# Patient Record
Sex: FEMALE | Race: WHITE | NOT HISPANIC OR LATINO | Employment: OTHER | URBAN - METROPOLITAN AREA
[De-identification: names, ages, dates, MRNs, and addresses within clinical notes are randomized per-mention and may not be internally consistent; named-entity substitution may affect disease eponyms.]

---

## 2017-02-06 RX ORDER — CYCLOBENZAPRINE HCL 10 MG
TABLET ORAL
Qty: 60 TABLET | Refills: 0 | Status: SHIPPED | OUTPATIENT
Start: 2017-02-06 | End: 2017-03-06 | Stop reason: SDUPTHER

## 2017-02-08 RX ORDER — TRAMADOL HYDROCHLORIDE 50 MG/1
TABLET ORAL
Qty: 20 TABLET | Refills: 0 | Status: SHIPPED | OUTPATIENT
Start: 2017-02-08 | End: 2017-02-28 | Stop reason: SDUPTHER

## 2017-02-27 RX ORDER — GABAPENTIN 800 MG/1
TABLET ORAL
Qty: 270 TABLET | Refills: 1 | Status: SHIPPED | OUTPATIENT
Start: 2017-02-27 | End: 2017-08-27 | Stop reason: SDUPTHER

## 2017-02-28 ENCOUNTER — OFFICE VISIT (OUTPATIENT)
Dept: FAMILY MEDICINE CLINIC | Facility: CLINIC | Age: 62
End: 2017-02-28

## 2017-02-28 VITALS
WEIGHT: 154 LBS | TEMPERATURE: 98 F | SYSTOLIC BLOOD PRESSURE: 140 MMHG | RESPIRATION RATE: 16 BRPM | BODY MASS INDEX: 25.66 KG/M2 | HEIGHT: 65 IN | DIASTOLIC BLOOD PRESSURE: 90 MMHG

## 2017-02-28 DIAGNOSIS — I10 ESSENTIAL HYPERTENSION: Primary | ICD-10-CM

## 2017-02-28 DIAGNOSIS — M54.50 CHRONIC BILATERAL LOW BACK PAIN WITHOUT SCIATICA: ICD-10-CM

## 2017-02-28 DIAGNOSIS — G89.29 CHRONIC BILATERAL LOW BACK PAIN WITHOUT SCIATICA: ICD-10-CM

## 2017-02-28 DIAGNOSIS — G47.10 HYPERSOMNOLENCE: ICD-10-CM

## 2017-02-28 PROCEDURE — 99213 OFFICE O/P EST LOW 20 MIN: CPT | Performed by: FAMILY MEDICINE

## 2017-02-28 RX ORDER — AMOXICILLIN 875 MG/1
875 TABLET, COATED ORAL 2 TIMES DAILY
Qty: 14 TABLET | Refills: 0 | Status: SHIPPED | OUTPATIENT
Start: 2017-02-28 | End: 2017-07-31

## 2017-02-28 RX ORDER — MELOXICAM 15 MG/1
15 TABLET ORAL DAILY PRN
Qty: 30 TABLET | Refills: 5 | Status: SHIPPED | OUTPATIENT
Start: 2017-02-28 | End: 2017-10-09 | Stop reason: SDUPTHER

## 2017-02-28 RX ORDER — TRAMADOL HYDROCHLORIDE 50 MG/1
50 TABLET ORAL EVERY 8 HOURS PRN
Qty: 50 TABLET | Refills: 0 | Status: SHIPPED | OUTPATIENT
Start: 2017-02-28 | End: 2017-03-24 | Stop reason: SDUPTHER

## 2017-02-28 NOTE — PATIENT INSTRUCTIONS
This is a very nice 61-year-old who is here for follow-up.  She appears to be doing very well so I will renew her prescription.  I would like her to call if there is a problem.

## 2017-02-28 NOTE — PROGRESS NOTES
Subjective   Jayne Beltran is a 61 y.o. female presenting with   Chief Complaint   Patient presents with   • Diabetes   • Hypertension   • Medication Check up   • Med Refill     TRAMADOL  and  MELOXICAM    • Sore Throat        HPI Comments: 61-year-old  white female former smoker comes in today for follow-up for high blood pressure and chronic back pain and for refill on her tramadol and meloxicam.  She tells me that she is doing well physically but she is somewhat tired of dealing with her  Golden.  She said that he does not want to do anything but sit around the house and watch TV.    Otherwise she is fine without any current complaints, She lately has developed a sore throat and sinus pressure and she tells me that Golden had to be put on antibiotics for cellulitis of his leg.    Diabetes     Hypertension     Sore Throat           The following portions of the patient's history were reviewed and updated as appropriate: current medications, past family history, past medical history, past social history, past surgical history and problem list.    Review of Systems   HENT: Positive for sore throat.    Musculoskeletal: Positive for back pain.   All other systems reviewed and are negative.      Objective   Physical Exam   Constitutional: She is oriented to person, place, and time. She appears well-developed and well-nourished. No distress.   HENT:   Head: Normocephalic and atraumatic.   Nose: Mucosal edema and rhinorrhea present.   Mouth/Throat: Posterior oropharyngeal erythema present. No oropharyngeal exudate or posterior oropharyngeal edema.   Eyes: EOM are normal. Pupils are equal, round, and reactive to light.   Neck: Normal range of motion. Neck supple. No tracheal deviation present. No thyromegaly present.   Cardiovascular: Normal rate, regular rhythm, normal heart sounds and intact distal pulses.  Exam reveals no friction rub.    No murmur heard.  Pulmonary/Chest: Effort normal and breath  sounds normal. No respiratory distress. She has no wheezes.   Musculoskeletal: Normal range of motion. She exhibits no edema or tenderness.   Neurological: She is alert and oriented to person, place, and time. No cranial nerve deficit. Coordination normal.   Skin: Skin is warm and dry.   Psychiatric: She has a normal mood and affect. Her behavior is normal.   Nursing note and vitals reviewed.      Assessment/Plan   Jayne was seen today for diabetes, hypertension, medication check up, med refill and sore throat.    Diagnoses and all orders for this visit:    Essential hypertension  -     Comprehensive Metabolic Panel  -     TSH    Hypersomnolence    Chronic bilateral low back pain without sciatica    Other orders  -     meloxicam (MOBIC) 15 MG tablet; Take 1 tablet by mouth Daily As Needed for mild pain (1-3).  -     traMADol (ULTRAM) 50 MG tablet; Take 1 tablet by mouth Every 8 (Eight) Hours As Needed for moderate pain (4-6).                   I would like him to return for another visit in 6 month(s)

## 2017-03-01 LAB
ALBUMIN SERPL-MCNC: 4.5 G/DL (ref 3.5–5.2)
ALBUMIN/GLOB SERPL: 1.7 G/DL
ALP SERPL-CCNC: 125 U/L (ref 39–117)
ALT SERPL-CCNC: 55 U/L (ref 1–33)
AST SERPL-CCNC: 32 U/L (ref 1–32)
BILIRUB SERPL-MCNC: 0.6 MG/DL (ref 0.1–1.2)
BUN SERPL-MCNC: 16 MG/DL (ref 8–23)
BUN/CREAT SERPL: 18.8 (ref 7–25)
CALCIUM SERPL-MCNC: 10.1 MG/DL (ref 8.6–10.5)
CHLORIDE SERPL-SCNC: 98 MMOL/L (ref 98–107)
CO2 SERPL-SCNC: 29.5 MMOL/L (ref 22–29)
CREAT SERPL-MCNC: 0.85 MG/DL (ref 0.57–1)
GLOBULIN SER CALC-MCNC: 2.6 GM/DL
GLUCOSE SERPL-MCNC: 120 MG/DL (ref 65–99)
POTASSIUM SERPL-SCNC: 3.7 MMOL/L (ref 3.5–5.2)
PROT SERPL-MCNC: 7.1 G/DL (ref 6–8.5)
SODIUM SERPL-SCNC: 144 MMOL/L (ref 136–145)
TSH SERPL DL<=0.005 MIU/L-ACNC: 1.22 MIU/ML (ref 0.27–4.2)

## 2017-03-07 RX ORDER — CYCLOBENZAPRINE HCL 10 MG
TABLET ORAL
Qty: 60 TABLET | Refills: 0 | Status: SHIPPED | OUTPATIENT
Start: 2017-03-07 | End: 2017-04-19 | Stop reason: SDUPTHER

## 2017-03-07 RX ORDER — CLONIDINE HYDROCHLORIDE 0.2 MG/1
TABLET ORAL
Qty: 180 TABLET | Refills: 1 | Status: SHIPPED | OUTPATIENT
Start: 2017-03-07 | End: 2017-09-20 | Stop reason: SDUPTHER

## 2017-03-24 RX ORDER — ROPINIROLE 1 MG/1
TABLET, FILM COATED ORAL
Qty: 90 TABLET | Refills: 0 | Status: SHIPPED | OUTPATIENT
Start: 2017-03-24 | End: 2017-06-01 | Stop reason: SDUPTHER

## 2017-03-24 RX ORDER — TRAMADOL HYDROCHLORIDE 50 MG/1
TABLET ORAL
Qty: 50 TABLET | Refills: 0 | Status: SHIPPED | OUTPATIENT
Start: 2017-03-24 | End: 2017-04-19 | Stop reason: SDUPTHER

## 2017-04-20 RX ORDER — TRAMADOL HYDROCHLORIDE 50 MG/1
TABLET ORAL
Qty: 50 TABLET | Refills: 0 | OUTPATIENT
Start: 2017-04-20 | End: 2017-05-14 | Stop reason: SDUPTHER

## 2017-04-20 RX ORDER — CYCLOBENZAPRINE HCL 10 MG
TABLET ORAL
Qty: 60 TABLET | Refills: 0 | Status: SHIPPED | OUTPATIENT
Start: 2017-04-20 | End: 2017-06-01 | Stop reason: SDUPTHER

## 2017-04-24 RX ORDER — METOPROLOL SUCCINATE 200 MG/1
TABLET, EXTENDED RELEASE ORAL
Qty: 90 TABLET | Refills: 1 | Status: SHIPPED | OUTPATIENT
Start: 2017-04-24 | End: 2017-10-29 | Stop reason: SDUPTHER

## 2017-05-15 RX ORDER — TRAMADOL HYDROCHLORIDE 50 MG/1
TABLET ORAL
Qty: 50 TABLET | Refills: 0 | Status: SHIPPED | OUTPATIENT
Start: 2017-05-15 | End: 2017-06-12 | Stop reason: SDUPTHER

## 2017-05-18 RX ORDER — MELOXICAM 15 MG/1
TABLET ORAL
Qty: 30 TABLET | Refills: 4 | Status: SHIPPED | OUTPATIENT
Start: 2017-05-18 | End: 2017-07-31

## 2017-06-02 RX ORDER — CYCLOBENZAPRINE HCL 10 MG
TABLET ORAL
Qty: 60 TABLET | Refills: 2 | Status: SHIPPED | OUTPATIENT
Start: 2017-06-02 | End: 2017-10-24 | Stop reason: SDUPTHER

## 2017-06-02 RX ORDER — ROPINIROLE 1 MG/1
TABLET, FILM COATED ORAL
Qty: 90 TABLET | Refills: 0 | Status: SHIPPED | OUTPATIENT
Start: 2017-06-02 | End: 2017-08-09 | Stop reason: SDUPTHER

## 2017-06-05 RX ORDER — OMEPRAZOLE 20 MG/1
CAPSULE, DELAYED RELEASE ORAL
Qty: 90 CAPSULE | Refills: 1 | Status: SHIPPED | OUTPATIENT
Start: 2017-06-05 | End: 2017-09-22 | Stop reason: SDUPTHER

## 2017-06-06 ENCOUNTER — TELEPHONE (OUTPATIENT)
Dept: GASTROENTEROLOGY | Facility: CLINIC | Age: 62
End: 2017-06-06

## 2017-06-13 RX ORDER — TRAMADOL HYDROCHLORIDE 50 MG/1
TABLET ORAL
Qty: 50 TABLET | Refills: 0 | Status: SHIPPED | OUTPATIENT
Start: 2017-06-13 | End: 2017-07-05 | Stop reason: SDUPTHER

## 2017-06-15 PROBLEM — K21.9 GASTROESOPHAGEAL REFLUX DISEASE: Status: ACTIVE | Noted: 2017-06-15

## 2017-06-15 PROBLEM — E11.9 TYPE 2 DIABETES MELLITUS (HCC): Status: ACTIVE | Noted: 2017-06-15

## 2017-06-15 PROBLEM — L82.0 INFLAMED SEBORRHEIC KERATOSIS: Status: ACTIVE | Noted: 2017-06-15

## 2017-06-15 PROBLEM — R73.9 HYPERGLYCEMIA: Status: ACTIVE | Noted: 2017-06-15

## 2017-06-15 PROBLEM — G25.81 RESTLESS LEGS SYNDROME: Status: ACTIVE | Noted: 2017-06-15

## 2017-06-15 PROBLEM — G62.9 PERIPHERAL NEUROPATHY: Status: ACTIVE | Noted: 2017-06-15

## 2017-06-21 RX ORDER — LISINOPRIL 40 MG/1
TABLET ORAL
Qty: 180 TABLET | Refills: 1 | Status: SHIPPED | OUTPATIENT
Start: 2017-06-21 | End: 2017-12-16 | Stop reason: SDUPTHER

## 2017-07-07 RX ORDER — TRAMADOL HYDROCHLORIDE 50 MG/1
TABLET ORAL
Qty: 50 TABLET | Refills: 0 | OUTPATIENT
Start: 2017-07-07 | End: 2017-08-02 | Stop reason: SDUPTHER

## 2017-07-17 NOTE — TELEPHONE ENCOUNTER
We have been unable to reach you to schedule your colonoscopy with Dr. Gorman.  Please contact our office to schedule.

## 2017-07-31 ENCOUNTER — OFFICE VISIT (OUTPATIENT)
Dept: FAMILY MEDICINE CLINIC | Facility: CLINIC | Age: 62
End: 2017-07-31

## 2017-07-31 VITALS
SYSTOLIC BLOOD PRESSURE: 190 MMHG | OXYGEN SATURATION: 96 % | WEIGHT: 156 LBS | HEIGHT: 65 IN | DIASTOLIC BLOOD PRESSURE: 106 MMHG | BODY MASS INDEX: 25.99 KG/M2 | HEART RATE: 126 BPM | TEMPERATURE: 98.4 F

## 2017-07-31 DIAGNOSIS — I10 ESSENTIAL HYPERTENSION: Primary | ICD-10-CM

## 2017-07-31 DIAGNOSIS — L03.031 PARONYCHIA OF GREAT TOE, RIGHT: ICD-10-CM

## 2017-07-31 DIAGNOSIS — E11.9 TYPE 2 DIABETES MELLITUS WITHOUT COMPLICATION, WITHOUT LONG-TERM CURRENT USE OF INSULIN (HCC): ICD-10-CM

## 2017-07-31 DIAGNOSIS — G25.81 RESTLESS LEGS SYNDROME: ICD-10-CM

## 2017-07-31 DIAGNOSIS — K21.9 GASTROESOPHAGEAL REFLUX DISEASE WITHOUT ESOPHAGITIS: ICD-10-CM

## 2017-07-31 PROBLEM — R73.9 HYPERGLYCEMIA: Status: RESOLVED | Noted: 2017-06-15 | Resolved: 2017-07-31

## 2017-07-31 PROCEDURE — 96372 THER/PROPH/DIAG INJ SC/IM: CPT | Performed by: FAMILY MEDICINE

## 2017-07-31 PROCEDURE — 99213 OFFICE O/P EST LOW 20 MIN: CPT | Performed by: FAMILY MEDICINE

## 2017-07-31 RX ORDER — CEFTRIAXONE 500 MG/1
500 INJECTION, POWDER, FOR SOLUTION INTRAMUSCULAR; INTRAVENOUS EVERY 24 HOURS
Status: DISCONTINUED | OUTPATIENT
Start: 2017-07-31 | End: 2018-02-06

## 2017-07-31 RX ORDER — AMOXICILLIN AND CLAVULANATE POTASSIUM 875; 125 MG/1; MG/1
1 TABLET, FILM COATED ORAL 2 TIMES DAILY
Qty: 14 TABLET | Refills: 0 | Status: SHIPPED | OUTPATIENT
Start: 2017-07-31 | End: 2017-10-13

## 2017-07-31 RX ADMIN — CEFTRIAXONE 500 MG: 500 INJECTION, POWDER, FOR SOLUTION INTRAMUSCULAR; INTRAVENOUS at 15:52

## 2017-07-31 NOTE — PATIENT INSTRUCTIONS
This is a very nice 62-year-old who is here for infected right great toe.  I will request a Rocephin and send out a prescription for Augmentin to take twice daily with food.  I also would like her to soak her foot in warm water with Epsom salts at least twice daily.  I would like her to call Wednesday if it is not starting to look better.    I also would like her to be sure and take all of her blood pressure medicine and monitor her blood pressure.  I would like her to call if it remains over 140/90.

## 2017-07-31 NOTE — PROGRESS NOTES
"Subjective   Jayne Beltran is a 62 y.o. female presenting with   Chief Complaint   Patient presents with   • Infected Toe     right foot 1st digit x 1 wk         HPI Comments: This is a 62-year-old  former smoker with diabetes and high blood pressure and peripheral neuropathy and restless leg syndrome who just got back from a family vacation to a place called \"\[facility]\""\".  She was there with her family for 4 days (except her , Golden, who refused to go) but forgot her medication.  Therefore she has not been on blood pressure medicine or metformin for the past 4 days.  I rechecked her blood pressure when she had been sitting in the room and got 168/94, but her pulse remained at 114 bpm.  I pointed out to her that her metoprolol was a rate limiting medication and that this could explain the tachycardia.  She said part of the problem was that she was in pain from her right great toe.  She was also when pain from her peripheral neuropathy since she has not been on her gabapentin.    Fortunately she does not have any lymphangeitis running up her right leg and she does not have any fever or chills.       The following portions of the patient's history were reviewed and updated as appropriate: current medications, past family history, past medical history, past social history, past surgical history and problem list.    Review of Systems   Musculoskeletal: Positive for arthralgias, back pain and myalgias.   All other systems reviewed and are negative.      Objective   Physical Exam   Constitutional: She is oriented to person, place, and time. She appears well-developed and well-nourished. No distress.   HENT:   Head: Normocephalic and atraumatic.   Eyes: EOM are normal. Pupils are equal, round, and reactive to light.   Neck: Normal range of motion. Neck supple.   Cardiovascular: Regular rhythm, normal heart sounds and intact distal pulses.  Tachycardia present.    No murmur " heard.  Pulmonary/Chest: Effort normal and breath sounds normal. No respiratory distress. She has no wheezes. She has no rales.   Musculoskeletal: She exhibits tenderness (tenderness to palpate her right great toe and also discomfort in her lumbar spine to range of motion.). She exhibits no edema or deformity.        Neurological: She is alert and oriented to person, place, and time. No cranial nerve deficit. Coordination normal.   Skin: Skin is warm and dry. She is not diaphoretic.   Psychiatric: She has a normal mood and affect. Her behavior is normal.   Nursing note and vitals reviewed.      Assessment/Plan   Jayne was seen today for infected toe.    Diagnoses and all orders for this visit:    Essential hypertension    Type 2 diabetes mellitus without complication, without long-term current use of insulin    Restless legs syndrome    Gastroesophageal reflux disease without esophagitis    Paronychia of great toe, right  -     cefTRIAXone (ROCEPHIN) injection 500 mg; Inject 500 mg into the shoulder, thigh, or buttocks Daily.    Other orders  -     amoxicillin-clavulanate (AUGMENTIN) 875-125 MG per tablet; Take 1 tablet by mouth 2 (Two) Times a Day.                   I would like him to return for another visit in 3 month(s)

## 2017-08-03 RX ORDER — TRAMADOL HYDROCHLORIDE 50 MG/1
TABLET ORAL
Qty: 50 TABLET | Refills: 0 | OUTPATIENT
Start: 2017-08-03 | End: 2017-09-03 | Stop reason: SDUPTHER

## 2017-08-10 RX ORDER — ROPINIROLE 1 MG/1
TABLET, FILM COATED ORAL
Qty: 90 TABLET | Refills: 0 | Status: SHIPPED | OUTPATIENT
Start: 2017-08-10 | End: 2017-10-08 | Stop reason: SDUPTHER

## 2017-08-28 RX ORDER — GABAPENTIN 800 MG/1
TABLET ORAL
Qty: 270 TABLET | Refills: 0 | Status: SHIPPED | OUTPATIENT
Start: 2017-08-28 | End: 2017-10-13 | Stop reason: SDUPTHER

## 2017-09-05 RX ORDER — TRAMADOL HYDROCHLORIDE 50 MG/1
TABLET ORAL
Qty: 50 TABLET | Refills: 0 | OUTPATIENT
Start: 2017-09-05 | End: 2017-10-08 | Stop reason: SDUPTHER

## 2017-09-20 RX ORDER — CLONIDINE HYDROCHLORIDE 0.2 MG/1
TABLET ORAL
Qty: 180 TABLET | Refills: 1 | Status: SHIPPED | OUTPATIENT
Start: 2017-09-20 | End: 2017-10-13

## 2017-09-22 ENCOUNTER — TELEPHONE (OUTPATIENT)
Dept: FAMILY MEDICINE CLINIC | Facility: CLINIC | Age: 62
End: 2017-09-22

## 2017-09-22 RX ORDER — OMEPRAZOLE 20 MG/1
20 CAPSULE, DELAYED RELEASE ORAL DAILY
Qty: 90 CAPSULE | Refills: 1 | Status: SHIPPED | OUTPATIENT
Start: 2017-09-22 | End: 2018-03-29 | Stop reason: SDUPTHER

## 2017-09-22 NOTE — TELEPHONE ENCOUNTER
Golden called stating Jayne needs a refill on her Omeprazole   Send to [facility] in [place]

## 2017-10-02 RX ORDER — AMLODIPINE BESYLATE 5 MG/1
TABLET ORAL
Qty: 90 TABLET | Refills: 0 | Status: SHIPPED | OUTPATIENT
Start: 2017-10-02 | End: 2017-12-25 | Stop reason: SDUPTHER

## 2017-10-09 RX ORDER — MELOXICAM 15 MG/1
TABLET ORAL
Qty: 30 TABLET | Refills: 3 | Status: SHIPPED | OUTPATIENT
Start: 2017-10-09 | End: 2018-01-29 | Stop reason: SDUPTHER

## 2017-10-09 RX ORDER — TRAMADOL HYDROCHLORIDE 50 MG/1
TABLET ORAL
Qty: 50 TABLET | Refills: 0 | OUTPATIENT
Start: 2017-10-09 | End: 2017-11-09 | Stop reason: SDUPTHER

## 2017-10-09 RX ORDER — ROPINIROLE 1 MG/1
TABLET, FILM COATED ORAL
Qty: 90 TABLET | Refills: 0 | Status: SHIPPED | OUTPATIENT
Start: 2017-10-09 | End: 2017-12-31 | Stop reason: SDUPTHER

## 2017-10-09 RX ORDER — GABAPENTIN 800 MG/1
TABLET ORAL
Qty: 270 TABLET | Refills: 0 | OUTPATIENT
Start: 2017-10-09 | End: 2017-11-01 | Stop reason: SDUPTHER

## 2017-10-13 ENCOUNTER — OFFICE VISIT (OUTPATIENT)
Dept: FAMILY MEDICINE CLINIC | Facility: CLINIC | Age: 62
End: 2017-10-13

## 2017-10-13 VITALS
HEART RATE: 72 BPM | WEIGHT: 156 LBS | SYSTOLIC BLOOD PRESSURE: 118 MMHG | DIASTOLIC BLOOD PRESSURE: 70 MMHG | OXYGEN SATURATION: 95 % | BODY MASS INDEX: 25.99 KG/M2 | HEIGHT: 65 IN

## 2017-10-13 DIAGNOSIS — I10 ESSENTIAL HYPERTENSION: Primary | ICD-10-CM

## 2017-10-13 DIAGNOSIS — K21.9 GASTROESOPHAGEAL REFLUX DISEASE WITHOUT ESOPHAGITIS: ICD-10-CM

## 2017-10-13 DIAGNOSIS — E11.9 TYPE 2 DIABETES MELLITUS WITHOUT COMPLICATION, WITHOUT LONG-TERM CURRENT USE OF INSULIN (HCC): ICD-10-CM

## 2017-10-13 DIAGNOSIS — G60.9 IDIOPATHIC PERIPHERAL NEUROPATHY: ICD-10-CM

## 2017-10-13 DIAGNOSIS — M89.9 BONE LESION: ICD-10-CM

## 2017-10-13 PROCEDURE — 99213 OFFICE O/P EST LOW 20 MIN: CPT | Performed by: FAMILY MEDICINE

## 2017-10-13 NOTE — PROGRESS NOTES
Subjective   Jayne Beltran is a 62 y.o. female presenting with   Chief Complaint   Patient presents with   • Follow-up     elevated blood sugar on 9-2-17        HPI Comments: 62-year-old  white female nonsmoker was recently admitted to the hospital for evaluation of headache and abdominal pain and was found to have quite elevated blood pressure and elevated blood sugar.  She was treated and released and said she is doing a lot better today but she was concerned because there was some mention of an irregularity in the calvarium of the skull.  I went and reviewed that record and found that the radiologist said this was possibly a normal variant and that he did see it on imaging that was done in March and that was not appreciably different, but he did raise the issue of possible multiple myeloma.  Therefore I will request a serum protein electrophoresis and sedimentation rate to be thorough.    She says she is doing well on her increased dose of metformin and that her blood pressure is better controlled than it has been in quite some time.       The following portions of the patient's history were reviewed and updated as appropriate: current medications, past family history, past medical history, past social history, past surgical history and problem list.    Review of Systems   All other systems reviewed and are negative.      Objective   Physical Exam   Constitutional: She is oriented to person, place, and time. She appears well-developed and well-nourished. No distress.   HENT:   Head: Normocephalic and atraumatic.   Eyes: EOM are normal. Pupils are equal, round, and reactive to light.   Neck: Normal range of motion. Neck supple.   Cardiovascular: Normal rate and regular rhythm.    Pulmonary/Chest: Effort normal and breath sounds normal.   Musculoskeletal: Normal range of motion. She exhibits no edema.   Neurological: She is alert and oriented to person, place, and time.   Skin: Skin is warm and dry. She  is not diaphoretic.   Psychiatric: She has a normal mood and affect. Her behavior is normal.   Nursing note and vitals reviewed.      Assessment/Plan   Jayne was seen today for follow-up.    Diagnoses and all orders for this visit:    Essential hypertension  -     Comprehensive Metabolic Panel    Idiopathic peripheral neuropathy  -     Comprehensive Metabolic Panel    Type 2 diabetes mellitus without complication, without long-term current use of insulin  -     Comprehensive Metabolic Panel  -     Hemoglobin A1c    Gastroesophageal reflux disease without esophagitis  -     Comprehensive Metabolic Panel    Bone lesion  -     Protein Elec + Interp, Serum  -     Comprehensive Metabolic Panel  -     Sedimentation Rate                   I would like him to return for another visit in 6 month(s)

## 2017-10-13 NOTE — PATIENT INSTRUCTIONS
This is a very nice 62-year-old who recently had to be admitted to the hospital.  One of the things that was found was an irregularity in the skull.  Although this was thought by the radiologist to probably be benign, he did suggest clinical correlation so I will request blood work to further evaluate that.  I will call her when the results are available.

## 2017-10-16 DIAGNOSIS — IMO0001 UNCONTROLLED DIABETES MELLITUS TYPE 2 WITHOUT COMPLICATIONS, UNSPECIFIED LONG TERM INSULIN USE STATUS: Primary | ICD-10-CM

## 2017-10-16 LAB
ALBUMIN SERPL ELPH-MCNC: 3.6 G/DL (ref 2.9–4.4)
ALBUMIN SERPL-MCNC: 4.1 G/DL (ref 3.5–5.2)
ALBUMIN/GLOB SERPL: 1.1 {RATIO} (ref 0.7–1.7)
ALBUMIN/GLOB SERPL: 1.5 G/DL
ALP SERPL-CCNC: 122 U/L (ref 39–117)
ALPHA1 GLOB SERPL ELPH-MCNC: 0.3 G/DL (ref 0–0.4)
ALPHA2 GLOB SERPL ELPH-MCNC: 0.9 G/DL (ref 0.4–1)
ALT SERPL-CCNC: 44 U/L (ref 1–33)
AST SERPL-CCNC: 42 U/L (ref 1–32)
B-GLOBULIN SERPL ELPH-MCNC: 1.1 G/DL (ref 0.7–1.3)
BILIRUB SERPL-MCNC: 0.6 MG/DL (ref 0.1–1.2)
BUN SERPL-MCNC: 12 MG/DL (ref 8–23)
BUN/CREAT SERPL: 15 (ref 7–25)
CALCIUM SERPL-MCNC: 9 MG/DL (ref 8.6–10.5)
CHLORIDE SERPL-SCNC: 96 MMOL/L (ref 98–107)
CO2 SERPL-SCNC: 29.3 MMOL/L (ref 22–29)
CREAT SERPL-MCNC: 0.8 MG/DL (ref 0.57–1)
ERYTHROCYTE [SEDIMENTATION RATE] IN BLOOD BY WESTERGREN METHOD: 11 MM/HR (ref 0–30)
GAMMA GLOB SERPL ELPH-MCNC: 1 G/DL (ref 0.4–1.8)
GLOBULIN SER CALC-MCNC: 2.8 GM/DL
GLOBULIN SER CALC-MCNC: 3.3 G/DL (ref 2.2–3.9)
GLUCOSE SERPL-MCNC: 258 MG/DL (ref 65–99)
HBA1C MFR BLD: 10.92 % (ref 4.8–5.6)
Lab: NORMAL
M PROTEIN SERPL ELPH-MCNC: NORMAL G/DL
POTASSIUM SERPL-SCNC: 3.8 MMOL/L (ref 3.5–5.2)
PROT PATTERN SERPL ELPH-IMP: NORMAL
PROT SERPL-MCNC: 6.9 G/DL (ref 6–8.5)
SODIUM SERPL-SCNC: 139 MMOL/L (ref 136–145)

## 2017-10-16 RX ORDER — GABAPENTIN 800 MG/1
TABLET ORAL
Qty: 270 TABLET | Refills: 2 | OUTPATIENT
Start: 2017-10-16 | End: 2018-07-09 | Stop reason: SDUPTHER

## 2017-10-17 ENCOUNTER — TELEPHONE (OUTPATIENT)
Dept: FAMILY MEDICINE CLINIC | Facility: CLINIC | Age: 62
End: 2017-10-17

## 2017-10-17 NOTE — TELEPHONE ENCOUNTER
Pt's  asked if there is a location near [place] or out in [place] that Jayne can go to see a endocrinologist?  Dr Ceballos did a referral for her yesterday

## 2017-10-18 NOTE — TELEPHONE ENCOUNTER
Pt wants to go near [facility] or in [place] to a Endocrinologist   Dr Ceballos did a referral for this patient

## 2017-10-25 RX ORDER — CYCLOBENZAPRINE HCL 10 MG
TABLET ORAL
Qty: 60 TABLET | Refills: 1 | Status: SHIPPED | OUTPATIENT
Start: 2017-10-25 | End: 2018-03-05 | Stop reason: SDUPTHER

## 2017-10-30 RX ORDER — METOPROLOL SUCCINATE 200 MG/1
TABLET, EXTENDED RELEASE ORAL
Qty: 90 TABLET | Refills: 1 | Status: SHIPPED | OUTPATIENT
Start: 2017-10-30 | End: 2018-04-29 | Stop reason: SDUPTHER

## 2017-11-01 ENCOUNTER — OFFICE VISIT (OUTPATIENT)
Dept: ENDOCRINOLOGY | Age: 62
End: 2017-11-01

## 2017-11-01 VITALS
BODY MASS INDEX: 25.22 KG/M2 | WEIGHT: 151.4 LBS | DIASTOLIC BLOOD PRESSURE: 76 MMHG | SYSTOLIC BLOOD PRESSURE: 132 MMHG | OXYGEN SATURATION: 92 % | HEART RATE: 82 BPM | HEIGHT: 65 IN

## 2017-11-01 DIAGNOSIS — G25.81 RESTLESS LEGS SYNDROME: ICD-10-CM

## 2017-11-01 DIAGNOSIS — E78.5 DYSLIPIDEMIA: ICD-10-CM

## 2017-11-01 DIAGNOSIS — E55.9 VITAMIN D DEFICIENCY: ICD-10-CM

## 2017-11-01 DIAGNOSIS — G45.8 OTHER SPECIFIED TRANSIENT CEREBRAL ISCHEMIAS: ICD-10-CM

## 2017-11-01 DIAGNOSIS — I10 ESSENTIAL HYPERTENSION: ICD-10-CM

## 2017-11-01 DIAGNOSIS — E11.9 TYPE 2 DIABETES MELLITUS WITHOUT COMPLICATION, WITHOUT LONG-TERM CURRENT USE OF INSULIN (HCC): Primary | ICD-10-CM

## 2017-11-01 PROCEDURE — 99205 OFFICE O/P NEW HI 60 MIN: CPT | Performed by: INTERNAL MEDICINE

## 2017-11-01 RX ORDER — TITANIUM DIOXIDE, OCTINOXATE, ZINC OXIDE 4.61; 1.6; .78 G/40ML; G/40ML; G/40ML
CREAM TOPICAL
COMMUNITY
End: 2019-08-29

## 2017-11-01 RX ORDER — CLONIDINE HYDROCHLORIDE 0.2 MG/1
0.2 TABLET ORAL 2 TIMES DAILY
COMMUNITY
End: 2019-08-29

## 2017-11-01 RX ORDER — ASPIRIN 81 MG/1
81 TABLET ORAL DAILY
COMMUNITY
End: 2020-09-08 | Stop reason: HOSPADM

## 2017-11-01 RX ORDER — DAPAGLIFLOZIN AND METFORMIN HYDROCHLORIDE 5; 1000 MG/1; MG/1
2 TABLET, FILM COATED, EXTENDED RELEASE ORAL DAILY
Qty: 60 TABLET | Refills: 5 | Status: SHIPPED | OUTPATIENT
Start: 2017-11-01 | End: 2018-04-28 | Stop reason: SDUPTHER

## 2017-11-01 RX ORDER — FAMOTIDINE 20 MG
TABLET ORAL
COMMUNITY
End: 2020-03-02

## 2017-11-01 RX ORDER — INSULIN GLARGINE AND LIXISENATIDE 100; 33 U/ML; UG/ML
60 INJECTION, SOLUTION SUBCUTANEOUS DAILY
Qty: 5 PEN | Refills: 5 | Status: SHIPPED | OUTPATIENT
Start: 2017-11-01 | End: 2019-05-14

## 2017-11-01 NOTE — PROGRESS NOTES
"Meagan Beltran is a 62 y.o. female     NEW PATIENT CONSULT, REFERRED BY DR. FELTON FOR TYPE 2 DIABETES, UNCONTROLLED.    /76  Pulse 82  Ht 65\" (165.1 cm)  Wt 151 lb 6.4 oz (68.7 kg)  SpO2 92%  BMI 25.19 kg/m2     Allergies   Allergen Reactions   • Codeine          Current Outpatient Prescriptions:   •  amLODIPine (NORVASC) 5 MG tablet, TAKE 1 TABLET DAILY., Disp: 90 tablet, Rfl: 0  •  aspirin 81 MG EC tablet, Take 81 mg by mouth Daily., Disp: , Rfl:   •  CloNIDine (CATAPRES) 0.2 MG tablet, Take 0.2 mg by mouth 2 (Two) Times a Day., Disp: , Rfl:   •  Cranberry 400 MG capsule, Take  by mouth., Disp: , Rfl:   •  cyclobenzaprine (FLEXERIL) 10 MG tablet, TAKE ONE TABLET BY MOUTH TWICE A DAY AS NEEDED FOR MUSCLE SPASMS, Disp: 60 tablet, Rfl: 1  •  gabapentin (NEURONTIN) 800 MG tablet, TAKE 1 TABLET BY MOUTH 3 TIMES A DAY, Disp: 270 tablet, Rfl: 2  •  IRON, FERROUS GLUCONATE, PO, Take  by mouth., Disp: , Rfl:   •  lisinopril (PRINIVIL,ZESTRIL) 40 MG tablet, TAKE 2 TABLETS BY MOUTH EVERY DAY, Disp: 180 tablet, Rfl: 1  •  LYRICA 75 MG capsule, TAKE ONE CAPSULE BY MOUTH TWICE A DAY, Disp: 60 capsule, Rfl: 1  •  meloxicam (MOBIC) 15 MG tablet, TAKE ONE TABLET BY MOUTH DAILY AS NEEDED FOR PAIN, Disp: 30 tablet, Rfl: 3  •  metFORMIN (GLUCOPHAGE) 500 MG tablet, Take 500 mg by mouth 2 (Two) Times a Day., Disp: , Rfl:   •  metoprolol succinate XL (TOPROL-XL) 200 MG 24 hr tablet, TAKE 1 TABLET BY MOUTH EVERY DAY, Disp: 90 tablet, Rfl: 1  •  omeprazole (priLOSEC) 20 MG capsule, Take 1 capsule by mouth Daily., Disp: 90 capsule, Rfl: 1  •  potassium citrate (UROCIT-K) 10 MEQ (1080 MG) CR tablet, Take  by mouth 2 (two) times a day., Disp: , Rfl:   •  rOPINIRole (REQUIP) 1 MG tablet, TAKE ONE TABLET BY MOUTH EVERY NIGHT AT BEDTIME, Disp: 90 tablet, Rfl: 0  •  traMADol (ULTRAM) 50 MG tablet, TAKE 1 TABLET EVERY 8 HOURS AS NEEDED FOR PAIN, Disp: 50 tablet, Rfl: 0  •  Vitamin D, Cholecalciferol, 1000 units capsule, " Take  by mouth., Disp: , Rfl:     Current Facility-Administered Medications:   •  cefTRIAXone (ROCEPHIN) injection 500 mg, 500 mg, Intramuscular, Q24H, Erwin Ceballos MD, 500 mg at 07/31/17 5252        History of Present Illness this is a 61-year-old female who has been referred by Dr. Ceballos her primary care provider for further evaluation and treatment of uncontrolled type II diabetes.  She said she has been known to have type II diabetes for about a little over 2 years and has been on metformin and the 1000 mg daily.  She is also an known patient with hypertension as well as vitamin D deficiency and restless leg syndrome.  Her family history is equally positive for type II diabetes and hypertension and heart disease and stroke.    The following portions of the patient's history were reviewed and updated as appropriate: allergies, current medications, past family history, past medical history, past social history, past surgical history and problem list.    Review of Systems   Constitutional: Negative for chills, fatigue and fever.   HENT: Negative for sore throat, trouble swallowing and voice change.    Eyes: Negative for pain and redness.   Respiratory: Negative for shortness of breath and wheezing.    Gastrointestinal: Negative for abdominal pain, constipation, diarrhea, nausea and vomiting.   Endocrine: Positive for cold intolerance. Negative for heat intolerance.   Genitourinary: Positive for frequency.   Musculoskeletal: Negative for joint swelling.   Skin: Negative for rash and wound.   Neurological: Negative for tremors, light-headedness and headaches.   Hematological: Does not bruise/bleed easily.   Psychiatric/Behavioral: Positive for sleep disturbance. Negative for confusion. The patient is not nervous/anxious.        Objective   Physical Exam   Constitutional: She is oriented to person, place, and time. She appears well-developed and well-nourished. No distress.   HENT:   Head: Normocephalic and  atraumatic.   Right Ear: External ear normal.   Left Ear: External ear normal.   Nose: Nose normal.   Mouth/Throat: Oropharynx is clear and moist. No oropharyngeal exudate.   Eyes: Conjunctivae and EOM are normal. Pupils are equal, round, and reactive to light. Right eye exhibits no discharge. Left eye exhibits no discharge. No scleral icterus.   Neck: Normal range of motion. Neck supple. No JVD present. No tracheal deviation present. No thyromegaly present.   Cardiovascular: Normal rate, regular rhythm, normal heart sounds and intact distal pulses.  Exam reveals no gallop and no friction rub.    No murmur heard.  Pulmonary/Chest: Effort normal and breath sounds normal. No stridor. No respiratory distress. She has no wheezes. She has no rales. She exhibits no tenderness.   Abdominal: Soft. Bowel sounds are normal. She exhibits no distension and no mass. There is no tenderness. There is no rebound and no guarding. No hernia.   Musculoskeletal: Normal range of motion. She exhibits no edema, tenderness or deformity.   Lymphadenopathy:     She has no cervical adenopathy.   Neurological: She is alert and oriented to person, place, and time. She has normal reflexes. She displays normal reflexes. No cranial nerve deficit. She exhibits normal muscle tone. Coordination normal.   Skin: Skin is warm and dry. No rash noted. She is not diaphoretic. No erythema. No pallor.   Psychiatric: She has a normal mood and affect. Her behavior is normal. Judgment and thought content normal.   Nursing note and vitals reviewed.        Assessment/Plan   Jayne was seen today for diabetes.    Diagnoses and all orders for this visit:    Type 2 diabetes mellitus without complication, without long-term current use of insulin  -     T3, Free  -     T4 & TSH ([facility])  -     T4, Free  -     Uric Acid  -     Vitamin D 25 Hydroxy  -     Comprehensive Metabolic Panel  -     C-Peptide  -     Follicle Stimulating Hormone  -     Hemoglobin A1c  -      Lipid Panel  -     Luteinizing Hormone  -     MicroAlbumin, Urine, Random - Urine, Clean Catch  -     ACTH  -     Cortisol  -     T4 & TSH ([facility]); Future  -     Uric Acid; Future  -     Vitamin D 25 Hydroxy; Future  -     Comprehensive Metabolic Panel; Future  -     C-Peptide; Future  -     Hemoglobin A1c; Future  -     Lipid Panel; Future  -     MicroAlbumin, Urine, Random - Urine, Clean Catch; Future    Essential hypertension  -     T3, Free  -     T4 & TSH ([facility])  -     T4, Free  -     Uric Acid  -     Vitamin D 25 Hydroxy  -     Comprehensive Metabolic Panel  -     C-Peptide  -     Follicle Stimulating Hormone  -     Hemoglobin A1c  -     Lipid Panel  -     Luteinizing Hormone  -     MicroAlbumin, Urine, Random - Urine, Clean Catch  -     ACTH  -     Cortisol  -     T4 & TSH ([facility]); Future  -     Uric Acid; Future  -     Vitamin D 25 Hydroxy; Future  -     Comprehensive Metabolic Panel; Future  -     C-Peptide; Future  -     Hemoglobin A1c; Future  -     Lipid Panel; Future  -     MicroAlbumin, Urine, Random - Urine, Clean Catch; Future    Other specified transient cerebral ischemias  -     T3, Free  -     T4 & TSH ([facility])  -     T4, Free  -     Uric Acid  -     Vitamin D 25 Hydroxy  -     Comprehensive Metabolic Panel  -     C-Peptide  -     Follicle Stimulating Hormone  -     Hemoglobin A1c  -     Lipid Panel  -     Luteinizing Hormone  -     MicroAlbumin, Urine, Random - Urine, Clean Catch  -     ACTH  -     Cortisol  -     T4 & TSH ([facility]); Future  -     Uric Acid; Future  -     Vitamin D 25 Hydroxy; Future  -     Comprehensive Metabolic Panel; Future  -     C-Peptide; Future  -     Hemoglobin A1c; Future  -     Lipid Panel; Future  -     MicroAlbumin, Urine, Random - Urine, Clean Catch; Future    Restless legs syndrome  -     T3, Free  -     T4 & TSH ([facility])  -     T4, Free  -     Uric Acid  -     Vitamin D 25 Hydroxy  -     Comprehensive Metabolic Panel  -     C-Peptide  -     Follicle  Stimulating Hormone  -     Hemoglobin A1c  -     Lipid Panel  -     Luteinizing Hormone  -     MicroAlbumin, Urine, Random - Urine, Clean Catch  -     ACTH  -     Cortisol  -     T4 & TSH ([facility]); Future  -     Uric Acid; Future  -     Vitamin D 25 Hydroxy; Future  -     Comprehensive Metabolic Panel; Future  -     C-Peptide; Future  -     Hemoglobin A1c; Future  -     Lipid Panel; Future  -     MicroAlbumin, Urine, Random - Urine, Clean Catch; Future    Dyslipidemia  -     T3, Free  -     T4 & TSH ([facility])  -     T4, Free  -     Uric Acid  -     Vitamin D 25 Hydroxy  -     Comprehensive Metabolic Panel  -     C-Peptide  -     Follicle Stimulating Hormone  -     Hemoglobin A1c  -     Lipid Panel  -     Luteinizing Hormone  -     MicroAlbumin, Urine, Random - Urine, Clean Catch  -     ACTH  -     Cortisol  -     T4 & TSH ([facility]); Future  -     Uric Acid; Future  -     Vitamin D 25 Hydroxy; Future  -     Comprehensive Metabolic Panel; Future  -     C-Peptide; Future  -     Hemoglobin A1c; Future  -     Lipid Panel; Future  -     MicroAlbumin, Urine, Random - Urine, Clean Catch; Future    Vitamin D deficiency  -     T3, Free  -     T4 & TSH ([facility])  -     T4, Free  -     Uric Acid  -     Vitamin D 25 Hydroxy  -     Comprehensive Metabolic Panel  -     C-Peptide  -     Follicle Stimulating Hormone  -     Hemoglobin A1c  -     Lipid Panel  -     Luteinizing Hormone  -     MicroAlbumin, Urine, Random - Urine, Clean Catch  -     ACTH  -     Cortisol  -     T4 & TSH ([facility]); Future  -     Uric Acid; Future  -     Vitamin D 25 Hydroxy; Future  -     Comprehensive Metabolic Panel; Future  -     C-Peptide; Future  -     Hemoglobin A1c; Future  -     Lipid Panel; Future  -     MicroAlbumin, Urine, Random - Urine, Clean Catch; Future    Other orders  -     SOLIQUA 100-33 UNT-MCG/ML solution pen-injector; Inject 60 Units under the skin Daily.  -     XIGDUO XR 5-1000 MG tablet sustained-release 24 hour; Take 2 tablets by  mouth Daily.               His summary I saw and examined this the 62-year-old female for above-mentioned problems.  I reviewed her laboratory evaluations of the 08/21/2015 as well as 03/20/2015 and during which her hemoglobin A1c from 6.8 went to 7.2 and then there is a gap and then on 10/13/2017 she has a hemoglobin A1c of 10.92.  He is clearly out of control and as a result we will go ahead and the obtain an extensive laboratory evaluation and once the results come back we will go ahead and make proper recommendations however for her diabetes am going to go ahead and is start her on Xigduo 5/1000 mg once daily every morning for 4 weeks and then if well tolerated increase it to 2 tablets every morning.  I also stressed the point that because she will be urinating more she needs to drink plenty of liquids and also keep her genital area very clean after the use of toilet or sexual intercourse.  Additionally I am is starting her on Soliqua 16 units every morning and then every 4 days if her fasting blood glucose is greater than 110 increase the dose by 4 units.  I ask her to check her blood glucose 3 times daily.  This office visit including patient counseling which exceeded 50% of the time lasted more than 60 minutes.  She will see Ms. Iris Mazariegos in 4 months or sooner if needed with laboratory evaluation prior to each office visit.

## 2017-11-02 LAB
25(OH)D3+25(OH)D2 SERPL-MCNC: 54.7 NG/ML (ref 30–100)
ACTH PLAS-MCNC: 4.2 PG/ML (ref 7.2–63.3)
ALBUMIN SERPL-MCNC: 4.7 G/DL (ref 3.5–5.2)
ALBUMIN/GLOB SERPL: 1.6 G/DL
ALP SERPL-CCNC: 126 U/L (ref 39–117)
ALT SERPL-CCNC: 39 U/L (ref 1–33)
AST SERPL-CCNC: 33 U/L (ref 1–32)
BILIRUB SERPL-MCNC: 0.8 MG/DL (ref 0.1–1.2)
BUN SERPL-MCNC: 11 MG/DL (ref 8–23)
BUN/CREAT SERPL: 11.7 (ref 7–25)
C PEPTIDE SERPL-MCNC: 4.4 NG/ML (ref 1.1–4.4)
CALCIUM SERPL-MCNC: 9.8 MG/DL (ref 8.6–10.5)
CHLORIDE SERPL-SCNC: 97 MMOL/L (ref 98–107)
CHOLEST SERPL-MCNC: 200 MG/DL (ref 0–200)
CO2 SERPL-SCNC: 28.1 MMOL/L (ref 22–29)
CORTIS SERPL-MCNC: 4.2 UG/DL
CREAT SERPL-MCNC: 0.94 MG/DL (ref 0.57–1)
FSH SERPL-ACNC: 56.8 MIU/ML
GFR SERPLBLD CREATININE-BSD FMLA CKD-EPI: 60 ML/MIN/1.73
GFR SERPLBLD CREATININE-BSD FMLA CKD-EPI: 73 ML/MIN/1.73
GLOBULIN SER CALC-MCNC: 3 GM/DL
GLUCOSE SERPL-MCNC: 128 MG/DL (ref 65–99)
HBA1C MFR BLD: 10.6 % (ref 4.8–5.6)
HDLC SERPL-MCNC: 35 MG/DL (ref 40–60)
LDLC SERPL CALC-MCNC: ABNORMAL MG/DL
LH SERPL-ACNC: 32.4 MIU/ML
MICROALBUMIN UR-MCNC: 460.7 UG/ML
POTASSIUM SERPL-SCNC: 4.2 MMOL/L (ref 3.5–5.2)
PROT SERPL-MCNC: 7.7 G/DL (ref 6–8.5)
SODIUM SERPL-SCNC: 142 MMOL/L (ref 136–145)
T3FREE SERPL-MCNC: 3.9 PG/ML (ref 2–4.4)
T4 FREE SERPL-MCNC: 1.37 NG/DL (ref 0.93–1.7)
T4 SERPL-MCNC: 10.25 MCG/DL (ref 4.5–11.7)
TRIGL SERPL-MCNC: 537 MG/DL (ref 0–150)
TSH SERPL DL<=0.005 MIU/L-ACNC: 1.19 MIU/ML (ref 0.27–4.2)
URATE SERPL-MCNC: 5.1 MG/DL (ref 2.4–5.7)
VLDLC SERPL CALC-MCNC: ABNORMAL MG/DL

## 2017-11-02 RX ORDER — ICOSAPENT ETHYL 1000 MG/1
4 CAPSULE ORAL DAILY
Qty: 120 CAPSULE | Refills: 5 | Status: SHIPPED | OUTPATIENT
Start: 2017-11-02 | End: 2018-07-16

## 2017-11-03 RX ORDER — METOPROLOL SUCCINATE 200 MG/1
TABLET, EXTENDED RELEASE ORAL
Qty: 90 TABLET | Refills: 1 | Status: SHIPPED | OUTPATIENT
Start: 2017-11-03 | End: 2018-06-27 | Stop reason: SDUPTHER

## 2017-11-09 RX ORDER — TRAMADOL HYDROCHLORIDE 50 MG/1
TABLET ORAL
Qty: 50 TABLET | Refills: 0 | OUTPATIENT
Start: 2017-11-09 | End: 2017-12-04 | Stop reason: SDUPTHER

## 2017-12-06 RX ORDER — TRAMADOL HYDROCHLORIDE 50 MG/1
TABLET ORAL
Qty: 50 TABLET | Refills: 0 | OUTPATIENT
Start: 2017-12-06 | End: 2017-12-31 | Stop reason: SDUPTHER

## 2017-12-18 RX ORDER — LISINOPRIL 40 MG/1
TABLET ORAL
Qty: 180 TABLET | Refills: 1 | Status: SHIPPED | OUTPATIENT
Start: 2017-12-18 | End: 2018-09-09 | Stop reason: SDUPTHER

## 2017-12-26 RX ORDER — AMLODIPINE BESYLATE 5 MG/1
TABLET ORAL
Qty: 90 TABLET | Refills: 0 | Status: SHIPPED | OUTPATIENT
Start: 2017-12-26 | End: 2018-03-30 | Stop reason: SDUPTHER

## 2018-01-02 RX ORDER — TRAMADOL HYDROCHLORIDE 50 MG/1
TABLET ORAL
Qty: 50 TABLET | Refills: 0 | OUTPATIENT
Start: 2018-01-02 | End: 2018-01-06 | Stop reason: SDUPTHER

## 2018-01-02 RX ORDER — ROPINIROLE 1 MG/1
TABLET, FILM COATED ORAL
Qty: 90 TABLET | Refills: 0 | Status: SHIPPED | OUTPATIENT
Start: 2018-01-02 | End: 2018-03-30 | Stop reason: SDUPTHER

## 2018-01-08 RX ORDER — TRAMADOL HYDROCHLORIDE 50 MG/1
TABLET ORAL
Qty: 50 TABLET | Refills: 0 | OUTPATIENT
Start: 2018-01-08 | End: 2018-01-29 | Stop reason: SDUPTHER

## 2018-01-29 RX ORDER — TRAMADOL HYDROCHLORIDE 50 MG/1
TABLET ORAL
Qty: 50 TABLET | Refills: 0 | OUTPATIENT
Start: 2018-01-29 | End: 2018-02-26 | Stop reason: SDUPTHER

## 2018-01-29 RX ORDER — MELOXICAM 15 MG/1
TABLET ORAL
Qty: 30 TABLET | Refills: 2 | Status: SHIPPED | OUTPATIENT
Start: 2018-01-29 | End: 2018-02-26 | Stop reason: SDUPTHER

## 2018-02-06 ENCOUNTER — HOSPITAL ENCOUNTER (EMERGENCY)
Facility: HOSPITAL | Age: 63
Discharge: HOME | End: 2018-02-06
Attending: EMERGENCY MEDICINE | Admitting: EMERGENCY MEDICINE

## 2018-02-06 VITALS
HEIGHT: 65 IN | WEIGHT: 152 LBS | TEMPERATURE: 98.4 F | DIASTOLIC BLOOD PRESSURE: 110 MMHG | BODY MASS INDEX: 25.33 KG/M2 | SYSTOLIC BLOOD PRESSURE: 190 MMHG | OXYGEN SATURATION: 96 % | RESPIRATION RATE: 18 BRPM | HEART RATE: 93 BPM

## 2018-02-06 DIAGNOSIS — S01.01XA LACERATION OF SCALP, INITIAL ENCOUNTER: Primary | ICD-10-CM

## 2018-02-06 PROCEDURE — 90715 TDAP VACCINE 7 YRS/> IM: CPT | Performed by: EMERGENCY MEDICINE

## 2018-02-06 PROCEDURE — 90471 IMMUNIZATION ADMIN: CPT | Performed by: EMERGENCY MEDICINE

## 2018-02-06 PROCEDURE — 99283 EMERGENCY DEPT VISIT LOW MDM: CPT

## 2018-02-06 PROCEDURE — 12002 RPR S/N/AX/GEN/TRNK2.6-7.5CM: CPT | Performed by: EMERGENCY MEDICINE

## 2018-02-06 PROCEDURE — 25010000002 TDAP 5-2.5-18.5 LF-MCG/0.5 SUSPENSION: Performed by: EMERGENCY MEDICINE

## 2018-02-06 RX ORDER — BACITRACIN ZINC 500 [USP'U]/G
OINTMENT TOPICAL ONCE
Status: COMPLETED | OUTPATIENT
Start: 2018-02-06 | End: 2018-02-06

## 2018-02-06 RX ORDER — LIDOCAINE HYDROCHLORIDE AND EPINEPHRINE 20; 5 MG/ML; UG/ML
20 INJECTION, SOLUTION EPIDURAL; INFILTRATION; INTRACAUDAL; PERINEURAL ONCE
Status: COMPLETED | OUTPATIENT
Start: 2018-02-06 | End: 2018-02-06

## 2018-02-06 RX ORDER — ACETAMINOPHEN 500 MG
1000 TABLET ORAL ONCE
Status: COMPLETED | OUTPATIENT
Start: 2018-02-06 | End: 2018-02-06

## 2018-02-06 RX ADMIN — TETANUS TOXOID, REDUCED DIPHTHERIA TOXOID AND ACELLULAR PERTUSSIS VACCINE, ADSORBED 0.5 ML: 5; 2.5; 8; 8; 2.5 SUSPENSION INTRAMUSCULAR at 19:16

## 2018-02-06 RX ADMIN — ACETAMINOPHEN 1000 MG: 500 TABLET, FILM COATED ORAL at 19:43

## 2018-02-06 RX ADMIN — BACITRACIN ZINC 1 EACH: 500 OINTMENT TOPICAL at 20:14

## 2018-02-06 RX ADMIN — LIDOCAINE HYDROCHLORIDE,EPINEPHRINE BITARTRATE 20 ML: 20; .005 INJECTION, SOLUTION EPIDURAL; INFILTRATION; INTRACAUDAL; PERINEURAL at 19:44

## 2018-02-06 NOTE — ED PROVIDER NOTES
Subjective   History of Present Illness  History of Present Illness    Chief complaint: Forehead laceration    Location: forehead above hairline    Quality/Severity:  moderate    Timing/Duration: just PTA    Modifying Factors: none    Associated Symptoms: mild HA, no LOC, mild dizziness, no focal deficit    Narrative: 63 yo female hit head on sharp edge of trash can sustaining laceration. Td outdated.    Review of Systems  All other systems reviewed are otherwise negative as related chief complaint  Past Medical History:   Diagnosis Date   • Hypertension    • Skin cancer     nose   • Type 2 diabetes mellitus        Allergies   Allergen Reactions   • Codeine        Past Surgical History:   Procedure Laterality Date   • CHOLECYSTECTOMY     • COLONOSCOPY         Family History   Problem Relation Age of Onset   • Heart disease Mother    • Diabetes Mother    • Diabetes Father        Social History     Social History   • Marital status:      Spouse name: N/A   • Number of children: N/A   • Years of education: N/A     Social History Main Topics   • Smoking status: Current Every Day Smoker     Packs/day: 0.50     Types: Cigarettes   • Smokeless tobacco: Never Used   • Alcohol use No   • Drug use: No   • Sexual activity: Not Asked     Other Topics Concern   • None     Social History Narrative   • None     ED Triage Vitals   Temp Heart Rate Resp BP SpO2   02/06/18 1851 02/06/18 1851 02/06/18 1851 02/06/18 1851 02/06/18 1851   98.4 °F (36.9 °C) 93 18 197/110 96 %      Temp src Heart Rate Source Patient Position BP Location FiO2 (%)   02/06/18 1851 -- -- -- --   Oral           Objective   Physical Exam   Constitutional: She is oriented to person, place, and time. She appears well-developed. No distress.   HENT:   Head: Head is without raccoon's eyes and without Watson's sign.       Mouth/Throat: Oropharynx is clear and moist.   Eyes: EOM are normal. Pupils are equal, round, and reactive to light.   Neck:   Painless range  of motion.  No midline tenderness to palpation or step-off   Cardiovascular: Normal rate and intact distal pulses.    Pulmonary/Chest: Effort normal. No respiratory distress.   Musculoskeletal:   Moves all extremities equally with normal strength   Neurological: She is alert and oriented to person, place, and time.   Skin: Skin is warm and dry.   Psychiatric: She has a normal mood and affect. Thought content normal.   Vitals reviewed.      Procedures  Laceration repair:  Frontal scalp laceration-4 cm  Discussed risks benefits and alternatives.  Patient agreeable with planned laceration repair with sutures.  Anesthetized with 2% lidocaine with epinephrine.  Wound irrigated copiously with normal saline.  Wound is hemostatic.  Wound explored to its no foreign bodies are identified.  Wound edges are closely approximated and closed with 5-0 prolene suture.  One central interrupted suture and 2 laterally placed running sutures for a total of 3 sutures.  Good closure.  Hemostatic.  Procedure well tolerated.  No immediate competitions identified.  Bacitracin applied and wound.         ED Course  ED Course                  MDM    Final diagnoses:   Laceration of scalp, initial encounter              Medication List      Notice     No changes were made to your prescriptions during this visit.        Follow-up Information     Follow up with Erwin Ceballos MD. Schedule an appointment as soon as possible for a visit in 1 week.    Specialty:  Family Medicine    Why:  For wound re-check, For suture removal    Contact information:    [address]  [place] [address] [place] [id number] [phone]                 Vinay Maynard MD  02/06/18 2004

## 2018-02-07 NOTE — ED NOTES
Wound on head was cleaned with hippacleanse and flushed out with saline.     Nazanin Maynard  02/06/18 1944

## 2018-02-14 ENCOUNTER — OFFICE VISIT (OUTPATIENT)
Dept: FAMILY MEDICINE CLINIC | Facility: CLINIC | Age: 63
End: 2018-02-14

## 2018-02-14 VITALS
DIASTOLIC BLOOD PRESSURE: 80 MMHG | WEIGHT: 149 LBS | BODY MASS INDEX: 24.83 KG/M2 | HEIGHT: 65 IN | SYSTOLIC BLOOD PRESSURE: 128 MMHG

## 2018-02-14 DIAGNOSIS — Z48.02 ENCOUNTER FOR REMOVAL OF SUTURES: Primary | ICD-10-CM

## 2018-02-14 PROCEDURE — 99212 OFFICE O/P EST SF 10 MIN: CPT | Performed by: NURSE PRACTITIONER

## 2018-02-14 NOTE — PROGRESS NOTES
Pleasant patient here today for follow-up emergency room for stitches removal  Patient had fell hit her head on the garbage can, with laceration right frontal parietal  Received sutures in the hospital  Here for removal    No LOC  Slipped and fell while cleaning trash  No brain studies done in the emergency room  Feels good presently no headache dizziness no increased pain no vision change no neck pain

## 2018-02-18 PROBLEM — Z48.02 ENCOUNTER FOR REMOVAL OF SUTURES: Status: ACTIVE | Noted: 2018-02-18

## 2018-02-18 NOTE — PROGRESS NOTES
Subjective   Jayne Beltran is a 62 y.o. female.     HPI Comments: Pleasant patient here today for follow-up emergency room for stitches removal  Patient had fell hit her head on the garbage can, with laceration right frontal parietal  Received sutures in the hospital  Here for removal    No LOC  Slipped and fell while cleaning trash  No brain studies done in the emergency room  Feels good presently no headache dizziness no increased pain no vision change no neck pain             The following portions of the patient's history were reviewed and updated as appropriate: allergies, past family history, past medical history, past social history, past surgical history and problem list.    Review of Systems   Skin:        Sutures need removal scalp   All other systems reviewed and are negative.      Objective   Physical Exam   Constitutional: She is oriented to person, place, and time.   HENT:   Frontal sutures within hairline  Appears clean small scab without redness or appearance  Scant swelling     Eyes: EOM are normal. Pupils are equal, round, and reactive to light.   Pulmonary/Chest: Effort normal.   Musculoskeletal: Normal range of motion. She exhibits no edema or tenderness.   Full range of motion C-spine though cervical point tenderness over extremely  strength 5+     Neurological: She is alert and oriented to person, place, and time. She displays normal reflexes. No cranial nerve deficit. She exhibits normal muscle tone. Coordination normal.   Skin:   See HEENT exam   Psychiatric: She has a normal mood and affect. Her behavior is normal. Thought content normal.       Assessment/Plan   Jayne was seen today for suture remoaval.    Diagnoses and all orders for this visit:    Encounter for removal of sutures                  Gently removed approximate seven sutures blue nonobservant  Carefully inspected womb bison clean  Apply family are Vaseline twice daily  Until completely healed  If severe headache  weakness confusion emergency room horny persistent headache  Routine follow-up Dr. Ceballos

## 2018-02-21 ENCOUNTER — RESULTS ENCOUNTER (OUTPATIENT)
Dept: ENDOCRINOLOGY | Age: 63
End: 2018-02-21

## 2018-02-21 DIAGNOSIS — E78.5 DYSLIPIDEMIA: ICD-10-CM

## 2018-02-21 DIAGNOSIS — I10 ESSENTIAL HYPERTENSION: ICD-10-CM

## 2018-02-21 DIAGNOSIS — E11.9 TYPE 2 DIABETES MELLITUS WITHOUT COMPLICATION, WITHOUT LONG-TERM CURRENT USE OF INSULIN (HCC): ICD-10-CM

## 2018-02-21 DIAGNOSIS — G25.81 RESTLESS LEGS SYNDROME: ICD-10-CM

## 2018-02-21 DIAGNOSIS — E55.9 VITAMIN D DEFICIENCY: ICD-10-CM

## 2018-02-21 DIAGNOSIS — G45.8 OTHER SPECIFIED TRANSIENT CEREBRAL ISCHEMIAS: ICD-10-CM

## 2018-02-26 ENCOUNTER — TELEPHONE (OUTPATIENT)
Dept: FAMILY MEDICINE CLINIC | Facility: CLINIC | Age: 63
End: 2018-02-26

## 2018-02-26 RX ORDER — TRAMADOL HYDROCHLORIDE 50 MG/1
50 TABLET ORAL EVERY 8 HOURS PRN
Qty: 50 TABLET | Refills: 0 | OUTPATIENT
Start: 2018-02-26 | End: 2018-03-05 | Stop reason: SDUPTHER

## 2018-02-26 RX ORDER — MELOXICAM 15 MG/1
15 TABLET ORAL DAILY PRN
Qty: 30 TABLET | Refills: 2 | Status: SHIPPED | OUTPATIENT
Start: 2018-02-26 | End: 2018-12-10 | Stop reason: SDUPTHER

## 2018-02-26 RX ORDER — PREGABALIN 75 MG/1
75 CAPSULE ORAL 2 TIMES DAILY
Qty: 60 CAPSULE | Refills: 1 | OUTPATIENT
Start: 2018-02-26 | End: 2018-03-05 | Stop reason: SDUPTHER

## 2018-02-26 NOTE — TELEPHONE ENCOUNTER
Pt  called needs    Tramadol 50 mg  meloxicam 15 mg  lyrica 75 mg      They would like [facility] supply to local [facility]

## 2018-03-05 RX ORDER — CYCLOBENZAPRINE HCL 10 MG
TABLET ORAL
Qty: 60 TABLET | Refills: 2 | Status: SHIPPED | OUTPATIENT
Start: 2018-03-05 | End: 2018-08-27 | Stop reason: SDUPTHER

## 2018-03-05 RX ORDER — TRAMADOL HYDROCHLORIDE 50 MG/1
TABLET ORAL
Qty: 50 TABLET | Refills: 0 | OUTPATIENT
Start: 2018-03-05 | End: 2018-03-28 | Stop reason: SDUPTHER

## 2018-03-19 DIAGNOSIS — E55.9 VITAMIN D DEFICIENCY: ICD-10-CM

## 2018-03-19 DIAGNOSIS — E11.9 TYPE 2 DIABETES MELLITUS WITHOUT COMPLICATION, WITHOUT LONG-TERM CURRENT USE OF INSULIN (HCC): Primary | ICD-10-CM

## 2018-03-28 RX ORDER — TRAMADOL HYDROCHLORIDE 50 MG/1
TABLET ORAL
Qty: 50 TABLET | Refills: 0 | OUTPATIENT
Start: 2018-03-28 | End: 2018-04-24 | Stop reason: SDUPTHER

## 2018-03-29 RX ORDER — OMEPRAZOLE 20 MG/1
20 CAPSULE, DELAYED RELEASE ORAL DAILY
Qty: 90 CAPSULE | Refills: 1 | Status: SHIPPED | OUTPATIENT
Start: 2018-03-29 | End: 2018-06-27 | Stop reason: SDUPTHER

## 2018-03-29 RX ORDER — CLONIDINE HYDROCHLORIDE 0.2 MG/1
TABLET ORAL
Qty: 180 TABLET | Refills: 1 | Status: SHIPPED | OUTPATIENT
Start: 2018-03-29 | End: 2018-06-27 | Stop reason: SDUPTHER

## 2018-03-30 RX ORDER — ROPINIROLE 1 MG/1
TABLET, FILM COATED ORAL
Qty: 90 TABLET | Refills: 0 | Status: SHIPPED | OUTPATIENT
Start: 2018-03-30 | End: 2018-06-28 | Stop reason: SDUPTHER

## 2018-03-30 RX ORDER — AMLODIPINE BESYLATE 5 MG/1
TABLET ORAL
Qty: 90 TABLET | Refills: 0 | Status: SHIPPED | OUTPATIENT
Start: 2018-03-30 | End: 2018-06-28 | Stop reason: SDUPTHER

## 2018-04-19 ENCOUNTER — RESULTS ENCOUNTER (OUTPATIENT)
Dept: ENDOCRINOLOGY | Age: 63
End: 2018-04-19

## 2018-04-19 DIAGNOSIS — E11.9 TYPE 2 DIABETES MELLITUS WITHOUT COMPLICATION, WITHOUT LONG-TERM CURRENT USE OF INSULIN (HCC): ICD-10-CM

## 2018-04-19 DIAGNOSIS — E55.9 VITAMIN D DEFICIENCY: ICD-10-CM

## 2018-04-25 RX ORDER — TRAMADOL HYDROCHLORIDE 50 MG/1
TABLET ORAL
Qty: 50 TABLET | Refills: 0 | OUTPATIENT
Start: 2018-04-25 | End: 2018-05-24 | Stop reason: SDUPTHER

## 2018-04-25 RX ORDER — MELOXICAM 15 MG/1
TABLET ORAL
Qty: 30 TABLET | Refills: 1 | Status: SHIPPED | OUTPATIENT
Start: 2018-04-25 | End: 2018-06-27 | Stop reason: SDUPTHER

## 2018-04-30 RX ORDER — DAPAGLIFLOZIN AND METFORMIN HYDROCHLORIDE 5; 1000 MG/1; MG/1
TABLET, FILM COATED, EXTENDED RELEASE ORAL
Qty: 60 TABLET | Refills: 5 | Status: SHIPPED | OUTPATIENT
Start: 2018-04-30 | End: 2018-06-27 | Stop reason: SDUPTHER

## 2018-04-30 RX ORDER — METOPROLOL SUCCINATE 200 MG/1
TABLET, EXTENDED RELEASE ORAL
Qty: 90 TABLET | Refills: 1 | Status: SHIPPED | OUTPATIENT
Start: 2018-04-30 | End: 2018-10-26 | Stop reason: SDUPTHER

## 2018-05-01 RX ORDER — DAPAGLIFLOZIN AND METFORMIN HYDROCHLORIDE 5; 1000 MG/1; MG/1
TABLET, FILM COATED, EXTENDED RELEASE ORAL
Qty: 60 TABLET | Refills: 5 | Status: SHIPPED | OUTPATIENT
Start: 2018-05-01 | End: 2019-05-06 | Stop reason: SDUPTHER

## 2018-05-25 RX ORDER — TRAMADOL HYDROCHLORIDE 50 MG/1
TABLET ORAL
Qty: 20 TABLET | Refills: 0 | OUTPATIENT
Start: 2018-05-25 | End: 2018-06-04 | Stop reason: SDUPTHER

## 2018-06-05 RX ORDER — TRAMADOL HYDROCHLORIDE 50 MG/1
TABLET ORAL
Qty: 30 TABLET | Refills: 0 | OUTPATIENT
Start: 2018-06-05 | End: 2018-06-25 | Stop reason: SDUPTHER

## 2018-06-26 RX ORDER — TRAMADOL HYDROCHLORIDE 50 MG/1
TABLET ORAL
Qty: 30 TABLET | Refills: 0 | OUTPATIENT
Start: 2018-06-26 | End: 2018-07-23 | Stop reason: SDUPTHER

## 2018-06-27 ENCOUNTER — TELEPHONE (OUTPATIENT)
Dept: FAMILY MEDICINE CLINIC | Facility: CLINIC | Age: 63
End: 2018-06-27

## 2018-06-27 RX ORDER — OMEPRAZOLE 20 MG/1
20 CAPSULE, DELAYED RELEASE ORAL DAILY
Qty: 90 CAPSULE | Refills: 0 | Status: SHIPPED | OUTPATIENT
Start: 2018-06-27 | End: 2018-06-29 | Stop reason: SDUPTHER

## 2018-06-28 ENCOUNTER — TELEPHONE (OUTPATIENT)
Dept: FAMILY MEDICINE CLINIC | Facility: CLINIC | Age: 63
End: 2018-06-28

## 2018-06-28 RX ORDER — ROPINIROLE 1 MG/1
TABLET, FILM COATED ORAL
Qty: 90 TABLET | Refills: 0 | Status: SHIPPED | OUTPATIENT
Start: 2018-06-28 | End: 2018-07-16

## 2018-06-28 RX ORDER — AMLODIPINE BESYLATE 5 MG/1
TABLET ORAL
Qty: 90 TABLET | Refills: 0 | Status: SHIPPED | OUTPATIENT
Start: 2018-06-28 | End: 2018-10-04 | Stop reason: SDUPTHER

## 2018-06-29 ENCOUNTER — TELEPHONE (OUTPATIENT)
Dept: FAMILY MEDICINE CLINIC | Facility: CLINIC | Age: 63
End: 2018-06-29

## 2018-06-29 RX ORDER — RANITIDINE 300 MG/1
300 CAPSULE ORAL EVERY EVENING
Qty: 90 CAPSULE | Refills: 1 | Status: SHIPPED | OUTPATIENT
Start: 2018-06-29 | End: 2018-12-15 | Stop reason: SDUPTHER

## 2018-06-29 RX ORDER — OMEPRAZOLE 20 MG/1
20 CAPSULE, DELAYED RELEASE ORAL DAILY
Qty: 90 CAPSULE | Refills: 1 | Status: SHIPPED | OUTPATIENT
Start: 2018-06-29 | End: 2018-06-29

## 2018-06-29 NOTE — TELEPHONE ENCOUNTER
[facility] requesting to change omeprazole 20 mg cap  Pt take one cap  Daily # 90  to something else because her insurance will not cover that

## 2018-07-09 RX ORDER — GABAPENTIN 800 MG/1
TABLET ORAL
Qty: 270 TABLET | Refills: 0 | OUTPATIENT
Start: 2018-07-09 | End: 2018-09-10 | Stop reason: SDUPTHER

## 2018-07-16 ENCOUNTER — OFFICE VISIT (OUTPATIENT)
Dept: FAMILY MEDICINE CLINIC | Facility: CLINIC | Age: 63
End: 2018-07-16

## 2018-07-16 VITALS
TEMPERATURE: 98 F | OXYGEN SATURATION: 98 % | DIASTOLIC BLOOD PRESSURE: 80 MMHG | SYSTOLIC BLOOD PRESSURE: 120 MMHG | WEIGHT: 143.9 LBS | BODY MASS INDEX: 23.97 KG/M2 | HEIGHT: 65 IN | HEART RATE: 72 BPM

## 2018-07-16 DIAGNOSIS — G89.29 CHRONIC BILATERAL LOW BACK PAIN WITHOUT SCIATICA: ICD-10-CM

## 2018-07-16 DIAGNOSIS — E11.9 TYPE 2 DIABETES MELLITUS WITHOUT COMPLICATION, WITHOUT LONG-TERM CURRENT USE OF INSULIN (HCC): ICD-10-CM

## 2018-07-16 DIAGNOSIS — M54.50 CHRONIC BILATERAL LOW BACK PAIN WITHOUT SCIATICA: ICD-10-CM

## 2018-07-16 DIAGNOSIS — E78.5 DYSLIPIDEMIA: ICD-10-CM

## 2018-07-16 DIAGNOSIS — I10 ESSENTIAL HYPERTENSION: Primary | ICD-10-CM

## 2018-07-16 DIAGNOSIS — G25.81 RESTLESS LEGS SYNDROME: ICD-10-CM

## 2018-07-16 DIAGNOSIS — G60.9 IDIOPATHIC PERIPHERAL NEUROPATHY: ICD-10-CM

## 2018-07-16 PROCEDURE — 99213 OFFICE O/P EST LOW 20 MIN: CPT | Performed by: FAMILY MEDICINE

## 2018-07-16 RX ORDER — ROPINIROLE 3 MG/1
3 TABLET, FILM COATED ORAL NIGHTLY
Qty: 30 TABLET | Refills: 5 | Status: SHIPPED | OUTPATIENT
Start: 2018-07-16 | End: 2018-12-10 | Stop reason: SDUPTHER

## 2018-07-16 NOTE — PATIENT INSTRUCTIONS
This is a very nice 62-year-old who is up all night with restless leg symptoms.  I will increase the strength of her Requip to 3 mg at night.  I would like her to call if this does not help.

## 2018-07-16 NOTE — PROGRESS NOTES
Subjective   Jayne Beltran is a 62 y.o. female presenting with   Chief Complaint   Patient presents with   • Restless Legs Syndrome     still having bad problems        62-year-old  white female nonsmoker comes in today with the complaint that her restless leg syndrome is keeping her up all night.  She says that she only got about 3 hours sleep last night before going into work.  She says that her legs start feeling like they are burning, and then she has to get up and walk around.    She also mentions that she hit her arm on the railing of her stairs and bruised it.  She only mention that when I noticed that she had to use her left arm to raise her right arm to shake my hand.  She declines x-ray today and physical therapy and says she is sure it is only bruised.  However she does display limited range of motion.  I advised her that if she does not start getting range of motion exercises she could end up with a frozen shoulder.         The following portions of the patient's history were reviewed and updated as appropriate: current medications, past family history, past medical history, past social history, past surgical history and problem list.    Review of Systems   Musculoskeletal: Positive for myalgias.   All other systems reviewed and are negative.      Objective   Physical Exam   Constitutional: She is oriented to person, place, and time. She appears well-developed and well-nourished.   HENT:   Head: Normocephalic and atraumatic.   Eyes: EOM are normal. Pupils are equal, round, and reactive to light.   Neck: Normal range of motion. Neck supple.   Cardiovascular: Normal rate and regular rhythm.    Pulmonary/Chest: Effort normal and breath sounds normal.   Musculoskeletal: She exhibits tenderness (tenderness to palpate in the proximal humerus on the right without any deformity or discoloration). She exhibits no edema or deformity.   Neurological: She is alert and oriented to person, place, and time.    Skin: Skin is warm and dry.   Psychiatric: She has a normal mood and affect. Her behavior is normal.   Nursing note and vitals reviewed.      Assessment/Plan   Jayne was seen today for restless legs syndrome.    Diagnoses and all orders for this visit:    Essential hypertension    Type 2 diabetes mellitus without complication, without long-term current use of insulin (CMS/[facility])    Chronic bilateral low back pain without sciatica    Idiopathic peripheral neuropathy    Restless legs syndrome    Dyslipidemia    Other orders  -     rOPINIRole (REQUIP) 3 MG tablet; Take 1 tablet by mouth Every Night.                   I would like him to return for another visit in 6 month(s)

## 2018-07-24 RX ORDER — MELOXICAM 15 MG/1
TABLET ORAL
Qty: 90 TABLET | Refills: 1 | Status: SHIPPED | OUTPATIENT
Start: 2018-07-24 | End: 2018-11-16 | Stop reason: SDUPTHER

## 2018-07-24 RX ORDER — TRAMADOL HYDROCHLORIDE 50 MG/1
TABLET ORAL
Qty: 30 TABLET | Refills: 1 | OUTPATIENT
Start: 2018-07-24 | End: 2018-09-11 | Stop reason: SDUPTHER

## 2018-08-27 RX ORDER — CYCLOBENZAPRINE HCL 10 MG
TABLET ORAL
Qty: 60 TABLET | Refills: 3 | Status: SHIPPED | OUTPATIENT
Start: 2018-08-27 | End: 2019-02-13 | Stop reason: SDUPTHER

## 2018-09-10 RX ORDER — LISINOPRIL 40 MG/1
TABLET ORAL
Qty: 180 TABLET | Refills: 1 | Status: SHIPPED | OUTPATIENT
Start: 2018-09-10 | End: 2019-03-16 | Stop reason: SDUPTHER

## 2018-09-10 RX ORDER — GABAPENTIN 800 MG/1
800 TABLET ORAL 3 TIMES DAILY
Qty: 270 TABLET | Refills: 0 | OUTPATIENT
Start: 2018-09-10 | End: 2018-12-30 | Stop reason: SDUPTHER

## 2018-09-12 RX ORDER — TRAMADOL HYDROCHLORIDE 50 MG/1
50 TABLET ORAL EVERY 8 HOURS PRN
Qty: 30 TABLET | Refills: 1 | OUTPATIENT
Start: 2018-09-24 | End: 2018-11-06 | Stop reason: SDUPTHER

## 2018-09-17 RX ORDER — TRAMADOL HYDROCHLORIDE 50 MG/1
TABLET ORAL
Qty: 30 TABLET | Refills: 0 | OUTPATIENT
Start: 2018-09-17

## 2018-09-24 RX ORDER — GABAPENTIN 800 MG/1
TABLET ORAL
Qty: 270 TABLET | Refills: 0 | OUTPATIENT
Start: 2018-09-24

## 2018-09-26 RX ORDER — CLONIDINE HYDROCHLORIDE 0.2 MG/1
TABLET ORAL
Qty: 180 TABLET | Refills: 1 | Status: SHIPPED | OUTPATIENT
Start: 2018-09-26 | End: 2018-11-16 | Stop reason: SDUPTHER

## 2018-10-04 RX ORDER — ROPINIROLE 1 MG/1
TABLET, FILM COATED ORAL
Qty: 90 TABLET | Refills: 0 | Status: SHIPPED | OUTPATIENT
Start: 2018-10-04 | End: 2018-12-31 | Stop reason: SDUPTHER

## 2018-10-04 RX ORDER — AMLODIPINE BESYLATE 5 MG/1
TABLET ORAL
Qty: 90 TABLET | Refills: 0 | Status: SHIPPED | OUTPATIENT
Start: 2018-10-04 | End: 2018-11-16

## 2018-10-26 RX ORDER — METOPROLOL SUCCINATE 200 MG/1
TABLET, EXTENDED RELEASE ORAL
Qty: 90 TABLET | Refills: 0 | Status: SHIPPED | OUTPATIENT
Start: 2018-10-26 | End: 2019-04-08 | Stop reason: SDUPTHER

## 2018-11-06 RX ORDER — TRAMADOL HYDROCHLORIDE 50 MG/1
50 TABLET ORAL EVERY 8 HOURS PRN
Qty: 30 TABLET | Refills: 0 | OUTPATIENT
Start: 2018-11-06 | End: 2018-11-16 | Stop reason: SDUPTHER

## 2018-11-07 ENCOUNTER — TELEPHONE (OUTPATIENT)
Dept: FAMILY MEDICINE CLINIC | Facility: CLINIC | Age: 63
End: 2018-11-07

## 2018-11-16 ENCOUNTER — OFFICE VISIT (OUTPATIENT)
Dept: FAMILY MEDICINE CLINIC | Facility: CLINIC | Age: 63
End: 2018-11-16

## 2018-11-16 VITALS
WEIGHT: 139 LBS | HEART RATE: 97 BPM | SYSTOLIC BLOOD PRESSURE: 162 MMHG | HEIGHT: 65 IN | BODY MASS INDEX: 23.16 KG/M2 | DIASTOLIC BLOOD PRESSURE: 93 MMHG | OXYGEN SATURATION: 96 %

## 2018-11-16 DIAGNOSIS — G62.9 PERIPHERAL POLYNEUROPATHY: ICD-10-CM

## 2018-11-16 DIAGNOSIS — I73.9 INTERMITTENT CLAUDICATION (HCC): ICD-10-CM

## 2018-11-16 DIAGNOSIS — G45.9 TIA (TRANSIENT ISCHEMIC ATTACK): ICD-10-CM

## 2018-11-16 DIAGNOSIS — I10 ESSENTIAL HYPERTENSION: ICD-10-CM

## 2018-11-16 DIAGNOSIS — E11.9 TYPE 2 DIABETES MELLITUS WITHOUT COMPLICATION, WITHOUT LONG-TERM CURRENT USE OF INSULIN (HCC): ICD-10-CM

## 2018-11-16 DIAGNOSIS — E11.9 TYPE 2 DIABETES MELLITUS WITHOUT COMPLICATION, WITHOUT LONG-TERM CURRENT USE OF INSULIN (HCC): Primary | ICD-10-CM

## 2018-11-16 DIAGNOSIS — E78.5 DYSLIPIDEMIA: ICD-10-CM

## 2018-11-16 DIAGNOSIS — I65.29 STENOSIS OF CAROTID ARTERY, UNSPECIFIED LATERALITY: ICD-10-CM

## 2018-11-16 PROBLEM — G25.81 RESTLESS LEGS SYNDROME: Chronic | Status: ACTIVE | Noted: 2017-06-15

## 2018-11-16 PROCEDURE — 99213 OFFICE O/P EST LOW 20 MIN: CPT | Performed by: NURSE PRACTITIONER

## 2018-11-16 RX ORDER — TRAMADOL HYDROCHLORIDE 50 MG/1
100 TABLET ORAL 2 TIMES DAILY PRN
Qty: 60 TABLET | Refills: 0 | Status: SHIPPED | OUTPATIENT
Start: 2018-11-16 | End: 2018-12-03 | Stop reason: SDUPTHER

## 2018-11-16 RX ORDER — AMLODIPINE BESYLATE 10 MG/1
10 TABLET ORAL DAILY
Qty: 90 TABLET | Refills: 1 | Status: SHIPPED | OUTPATIENT
Start: 2018-11-16 | End: 2019-03-25 | Stop reason: SDUPTHER

## 2018-11-17 LAB
ALBUMIN SERPL-MCNC: 4.3 G/DL (ref 3.5–5.2)
ALBUMIN/GLOB SERPL: 1.5 G/DL
ALP SERPL-CCNC: 88 U/L (ref 39–117)
ALT SERPL-CCNC: 21 U/L (ref 1–33)
AST SERPL-CCNC: 20 U/L (ref 1–32)
BASOPHILS # BLD AUTO: 0.08 10*3/MM3 (ref 0–0.2)
BASOPHILS NFR BLD AUTO: 0.6 % (ref 0–1.5)
BILIRUB SERPL-MCNC: 0.3 MG/DL (ref 0.1–1.2)
BUN SERPL-MCNC: 17 MG/DL (ref 8–23)
BUN/CREAT SERPL: 22.1 (ref 7–25)
CALCIUM SERPL-MCNC: 9 MG/DL (ref 8.6–10.5)
CHLORIDE SERPL-SCNC: 102 MMOL/L (ref 98–107)
CHOLEST SERPL-MCNC: 159 MG/DL (ref 0–200)
CO2 SERPL-SCNC: 25.7 MMOL/L (ref 22–29)
CREAT SERPL-MCNC: 0.77 MG/DL (ref 0.57–1)
EOSINOPHIL # BLD AUTO: 0.21 10*3/MM3 (ref 0–0.7)
EOSINOPHIL NFR BLD AUTO: 1.6 % (ref 0.3–6.2)
ERYTHROCYTE [DISTWIDTH] IN BLOOD BY AUTOMATED COUNT: 14.2 % (ref 11.7–13)
GLOBULIN SER CALC-MCNC: 2.8 GM/DL
GLUCOSE SERPL-MCNC: 116 MG/DL (ref 65–99)
HBA1C MFR BLD: 6.07 % (ref 4.8–5.6)
HCT VFR BLD AUTO: 46.7 % (ref 35.6–45.5)
HDLC SERPL-MCNC: 41 MG/DL (ref 40–60)
HGB BLD-MCNC: 14.6 G/DL (ref 11.9–15.5)
IMM GRANULOCYTES # BLD: 0.03 10*3/MM3 (ref 0–0.03)
IMM GRANULOCYTES NFR BLD: 0.2 % (ref 0–0.5)
LDLC SERPL CALC-MCNC: 64 MG/DL (ref 0–100)
LDLC/HDLC SERPL: 1.56 {RATIO}
LYMPHOCYTES # BLD AUTO: 5.01 10*3/MM3 (ref 0.9–4.8)
LYMPHOCYTES NFR BLD AUTO: 37.2 % (ref 19.6–45.3)
MCH RBC QN AUTO: 29.1 PG (ref 26.9–32)
MCHC RBC AUTO-ENTMCNC: 31.3 G/DL (ref 32.4–36.3)
MCV RBC AUTO: 93.2 FL (ref 80.5–98.2)
MONOCYTES # BLD AUTO: 0.86 10*3/MM3 (ref 0.2–1.2)
MONOCYTES NFR BLD AUTO: 6.4 % (ref 5–12)
NEUTROPHILS # BLD AUTO: 7.29 10*3/MM3 (ref 1.9–8.1)
NEUTROPHILS NFR BLD AUTO: 54 % (ref 42.7–76)
PLATELET # BLD AUTO: 395 10*3/MM3 (ref 140–500)
POTASSIUM SERPL-SCNC: 4 MMOL/L (ref 3.5–5.2)
PROT SERPL-MCNC: 7.1 G/DL (ref 6–8.5)
RBC # BLD AUTO: 5.01 10*6/MM3 (ref 3.9–5.2)
SODIUM SERPL-SCNC: 142 MMOL/L (ref 136–145)
TRIGL SERPL-MCNC: 271 MG/DL (ref 0–150)
TSH SERPL DL<=0.005 MIU/L-ACNC: 1.13 MIU/ML (ref 0.27–4.2)
VLDLC SERPL CALC-MCNC: 54.2 MG/DL (ref 5–40)
WBC # BLD AUTO: 13.48 10*3/MM3 (ref 4.5–10.7)

## 2018-11-18 NOTE — PROGRESS NOTES
Subjective   Jayne Beltran is a 63 y.o. female.     Pleasant patient here follow up essential hypertension  Blood pressureElevated today  I recheck with similar results  No chest pain no shortness of breath no fatigue    New skin lesion left chin  Several months  History basal cell nose    Long history chronic leg pain  Peripheral neuropathy diabetes  As well as restless leg syndrome  Increased pain at night    She describes some leg pain standing  Sitting feels better when moving    She also has Bilateralcalf Pain walking several blocks  And does get relief with rest somewhat    Several years ago she had a carotid Doppler study mild stenosis bilateral  No severe occlusion  Unfortunately she continues to smoke and is not ready to quit    Hypertriglyceridemia  Takes vesepa Sees Dr. Clemons  Has not followed up with lab in some time  Works frequently I think she's been overwhelmed with work        Hypertension   Pertinent negatives include no chest pain, palpitations or shortness of breath.        The following portions of the patient's history were reviewed and updated as appropriate: allergies, current medications, past family history, past medical history, past social history, past surgical history and problem list.    Review of Systems   Respiratory: Negative for cough and shortness of breath.    Cardiovascular: Negative for chest pain, palpitations and leg swelling.   Musculoskeletal:        Leg pain with walking chronic extremely pain   All other systems reviewed and are negative.      Objective   Physical Exam   Musculoskeletal:   Dorsal pedi-'s 2+ left  One plus right foot  Difficult to assess posterior tibialis  Cavalry for less than one 2nd feet warm  Decrease hair lower extremities           Assessment/Plan   Jayne was seen today for hypertension.    Diagnoses and all orders for this visit:    Type 2 diabetes mellitus without complication, without long-term current use of insulin (CMS/[facility])  -      Comprehensive Metabolic Panel; Future  -     CBC & Differential; Future  -     Lipid Panel With LDL / HDL Ratio; Future  -     TSH Rfx On Abnormal To Free T4; Future  -     Hemoglobin A1c; Future  -     Urinalysis With Microscopic If Indicated (No Culture) - Urine, Clean Catch; Future    Essential hypertension  -     Comprehensive Metabolic Panel; Future  -     CBC & Differential; Future  -     Lipid Panel With LDL / HDL Ratio; Future  -     TSH Rfx On Abnormal To Free T4; Future  -     Hemoglobin A1c; Future  -     Urinalysis With Microscopic If Indicated (No Culture) - Urine, Clean Catch; Future    Peripheral polyneuropathy  -     Comprehensive Metabolic Panel; Future  -     CBC & Differential; Future  -     Lipid Panel With LDL / HDL Ratio; Future  -     TSH Rfx On Abnormal To Free T4; Future  -     Hemoglobin A1c; Future  -     Urinalysis With Microscopic If Indicated (No Culture) - Urine, Clean Catch; Future  -     Doppler Arterial Lower Extremity Stress CAR    Dyslipidemia  -     Comprehensive Metabolic Panel; Future  -     CBC & Differential; Future  -     Lipid Panel With LDL / HDL Ratio; Future  -     TSH Rfx On Abnormal To Free T4; Future  -     Hemoglobin A1c; Future  -     Urinalysis With Microscopic If Indicated (No Culture) - Urine, Clean Catch; Future    TIA (transient ischemic attack)  -     Comprehensive Metabolic Panel; Future  -     CBC & Differential; Future  -     Lipid Panel With LDL / HDL Ratio; Future  -     TSH Rfx On Abnormal To Free T4; Future  -     Hemoglobin A1c; Future  -     Urinalysis With Microscopic If Indicated (No Culture) - Urine, Clean Catch; Future    Intermittent claudication (CMS/HCC)  -     Doppler Arterial Lower Extremity Stress CAR    Stenosis of carotid artery, unspecified laterality  -     Duplex Carotid Ultrasound CAR    Other orders  -     amLODIPine (NORVASC) 10 MG tablet; Take 1 tablet by mouth Daily.  -     traMADol (ULTRAM) 50 MG tablet; Take 2 tablets by mouth 2  (Two) Times a Day As Needed for Moderate Pain . Take with Tylenol                  Continue aspirin daily 1 mg  She must stop smoking  Discussed smoking cessation  Options  Smoking is a vascular disease  Diabetes is a vascular nervesAnd nerve disease  Hypertension is a vascular disease    Tobacco smoke or smoking is a accelerant    May benefit from a statin discuss with Dr. Ceballos  And/or Dr. Clemons  Good blood pressure control less than 130/80    I would like for her to have Doppler studies lower extremity to rule out any PAD component to her chronic pain  Her diabetes is severely uncontrolled this simply may be diabetic neuropathy    Risk-benefit tramadol  Potential risk of addiction dependence proper storage disposal  She may take up to two twice daily with Tylenol    She's had difficult time sleeping due to the severe pain    Chest pain shortness of breath confusion slurred speech weakness emergency room    Short-term follow-up    As well Follow-up Dr. Ceballos

## 2018-11-19 RX ORDER — PREGABALIN 75 MG/1
75 CAPSULE ORAL 2 TIMES DAILY
Qty: 60 CAPSULE | Refills: 1 | OUTPATIENT
Start: 2018-11-19

## 2018-11-21 ENCOUNTER — TELEPHONE (OUTPATIENT)
Dept: FAMILY MEDICINE CLINIC | Facility: CLINIC | Age: 63
End: 2018-11-21

## 2018-11-21 RX ORDER — PREGABALIN 75 MG/1
75 CAPSULE ORAL 2 TIMES DAILY
Qty: 60 CAPSULE | Refills: 1 | Status: SHIPPED | OUTPATIENT
Start: 2018-11-21 | End: 2019-01-21 | Stop reason: SDUPTHER

## 2018-11-28 ENCOUNTER — RESULTS ENCOUNTER (OUTPATIENT)
Dept: FAMILY MEDICINE CLINIC | Facility: CLINIC | Age: 63
End: 2018-11-28

## 2018-11-28 DIAGNOSIS — E11.9 TYPE 2 DIABETES MELLITUS WITHOUT COMPLICATION, WITHOUT LONG-TERM CURRENT USE OF INSULIN (HCC): ICD-10-CM

## 2018-11-28 DIAGNOSIS — G62.9 PERIPHERAL POLYNEUROPATHY: ICD-10-CM

## 2018-11-28 DIAGNOSIS — G45.9 TIA (TRANSIENT ISCHEMIC ATTACK): ICD-10-CM

## 2018-11-28 DIAGNOSIS — E78.5 DYSLIPIDEMIA: ICD-10-CM

## 2018-11-28 DIAGNOSIS — I10 ESSENTIAL HYPERTENSION: ICD-10-CM

## 2018-11-29 ENCOUNTER — OFFICE VISIT (OUTPATIENT)
Dept: FAMILY MEDICINE CLINIC | Facility: CLINIC | Age: 63
End: 2018-11-29

## 2018-11-29 VITALS
WEIGHT: 138 LBS | SYSTOLIC BLOOD PRESSURE: 171 MMHG | HEART RATE: 95 BPM | HEIGHT: 65 IN | OXYGEN SATURATION: 94 % | DIASTOLIC BLOOD PRESSURE: 102 MMHG | BODY MASS INDEX: 22.99 KG/M2

## 2018-11-29 DIAGNOSIS — I10 ESSENTIAL HYPERTENSION: Primary | ICD-10-CM

## 2018-11-29 PROCEDURE — 99213 OFFICE O/P EST LOW 20 MIN: CPT | Performed by: NURSE PRACTITIONER

## 2018-11-29 RX ORDER — TRIAMTERENE AND HYDROCHLOROTHIAZIDE 37.5; 25 MG/1; MG/1
1 TABLET ORAL DAILY
Qty: 30 TABLET | Refills: 2 | Status: SHIPPED | OUTPATIENT
Start: 2018-11-29 | End: 2018-12-24 | Stop reason: SDUPTHER

## 2018-11-30 ENCOUNTER — HOSPITAL ENCOUNTER (OUTPATIENT)
Dept: CARDIOLOGY | Facility: HOSPITAL | Age: 63
Discharge: HOME | End: 2018-11-30

## 2018-11-30 ENCOUNTER — HOSPITAL ENCOUNTER (OUTPATIENT)
Dept: CARDIOLOGY | Facility: HOSPITAL | Age: 63
Discharge: HOME | End: 2018-11-30
Admitting: NURSE PRACTITIONER

## 2018-11-30 LAB
BH CV LOWER ARTERIAL LEFT ABI RATIO: 1.2
BH CV LOWER ARTERIAL LEFT DORSALIS PEDIS SYS MAX: 152 MMHG
BH CV LOWER ARTERIAL LEFT GREAT TOE SYS MAX: 150 MMHG
BH CV LOWER ARTERIAL LEFT POST EX ABI RATIO: 1.2
BH CV LOWER ARTERIAL LEFT POST TIBIAL SYS MAX: 156 MMHG
BH CV LOWER ARTERIAL LEFT TBI RATIO: 1.1
BH CV LOWER ARTERIAL RIGHT ABI RATIO: 1.1
BH CV LOWER ARTERIAL RIGHT DORSALIS PEDIS SYS MAX: 147 MMHG
BH CV LOWER ARTERIAL RIGHT GREAT TOE SYS MAX: 139 MMHG
BH CV LOWER ARTERIAL RIGHT POST EX ABI RATIO: 1.3
BH CV LOWER ARTERIAL RIGHT POST TIBIAL SYS MAX: 154 MMHG
BH CV LOWER ARTERIAL RIGHT TBI RATIO: 1
BH CV XLRA MEAS LEFT CCA RATIO VEL: -57.8 CM/SEC
BH CV XLRA MEAS LEFT DIST CCA EDV: -16.6 CM/SEC
BH CV XLRA MEAS LEFT DIST CCA PSV: -57.8 CM/SEC
BH CV XLRA MEAS LEFT DIST ICA EDV: -15.3 CM/SEC
BH CV XLRA MEAS LEFT DIST ICA PSV: -47.9 CM/SEC
BH CV XLRA MEAS LEFT ICA RATIO VEL: -50.5 CM/SEC
BH CV XLRA MEAS LEFT ICA/CCA RATIO: 0.87
BH CV XLRA MEAS LEFT MID ICA EDV: -18.1 CM/SEC
BH CV XLRA MEAS LEFT MID ICA PSV: -50.5 CM/SEC
BH CV XLRA MEAS LEFT PROX CCA EDV: 16.6 CM/SEC
BH CV XLRA MEAS LEFT PROX CCA PSV: 94.8 CM/SEC
BH CV XLRA MEAS LEFT PROX ECA EDV: -14.2 CM/SEC
BH CV XLRA MEAS LEFT PROX ECA PSV: -65 CM/SEC
BH CV XLRA MEAS LEFT PROX ICA EDV: -13.7 CM/SEC
BH CV XLRA MEAS LEFT PROX ICA PSV: -46.9 CM/SEC
BH CV XLRA MEAS LEFT PROX SCLA PSV: 93.9 CM/SEC
BH CV XLRA MEAS LEFT VERTEBRAL A EDV: 14.7 CM/SEC
BH CV XLRA MEAS LEFT VERTEBRAL A PSV: 45 CM/SEC
BH CV XLRA MEAS RIGHT CCA RATIO VEL: -48.1 CM/SEC
BH CV XLRA MEAS RIGHT DIST CCA EDV: -13.4 CM/SEC
BH CV XLRA MEAS RIGHT DIST CCA PSV: -48.1 CM/SEC
BH CV XLRA MEAS RIGHT DIST ICA EDV: -19.4 CM/SEC
BH CV XLRA MEAS RIGHT DIST ICA PSV: -53.6 CM/SEC
BH CV XLRA MEAS RIGHT ICA RATIO VEL: -59.3 CM/SEC
BH CV XLRA MEAS RIGHT ICA/CCA RATIO: 1.2
BH CV XLRA MEAS RIGHT MID ICA EDV: -23.7 CM/SEC
BH CV XLRA MEAS RIGHT MID ICA PSV: -59.3 CM/SEC
BH CV XLRA MEAS RIGHT PROX CCA EDV: 22.5 CM/SEC
BH CV XLRA MEAS RIGHT PROX CCA PSV: 109 CM/SEC
BH CV XLRA MEAS RIGHT PROX ECA EDV: -9.9 CM/SEC
BH CV XLRA MEAS RIGHT PROX ECA PSV: -44.6 CM/SEC
BH CV XLRA MEAS RIGHT PROX ICA EDV: -14.7 CM/SEC
BH CV XLRA MEAS RIGHT PROX ICA PSV: -45 CM/SEC
BH CV XLRA MEAS RIGHT PROX SCLA PSV: 119 CM/SEC
BH CV XLRA MEAS RIGHT VERTEBRAL A EDV: 7.8 CM/SEC
BH CV XLRA MEAS RIGHT VERTEBRAL A PSV: 29.7 CM/SEC
LEFT ARM BP: 131 MMHG
RIGHT ARM BP: 136 MMHG
UPPER ARTERIAL LEFT ARM BRACHIAL SYS MAX: 131 MMHG
UPPER ARTERIAL RIGHT ARM BRACHIAL SYS MAX: 136 MMHG

## 2018-11-30 PROCEDURE — 93880 EXTRACRANIAL BILAT STUDY: CPT

## 2018-11-30 PROCEDURE — 93924 LWR XTR VASC STDY BILAT: CPT

## 2018-11-30 RX ORDER — TRAMADOL HYDROCHLORIDE 50 MG/1
100 TABLET ORAL 2 TIMES DAILY PRN
Qty: 60 TABLET | Refills: 0 | Status: CANCELLED | OUTPATIENT
Start: 2018-11-30

## 2018-12-03 RX ORDER — TRAMADOL HYDROCHLORIDE 50 MG/1
100 TABLET ORAL 2 TIMES DAILY PRN
Qty: 60 TABLET | Refills: 0 | Status: SHIPPED | OUTPATIENT
Start: 2018-12-03 | End: 2018-12-13 | Stop reason: SDUPTHER
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
## 2018-12-06 RX ORDER — ICOSAPENT ETHYL 1000 MG/1
4 CAPSULE ORAL DAILY
Qty: 120 CAPSULE | Refills: 5 | OUTPATIENT
Start: 2018-12-06

## 2018-12-10 RX ORDER — MELOXICAM 15 MG/1
15 TABLET ORAL DAILY PRN
Qty: 90 TABLET | Refills: 0 | Status: SHIPPED | OUTPATIENT
Start: 2018-12-10 | End: 2019-04-08 | Stop reason: SDUPTHER

## 2018-12-10 RX ORDER — ROPINIROLE 3 MG/1
3 TABLET, FILM COATED ORAL NIGHTLY
Qty: 90 TABLET | Refills: 0 | Status: SHIPPED | OUTPATIENT
Start: 2018-12-10 | End: 2019-03-08 | Stop reason: SDUPTHER

## 2018-12-13 ENCOUNTER — OFFICE VISIT (OUTPATIENT)
Dept: FAMILY MEDICINE CLINIC | Facility: CLINIC | Age: 63
End: 2018-12-13

## 2018-12-13 VITALS
HEART RATE: 71 BPM | SYSTOLIC BLOOD PRESSURE: 160 MMHG | HEIGHT: 65 IN | OXYGEN SATURATION: 93 % | BODY MASS INDEX: 22.99 KG/M2 | DIASTOLIC BLOOD PRESSURE: 82 MMHG | WEIGHT: 138 LBS

## 2018-12-13 DIAGNOSIS — I10 ESSENTIAL HYPERTENSION: ICD-10-CM

## 2018-12-13 DIAGNOSIS — G62.9 PERIPHERAL POLYNEUROPATHY: Primary | ICD-10-CM

## 2018-12-13 PROCEDURE — 99213 OFFICE O/P EST LOW 20 MIN: CPT | Performed by: NURSE PRACTITIONER

## 2018-12-13 RX ORDER — TRAMADOL HYDROCHLORIDE 50 MG/1
100 TABLET ORAL 2 TIMES DAILY PRN
Qty: 60 TABLET | Refills: 1 | Status: SHIPPED | OUTPATIENT
Start: 2018-12-13 | End: 2019-01-14 | Stop reason: SDUPTHER

## 2018-12-13 NOTE — PROGRESS NOTES
Very pleasant patient here follow-up essential hypertension is controlled    Chronic leg pain Pain  When walking recent Doppler studies are normal however

## 2018-12-16 NOTE — PROGRESS NOTES
Subjective   Jayne Beltran is a 63 y.o. female.     Very pleasant patient here follow-up essential hypertension is controlled    Chronic leg pain Pain  When walking recent Doppler studies are normal however  History diabetes mellitus controlled  She describes pain entire foot ankle  Much of her lower leg  Does not radiate from her back or groin  No abdominal pain  Increased with walking,Bedtime  Long history neuropathy  Recently had taken Tramadol help quite a bit  We discussed risk and benefit including potential risk  Addiction dependence  Although lower than narcotics    Continues to smoke unfortunately      Hypertension   Pertinent negatives include no chest pain, palpitations or shortness of breath.        The following portions of the patient's history were reviewed and updated as appropriate: allergies, current medications, past family history, past medical history, past social history, past surgical history and problem list.    Review of Systems   Constitutional: Negative for chills, fatigue, fever and unexpected weight loss.   HENT: Negative.  Negative for trouble swallowing.    Eyes: Negative.    Respiratory: Negative for cough and shortness of breath.    Cardiovascular: Negative for chest pain, palpitations and leg swelling.   Gastrointestinal: Negative for abdominal pain, blood in stool, constipation and diarrhea.   Genitourinary: Negative.    Musculoskeletal: Negative.         Chronic lower extremity pain neuropathy   Skin: Negative.    Neurological: Negative.  Negative for dizziness, speech difficulty, weakness and confusion.   Psychiatric/Behavioral: Negative.        Objective   Physical Exam   Constitutional: She is oriented to person, place, and time. She appears well-developed and well-nourished. No distress.   HENT:   Head: Normocephalic.   Eyes: Conjunctivae are normal. Pupils are equal, round, and reactive to light.   Neck: Neck supple.   Cardiovascular: Exam reveals no friction rub.   No  murmur heard.  Pulmonary/Chest: Effort normal. No respiratory distress. She has no wheezes.   Musculoskeletal: She exhibits no edema.   Trace tibia and ankle edemano calf tenderness negative homes   Neurological: She is alert and oriented to person, place, and time.   Skin: Skin is warm and dry.   Psychiatric: She has a normal mood and affect. Her behavior is normal. Judgment and thought content normal.   Vitals reviewed.        Assessment/Plan   Jayne was seen today for hypertension.    Diagnoses and all orders for this visit:    Peripheral polyneuropathy    Essential hypertension    Other orders  -     lidocaine (XYLOCAINE) 2 % jelly; Apply  topically to the appropriate area as directed 3 (Three) Times a Day. Sparingly as needed for pain  -     traMADol (ULTRAM) 50 MG tablet; Take 2 tablets by mouth 2 (Two) Times a Day As Needed for Moderate Pain . Take with Tylenol                Lidocaine with caution sparingly  Tramadol risk-benefit  Appropriate use storage disposal  Follow-up Dr. Ceballos  Consider maximizing Lyrica and possibly decreasing gabapentin  Potential drug interactions sedation discussed   strategy to mitigate risk  Stop smoking  Discuss importance  Smoking is a major cause of atherosclerotic disease lung disease lung cancer stroke heart attack

## 2018-12-17 RX ORDER — RANITIDINE 300 MG/1
CAPSULE ORAL
Qty: 90 CAPSULE | Refills: 1 | Status: SHIPPED | OUTPATIENT
Start: 2018-12-17 | End: 2019-03-25 | Stop reason: SDUPTHER

## 2018-12-17 RX ORDER — TRAMADOL HYDROCHLORIDE 50 MG/1
100 TABLET ORAL 2 TIMES DAILY PRN
Qty: 60 TABLET | Refills: 0 | OUTPATIENT
Start: 2018-12-17

## 2018-12-21 ENCOUNTER — TELEPHONE (OUTPATIENT)
Dept: FAMILY MEDICINE CLINIC | Facility: CLINIC | Age: 63
End: 2018-12-21

## 2018-12-21 NOTE — TELEPHONE ENCOUNTER
Called prescription to [facility] pharmacy for Maxzide   Take 2 tablet 1 morning and 1 afternoon ,    QTY 60     0 refill  Notified pt to have office visit in 2-3 weeks and  to check  Her potassium  Levels per Epley

## 2018-12-24 ENCOUNTER — TELEPHONE (OUTPATIENT)
Dept: FAMILY MEDICINE CLINIC | Facility: CLINIC | Age: 63
End: 2018-12-24

## 2018-12-24 RX ORDER — TRIAMTERENE AND HYDROCHLOROTHIAZIDE 37.5; 25 MG/1; MG/1
1 TABLET ORAL DAILY
Qty: 30 TABLET | Refills: 2 | Status: SHIPPED | OUTPATIENT
Start: 2018-12-24 | End: 2019-01-15 | Stop reason: SDUPTHER

## 2018-12-31 RX ORDER — GABAPENTIN 800 MG/1
TABLET ORAL
Qty: 270 TABLET | Refills: 0 | OUTPATIENT
Start: 2018-12-31 | End: 2019-05-14 | Stop reason: SDUPTHER

## 2018-12-31 RX ORDER — ROPINIROLE 1 MG/1
TABLET, FILM COATED ORAL
Qty: 90 TABLET | Refills: 0 | Status: SHIPPED | OUTPATIENT
Start: 2018-12-31 | End: 2019-01-14 | Stop reason: SDUPTHER

## 2018-12-31 RX ORDER — AMLODIPINE BESYLATE 5 MG/1
TABLET ORAL
Qty: 90 TABLET | Refills: 0 | Status: SHIPPED | OUTPATIENT
Start: 2018-12-31 | End: 2019-01-14 | Stop reason: SDUPTHER

## 2019-01-03 RX ORDER — GABAPENTIN 800 MG/1
TABLET ORAL
Qty: 270 TABLET | Refills: 0 | OUTPATIENT
Start: 2019-01-03

## 2019-01-14 ENCOUNTER — OFFICE VISIT (OUTPATIENT)
Dept: FAMILY MEDICINE CLINIC | Facility: CLINIC | Age: 64
End: 2019-01-14

## 2019-01-14 VITALS
HEART RATE: 78 BPM | OXYGEN SATURATION: 91 % | DIASTOLIC BLOOD PRESSURE: 80 MMHG | WEIGHT: 144 LBS | BODY MASS INDEX: 23.99 KG/M2 | SYSTOLIC BLOOD PRESSURE: 140 MMHG | HEIGHT: 65 IN

## 2019-01-14 DIAGNOSIS — G62.9 PERIPHERAL POLYNEUROPATHY: ICD-10-CM

## 2019-01-14 DIAGNOSIS — M54.12 CERVICAL RADICULOPATHY, ACUTE: Primary | ICD-10-CM

## 2019-01-14 DIAGNOSIS — I10 ESSENTIAL HYPERTENSION: ICD-10-CM

## 2019-01-14 PROCEDURE — 99214 OFFICE O/P EST MOD 30 MIN: CPT | Performed by: NURSE PRACTITIONER

## 2019-01-14 RX ORDER — TRAMADOL HYDROCHLORIDE 50 MG/1
100 TABLET ORAL 2 TIMES DAILY PRN
Qty: 120 TABLET | Refills: 2 | Status: SHIPPED | OUTPATIENT
Start: 2019-01-14 | End: 2019-05-14 | Stop reason: SDUPTHER

## 2019-01-14 NOTE — PATIENT INSTRUCTIONS
Discharge instructions    Neck exercises daily  Gentle stretching  Work around the pain not through  Call in 6 weeks for follow-up    If not greatly improved in 4-6 weeks  X-rays and physical therapy and/or referral    If weakness chest pain nausea vomiting  Bowel or bladder incontinence or retention weakness in the leg  Emergency room      Cervical Radiculopathy  Cervical radiculopathy happens when a nerve in the neck (cervical nerve) is pinched or bruised. This condition can develop because of an injury or as part of the normal aging process. Pressure on the cervical nerves can cause pain or numbness that runs from the neck all the way down into the arm and fingers. Usually, this condition gets better with rest. Treatment may be needed if the condition does not improve.  What are the causes?  This condition may be caused by:  · Injury.  · Slipped (herniated) disk.  · Muscle tightness in the neck because of overuse.  · Arthritis.  · Breakdown or degeneration in the bones and joints of the spine (spondylosis) due to aging.  · Bone spurs that may develop near the cervical nerves.    What are the signs or symptoms?  Symptoms of this condition include:  · Pain that runs from the neck to the arm and hand. The pain can be severe or irritating. It may be worse when the neck is moved.  · Numbness or weakness in the affected arm and hand.    How is this diagnosed?  This condition may be diagnosed based on symptoms, medical history, and a physical exam. You may also have tests, including:  · X-rays.  · CT scan.  · MRI.  · Electromyogram (EMG).  · Nerve conduction tests.    How is this treated?  In many cases, treatment is not needed for this condition. With rest, the condition usually gets better over time. If treatment is needed, options may include:  · Wearing a soft neck collar for short periods of time.  · Physical therapy to strengthen your neck muscles.  · Medicines, such as NSAIDs, oral corticosteroids, or spinal  injections.  · Surgery. This may be needed if other treatments do not help. Various types of surgery may be done depending on the cause of your problems.    Follow these instructions at home:  Managing pain  · Take over-the-counter and prescription medicines only as told by your health care provider.  · If directed, apply ice to the affected area.  ? Put ice in a plastic bag.  ? Place a towel between your skin and the bag.  ? Leave the ice on for 20 minutes, 2-3 times per day.  · If ice does not help, you can try using heat. Take a warm shower or warm bath, or use a heat pack as told by your health care provider.  · Try a gentle neck and shoulder massage to help relieve symptoms.  Activity  · Rest as needed. Follow instructions from your health care provider about any restrictions on activities.  · Do stretching and strengthening exercises as told by your health care provider or physical therapist.  General instructions  · If you were given a soft collar, wear it as told by your health care provider.  · Use a flat pillow when you sleep.  · Keep all follow-up visits as told by your health care provider. This is important.  Contact a health care provider if:  · Your condition does not improve with treatment.  Get help right away if:  · Your pain gets much worse and cannot be controlled with medicines.  · You have weakness or numbness in your hand, arm, face, or leg.  · You have a high fever.  · You have a stiff, rigid neck.  · You lose control of your bowels or your bladder (have incontinence).  · You have trouble with walking, balance, or speaking.  This information is not intended to replace advice given to you by your health care provider. Make sure you discuss any questions you have with your health care provider.  Document Released: 09/12/2002 Document Revised: 05/25/2017 Document Reviewed: 02/11/2016  [employer] Interactive Patient Education © 2018 [employer]    Cervical Strain and Sprain Rehab  Ask your health  care provider which exercises are safe for you. Do exercises exactly as told by your health care provider and adjust them as directed. It is normal to feel mild stretching, pulling, tightness, or discomfort as you do these exercises, but you should stop right away if you feel sudden pain or your pain gets worse. Do not begin these exercises until told by your health care provider.  Stretching and range of motion exercises  These exercises warm up your muscles and joints and improve the movement and flexibility of your neck. These exercises also help to relieve pain, numbness, and tingling.  Exercise A: Cervical side bend    1. Using good posture, sit on a stable chair or stand up.  2. Without moving your shoulders, slowly tilt your left / right ear to your shoulder until you feel a stretch in your neck muscles. You should be looking straight ahead.  3. Hold for __________ seconds.  4. Repeat with the other side of your neck.  Repeat __________ times. Complete this exercise __________ times a day.  Exercise B: Cervical rotation    1. Using good posture, sit on a stable chair or stand up.  2. Slowly turn your head to the side as if you are looking over your left / right shoulder.  ? Keep your eyes level with the ground.  ? Stop when you feel a stretch along the side and the back of your neck.  3. Hold for __________ seconds.  4. Repeat this by turning to your other side.  Repeat __________ times. Complete this exercise __________ times a day.  Exercise C: Thoracic extension and pectoral stretch  1. Roll a towel or a small blanket so it is about 4 inches (10 cm) in diameter.  2. Lie down on your back on a firm surface.  3. Put the towel lengthwise, under your spine in the middle of your back. It should not be not under your shoulder blades. The towel should line up with your spine from your middle back to your lower back.  4. Put your hands behind your head and let your elbows fall out to your sides.  5. Hold for  __________ seconds.  Repeat __________ times. Complete this exercise __________ times a day.  Strengthening exercises  These exercises build strength and endurance in your neck. Endurance is the ability to use your muscles for a long time, even after your muscles get tired.  Exercise D: Upper cervical flexion, isometric  1. Lie on your back with a thin pillow behind your head and a small rolled-up towel under your neck.  2. Gently tuck your chin toward your chest and nod your head down to look toward your feet. Do not lift your head off the pillow.  3. Hold for __________ seconds.  4. Release the tension slowly. Relax your neck muscles completely before you repeat this exercise.  Repeat __________ times. Complete this exercise __________ times a day.  Exercise E: Cervical extension, isometric    1. Stand about 6 inches (15 cm) away from a wall, with your back facing the wall.  2. Place a soft object, about 6-8 inches (15-20 cm) in diameter, between the back of your head and the wall. A soft object could be a small pillow, a ball, or a folded towel.  3. Gently tilt your head back and press into the soft object. Keep your jaw and forehead relaxed.  4. Hold for __________ seconds.  5. Release the tension slowly. Relax your neck muscles completely before you repeat this exercise.  Repeat __________ times. Complete this exercise __________ times a day.  Posture and body mechanics    Body mechanics refers to the movements and positions of your body while you do your daily activities. Posture is part of body mechanics. Good posture and healthy body mechanics can help to relieve stress in your body's tissues and joints. Good posture means that your spine is in its natural S-curve position (your spine is neutral), your shoulders are pulled back slightly, and your head is not tipped forward. The following are general guidelines for applying improved posture and body mechanics to your everyday activities.  Standing  · When  standing, keep your spine neutral and keep your feet about hip-width apart. Keep a slight bend in your knees. Your ears, shoulders, and hips should line up.  · When you do a task in which you  one place for a long time, place one foot up on a stable object that is 2-4 inches (5-10 cm) high, such as a footstool. This helps keep your spine neutral.  Sitting    · When sitting, keep your spine neutral and your keep feet flat on the floor. Use a footrest, if necessary, and keep your thighs parallel to the floor. Avoid rounding your shoulders, and avoid tilting your head forward.  · When working at a desk or a computer, keep your desk at a height where your hands are slightly lower than your elbows. Slide your chair under your desk so you are close enough to maintain good posture.  · When working at a computer, place your monitor at a height where you are looking straight ahead and you do not have to tilt your head forward or downward to look at the screen.  Resting  When lying down and resting, avoid positions that are most painful for you. Try to support your neck in a neutral position. You can use a contour pillow or a small rolled-up towel. Your pillow should support your neck but not push on it.  This information is not intended to replace advice given to you by your health care provider. Make sure you discuss any questions you have with your health care provider.  Document Released: 12/18/2006 Document Revised: 08/24/2017 Document Reviewed: 11/23/2016  [employer] Interactive Patient Education © 2018 [employer]

## 2019-01-15 RX ORDER — TRIAMTERENE AND HYDROCHLOROTHIAZIDE 37.5; 25 MG/1; MG/1
1 TABLET ORAL DAILY
Qty: 90 TABLET | Refills: 0 | Status: SHIPPED | OUTPATIENT
Start: 2019-01-15 | End: 2019-07-15 | Stop reason: SDUPTHER

## 2019-01-21 ENCOUNTER — TELEPHONE (OUTPATIENT)
Dept: FAMILY MEDICINE CLINIC | Facility: CLINIC | Age: 64
End: 2019-01-21

## 2019-01-21 RX ORDER — PREGABALIN 75 MG/1
75 CAPSULE ORAL 2 TIMES DAILY
Qty: 60 CAPSULE | Refills: 4 | OUTPATIENT
Start: 2019-01-21 | End: 2019-05-14 | Stop reason: SDUPTHER

## 2019-01-29 RX ORDER — ICOSAPENT ETHYL 1000 MG/1
4 CAPSULE ORAL DAILY
Qty: 120 CAPSULE | Refills: 5 | OUTPATIENT
Start: 2019-01-29

## 2019-02-13 RX ORDER — CYCLOBENZAPRINE HCL 10 MG
TABLET ORAL
Qty: 60 TABLET | Refills: 1 | Status: SHIPPED | OUTPATIENT
Start: 2019-02-13 | End: 2019-07-04 | Stop reason: SDUPTHER

## 2019-03-08 RX ORDER — ROPINIROLE 3 MG/1
3 TABLET, FILM COATED ORAL NIGHTLY
Qty: 90 TABLET | Refills: 0 | Status: SHIPPED | OUTPATIENT
Start: 2019-03-08 | End: 2019-06-02 | Stop reason: SDUPTHER

## 2019-03-18 RX ORDER — LISINOPRIL 40 MG/1
TABLET ORAL
Qty: 60 TABLET | Refills: 0 | Status: SHIPPED | OUTPATIENT
Start: 2019-03-18 | End: 2019-04-14 | Stop reason: SDUPTHER

## 2019-03-19 RX ORDER — CLONIDINE HYDROCHLORIDE 0.2 MG/1
TABLET ORAL
Qty: 180 TABLET | Refills: 1 | Status: SHIPPED | OUTPATIENT
Start: 2019-03-19 | End: 2019-03-27 | Stop reason: SDUPTHER

## 2019-03-25 RX ORDER — RANITIDINE 300 MG/1
CAPSULE ORAL
Qty: 30 CAPSULE | Refills: 0 | Status: SHIPPED | OUTPATIENT
Start: 2019-03-25 | End: 2019-07-15 | Stop reason: SDUPTHER

## 2019-03-25 RX ORDER — AMLODIPINE BESYLATE 10 MG/1
10 TABLET ORAL DAILY
Qty: 90 TABLET | Refills: 0 | Status: SHIPPED | OUTPATIENT
Start: 2019-03-25 | End: 2019-03-27 | Stop reason: SDUPTHER

## 2019-03-26 ENCOUNTER — TELEPHONE (OUTPATIENT)
Dept: FAMILY MEDICINE CLINIC | Facility: CLINIC | Age: 64
End: 2019-03-26

## 2019-03-27 RX ORDER — AMLODIPINE BESYLATE 10 MG/1
10 TABLET ORAL DAILY
Qty: 90 TABLET | Refills: 1 | Status: SHIPPED | OUTPATIENT
Start: 2019-03-27 | End: 2019-06-18

## 2019-03-28 RX ORDER — ROPINIROLE 1 MG/1
TABLET, FILM COATED ORAL
Qty: 90 TABLET | Refills: 0 | Status: SHIPPED | OUTPATIENT
Start: 2019-03-28 | End: 2019-05-14 | Stop reason: SDUPTHER

## 2019-03-28 RX ORDER — AMLODIPINE BESYLATE 5 MG/1
TABLET ORAL
Qty: 90 TABLET | Refills: 0 | Status: SHIPPED | OUTPATIENT
Start: 2019-03-28 | End: 2019-05-14 | Stop reason: SDUPTHER

## 2019-04-08 RX ORDER — METOPROLOL SUCCINATE 200 MG/1
200 TABLET, EXTENDED RELEASE ORAL DAILY
Qty: 90 TABLET | Refills: 0 | Status: SHIPPED | OUTPATIENT
Start: 2019-04-08 | End: 2019-07-04 | Stop reason: SDUPTHER

## 2019-04-08 RX ORDER — MELOXICAM 15 MG/1
15 TABLET ORAL DAILY PRN
Qty: 90 TABLET | Refills: 0 | Status: SHIPPED | OUTPATIENT
Start: 2019-04-08 | End: 2019-07-03 | Stop reason: SDUPTHER

## 2019-04-15 RX ORDER — LISINOPRIL 40 MG/1
TABLET ORAL
Qty: 60 TABLET | Refills: 0 | Status: SHIPPED | OUTPATIENT
Start: 2019-04-15 | End: 2019-05-07 | Stop reason: SDUPTHER

## 2019-04-15 RX ORDER — DAPAGLIFLOZIN AND METFORMIN HYDROCHLORIDE 5; 1000 MG/1; MG/1
TABLET, FILM COATED, EXTENDED RELEASE ORAL
Qty: 60 TABLET | Refills: 5 | OUTPATIENT
Start: 2019-04-15

## 2019-05-06 RX ORDER — DAPAGLIFLOZIN AND METFORMIN HYDROCHLORIDE 5; 1000 MG/1; MG/1
2 TABLET, FILM COATED, EXTENDED RELEASE ORAL DAILY
Qty: 60 TABLET | Refills: 0 | Status: SHIPPED | OUTPATIENT
Start: 2019-05-06 | End: 2019-05-13 | Stop reason: SDUPTHER

## 2019-05-06 RX ORDER — DAPAGLIFLOZIN AND METFORMIN HYDROCHLORIDE 5; 1000 MG/1; MG/1
TABLET, FILM COATED, EXTENDED RELEASE ORAL
Qty: 60 TABLET | Refills: 5 | OUTPATIENT
Start: 2019-05-06

## 2019-05-07 RX ORDER — LISINOPRIL 40 MG/1
80 TABLET ORAL DAILY
Qty: 180 TABLET | Refills: 0 | Status: SHIPPED | OUTPATIENT
Start: 2019-05-07 | End: 2019-08-05 | Stop reason: SDUPTHER

## 2019-05-13 RX ORDER — DAPAGLIFLOZIN AND METFORMIN HYDROCHLORIDE 5; 1000 MG/1; MG/1
2 TABLET, FILM COATED, EXTENDED RELEASE ORAL DAILY
Qty: 60 TABLET | Refills: 0 | Status: SHIPPED | OUTPATIENT
Start: 2019-05-13 | End: 2019-06-18 | Stop reason: SDUPTHER

## 2019-05-14 ENCOUNTER — OFFICE VISIT (OUTPATIENT)
Dept: FAMILY MEDICINE CLINIC | Facility: CLINIC | Age: 64
End: 2019-05-14

## 2019-05-14 VITALS
OXYGEN SATURATION: 94 % | BODY MASS INDEX: 23.82 KG/M2 | SYSTOLIC BLOOD PRESSURE: 130 MMHG | HEIGHT: 65 IN | DIASTOLIC BLOOD PRESSURE: 70 MMHG | HEART RATE: 84 BPM | WEIGHT: 143 LBS

## 2019-05-14 DIAGNOSIS — G62.9 PERIPHERAL POLYNEUROPATHY: Primary | ICD-10-CM

## 2019-05-14 DIAGNOSIS — Z79.899 HIGH RISK MEDICATION USE: ICD-10-CM

## 2019-05-14 PROCEDURE — 99213 OFFICE O/P EST LOW 20 MIN: CPT | Performed by: NURSE PRACTITIONER

## 2019-05-14 RX ORDER — PREGABALIN 75 MG/1
75 CAPSULE ORAL 2 TIMES DAILY
Qty: 60 CAPSULE | Refills: 3 | Status: SHIPPED | OUTPATIENT
Start: 2019-05-14 | End: 2019-06-18

## 2019-05-14 RX ORDER — TRAMADOL HYDROCHLORIDE 50 MG/1
100 TABLET ORAL 2 TIMES DAILY PRN
Qty: 120 TABLET | Refills: 2 | Status: SHIPPED | OUTPATIENT
Start: 2019-05-14 | End: 2019-08-26 | Stop reason: SDUPTHER

## 2019-05-14 RX ORDER — GABAPENTIN 800 MG/1
800 TABLET ORAL 3 TIMES DAILY
Qty: 270 TABLET | Refills: 0 | Status: SHIPPED | OUTPATIENT
Start: 2019-05-14 | End: 2019-06-18

## 2019-05-14 RX ORDER — DAPAGLIFLOZIN AND METFORMIN HYDROCHLORIDE 5; 1000 MG/1; MG/1
2 TABLET, FILM COATED, EXTENDED RELEASE ORAL DAILY
Qty: 60 TABLET | Refills: 3 | Status: CANCELLED | OUTPATIENT
Start: 2019-05-14

## 2019-05-14 NOTE — PROGRESS NOTES
Subjective   Jayne Beltran is a 63 y.o. female.     pleasant patient here for follow-up polyneuropathy long history of neuropathy and complains of chronic mild/moderate and severe pain  Combination of Lyrica and gabapentin has helped best so far and seen Dr. Ceballos as well as he started her on tramadol opiate agonist she is had much better pain control  Takes no more than 4 daily  Monitoring [place]  She has had no adverse events    Cervical radiculopathy resolved  Out of her diabetic medication needs to follow-up with Dr. Clemons endocrinology she is been off for 3 days no acute illness  She is careful not to combine Lyrica and gabapentin at the same time to decrease risk of sedation  No drug-seeking behavior she is had substantial benefit from the combination of medication    Previously Dr. Ceballos had patient taking both Lyrica and gabapentin and required this combination to get her improved pain relief  I would prefer monotherapy here  And she is agreed to discuss to try to decrease gabapentin gradually  She can discuss this with me by email or call and I can increase Lyrica as tolerated or required    Tramadol always lowest effective dose shortest time possible reviewed safety potential for addiction dependence  She is doing well here and getting considerable relief  Will need a tox assure today requested [place] ventura reviewed updated controlled substance agreement as well storage disposal  Patient has tried a conservative multitude of conservative therapy and her quality of life has been quite poor and is improved with tramadol which is less addictive with much less side effect profile  As hydrocodone  She is taking a moderate dose presently do not plan to titrate up and if this was required she would need to see pain management as discussed         The following portions of the patient's history were reviewed and updated as appropriate: allergies, current medications, past family history, past medical history,  past social history, past surgical history and problem list.    Review of Systems   Neurological: Positive for numbness.        Neuropathic leg pain   All other systems reviewed and are negative.      Objective   Physical Exam   Constitutional: She is oriented to person, place, and time. She appears well-developed and well-nourished.   HENT:   Head: Normocephalic.   Mouth/Throat: Oropharynx is clear and moist. No oropharyngeal exudate.   Eyes: Conjunctivae are normal. Pupils are equal, round, and reactive to light.   Neck: Neck supple. No JVD present.   Cardiovascular: Normal rate, regular rhythm and normal heart sounds. Exam reveals no friction rub.   No murmur heard.  Pulmonary/Chest: Effort normal and breath sounds normal. No respiratory distress. She has no wheezes.   Abdominal: Soft. Bowel sounds are normal. She exhibits no distension and no mass. There is no tenderness. There is no guarding. No hernia.   Musculoskeletal: She exhibits no edema or tenderness.   Lymphadenopathy:     She has no cervical adenopathy.   Neurological: She is alert and oriented to person, place, and time.   Skin: Skin is warm and dry.   Psychiatric: She has a normal mood and affect. Her behavior is normal. Judgment and thought content normal.   Vitals reviewed.        Assessment/Plan   Jayne was seen today for check-up on htn.    Diagnoses and all orders for this visit:    Peripheral polyneuropathy  Comments:  Chronic severe peripheral neuropathy  Orders:  -     ToxASSURE Select 13 (MW) - Urine, Clean Catch    High risk medication use  -     ToxASSURE Select 13 (MW) - Urine, Clean Catch    Other orders  -     Cancel: XIGDUO XR 5-1000 MG tablet; Take 2 tablets by mouth Daily.  -     gabapentin (NEURONTIN) 800 MG tablet; Take 1 tablet by mouth 3 (Three) Times a Day.  -     pregabalin (LYRICA) 75 MG capsule; Take 1 capsule by mouth 2 (Two) Times a Day.  -     traMADol (ULTRAM) 50 MG tablet; Take 2 tablets by mouth 2 (Two) Times a Day As  Needed for Moderate Pain . Take with Tylenol                  There are no Patient Instructions on file for this visit.

## 2019-05-20 LAB — DRUGS UR: NORMAL

## 2019-06-03 RX ORDER — ROPINIROLE 3 MG/1
TABLET, FILM COATED ORAL
Qty: 90 TABLET | Refills: 0 | Status: SHIPPED | OUTPATIENT
Start: 2019-06-03 | End: 2019-08-29 | Stop reason: SDUPTHER

## 2019-06-14 RX ORDER — DAPAGLIFLOZIN AND METFORMIN HYDROCHLORIDE 5; 1000 MG/1; MG/1
TABLET, FILM COATED, EXTENDED RELEASE ORAL
Qty: 60 TABLET | Refills: 0 | Status: CANCELLED | OUTPATIENT
Start: 2019-06-14

## 2019-06-18 ENCOUNTER — OFFICE VISIT (OUTPATIENT)
Dept: ENDOCRINOLOGY | Age: 64
End: 2019-06-18

## 2019-06-18 VITALS
BODY MASS INDEX: 23.99 KG/M2 | RESPIRATION RATE: 16 BRPM | WEIGHT: 144 LBS | HEIGHT: 65 IN | SYSTOLIC BLOOD PRESSURE: 122 MMHG | DIASTOLIC BLOOD PRESSURE: 76 MMHG

## 2019-06-18 DIAGNOSIS — E11.9 TYPE 2 DIABETES MELLITUS WITHOUT COMPLICATION, WITHOUT LONG-TERM CURRENT USE OF INSULIN (HCC): Primary | ICD-10-CM

## 2019-06-18 DIAGNOSIS — E11.43 DIABETIC AUTONOMIC NEUROPATHY ASSOCIATED WITH TYPE 2 DIABETES MELLITUS (HCC): ICD-10-CM

## 2019-06-18 DIAGNOSIS — I10 ESSENTIAL HYPERTENSION: ICD-10-CM

## 2019-06-18 DIAGNOSIS — E78.5 DYSLIPIDEMIA: ICD-10-CM

## 2019-06-18 DIAGNOSIS — E55.9 VITAMIN D DEFICIENCY: ICD-10-CM

## 2019-06-18 PROCEDURE — 99215 OFFICE O/P EST HI 40 MIN: CPT | Performed by: INTERNAL MEDICINE

## 2019-06-18 RX ORDER — DAPAGLIFLOZIN AND METFORMIN HYDROCHLORIDE 5; 1000 MG/1; MG/1
2 TABLET, FILM COATED, EXTENDED RELEASE ORAL DAILY
Qty: 60 TABLET | Refills: 11 | Status: SHIPPED | OUTPATIENT
Start: 2019-06-18 | End: 2019-09-05 | Stop reason: SDUPTHER

## 2019-06-18 RX ORDER — DULOXETIN HYDROCHLORIDE 60 MG/1
60 CAPSULE, DELAYED RELEASE ORAL DAILY
Qty: 90 CAPSULE | Refills: 3 | Status: SHIPPED | OUTPATIENT
Start: 2019-06-18 | End: 2019-08-29

## 2019-06-18 RX ORDER — AMLODIPINE BESYLATE 10 MG/1
10 TABLET ORAL DAILY
Qty: 90 TABLET | Refills: 1 | Status: SHIPPED | OUTPATIENT
Start: 2019-06-18 | End: 2019-08-29

## 2019-06-18 NOTE — PROGRESS NOTES
"Subjective   Jayne Beltran is a 63 y.o. female seen for follow up for DM2, HTN. No lab review. Patient is checking BG 2 times a day. No BG readings. She states that her feet are swollen and she has restless leg syndrome. She states that her PCP told her it may be diabetic neuropathy.     History of Present Illness this is a 63-year-old female known patient with type 2 diabetes hypertension and dyslipidemia as well as vitamin D deficiency.  Over the course of last 6 months she has had no significant health problem for which to go to the ER or hospital.  The first time I saw her was November 1, 2017 and since then I have not seen her.  She said the reason for this was a great deal of family issues and 8 deaths due to cardiovascular or cancer in close family members.    /76   Resp 16   Ht 165.1 cm (65\")   Wt 65.3 kg (144 lb)   BMI 23.96 kg/m²      Allergies   Allergen Reactions   • Codeine        Current Outpatient Medications:   •  amLODIPine (NORVASC) 10 MG tablet, Take 1 tablet by mouth Daily., Disp: 90 tablet, Rfl: 1  •  aspirin 81 MG EC tablet, Take 81 mg by mouth Daily., Disp: , Rfl:   •  CloNIDine (CATAPRES) 0.2 MG tablet, Take 0.2 mg by mouth 2 (Two) Times a Day., Disp: , Rfl:   •  Cranberry 400 MG capsule, Take  by mouth., Disp: , Rfl:   •  cyclobenzaprine (FLEXERIL) 10 MG tablet, TAKE 1 TABLET BY MOUTH TWICE A DAY AS NEEDED FOR MUSCLE SPASM, Disp: 60 tablet, Rfl: 1  •  gabapentin (NEURONTIN) 800 MG tablet, Take 1 tablet by mouth 3 (Three) Times a Day., Disp: 270 tablet, Rfl: 0  •  IRON, FERROUS GLUCONATE, PO, Take  by mouth., Disp: , Rfl:   •  lidocaine (XYLOCAINE) 2 % jelly, Apply  topically to the appropriate area as directed 3 (Three) Times a Day. Sparingly as needed for pain, Disp: 120 mL, Rfl: 3  •  lisinopril (PRINIVIL,ZESTRIL) 40 MG tablet, Take 2 tablets by mouth Daily., Disp: 180 tablet, Rfl: 0  •  meloxicam (MOBIC) 15 MG tablet, Take 1 tablet by mouth Daily As Needed for Moderate Pain " ., Disp: 90 tablet, Rfl: 0  •  metoprolol succinate XL (TOPROL-XL) 200 MG 24 hr tablet, Take 1 tablet by mouth Daily., Disp: 90 tablet, Rfl: 0  •  potassium citrate (UROCIT-K) 10 MEQ (1080 MG) CR tablet, Take  by mouth 2 (two) times a day., Disp: , Rfl:   •  pregabalin (LYRICA) 75 MG capsule, Take 1 capsule by mouth 2 (Two) Times a Day., Disp: 60 capsule, Rfl: 3  •  ranitidine (ZANTAC) 300 MG capsule, TAKE ONE CAPSULE BY MOUTH EVERY EVENING, Disp: 30 capsule, Rfl: 0  •  rOPINIRole (REQUIP) 3 MG tablet, TAKE 1 TABLET BY MOUTH EVERY DAY AT NIGHT, Disp: 90 tablet, Rfl: 0  •  traMADol (ULTRAM) 50 MG tablet, Take 2 tablets by mouth 2 (Two) Times a Day As Needed for Moderate Pain . Take with Tylenol, Disp: 120 tablet, Rfl: 2  •  triamterene-hydrochlorothiazide (MAXZIDE-25) 37.5-25 MG per tablet, Take 1 tablet by mouth Daily. For htn, Disp: 90 tablet, Rfl: 0  •  Vitamin D, Cholecalciferol, 1000 units capsule, Take  by mouth., Disp: , Rfl:   •  XIGDUO XR 5-1000 MG tablet, Take 2 tablets by mouth Daily., Disp: 60 tablet, Rfl: 0      The following portions of the patient's history were reviewed and updated as appropriate: allergies, current medications, past family history, past medical history, past social history, past surgical history and problem list.    Review of Systems   Constitutional: Negative.    HENT: Negative.    Eyes: Negative.    Respiratory: Negative.    Cardiovascular: Negative.    Gastrointestinal: Negative.    Endocrine: Negative.    Genitourinary: Negative.    Musculoskeletal: Negative.    Skin: Negative.    Allergic/Immunologic: Negative.    Neurological: Negative.    Hematological: Negative.    Psychiatric/Behavioral: Negative.        Objective   Physical Exam   Constitutional: She is oriented to person, place, and time. She appears well-developed and well-nourished. No distress.   HENT:   Head: Normocephalic and atraumatic.   Right Ear: External ear normal.   Left Ear: External ear normal.   Nose: Nose  normal.   Mouth/Throat: Oropharynx is clear and moist. No oropharyngeal exudate.   Eyes: Conjunctivae and EOM are normal. Pupils are equal, round, and reactive to light. Right eye exhibits no discharge. Left eye exhibits no discharge. No scleral icterus.   Neck: Normal range of motion. Neck supple. No JVD present. No tracheal deviation present. No thyromegaly present.   Cardiovascular: Normal rate, regular rhythm, normal heart sounds and intact distal pulses. Exam reveals no gallop and no friction rub.   No murmur heard.  Pulmonary/Chest: Effort normal and breath sounds normal. No stridor. No respiratory distress. She has no wheezes. She has no rales. She exhibits no tenderness.   Abdominal: Soft. Bowel sounds are normal. She exhibits no distension and no mass. There is no tenderness. There is no rebound and no guarding. No hernia.   Musculoskeletal: Normal range of motion. She exhibits edema. She exhibits no tenderness or deformity.   4+ bilateral edema on both ankles   Lymphadenopathy:     She has no cervical adenopathy.   Neurological: She is alert and oriented to person, place, and time. She has normal reflexes. She displays normal reflexes. No cranial nerve deficit or sensory deficit. She exhibits normal muscle tone. Coordination normal.   Skin: Skin is warm and dry. No rash noted. She is not diaphoretic. No erythema. No pallor.   Psychiatric: She has a normal mood and affect. Her behavior is normal. Judgment and thought content normal.   Nursing note and vitals reviewed.        Assessment/Plan   Diagnoses and all orders for this visit:    Type 2 diabetes mellitus without complication, without long-term current use of insulin (CMS/[facility])  -     T4 & TSH ([facility])  -     Vitamin D 25 Hydroxy  -     Uric Acid  -     Comprehensive Metabolic Panel  -     C-Peptide  -     Hemoglobin A1c  -     Lipid Panel  -     MicroAlbumin, Urine, Random - Urine, Clean Catch  -     ACTH  -     Cortisol  -     T4 & TSH ([facility]);  Future  -     Uric Acid; Future  -     Vitamin D 25 Hydroxy; Future  -     Comprehensive Metabolic Panel; Future  -     C-Peptide; Future  -     Hemoglobin A1c; Future  -     Lipid Panel; Future  -     MicroAlbumin, Urine, Random - Urine, Clean Catch; Future    Essential hypertension  -     T4 & TSH ([facility])  -     Vitamin D 25 Hydroxy  -     Uric Acid  -     Comprehensive Metabolic Panel  -     C-Peptide  -     Hemoglobin A1c  -     Lipid Panel  -     MicroAlbumin, Urine, Random - Urine, Clean Catch  -     ACTH  -     Cortisol  -     T4 & TSH ([facility]); Future  -     Uric Acid; Future  -     Vitamin D 25 Hydroxy; Future  -     Comprehensive Metabolic Panel; Future  -     C-Peptide; Future  -     Hemoglobin A1c; Future  -     Lipid Panel; Future  -     MicroAlbumin, Urine, Random - Urine, Clean Catch; Future    Vitamin D deficiency  -     T4 & TSH ([facility])  -     Vitamin D 25 Hydroxy  -     Uric Acid  -     Comprehensive Metabolic Panel  -     C-Peptide  -     Hemoglobin A1c  -     Lipid Panel  -     MicroAlbumin, Urine, Random - Urine, Clean Catch  -     ACTH  -     Cortisol  -     T4 & TSH ([facility]); Future  -     Uric Acid; Future  -     Vitamin D 25 Hydroxy; Future  -     Comprehensive Metabolic Panel; Future  -     C-Peptide; Future  -     Hemoglobin A1c; Future  -     Lipid Panel; Future  -     MicroAlbumin, Urine, Random - Urine, Clean Catch; Future    Dyslipidemia  -     T4 & TSH ([facility])  -     Vitamin D 25 Hydroxy  -     Uric Acid  -     Comprehensive Metabolic Panel  -     C-Peptide  -     Hemoglobin A1c  -     Lipid Panel  -     MicroAlbumin, Urine, Random - Urine, Clean Catch  -     ACTH  -     Cortisol  -     T4 & TSH ([facility]); Future  -     Uric Acid; Future  -     Vitamin D 25 Hydroxy; Future  -     Comprehensive Metabolic Panel; Future  -     C-Peptide; Future  -     Hemoglobin A1c; Future  -     Lipid Panel; Future  -     MicroAlbumin, Urine, Random - Urine, Clean Catch; Future    Diabetic  autonomic neuropathy associated with type 2 diabetes mellitus (CMS/[facility])  -     T4 & TSH ([facility]); Future  -     Uric Acid; Future  -     Vitamin D 25 Hydroxy; Future  -     Comprehensive Metabolic Panel; Future  -     C-Peptide; Future  -     Hemoglobin A1c; Future  -     Lipid Panel; Future  -     MicroAlbumin, Urine, Random - Urine, Clean Catch; Future    Other orders  -     XIGDUO XR 5-1000 MG tablet; Take 2 tablets by mouth Daily.  -     LYRICA 150 MG capsule; Take 1 capsule by mouth 2 (Two) Times a Day.  -     amLODIPine (NORVASC) 10 MG tablet; Take 1 tablet by mouth Daily.  -     DULoxetine (CYMBALTA) 60 MG capsule; Take 1 capsule by mouth Daily.      In summary I saw and examined this note for above-mentioned problems.  I reviewed her laboratory evaluation of November 16, 2018 and at this point we will go ahead and order additional laboratory evaluation and once the results come back we will go ahead and call for any possible modification or new medications.  I am also going to discontinue gabapentin and increase the dose of Lyrica to 150 mg twice a day and add Cymbalta 60 mg to help with her diabetic neuropathy.  This office visit lasted 40 minutes 25 minutes of it being a spent on face-to-face counseling and education of the patient and arranging his plan of care moving forward.  She will see Ms. Iris Mazariegos in 4 months or sooner if needed with laboratory evaluation prior to each office visit.

## 2019-06-19 LAB
25(OH)D3+25(OH)D2 SERPL-MCNC: 27.6 NG/ML (ref 30–100)
ACTH PLAS-MCNC: 7.7 PG/ML (ref 7.2–63.3)
ALBUMIN SERPL-MCNC: 3.9 G/DL (ref 3.5–5.2)
ALBUMIN/GLOB SERPL: 1.6 G/DL
ALP SERPL-CCNC: 83 U/L (ref 39–117)
ALT SERPL-CCNC: 16 U/L (ref 1–33)
AST SERPL-CCNC: 16 U/L (ref 1–32)
BILIRUB SERPL-MCNC: 0.4 MG/DL (ref 0.2–1.2)
BUN SERPL-MCNC: 27 MG/DL (ref 8–23)
BUN/CREAT SERPL: 18.2 (ref 7–25)
C PEPTIDE SERPL-MCNC: 9.1 NG/ML (ref 1.1–4.4)
CALCIUM SERPL-MCNC: 9.1 MG/DL (ref 8.6–10.5)
CHLORIDE SERPL-SCNC: 102 MMOL/L (ref 98–107)
CHOLEST SERPL-MCNC: 185 MG/DL (ref 0–200)
CO2 SERPL-SCNC: 25.3 MMOL/L (ref 22–29)
CORTIS SERPL-MCNC: 3.6 UG/DL
CREAT SERPL-MCNC: 1.48 MG/DL (ref 0.57–1)
GLOBULIN SER CALC-MCNC: 2.5 GM/DL
GLUCOSE SERPL-MCNC: 137 MG/DL (ref 65–99)
HBA1C MFR BLD: 6.18 % (ref 4.8–5.6)
HDLC SERPL-MCNC: 32 MG/DL (ref 40–60)
INTERPRETATION: NORMAL
LDLC SERPL CALC-MCNC: ABNORMAL MG/DL
Lab: NORMAL
MICROALBUMIN UR-MCNC: 36.4 UG/ML
POTASSIUM SERPL-SCNC: 3.4 MMOL/L (ref 3.5–5.2)
PROT SERPL-MCNC: 6.4 G/DL (ref 6–8.5)
SODIUM SERPL-SCNC: 140 MMOL/L (ref 136–145)
T4 SERPL-MCNC: 7.73 MCG/DL (ref 4.5–11.7)
TRIGL SERPL-MCNC: 436 MG/DL (ref 0–150)
TSH SERPL DL<=0.005 MIU/L-ACNC: 2.02 MIU/ML (ref 0.27–4.2)
URATE SERPL-MCNC: 8.1 MG/DL (ref 2.4–5.7)
VLDLC SERPL CALC-MCNC: ABNORMAL MG/DL

## 2019-06-19 RX ORDER — OMEPRAZOLE 20 MG/1
20 CAPSULE, DELAYED RELEASE ORAL DAILY
Qty: 90 CAPSULE | Refills: 1 | Status: SHIPPED | OUTPATIENT
Start: 2019-06-19 | End: 2019-08-29

## 2019-06-19 RX ORDER — ERGOCALCIFEROL 1.25 MG/1
50000 CAPSULE ORAL WEEKLY
Qty: 13 CAPSULE | Refills: 3 | Status: SHIPPED | OUTPATIENT
Start: 2019-06-19 | End: 2019-08-29

## 2019-06-19 RX ORDER — ALLOPURINOL 100 MG/1
100 TABLET ORAL DAILY
Qty: 90 TABLET | Refills: 3 | Status: SHIPPED | OUTPATIENT
Start: 2019-06-19 | End: 2019-08-29

## 2019-06-19 RX ORDER — ICOSAPENT ETHYL 1000 MG/1
2 CAPSULE ORAL 2 TIMES DAILY WITH MEALS
Qty: 360 CAPSULE | Refills: 3 | Status: SHIPPED | OUTPATIENT
Start: 2019-06-19 | End: 2020-03-10 | Stop reason: SDUPTHER

## 2019-06-25 RX ORDER — AMLODIPINE BESYLATE 5 MG/1
TABLET ORAL
Qty: 90 TABLET | Refills: 0 | Status: SHIPPED | OUTPATIENT
Start: 2019-06-25 | End: 2019-09-05

## 2019-06-25 RX ORDER — ROPINIROLE 1 MG/1
TABLET, FILM COATED ORAL
Qty: 90 TABLET | Refills: 0 | Status: ON HOLD | OUTPATIENT
Start: 2019-06-25 | End: 2020-03-03

## 2019-07-03 RX ORDER — MELOXICAM 15 MG/1
15 TABLET ORAL DAILY PRN
Qty: 90 TABLET | Refills: 0 | Status: SHIPPED | OUTPATIENT
Start: 2019-07-03 | End: 2019-08-29

## 2019-07-05 RX ORDER — CYCLOBENZAPRINE HCL 10 MG
TABLET ORAL
Qty: 60 TABLET | Refills: 1 | Status: SHIPPED | OUTPATIENT
Start: 2019-07-05 | End: 2019-09-26 | Stop reason: SDUPTHER

## 2019-07-05 RX ORDER — METOPROLOL SUCCINATE 200 MG/1
TABLET, EXTENDED RELEASE ORAL
Qty: 90 TABLET | Refills: 0 | Status: SHIPPED | OUTPATIENT
Start: 2019-07-05 | End: 2019-09-30 | Stop reason: SDUPTHER

## 2019-07-12 NOTE — PROGRESS NOTES
Patient kasey to dialysis.    Subjective   Jayne Beltran is a 63 y.o. female.     Follow-up essential hypertension controlled  Right arm pain sharp achy pain originates the base of right neck radiates to right trapezoid shoulder mid upper arm anteriorly  No weakness no chest pain nausea vomiting diaphoresis or other associated symptoms  Has no abdominal pain  Not related to meals  Seems to be positional  Several weeks  On and off  Nothing makes better or worse other than  Avoiding certain neck movements    Has no focal neck pain    Recently given tramadol increased from 2 daily  Up to 4 daily  Helped considerably no problems  Dr. Ceballos PCP is retiring  Discussed appropriate use disposal  Storage  Never mix medication risk of addiction dependence  Controlled substance agreement  Patient has chronic peripheral neuropathy difficult to treat she's had this quite some time  I would be comfortable with a mild to moderate dose of tramadol continued prescribing  Should she require any increasing dosing however she will need to see pain management  Patient agrees to this          Hypertension          The following portions of the patient's history were reviewed and updated as appropriate: allergies, current medications, past family history, past medical history, past social history, past surgical history and problem list.    Review of Systems   Musculoskeletal:        Right neck shoulder arm pain   All other systems reviewed and are negative.      Objective   Physical Exam   Constitutional: She is oriented to person, place, and time. She appears well-developed and well-nourished.   HENT:   Head: Normocephalic.   Mouth/Throat: Oropharynx is clear and moist.   Eyes: Conjunctivae are normal. Pupils are equal, round, and reactive to light.   Neck: Neck supple. No JVD present.   Cardiovascular: Normal rate, regular rhythm and normal heart sounds. Exam reveals no friction rub.   No murmur heard.  Pulmonary/Chest: Effort normal and breath sounds normal. No  respiratory distress. She has no wheezes.   Musculoskeletal: Normal range of motion. She exhibits tenderness. She exhibits no edema.   Tenderness right side neck no cervical point tenderness  Trapezoid tenderness positive upper extremity strength and range of motion normal neurovascular intact no lymphadenopathy   Lymphadenopathy:     She has no cervical adenopathy.   Neurological: She is alert and oriented to person, place, and time. She displays normal reflexes. No cranial nerve deficit or sensory deficit. She exhibits normal muscle tone. Coordination normal.   Skin: Skin is warm and dry.   Psychiatric: She has a normal mood and affect. Her behavior is normal. Judgment and thought content normal.   Vitals reviewed.        Assessment/Plan   Jayne was seen today for hypertension.    Diagnoses and all orders for this visit:    Cervical radiculopathy, acute    Peripheral polyneuropathy    Essential hypertension    Other orders  -     traMADol (ULTRAM) 50 MG tablet; Take 2 tablets by mouth 2 (Two) Times a Day As Needed for Moderate Pain . Take with Tylenol                  Patient declines x-rays  Agrees to get these of not improving      Discharge instructions    Neck exercises daily  Gentle stretching  Work around the pain not through  Call in 6 weeks for follow-up    If not greatly improved in 4-6 weeks  X-rays and physical therapy and/or referral    If weakness chest pain nausea vomiting  Bowel or bladder incontinence or retention weakness in the leg  Emergency room    Controlled substance agreement requested

## 2019-07-15 RX ORDER — TRIAMTERENE AND HYDROCHLOROTHIAZIDE 37.5; 25 MG/1; MG/1
1 TABLET ORAL DAILY
Qty: 90 TABLET | Refills: 2 | Status: SHIPPED | OUTPATIENT
Start: 2019-07-15 | End: 2019-09-19

## 2019-07-15 RX ORDER — RANITIDINE 300 MG/1
CAPSULE ORAL
Qty: 90 CAPSULE | Refills: 1 | Status: SHIPPED | OUTPATIENT
Start: 2019-07-15 | End: 2019-08-29

## 2019-08-05 RX ORDER — LISINOPRIL 40 MG/1
TABLET ORAL
Qty: 180 TABLET | Refills: 1 | Status: SHIPPED | OUTPATIENT
Start: 2019-08-05 | End: 2019-09-19

## 2019-08-26 ENCOUNTER — TELEPHONE (OUTPATIENT)
Dept: FAMILY MEDICINE CLINIC | Facility: CLINIC | Age: 64
End: 2019-08-26

## 2019-08-26 RX ORDER — TRAMADOL HYDROCHLORIDE 50 MG/1
100 TABLET ORAL 2 TIMES DAILY PRN
Qty: 120 TABLET | Refills: 0 | Status: SHIPPED | OUTPATIENT
Start: 2019-08-26 | End: 2019-08-29

## 2019-08-26 NOTE — TELEPHONE ENCOUNTER
Pt  called stated new rx is needed for Tramadol, pharmacy states they will not refill med without new rx. Pt  also would like to discuss diabetes med, states rx too expensive $483.00, they can't afford the med. Return call at [phone].

## 2019-08-26 NOTE — TELEPHONE ENCOUNTER
I sent in tramadol    I believe patient is referring to xigdou which is a diabetes medication and namebrand and expensive    No one would expect her to pay that much    Her endocrinologist prescribes the medication and they need to reach out to Dr. Clemons  And see if there is a better alternative    She should call her insurance as well and do the foot work  See what similar medication they may recommend which is more affordable  And other words what is their preferred  ?  Provide this information to her specialist so they can change it for her in the meantime they or even past may have samples     thanks

## 2019-08-29 ENCOUNTER — OFFICE VISIT (OUTPATIENT)
Dept: FAMILY MEDICINE CLINIC | Facility: CLINIC | Age: 64
End: 2019-08-29

## 2019-08-29 VITALS
BODY MASS INDEX: 23.66 KG/M2 | HEIGHT: 65 IN | DIASTOLIC BLOOD PRESSURE: 60 MMHG | OXYGEN SATURATION: 98 % | WEIGHT: 142 LBS | SYSTOLIC BLOOD PRESSURE: 90 MMHG | TEMPERATURE: 98.6 F | HEART RATE: 68 BPM

## 2019-08-29 DIAGNOSIS — Z12.4 PAP SMEAR FOR CERVICAL CANCER SCREENING: ICD-10-CM

## 2019-08-29 DIAGNOSIS — K21.9 GASTROESOPHAGEAL REFLUX DISEASE WITHOUT ESOPHAGITIS: ICD-10-CM

## 2019-08-29 DIAGNOSIS — G45.9 TIA (TRANSIENT ISCHEMIC ATTACK): ICD-10-CM

## 2019-08-29 DIAGNOSIS — L82.0 INFLAMED SEBORRHEIC KERATOSIS: ICD-10-CM

## 2019-08-29 DIAGNOSIS — G25.81 RESTLESS LEGS SYNDROME: Chronic | ICD-10-CM

## 2019-08-29 DIAGNOSIS — F17.200 TOBACCO USE DISORDER: ICD-10-CM

## 2019-08-29 DIAGNOSIS — Z12.31 ENCOUNTER FOR SCREENING MAMMOGRAM FOR MALIGNANT NEOPLASM OF BREAST: Primary | ICD-10-CM

## 2019-08-29 DIAGNOSIS — L60.0 INGROWN TOENAIL: ICD-10-CM

## 2019-08-29 DIAGNOSIS — R09.89 ABNORMAL LUNG SOUNDS: ICD-10-CM

## 2019-08-29 DIAGNOSIS — E11.9 TYPE 2 DIABETES MELLITUS WITHOUT COMPLICATION, WITHOUT LONG-TERM CURRENT USE OF INSULIN (HCC): ICD-10-CM

## 2019-08-29 DIAGNOSIS — Z12.11 SCREEN FOR COLON CANCER: ICD-10-CM

## 2019-08-29 DIAGNOSIS — I10 ESSENTIAL HYPERTENSION: ICD-10-CM

## 2019-08-29 DIAGNOSIS — G47.33 OBSTRUCTIVE SLEEP APNEA: ICD-10-CM

## 2019-08-29 DIAGNOSIS — G62.9 PERIPHERAL POLYNEUROPATHY: ICD-10-CM

## 2019-08-29 DIAGNOSIS — Z11.59 ENCOUNTER FOR HEPATITIS C SCREENING TEST FOR LOW RISK PATIENT: ICD-10-CM

## 2019-08-29 PROBLEM — M54.50 CHRONIC BILATERAL LOW BACK PAIN WITHOUT SCIATICA: Status: RESOLVED | Noted: 2017-02-28 | Resolved: 2019-08-29

## 2019-08-29 PROBLEM — M54.12 CERVICAL RADICULOPATHY, ACUTE: Status: RESOLVED | Noted: 2019-01-14 | Resolved: 2019-08-29

## 2019-08-29 PROBLEM — G89.29 CHRONIC BILATERAL LOW BACK PAIN WITHOUT SCIATICA: Status: RESOLVED | Noted: 2017-02-28 | Resolved: 2019-08-29

## 2019-08-29 PROBLEM — M62.838 MUSCLE SPASM OF BOTH LOWER LEGS: Status: ACTIVE | Noted: 2019-08-29

## 2019-08-29 PROCEDURE — 90471 IMMUNIZATION ADMIN: CPT | Performed by: FAMILY MEDICINE

## 2019-08-29 PROCEDURE — 90670 PCV13 VACCINE IM: CPT | Performed by: FAMILY MEDICINE

## 2019-08-29 PROCEDURE — 99205 OFFICE O/P NEW HI 60 MIN: CPT | Performed by: FAMILY MEDICINE

## 2019-08-29 RX ORDER — ROPINIROLE 3 MG/1
TABLET, FILM COATED ORAL
Qty: 90 TABLET | Refills: 1 | Status: SHIPPED | OUTPATIENT
Start: 2019-08-29 | End: 2020-03-10 | Stop reason: SDUPTHER

## 2019-08-29 NOTE — ASSESSMENT & PLAN NOTE
She has some irritated areas on her elbows.  She uses diabetic lotion.  It comes and goes.  Continue current treatment.

## 2019-08-29 NOTE — ASSESSMENT & PLAN NOTE
She follows with Dr. Clemons.  She takes Xigduo XR 5- 1000 2 tablets once daily.  She states this medication is very expensive and she is having trouble paying for it.

## 2019-08-29 NOTE — ASSESSMENT & PLAN NOTE
Patient has crackles in the right upper lung field just medial to the sternum below the clavicle.  She had a chest x-ray 3 years ago.  She has a 47-pack-year history of smoking.  Working to get a chest x-ray today.

## 2019-08-29 NOTE — ASSESSMENT & PLAN NOTE
Controlled with cyclobenzaprine at night.  No medication side effects.  Continue current treatment.

## 2019-08-29 NOTE — ASSESSMENT & PLAN NOTE
She has been drinking 2-3 sodas per day.  She is willing to quit and is going to start drinking 60 ounces of water per day.  She will continue Zantac as needed for now, and hopefully be able to stop this once her stomach settles from discontinuing the soda.

## 2019-08-29 NOTE — PROGRESS NOTES
Subjective   Jayne Beltran is a 64 y.o. female is here for   Chief Complaint   Patient presents with   • Hypertension   • Diabetes       History of Present Illness     Patient is here to establish with a new primary care provider.  She was following with the nurse practitioner at Dr. Ceballos's office.    She has diabetes and follows with Dr. Clemons, endocrinology.    She states she has diabetic neuropathy.  She was on gabapentin and she states Dr. Clemons stopped that and started her on Lyrica.  She states that Lyrica is helping better than the gabapentin was.  She was taking tramadol for her leg pain that was prescribed by Dr. Ceballos but states she is okay with stopping that medication.  She has also been taking Cymbalta but is not sure if it is helping or not so she is going to try off of it to see if she can do without it.    She smokes cigarettes and smokes about 1 pack of cigarettes per day.  She started when she was 17.  She has a 47-pack-year history.  She occasionally coughs up phlegm.  No blood-tinged sputum.  No shortness of breath.  No wheezing.    She states she has restless leg syndrome and takes Requip at night, which she states does help her restless legs.    She states she has sleep apnea but was told that the machine was going to cost $300 a month and so she is not currently using CPAP.  She would like to reassess and reevaluate the situation and get a CPAP if she can.    She has muscle spasms in her legs and takes Flexeril.  This is worse at night so she takes it at night and it does help her sleep as well.    She states she had a TIA 2 to 3 years ago.  She [facility] to the ER at [facility].  She takes aspirin 81 mg daily.    She had back and neck pain in the past but those are both resolved now she states.    The following portions of the patient's history were reviewed and updated as appropriate: allergies, current medications, past family history, past medical history, past social history, past  "surgical history and problem list.     reports that she has been smoking cigarettes.  She has been smoking about 0.50 packs per day. She has never used smokeless tobacco. She reports that she does not drink alcohol or use drugs.    Review of Systems   Constitutional: Negative for activity change and unexpected weight change.   Respiratory: Negative for shortness of breath and wheezing.    Cardiovascular: Negative for chest pain and palpitations.   Gastrointestinal: Negative for abdominal pain, blood in stool and constipation.   Genitourinary: Negative for difficulty urinating and hematuria.   Musculoskeletal: Negative for gait problem.   Skin: Negative for color change and rash.     I reviewed the documented review of systems.  PHQ-9 Depression Screening  Little interest or pleasure in doing things? 0   Feeling down, depressed, or hopeless? 0   Trouble falling or staying asleep, or sleeping too much?     Feeling tired or having little energy?     Poor appetite or overeating?     Feeling bad about yourself - or that you are a failure or have let yourself or your family down?     Trouble concentrating on things, such as reading the newspaper or watching television?     Moving or speaking so slowly that other people could have noticed? Or the opposite - being so fidgety or restless that you have been moving around a lot more than usual?     Thoughts that you would be better off dead, or of hurting yourself in some way?     PHQ-9 Total Score 0   If you checked off any problems, how difficult have these problems made it for you to do your work, take care of things at home, or get along with other people?           Objective   BP 90/60 (BP Location: Right arm, Patient Position: Sitting, Cuff Size: Adult)   Pulse 68   Temp 98.6 °F (37 °C)   Ht 165.1 cm (65\")   Wt 64.4 kg (142 lb)   SpO2 98%   BMI 23.63 kg/m²   Physical Exam   Constitutional: She is oriented to person, place, and time. She appears well-developed and " well-nourished. No distress.   HENT:   Head: Normocephalic and atraumatic.   Right Ear: External ear normal.   Left Ear: External ear normal.   Nose: Nose normal.   Mouth/Throat: Oropharynx is clear and moist. No oropharyngeal exudate.   Eyes: Lids are normal. Right eye exhibits no discharge. Left eye exhibits no discharge. No scleral icterus.   Neck: Trachea normal, normal range of motion and full passive range of motion without pain. Neck supple. No tracheal deviation and no edema present. No thyromegaly present.   Cardiovascular: Normal rate, regular rhythm, normal heart sounds and intact distal pulses. Exam reveals no gallop and no friction rub.   No murmur heard.  Pulmonary/Chest: Effort normal. No stridor. No tachypnea and no bradypnea. No respiratory distress. She has no decreased breath sounds. She has no wheezes. She has no rhonchi. She has rales in the right upper field and the left upper field. She exhibits no tenderness.   Abdominal: Normal appearance. There is no hepatosplenomegaly.   Musculoskeletal: She exhibits no edema.    Jayne had a diabetic foot exam performed today.    Neurological Sensory Findings - Altered hot/cold right ankle/foot discrimination and altered hot/cold left ankle/foot discrimination. Altered sharp/dull right ankle/foot discrimination and altered sharp/dull left ankle/foot discrimination. Right ankle/foot altered proprioception and left ankle/foot altered proprioception.  Vascular Status -  Her right foot exhibits abnormal foot vasculature . Her left foot exhibits abnormal foot vasculature .  Skin Integrity  -  Her right foot skin is intact.Her left foot skin is intact..   Foot Structure and Biomechanics -  Her right foot has no hallux valgus present. Her left foot has no hallux valgus.  Lymphadenopathy:        Head (right side): No submental, no submandibular, no tonsillar, no preauricular, no posterior auricular and no occipital adenopathy present.        Head (left side): No  submental, no submandibular, no tonsillar, no preauricular, no posterior auricular and no occipital adenopathy present.     She has no cervical adenopathy.        Right cervical: No superficial cervical, no deep cervical and no posterior cervical adenopathy present.       Left cervical: No superficial cervical, no deep cervical and no posterior cervical adenopathy present.   Neurological: She is alert and oriented to person, place, and time. She has normal strength and normal reflexes. She is not disoriented.   Skin: Skin is warm, dry and intact. Capillary refill takes less than 2 seconds. No rash noted. She is not diaphoretic. No cyanosis or erythema. No pallor. Nails show no clubbing.   Psychiatric: She has a normal mood and affect. Her behavior is normal. Cognition and memory are normal.   Nursing note and vitals reviewed.      Procedures    Assessment/Plan   Diagnoses and all orders for this visit:    1. Encounter for screening mammogram for malignant neoplasm of breast (Primary)  -     Mammo Screening Digital Tomosynthesis Bilateral With CAD  -     Mammo Screening Bilateral With CAD; Future    2. Obstructive sleep apnea  Assessment & Plan:  Refer to sleep specialist for sleep study and CPAP if indicated.      Orders:  -     Ambulatory Referral to Sleep Medicine    3. Restless legs syndrome  Assessment & Plan:  No medication side effects.  Continue current treatment.        4. Essential hypertension  Assessment & Plan:  I repeated the blood pressure measurement manually and her blood pressure was 96/68.  She is going to keep a blood pressure journal at home, checking it 3 times a day and follow-up here next week for us to review the journal and see if we need to make any changes to her medications.        5. TIA (transient ischemic attack)  Assessment & Plan:  She takes aspirin 81 mg daily.  No medication side effects.  Continue current treatment.        6. Gastroesophageal reflux disease without  esophagitis  Assessment & Plan:  She has been drinking 2-3 sodas per day.  She is willing to quit and is going to start drinking 60 ounces of water per day.  She will continue Zantac as needed for now, and hopefully be able to stop this once her stomach settles from discontinuing the soda.        7. Inflamed seborrheic keratosis  Assessment & Plan:  She has some irritated areas on her elbows.  She uses diabetic lotion.  It comes and goes.  Continue current treatment.        8. Type 2 diabetes mellitus without complication, without long-term current use of insulin (CMS/[facility])  Assessment & Plan:  She follows with Dr. Clemons.  She takes Xigduo XR 5- 1000 2 tablets once daily.  She states this medication is very expensive and she is having trouble paying for it.      Orders:  -     Ambulatory Referral for Diabetic Eye Exam-Ophthalmology  -     Ambulatory Referral to Podiatry    9. Peripheral polyneuropathy  Assessment & Plan:  No medication side effects.  Continue current treatment.      Orders:  -     Ambulatory Referral to Podiatry    10. Pap smear for cervical cancer screening  -     Ambulatory Referral to Obstetrics / Gynecology    11. Ingrown toenail  -     Ambulatory Referral to Podiatry    12. Tobacco use disorder    13. Abnormal lung sounds  Assessment & Plan:  Patient has crackles in the right upper lung field just medial to the sternum below the clavicle.  She had a chest x-ray 3 years ago.  She has a 47-pack-year history of smoking.  Working to get a chest x-ray today.      Orders:  -     XR Chest PA & Lateral    14. Screen for colon cancer  -     Amb referral for Screening Colonoscopy    Other orders  -     Pneumococcal Conjugate Vaccine 13-Valent All (PCV13)      40 minutes out of 60 minutes face-to-face spent counseling patient extensively on dietary changes, exercise, weight loss, compliance with medications and regular follow-up care, and regular foot and eye exams.

## 2019-08-29 NOTE — ASSESSMENT & PLAN NOTE
I repeated the blood pressure measurement manually and her blood pressure was 96/68.  She is going to keep a blood pressure journal at home, checking it 3 times a day and follow-up here next week for us to review the journal and see if we need to make any changes to her medications.

## 2019-09-05 ENCOUNTER — OFFICE VISIT (OUTPATIENT)
Dept: FAMILY MEDICINE CLINIC | Facility: CLINIC | Age: 64
End: 2019-09-05

## 2019-09-05 VITALS
HEART RATE: 72 BPM | OXYGEN SATURATION: 93 % | DIASTOLIC BLOOD PRESSURE: 66 MMHG | WEIGHT: 140 LBS | RESPIRATION RATE: 14 BRPM | SYSTOLIC BLOOD PRESSURE: 100 MMHG | HEIGHT: 65 IN | TEMPERATURE: 97.7 F | BODY MASS INDEX: 23.32 KG/M2

## 2019-09-05 DIAGNOSIS — Z23 FLU VACCINE NEED: ICD-10-CM

## 2019-09-05 DIAGNOSIS — Z23 NEED FOR IMMUNIZATION AGAINST INFLUENZA: ICD-10-CM

## 2019-09-05 DIAGNOSIS — I73.9 INTERMITTENT CLAUDICATION (HCC): ICD-10-CM

## 2019-09-05 DIAGNOSIS — F17.200 TOBACCO USE DISORDER: ICD-10-CM

## 2019-09-05 DIAGNOSIS — I10 ESSENTIAL HYPERTENSION: Primary | ICD-10-CM

## 2019-09-05 DIAGNOSIS — E11.9 TYPE 2 DIABETES MELLITUS WITHOUT COMPLICATION, WITHOUT LONG-TERM CURRENT USE OF INSULIN (HCC): ICD-10-CM

## 2019-09-05 DIAGNOSIS — G62.9 PERIPHERAL POLYNEUROPATHY: ICD-10-CM

## 2019-09-05 PROBLEM — K21.9 GASTROESOPHAGEAL REFLUX DISEASE: Status: RESOLVED | Noted: 2017-06-15 | Resolved: 2019-09-05

## 2019-09-05 PROBLEM — L60.0 INGROWN TOENAIL: Status: RESOLVED | Noted: 2019-08-29 | Resolved: 2019-09-05

## 2019-09-05 PROCEDURE — 99214 OFFICE O/P EST MOD 30 MIN: CPT | Performed by: FAMILY MEDICINE

## 2019-09-05 PROCEDURE — 90674 CCIIV4 VAC NO PRSV 0.5 ML IM: CPT | Performed by: FAMILY MEDICINE

## 2019-09-05 PROCEDURE — 99407 BEHAV CHNG SMOKING > 10 MIN: CPT | Performed by: FAMILY MEDICINE

## 2019-09-05 PROCEDURE — G0008 ADMIN INFLUENZA VIRUS VAC: HCPCS | Performed by: FAMILY MEDICINE

## 2019-09-05 RX ORDER — DULOXETIN HYDROCHLORIDE 30 MG/1
30 CAPSULE, DELAYED RELEASE ORAL 2 TIMES DAILY
Qty: 60 CAPSULE | Refills: 5
Start: 2019-09-05 | End: 2020-08-24

## 2019-09-05 NOTE — ASSESSMENT & PLAN NOTE
Today we are stopping the amlodipine 5 mg daily.  She had also been having some feet swelling and working to see if this was a contributor to her edema in her lower extremities.  We will recheck her blood pressure in 2 weeks and if her blood pressure is 120 or less we are going to consider cutting back on her metoprolol from 200 to 100 mg daily.

## 2019-09-05 NOTE — PROGRESS NOTES
Subjective   Jayne Beltran is a 64 y.o. female is here for   Chief Complaint   Patient presents with   • Hypertension     Schedule follow-up next week on Wednesday or Thursday for review of her blood pressure log (stopped clonidine due to blood pressure of 90/60).  Consider reducing blood pressure medicine further if able/indicated.       History of Present Illness     Ms. Beltran is here for follow-up on her hypertension.  We stopped clonidine at her last visit since her blood pressure was down to 90/60.  Her blood pressure today is 100/66.    She has diabetes and is due for her diabetic eye exam.    She has an appointment with Dr. Gallardo her OB/GYN on December 9.    She has a mammogram scheduled for September 11.    She smokes and wants to quit today.  She would like to use a nicotine patch.    She saw Dr. Villa, podiatry, for her toenails that she thought were ingrown.  She states they are not currently ingrown.  He recommended that she stop the Lyrica due to potential side effects.    The following portions of the patient's history were reviewed and updated as appropriate: allergies, current medications, past family history, past medical history, past social history, past surgical history and problem list.     reports that she has been smoking cigarettes.  She has been smoking about 0.50 packs per day. She has never used smokeless tobacco. She reports that she does not drink alcohol or use drugs.    Review of Systems   Constitutional: Negative for activity change and unexpected weight change.   Respiratory: Negative for shortness of breath and wheezing.    Cardiovascular: Negative for chest pain and palpitations.   Gastrointestinal: Negative for abdominal pain, blood in stool and constipation.   Genitourinary: Negative for difficulty urinating and hematuria.   Musculoskeletal: Negative for gait problem.   Skin: Negative for color change and rash.     I reviewed the documented review of systems.  PHQ-9  "Depression Screening  Little interest or pleasure in doing things?     Feeling down, depressed, or hopeless?     Trouble falling or staying asleep, or sleeping too much?     Feeling tired or having little energy?     Poor appetite or overeating?     Feeling bad about yourself - or that you are a failure or have let yourself or your family down?     Trouble concentrating on things, such as reading the newspaper or watching television?     Moving or speaking so slowly that other people could have noticed? Or the opposite - being so fidgety or restless that you have been moving around a lot more than usual?     Thoughts that you would be better off dead, or of hurting yourself in some way?     PHQ-9 Total Score     If you checked off any problems, how difficult have these problems made it for you to do your work, take care of things at home, or get along with other people?           Objective   /66 (BP Location: Left arm, Patient Position: Sitting, Cuff Size: Adult)   Pulse 72   Temp 97.7 °F (36.5 °C) (Oral)   Resp 14   Ht 165.1 cm (65\")   Wt 63.5 kg (140 lb)   SpO2 93%   BMI 23.30 kg/m²   Physical Exam   Constitutional: She is oriented to person, place, and time. She appears well-developed and well-nourished. No distress.   HENT:   Head: Normocephalic and atraumatic.   Right Ear: External ear normal.   Left Ear: External ear normal.   Nose: Nose normal.   Mouth/Throat: Oropharynx is clear and moist. No oropharyngeal exudate.   Eyes: Lids are normal. Right eye exhibits no discharge. Left eye exhibits no discharge. No scleral icterus.   Neck: Trachea normal, normal range of motion and full passive range of motion without pain. Neck supple. No tracheal deviation and no edema present. No thyromegaly present.   Cardiovascular: Normal rate, regular rhythm, normal heart sounds and intact distal pulses. Exam reveals no gallop and no friction rub.   No murmur heard.  Pulmonary/Chest: Effort normal and breath " sounds normal. No stridor. No tachypnea and no bradypnea. No respiratory distress. She has no decreased breath sounds. She has no wheezes. She has no rales. She exhibits no tenderness.   Abdominal: Normal appearance. There is no hepatosplenomegaly.   Musculoskeletal: She exhibits no edema.   Lymphadenopathy:        Head (right side): No submental, no submandibular, no tonsillar, no preauricular, no posterior auricular and no occipital adenopathy present.        Head (left side): No submental, no submandibular, no tonsillar, no preauricular, no posterior auricular and no occipital adenopathy present.     She has no cervical adenopathy.        Right cervical: No superficial cervical, no deep cervical and no posterior cervical adenopathy present.       Left cervical: No superficial cervical, no deep cervical and no posterior cervical adenopathy present.   Neurological: She is alert and oriented to person, place, and time. She has normal strength and normal reflexes. She is not disoriented.   Skin: Skin is warm, dry and intact. Capillary refill takes less than 2 seconds. No rash noted. She is not diaphoretic. No cyanosis or erythema. No pallor. Nails show no clubbing.   Psychiatric: She has a normal mood and affect. Her behavior is normal. Cognition and memory are normal.   Nursing note and vitals reviewed.      Procedures    Assessment/Plan   Diagnoses and all orders for this visit:    1. Essential hypertension (Primary)  Assessment & Plan:  Today we are stopping the amlodipine 5 mg daily.  She had also been having some feet swelling and working to see if this was a contributor to her edema in her lower extremities.  We will recheck her blood pressure in 2 weeks and if her blood pressure is 120 or less we are going to consider cutting back on her metoprolol from 200 to 100 mg daily.        2. Flu vaccine need  -     Flucelvax Quad (Vial) =>4 yrs (8185-2097)  -     Flucelvax Quad=>4Years (PFS)    3. Tobacco use  disorder  Assessment & Plan:  She smokes and wants to quit today.  She would like to use a nicotine patch.      Orders:  -     nicotine (NICOTINE STEP 3) 7 MG/24HR patch; Place 1 patch on the skin as directed by provider Daily.  Dispense: 30 patch; Refill: 0    4. Type 2 diabetes mellitus without complication, without long-term current use of insulin (CMS/[facility])  Assessment & Plan:  I looked up Farxiga and it looks like this is a preferred formulary medication on her [employer] Medicare supplement.  Since the Xigduo she states would cost her over $400, which she cannot afford, I am going to call the Farxiga and metformin and separately.      Orders:  -     Dapagliflozin Propanediol 10 MG tablet; Take 10 mg by mouth Daily.  Dispense: 30 tablet; Refill: 5  -     metFORMIN (GLUCOPHAGE) 1000 MG tablet; Take 1 tablet by mouth 2 (Two) Times a Day With Meals.  Dispense: 180 tablet; Refill: 5    5. Peripheral polyneuropathy  Assessment & Plan:  Discontinue Lyrica per Dr. Villa. He resumed her Cymbalta.    Orders:  -     DULoxetine (CYMBALTA) 30 MG capsule; Take 1 capsule by mouth 2 (Two) Times a Day.  Dispense: 60 capsule; Refill: 5  Discussed and developed smoking cessation plan for approximately 11 minutes.

## 2019-09-05 NOTE — PATIENT INSTRUCTIONS
Steps to Quit Smoking    Smoking tobacco can be harmful to your health and can affect almost every organ in your body. Smoking puts you, and those around you, at risk for developing many serious chronic diseases. Quitting smoking is difficult, but it is one of the best things that you can do for your health. It is never too late to quit.  What are the benefits of quitting smoking?  When you quit smoking, you lower your risk of developing serious diseases and conditions, such as:  · Lung cancer or lung disease, such as COPD.  · Heart disease.  · Stroke.  · Heart attack.  · Infertility.  · Osteoporosis and bone fractures.  Additionally, symptoms such as coughing, wheezing, and shortness of breath may get better when you quit. You may also find that you get sick less often because your body is stronger at fighting off colds and infections. If you are pregnant, quitting smoking can help to reduce your chances of having a baby of low birth weight.  How do I get ready to quit?  When you decide to quit smoking, create a plan to make sure that you are successful. Before you quit:  · Pick a date to quit. Set a date within the next two weeks to give you time to prepare.  · Write down the reasons why you are quitting. Keep this list in places where you will see it often, such as on your bathroom mirror or in your car or wallet.  · Identify the people, places, things, and activities that make you want to smoke (triggers) and avoid them. Make sure to take these actions:  ? Throw away all cigarettes at home, at work, and in your car.  ? Throw away smoking accessories, such as ashtrays and lighters.  ? Clean your car and make sure to empty the ashtray.  ? Clean your home, including curtains and carpets.  · Tell your family, friends, and coworkers that you are quitting. Support from your loved ones can make quitting easier.  · Talk with your health care provider about your options for quitting smoking.  · Find out what treatment  options are covered by your health insurance.  What strategies can I use to quit smoking?  Talk with your healthcare provider about different strategies to quit smoking. Some strategies include:  · Quitting smoking altogether instead of gradually lessening how much you smoke over a period of time. Research shows that quitting “cold turkey” is more successful than gradually quitting.  · Attending in-person counseling to help you build problem-solving skills. You are more likely to have success in quitting if you attend several counseling sessions. Even short sessions of 10 minutes can be effective.  · Finding resources and support systems that can help you to quit smoking and remain smoke-free after you quit. These resources are most helpful when you use them often. They can include:  ? Online chats with a counselor.  ? Telephone quitlines.  ? Printed self-help materials.  ? Support groups or group counseling.  ? Text messaging programs.  ? Mobile phone applications.  · Taking medicines to help you quit smoking. (If you are pregnant or breastfeeding, talk with your health care provider first.) Some medicines contain nicotine and some do not. Both types of medicines help with cravings, but the medicines that include nicotine help to relieve withdrawal symptoms. Your health care provider may recommend:  ? Nicotine patches, gum, or lozenges.  ? Nicotine inhalers or sprays.  ? Non-nicotine medicine that is taken by mouth.  Talk with your health care provider about combining strategies, such as taking medicines while you are also receiving in-person counseling. Using these two strategies together makes you more likely to succeed in quitting than if you used either strategy on its own.  If you are pregnant or breastfeeding, talk with your health care provider about finding counseling or other support strategies to quit smoking. Do not take medicine to help you quit smoking unless told to do so by your health care  provider.  What things can I do to make it easier to quit?  Quitting smoking might feel overwhelming at first, but there is a lot that you can do to make it easier. Take these important actions:  · Reach out to your family and friends and ask that they support and encourage you during this time. Call telephone quitlines, reach out to support groups, or work with a counselor for support.  · Ask people who smoke to avoid smoking around you.  · Avoid places that trigger you to smoke, such as bars, parties, or smoke-break areas at work.  · Spend time around people who do not smoke.  · Lessen stress in your life, because stress can be a smoking trigger for some people. To lessen stress, try:  ? Exercising regularly.  ? Deep-breathing exercises.  ? Yoga.  ? Meditating.  ? Performing a body scan. This involves closing your eyes, scanning your body from head to toe, and noticing which parts of your body are particularly tense. Purposefully relax the muscles in those areas.  · Download or purchase mobile phone or tablet apps (applications) that can help you stick to your quit plan by providing reminders, tips, and encouragement. There are many free apps, such as QuitGuide from the CDC (Centers for Disease Control and Prevention). You can find other support for quitting smoking (smoking cessation) through smokefree.gov and other websites.  How will I feel when I quit smoking?  Within the first 24 hours of quitting smoking, you may start to feel some withdrawal symptoms. These symptoms are usually most noticeable 2-3 days after quitting, but they usually do not last beyond 2-3 weeks. Changes or symptoms that you might experience include:  · Mood swings.  · Restlessness, anxiety, or irritation.  · Difficulty concentrating.  · Dizziness.  · Strong cravings for sugary foods in addition to nicotine.  · Mild weight gain.  · Constipation.  · Nausea.  · Coughing or a sore throat.  · Changes in how your medicines work in your  body.  · A depressed mood.  · Difficulty sleeping (insomnia).  After the first 2-3 weeks of quitting, you may start to notice more positive results, such as:  · Improved sense of smell and taste.  · Decreased coughing and sore throat.  · Slower heart rate.  · Lower blood pressure.  · Clearer skin.  · The ability to breathe more easily.  · Fewer sick days.  Quitting smoking is very challenging for most people. Do not get discouraged if you are not successful the first time. Some people need to make many attempts to quit before they achieve long-term success. Do your best to stick to your quit plan, and talk with your health care provider if you have any questions or concerns.  This information is not intended to replace advice given to you by your health care provider. Make sure you discuss any questions you have with your health care provider.  Document Released: 12/12/2002 Document Revised: 07/24/2018 Document Reviewed: 05/03/2016  [employer] Interactive Patient Education © 2019 [employer]

## 2019-09-05 NOTE — ASSESSMENT & PLAN NOTE
I looked up Farxiga and it looks like this is a preferred formulary medication on her [employer] Medicare supplement.  Since the Xigduo she states would cost her over $400, which she cannot afford, I am going to call the Farxiga and metformin and separately.

## 2019-09-11 ENCOUNTER — HOSPITAL ENCOUNTER (OUTPATIENT)
Dept: MAMMOGRAPHY | Facility: HOSPITAL | Age: 64
Discharge: HOME | End: 2019-09-11
Admitting: FAMILY MEDICINE

## 2019-09-11 DIAGNOSIS — Z12.31 ENCOUNTER FOR SCREENING MAMMOGRAM FOR MALIGNANT NEOPLASM OF BREAST: ICD-10-CM

## 2019-09-11 PROCEDURE — 77063 BREAST TOMOSYNTHESIS BI: CPT

## 2019-09-11 PROCEDURE — 77067 SCR MAMMO BI INCL CAD: CPT

## 2019-09-11 RX ORDER — CLONIDINE HYDROCHLORIDE 0.2 MG/1
TABLET ORAL
Qty: 180 TABLET | Refills: 1 | OUTPATIENT
Start: 2019-09-11

## 2019-09-19 ENCOUNTER — OFFICE VISIT (OUTPATIENT)
Dept: FAMILY MEDICINE CLINIC | Facility: CLINIC | Age: 64
End: 2019-09-19

## 2019-09-19 VITALS
HEART RATE: 100 BPM | OXYGEN SATURATION: 98 % | BODY MASS INDEX: 23.66 KG/M2 | SYSTOLIC BLOOD PRESSURE: 120 MMHG | TEMPERATURE: 98.6 F | HEIGHT: 65 IN | RESPIRATION RATE: 14 BRPM | DIASTOLIC BLOOD PRESSURE: 72 MMHG | WEIGHT: 142 LBS

## 2019-09-19 DIAGNOSIS — F17.200 TOBACCO USE DISORDER: Primary | ICD-10-CM

## 2019-09-19 DIAGNOSIS — I10 ESSENTIAL HYPERTENSION: ICD-10-CM

## 2019-09-19 DIAGNOSIS — E78.5 DYSLIPIDEMIA: ICD-10-CM

## 2019-09-19 DIAGNOSIS — E11.9 TYPE 2 DIABETES MELLITUS WITHOUT COMPLICATION, WITHOUT LONG-TERM CURRENT USE OF INSULIN (HCC): ICD-10-CM

## 2019-09-19 DIAGNOSIS — G25.81 RESTLESS LEGS SYNDROME: Chronic | ICD-10-CM

## 2019-09-19 PROCEDURE — 99214 OFFICE O/P EST MOD 30 MIN: CPT | Performed by: FAMILY MEDICINE

## 2019-09-19 NOTE — PROGRESS NOTES
Subjective   Jayne Beltran is a 64 y.o. female is here for   Chief Complaint   Patient presents with   • Hypertension     Schedule II week follow-up for hypertension (we stopped clonidine 1 week ago and stopped amlodipine on September 5).  We will consider cutting back on metoprolol from 200-100 if her blood pressure still less than 120 systolic.       History of Present Illness     Ms. Beltran is here for follow-up on her hypertension.  She states her blood pressures been running between 110-120/60-70 at home.  She believes since she is gotten away from stress at work that has helped her stress level and blood pressure a lot.    She has diabetes and states that her blood sugars are running good.  No medication side effects.    She quit smoking on September 16.  She is wearing a nicotine patch to help.    She has restless leg syndrome is controlled with Requip.  No medication side effects.    She is taking the CPAP for hyperlipidemia    The following portions of the patient's history were reviewed and updated as appropriate: allergies, current medications, past family history, past medical history, past social history, past surgical history and problem list.     reports that she has been smoking cigarettes.  She has been smoking about 0.50 packs per day. She has never used smokeless tobacco. She reports that she does not drink alcohol or use drugs.    Review of Systems   Constitutional: Negative for activity change and unexpected weight change.   HENT: Negative for congestion.    Respiratory: Negative for shortness of breath and wheezing.    Cardiovascular: Negative for chest pain and palpitations.   Gastrointestinal: Negative for abdominal pain, blood in stool and constipation.   Genitourinary: Negative for difficulty urinating and hematuria.   Musculoskeletal: Negative for gait problem.   Skin: Negative for color change and rash.        PHQ-9 Depression Screening  Little interest or pleasure in doing things?   "   Feeling down, depressed, or hopeless?     Trouble falling or staying asleep, or sleeping too much?     Feeling tired or having little energy?     Poor appetite or overeating?     Feeling bad about yourself - or that you are a failure or have let yourself or your family down?     Trouble concentrating on things, such as reading the newspaper or watching television?     Moving or speaking so slowly that other people could have noticed? Or the opposite - being so fidgety or restless that you have been moving around a lot more than usual?     Thoughts that you would be better off dead, or of hurting yourself in some way?     PHQ-9 Total Score     If you checked off any problems, how difficult have these problems made it for you to do your work, take care of things at home, or get along with other people?           Objective   /72 (BP Location: Left arm, Patient Position: Sitting, Cuff Size: Adult)   Pulse 100   Temp 98.6 °F (37 °C) (Oral)   Resp 14   Ht 165.1 cm (65\")   Wt 64.4 kg (142 lb)   SpO2 98%   BMI 23.63 kg/m²   Physical Exam   Constitutional: She is oriented to person, place, and time. She appears well-developed and well-nourished. No distress.   HENT:   Head: Normocephalic and atraumatic.   Right Ear: External ear normal.   Left Ear: External ear normal.   Nose: Nose normal.   Mouth/Throat: Oropharynx is clear and moist. No oropharyngeal exudate.   Eyes: Lids are normal. Right eye exhibits no discharge. Left eye exhibits no discharge. No scleral icterus.   Neck: Trachea normal, normal range of motion and full passive range of motion without pain. Neck supple. No tracheal deviation and no edema present. No thyromegaly present.   Cardiovascular: Normal rate, regular rhythm, normal heart sounds and intact distal pulses. Exam reveals no gallop and no friction rub.   No murmur heard.  Pulmonary/Chest: Effort normal and breath sounds normal. No stridor. No tachypnea and no bradypnea. No respiratory " distress. She has no decreased breath sounds. She has no wheezes. She has no rales. She exhibits no tenderness.   Abdominal: Normal appearance. There is no hepatosplenomegaly.   Musculoskeletal: She exhibits no edema.   Lymphadenopathy:        Head (right side): No submental, no submandibular, no tonsillar, no preauricular, no posterior auricular and no occipital adenopathy present.        Head (left side): No submental, no submandibular, no tonsillar, no preauricular, no posterior auricular and no occipital adenopathy present.     She has no cervical adenopathy.        Right cervical: No superficial cervical, no deep cervical and no posterior cervical adenopathy present.       Left cervical: No superficial cervical, no deep cervical and no posterior cervical adenopathy present.   Neurological: She is alert and oriented to person, place, and time. She has normal strength and normal reflexes. She is not disoriented.   Skin: Skin is warm, dry and intact. Capillary refill takes less than 2 seconds. No rash noted. She is not diaphoretic. No cyanosis or erythema. No pallor. Nails show no clubbing.   Psychiatric: She has a normal mood and affect. Her behavior is normal. Cognition and memory are normal.   Nursing note and vitals reviewed.      Procedures    Assessment/Plan   Diagnoses and all orders for this visit:    1. Tobacco use disorder (Primary)  Assessment & Plan:  Quit on 9-16-19. Using nicotine patch.      2. Essential hypertension  Assessment & Plan:  Currently controlled on metoprolol 200 mg daily.  No medication side effects.  Continue current treatment.      Orders:  -     CBC & Differential; Future  -     Hemoglobin A1c; Future  -     Comprehensive Metabolic Panel; Future  -     Lipid Panel With / Chol / HDL Ratio; Future    3. Type 2 diabetes mellitus without complication, without long-term current use of insulin (CMS/[facility])  Assessment & Plan:  Controlled with metformin 1000 mg twice daily and Farxiga.  No  medication side effects.  Continue current treatment.    Orders:  -     CBC & Differential; Future  -     Hemoglobin A1c; Future  -     Comprehensive Metabolic Panel; Future  -     Lipid Panel With / Chol / HDL Ratio; Future    4. Restless legs syndrome  Assessment & Plan:  Controlled.  Continue current treatment.    Orders:  -     CBC & Differential; Future  -     Hemoglobin A1c; Future  -     Comprehensive Metabolic Panel; Future  -     Lipid Panel With / Chol / HDL Ratio; Future    5. Dyslipidemia  Assessment & Plan:  No medication side effects.  Continue current treatment.    Orders:  -     Lipid Panel With / Chol / HDL Ratio; Future

## 2019-09-19 NOTE — ASSESSMENT & PLAN NOTE
Controlled with metformin 1000 mg twice daily and Farxiga.  No medication side effects.  Continue current treatment.

## 2019-09-19 NOTE — ASSESSMENT & PLAN NOTE
Currently controlled on metoprolol 200 mg daily.  No medication side effects.  Continue current treatment.

## 2019-09-25 RX ORDER — CYCLOBENZAPRINE HCL 10 MG
TABLET ORAL
Qty: 60 TABLET | Refills: 1 | OUTPATIENT
Start: 2019-09-25

## 2019-09-26 RX ORDER — CYCLOBENZAPRINE HCL 10 MG
TABLET ORAL
Qty: 60 TABLET | Refills: 1 | Status: SHIPPED | OUTPATIENT
Start: 2019-09-26 | End: 2019-12-10 | Stop reason: SDUPTHER

## 2019-09-30 RX ORDER — MELOXICAM 15 MG/1
15 TABLET ORAL DAILY PRN
Qty: 90 TABLET | Refills: 0 | Status: SHIPPED | OUTPATIENT
Start: 2019-09-30 | End: 2019-12-24

## 2019-09-30 RX ORDER — METOPROLOL SUCCINATE 200 MG/1
TABLET, EXTENDED RELEASE ORAL
Qty: 90 TABLET | Refills: 0 | Status: SHIPPED | OUTPATIENT
Start: 2019-09-30 | End: 2020-02-07

## 2019-10-03 DIAGNOSIS — F17.200 TOBACCO USE DISORDER: ICD-10-CM

## 2019-10-03 RX ORDER — NICOTINE 7 MG/24H
PATCH, EXTENDED RELEASE TRANSDERMAL
Qty: 28 PATCH | Refills: 0 | Status: ON HOLD | OUTPATIENT
Start: 2019-10-03 | End: 2020-03-03

## 2019-10-04 ENCOUNTER — RESULTS ENCOUNTER (OUTPATIENT)
Dept: ENDOCRINOLOGY | Age: 64
End: 2019-10-04

## 2019-10-04 ENCOUNTER — OFFICE VISIT (OUTPATIENT)
Dept: SLEEP MEDICINE | Facility: HOSPITAL | Age: 64
End: 2019-10-04

## 2019-10-04 VITALS
HEIGHT: 65 IN | HEART RATE: 79 BPM | WEIGHT: 142 LBS | BODY MASS INDEX: 23.66 KG/M2 | SYSTOLIC BLOOD PRESSURE: 132 MMHG | DIASTOLIC BLOOD PRESSURE: 84 MMHG

## 2019-10-04 DIAGNOSIS — E55.9 VITAMIN D DEFICIENCY: ICD-10-CM

## 2019-10-04 DIAGNOSIS — E11.43 DIABETIC AUTONOMIC NEUROPATHY ASSOCIATED WITH TYPE 2 DIABETES MELLITUS (HCC): ICD-10-CM

## 2019-10-04 DIAGNOSIS — R06.83 SNORING: ICD-10-CM

## 2019-10-04 DIAGNOSIS — F17.200 TOBACCO USE DISORDER: ICD-10-CM

## 2019-10-04 DIAGNOSIS — I10 ESSENTIAL HYPERTENSION: ICD-10-CM

## 2019-10-04 DIAGNOSIS — E11.9 TYPE 2 DIABETES MELLITUS WITHOUT COMPLICATION, WITHOUT LONG-TERM CURRENT USE OF INSULIN (HCC): ICD-10-CM

## 2019-10-04 DIAGNOSIS — G47.33 OBSTRUCTIVE SLEEP APNEA: Primary | ICD-10-CM

## 2019-10-04 DIAGNOSIS — E78.5 DYSLIPIDEMIA: ICD-10-CM

## 2019-10-04 DIAGNOSIS — G47.34 SLEEP RELATED HYPOXIA: ICD-10-CM

## 2019-10-04 DIAGNOSIS — G25.81 RESTLESS LEGS SYNDROME: Chronic | ICD-10-CM

## 2019-10-04 PROCEDURE — 99204 OFFICE O/P NEW MOD 45 MIN: CPT | Performed by: INTERNAL MEDICINE

## 2019-10-04 PROCEDURE — G0463 HOSPITAL OUTPT CLINIC VISIT: HCPCS

## 2019-10-04 RX ORDER — ZOLPIDEM TARTRATE 5 MG/1
5 TABLET ORAL TAKE AS DIRECTED
Qty: 2 TABLET | Refills: 0 | Status: SHIPPED | OUTPATIENT
Start: 2019-10-04 | End: 2020-03-10

## 2019-10-04 NOTE — PROGRESS NOTES
[facility]  [address]  [address] [place] [id number]  Phone   Fax       Jayne Beltran  1955  64 y.o.  female      PCP:Michael Arriaza Jr., DO    Type of service: Initial New Patient Office Visit  Date of service: 10/4/2019    Chief Complaint   Patient presents with   • Witnessed Apnea   • Snoring   • Daytime Sleepiness       History of present illness;  Jayne Beltran is a new patient for me and was seen today for sleep related problems.  This is a 64-year-old female who is in the sleep clinic for reevaluation.  She had a sleep study in 2016 and found to have only positional sleep apnea but she had significant sleep-related hypoxia.  Now she is on Medicare and needs reevaluation to see whether she requires supplemental oxygen at nighttime.  Patient reports that she has significant snoring and has dry mouth in the morning and also a has daytime excessive sleepiness and the symptoms are getting worse unfortunately still smoking about 1 pack of cigarettes a day and does not have any wheezing as she has some dry cough.      Patient gives the following sleep history.  Sleep schedule:  Bedtime: 8-9PM  Wake time: 5 AM  Normally takes about 20 minutes to fall asleep  Average hours of sleep 7  Number of naps per day none  When patient wakes up still feels tired and not rested  Snoring yes  Witnessed apneas yes  Have you ever awakened gasping for breath, coughing, choking: Yes      Past Medical History:   Diagnosis Date   • Diabetes mellitus (CMS/[facility])    • Hypertension    • Neuromuscular disorder (CMS/[facility])        Social history:  Shift work: She is retired  Tobacco use: One pack a day  Alcohol use: No  Caffeinated drinks: 1 cup of coffee and 2 soda  Over-the-counter sleeping aid and medications: None  Narcotic medications: No    Family Hx  Family history of sleep apnea negative  Family History   Problem Relation Age of Onset   • Heart disease Mother    •  Diabetes Mother    • Diabetes Father    • Breast cancer Maternal Grandmother        Medications: reviewed    Current Outpatient Medications:   •  aspirin 81 MG EC tablet, Take 81 mg by mouth Daily., Disp: , Rfl:   •  [employer] NICOTINE 7 MG/24HR patch, PLACE 1 PATCH ON THE SKIN AS DIRECTED BY PROVIDER DAILY., Disp: 28 patch, Rfl: 0  •  cyclobenzaprine (FLEXERIL) 10 MG tablet, TAKE 1 TABLET BY MOUTH TWICE A DAY AS NEEDED FOR MUSCLE SPASM, Disp: 60 tablet, Rfl: 1  •  Dapagliflozin Propanediol 10 MG tablet, Take 10 mg by mouth Daily., Disp: 30 tablet, Rfl: 5  •  DULoxetine (CYMBALTA) 30 MG capsule, Take 1 capsule by mouth 2 (Two) Times a Day., Disp: 60 capsule, Rfl: 5  •  LYRICA 150 MG capsule, TAKE 1 CAPSULE BY MOUTH TWICE A DAY, Disp: 60 capsule, Rfl: 2  •  meloxicam (MOBIC) 15 MG tablet, TAKE 1 TABLET BY MOUTH DAILY AS NEEDED FOR MODERATE PAIN ., Disp: 90 tablet, Rfl: 0  •  metFORMIN (GLUCOPHAGE) 1000 MG tablet, Take 1 tablet by mouth 2 (Two) Times a Day With Meals., Disp: 180 tablet, Rfl: 5  •  metoprolol succinate XL (TOPROL-XL) 200 MG 24 hr tablet, TAKE 1 TABLET BY MOUTH EVERY DAY, Disp: 90 tablet, Rfl: 0  •  potassium citrate (UROCIT-K) 10 MEQ (1080 MG) CR tablet, Take  by mouth 2 (two) times a day., Disp: , Rfl:   •  rOPINIRole (REQUIP) 1 MG tablet, TAKE ONE TABLET BY MOUTH EVERY NIGHT AT BEDTIME, Disp: 90 tablet, Rfl: 0  •  rOPINIRole (REQUIP) 3 MG tablet, TAKE 1 TABLET BY MOUTH EVERY DAY AT NIGHT, Disp: 90 tablet, Rfl: 1  •  VASCEPA 1 g capsule capsule, Take 2 g by mouth 2 (Two) Times a Day With Meals., Disp: 360 capsule, Rfl: 3  •  Vitamin D, Cholecalciferol, 1000 units capsule, Take  by mouth., Disp: , Rfl:   •  zolpidem (AMBIEN) 5 MG tablet, Take 1 tablet by mouth Take As Directed for 2 doses. Bring the medication to the sleep lab. DO NOT USE AT HOME, Disp: 2 tablet, Rfl: 0    Review of systems:  [place] Sleepiness Scale: Total score: 18   Positive for snoring, witnessed apneas, fatigue and daytime excessive  "sleepiness,   Negative for shortness of breath, cough, wheezing, chest pain, nausea and vomiting, palpitation, swelling of feet:    Morning headaches: No  Awaken with sore throat or dry mouth : Yes  Leg jerking at night: No  Crawly feeling/urge sensation to move in the legs: No  Teeth grinding: No  Sleepwalking, nightmares, muscle weakness with laughing or anger,sleep paralysis: No  Nasal Congestion: No  Nocturia (how many times/night): None  Memory Problem: No  Weight change, no change    Physical exam:  Vitals:    10/04/19 0900   BP: 132/84   Pulse: 79   Weight: 64.4 kg (142 lb)   Height: 165.1 cm (65\")    Body mass index is 23.63 kg/m².    HEENT: Head is atraumatic, normocephalic  Eyes: pupils are round equal and reacting to light and accommodation, conjunctiva normal  Nose: no nasal septal defects or deviation and the nasal passages are clear, no nasal polyps,  Throat: tonsils are not enlarged, tongue normal size, oral airway Mallampati class 3  NECK: No lymphadenopathy, trachea is in the midline, thyroid not enlarged  RESPIRATORY SYSTEM: Breath sounds are equal on both sides, there are no wheezes or crackles  CARDIOVASULAR SYSTEM: Heart sounds are regular rhythm and prosper rate, there are no murmurs or thrills  ABDOMEN: Soft, no hepatosplenomegaly, no evidence of ascites  EXTREMITES: No cyanosis, clubbing or edema   NEUROLOGICAL SYSTEM: Oriented x 3, no gross motor defects, gait normal    Medical records and labs reviewed in [facility] reviewed    Assessment and plan:  · Sleep apnea unspecified, (G47.30).   Patient's symptoms and examination is consistent with sleep apnea.  I have talked to the patient about the signs and symptoms of sleep apnea and consequences of untreated sleep apnea. Discussed the sleep testing. I have placed a order in epic for a in lab Split night sleep test.  Patient will have a follow-up after this sleep test is done.  I have also given a prescription for zolpidem to be used for the sleep " study  · Sleep related to hypoxia, patient needs a full evaluation with a split-night study to see if she needs any supplemental oxygen.  · Snoring secondary to sleep apnea  · Hypersomnia with [place] Sleepiness Scale of Total score: 18 due to sleep apnea.  · COPD, unfortunately she is still smoking 1 pack of cigarettes a day I have talked to the patient about smoking cessation        Aime Coronado MD, [facility]  Sleep Medicine.(Board-certified)  [facility]  10/4/2019 ,

## 2019-10-11 DIAGNOSIS — E11.9 TYPE 2 DIABETES MELLITUS WITHOUT COMPLICATION, WITHOUT LONG-TERM CURRENT USE OF INSULIN (HCC): Primary | ICD-10-CM

## 2019-10-11 DIAGNOSIS — E78.5 DYSLIPIDEMIA: ICD-10-CM

## 2019-10-11 DIAGNOSIS — E55.9 VITAMIN D DEFICIENCY: ICD-10-CM

## 2019-10-15 ENCOUNTER — HOSPITAL ENCOUNTER (OUTPATIENT)
Dept: SLEEP MEDICINE | Facility: HOSPITAL | Age: 64
Discharge: HOME | End: 2019-10-15
Admitting: INTERNAL MEDICINE

## 2019-10-15 DIAGNOSIS — G25.81 RESTLESS LEGS SYNDROME: ICD-10-CM

## 2019-10-15 DIAGNOSIS — R06.83 SNORING: ICD-10-CM

## 2019-10-15 DIAGNOSIS — F17.200 TOBACCO USE DISORDER: ICD-10-CM

## 2019-10-15 DIAGNOSIS — G47.33 OBSTRUCTIVE SLEEP APNEA: ICD-10-CM

## 2019-10-15 DIAGNOSIS — G47.34 SLEEP RELATED HYPOXIA: ICD-10-CM

## 2019-10-15 DIAGNOSIS — E11.9 TYPE 2 DIABETES MELLITUS WITHOUT COMPLICATION, WITHOUT LONG-TERM CURRENT USE OF INSULIN (HCC): ICD-10-CM

## 2019-10-15 PROCEDURE — 95810 POLYSOM 6/> YRS 4/> PARAM: CPT

## 2019-10-15 PROCEDURE — 95810 POLYSOM 6/> YRS 4/> PARAM: CPT | Performed by: INTERNAL MEDICINE

## 2019-10-21 RX ORDER — TRAMADOL HYDROCHLORIDE 50 MG/1
100 TABLET ORAL 2 TIMES DAILY PRN
Qty: 120 TABLET | Refills: 0 | OUTPATIENT
Start: 2019-10-21

## 2019-10-22 ENCOUNTER — TELEPHONE (OUTPATIENT)
Dept: FAMILY MEDICINE CLINIC | Facility: CLINIC | Age: 64
End: 2019-10-22

## 2019-10-22 NOTE — TELEPHONE ENCOUNTER
Mrs. Beltran told me that the prescription for Xigduo XR was not on formulary and that it would cost hundreds of dollars and she could not afford it.  Instead of having the patient call Dr. Clemons's office to have them change the medicine, and since Xigduo XR is a combination medication, I called in the two medicines as separate prescriptions, since her insurance company does cover them on formulary separately, in order to try to help the patient.  I do not want her to leave Dr. Clemons, unless he chooses to release her to my care for her diabetes.  Her last A1C on 6-18-19 was 6.18. LDL on 11/16/18 was 64.  Liver and kidney function have been normal.  If Dr. Clemons would like, I would be more than happy and comfortable taking over her diabetes care, but first I will contact Dr. Clemons to get his professional opinion and recommendation for this patient.  If he believes, for any reason that she should continue with him for diabetes care, then that is what I will recommend she do.

## 2019-10-22 NOTE — TELEPHONE ENCOUNTER
called inquiring on appointment with Endocrinologist at 4pm today. They feel that the appointment is not necessary since all meds were changed by you and numbers are all ok. They really dont want the drive or the extra co-pay for a specialist.

## 2019-10-23 RX ORDER — TRAMADOL HYDROCHLORIDE 50 MG/1
100 TABLET ORAL 2 TIMES DAILY PRN
Qty: 120 TABLET | Refills: 0 | OUTPATIENT
Start: 2019-10-23

## 2019-10-23 NOTE — TELEPHONE ENCOUNTER
I feel perfectly comfortable to leave her care in your able hands.  Feel free to reach out to me.  Thank you

## 2019-10-28 ENCOUNTER — TELEPHONE (OUTPATIENT)
Dept: FAMILY MEDICINE CLINIC | Facility: CLINIC | Age: 64
End: 2019-10-28

## 2019-10-28 NOTE — TELEPHONE ENCOUNTER
Patient is visiting her daughter in [facility]. The dog got her mrdication bottle for Metformin. She has what was in her weekly med case but what was in the bottle are gone. She will be there for 2 weeks. What to do?

## 2019-11-08 ENCOUNTER — TELEPHONE (OUTPATIENT)
Dept: SLEEP MEDICINE | Facility: HOSPITAL | Age: 64
End: 2019-11-08

## 2019-11-08 NOTE — TELEPHONE ENCOUNTER
Spoke w/patients , he said she was out of town and may not be back for a month, faxed order for overnight oximetry to [address]

## 2019-11-08 NOTE — TELEPHONE ENCOUNTER
Left message for patient to call back with sleep study results, Faxed orders to [place] [phone] for overnight oximeter, no apnea, hypoxia-AK

## 2019-11-11 ENCOUNTER — RESULTS ENCOUNTER (OUTPATIENT)
Dept: ENDOCRINOLOGY | Age: 64
End: 2019-11-11

## 2019-11-11 DIAGNOSIS — E55.9 VITAMIN D DEFICIENCY: ICD-10-CM

## 2019-11-11 DIAGNOSIS — E78.5 DYSLIPIDEMIA: ICD-10-CM

## 2019-11-11 DIAGNOSIS — E11.9 TYPE 2 DIABETES MELLITUS WITHOUT COMPLICATION, WITHOUT LONG-TERM CURRENT USE OF INSULIN (HCC): ICD-10-CM

## 2019-11-15 ENCOUNTER — TELEPHONE (OUTPATIENT)
Dept: FAMILY MEDICINE CLINIC | Facility: CLINIC | Age: 64
End: 2019-11-15

## 2019-11-15 NOTE — TELEPHONE ENCOUNTER
The patient's , Golden Beltran, called and said that she has been acting unusual since she started taking Lyrica.  He said she has seen the podiatrist, Dr. Villa, who is prescribing the Lyrica.  He said he is concerned that she may be taking other opioids.  He said that her behavior has been unusual and that she left the house 5 weeks ago and has not been back.  He said he thinks that her behaviors may be due to side effects of Lyrica.  I called Dr. Villa's office to let them know the patients  called this office and reported this concern.  I spoke with Dr. Villa and he said he would check with the patient to evaluate her for any possible medication side effects.

## 2019-11-27 ENCOUNTER — RESULTS ENCOUNTER (OUTPATIENT)
Dept: FAMILY MEDICINE CLINIC | Facility: CLINIC | Age: 64
End: 2019-11-27

## 2019-11-27 DIAGNOSIS — Z11.59 ENCOUNTER FOR HEPATITIS C SCREENING TEST FOR LOW RISK PATIENT: ICD-10-CM

## 2019-12-10 RX ORDER — CYCLOBENZAPRINE HCL 10 MG
TABLET ORAL
Qty: 60 TABLET | Refills: 0 | Status: SHIPPED | OUTPATIENT
Start: 2019-12-10 | End: 2020-03-10

## 2019-12-13 ENCOUNTER — OFFICE VISIT (OUTPATIENT)
Dept: SLEEP MEDICINE | Facility: HOSPITAL | Age: 64
End: 2019-12-13

## 2019-12-13 VITALS
BODY MASS INDEX: 25.07 KG/M2 | HEIGHT: 66 IN | HEART RATE: 89 BPM | DIASTOLIC BLOOD PRESSURE: 85 MMHG | SYSTOLIC BLOOD PRESSURE: 128 MMHG | WEIGHT: 156 LBS

## 2019-12-13 DIAGNOSIS — R06.83 SNORING: ICD-10-CM

## 2019-12-13 DIAGNOSIS — J96.91 RESPIRATORY FAILURE WITH HYPOXIA, UNSPECIFIED CHRONICITY (HCC): Primary | ICD-10-CM

## 2019-12-13 PROBLEM — J96.11 CHRONIC RESPIRATORY FAILURE WITH HYPOXIA: Status: ACTIVE | Noted: 2019-12-13

## 2019-12-13 PROCEDURE — G0463 HOSPITAL OUTPT CLINIC VISIT: HCPCS

## 2019-12-13 PROCEDURE — 99214 OFFICE O/P EST MOD 30 MIN: CPT | Performed by: INTERNAL MEDICINE

## 2019-12-13 NOTE — PROGRESS NOTES
[place] [id number] [facility]  [address] [facility]  [place] [address]  Phone   Fax       SLEEP CLINIC FOLLOW UP PROGRESS NOTE.    Jayne Beltran  1955  64 y.o.  female      PCP: Michael Arriaza Jr.,       Date of visit: 12/13/2019    Chief Complaint   Patient presents with   • Shortness of Breath       INTERM HISTORY:  This is a 64 y.o. years old patient who has a history of snoring and shortness of breath was evaluated in the sleep center she underwent polysomnography on 16 October year 2019 which basically showed that she did not had any sleep apnea but had significant hypoxia and periodic leg movement disorder.  Patient also has mild snoring.  Because of the severe hypoxia found during the sleep study she underwent a comprehensive oximetry on room air.    Daytime 6-minute exercise test was done on room air.  I have reviewed these test myself.  She walked for 4 minutes and her saturations dropped to 88% on room air.  She was placed on 2 L of oxygen and she completed 6-minute walk  She also had a nighttime oximetry which showed her saturations were low less than 88% for 45 minutes        Past Medical History:   Diagnosis Date   • Diabetes mellitus (CMS/HCC)    • Hypertension    • Neuromuscular disorder (CMS/HCC)        MEDICATIONS: reviewed by me    Current Outpatient Medications:   •  aspirin 81 MG EC tablet, Take 81 mg by mouth Daily., Disp: , Rfl:   •  [employer] NICOTINE 7 MG/24HR patch, PLACE 1 PATCH ON THE SKIN AS DIRECTED BY PROVIDER DAILY., Disp: 28 patch, Rfl: 0  •  cyclobenzaprine (FLEXERIL) 10 MG tablet, TAKE 1 TABLET BY MOUTH TWICE A DAY AS NEEDED FOR MUSCLE SPASMS, Disp: 60 tablet, Rfl: 0  •  Dapagliflozin Propanediol 10 MG tablet, Take 10 mg by mouth Daily., Disp: 30 tablet, Rfl: 5  •  DULoxetine (CYMBALTA) 30 MG capsule, Take 1 capsule by mouth 2 (Two) Times a Day., Disp: 60 capsule, Rfl: 5  •  LYRICA 150 MG capsule, TAKE 1 CAPSULE BY MOUTH TWICE A DAY,  "Disp: 60 capsule, Rfl: 2  •  meloxicam (MOBIC) 15 MG tablet, TAKE 1 TABLET BY MOUTH DAILY AS NEEDED FOR MODERATE PAIN ., Disp: 90 tablet, Rfl: 0  •  metFORMIN (GLUCOPHAGE) 1000 MG tablet, Take 1 tablet by mouth 2 (Two) Times a Day With Meals., Disp: 180 tablet, Rfl: 5  •  metoprolol succinate XL (TOPROL-XL) 200 MG 24 hr tablet, TAKE 1 TABLET BY MOUTH EVERY DAY, Disp: 90 tablet, Rfl: 0  •  potassium citrate (UROCIT-K) 10 MEQ (1080 MG) CR tablet, Take  by mouth 2 (two) times a day., Disp: , Rfl:   •  rOPINIRole (REQUIP) 1 MG tablet, TAKE ONE TABLET BY MOUTH EVERY NIGHT AT BEDTIME, Disp: 90 tablet, Rfl: 0  •  rOPINIRole (REQUIP) 3 MG tablet, TAKE 1 TABLET BY MOUTH EVERY DAY AT NIGHT, Disp: 90 tablet, Rfl: 1  •  VASCEPA 1 g capsule capsule, Take 2 g by mouth 2 (Two) Times a Day With Meals., Disp: 360 capsule, Rfl: 3  •  Vitamin D, Cholecalciferol, 1000 units capsule, Take  by mouth., Disp: , Rfl:   •  zolpidem (AMBIEN) 5 MG tablet, Take 1 tablet by mouth Take As Directed for 2 doses. Bring the medication to the sleep lab. DO NOT USE AT HOME, Disp: 2 tablet, Rfl: 0    Allergies   Allergen Reactions   • Codeine     reviewed by me    SOCIAL, FAMILY HISTORY: Medical records are reviewed and noted by me.    REVIEW OF SYSTEMS:   [place] Sleepiness Scale :Total score: 19   Snoring: Yes      PHYSICAL EXAMINATION:  Vitals:    12/13/19 0900   BP: 128/85   Pulse: 89   Weight: 70.8 kg (156 lb)   Height: 167.6 cm (66\")    Body mass index is 25.18 kg/m².    HEENT: pupils are round equal and reacting to light and accommodation, nasal passage is clear, no nasal polyps, no lymphadenopathy, throat is clear, oral airway Mallampati class 3  RESPRATORY SYSTEM: Breath sounds are equal on both sides and are normal, no wheezes or crackles  CARDIOVASULAR SYSTEM: Heart rate is regular without murmur  ABDOMEN: Soft, no ascites, no hepatosplenomegaly.  EXTREMITIES: No cyanosis, clubbing or edema       ASSESSMENT AND PLAN:  · Chronic hypoxic " respiratory failure, patient has significant hypoxia on room air.  There is a new problem.  she requires supplemental oxygen.  I have made arrangements for her to have 2 L of oxygen at nighttime and with activity.  This will be arranged through the [facility] and I will see her in 1 year for follow-up.  · Snoring I have talked to the patient about the snoring and she is also instructed to sleep on the side        Aime Coronado MD, [facility]  Sleep Medicine.(Board-certified)  [facility]   12/13/2019

## 2019-12-20 ENCOUNTER — DOCUMENTATION (OUTPATIENT)
Dept: SLEEP MEDICINE | Facility: HOSPITAL | Age: 64
End: 2019-12-20

## 2019-12-20 ENCOUNTER — TELEPHONE (OUTPATIENT)
Dept: SLEEP MEDICINE | Facility: HOSPITAL | Age: 64
End: 2019-12-20

## 2019-12-20 RX ORDER — OMEPRAZOLE 20 MG/1
CAPSULE, DELAYED RELEASE ORAL
Qty: 90 CAPSULE | Refills: 1 | Status: SHIPPED | OUTPATIENT
Start: 2019-12-20 | End: 2020-03-10 | Stop reason: SDUPTHER

## 2019-12-20 NOTE — PROGRESS NOTES
This is a brief note.  Patient was evaluated in the sleep center for evaluation of sleep apnea.  This sleep test did not show any evidence of sleep apnea but she had profound hypoxia at nighttime.  She underwent a comprehensive oximetry which confirmed that she has hypoxia with walking and at nighttime.  She was prescribed oxygen at 2 L but the Medicare is denying her oxygen as she does not have any other accompanying diagnosis of COPD or heart failure.  In addition she had a home sleep study in 2016 at which time she was found to have hypoxia but unfortunately there was no follow-up.    I have called called [facility] and requested an appointment for her to be seen for evaluation of hypoxia and possibly COPD.  Patient's  is also been informed about the upcoming appointment    Aime Coronado MD  12/20/2019  Sleep Medicine

## 2019-12-20 NOTE — TELEPHONE ENCOUNTER
Spoke with patient to let her know about insurance not wanting to pay for her O2, and that I had spoken to TJ at [facility], Dr. Coronado called [facility] to let them know that she needs to be seen there, She needs a formal diagnosis and to try an inhaler of some type before medicare will pay for her O2. Told her to please call me if she does not hear from [facility]

## 2019-12-24 RX ORDER — MELOXICAM 15 MG/1
15 TABLET ORAL DAILY PRN
Qty: 90 TABLET | Refills: 0 | Status: SHIPPED | OUTPATIENT
Start: 2019-12-24 | End: 2020-03-20 | Stop reason: SDUPTHER

## 2019-12-30 ENCOUNTER — OFFICE VISIT (OUTPATIENT)
Dept: FAMILY MEDICINE CLINIC | Facility: CLINIC | Age: 64
End: 2019-12-30

## 2019-12-30 ENCOUNTER — HOSPITAL ENCOUNTER (OUTPATIENT)
Dept: GENERAL RADIOLOGY | Facility: HOSPITAL | Age: 64
Discharge: HOME | End: 2019-12-30
Admitting: FAMILY MEDICINE

## 2019-12-30 VITALS
RESPIRATION RATE: 14 BRPM | OXYGEN SATURATION: 98 % | DIASTOLIC BLOOD PRESSURE: 80 MMHG | TEMPERATURE: 98.4 F | HEIGHT: 65 IN | BODY MASS INDEX: 26.33 KG/M2 | HEART RATE: 100 BPM | SYSTOLIC BLOOD PRESSURE: 140 MMHG | WEIGHT: 158 LBS

## 2019-12-30 DIAGNOSIS — R07.81 RIB PAIN ON LEFT SIDE: ICD-10-CM

## 2019-12-30 DIAGNOSIS — R22.41 LEG MASS, RIGHT: Primary | ICD-10-CM

## 2019-12-30 DIAGNOSIS — J18.9 PNEUMONIA OF BOTH LOWER LOBES DUE TO INFECTIOUS ORGANISM: ICD-10-CM

## 2019-12-30 PROCEDURE — G0402 INITIAL PREVENTIVE EXAM: HCPCS | Performed by: FAMILY MEDICINE

## 2019-12-30 PROCEDURE — 71101 X-RAY EXAM UNILAT RIBS/CHEST: CPT

## 2019-12-30 PROCEDURE — 99214 OFFICE O/P EST MOD 30 MIN: CPT | Performed by: FAMILY MEDICINE

## 2019-12-30 RX ORDER — AZITHROMYCIN 250 MG/1
TABLET, FILM COATED ORAL
Qty: 6 TABLET | Refills: 0 | Status: ON HOLD | OUTPATIENT
Start: 2019-12-30 | End: 2020-03-03

## 2019-12-30 RX ORDER — CEFDINIR 300 MG/1
300 CAPSULE ORAL 2 TIMES DAILY
Qty: 20 CAPSULE | Refills: 0 | Status: SHIPPED | OUTPATIENT
Start: 2019-12-30 | End: 2020-01-09

## 2019-12-30 NOTE — PATIENT INSTRUCTIONS

## 2019-12-30 NOTE — PROGRESS NOTES
Subsequent Medicare Wellness Visit   The ABC's of the Annual Wellness Visit    Chief Complaint   Patient presents with   • Cyst     right upper thigh   • Chest Pain     left side under breast- pulled muscle        HPI:  Jayne Beltran YOB: 1955, is a 64 y.o. female who presents for a Subsequent Medicare Wellness Visit.    Recent Hospitalizations:  No hospitalization(s) within the last year..    Current Medical Providers:  Patient Care Team:  Michael Arriaza Jr., DO as PCP - General (Family Medicine)  Gilles Villa DPM as Consulting Physician (Podiatry)  Jen Hays MD as Consulting Physician (Obstetrics and Gynecology)    Health Habits and Functional and Cognitive Screening and Depression Screening:  Functional & Cognitive Status 2019   Do you have difficulty preparing food and eating? No   Do you have difficulty bathing yourself, getting dressed or grooming yourself? No   Do you have difficulty using the toilet? No   Do you have difficulty moving around from place to place? No   Do you have trouble with steps or getting out of a bed or a chair? No   Current Diet Well Balanced Diet   Dental Exam Not up to date   Eye Exam Up to date   Exercise (times per week) 7 times per week   Current Exercise Activities Include Housecleaning   Do you need help using the phone?  No   Are you deaf or do you have serious difficulty hearing?  No   Do you need help with transportation? No   Do you need help shopping? No   Do you need help preparing meals?  No   Do you need help with housework?  No   Do you need help with laundry? No   Do you need help taking your medications? No   Do you need help managing money? No   Do you ever drive or ride in a car without wearing a seat belt? No   Have you felt unusual stress, anger or loneliness in the last month? No   Who do you live with? Spouse   If you need help, do you have trouble finding someone available to you? No   Have you been bothered in the last  four weeks by sexual problems? No   Do you have difficulty concentrating, remembering or making decisions? No       Compared to one year ago, the patient feels her physical health is the same and her mental health is the same.    Depression Screen:  PHQ-2/PHQ-9 Depression Screening 12/30/2019   Little interest or pleasure in doing things 0   Feeling down, depressed, or hopeless 0   Trouble falling or staying asleep, or sleeping too much 0   Feeling tired or having little energy 0   Poor appetite or overeating 0   Feeling bad about yourself - or that you are a failure or have let yourself or your family down 0   Trouble concentrating on things, such as reading the newspaper or watching television 0   Moving or speaking so slowly that other people could have noticed. Or the opposite - being so fidgety or restless that you have been moving around a lot more than usual 0   Thoughts that you would be better off dead, or of hurting yourself in some way 0   Total Score 0   If you checked off any problems, how difficult have these problems made it for you to do your work, take care of things at home, or get along with other people? Not difficult at all         Past Medical/Family/Social History:  The following portions of the patient's history were reviewed and updated as appropriate: allergies, current medications, past family history, past medical history, past social history, past surgical history and problem list.    Allergies   Allergen Reactions   • Codeine          Current Outpatient Medications:   •  aspirin 81 MG EC tablet, Take 81 mg by mouth Daily., Disp: , Rfl:   •  [employer] NICOTINE 7 MG/24HR patch, PLACE 1 PATCH ON THE SKIN AS DIRECTED BY PROVIDER DAILY., Disp: 28 patch, Rfl: 0  •  cyclobenzaprine (FLEXERIL) 10 MG tablet, TAKE 1 TABLET BY MOUTH TWICE A DAY AS NEEDED FOR MUSCLE SPASMS, Disp: 60 tablet, Rfl: 0  •  Dapagliflozin Propanediol 10 MG tablet, Take 10 mg by mouth Daily., Disp: 30 tablet, Rfl: 5  •   DULoxetine (CYMBALTA) 30 MG capsule, Take 1 capsule by mouth 2 (Two) Times a Day., Disp: 60 capsule, Rfl: 5  •  LYRICA 150 MG capsule, TAKE 1 CAPSULE BY MOUTH TWICE A DAY, Disp: 60 capsule, Rfl: 2  •  meloxicam (MOBIC) 15 MG tablet, TAKE 1 TABLET BY MOUTH DAILY AS NEEDED FOR MODERATE PAIN ., Disp: 90 tablet, Rfl: 0  •  metFORMIN (GLUCOPHAGE) 1000 MG tablet, Take 1 tablet by mouth 2 (Two) Times a Day With Meals., Disp: 180 tablet, Rfl: 5  •  metoprolol succinate XL (TOPROL-XL) 200 MG 24 hr tablet, TAKE 1 TABLET BY MOUTH EVERY DAY, Disp: 90 tablet, Rfl: 0  •  omeprazole (priLOSEC) 20 MG capsule, TAKE 1 CAPSULE BY MOUTH EVERY DAY, Disp: 90 capsule, Rfl: 1  •  potassium citrate (UROCIT-K) 10 MEQ (1080 MG) CR tablet, Take  by mouth 2 (two) times a day., Disp: , Rfl:   •  rOPINIRole (REQUIP) 1 MG tablet, TAKE ONE TABLET BY MOUTH EVERY NIGHT AT BEDTIME, Disp: 90 tablet, Rfl: 0  •  rOPINIRole (REQUIP) 3 MG tablet, TAKE 1 TABLET BY MOUTH EVERY DAY AT NIGHT, Disp: 90 tablet, Rfl: 1  •  VASCEPA 1 g capsule capsule, Take 2 g by mouth 2 (Two) Times a Day With Meals., Disp: 360 capsule, Rfl: 3  •  Vitamin D, Cholecalciferol, 1000 units capsule, Take  by mouth., Disp: , Rfl:   •  zolpidem (AMBIEN) 5 MG tablet, Take 1 tablet by mouth Take As Directed for 2 doses. Bring the medication to the sleep lab. DO NOT USE AT HOME, Disp: 2 tablet, Rfl: 0    Aspirin use counseling: Taking ASA appropriately as indicated    Current medication list contains no high risk medications.  No harmful drug interactions have been identified.     Family History   Problem Relation Age of Onset   • Heart disease Mother    • Diabetes Mother    • Diabetes Father    • Breast cancer Maternal Grandmother        Social History     Tobacco Use   • Smoking status: Current Every Day Smoker     Packs/day: 0.50     Types: Cigarettes   • Smokeless tobacco: Never Used   Substance Use Topics   • Alcohol use: No       Past Surgical History:   Procedure Laterality Date   •  "CHOLECYSTECTOMY     • COLONOSCOPY         Patient Active Problem List   Diagnosis   • Essential hypertension   • Snoring   • TIA (transient ischemic attack)   • Inflamed seborrheic keratosis   • Type 2 diabetes mellitus without complication, without long-term current use of insulin (CMS/HCC)   • Peripheral neuropathy   • Restless legs syndrome   • Benign paroxysmal positional vertigo   • Bone lesion   • Vitamin D deficiency   • Dyslipidemia   • Muscle spasm of both lower legs   • Tobacco use disorder   • Abnormal lung sounds   • Intermittent claudication (CMS/HCC)   • Chronic respiratory failure with hypoxia (CMS/HCC)       Review of Systems    Objective     Vitals:    12/30/19 1318   BP: 140/80   BP Location: Left arm   Patient Position: Sitting   Cuff Size: Adult   Pulse: 100   Resp: 14   Temp: 98.4 °F (36.9 °C)   TempSrc: Oral   SpO2: 98%   Weight: 71.7 kg (158 lb)   Height: 165.1 cm (65\")       Patient's Body mass index is 26.29 kg/m². BMI is above normal parameters. Recommendations include: educational material, exercise counseling and nutrition counseling.      No exam data present    The patient has no evidence of cognitve impairment.     Physical Exam    Recent Lab Results:  Lab Results   Component Value Date     (H) 06/18/2019     Lab Results   Component Value Date    TRIG 436 (H) 06/18/2019    HDL 32 (L) 06/18/2019    VLDL CANCELED 06/18/2019    LDLHDL 1.56 11/16/2018       Assessment/Plan   Age-appropriate Screening Schedule:  Refer to the list below for future screening recommendations based on patient's age, sex and/or medical conditions.      Health Maintenance   Topic Date Due   • PAP SMEAR  02/22/2016   • HEMOGLOBIN A1C  12/18/2019   • DIABETIC EYE EXAM  12/31/2019 (Originally 2/22/2016)   • ZOSTER VACCINE (1 of 2) 09/06/2020 (Originally 7/27/2004)   • URINE MICROALBUMIN  06/18/2020   • DIABETIC FOOT EXAM  09/04/2020   • MAMMOGRAM  09/11/2021   • COLONOSCOPY  10/12/2025   • TDAP/TD VACCINES (2 " - Td) 02/06/2028       Medicare Risks and Personalized Health Plan:  Obesity/Overweight       CMS-Preventive Services Quick Reference  Medicare Preventive Services Addressed:  Annual Wellness Visit (AWV)    Advance Care Planning:  Patient does not have an advance directive - information provided to the patient today    There are no diagnoses linked to this encounter.    An After Visit Summary and PPPS with all of these plans were given to the patient.      Follow Up:  No follow-ups on file.

## 2019-12-30 NOTE — PROGRESS NOTES
Subjective   Jayne Beltran is a 64 y.o. female is here for   Chief Complaint   Patient presents with   • Cyst     right upper thigh   • Chest Pain     left side under breast- pulled muscle        History of Present Illness     Patient states that she has a mass on her right upper thigh that started about 2 years ago and is been slowly getting bigger.  Not painful.    In November she was sick with an upper respiratory infection had some coughing and had some pain in her left side.  It was going away but came back again a few days ago.  She has pain when she coughs or bends over.  She was helping to move her  and was pulling on him and thinks that she may have pulled a muscle.    She has been coughing up sputum.  She has been SOA.  No fever. + Chills.    The following portions of the patient's history were reviewed and updated as appropriate: allergies, current medications, past family history, past medical history, past social history, past surgical history and problem list.     reports that she has been smoking cigarettes. She has been smoking about 0.50 packs per day. She has never used smokeless tobacco. She reports that she does not drink alcohol or use drugs.    Review of Systems   Constitutional: Negative for activity change and unexpected weight change.   HENT: Negative for congestion.    Respiratory: Negative for shortness of breath and wheezing.    Cardiovascular: Negative for chest pain and palpitations.   Gastrointestinal: Negative for abdominal pain, blood in stool and constipation.   Genitourinary: Negative for difficulty urinating and hematuria.   Musculoskeletal: Negative for gait problem.   Skin: Negative for color change and rash.        PHQ-9 Depression Screening  Little interest or pleasure in doing things? 0   Feeling down, depressed, or hopeless? 0   Trouble falling or staying asleep, or sleeping too much? 0   Feeling tired or having little energy? 0   Poor appetite or overeating? 0  "  Feeling bad about yourself - or that you are a failure or have let yourself or your family down? 0   Trouble concentrating on things, such as reading the newspaper or watching television? 0   Moving or speaking so slowly that other people could have noticed? Or the opposite - being so fidgety or restless that you have been moving around a lot more than usual? 0   Thoughts that you would be better off dead, or of hurting yourself in some way? 0   PHQ-9 Total Score 0   If you checked off any problems, how difficult have these problems made it for you to do your work, take care of things at home, or get along with other people? Not difficult at all         Objective   /80 (BP Location: Left arm, Patient Position: Sitting, Cuff Size: Adult)   Pulse 100   Temp 98.4 °F (36.9 °C) (Oral)   Resp 14   Ht 165.1 cm (65\")   Wt 71.7 kg (158 lb)   SpO2 98%   BMI 26.29 kg/m²   Physical Exam   Constitutional: She is oriented to person, place, and time. She appears well-developed and well-nourished. No distress.   HENT:   Head: Normocephalic and atraumatic.   Right Ear: External ear normal.   Left Ear: External ear normal.   Nose: Nose normal.   Mouth/Throat: Oropharynx is clear and moist. No oropharyngeal exudate.   Eyes: Lids are normal. Right eye exhibits no discharge. Left eye exhibits no discharge. No scleral icterus.   Neck: Trachea normal, normal range of motion and full passive range of motion without pain. Neck supple. No tracheal deviation and no edema present. No thyromegaly present.   Cardiovascular: Normal rate, regular rhythm, normal heart sounds and intact distal pulses. Exam reveals no gallop and no friction rub.   No murmur heard.  Pulmonary/Chest: Effort normal and breath sounds normal. No stridor. No tachypnea and no bradypnea. No respiratory distress. She has no decreased breath sounds. She has no wheezes. She has no rales. She exhibits no tenderness.   Abdominal: Normal appearance. There is no " hepatosplenomegaly.   Musculoskeletal: She exhibits no edema.   There is a 4 - 5 cm X 2 cm palpable mass just under the skin of the right proximal thigh.  The mass is firm and mobile.  It is nontender.  There is pain with palpation of the left ribs along the midaxillary line.   Lymphadenopathy:        Head (right side): No submental, no submandibular, no tonsillar, no preauricular, no posterior auricular and no occipital adenopathy present.        Head (left side): No submental, no submandibular, no tonsillar, no preauricular, no posterior auricular and no occipital adenopathy present.     She has no cervical adenopathy.        Right cervical: No superficial cervical, no deep cervical and no posterior cervical adenopathy present.       Left cervical: No superficial cervical, no deep cervical and no posterior cervical adenopathy present.   Neurological: She is alert and oriented to person, place, and time. She has normal strength and normal reflexes. She is not disoriented.   Skin: Skin is warm, dry and intact. Capillary refill takes less than 2 seconds. No rash noted. She is not diaphoretic. No cyanosis or erythema. No pallor. Nails show no clubbing.   Psychiatric: She has a normal mood and affect. Her behavior is normal. Cognition and memory are normal.   Nursing note and vitals reviewed.      Procedures    Assessment/Plan   Diagnoses and all orders for this visit:    1. Leg mass, right (Primary)  Assessment & Plan:  MRI Right thigh      Orders:  -     MRI Femur Right Without Contrast; Future    2. Rib pain on left side  Assessment & Plan:  Xray Left ribs and CXR      Orders:  -     XR Ribs Left With PA Chest; Future    3. Pneumonia of both lower lobes due to infectious organism (CMS/[facility])  -     cefdinir (OMNICEF) 300 MG capsule; Take 1 capsule by mouth 2 (Two) Times a Day for 10 days.  Dispense: 20 capsule; Refill: 0  -     azithromycin (ZITHROMAX Z-CELESTINE) 250 MG tablet; Take 2 tablets the first day, then 1 tablet  daily for 4 days.  Dispense: 6 tablet; Refill: 0    I reviewed the chest x-ray result today after the patient left and went to the hospital to get the x-ray, and I called her and let her know that she has possible pneumonia.  I called in antibiotics and advised her to going to pick those up now, which she said she would.  Also advised her that if she does not get any better within 24 to 48 hours she needs to go to the emergency room, or, at any point if she gets any worse, to go to the ER.  She agrees.

## 2020-01-10 ENCOUNTER — OFFICE VISIT (OUTPATIENT)
Dept: FAMILY MEDICINE CLINIC | Facility: CLINIC | Age: 65
End: 2020-01-10

## 2020-01-10 VITALS
TEMPERATURE: 98.4 F | BODY MASS INDEX: 23.32 KG/M2 | DIASTOLIC BLOOD PRESSURE: 80 MMHG | OXYGEN SATURATION: 96 % | WEIGHT: 140 LBS | HEIGHT: 65 IN | RESPIRATION RATE: 14 BRPM | HEART RATE: 100 BPM | SYSTOLIC BLOOD PRESSURE: 120 MMHG

## 2020-01-10 DIAGNOSIS — I10 ESSENTIAL HYPERTENSION: ICD-10-CM

## 2020-01-10 DIAGNOSIS — J96.91 RESPIRATORY FAILURE WITH HYPOXIA, UNSPECIFIED CHRONICITY (HCC): ICD-10-CM

## 2020-01-10 DIAGNOSIS — I73.9 INTERMITTENT CLAUDICATION (HCC): ICD-10-CM

## 2020-01-10 DIAGNOSIS — J06.9 UPPER RESPIRATORY TRACT INFECTION, UNSPECIFIED TYPE: ICD-10-CM

## 2020-01-10 DIAGNOSIS — E11.43 DIABETIC AUTONOMIC NEUROPATHY ASSOCIATED WITH TYPE 2 DIABETES MELLITUS (HCC): ICD-10-CM

## 2020-01-10 DIAGNOSIS — E11.9 TYPE 2 DIABETES MELLITUS WITHOUT COMPLICATION, WITHOUT LONG-TERM CURRENT USE OF INSULIN (HCC): Primary | ICD-10-CM

## 2020-01-10 PROCEDURE — 99214 OFFICE O/P EST MOD 30 MIN: CPT | Performed by: FAMILY MEDICINE

## 2020-01-10 NOTE — PROGRESS NOTES
Subjective   Jayne Beltran is a 64 y.o. female is here for   Chief Complaint   Patient presents with   • Follow-up     mri results   • URI       History of Present Illness     Pt is here for follow-up of her MRI results. She states the mass on her left leg is getting a little smaller. She has another one on her right leg that is smaller and has been there for years also.  These areas on her legs are not tender or painful.    She has HTN that is controlled with Metoprolol. Her BP today is 120/80.    She has diabetes controlled with Metformin and Dapagliflozin.    She states she had an URI but is getting better and does not need or want any medicine.    The following portions of the patient's history were reviewed and updated as appropriate: allergies, current medications, past family history, past medical history, past social history, past surgical history and problem list.     reports that she has been smoking cigarettes. She has been smoking about 0.50 packs per day. She has never used smokeless tobacco. She reports that she does not drink alcohol or use drugs.    Review of Systems   Constitutional: Positive for chills, fatigue and fever. Negative for activity change and unexpected weight change.   HENT: Positive for congestion, sinus pressure and sinus pain.    Respiratory: Positive for cough. Negative for shortness of breath and wheezing.    Cardiovascular: Negative for chest pain and palpitations.   Gastrointestinal: Negative for abdominal pain, blood in stool and constipation.   Genitourinary: Negative for difficulty urinating and hematuria.   Musculoskeletal: Negative for gait problem.   Skin: Negative for color change and rash.        PHQ-9 Depression Screening  Little interest or pleasure in doing things?     Feeling down, depressed, or hopeless?     Trouble falling or staying asleep, or sleeping too much?     Feeling tired or having little energy?     Poor appetite or overeating?     Feeling bad about  "yourself - or that you are a failure or have let yourself or your family down?     Trouble concentrating on things, such as reading the newspaper or watching television?     Moving or speaking so slowly that other people could have noticed? Or the opposite - being so fidgety or restless that you have been moving around a lot more than usual?     Thoughts that you would be better off dead, or of hurting yourself in some way?     PHQ-9 Total Score     If you checked off any problems, how difficult have these problems made it for you to do your work, take care of things at home, or get along with other people?           Objective   /80 (BP Location: Left arm, Patient Position: Sitting, Cuff Size: Adult)   Pulse 100   Temp 98.4 °F (36.9 °C) (Oral)   Resp 14   Ht 165.1 cm (65\")   Wt 63.5 kg (140 lb)   SpO2 96%   BMI 23.30 kg/m²   Physical Exam   Constitutional: She is oriented to person, place, and time. She appears well-developed and well-nourished. No distress.   HENT:   Head: Normocephalic and atraumatic.   Right Ear: External ear normal.   Left Ear: External ear normal.   Nose: Nose normal.   Mouth/Throat: Oropharynx is clear and moist. No oropharyngeal exudate.   Eyes: Lids are normal. Right eye exhibits no discharge. Left eye exhibits no discharge. No scleral icterus.   Neck: Trachea normal, normal range of motion and full passive range of motion without pain. Neck supple. No tracheal deviation and no edema present. No thyromegaly present.   Cardiovascular: Normal rate, regular rhythm, normal heart sounds and intact distal pulses. Exam reveals no gallop and no friction rub.   No murmur heard.  Pulmonary/Chest: Effort normal and breath sounds normal. No stridor. No tachypnea and no bradypnea. No respiratory distress. She has no decreased breath sounds. She has no wheezes. She has no rales. She exhibits no tenderness.   Abdominal: Normal appearance. There is no hepatosplenomegaly.   Musculoskeletal: She " exhibits no edema or tenderness.   Lymphadenopathy:        Head (right side): No submental, no submandibular, no tonsillar, no preauricular, no posterior auricular and no occipital adenopathy present.        Head (left side): No submental, no submandibular, no tonsillar, no preauricular, no posterior auricular and no occipital adenopathy present.     She has no cervical adenopathy.        Right cervical: No superficial cervical, no deep cervical and no posterior cervical adenopathy present.       Left cervical: No superficial cervical, no deep cervical and no posterior cervical adenopathy present.   Neurological: She is alert and oriented to person, place, and time. She has normal strength and normal reflexes. She is not disoriented.   Skin: Skin is warm, dry and intact. Capillary refill takes less than 2 seconds. No rash noted. She is not diaphoretic. No cyanosis or erythema. No pallor. Nails show no clubbing.   Psychiatric: She has a normal mood and affect. Her behavior is normal. Cognition and memory are normal.   Nursing note and vitals reviewed.      Procedures    Assessment/Plan   Diagnoses and all orders for this visit:    1. Type 2 diabetes mellitus without complication, without long-term current use of insulin (CMS/[facility]) (Primary)  -     Ambulatory Referral for Diabetic Eye Exam-Ophthalmology    2. Upper respiratory tract infection, unspecified type  -     POCT Influenza A/B    3. Essential hypertension  Assessment & Plan:  Controlled with metoprolol  mg daily.  No medication side effects.  Continue current treatment.        4. Intermittent claudication (CMS/[facility])    5. Diabetic autonomic neuropathy associated with type 2 diabetes mellitus (CMS/[facility])    6. Respiratory failure with hypoxia, unspecified chronicity (CMS/[facility])

## 2020-01-17 ENCOUNTER — RESULTS ENCOUNTER (OUTPATIENT)
Dept: FAMILY MEDICINE CLINIC | Facility: CLINIC | Age: 65
End: 2020-01-17

## 2020-01-17 DIAGNOSIS — I10 ESSENTIAL HYPERTENSION: ICD-10-CM

## 2020-01-17 DIAGNOSIS — E11.9 TYPE 2 DIABETES MELLITUS WITHOUT COMPLICATION, WITHOUT LONG-TERM CURRENT USE OF INSULIN (HCC): ICD-10-CM

## 2020-01-17 DIAGNOSIS — G25.81 RESTLESS LEGS SYNDROME: Chronic | ICD-10-CM

## 2020-01-19 ENCOUNTER — RESULTS ENCOUNTER (OUTPATIENT)
Dept: FAMILY MEDICINE CLINIC | Facility: CLINIC | Age: 65
End: 2020-01-19

## 2020-01-19 DIAGNOSIS — E11.9 TYPE 2 DIABETES MELLITUS WITHOUT COMPLICATION, WITHOUT LONG-TERM CURRENT USE OF INSULIN (HCC): ICD-10-CM

## 2020-01-19 DIAGNOSIS — G25.81 RESTLESS LEGS SYNDROME: Chronic | ICD-10-CM

## 2020-01-19 DIAGNOSIS — E78.5 DYSLIPIDEMIA: ICD-10-CM

## 2020-01-19 DIAGNOSIS — I10 ESSENTIAL HYPERTENSION: ICD-10-CM

## 2020-01-27 ENCOUNTER — OFFICE VISIT (OUTPATIENT)
Dept: FAMILY MEDICINE CLINIC | Facility: CLINIC | Age: 65
End: 2020-01-27

## 2020-01-27 VITALS
WEIGHT: 144 LBS | OXYGEN SATURATION: 98 % | BODY MASS INDEX: 23.99 KG/M2 | DIASTOLIC BLOOD PRESSURE: 62 MMHG | HEART RATE: 95 BPM | SYSTOLIC BLOOD PRESSURE: 110 MMHG | TEMPERATURE: 97.7 F | HEIGHT: 65 IN | RESPIRATION RATE: 14 BRPM

## 2020-01-27 DIAGNOSIS — A08.4 VIRAL GASTROENTERITIS: Primary | ICD-10-CM

## 2020-01-27 PROCEDURE — 99213 OFFICE O/P EST LOW 20 MIN: CPT | Performed by: FAMILY MEDICINE

## 2020-01-27 NOTE — PATIENT INSTRUCTIONS
Drink plenty of fluids. Rest. If vomiting or abdominal pain occurs go to ER.     Viral Gastroenteritis, Adult    Viral gastroenteritis is also known as the stomach flu. This condition is caused by various viruses. These viruses can be passed from person to person very easily (are very contagious). This condition may affect your stomach, small intestine, and large intestine. It can cause sudden watery diarrhea, fever, and vomiting.  Diarrhea and vomiting can make you feel weak and cause you to become dehydrated. You may not be able to keep fluids down. Dehydration can make you tired and thirsty, cause you to have a dry mouth, and decrease how often you urinate. Older adults and people with other diseases or a weak immune system are at higher risk for dehydration.  It is important to replace the fluids that you lose from diarrhea and vomiting. If you become severely dehydrated, you may need to get fluids through an IV tube.  What are the causes?  Gastroenteritis is caused by various viruses, including rotavirus and norovirus. Norovirus is the most common cause in adults.  You can get sick by eating food, drinking water, or touching a surface contaminated with one of these viruses. You can also get sick from sharing utensils or other personal items with an infected person.  What increases the risk?  This condition is more likely to develop in people:  · Who have a weak defense system (immune system).  · Who live with one or more children who are younger than 2 years old.  · Who live in a nursing home.  · Who go on cruise ships.  What are the signs or symptoms?  Symptoms of this condition start suddenly 1-2 days after exposure to a virus. Symptoms may last a few days or as long as a week. The most common symptoms are watery diarrhea and vomiting. Other symptoms include:  · Fever.  · Headache.  · Fatigue.  · Pain in the abdomen.  · Chills.  · Weakness.  · Nausea.  · Muscle aches.  · Loss of appetite.  How is this  diagnosed?  This condition is diagnosed with a medical history and physical exam. You may also have a stool test to check for viruses or other infections.  How is this treated?  This condition typically goes away on its own. The focus of treatment is to restore lost fluids (rehydration). Your health care provider may recommend that you take an oral rehydration solution (ORS) to replace important salts and minerals (electrolytes) in your body. Severe cases of this condition may require giving fluids through an IV tube.  Treatment may also include medicine to help with your symptoms.  Follow these instructions at home:    Follow instructions from your health care provider about how to care for yourself at home.  Follow these recommendations as told by your health care provider:  · Take an ORS. This is a drink that is sold at pharmacies and retail stores.  · Drink clear fluids in small amounts as you are able. Clear fluids include water, ice chips, diluted fruit juice, and low-calorie sports drinks.  · Eat bland, easy-to-digest foods in small amounts as you are able. These foods include bananas, applesauce, rice, lean meats, toast, and crackers.  · Avoid fluids that contain a lot of sugar or caffeine, such as energy drinks, sports drinks, and soda.  · Avoid alcohol.  · Avoid spicy or fatty foods.  General instructions  · Drink enough fluid to keep your urine clear or pale yellow.  · Wash your hands often. If soap and water are not available, use hand .  · Make sure that all people in your household wash their hands well and often.  · Take over-the-counter and prescription medicines only as told by your health care provider.  · Rest at home while you recover.  · Watch your condition for any changes.  · Take a warm bath to relieve any burning or pain from frequent diarrhea episodes.  · Keep all follow-up visits as told by your health care provider. This is important.  Contact a health care provider if:  · You  cannot keep fluids down.  · Your symptoms get worse.  · You have new symptoms.  · You feel light-headed or dizzy.  · You have muscle cramps.  Get help right away if:  · You have chest pain.  · You feel extremely weak or you faint.  · You see blood in your vomit.  · Your vomit looks like coffee grounds.  · You have bloody or black stools or stools that look like tar.  · You have a severe headache, a stiff neck, or both.  · You have a rash.  · You have severe pain, cramping, or bloating in your abdomen.  · You have trouble breathing or you are breathing very quickly.  · Your heart is beating very quickly.  · Your skin feels cold and clammy.  · You feel confused.  · You have pain when you urinate.  · You have signs of dehydration, such as:  ? Dark urine, very little urine, or no urine.  ? Cracked lips.  ? Dry mouth.  ? Sunken eyes.  ? Sleepiness.  ? Weakness.  This information is not intended to replace advice given to you by your health care provider. Make sure you discuss any questions you have with your health care provider.  Document Released: 12/18/2006 Document Revised: 08/02/2018 Document Reviewed: 08/23/2016  [employer] Interactive Patient Education © 2019 [employer]

## 2020-01-27 NOTE — PROGRESS NOTES
Subjective   Jayne Beltran is a 64 y.o. female is here for   Chief Complaint   Patient presents with   • Fatigue   • Insomnia   • Diarrhea       History of Present Illness     Pt states she was vomiting that started a month ago.  She states she is able to keep solids and liquids down now. She feels weak.  She states she is feeling a lot better now. No fever or chills.  She also had diarrhea.  No blood or mucus in the diarrhea.    The following portions of the patient's history were reviewed and updated as appropriate: allergies, current medications, past family history, past medical history, past social history, past surgical history and problem list.     reports that she has been smoking cigarettes. She has been smoking about 0.50 packs per day. She has never used smokeless tobacco. She reports that she does not drink alcohol or use drugs.    Review of Systems   Constitutional: Negative for activity change and unexpected weight change.   HENT: Negative for congestion.    Respiratory: Negative for shortness of breath and wheezing.    Cardiovascular: Negative for chest pain and palpitations.   Gastrointestinal: Negative for abdominal pain, blood in stool and constipation.   Genitourinary: Negative for difficulty urinating and hematuria.   Musculoskeletal: Negative for gait problem.   Skin: Negative for color change and rash.        PHQ-9 Depression Screening  Little interest or pleasure in doing things?     Feeling down, depressed, or hopeless?     Trouble falling or staying asleep, or sleeping too much?     Feeling tired or having little energy?     Poor appetite or overeating?     Feeling bad about yourself - or that you are a failure or have let yourself or your family down?     Trouble concentrating on things, such as reading the newspaper or watching television?     Moving or speaking so slowly that other people could have noticed? Or the opposite - being so fidgety or restless that you have been moving  "around a lot more than usual?     Thoughts that you would be better off dead, or of hurting yourself in some way?     PHQ-9 Total Score     If you checked off any problems, how difficult have these problems made it for you to do your work, take care of things at home, or get along with other people?           Objective   /62 (BP Location: Left arm, Patient Position: Sitting, Cuff Size: Adult)   Pulse 95   Temp 97.7 °F (36.5 °C) (Oral)   Resp 14   Ht 165.1 cm (65\")   Wt 65.3 kg (144 lb)   SpO2 98%   BMI 23.96 kg/m²   Physical Exam   Constitutional: She is oriented to person, place, and time. She appears well-developed and well-nourished. No distress.   HENT:   Head: Normocephalic and atraumatic.   Right Ear: External ear normal.   Left Ear: External ear normal.   Nose: Nose normal.   Mouth/Throat: Oropharynx is clear and moist. No oropharyngeal exudate.   Eyes: Lids are normal. Right eye exhibits no discharge. Left eye exhibits no discharge. No scleral icterus.   Neck: Trachea normal, normal range of motion and full passive range of motion without pain. Neck supple. No tracheal deviation and no edema present. No thyromegaly present.   Cardiovascular: Normal rate, regular rhythm, normal heart sounds and intact distal pulses. Exam reveals no gallop and no friction rub.   No murmur heard.  Pulmonary/Chest: Effort normal and breath sounds normal. No stridor. No tachypnea and no bradypnea. No respiratory distress. She has no decreased breath sounds. She has no wheezes. She has no rales. She exhibits no tenderness.   Abdominal: Normal appearance. There is no hepatosplenomegaly.   Musculoskeletal: She exhibits no edema.   Lymphadenopathy:        Head (right side): No submental, no submandibular, no tonsillar, no preauricular, no posterior auricular and no occipital adenopathy present.        Head (left side): No submental, no submandibular, no tonsillar, no preauricular, no posterior auricular and no occipital " adenopathy present.     She has no cervical adenopathy.        Right cervical: No superficial cervical, no deep cervical and no posterior cervical adenopathy present.       Left cervical: No superficial cervical, no deep cervical and no posterior cervical adenopathy present.   Neurological: She is alert and oriented to person, place, and time. She has normal strength and normal reflexes. She is not disoriented.   Skin: Skin is warm, dry and intact. Capillary refill takes less than 2 seconds. No rash noted. She is not diaphoretic. No cyanosis or erythema. No pallor. Nails show no clubbing.   Psychiatric: She has a normal mood and affect. Her behavior is normal. Cognition and memory are normal.   Nursing note and vitals reviewed.      Procedures    Assessment/Plan   Diagnoses and all orders for this visit:    1. Viral gastroenteritis (Primary)  Comments:  Rest. Fluids. Conservative management.

## 2020-02-04 ENCOUNTER — TELEPHONE (OUTPATIENT)
Dept: FAMILY MEDICINE CLINIC | Facility: CLINIC | Age: 65
End: 2020-02-04

## 2020-02-04 NOTE — TELEPHONE ENCOUNTER
Please call patient and have her to schedule an appointment within the next 2 weeks for uncontrolled diabetes.  Please schedule her to come get her labs 1 week prior to her office visit.

## 2020-02-05 DIAGNOSIS — E11.9 TYPE 2 DIABETES MELLITUS WITHOUT COMPLICATION, WITHOUT LONG-TERM CURRENT USE OF INSULIN (HCC): ICD-10-CM

## 2020-02-05 RX ORDER — DAPAGLIFLOZIN 10 MG/1
TABLET, FILM COATED ORAL
Qty: 30 TABLET | Refills: 2 | Status: SHIPPED | OUTPATIENT
Start: 2020-02-05 | End: 2020-02-07

## 2020-02-06 DIAGNOSIS — I10 ESSENTIAL HYPERTENSION: Primary | ICD-10-CM

## 2020-02-07 ENCOUNTER — TELEPHONE (OUTPATIENT)
Dept: FAMILY MEDICINE CLINIC | Facility: CLINIC | Age: 65
End: 2020-02-07

## 2020-02-07 DIAGNOSIS — E11.9 TYPE 2 DIABETES MELLITUS WITHOUT COMPLICATION, WITHOUT LONG-TERM CURRENT USE OF INSULIN (HCC): Primary | ICD-10-CM

## 2020-02-07 RX ORDER — LISINOPRIL 40 MG/1
40 TABLET ORAL DAILY
Qty: 90 TABLET | Refills: 1 | Status: SHIPPED | OUTPATIENT
Start: 2020-02-07 | End: 2020-03-06 | Stop reason: HOSPADM

## 2020-02-07 RX ORDER — METOPROLOL SUCCINATE 200 MG/1
TABLET, EXTENDED RELEASE ORAL
Qty: 90 TABLET | Refills: 0 | Status: SHIPPED | OUTPATIENT
Start: 2020-02-07 | End: 2020-03-10 | Stop reason: SDUPTHER

## 2020-02-08 NOTE — TELEPHONE ENCOUNTER
I have discontinued the Farxiga from her medication list and called in Jardiance 10 mg to be taken once daily.  Please ask her to go ahead and pick that up from the pharmacy and start taking it, and less that is not covered by her insurance either.  Please ask her to let us know after she goes to the pharmacy whether it is covered or not.

## 2020-02-10 NOTE — TELEPHONE ENCOUNTER
Pt is aware that a new rx for Jardiance 10mg was sent to the pharmacy. Pt also made aware that there is a coupon card here at the office for her to .

## 2020-02-12 PROBLEM — D72.829 LEUKOCYTOSIS: Status: ACTIVE | Noted: 2020-02-12

## 2020-02-12 LAB
ALBUMIN SERPL-MCNC: 3.8 G/DL (ref 3.5–5.2)
ALBUMIN/GLOB SERPL: 1.7 G/DL
ALP SERPL-CCNC: 81 U/L (ref 39–117)
ALT SERPL-CCNC: 11 U/L (ref 1–33)
AST SERPL-CCNC: 14 U/L (ref 1–32)
BASOPHILS # BLD AUTO: ABNORMAL 10*3/UL
BASOPHILS # BLD MANUAL: 0 10*3/MM3 (ref 0–0.2)
BASOPHILS NFR BLD MANUAL: 0 % (ref 0–1.5)
BILIRUB SERPL-MCNC: 0.3 MG/DL (ref 0.2–1.2)
BUN SERPL-MCNC: 14 MG/DL (ref 8–23)
BUN/CREAT SERPL: 10 (ref 7–25)
CALCIUM SERPL-MCNC: 8.8 MG/DL (ref 8.6–10.5)
CHLORIDE SERPL-SCNC: 106 MMOL/L (ref 98–107)
CHOLEST SERPL-MCNC: 168 MG/DL (ref 0–200)
CHOLEST/HDLC SERPL: 4.94 {RATIO}
CO2 SERPL-SCNC: 22.6 MMOL/L (ref 22–29)
CREAT SERPL-MCNC: 1.4 MG/DL (ref 0.57–1)
DIFFERENTIAL COMMENT: ABNORMAL
EOSINOPHIL # BLD AUTO: ABNORMAL 10*3/UL
EOSINOPHIL # BLD MANUAL: 0.44 10*3/MM3 (ref 0–0.4)
EOSINOPHIL NFR BLD AUTO: ABNORMAL %
EOSINOPHIL NFR BLD MANUAL: 3 % (ref 0.3–6.2)
ERYTHROCYTE [DISTWIDTH] IN BLOOD BY AUTOMATED COUNT: 17.5 % (ref 12.3–15.4)
GLOBULIN SER CALC-MCNC: 2.2 GM/DL
GLUCOSE SERPL-MCNC: 100 MG/DL (ref 65–99)
HBA1C MFR BLD: 6.6 % (ref 4.8–5.6)
HCT VFR BLD AUTO: 34.8 % (ref 34–46.6)
HCV AB S/CO SERPL IA: <0.1 S/CO RATIO (ref 0–0.9)
HDLC SERPL-MCNC: 34 MG/DL (ref 40–60)
HGB BLD-MCNC: 10.9 G/DL (ref 12–15.9)
LDLC SERPL CALC-MCNC: 60 MG/DL (ref 0–100)
LYMPHOCYTES # BLD AUTO: ABNORMAL 10*3/UL
LYMPHOCYTES # BLD MANUAL: 3.83 10*3/MM3 (ref 0.7–3.1)
LYMPHOCYTES NFR BLD AUTO: ABNORMAL %
LYMPHOCYTES NFR BLD MANUAL: 26.3 % (ref 19.6–45.3)
MCH RBC QN AUTO: 28.6 PG (ref 26.6–33)
MCHC RBC AUTO-ENTMCNC: 31.3 G/DL (ref 31.5–35.7)
MCV RBC AUTO: 91.3 FL (ref 79–97)
MONOCYTES # BLD MANUAL: 1.47 10*3/MM3 (ref 0.1–0.9)
MONOCYTES NFR BLD AUTO: ABNORMAL %
MONOCYTES NFR BLD MANUAL: 10.1 % (ref 5–12)
NEUTROPHILS # BLD MANUAL: 8.82 10*3/MM3 (ref 1.7–7)
NEUTROPHILS NFR BLD AUTO: ABNORMAL %
NEUTROPHILS NFR BLD MANUAL: 60.6 % (ref 42.7–76)
PLATELET # BLD AUTO: 522 10*3/MM3 (ref 140–450)
PLATELET BLD QL SMEAR: ABNORMAL
POTASSIUM SERPL-SCNC: 4.4 MMOL/L (ref 3.5–5.2)
PROT SERPL-MCNC: 6 G/DL (ref 6–8.5)
RBC # BLD AUTO: 3.81 10*6/MM3 (ref 3.77–5.28)
RBC MORPH BLD: ABNORMAL
SODIUM SERPL-SCNC: 143 MMOL/L (ref 136–145)
TRIGL SERPL-MCNC: 371 MG/DL (ref 0–150)
VLDLC SERPL CALC-MCNC: 74.2 MG/DL
WBC # BLD AUTO: 14.56 10*3/MM3 (ref 3.4–10.8)

## 2020-02-18 ENCOUNTER — OFFICE VISIT (OUTPATIENT)
Dept: FAMILY MEDICINE CLINIC | Facility: CLINIC | Age: 65
End: 2020-02-18

## 2020-02-18 VITALS
OXYGEN SATURATION: 98 % | HEART RATE: 104 BPM | SYSTOLIC BLOOD PRESSURE: 112 MMHG | TEMPERATURE: 98.2 F | HEIGHT: 65 IN | DIASTOLIC BLOOD PRESSURE: 64 MMHG | WEIGHT: 139 LBS | RESPIRATION RATE: 14 BRPM | BODY MASS INDEX: 23.16 KG/M2

## 2020-02-18 DIAGNOSIS — E11.9 TYPE 2 DIABETES MELLITUS WITHOUT COMPLICATION, WITHOUT LONG-TERM CURRENT USE OF INSULIN (HCC): ICD-10-CM

## 2020-02-18 DIAGNOSIS — I10 ESSENTIAL HYPERTENSION: Primary | ICD-10-CM

## 2020-02-18 DIAGNOSIS — F51.01 PRIMARY INSOMNIA: ICD-10-CM

## 2020-02-18 PROCEDURE — 99214 OFFICE O/P EST MOD 30 MIN: CPT | Performed by: FAMILY MEDICINE

## 2020-02-18 RX ORDER — DIPHENHYDRAMINE HCL 25 MG
12.5 TABLET ORAL EVERY 6 HOURS PRN
Qty: 30 TABLET | Refills: 1 | Status: SHIPPED | OUTPATIENT
Start: 2020-02-18 | End: 2020-03-10 | Stop reason: SDUPTHER

## 2020-02-18 NOTE — ASSESSMENT & PLAN NOTE
Controlled with metformin thousand milligrams twice daily and Jardiance 10 mg daily.  No medication side effects.  Continue current treatment.

## 2020-02-18 NOTE — PROGRESS NOTES
Subjective   Jayne Beltran is a 64 y.o. female is here for   Chief Complaint   Patient presents with   • Diabetes       History of Present Illness     Ms. Beltran is here today for follow-up of her diabetes.  Her blood sugars been running between 110-190.  She states her blood sugars been doing much better.      She had been eating late and eating poorly and gained about 20 pounds a few months ago but has since lost that weight back off.    She states her upper respiratory infection has resolved.    She also has difficulty falling and staying asleep.  She is tried some over-the-counter melatonin that has not seemed to help much.  She is not tried Benadryl.    She has hypertension.  Her blood pressure is 112/64 today.    The following portions of the patient's history were reviewed and updated as appropriate: allergies, current medications, past family history, past medical history, past social history, past surgical history and problem list.     reports that she has been smoking cigarettes. She has been smoking about 0.50 packs per day. She has never used smokeless tobacco. She reports that she does not drink alcohol or use drugs.    Review of Systems   Constitutional: Negative for activity change and unexpected weight change.   HENT: Negative for congestion.    Respiratory: Negative for shortness of breath and wheezing.    Cardiovascular: Negative for chest pain and palpitations.   Gastrointestinal: Negative for abdominal pain, blood in stool and constipation.   Genitourinary: Negative for difficulty urinating and hematuria.   Musculoskeletal: Negative for gait problem.   Skin: Negative for color change and rash.        PHQ-9 Depression Screening  Little interest or pleasure in doing things?     Feeling down, depressed, or hopeless?     Trouble falling or staying asleep, or sleeping too much?     Feeling tired or having little energy?     Poor appetite or overeating?     Feeling bad about yourself - or that  "you are a failure or have let yourself or your family down?     Trouble concentrating on things, such as reading the newspaper or watching television?     Moving or speaking so slowly that other people could have noticed? Or the opposite - being so fidgety or restless that you have been moving around a lot more than usual?     Thoughts that you would be better off dead, or of hurting yourself in some way?     PHQ-9 Total Score     If you checked off any problems, how difficult have these problems made it for you to do your work, take care of things at home, or get along with other people?           Objective   /64 (BP Location: Left arm, Patient Position: Sitting, Cuff Size: Adult)   Pulse 104   Temp 98.2 °F (36.8 °C) (Oral)   Resp 14   Ht 165.1 cm (65\")   Wt 63 kg (139 lb)   SpO2 98%   BMI 23.13 kg/m²   Physical Exam   Constitutional: She is oriented to person, place, and time. She appears well-developed and well-nourished. No distress.   HENT:   Head: Normocephalic and atraumatic.   Right Ear: External ear normal.   Left Ear: External ear normal.   Nose: Nose normal.   Mouth/Throat: Oropharynx is clear and moist. No oropharyngeal exudate.   Eyes: Lids are normal. Right eye exhibits no discharge. Left eye exhibits no discharge. No scleral icterus.   Neck: Trachea normal, normal range of motion and full passive range of motion without pain. Neck supple. No tracheal deviation and no edema present. No thyromegaly present.   Cardiovascular: Normal rate, regular rhythm, normal heart sounds and intact distal pulses. Exam reveals no gallop and no friction rub.   No murmur heard.  Pulmonary/Chest: Effort normal and breath sounds normal. No stridor. No tachypnea and no bradypnea. No respiratory distress. She has no decreased breath sounds. She has no wheezes. She has no rales. She exhibits no tenderness.   Abdominal: Normal appearance. There is no hepatosplenomegaly.   Musculoskeletal: She exhibits no edema. "   Lymphadenopathy:        Head (right side): No submental, no submandibular, no tonsillar, no preauricular, no posterior auricular and no occipital adenopathy present.        Head (left side): No submental, no submandibular, no tonsillar, no preauricular, no posterior auricular and no occipital adenopathy present.     She has no cervical adenopathy.        Right cervical: No superficial cervical, no deep cervical and no posterior cervical adenopathy present.       Left cervical: No superficial cervical, no deep cervical and no posterior cervical adenopathy present.   Neurological: She is alert and oriented to person, place, and time. She has normal strength and normal reflexes. She is not disoriented.   Skin: Skin is warm, dry and intact. Capillary refill takes less than 2 seconds. No rash noted. She is not diaphoretic. No cyanosis or erythema. No pallor. Nails show no clubbing.   Psychiatric: She has a normal mood and affect. Her behavior is normal. Cognition and memory are normal.   Nursing note and vitals reviewed.      Procedures    Assessment/Plan   Diagnoses and all orders for this visit:    1. Essential hypertension (Primary)  Assessment & Plan:  Controlled with metoprolol  mg daily and lisinopril 40 mg daily.  No medication side effects.  Continue current treatment.      2. Primary insomnia  Assessment & Plan:  Benadryl 12.5 mg QHS PRN.      Orders:  -     diphenhydrAMINE (BENADRYL ALLERGY) 25 MG tablet; Take 0.5 tablets by mouth Every 6 (Six) Hours As Needed for Itching or Sleep.  Dispense: 30 tablet; Refill: 1    3. Type 2 diabetes mellitus without complication, without long-term current use of insulin (CMS/[facility])  Assessment & Plan:  Controlled with metformin thousand milligrams twice daily and Jardiance 10 mg daily.  No medication side effects.  Continue current treatment.

## 2020-02-18 NOTE — ASSESSMENT & PLAN NOTE
Controlled with metoprolol  mg daily and lisinopril 40 mg daily.  No medication side effects.  Continue current treatment.

## 2020-02-20 ENCOUNTER — TRANSCRIBE ORDERS (OUTPATIENT)
Dept: ADMINISTRATIVE | Facility: HOSPITAL | Age: 65
End: 2020-02-20

## 2020-02-20 DIAGNOSIS — R06.09 DOE (DYSPNEA ON EXERTION): ICD-10-CM

## 2020-02-20 DIAGNOSIS — F17.200 SMOKER: ICD-10-CM

## 2020-02-20 DIAGNOSIS — R91.8 LUNG NODULES: Primary | ICD-10-CM

## 2020-02-24 ENCOUNTER — OFFICE VISIT (OUTPATIENT)
Dept: OBSTETRICS AND GYNECOLOGY | Facility: CLINIC | Age: 65
End: 2020-02-24

## 2020-02-24 VITALS
SYSTOLIC BLOOD PRESSURE: 120 MMHG | BODY MASS INDEX: 23.24 KG/M2 | DIASTOLIC BLOOD PRESSURE: 60 MMHG | WEIGHT: 139.5 LBS | HEIGHT: 65 IN

## 2020-02-24 DIAGNOSIS — Z71.83 ENCOUNTER FOR NONPROCREATIVE GENETIC COUNSELING AND TESTING: ICD-10-CM

## 2020-02-24 DIAGNOSIS — Z13.71 ENCOUNTER FOR NONPROCREATIVE GENETIC COUNSELING AND TESTING: ICD-10-CM

## 2020-02-24 DIAGNOSIS — Z78.0 ASYMPTOMATIC MENOPAUSAL STATE: ICD-10-CM

## 2020-02-24 DIAGNOSIS — Z80.41 FAMILY HISTORY OF OVARIAN CANCER: ICD-10-CM

## 2020-02-24 DIAGNOSIS — Z13.9 SCREENING FOR CONDITION: ICD-10-CM

## 2020-02-24 DIAGNOSIS — Z01.419 PAP SMEAR, LOW-RISK: Primary | ICD-10-CM

## 2020-02-24 DIAGNOSIS — Z01.411 ENCOUNTER FOR GYNECOLOGICAL EXAMINATION WITH ABNORMAL FINDING: ICD-10-CM

## 2020-02-24 DIAGNOSIS — N95.0 PMB (POSTMENOPAUSAL BLEEDING): ICD-10-CM

## 2020-02-24 LAB
BILIRUB BLD-MCNC: NEGATIVE MG/DL
CLARITY, POC: CLEAR
COLOR UR: YELLOW
GLUCOSE UR STRIP-MCNC: NEGATIVE MG/DL
KETONES UR QL: NEGATIVE
LEUKOCYTE EST, POC: NEGATIVE
NITRITE UR-MCNC: NEGATIVE MG/ML
PH UR: 6 [PH] (ref 5–8)
PROT UR STRIP-MCNC: NEGATIVE MG/DL
RBC # UR STRIP: NEGATIVE /UL
SP GR UR: 1.02 (ref 1–1.03)
UROBILINOGEN UR QL: NORMAL

## 2020-02-24 PROCEDURE — G0101 CA SCREEN;PELVIC/BREAST EXAM: HCPCS | Performed by: OBSTETRICS & GYNECOLOGY

## 2020-02-24 PROCEDURE — 99213 OFFICE O/P EST LOW 20 MIN: CPT | Performed by: OBSTETRICS & GYNECOLOGY

## 2020-02-24 PROCEDURE — 81002 URINALYSIS NONAUTO W/O SCOPE: CPT | Performed by: OBSTETRICS & GYNECOLOGY

## 2020-02-24 NOTE — PROGRESS NOTES
"GYN Annual Exam     CC- Here for annual exam.     Jayne Beltran is a 64 y.o. female previous patient not seen in the last three years who presents for annual well woman exam.  She was last seen here around . She is asking about menopause and states \"I have never stopped bleeding\".  She has gone greater than a year without a cycle but will have intermittent spotting or bleeding like a period.  We discussed that this is not normal and needs further evaluation.  Her daughter was also diagnosed with ovarian cancer at age 23.  She is unclear whether or not her daughter ever had any testing for BRCA, however, she is interested in BRCA testing herself.      OB History        2    Para   2    Term   2            AB        Living   2       SAB        TAB        Ectopic        Molar        Multiple        Live Births              Obstetric Comments   2              Menarche:11  Menopause:?  HRT:none  Current contraception: post menopausal status  History of abnormal Pap smear: no   History of abnormal mammogram: no  Family history of uterine, colon or ovarian cancer: yes - daugher with ovarian cancer age 23, no genetics done  Family history of breast cancer: yes - MGM ? age dx  STD's: none  Last pap smear:?  Gardasil: none  PARVIZ: family history and dad with DVT      Health Maintenance   Topic Date Due   • DIABETIC EYE EXAM  2016   • ZOSTER VACCINE (1 of 2) 2020 (Originally 2004)   • URINE MICROALBUMIN  2020   • HEMOGLOBIN A1C  2020   • DIABETIC FOOT EXAM  2020   • MEDICARE ANNUAL WELLNESS  2020   • MAMMOGRAM  2021   • PAP SMEAR  2023   • COLONOSCOPY  10/12/2025   • TDAP/TD VACCINES (2 - Td) 2028   • HEPATITIS C SCREENING  Completed   • Pneumococcal Vaccine Once at 65 Years Old  Discontinued       Past Medical History:   Diagnosis Date   • COPD (chronic obstructive pulmonary disease) (CMS/HCC)    • Diabetes mellitus (CMS/HCC)    • " Hypertension    • Neuromuscular disorder (CMS/HCC)    • TIA (transient ischemic attack)        Past Surgical History:   Procedure Laterality Date   • CHOLECYSTECTOMY     • COLONOSCOPY           Current Outpatient Medications:   •  aspirin 81 MG EC tablet, Take 81 mg by mouth Daily., Disp: , Rfl:   •  azithromycin (ZITHROMAX Z-CELESTINE) 250 MG tablet, Take 2 tablets the first day, then 1 tablet daily for 4 days., Disp: 6 tablet, Rfl: 0  •  [facility] NICOTINE 7 MG/24HR patch, PLACE 1 PATCH ON THE SKIN AS DIRECTED BY PROVIDER DAILY., Disp: 28 patch, Rfl: 0  •  cyclobenzaprine (FLEXERIL) 10 MG tablet, TAKE 1 TABLET BY MOUTH TWICE A DAY AS NEEDED FOR MUSCLE SPASMS, Disp: 60 tablet, Rfl: 0  •  diphenhydrAMINE (BENADRYL ALLERGY) 25 MG tablet, Take 0.5 tablets by mouth Every 6 (Six) Hours As Needed for Itching or Sleep., Disp: 30 tablet, Rfl: 1  •  DULoxetine (CYMBALTA) 30 MG capsule, Take 1 capsule by mouth 2 (Two) Times a Day., Disp: 60 capsule, Rfl: 5  •  Empagliflozin 10 MG tablet, Take 10 mg by mouth Daily., Disp: 90 tablet, Rfl: 1  •  lisinopril (PRINIVIL,ZESTRIL) 40 MG tablet, Take 1 tablet by mouth Daily for 180 days., Disp: 90 tablet, Rfl: 1  •  LYRICA 150 MG capsule, TAKE 1 CAPSULE BY MOUTH TWICE A DAY, Disp: 60 capsule, Rfl: 2  •  meloxicam (MOBIC) 15 MG tablet, TAKE 1 TABLET BY MOUTH DAILY AS NEEDED FOR MODERATE PAIN ., Disp: 90 tablet, Rfl: 0  •  metFORMIN (GLUCOPHAGE) 1000 MG tablet, Take 1 tablet by mouth 2 (Two) Times a Day With Meals., Disp: 180 tablet, Rfl: 5  •  metoprolol succinate XL (TOPROL-XL) 200 MG 24 hr tablet, TAKE 1 TABLET BY MOUTH EVERY DAY, Disp: 90 tablet, Rfl: 0  •  omeprazole (priLOSEC) 20 MG capsule, TAKE 1 CAPSULE BY MOUTH EVERY DAY, Disp: 90 capsule, Rfl: 1  •  potassium citrate (UROCIT-K) 10 MEQ (1080 MG) CR tablet, Take  by mouth 2 (two) times a day., Disp: , Rfl:   •  rOPINIRole (REQUIP) 1 MG tablet, TAKE ONE TABLET BY MOUTH EVERY NIGHT AT BEDTIME, Disp: 90 tablet, Rfl: 0  •  rOPINIRole (REQUIP)  3 MG tablet, TAKE 1 TABLET BY MOUTH EVERY DAY AT NIGHT, Disp: 90 tablet, Rfl: 1  •  VASCEPA 1 g capsule capsule, Take 2 g by mouth 2 (Two) Times a Day With Meals., Disp: 360 capsule, Rfl: 3  •  Vitamin D, Cholecalciferol, 1000 units capsule, Take  by mouth., Disp: , Rfl:   •  zolpidem (AMBIEN) 5 MG tablet, Take 1 tablet by mouth Take As Directed for 2 doses. Bring the medication to the sleep lab. DO NOT USE AT HOME, Disp: 2 tablet, Rfl: 0    Allergies   Allergen Reactions   • Codeine        Social History     Tobacco Use   • Smoking status: Current Every Day Smoker     Packs/day: 1.50     Types: Cigarettes   • Smokeless tobacco: Never Used   Substance Use Topics   • Alcohol use: No   • Drug use: No       Family History   Problem Relation Age of Onset   • Heart disease Mother    • Diabetes Mother    • Diabetes Father    • Deep vein thrombosis Father    • Breast cancer Maternal Grandmother         ? age dx   • Ovarian cancer Daughter 23   • Colon cancer Neg Hx        Review of Systems   Constitutional: Negative for appetite change, fatigue, fever and unexpected weight change.   Eyes: Negative for photophobia and visual disturbance.   Respiratory: Positive for shortness of breath. Negative for cough.    Cardiovascular: Negative for chest pain and palpitations.   Gastrointestinal: Negative for abdominal distention, abdominal pain, constipation, diarrhea and nausea.   Endocrine: Negative for cold intolerance and heat intolerance.   Genitourinary: Positive for vaginal bleeding. Negative for dyspareunia, dysuria, menstrual problem, pelvic pain and vaginal discharge.   Musculoskeletal: Negative for back pain.   Skin: Negative for color change and rash.   Neurological: Positive for numbness (in feet from Dm). Negative for headaches.   Hematological: Negative for adenopathy. Does not bruise/bleed easily.   Psychiatric/Behavioral: Negative for dysphoric mood. The patient is not nervous/anxious.        /60   Ht 165.1 cm  "(65\")   Wt 63.3 kg (139 lb 8 oz)   LMP  (LMP Unknown)   Breastfeeding No   BMI 23.21 kg/m²     Physical Exam   Constitutional: She is oriented to person, place, and time. She appears well-developed and well-nourished.   HENT:   Head: Normocephalic and atraumatic.   Eyes: Conjunctivae are normal. No scleral icterus.   Neck: Neck supple. No thyromegaly present.   Cardiovascular: Normal rate and regular rhythm.   Pulmonary/Chest: Effort normal and breath sounds normal. Right breast exhibits no inverted nipple, no mass, no nipple discharge, no skin change and no tenderness. Left breast exhibits no inverted nipple, no mass, no nipple discharge, no skin change and no tenderness.   Abdominal: Soft. Bowel sounds are normal. She exhibits no distension and no mass. There is no tenderness. There is no rebound and no guarding. No hernia.   Genitourinary: Rectal exam shows external hemorrhoid. Pelvic exam was performed with patient supine. There is no rash, tenderness or lesion on the right labia. There is no rash, tenderness or lesion on the left labia. Uterus is enlarged. Uterus is not deviated, not fixed and not tender. Cervix exhibits no motion tenderness, no discharge and no friability. Right adnexum displays no mass, no tenderness and no fullness. Left adnexum displays no mass, no tenderness and no fullness. No erythema, tenderness or bleeding in the vagina. No foreign body in the vagina. No signs of injury around the vagina. No vaginal discharge found.   Neurological: She is alert and oriented to person, place, and time.   Skin: Skin is warm and dry.   Psychiatric: She has a normal mood and affect. Her behavior is normal. Judgment and thought content normal.   Nursing note and vitals reviewed.         Assessment/Plan    1) GYN HM: pap  SBE demonstrated and encouraged.  2) STD screening: declines Condoms encouraged.  3) Bone health - Weight bearing exercise, dietary calcium recommendations and vitamin D reviewed.   4) " Diet and Exercise discussed  5) Smoking Status: yes I advised Jayne of the risks of continuing to use tobacco, and I provided her with tobacco cessation educational materials in the After Visit Summary.   During this visit, I spent 4 minutes counseling the patient regarding tobacco cessation.  6)  VB- uterus feels enlarged on exam. Schedule TVUS and endo bx within the next week. Importance of f/u ot r/o endometrial cancer d/w pt  7)MMG: UTD 10/2019 B1  8) DEXA-due, will schedule  9)C scope-due now, pt has paperwork and wants to call herself.    10) Family h/o ovarian cancer- Based on the patient's family history of ovarian cancer, she  qualifies for genetic screening for the breast and ovarian cancer gene, or BRCA 1 & 2.  We offer this screening through our office and all testing is optional.  The test is offered through [facility].   We discussed risks, benefits and alternatives of the testing and the patient is interested.  We discussed results that results include positive, negative and uncertain results.  All results, both positive, negative and uncertain should be reviewed in the office in 4-6 weeks.  For those patients with elevated risk of breast cancer but negative BRCA results, a breast cancer risk assessment through [facility] will be calculated and anyone with a lifetime risk is greater than 20% will be offered MRI in addition to yearly mammograms.  These test is optional.  Patient was offered referral to genetic counselor and was not interested. The patient was given written information about the test. Her test was drawn today.    110Follow up prn and 1 year       Jayne was seen today for gynecologic exam.    Diagnoses and all orders for this visit:    Pap smear, low-risk  -     Pap IG (Image Guided)    Screening for condition  -     POC Urinalysis Dipstick    Encounter for gynecological examination with abnormal finding    PMB (postmenopausal bleeding)    Family history of ovarian cancer    Encounter for  nonprocreative genetic counseling and testing        Jen Hays MD  02/24/2020    1:56 PM

## 2020-02-25 PROBLEM — Z80.41 FAMILY HISTORY OF OVARIAN CANCER: Status: ACTIVE | Noted: 2020-02-25

## 2020-02-25 PROBLEM — N95.0 PMB (POSTMENOPAUSAL BLEEDING): Status: ACTIVE | Noted: 2020-02-25

## 2020-02-27 LAB
CYTOLOGIST CVX/VAG CYTO: NORMAL
CYTOLOGY CVX/VAG DOC CYTO: NORMAL
CYTOLOGY CVX/VAG DOC THIN PREP: NORMAL
DX ICD CODE: NORMAL
HIV 1 & 2 AB SER-IMP: NORMAL
Lab: NORMAL
OTHER STN SPEC: NORMAL
STAT OF ADQ CVX/VAG CYTO-IMP: NORMAL

## 2020-02-28 ENCOUNTER — APPOINTMENT (OUTPATIENT)
Dept: BONE DENSITY | Facility: HOSPITAL | Age: 65
End: 2020-02-28

## 2020-02-28 DIAGNOSIS — Z78.0 ASYMPTOMATIC MENOPAUSAL STATE: ICD-10-CM

## 2020-02-28 DIAGNOSIS — Z13.9 SCREENING FOR CONDITION: ICD-10-CM

## 2020-02-28 PROCEDURE — 77080 DXA BONE DENSITY AXIAL: CPT

## 2020-03-02 ENCOUNTER — HOSPITAL ENCOUNTER (OUTPATIENT)
Dept: PULMONOLOGY | Facility: HOSPITAL | Age: 65
Discharge: HOME | End: 2020-03-02
Admitting: INTERNAL MEDICINE

## 2020-03-02 ENCOUNTER — APPOINTMENT (OUTPATIENT)
Dept: GENERAL RADIOLOGY | Facility: HOSPITAL | Age: 65
End: 2020-03-02

## 2020-03-02 ENCOUNTER — APPOINTMENT (OUTPATIENT)
Dept: CT IMAGING | Facility: HOSPITAL | Age: 65
End: 2020-03-02

## 2020-03-02 ENCOUNTER — HOSPITAL ENCOUNTER (OUTPATIENT)
Dept: CT IMAGING | Facility: HOSPITAL | Age: 65
Discharge: HOME | End: 2020-03-02

## 2020-03-02 ENCOUNTER — HOSPITAL ENCOUNTER (EMERGENCY)
Facility: HOSPITAL | Age: 65
Discharge: ANOTHER HOSPITAL | End: 2020-03-02
Attending: EMERGENCY MEDICINE | Admitting: EMERGENCY MEDICINE

## 2020-03-02 VITALS
DIASTOLIC BLOOD PRESSURE: 50 MMHG | OXYGEN SATURATION: 98 % | HEIGHT: 65 IN | TEMPERATURE: 97.6 F | SYSTOLIC BLOOD PRESSURE: 98 MMHG | BODY MASS INDEX: 24.99 KG/M2 | HEART RATE: 99 BPM | WEIGHT: 150 LBS | RESPIRATION RATE: 16 BRPM

## 2020-03-02 VITALS — HEART RATE: 65 BPM | RESPIRATION RATE: 16 BRPM | OXYGEN SATURATION: 96 %

## 2020-03-02 DIAGNOSIS — N17.9 ACUTE RENAL FAILURE, UNSPECIFIED ACUTE RENAL FAILURE TYPE (HCC): ICD-10-CM

## 2020-03-02 DIAGNOSIS — A41.9 SEPSIS WITH ACUTE RENAL FAILURE AND SEPTIC SHOCK, DUE TO UNSPECIFIED ORGANISM, UNSPECIFIED ACUTE RENAL FAILURE TYPE (HCC): Primary | ICD-10-CM

## 2020-03-02 DIAGNOSIS — F17.200 SMOKER: ICD-10-CM

## 2020-03-02 DIAGNOSIS — R65.21 SEPSIS WITH ACUTE RENAL FAILURE AND SEPTIC SHOCK, DUE TO UNSPECIFIED ORGANISM, UNSPECIFIED ACUTE RENAL FAILURE TYPE (HCC): Primary | ICD-10-CM

## 2020-03-02 DIAGNOSIS — R06.09 DOE (DYSPNEA ON EXERTION): ICD-10-CM

## 2020-03-02 DIAGNOSIS — N20.0 KIDNEY STONES: ICD-10-CM

## 2020-03-02 DIAGNOSIS — E86.0 DEHYDRATION: ICD-10-CM

## 2020-03-02 DIAGNOSIS — R91.8 LUNG NODULES: ICD-10-CM

## 2020-03-02 DIAGNOSIS — E87.1 HYPONATREMIA: ICD-10-CM

## 2020-03-02 DIAGNOSIS — R19.7 DIARRHEA, UNSPECIFIED TYPE: ICD-10-CM

## 2020-03-02 DIAGNOSIS — N17.9 SEPSIS WITH ACUTE RENAL FAILURE AND SEPTIC SHOCK, DUE TO UNSPECIFIED ORGANISM, UNSPECIFIED ACUTE RENAL FAILURE TYPE (HCC): Primary | ICD-10-CM

## 2020-03-02 LAB
ALBUMIN SERPL-MCNC: 3.5 G/DL (ref 3.5–5.2)
ALBUMIN/GLOB SERPL: 1 G/DL
ALP SERPL-CCNC: 85 U/L (ref 39–117)
ALT SERPL W P-5'-P-CCNC: 12 U/L (ref 1–33)
AMORPH URATE CRY URNS QL MICRO: ABNORMAL /HPF
ANION GAP SERPL CALCULATED.3IONS-SCNC: 36.6 MMOL/L (ref 5–15)
AST SERPL-CCNC: 13 U/L (ref 1–32)
BACTERIA UR QL AUTO: ABNORMAL /HPF
BASOPHILS # BLD AUTO: 0.05 10*3/MM3 (ref 0–0.2)
BASOPHILS NFR BLD AUTO: 0.2 % (ref 0–1.5)
BILIRUB SERPL-MCNC: 0.2 MG/DL (ref 0.2–1.2)
BILIRUB UR QL STRIP: ABNORMAL
BUN BLD-MCNC: 88 MG/DL (ref 8–23)
BUN/CREAT SERPL: 10.1 (ref 7–25)
CALCIUM SPEC-SCNC: 8.5 MG/DL (ref 8.6–10.5)
CHLORIDE SERPL-SCNC: 83 MMOL/L (ref 98–107)
CLARITY UR: ABNORMAL
CO2 SERPL-SCNC: 7.4 MMOL/L (ref 22–29)
COLOR UR: YELLOW
CREAT BLD-MCNC: 8.73 MG/DL (ref 0.57–1)
D-LACTATE SERPL-SCNC: 3.7 MMOL/L (ref 0.5–2)
DEPRECATED RDW RBC AUTO: 58.5 FL (ref 37–54)
EOSINOPHIL # BLD AUTO: 0.01 10*3/MM3 (ref 0–0.4)
EOSINOPHIL NFR BLD AUTO: 0 % (ref 0.3–6.2)
ERYTHROCYTE [DISTWIDTH] IN BLOOD BY AUTOMATED COUNT: 17 % (ref 12.3–15.4)
FLUAV AG NPH QL: NEGATIVE
FLUBV AG NPH QL IA: NEGATIVE
GFR SERPL CREATININE-BSD FRML MDRD: 5 ML/MIN/1.73
GFR SERPL CREATININE-BSD FRML MDRD: ABNORMAL ML/MIN/{1.73_M2}
GLOBULIN UR ELPH-MCNC: 3.5 GM/DL
GLUCOSE BLD-MCNC: 134 MG/DL (ref 65–99)
GLUCOSE BLDC GLUCOMTR-MCNC: 154 MG/DL (ref 70–130)
GLUCOSE UR STRIP-MCNC: NEGATIVE MG/DL
HCT VFR BLD AUTO: 39.6 % (ref 34–46.6)
HGB BLD-MCNC: 12.1 G/DL (ref 12–15.9)
HGB UR QL STRIP.AUTO: ABNORMAL
HYALINE CASTS UR QL AUTO: ABNORMAL /LPF
IMM GRANULOCYTES # BLD AUTO: 0.3 10*3/MM3 (ref 0–0.05)
IMM GRANULOCYTES NFR BLD AUTO: 1.2 % (ref 0–0.5)
KETONES UR QL STRIP: ABNORMAL
LACTATE HOLD SPECIMEN: NORMAL
LEUKOCYTE ESTERASE UR QL STRIP.AUTO: ABNORMAL
LYMPHOCYTES # BLD AUTO: 1.93 10*3/MM3 (ref 0.7–3.1)
LYMPHOCYTES NFR BLD AUTO: 7.9 % (ref 19.6–45.3)
MCH RBC QN AUTO: 28.9 PG (ref 26.6–33)
MCHC RBC AUTO-ENTMCNC: 30.6 G/DL (ref 31.5–35.7)
MCV RBC AUTO: 94.7 FL (ref 79–97)
MONOCYTES # BLD AUTO: 1.47 10*3/MM3 (ref 0.1–0.9)
MONOCYTES NFR BLD AUTO: 6 % (ref 5–12)
NEUTROPHILS # BLD AUTO: 20.81 10*3/MM3 (ref 1.7–7)
NEUTROPHILS NFR BLD AUTO: 84.7 % (ref 42.7–76)
NITRITE UR QL STRIP: NEGATIVE
NRBC BLD AUTO-RTO: 0 /100 WBC (ref 0–0.2)
PH UR STRIP.AUTO: <=5 [PH] (ref 4.5–8)
PLATELET # BLD AUTO: 626 10*3/MM3 (ref 140–450)
PMV BLD AUTO: 10.5 FL (ref 6–12)
POTASSIUM BLD-SCNC: 5.3 MMOL/L (ref 3.5–5.2)
PROCALCITONIN SERPL-MCNC: 0.99 NG/ML (ref 0.1–0.25)
PROT SERPL-MCNC: 7 G/DL (ref 6–8.5)
PROT UR QL STRIP: ABNORMAL
RBC # BLD AUTO: 4.18 10*6/MM3 (ref 3.77–5.28)
RBC # UR: ABNORMAL /HPF
REF LAB TEST METHOD: ABNORMAL
SODIUM BLD-SCNC: 127 MMOL/L (ref 136–145)
SP GR UR STRIP: 1.02 (ref 1–1.03)
SQUAMOUS #/AREA URNS HPF: ABNORMAL /HPF
TROPONIN T SERPL-MCNC: 0.01 NG/ML (ref 0–0.03)
UROBILINOGEN UR QL STRIP: ABNORMAL
WBC NRBC COR # BLD: 24.57 10*3/MM3 (ref 3.4–10.8)
WBC UR QL AUTO: ABNORMAL /HPF

## 2020-03-02 PROCEDURE — 25010000002 VANCOMYCIN 1 G RECONSTITUTED SOLUTION

## 2020-03-02 PROCEDURE — 96375 TX/PRO/DX INJ NEW DRUG ADDON: CPT

## 2020-03-02 PROCEDURE — 99283 EMERGENCY DEPT VISIT LOW MDM: CPT

## 2020-03-02 PROCEDURE — 80053 COMPREHEN METABOLIC PANEL: CPT | Performed by: EMERGENCY MEDICINE

## 2020-03-02 PROCEDURE — 99291 CRITICAL CARE FIRST HOUR: CPT | Performed by: EMERGENCY MEDICINE

## 2020-03-02 PROCEDURE — 87804 INFLUENZA ASSAY W/OPTIC: CPT

## 2020-03-02 PROCEDURE — 96365 THER/PROPH/DIAG IV INF INIT: CPT

## 2020-03-02 PROCEDURE — 81001 URINALYSIS AUTO W/SCOPE: CPT | Performed by: EMERGENCY MEDICINE

## 2020-03-02 PROCEDURE — 93005 ELECTROCARDIOGRAM TRACING: CPT | Performed by: EMERGENCY MEDICINE

## 2020-03-02 PROCEDURE — 85025 COMPLETE CBC W/AUTO DIFF WBC: CPT | Performed by: EMERGENCY MEDICINE

## 2020-03-02 PROCEDURE — 71046 X-RAY EXAM CHEST 2 VIEWS: CPT

## 2020-03-02 PROCEDURE — 94726 PLETHYSMOGRAPHY LUNG VOLUMES: CPT

## 2020-03-02 PROCEDURE — 94729 DIFFUSING CAPACITY: CPT

## 2020-03-02 PROCEDURE — 25010000002 CEFEPIME 2 G/NS 100 ML SOLUTION: Performed by: EMERGENCY MEDICINE

## 2020-03-02 PROCEDURE — 83605 ASSAY OF LACTIC ACID: CPT | Performed by: EMERGENCY MEDICINE

## 2020-03-02 PROCEDURE — 71250 CT THORAX DX C-: CPT

## 2020-03-02 PROCEDURE — 82962 GLUCOSE BLOOD TEST: CPT

## 2020-03-02 PROCEDURE — 94060 EVALUATION OF WHEEZING: CPT

## 2020-03-02 PROCEDURE — 93010 ELECTROCARDIOGRAM REPORT: CPT | Performed by: INTERNAL MEDICINE

## 2020-03-02 PROCEDURE — 84145 PROCALCITONIN (PCT): CPT | Performed by: EMERGENCY MEDICINE

## 2020-03-02 PROCEDURE — 74176 CT ABD & PELVIS W/O CONTRAST: CPT

## 2020-03-02 PROCEDURE — 84484 ASSAY OF TROPONIN QUANT: CPT | Performed by: EMERGENCY MEDICINE

## 2020-03-02 PROCEDURE — 99284 EMERGENCY DEPT VISIT MOD MDM: CPT

## 2020-03-02 PROCEDURE — 96361 HYDRATE IV INFUSION ADD-ON: CPT

## 2020-03-02 RX ORDER — CEFEPIME HYDROCHLORIDE 1 G/1
INJECTION, POWDER, FOR SOLUTION INTRAMUSCULAR; INTRAVENOUS
Status: DISCONTINUED
Start: 2020-03-02 | End: 2020-03-03 | Stop reason: HOSPADM

## 2020-03-02 RX ORDER — ALBUTEROL SULFATE 2.5 MG/3ML
2.5 SOLUTION RESPIRATORY (INHALATION) ONCE
Status: COMPLETED | OUTPATIENT
Start: 2020-03-02 | End: 2020-03-02

## 2020-03-02 RX ORDER — VANCOMYCIN HYDROCHLORIDE 1 G/20ML
INJECTION, POWDER, LYOPHILIZED, FOR SOLUTION INTRAVENOUS
Status: COMPLETED
Start: 2020-03-02 | End: 2020-03-02

## 2020-03-02 RX ORDER — SODIUM CHLORIDE 9 MG/ML
INJECTION, SOLUTION INTRAVENOUS
Status: DISCONTINUED
Start: 2020-03-02 | End: 2020-03-03 | Stop reason: HOSPADM

## 2020-03-02 RX ORDER — SODIUM CHLORIDE, SODIUM LACTATE, POTASSIUM CHLORIDE, CALCIUM CHLORIDE 600; 310; 30; 20 MG/100ML; MG/100ML; MG/100ML; MG/100ML
200 INJECTION, SOLUTION INTRAVENOUS CONTINUOUS
Status: DISCONTINUED | OUTPATIENT
Start: 2020-03-02 | End: 2020-03-03 | Stop reason: HOSPADM

## 2020-03-02 RX ORDER — LOPERAMIDE HYDROCHLORIDE 2 MG/1
2 CAPSULE ORAL ONCE
Status: COMPLETED | OUTPATIENT
Start: 2020-03-02 | End: 2020-03-02

## 2020-03-02 RX ADMIN — METRONIDAZOLE 500 MG: 500 INJECTION, SOLUTION INTRAVENOUS at 20:32

## 2020-03-02 RX ADMIN — LOPERAMIDE HYDROCHLORIDE 2 MG: 2 CAPSULE ORAL at 19:15

## 2020-03-02 RX ADMIN — SODIUM CHLORIDE, POTASSIUM CHLORIDE, SODIUM LACTATE AND CALCIUM CHLORIDE 200 ML/HR: 600; 310; 30; 20 INJECTION, SOLUTION INTRAVENOUS at 22:33

## 2020-03-02 RX ADMIN — SODIUM CHLORIDE 1000 ML: 9 INJECTION, SOLUTION INTRAVENOUS at 19:14

## 2020-03-02 RX ADMIN — VANCOMYCIN HYDROCHLORIDE 1000 MG: 1 INJECTION, POWDER, LYOPHILIZED, FOR SOLUTION INTRAVENOUS at 23:15

## 2020-03-02 RX ADMIN — CEFEPIME 2 G: 2 INJECTION, POWDER, FOR SOLUTION INTRAVENOUS at 20:44

## 2020-03-02 RX ADMIN — SODIUM CHLORIDE 1000 ML: 9 INJECTION, SOLUTION INTRAVENOUS at 21:45

## 2020-03-02 RX ADMIN — ALBUTEROL SULFATE 2.5 MG: 2.5 SOLUTION RESPIRATORY (INHALATION) at 10:02

## 2020-03-03 ENCOUNTER — TELEPHONE (OUTPATIENT)
Dept: FAMILY MEDICINE CLINIC | Facility: CLINIC | Age: 65
End: 2020-03-03

## 2020-03-03 ENCOUNTER — HOSPITAL ENCOUNTER (INPATIENT)
Facility: HOSPITAL | Age: 65
LOS: 3 days | Discharge: HOME | End: 2020-03-06
Attending: INTERNAL MEDICINE | Admitting: INTERNAL MEDICINE

## 2020-03-03 DIAGNOSIS — E11.43 DIABETIC AUTONOMIC NEUROPATHY ASSOCIATED WITH TYPE 2 DIABETES MELLITUS (HCC): Primary | ICD-10-CM

## 2020-03-03 PROBLEM — N17.9 ACUTE RENAL FAILURE (ARF): Status: ACTIVE | Noted: 2020-03-03

## 2020-03-03 LAB
ALBUMIN SERPL-MCNC: 3 G/DL (ref 3.5–5.2)
ALBUMIN SERPL-MCNC: 3.3 G/DL (ref 3.5–5.2)
ANION GAP SERPL CALCULATED.3IONS-SCNC: 27.2 MMOL/L (ref 5–15)
ANION GAP SERPL CALCULATED.3IONS-SCNC: 34 MMOL/L (ref 5–15)
ANION GAP SERPL CALCULATED.3IONS-SCNC: 36 MMOL/L (ref 5–15)
BUN BLD-MCNC: 74 MG/DL (ref 8–23)
BUN BLD-MCNC: 79 MG/DL (ref 8–23)
BUN BLD-MCNC: 80 MG/DL (ref 8–23)
BUN/CREAT SERPL: 10.1 (ref 7–25)
BUN/CREAT SERPL: 10.5 (ref 7–25)
BUN/CREAT SERPL: 10.8 (ref 7–25)
CALCIUM SPEC-SCNC: 6.7 MG/DL (ref 8.6–10.5)
CALCIUM SPEC-SCNC: 6.8 MG/DL (ref 8.6–10.5)
CALCIUM SPEC-SCNC: 6.8 MG/DL (ref 8.6–10.5)
CHLORIDE SERPL-SCNC: 92 MMOL/L (ref 98–107)
CHLORIDE SERPL-SCNC: 93 MMOL/L (ref 98–107)
CHLORIDE SERPL-SCNC: 93 MMOL/L (ref 98–107)
CO2 SERPL-SCNC: 14.8 MMOL/L (ref 22–29)
CO2 SERPL-SCNC: 3 MMOL/L (ref 22–29)
CO2 SERPL-SCNC: 5 MMOL/L (ref 22–29)
CREAT BLD-MCNC: 6.87 MG/DL (ref 0.57–1)
CREAT BLD-MCNC: 7.6 MG/DL (ref 0.57–1)
CREAT BLD-MCNC: 7.85 MG/DL (ref 0.57–1)
CREAT UR-MCNC: 104.3 MG/DL
D-LACTATE SERPL-SCNC: 1.5 MMOL/L (ref 0.5–2)
GFR SERPL CREATININE-BSD FRML MDRD: 5 ML/MIN/1.73
GFR SERPL CREATININE-BSD FRML MDRD: 5 ML/MIN/1.73
GFR SERPL CREATININE-BSD FRML MDRD: 6 ML/MIN/1.73
GFR SERPL CREATININE-BSD FRML MDRD: ABNORMAL ML/MIN/{1.73_M2}
GLUCOSE BLD-MCNC: 146 MG/DL (ref 65–99)
GLUCOSE BLD-MCNC: 153 MG/DL (ref 65–99)
GLUCOSE BLD-MCNC: 195 MG/DL (ref 65–99)
GLUCOSE BLDC GLUCOMTR-MCNC: 111 MG/DL (ref 70–130)
GLUCOSE BLDC GLUCOMTR-MCNC: 194 MG/DL (ref 70–130)
GLUCOSE BLDC GLUCOMTR-MCNC: 220 MG/DL (ref 70–130)
GLUCOSE BLDC GLUCOMTR-MCNC: 230 MG/DL (ref 70–130)
GLUCOSE BLDC GLUCOMTR-MCNC: 233 MG/DL (ref 70–130)
MRSA DNA SPEC QL NAA+PROBE: NORMAL
PHOSPHATE SERPL-MCNC: 11.9 MG/DL (ref 2.5–4.5)
PHOSPHATE SERPL-MCNC: 7.5 MG/DL (ref 2.5–4.5)
POTASSIUM BLD-SCNC: 3.2 MMOL/L (ref 3.5–5.2)
POTASSIUM BLD-SCNC: 4.4 MMOL/L (ref 3.5–5.2)
POTASSIUM BLD-SCNC: 4.4 MMOL/L (ref 3.5–5.2)
SODIUM BLD-SCNC: 132 MMOL/L (ref 136–145)
SODIUM BLD-SCNC: 132 MMOL/L (ref 136–145)
SODIUM BLD-SCNC: 134 MMOL/L (ref 136–145)
SODIUM UR-SCNC: 46 MMOL/L
VANCOMYCIN SERPL-MCNC: 14.4 MCG/ML (ref 5–40)

## 2020-03-03 PROCEDURE — 82570 ASSAY OF URINE CREATININE: CPT | Performed by: INTERNAL MEDICINE

## 2020-03-03 PROCEDURE — 80069 RENAL FUNCTION PANEL: CPT | Performed by: INTERNAL MEDICINE

## 2020-03-03 PROCEDURE — 83605 ASSAY OF LACTIC ACID: CPT | Performed by: INTERNAL MEDICINE

## 2020-03-03 PROCEDURE — 25010000002 CEFEPIME PER 500 MG: Performed by: INTERNAL MEDICINE

## 2020-03-03 PROCEDURE — 63710000001 INSULIN LISPRO (HUMAN) PER 5 UNITS: Performed by: INTERNAL MEDICINE

## 2020-03-03 PROCEDURE — 80048 BASIC METABOLIC PNL TOTAL CA: CPT | Performed by: INTERNAL MEDICINE

## 2020-03-03 PROCEDURE — 87641 MR-STAPH DNA AMP PROBE: CPT | Performed by: INTERNAL MEDICINE

## 2020-03-03 PROCEDURE — 82962 GLUCOSE BLOOD TEST: CPT

## 2020-03-03 PROCEDURE — 87040 BLOOD CULTURE FOR BACTERIA: CPT | Performed by: INTERNAL MEDICINE

## 2020-03-03 PROCEDURE — 84300 ASSAY OF URINE SODIUM: CPT | Performed by: INTERNAL MEDICINE

## 2020-03-03 PROCEDURE — 80202 ASSAY OF VANCOMYCIN: CPT | Performed by: INTERNAL MEDICINE

## 2020-03-03 PROCEDURE — 25010000002 VANCOMYCIN PER 500 MG: Performed by: INTERNAL MEDICINE

## 2020-03-03 RX ORDER — NICOTINE POLACRILEX 4 MG
15 LOZENGE BUCCAL
Status: DISCONTINUED | OUTPATIENT
Start: 2020-03-03 | End: 2020-03-06 | Stop reason: HOSPADM

## 2020-03-03 RX ORDER — SODIUM CHLORIDE 0.9 % (FLUSH) 0.9 %
10 SYRINGE (ML) INJECTION AS NEEDED
Status: DISCONTINUED | OUTPATIENT
Start: 2020-03-03 | End: 2020-03-06 | Stop reason: HOSPADM

## 2020-03-03 RX ORDER — AMLODIPINE BESYLATE 5 MG/1
5 TABLET ORAL DAILY
COMMUNITY
End: 2020-03-10 | Stop reason: SDUPTHER

## 2020-03-03 RX ORDER — POTASSIUM CHLORIDE 750 MG/1
40 CAPSULE, EXTENDED RELEASE ORAL ONCE
Status: COMPLETED | OUTPATIENT
Start: 2020-03-03 | End: 2020-03-03

## 2020-03-03 RX ORDER — VANCOMYCIN HYDROCHLORIDE 1 G/200ML
1 INJECTION, SOLUTION INTRAVENOUS ONCE
Status: COMPLETED | OUTPATIENT
Start: 2020-03-03 | End: 2020-03-03

## 2020-03-03 RX ORDER — DEXTROSE MONOHYDRATE 25 G/50ML
25 INJECTION, SOLUTION INTRAVENOUS
Status: DISCONTINUED | OUTPATIENT
Start: 2020-03-03 | End: 2020-03-06 | Stop reason: HOSPADM

## 2020-03-03 RX ORDER — CYANOCOBALAMIN (VITAMIN B-12) 500 MCG
500 TABLET ORAL DAILY
Status: ON HOLD | COMMUNITY
End: 2021-06-15

## 2020-03-03 RX ORDER — SODIUM CHLORIDE 0.9 % (FLUSH) 0.9 %
10 SYRINGE (ML) INJECTION EVERY 12 HOURS SCHEDULED
Status: DISCONTINUED | OUTPATIENT
Start: 2020-03-03 | End: 2020-03-06 | Stop reason: HOSPADM

## 2020-03-03 RX ORDER — PREGABALIN 75 MG/1
150 CAPSULE ORAL NIGHTLY
Status: DISCONTINUED | OUTPATIENT
Start: 2020-03-04 | End: 2020-03-06

## 2020-03-03 RX ORDER — SODIUM CHLORIDE, SODIUM LACTATE, POTASSIUM CHLORIDE, CALCIUM CHLORIDE 600; 310; 30; 20 MG/100ML; MG/100ML; MG/100ML; MG/100ML
50 INJECTION, SOLUTION INTRAVENOUS CONTINUOUS
Status: DISCONTINUED | OUTPATIENT
Start: 2020-03-03 | End: 2020-03-05

## 2020-03-03 RX ADMIN — CEFEPIME HYDROCHLORIDE 2 G: 2 INJECTION, POWDER, FOR SOLUTION INTRAVENOUS at 21:23

## 2020-03-03 RX ADMIN — VANCOMYCIN HYDROCHLORIDE 1 G: 1 INJECTION, SOLUTION INTRAVENOUS at 15:19

## 2020-03-03 RX ADMIN — PREGABALIN 150 MG: 75 CAPSULE ORAL at 23:45

## 2020-03-03 RX ADMIN — POTASSIUM CHLORIDE 40 MEQ: 750 CAPSULE, EXTENDED RELEASE ORAL at 18:27

## 2020-03-03 RX ADMIN — SODIUM BICARBONATE 150 MEQ/1,000 ML IN DEXTROSE 5 % INTRAVENOUS 150 MEQ: SOLUTION at 02:48

## 2020-03-03 RX ADMIN — SODIUM CHLORIDE, PRESERVATIVE FREE 10 ML: 5 INJECTION INTRAVENOUS at 08:22

## 2020-03-03 RX ADMIN — INSULIN LISPRO 4 UNITS: 100 INJECTION, SOLUTION INTRAVENOUS; SUBCUTANEOUS at 17:40

## 2020-03-03 RX ADMIN — INSULIN LISPRO 4 UNITS: 100 INJECTION, SOLUTION INTRAVENOUS; SUBCUTANEOUS at 12:25

## 2020-03-03 RX ADMIN — SODIUM CHLORIDE, POTASSIUM CHLORIDE, SODIUM LACTATE AND CALCIUM CHLORIDE 125 ML/HR: 600; 310; 30; 20 INJECTION, SOLUTION INTRAVENOUS at 23:47

## 2020-03-03 RX ADMIN — SODIUM CHLORIDE, PRESERVATIVE FREE 10 ML: 5 INJECTION INTRAVENOUS at 21:23

## 2020-03-03 RX ADMIN — SODIUM CHLORIDE 1000 ML: 9 INJECTION, SOLUTION INTRAVENOUS at 01:52

## 2020-03-03 RX ADMIN — MICONAZOLE NITRATE 200 MG: 200 SUPPOSITORY VAGINAL at 08:22

## 2020-03-03 RX ADMIN — INSULIN LISPRO 4 UNITS: 100 INJECTION, SOLUTION INTRAVENOUS; SUBCUTANEOUS at 21:23

## 2020-03-03 RX ADMIN — SODIUM BICARBONATE 150 MEQ/1,000 ML IN DEXTROSE 5 % INTRAVENOUS 150 MEQ: SOLUTION at 09:30

## 2020-03-03 RX ADMIN — SODIUM CHLORIDE 1000 ML: 9 INJECTION, SOLUTION INTRAVENOUS at 05:09

## 2020-03-03 NOTE — H&P
Group: [facility]         H/p  NOTE    Patient Identification:  Jayne Beltran  64 y.o.  female  1955  [id number]                CC: Weakness diarrhea    History of Present Illness:  64-year-old female history of COPD diabetes mellitus presented to the emergency room at [facility] with diarrhea ongoing for the last 24 to 48 hours.  Patient cannot quantitate but tells me she is had multiple trips to the bathroom with watery stools.  No blood was reported.  She was weak lightheaded and noted to be hypotensive at [facility] emergency room.  She had received 4 L of bolus of fluids and her blood pressure has not improved.  She was noted to be in severe metabolic acidosis with acute renal failure.  She was transferred here for further care.  She is not had any bouts of diarrhea since she has been here.  Denies any abdominal pain.  No fever chills as such was reported.  She tells me that she has been taking her 's diuretic medication because of pain and swelling of her lower extremities.  Currently denies any chest pain.      Review of Systems  Constitutional: Positive for activity change, appetite change and fatigue. Negative for fever.   HENT: Negative.    Eyes: Negative for pain and visual disturbance.   Respiratory: Negative for cough and shortness of breath.    Cardiovascular: Positive for leg swelling. Negative for chest pain.   Gastrointestinal: Positive for diarrhea and nausea. Negative for abdominal pain.   Genitourinary: Positive for decreased urine volume and difficulty urinating. Negative for frequency.   Musculoskeletal: Positive for arthralgias.   Skin: Negative for color change and rash.   Neurological: Positive for dizziness, weakness and light-headedness. Negative for seizures, syncope and headaches.   All other systems reviewed and are negative.  Past Medical History:  Past Medical History:   Diagnosis Date   • COPD (chronic obstructive pulmonary disease) (CMS/[facility])    • Diabetes  mellitus (CMS/HCC)    • Hypertension    • Neuromuscular disorder (CMS/HCC)    • TIA (transient ischemic attack)        Past Surgical History:  Past Surgical History:   Procedure Laterality Date   • CHOLECYSTECTOMY     • COLONOSCOPY          Home Meds:  Medications Prior to Admission   Medication Sig Dispense Refill Last Dose   • amLODIPine (NORVASC) 5 MG tablet Take 5 mg by mouth Daily.      • Cyanocobalamin (VITAMIN B 12) 500 MCG tablet Take 500 mcg by mouth Daily.      • aspirin 81 MG EC tablet Take 81 mg by mouth Daily.   Taking   • azithromycin (ZITHROMAX Z-CELESTINE) 250 MG tablet Take 2 tablets the first day, then 1 tablet daily for 4 days. 6 tablet 0 Not Taking   • [facility]S NICOTINE 7 MG/24HR patch PLACE 1 PATCH ON THE SKIN AS DIRECTED BY PROVIDER DAILY. 28 patch 0 Taking   • cyclobenzaprine (FLEXERIL) 10 MG tablet TAKE 1 TABLET BY MOUTH TWICE A DAY AS NEEDED FOR MUSCLE SPASMS 60 tablet 0 Taking   • diphenhydrAMINE (BENADRYL ALLERGY) 25 MG tablet Take 0.5 tablets by mouth Every 6 (Six) Hours As Needed for Itching or Sleep. 30 tablet 1    • DULoxetine (CYMBALTA) 30 MG capsule Take 1 capsule by mouth 2 (Two) Times a Day. 60 capsule 5 Taking   • Empagliflozin 10 MG tablet Take 10 mg by mouth Daily. 90 tablet 1    • lisinopril (PRINIVIL,ZESTRIL) 40 MG tablet Take 1 tablet by mouth Daily for 180 days. 90 tablet 1    • LYRICA 150 MG capsule TAKE 1 CAPSULE BY MOUTH TWICE A DAY (Patient taking differently: 3 (Three) Times a Day.) 60 capsule 2 Taking   • meloxicam (MOBIC) 15 MG tablet TAKE 1 TABLET BY MOUTH DAILY AS NEEDED FOR MODERATE PAIN . 90 tablet 0 Taking   • metFORMIN (GLUCOPHAGE) 1000 MG tablet Take 1 tablet by mouth 2 (Two) Times a Day With Meals. 180 tablet 5 Taking   • metoprolol succinate XL (TOPROL-XL) 200 MG 24 hr tablet TAKE 1 TABLET BY MOUTH EVERY DAY 90 tablet 0    • omeprazole (priLOSEC) 20 MG capsule TAKE 1 CAPSULE BY MOUTH EVERY DAY 90 capsule 1 Taking   • rOPINIRole (REQUIP) 1 MG tablet TAKE ONE TABLET BY  "MOUTH EVERY NIGHT AT BEDTIME 90 tablet 0 Taking   • rOPINIRole (REQUIP) 3 MG tablet TAKE 1 TABLET BY MOUTH EVERY DAY AT NIGHT 90 tablet 1 Taking   • VASCEPA 1 g capsule capsule Take 2 g by mouth 2 (Two) Times a Day With Meals. 360 capsule 3 Taking   • zolpidem (AMBIEN) 5 MG tablet Take 1 tablet by mouth Take As Directed for 2 doses. Bring the medication to the sleep lab. DO NOT USE AT HOME 2 tablet 0 Taking       Allergies:  Allergies   Allergen Reactions   • Codeine Hives and Hallucinations       Social History:   Social History     Socioeconomic History   • Marital status:      Spouse name: Not on file   • Number of children: Not on file   • Years of education: Not on file   • Highest education level: Not on file   Tobacco Use   • Smoking status: Current Every Day Smoker     Packs/day: 1.50     Types: Cigarettes   • Smokeless tobacco: Never Used   Substance and Sexual Activity   • Alcohol use: No   • Drug use: No   • Sexual activity: Not Currently     Birth control/protection: Post-menopausal       Family History:  Family History   Problem Relation Age of Onset   • Heart disease Mother    • Diabetes Mother    • Diabetes Father    • Deep vein thrombosis Father    • Breast cancer Maternal Grandmother         ? age dx   • Ovarian cancer Daughter 23   • Colon cancer Neg Hx        Physical Exam:  /57   Pulse 102   Temp 97.8 °F (36.6 °C) (Oral)   Resp 20   Ht 165.1 cm (65\")   Wt 63.4 kg (139 lb 12.4 oz)   LMP  (LMP Unknown)   SpO2 98%   BMI 23.26 kg/m²  Body mass index is 23.26 kg/m². 98% 63.4 kg (139 lb 12.4 oz)  Physical Exam  Well-developed slightly obese body habitus  Eyes normal conjunctive a pupils reactive to light  ENT Mallampati between 3 and 4 normal nasal exam.  Oral cavity dry mucous membrane  Neck midline trachea no thyromegaly  Chest diminished breath sound but clear no labored breathing  CVS regular rate rhythm 1+ lower extremity edema  Abdomen soft nontender no " hepatosplenomegaly  CNS intact normal sensory exam  Skin no rashes no nodules  Psych oriented to time place and person normal memory  Musculoskeletal no cyanosis no clubbing normal range of motion    LABS:  Lab Results   Component Value Date    CALCIUM 8.5 (L) 03/02/2020     Results from last 7 days   Lab Units 03/02/20  1915   SODIUM mmol/L 127*   POTASSIUM mmol/L 5.3*   CHLORIDE mmol/L 83*   CO2 mmol/L 7.4*   BUN mg/dL 88*   CREATININE mg/dL 8.73*   GLUCOSE mg/dL 134*   CALCIUM mg/dL 8.5*   WBC 10*3/mm3 24.57*   HEMOGLOBIN g/dL 12.1   PLATELETS 10*3/mm3 626*   ALT (SGPT) U/L 12   AST (SGOT) U/L 13   PROCALCITONIN ng/mL 0.99*     Lab Results   Component Value Date    TROPONINT 0.011 03/02/2020     Results from last 7 days   Lab Units 03/02/20  1915   TROPONIN T ng/mL 0.011         Results from last 7 days   Lab Units 03/02/20  1934 03/02/20  1915   PROCALCITONIN ng/mL  --  0.99*   LACTATE mmol/L 3.7*  --                      Lab Results   Component Value Date    TSH 2.020 06/18/2019     Estimated Creatinine Clearance: 6.4 mL/min (A) (by C-G formula based on SCr of 8.73 mg/dL (H)).  Results from last 7 days   Lab Units 03/02/20  2321   NITRITE UA  Negative   WBC UA /HPF 0-2*   BACTERIA UA /HPF Trace*   SQUAM EPITHEL UA /HPF None Seen        Imaging: I personally visualized the images of scans/x-rays performed within last 3 days.      Assessment:  Hypovolemic shock resolved with fluid resuscitation  Sepsis source unclear  Lactic acidosis  Dehydration  Acute renal failure  Nephrolithiasis  Hyperkalemia  Hyponatremia  Severe metabolic acidosis  Hypertension  Diabetes mellitus        Recommendations:  This point a female with hypovolemic shock resolved with fluid resuscitation.  Suspect primary source as dehydration possibly from diarrhea.  Currently not had diarrhea now since hospital admission.  CT abdomen did not show any etiology for diarrhea but did show some kidney stones.  Currently on bicarb drip to be  continued.  I would repeat BMP stat.  I would consult nephrology for further management.  I will give her 1 more liter of fluid bolus she appears to be clinically dry  She has nephrolithiasis but no evidence of hydronephrosis.  We will see what nephrology has to say.  Correct electrolytes and hopefully hyponatremia will correct with fluid bolus.  Blood pressure stable.  He was she was taking diuretics of her .  Currently we have held those  Blood glucose monitoring per ICU protocol      Critical care time 35 minutes            Carlos Harmon MD  3/3/2020  5:14 AM      Much of this encounter note is an electronic transcription/translation of spoken language to printed text using Dragon Software.

## 2020-03-03 NOTE — PROGRESS NOTES
Discharge Planning Assessment  [facility]     Patient Name: Jayne Beltran  MRN: [id number]  Today's Date: 3/3/2020    Admit Date: 3/3/2020    Discharge Needs Assessment     Row Name 03/03/20 1640       Living Environment    Lives With  spouse    Current Living Arrangements  home/apartment/condo    Potentially Unsafe Housing Conditions  unable to assess    Primary Care Provided by  self;spouse/significant other;child(cami)    Provides Primary Care For  no one    Family Caregiver if Needed  spouse    Quality of Family Relationships  unable to assess    Able to Return to Prior Arrangements  yes       Resource/Environmental Concerns    Resource/Environmental Concerns  none    Transportation Concerns  car, none       Transition Planning    Patient/Family Anticipates Transition to  home with family    Patient/Family Anticipated Services at Transition  none       Discharge Needs Assessment    Concerns to be Addressed  discharge planning    Equipment Currently Used at Home  oxygen    Anticipated Changes Related to Illness  none    Equipment Needed After Discharge  none        Discharge Plan     Row Name 03/03/20 1635       Plan    Plan  Home     Provided Post Acute Provider List?  Yes    Post Acute Provider List  Home Health    Provided Post Acute Provider Quality & Resource List?  Yes    Post Acute Provider Quality and Resource List  Home Health    Delivered To  Patient    Method of Delivery  In person    Patient/Family in Agreement with Plan  yes    Plan Comments  IMM noted   CCP spoke to patient at bedside to discuss discharge planning.  Face sheet verified. CCP role explained.   Pt Primary care provider is  Dr. Michael Dominique.   Her emergency contact is her   [phone].  She uses no DME to ambulate.    She uses oxygen from [employer].  She is independent with ADL's.  She lives in house with her .   She has no past history of HH     She has not been to rehab in the past.  She obtains her medications  from [facility]  She will agree to Home [facility]  Nursing home compare given.      CCP following        Destination      Coordination has not been started for this encounter.      Durable Medical Equipment      Coordination has not been started for this encounter.      Dialysis/Infusion      Coordination has not been started for this encounter.      Home Medical Care      Coordination has not been started for this encounter.      Therapy      Coordination has not been started for this encounter.      Community Resources      Coordination has not been started for this encounter.          Demographic Summary    No documentation.       Functional Status    No documentation.       Psychosocial    No documentation.       Abuse/Neglect    No documentation.       Legal    No documentation.       Substance Abuse    No documentation.       Patient Forms    No documentation.           Diane Eaton RN

## 2020-03-03 NOTE — ED PROVIDER NOTES
Subjective   History of Present Illness  History of Present Illness    Chief complaint: Diarrhea, weakness and dizziness    Location: Generalized    Quality/Severity: Severe symptoms    Timing/Duration: Began yesterday, much worse today    Modifying Factors: None    Narrative: This patient presents for evaluation of worsening weakness with dizziness and diarrhea problems.  She says that her diarrhea began yesterday and she has had multiple trips to the bathroom with watery stools but not blood.  Today she has felt very weak and lightheaded with the sensation that she might pass out several times.  However she has not fainted so far.  She denies any fevers.  She denies any vomiting.  She has not had much of an appetite so she has not been drinking or eating much today.  There have been no known sick contacts.  Additionally, of importance, it was also related to the history that the patient has been recently taking some of her husbands diuretic pill medications because of swelling and pain in both of her feet and ankles.  It is unknown exactly which medication this is or the dosage or quantity and frequency with which she has been taking them.    Associated Symptoms: As above  Review of Systems   Constitutional: Positive for activity change, appetite change and fatigue. Negative for fever.   HENT: Negative.    Eyes: Negative for pain and visual disturbance.   Respiratory: Negative for cough and shortness of breath.    Cardiovascular: Positive for leg swelling. Negative for chest pain.   Gastrointestinal: Positive for diarrhea and nausea. Negative for abdominal pain.   Genitourinary: Positive for decreased urine volume and difficulty urinating. Negative for frequency.   Musculoskeletal: Positive for arthralgias.   Skin: Negative for color change and rash.   Neurological: Positive for dizziness, weakness and light-headedness. Negative for seizures, syncope and headaches.   All other systems reviewed and are  negative.      Past Medical History:   Diagnosis Date   • COPD (chronic obstructive pulmonary disease) (CMS/HCC)    • Diabetes mellitus (CMS/HCC)    • Hypertension    • Neuromuscular disorder (CMS/HCC)    • TIA (transient ischemic attack)        Allergies   Allergen Reactions   • Codeine Hives and Hallucinations       Past Surgical History:   Procedure Laterality Date   • CHOLECYSTECTOMY     • COLONOSCOPY         Family History   Problem Relation Age of Onset   • Heart disease Mother    • Diabetes Mother    • Diabetes Father    • Deep vein thrombosis Father    • Breast cancer Maternal Grandmother         ? age dx   • Ovarian cancer Daughter 23   • Colon cancer Neg Hx        Social History     Socioeconomic History   • Marital status:      Spouse name: Not on file   • Number of children: Not on file   • Years of education: Not on file   • Highest education level: Not on file   Tobacco Use   • Smoking status: Current Every Day Smoker     Packs/day: 1.50     Types: Cigarettes   • Smokeless tobacco: Never Used   Substance and Sexual Activity   • Alcohol use: No   • Drug use: No   • Sexual activity: Not Currently     Birth control/protection: Post-menopausal       ED Triage Vitals [03/02/20 1822]   Temp Heart Rate Resp BP SpO2   97.6 °F (36.4 °C) 86 15 (!) 83/49 99 %      Temp src Heart Rate Source Patient Position BP Location FiO2 (%)   Oral Monitor Sitting Right arm --         Objective   Physical Exam   Constitutional: She is oriented to person, place, and time. She appears well-developed and well-nourished. She appears toxic. She appears ill.   HENT:   Head: Normocephalic and atraumatic.   Very dry mucous membranes noted   Eyes: Pupils are equal, round, and reactive to light. EOM are normal. Right eye exhibits no discharge. Left eye exhibits no discharge.   Neck: Normal range of motion. Neck supple.   Cardiovascular: Normal rate, regular rhythm, normal heart sounds and intact distal pulses.   Radial pulse is  palpable bilaterally.   Pulmonary/Chest: Effort normal and breath sounds normal. No stridor. No respiratory distress. She has no wheezes. She has no rhonchi. She has no rales.   Respiratory rate is about 18 to 20 breaths/min.  However work of breathing seems to be normal.  Lungs are clear bilaterally.   Abdominal: Soft. Normal appearance. She exhibits no pulsatile midline mass and no mass. Bowel sounds are decreased. There is generalized tenderness (mild generalized tenderness throughout all of the abdomen equally.  No peritoneal signs anywhere.). There is no rigidity, no rebound, no guarding, no tenderness at McBurney's point and negative Valdes's sign. No hernia.   Musculoskeletal: Normal range of motion. She exhibits no edema or deformity.   Neurological: She is alert and oriented to person, place, and time.   Skin: Skin is warm and dry. No rash noted. She is not diaphoretic. No erythema. No pallor.   Psychiatric: She has a normal mood and affect. Her behavior is normal. Judgment and thought content normal.   Nursing note and vitals reviewed.    EKG           EKG time/Interp time: 1909/1912  Rhythm/Rate: Sinus rhythm, 89 bpm  P waves and NV: P waves are present, 134 ms  QRS, axis: 121 ms, normal axis  ST and T waves: No ST segment elevation patterns    Independently interpreted by me contemporaneously with treatment    Results for orders placed or performed during the hospital encounter of 03/02/20   Influenza Antigen, Rapid - Swab, Nasopharynx   Result Value Ref Range    Influenza A Ag, EIA Negative Negative    Influenza B Ag, EIA Negative Negative   Comprehensive Metabolic Panel   Result Value Ref Range    Glucose 134 (H) 65 - 99 mg/dL    BUN 88 (H) 8 - 23 mg/dL    Creatinine 8.73 (H) 0.57 - 1.00 mg/dL    Sodium 127 (L) 136 - 145 mmol/L    Potassium 5.3 (H) 3.5 - 5.2 mmol/L    Chloride 83 (L) 98 - 107 mmol/L    CO2 7.4 (L) 22.0 - 29.0 mmol/L    Calcium 8.5 (L) 8.6 - 10.5 mg/dL    Total Protein 7.0 6.0 - 8.5  g/dL    Albumin 3.50 3.50 - 5.20 g/dL    ALT (SGPT) 12 1 - 33 U/L    AST (SGOT) 13 1 - 32 U/L    Alkaline Phosphatase 85 39 - 117 U/L    Total Bilirubin 0.2 0.2 - 1.2 mg/dL    eGFR Non African Amer 5 (L) >60 mL/min/1.73    eGFR  African Amer      Globulin 3.5 gm/dL    A/G Ratio 1.0 g/dL    BUN/Creatinine Ratio 10.1 7.0 - 25.0    Anion Gap 36.6 (H) 5.0 - 15.0 mmol/L   Troponin   Result Value Ref Range    Troponin T 0.011 0.000 - 0.030 ng/mL   Procalcitonin   Result Value Ref Range    Procalcitonin 0.99 (H) 0.10 - 0.25 ng/mL   Lactic Acid, Plasma   Result Value Ref Range    Lactate 3.7 (C) 0.5 - 2.0 mmol/L   CBC Auto Differential   Result Value Ref Range    WBC 24.57 (H) 3.40 - 10.80 10*3/mm3    RBC 4.18 3.77 - 5.28 10*6/mm3    Hemoglobin 12.1 12.0 - 15.9 g/dL    Hematocrit 39.6 34.0 - 46.6 %    MCV 94.7 79.0 - 97.0 fL    MCH 28.9 26.6 - 33.0 pg    MCHC 30.6 (L) 31.5 - 35.7 g/dL    RDW 17.0 (H) 12.3 - 15.4 %    RDW-SD 58.5 (H) 37.0 - 54.0 fl    MPV 10.5 6.0 - 12.0 fL    Platelets 626 (H) 140 - 450 10*3/mm3    Neutrophil % 84.7 (H) 42.7 - 76.0 %    Lymphocyte % 7.9 (L) 19.6 - 45.3 %    Monocyte % 6.0 5.0 - 12.0 %    Eosinophil % 0.0 (L) 0.3 - 6.2 %    Basophil % 0.2 0.0 - 1.5 %    Immature Grans % 1.2 (H) 0.0 - 0.5 %    Neutrophils, Absolute 20.81 (H) 1.70 - 7.00 10*3/mm3    Lymphocytes, Absolute 1.93 0.70 - 3.10 10*3/mm3    Monocytes, Absolute 1.47 (H) 0.10 - 0.90 10*3/mm3    Eosinophils, Absolute 0.01 0.00 - 0.40 10*3/mm3    Basophils, Absolute 0.05 0.00 - 0.20 10*3/mm3    Immature Grans, Absolute 0.30 (H) 0.00 - 0.05 10*3/mm3    nRBC 0.0 0.0 - 0.2 /100 WBC   POC Glucose Once   Result Value Ref Range    Glucose 154 (H) 70 - 130 mg/dL       RADIOLOGY        Study: CT abdomen and pelvis w/o contrast    Findings: 1. Patient has a duplicated collecting system right kidney and there are multiple nonobstructing intrarenal calculi on the right as well as multiple, too numerous to count, calculi within the lower pole moiety  pelvis and proximal ureter. This probably  also a calculus in the right pelvic ureter. There is however no hydronephrosis. Please correlate further with clinical symptoms.  2. Multiple prominent loops of small bowel in the lower abdomen and pelvis with air-fluid levels and some feculent material. No transition point is seen and there is no definite wall thickening. Please correlate for clinical evidence of mild ileus. The  appendix is radiographically unremarkable.  3. Atherosclerotic vascular calcifications.  4. Patient has apparently had an interval uterine embolization.  5. Study is limited for evaluation for pathology other than urinary tract calculus disease by lack of IV contrast media.  6. Probably subacute left rib fractures.    Interpreted contemporaneously with treatment by Dr. Katz, independently viewed by me    RADIOLOGY        Study: Chest x-ray    Findings: No acute cardiopulmonary findings    Interpreted contemporaneously with treatment by Dr. Lorenzo, independently viewed by me    Critical Care  Performed by: Maikol Barber MD  Authorized by: Maikol Barber MD     Critical care provider statement:     Critical care time (minutes):  60    Critical care time was exclusive of:  Separately billable procedures and treating other patients and teaching time    Critical care was necessary to treat or prevent imminent or life-threatening deterioration of the following conditions:  Cardiac failure, circulatory failure, renal failure, sepsis, shock, dehydration and metabolic crisis    Critical care was time spent personally by me on the following activities:  Development of treatment plan with patient or surrogate, discussions with consultants, evaluation of patient's response to treatment, examination of patient, interpretation of cardiac output measurements, obtaining history from patient or surrogate, ordering and performing treatments and interventions, ordering and review of laboratory studies,  ordering and review of radiographic studies, pulse oximetry, re-evaluation of patient's condition and review of old charts    I assumed direction of critical care for this patient from another provider in my specialty: no                 ED Course  ED Course as of Mar 02 2249   Mon Mar 02, 2020   2243 I have reviewed all the labs and radiology reports available at this time.  Patient is clearly presenting with sepsis and hypotension.  I initiated broad-spectrum antibiotics and aggressive IV fluids as soon as these findings became apparent to me in the context of a very few shift with many critical patients at once.  Fortunately, patient has been awake and alert with normal mentation throughout this visit.  I initially spoke with our hospitalist here about this patient's disposition but he recommends the patient be transferred to [facility] for further multidisciplinary support at the ICU level.  I think this is a reasonable recommendation.  Therefore I paged the ICU specialist at [facility] and spoke with Dr. Bedolla.  He recommends giving 1/3 L of crystalloid before starting pressors.  Therefore I will go ahead and start with a bag of Ringer's lactate now.  Patient is already had almost 2 L of normal saline bolus.  Her blood pressures remain in the mid 80s.  However, as I said earlier she is awake and alert and mentating well while protecting her airway with normal work of breathing.  Per Dr. Bedolla's recommendation, I will hold off on pressors for a little while longer.  I will also order a chest x-ray and a dose of vancomycin per his request.  I had already given a dose of cefepime and Flagyl to address her GI complaints as a probable source of sepsis.  However he recommended we broaden that out further for better coverage.  He does agree to accept her in the ICU there.  We are waiting for a bed assignment now.  I will continue to monitor the patient closely until time of transport via EMS.  I have updated the  patient and her family members in the room with this plan of care.  They agree to everything as outlined.    [LETI]      ED Course User Index  [LETI] Maikol Barber MD                                           MDM  Number of Diagnoses or Management Options  Acute renal failure, unspecified acute renal failure type (CMS/HCC):   Dehydration:   Diarrhea, unspecified type:   Hyponatremia:   Kidney stones:   Sepsis with acute renal failure and septic shock, due to unspecified organism, unspecified acute renal failure type (CMS/HCC):      Amount and/or Complexity of Data Reviewed  Clinical lab tests: reviewed and ordered  Tests in the radiology section of CPT®: ordered and reviewed  Tests in the medicine section of CPT®: reviewed  Decide to obtain previous medical records or to obtain history from someone other than the patient: yes  Review and summarize past medical records: yes  Discuss the patient with other providers: yes (Dr. Levy - recommends transfer  Dr. Harmon - accepts to ICU)  Independent visualization of images, tracings, or specimens: yes    Risk of Complications, Morbidity, and/or Mortality  Presenting problems: moderate  Diagnostic procedures: moderate  Management options: moderate        Final diagnoses:   Sepsis with acute renal failure and septic shock, due to unspecified organism, unspecified acute renal failure type (CMS/HCC)   Diarrhea, unspecified type   Acute renal failure, unspecified acute renal failure type (CMS/HCC)   Kidney stones   Dehydration   Hyponatremia            Maikol Barber MD  03/03/20 0018

## 2020-03-03 NOTE — PROGRESS NOTES
Clinical Pharmacy Services: Medication History    Jayne Beltran is a 64 y.o. female presenting to [facility] for Acute renal failure (ARF) (CMS/[facility]) [N17.9]    She  has a past medical history of COPD (chronic obstructive pulmonary disease) (CMS/[facility]), Diabetes mellitus (CMS/[facility]), Hypertension, Neuromuscular disorder (CMS/[facility]), and TIA (transient ischemic attack).    Allergies as of 03/02/2020 - Reviewed 03/02/2020   Allergen Reaction Noted   • Codeine Hives and Hallucinations 03/24/2016   • Lyrica [pregabalin] Unknown - High Severity 03/02/2020       Medication information was obtained from: patient/pharmacy  Pharmacy and Phone Number:     [facility] [address] [place] [address] [address] [address] - [phone] [facility] [phone]   [address] [address]  Phone: [phone] Fax: [phone]          Prior to Admission Medications     Prescriptions Last Dose Informant Patient Reported? Taking?    amLODIPine (NORVASC) 5 MG tablet   Yes Yes    Take 5 mg by mouth Daily.    aspirin 81 MG EC tablet   Yes Yes    Take 81 mg by mouth Daily.    Cyanocobalamin (VITAMIN B 12) 500 MCG tablet   Yes Yes    Take 500 mcg by mouth Daily.    cyclobenzaprine (FLEXERIL) 10 MG tablet   No Yes    TAKE 1 TABLET BY MOUTH TWICE A DAY AS NEEDED FOR MUSCLE SPASMS    diphenhydrAMINE (BENADRYL ALLERGY) 25 MG tablet   No Yes    Take 0.5 tablets by mouth Every 6 (Six) Hours As Needed for Itching or Sleep.    DULoxetine (CYMBALTA) 30 MG capsule   No Yes    Take 1 capsule by mouth 2 (Two) Times a Day.    Empagliflozin 10 MG tablet   No Yes    Take 10 mg by mouth Daily.    lisinopril (PRINIVIL,ZESTRIL) 40 MG tablet   No Yes    Take 1 tablet by mouth Daily for 180 days.    LYRICA 150 MG capsule   No Yes    TAKE 1 CAPSULE BY MOUTH TWICE A DAY    Patient taking differently:  3 (Three) Times a Day.    meloxicam (MOBIC) 15 MG tablet   No Yes    TAKE 1 TABLET BY MOUTH DAILY AS NEEDED FOR MODERATE PAIN .    metFORMIN  (GLUCOPHAGE) 1000 MG tablet   No Yes    Take 1 tablet by mouth 2 (Two) Times a Day With Meals.    metoprolol succinate XL (TOPROL-XL) 200 MG 24 hr tablet   No Yes    TAKE 1 TABLET BY MOUTH EVERY DAY    omeprazole (priLOSEC) 20 MG capsule   No Yes    TAKE 1 CAPSULE BY MOUTH EVERY DAY    rOPINIRole (REQUIP) 3 MG tablet   No Yes    TAKE 1 TABLET BY MOUTH EVERY DAY AT NIGHT    VASCEPA 1 g capsule capsule   No Yes    Take 2 g by mouth 2 (Two) Times a Day With Meals.    zolpidem (AMBIEN) 5 MG tablet   No Yes    Take 1 tablet by mouth Take As Directed for 2 doses. Bring the medication to the sleep lab. DO NOT USE AT HOME            Medication notes:   · Removed nicotine patches and azithromycin-therapy compelted  · Confirmed with patient she is using ropinirole 3 mg, removed 1mg tablet     This medication list is complete to the best of my knowledge as of 3/3/2020    Please call if questions.    Saritha Ellington, Pharmacy Intern  [phone]  3/3/2020 11:55 AM

## 2020-03-03 NOTE — PLAN OF CARE
Direct admit from [place]. Pt received 2L bolus at [place] , 1 gram of Vanc, 500mg of flagyl, and 1g of Cefepime. Upon arrival to [facility] pt received another 2L bolus of Normal Saline. Bicarb gtt started. Pt has had about 65cc of urine out.

## 2020-03-03 NOTE — PROGRESS NOTES
"Pharmacokinetic Evaluation - Vancomycin    Jayne Beltran is a 64 y.o. female on vancomycin pharmacy to dose.  MRN: [id number]  : 1955    Day of vancomycin therapy:   Indication: sepsis  Consulted by: Dr. Harmon  Goal trough: 15-20 mcg/ml  Current dose: intermittent  Other antimicrobials: cefepime day     Blood pressure 119/64, pulse 105, temperature 98.1 °F (36.7 °C), temperature source Oral, resp. rate 20, height 165.1 cm (65\"), weight 63.4 kg (139 lb 12.4 oz), SpO2 94 %, not currently breastfeeding.  Results from last 7 days   Lab Units 20  0551 20   CREATININE mg/dL 7.60*  7.85* 8.73*     Estimated Creatinine Clearance: 7.2 mL/min (A) (by C-G formula based on SCr of 7.85 mg/dL (H)).  Results from last 7 days   Lab Units 20  0519 20   WBC 10*3/mm3  --   --  24.57*   HEMOGLOBIN g/dL  --   --  12.1   HEMATOCRIT %  --   --  39.6   PLATELETS 10*3/mm3  --   --  626*   LACTATE mmol/L 1.5 3.7*  --        Procal: 0.99    Cultures:      Microbiology Results (last 10 days)     Procedure Component Value - Date/Time    Influenza Antigen, Rapid - Swab, Nasopharynx [[id number]]  (Normal) Collected:  20 1826    Lab Status:  Final result Specimen:  Swab from Nasopharynx Updated:  20 1852     Influenza A Ag, EIA Negative     Influenza B Ag, EIA Negative            Dosing hx (include troughs if drawn):  3/  1g at 2315  3/3  1208 random=14.4 mcg/ml.        Assessment:   Admitted with possible sepsis. No current plans for HD although it may change.  Bc drawn this am after abx already given. Tomorrow will consider checking mrsa screen if it would be beneficial.  Random level this afternoon is 14.4 mcg/ml. Will redose with 1g (15.8 mcg/ml) now and check random level tomorrow am.      Plan:  1) Vanc 1g iv now  2) random vanc level tomorrow am  3) Encourage adequate hydration if appropriate. Monitor for decreased UOP, rash or other signs of vancomycin " intolerance.    Thanks for this consult, will follow until dc,  Roel Aguirre Pharm.D, BCCCP

## 2020-03-03 NOTE — PROGRESS NOTES
LOS: 0 days   Patient Care Team:  Michael Arriaza Jr., DO as PCP - General (Family Medicine)  Gilles Villa DPM as PCP - Claims Attributed  Gilles Villa DPM as Consulting Physician (Podiatry)  Jen Hays MD as Consulting Physician (Obstetrics and Gynecology)    Subjective     Reviewed with patient she had about 3 days of diarrhea no nausea or vomiting just watery diarrhea that has stopped she has not had any diarrhea since she came in here.  She is had no abdominal pain her current complaint is the Tejeda catheter she says is just killing her and if we do not take it out she is going to rip it out.  Patient does report she had swelling in her legs she was taking her 's diuretic as well at the time.    Review of Systems:   No cough no shortness of breath no sore throat no runny nose she has had no fevers chills sweats no chest pain or palpitations.  She is unaware of any prior kidney disease although reviewing her records I find that she had a creatinine of 1.48 in 6/18/2019 and 1.40 and 2/11/2020 suggest to me that she does have chronic kidney disease stage III.  Does have a history of COPD and diabetes she has not had any polyuria she has been thirsty lately she has a history of a TIA but she has not had any recent headaches or visual changes.  No blood clots easy bleeding or bruising no new skin rashes or lesions       Objective     Vital Signs  Vital Sign Min/Max for last 24 hours  Temp  Min: 97.6 °F (36.4 °C)  Max: 98.1 °F (36.7 °C)   BP  Min: 75/45  Max: 119/64   Pulse  Min: 86  Max: 108   Resp  Min: 15  Max: 20   SpO2  Min: 94 %  Max: 100 %   Flow (L/min)  Min: 0.5  Max: 0.5   Weight  Min: 63.4 kg (139 lb 12.4 oz)  Max: 68 kg (150 lb)        Ventilator/Non-Invasive Ventilation Settings (From admission, onward)    None                       Body mass index is 23.26 kg/m².  I/O last 3 completed shifts:  In: 1582 [P.O.:120; I.V.:462; IV Piggyback:1000]  Out: 87  [Urine:87]  No intake/output data recorded.        Physical Exam:  General Appearance: Well-developed white female who looks at least a decade older than her stated age she is resting in bed she does not appear in terrible distress although she is complaining constantly about her Tejeda catheter.  Eyes: Conjunctiva are clear and anicteric pupils are equal and reactive  ENT: Mucous membranes are still quite dry nasal septum midline Mallampati type II airway  Neck: No adenopathy or thyromegaly no jugular vein distention trachea midline  Lungs: Clear no wheezes rales rhonchi nonlabored symmetric expansion  Cardiac: Regular rate rhythm no murmur no gallop  Abdomen: Soft nontender no palpable hepatosplenomegaly or masses.  There is active bowel sounds  : No flank tenderness.  Her Tejeda catheter has about 800 cc of dark brown urine in the collection bag.  Musculoskeletal: Grossly normal  Skin: No jaundice no petechiae skin is warm and dry  Neuro: She is alert and oriented she is cooperative following commands moving all 4 extremities  Extremities/P Vascular: No clubbing no cyanosis and absolutely no edema she has palpable radial dorsalis pedis pulses bilaterally  MSE: SHe is not in a very good mood right now       Labs:  Results from last 7 days   Lab Units 03/03/20  0551 03/02/20  1915   GLUCOSE mg/dL 146*  153* 134*   SODIUM mmol/L 132*  132* 127*   POTASSIUM mmol/L 4.4  4.4 5.3*   CO2 mmol/L 3.0*  5.0* 7.4*   CHLORIDE mmol/L 93*  93* 83*   ANION GAP mmol/L 36.0*  34.0* 36.6*   CREATININE mg/dL 7.60*  7.85* 8.73*   BUN mg/dL 80*  79* 88*   BUN / CREAT RATIO  10.5  10.1 10.1   CALCIUM mg/dL 6.8*  6.7* 8.5*   EGFR IF NONAFRICN AM mL/min/1.73 5*  5* 5*   ALK PHOS U/L  --  85   TOTAL PROTEIN g/dL  --  7.0   ALT (SGPT) U/L  --  12   AST (SGOT) U/L  --  13   BILIRUBIN mg/dL  --  0.2   ALBUMIN g/dL 3.00* 3.50   GLOBULIN gm/dL  --  3.5     Estimated Creatinine Clearance: 7.2 mL/min (A) (by C-G formula based on SCr  of 7.85 mg/dL (H)).      Results from last 7 days   Lab Units 03/02/20  1915   WBC 10*3/mm3 24.57*   RBC 10*6/mm3 4.18   HEMOGLOBIN g/dL 12.1   HEMATOCRIT % 39.6   MCV fL 94.7   MCH pg 28.9   MCHC g/dL 30.6*   RDW % 17.0*   RDW-SD fl 58.5*   MPV fL 10.5   PLATELETS 10*3/mm3 626*   NEUTROPHIL % % 84.7*   LYMPHOCYTE % % 7.9*   MONOCYTES % % 6.0   EOSINOPHIL % % 0.0*   BASOPHIL % % 0.2   IMM GRAN % % 1.2*   NEUTROS ABS 10*3/mm3 20.81*   LYMPHS ABS 10*3/mm3 1.93   MONOS ABS 10*3/mm3 1.47*   EOS ABS 10*3/mm3 0.01   BASOS ABS 10*3/mm3 0.05   IMMATURE GRANS (ABS) 10*3/mm3 0.30*   NRBC /100 WBC 0.0         Results from last 7 days   Lab Units 03/02/20  1915   TROPONIN T ng/mL 0.011             Results from last 7 days   Lab Units 03/03/20  0519 03/02/20  1934 03/02/20  1915   LACTATE mmol/L 1.5 3.7*  --    PROCALCITONIN ng/mL  --   --  0.99*         Microbiology Results (last 10 days)     Procedure Component Value - Date/Time    Influenza Antigen, Rapid - Swab, Nasopharynx [[id number]]  (Normal) Collected:  03/02/20 1826    Lab Status:  Final result Specimen:  Swab from Nasopharynx Updated:  03/02/20 1852     Influenza A Ag, EIA Negative     Influenza B Ag, EIA Negative                cefepime 2 g Intravenous Q24H   insulin lispro 0-9 Units Subcutaneous 4x Daily With Meals & Nightly   miconazole 200 mg Vaginal Nightly   sodium chloride 10 mL Intravenous Q12H       Pharmacy to dose vancomycin     sodium bicarbonate 150 mEq Last Rate: 150 mEq (03/03/20 0930)       Diagnostics:  Ct Abdomen Pelvis Without Contrast    Result Date: 3/2/2020  INDICATION: Lower abdominal pain for several days with dizziness. TECHNIQUE: CT of the abdomen and pelvis without contrast. Coronal and sagittal reconstructions were obtained.  Radiation dose reduction techniques included automated exposure control or exposure modulation based on body size. Radiation audit for number of CT and nuclear cardiology exams performed in the last year: 0.   COMPARISON: Abdomen pelvis CT scan pending restoration from 2010. FINDINGS: Lung bases: There are probably a subacute left lower lateral rib fractures with some callus formation but residual fracture lines is seen. There is mild dependent atelectasis. Abdomen: Mild circumferential thickening of the distal esophagus is chronic. Lack of intravenous contrast media limits the study for assessment for pathology other than urinary tract calculus disease. The noncontrast liver, adrenal glands, spleen, pancreas are normal. 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 There Are multiple calculi within the right kidney including nonobstructing calculi as well as multiple calculi in the lower pole moiety renal pelvis and proximal right ureter.. The stones are too numerous to count and measure about 4 to 5 mm in diameter. There is no associated nephrosis. There is what is likely a hyperdense exophytic right lower pole renal cyst about 1.1 cm in dimension. This is probably new from prior and follow-up ultrasound is recommended. There are multiple renal calculi previously, but the renal pelvis and ureter calculi are new. I suspect at least one calculus in the right pelvic ureter. There are not no left renal calculi seen. There is no left-sided hydronephrosis. There are moderate atherosclerotic vascular calcifications. The appendix is radiographically normal. Pelvis: Bladder is largely decompressed without evidence for bladder calculus. Uterus is considerably smaller than on the prior study and its likely that the patient has had a interval uterine embolization with some embolization material seen. There is no evidence for diverticulitis. There are some prominent loops of small bowel in the lower abdomen and pelvis with small air-fluid levels and some feculent material. This could reflect mild ileus or bacterial overgrowth versus an incompetent ileocecal valve. No transition point is appreciated. There is no free air free  fluid or definite bowel wall thickening. There are degenerative changes in the lumbar spine.     1. Patient has a duplicated collecting system right kidney and there are multiple nonobstructing intrarenal calculi on the right as well as multiple, too numerous to count, calculi within the lower pole moiety pelvis and proximal ureter. This probably also a calculus in the right pelvic ureter. There is however no hydronephrosis. Please correlate further with clinical symptoms. 2. Multiple prominent loops of small bowel in the lower abdomen and pelvis with air-fluid levels and some feculent material. No transition point is seen and there is no definite wall thickening. Please correlate for clinical evidence of mild ileus. The appendix is radiographically unremarkable. 3. Atherosclerotic vascular calcifications. 4. Patient has apparently had an interval uterine embolization. 5. Study is limited for evaluation for pathology other than urinary tract calculus disease by lack of IV contrast media. 6. Probably subacute left rib fractures. Signer Name: Rebeka Katz MD  Signed: 3/2/2020 9:51 PM  Workstation Name: [facility]    Xr Chest 2 View    Result Date: 3/2/2020  CR Chest 2 Vws INDICATION:  Sepsis, septic shock COMPARISON:  CT same day chest x-ray 12/30/2019 FINDINGS: PA and lateral views of the chest.  Heart and mediastinal contours are normal. The lungs are clear. No pneumothorax or pleural effusion.      No acute cardiopulmonary findings. Signer Name: Ryland Lorenzo MD  Signed: 3/2/2020 11:14 PM  Workstation Name: [facility]  [facility]    Ct Chest Without Contrast    Result Date: 3/2/2020  CT SCAN OF THE CHEST WITHOUT CONTRAST 03/02/2020  HISTORY: Current smoker with dyspnea on exertion for one month. Pulmonary nodules noted on outside chest radiograph, follow-up.  TECHNIQUE: Spiral CT was performed through the chest without intravenous contrast administration as  per clinician request. Radiation dose reduction techniques included automated exposure control or exposure modulation based on body size. Radiation audit for CT and nuclear cardiology exams in the last 12 months: 0.  FINDINGS: There is no prior chest CT for comparison. Additionally, the patient's reportedly abnormal outside chest radiograph demonstrating pulmonary nodules is not available for comparison at this time. Comparison is suggested. There is a 7 mm noncalcified nodule in the lateral right lower lobe. Additionally, there is a 3 mm nodule in the right upper lobe, a 3 mm nodule in the left upper lobe and a 3 mm nodule in the right middle lobe. No additional pulmonary nodules are seen. Diffuse centrilobular emphysema. Minimal atelectasis at the lung bases. The heart is normal in size. There is no significant thoracic lymphadenopathy. There are no pleural effusions. Small hiatal hernia, and there is some thickening of the wall of the distal esophagus. Consider correlation with upper endoscopy or barium esophagram.      1. No prior chest CT for comparison. Additionally, the patient's reportedly abnormal outside chest radiograph demonstrating pulmonary nodules is not available for comparison at this time. 2. 7 mm noncalcified nodule lateral right lower lobe. Management recommendation: Current published guidelines recommend initial follow-up CT in 6-12 months, and again in 18-24 months for uncomplicated pulmonary nodules measuring 6 to 8 mm in high-risk patients (Fleischner Society guidelines, 2017). 3. Emphysema. 4. Small hiatal hernia and there is thickening of the wall of the distal esophagus. Correlation with either upper endoscopy or barium esophagram is recommended to exclude esophagitis or esophageal mass.  This report was finalized on 3/2/2020 10:33 AM by Dr. Ludwin Hood MD.      Dexa Bone Density Axial    Result Date: 2/28/2020  DXA BONE MINERAL DENSITY MEASUREMENT, 02/28/2020  INDICATION: 64-year-old  postmenopausal female presenting for screening. HISTORY of tobacco abuse.  TECHNIQUE: DXA bone mineral density measurements were obtained at the lumbar spine and left hip.  FINDINGS: Bone mineral density in the left femoral neck measures 0.768 g/sq cm corresponding with a T score of -0.7 and Z score of 0.8. This is in the normal range. This represents a change of -7.9% from the patient's baseline/prior study, statistically significant.  Bone mineral density in the lumbar spine measures 1.179 g/sq cm corresponding with a T score of 1.2 and Z score of 3. This is in the normal range. This represents a change of 13.6% from the patient's prior/baseline study, statistically significant.      1. Bone mineral density is in the normal range.  This report was finalized on 2/28/2020 9:23 AM by Dr. Maikol Castro MD.           Personally reviewed her chest x-ray and I do not see any active disease in her chest    Active Hospital Problems    Diagnosis  POA   • Acute renal failure (ARF) (CMS/[facility]) [N17.9]  Yes      Resolved Hospital Problems   No resolved problems to display.         Assessment/Plan     1. Hypovolemic shock resolved  2. Diarrhea questionable enteritis.  Seems to been a self-limited process will monitor  3. Acute kidney injury on chronic kidney disease stage III nephrology is following hopefully she will continue to improve.  I think probably dehydration both from diuretics and diarrhea with resultant hypovolemic shock is the likely cause but it is possible her renal function was worsening prior since she was having this edema that were not sure why it could have been due to renal dysfunction that  4. Hyperkalemia improved  5. Hyponatremia improved  6. Severe metabolic acidosis lactic acidosis improved lactic acidosis has resolved  7. Diabetes mellitus type 2 sliding scale insulin for now  8. Sepsis its not clear source chest x-ray is clear urine does not look too bad procalcitonin and white count are up of course  it could just be stress and the acute renal failure that have these up.  It may be all gastroenteritis but that usually viral.  I am going to check an MRSA swab if that is negative we can DC vancomycin and will follow cultures and a procalcitonin I would like to build to stop antibiotics if we do not find a source for bacterial sepsis  9. Hyperphosphatemia hopefully this will improve his renal function improve      Patient is absolutely insistent we remove the Tejeda catheter will have to monitor her output hopefully she has not obstructed in any way nursing is reported some vaginal candidiasis may be this is part of the cause , given her Monistat    Plan for disposition: I think she is stable for transfer out of the ICU    Obdulio Burger MD  03/03/20  1:43 PM    Time: I have spent over 40 minutes on patient's care today including reviewing her records examining her discussing with other physicians and transfer orders

## 2020-03-03 NOTE — CONSULTS
Referring Provider: Dr. Carlos Harmon  Reason for Consultation: GERI    Subjective     Chief complaint No chief complaint on file.      History of present illness:  63 yo WF who denies any prior problems with kidney function, transferred from [facility] last night for further evaluation of shock and severe GERI.  She reports severe diarrhea for several days as well as inability to eat for 1-2 days; dizziness and inability to stand led her to the ER.  Renal consulted due to elevated SCR 8.7, though value today 7.9 after large-volume resuscitation.  PMH includes DM2 on metformin; hypertension on lisinopril; PVD/TIA with active tobacco abuse; and COPD on nocturnal oxygen.  Hospitalization: large-volume resuscitation followed by bicarbonate drip; no pressors have been needed yet; and normalization of serum lactate.  · No fever or chills  · No urinary complaints, but has had discomfort ever since Tejeda catheter placed; reportedly has significant vaginitis  · Was taking PRN NSAIDs at home as well  · Diarrhea has resolved; still has no appetite  · Last dose of metformin was yesterday; last dose of lisinopril 2 days ago    Past Medical History:   Diagnosis Date   • COPD (chronic obstructive pulmonary disease) (CMS/HCC)    • Diabetes mellitus (CMS/HCC)    • Hypertension    • Neuromuscular disorder (CMS/HCC)    • TIA (transient ischemic attack)      Past Surgical History:   Procedure Laterality Date   • CHOLECYSTECTOMY     • COLONOSCOPY       Family History   Problem Relation Age of Onset   • Heart disease Mother    • Diabetes Mother    • Diabetes Father    • Deep vein thrombosis Father    • Breast cancer Maternal Grandmother         ? age dx   • Ovarian cancer Daughter 23   • Colon cancer Neg Hx      Social History     Tobacco Use   • Smoking status: Current Every Day Smoker     Packs/day: 1.50     Types: Cigarettes   • Smokeless tobacco: Never Used   Substance Use Topics   • Alcohol use: No   • Drug use: No      Medications Prior to Admission   Medication Sig Dispense Refill Last Dose   • amLODIPine (NORVASC) 5 MG tablet Take 5 mg by mouth Daily.      • Cyanocobalamin (VITAMIN B 12) 500 MCG tablet Take 500 mcg by mouth Daily.      • aspirin 81 MG EC tablet Take 81 mg by mouth Daily.   Taking   • azithromycin (ZITHROMAX Z-CELESTINE) 250 MG tablet Take 2 tablets the first day, then 1 tablet daily for 4 days. 6 tablet 0 Not Taking   • [facility] NICOTINE 7 MG/24HR patch PLACE 1 PATCH ON THE SKIN AS DIRECTED BY PROVIDER DAILY. 28 patch 0 Taking   • cyclobenzaprine (FLEXERIL) 10 MG tablet TAKE 1 TABLET BY MOUTH TWICE A DAY AS NEEDED FOR MUSCLE SPASMS 60 tablet 0 Taking   • diphenhydrAMINE (BENADRYL ALLERGY) 25 MG tablet Take 0.5 tablets by mouth Every 6 (Six) Hours As Needed for Itching or Sleep. 30 tablet 1    • DULoxetine (CYMBALTA) 30 MG capsule Take 1 capsule by mouth 2 (Two) Times a Day. 60 capsule 5 Taking   • Empagliflozin 10 MG tablet Take 10 mg by mouth Daily. 90 tablet 1    • lisinopril (PRINIVIL,ZESTRIL) 40 MG tablet Take 1 tablet by mouth Daily for 180 days. 90 tablet 1    • LYRICA 150 MG capsule TAKE 1 CAPSULE BY MOUTH TWICE A DAY (Patient taking differently: 3 (Three) Times a Day.) 60 capsule 2 Taking   • meloxicam (MOBIC) 15 MG tablet TAKE 1 TABLET BY MOUTH DAILY AS NEEDED FOR MODERATE PAIN . 90 tablet 0 Taking   • metFORMIN (GLUCOPHAGE) 1000 MG tablet Take 1 tablet by mouth 2 (Two) Times a Day With Meals. 180 tablet 5 Taking   • metoprolol succinate XL (TOPROL-XL) 200 MG 24 hr tablet TAKE 1 TABLET BY MOUTH EVERY DAY 90 tablet 0    • omeprazole (priLOSEC) 20 MG capsule TAKE 1 CAPSULE BY MOUTH EVERY DAY 90 capsule 1 Taking   • rOPINIRole (REQUIP) 1 MG tablet TAKE ONE TABLET BY MOUTH EVERY NIGHT AT BEDTIME 90 tablet 0 Taking   • rOPINIRole (REQUIP) 3 MG tablet TAKE 1 TABLET BY MOUTH EVERY DAY AT NIGHT 90 tablet 1 Taking   • VASCEPA 1 g capsule capsule Take 2 g by mouth 2 (Two) Times a Day With Meals. 360 capsule 3 Taking   •  "zolpidem (AMBIEN) 5 MG tablet Take 1 tablet by mouth Take As Directed for 2 doses. Bring the medication to the sleep lab. DO NOT USE AT HOME 2 tablet 0 Taking     Allergies:  Codeine    Review of Systems  14-point ROS performed and all negative except for pertinent +/-'s detailed in HPI.     Objective     Vital Signs  Temp:  [97.6 °F (36.4 °C)-98 °F (36.7 °C)] 98 °F (36.7 °C)  Heart Rate:  [] 106  Resp:  [20] 20  BP: ()/(45-73) 115/65    Flowsheet Rows      First Filed Value   Admission Height  165.1 cm (65\") Documented at 03/03/2020 0049   Admission Weight  63.4 kg (139 lb 12.4 oz) Documented at 03/03/2020 0049           No intake/output data recorded.  I/O last 3 completed shifts:  In: 1582 [P.O.:120; I.V.:462; IV Piggyback:1000]  Out: 87 [Urine:87]    Intake/Output Summary (Last 24 hours) at 3/3/2020 0820  Last data filed at 3/3/2020 0700  Gross per 24 hour   Intake 1582 ml   Output 87 ml   Net 1495 ml       Physical Exam:  NAD; pleasant; oriented; looks stated age  Chronically ill-appearing  Dry MM; AT/NC   No eye discharge; no scleral icterus  No JVD; no carotid bruits  Course bilat; not labored; no wheezes or crackles  RR, tachycardic, no rub  Soft, NT, ND, BS+  No edema  + clubbing  No asterixis, but she is tremulous  Moves all extremities   Anxious mood; normal affect  Speech is fluent    Results Review:  Results from last 7 days   Lab Units 03/03/20  0551 03/02/20  1915   SODIUM mmol/L 132* 127*   POTASSIUM mmol/L 4.4 5.3*   CHLORIDE mmol/L 93* 83*   CO2 mmol/L 5.0* 7.4*   BUN mg/dL 79* 88*   CREATININE mg/dL 7.85* 8.73*   CALCIUM mg/dL 6.7* 8.5*   BILIRUBIN mg/dL  --  0.2   ALK PHOS U/L  --  85   ALT (SGPT) U/L  --  12   AST (SGOT) U/L  --  13   GLUCOSE mg/dL 153* 134*       Estimated Creatinine Clearance: 7.2 mL/min (A) (by C-G formula based on SCr of 7.85 mg/dL (H)).          Results from last 7 days   Lab Units 03/02/20  1915   WBC 10*3/mm3 24.57*   HEMOGLOBIN g/dL 12.1   PLATELETS 10*3/mm3 " 626*             Active Medications    cefepime 2 g Intravenous Q24H   miconazole 200 mg Vaginal Nightly   sodium chloride 10 mL Intravenous Q12H       Pharmacy to dose vancomycin     sodium bicarbonate 150 mEq Last Rate: 150 mEq (03/03/20 0248)       Assessment/Plan   Assessment  1.  GERI versus GERI/CKD, oliguric: last SCR for review was 1.5 from 6/18/2019.  Unclear whether this is her baseline or not.  Current GERI prerenal +/- ATN due to hypovolemic shock with compromised renal autoregulation due to lisinopril and NSAID.  Already SCR improving.  ROS negative for obstructive process; CT scan negative for hydronephrosis though numerous renal calculi noted.  Resolved hyperkalemia; still has a large (34) anion gap, though this is already decreasing.  Hypovolemic hyponatremia, improving with isotonic IVF.  Serum lactate has normalized.  Urinalysis: 1.025, + ketones, large blood but only 6-12 RBCs, and greater than 300 mg/dL protein  2.  Shock, responding to fluid resuscitation  3.  Diarrhea, resolving  4.  DM2 on metformin, which is on hold  5.  Leukocytosis  6.  COPD with active tobacco abuse      Acute renal failure (ARF) (CMS/[facility])      Plan  1.  Continue bicarbonate drip given magnitude of anion gap  2.  Will recheck chemistries later today  3.  FENa  4.  Surveillance labs    I discussed the patient's findings and my recommendations with patient    Huan Beaulieu MD  03/03/20  8:20 AM

## 2020-03-04 LAB
ALBUMIN SERPL-MCNC: 2.9 G/DL (ref 3.5–5.2)
ANION GAP SERPL CALCULATED.3IONS-SCNC: 20.5 MMOL/L (ref 5–15)
BUN BLD-MCNC: 59 MG/DL (ref 8–23)
BUN/CREAT SERPL: 14.1 (ref 7–25)
CALCIUM SPEC-SCNC: 7.1 MG/DL (ref 8.6–10.5)
CHLORIDE SERPL-SCNC: 98 MMOL/L (ref 98–107)
CO2 SERPL-SCNC: 23.5 MMOL/L (ref 22–29)
CREAT BLD-MCNC: 4.18 MG/DL (ref 0.57–1)
DEPRECATED RDW RBC AUTO: 53.7 FL (ref 37–54)
ERYTHROCYTE [DISTWIDTH] IN BLOOD BY AUTOMATED COUNT: 16.8 % (ref 12.3–15.4)
GFR SERPL CREATININE-BSD FRML MDRD: 11 ML/MIN/1.73
GFR SERPL CREATININE-BSD FRML MDRD: ABNORMAL ML/MIN/{1.73_M2}
GLUCOSE BLD-MCNC: 137 MG/DL (ref 65–99)
GLUCOSE BLDC GLUCOMTR-MCNC: 155 MG/DL (ref 70–130)
GLUCOSE BLDC GLUCOMTR-MCNC: 181 MG/DL (ref 70–130)
GLUCOSE BLDC GLUCOMTR-MCNC: 224 MG/DL (ref 70–130)
GLUCOSE BLDC GLUCOMTR-MCNC: 236 MG/DL (ref 70–130)
HCT VFR BLD AUTO: 28.5 % (ref 34–46.6)
HGB BLD-MCNC: 9.5 G/DL (ref 12–15.9)
MAGNESIUM SERPL-MCNC: 1.7 MG/DL (ref 1.6–2.4)
MCH RBC QN AUTO: 29 PG (ref 26.6–33)
MCHC RBC AUTO-ENTMCNC: 33.3 G/DL (ref 31.5–35.7)
MCV RBC AUTO: 86.9 FL (ref 79–97)
PHOSPHATE SERPL-MCNC: 4.5 MG/DL (ref 2.5–4.5)
PLATELET # BLD AUTO: 398 10*3/MM3 (ref 140–450)
PMV BLD AUTO: 9.7 FL (ref 6–12)
POTASSIUM BLD-SCNC: 2.7 MMOL/L (ref 3.5–5.2)
PROCALCITONIN SERPL-MCNC: 0.76 NG/ML (ref 0.1–0.25)
RBC # BLD AUTO: 3.28 10*6/MM3 (ref 3.77–5.28)
SODIUM BLD-SCNC: 142 MMOL/L (ref 136–145)
URATE SERPL-MCNC: 14.1 MG/DL (ref 2.4–5.7)
VANCOMYCIN SERPL-MCNC: 22.5 MCG/ML (ref 5–40)
WBC NRBC COR # BLD: 14.51 10*3/MM3 (ref 3.4–10.8)

## 2020-03-04 PROCEDURE — 25010000002 MAGNESIUM SULFATE IN D5W 1G/100ML (PREMIX) 1-5 GM/100ML-% SOLUTION: Performed by: INTERNAL MEDICINE

## 2020-03-04 PROCEDURE — 84550 ASSAY OF BLOOD/URIC ACID: CPT | Performed by: INTERNAL MEDICINE

## 2020-03-04 PROCEDURE — 80202 ASSAY OF VANCOMYCIN: CPT | Performed by: INTERNAL MEDICINE

## 2020-03-04 PROCEDURE — 63710000001 INSULIN LISPRO (HUMAN) PER 5 UNITS: Performed by: INTERNAL MEDICINE

## 2020-03-04 PROCEDURE — 85027 COMPLETE CBC AUTOMATED: CPT | Performed by: INTERNAL MEDICINE

## 2020-03-04 PROCEDURE — 25010000003 POTASSIUM CHLORIDE 10 MEQ/100ML SOLUTION: Performed by: INTERNAL MEDICINE

## 2020-03-04 PROCEDURE — 25010000002 VANCOMYCIN PER 500 MG: Performed by: INTERNAL MEDICINE

## 2020-03-04 PROCEDURE — 25010000002 CEFEPIME PER 500 MG: Performed by: INTERNAL MEDICINE

## 2020-03-04 PROCEDURE — 83735 ASSAY OF MAGNESIUM: CPT | Performed by: INTERNAL MEDICINE

## 2020-03-04 PROCEDURE — 80069 RENAL FUNCTION PANEL: CPT | Performed by: INTERNAL MEDICINE

## 2020-03-04 PROCEDURE — 84145 PROCALCITONIN (PCT): CPT | Performed by: INTERNAL MEDICINE

## 2020-03-04 PROCEDURE — 82962 GLUCOSE BLOOD TEST: CPT

## 2020-03-04 RX ORDER — MAGNESIUM SULFATE 1 G/100ML
2 INJECTION INTRAVENOUS ONCE
Status: COMPLETED | OUTPATIENT
Start: 2020-03-04 | End: 2020-03-04

## 2020-03-04 RX ORDER — POTASSIUM CHLORIDE 7.45 MG/ML
10 INJECTION INTRAVENOUS
Status: COMPLETED | OUTPATIENT
Start: 2020-03-04 | End: 2020-03-04

## 2020-03-04 RX ORDER — POTASSIUM CHLORIDE 750 MG/1
40 CAPSULE, EXTENDED RELEASE ORAL ONCE
Status: COMPLETED | OUTPATIENT
Start: 2020-03-04 | End: 2020-03-04

## 2020-03-04 RX ADMIN — SODIUM CHLORIDE, PRESERVATIVE FREE 10 ML: 5 INJECTION INTRAVENOUS at 09:20

## 2020-03-04 RX ADMIN — INSULIN LISPRO 4 UNITS: 100 INJECTION, SOLUTION INTRAVENOUS; SUBCUTANEOUS at 22:34

## 2020-03-04 RX ADMIN — CEFEPIME HYDROCHLORIDE 2 G: 2 INJECTION, POWDER, FOR SOLUTION INTRAVENOUS at 23:05

## 2020-03-04 RX ADMIN — SODIUM CHLORIDE, POTASSIUM CHLORIDE, SODIUM LACTATE AND CALCIUM CHLORIDE 125 ML/HR: 600; 310; 30; 20 INJECTION, SOLUTION INTRAVENOUS at 09:08

## 2020-03-04 RX ADMIN — INSULIN LISPRO 2 UNITS: 100 INJECTION, SOLUTION INTRAVENOUS; SUBCUTANEOUS at 18:10

## 2020-03-04 RX ADMIN — POTASSIUM CHLORIDE 10 MEQ: 7.46 INJECTION, SOLUTION INTRAVENOUS at 09:09

## 2020-03-04 RX ADMIN — POTASSIUM CHLORIDE 40 MEQ: 750 CAPSULE, EXTENDED RELEASE ORAL at 12:43

## 2020-03-04 RX ADMIN — VANCOMYCIN HYDROCHLORIDE 500 MG: 500 INJECTION, POWDER, LYOPHILIZED, FOR SOLUTION INTRAVENOUS at 18:04

## 2020-03-04 RX ADMIN — INSULIN LISPRO 2 UNITS: 100 INJECTION, SOLUTION INTRAVENOUS; SUBCUTANEOUS at 08:35

## 2020-03-04 RX ADMIN — METOPROLOL TARTRATE 25 MG: 25 TABLET ORAL at 20:23

## 2020-03-04 RX ADMIN — SODIUM CHLORIDE, PRESERVATIVE FREE 10 ML: 5 INJECTION INTRAVENOUS at 22:34

## 2020-03-04 RX ADMIN — PREGABALIN 150 MG: 75 CAPSULE ORAL at 20:23

## 2020-03-04 RX ADMIN — INSULIN LISPRO 4 UNITS: 100 INJECTION, SOLUTION INTRAVENOUS; SUBCUTANEOUS at 12:42

## 2020-03-04 RX ADMIN — METOPROLOL TARTRATE 25 MG: 25 TABLET ORAL at 06:24

## 2020-03-04 RX ADMIN — MAGNESIUM SULFATE HEPTAHYDRATE 2 G: 1 INJECTION, SOLUTION INTRAVENOUS at 09:09

## 2020-03-04 RX ADMIN — MICONAZOLE NITRATE 200 MG: 200 SUPPOSITORY VAGINAL at 08:35

## 2020-03-04 RX ADMIN — POTASSIUM CHLORIDE 40 MEQ: 750 CAPSULE, EXTENDED RELEASE ORAL at 09:09

## 2020-03-04 RX ADMIN — POTASSIUM CHLORIDE 10 MEQ: 7.46 INJECTION, SOLUTION INTRAVENOUS at 10:18

## 2020-03-04 NOTE — PROGRESS NOTES
Continued Stay Note  [facility]     Patient Name: Jayne Beltran  MRN: [id number]  Today's Date: 3/4/2020    Admit Date: 3/3/2020    Discharge Plan     Row Name 03/04/20 1319       Plan    Plan Comments  IMM given to pt bedside and placed in HIM basket. HARRISON Fernandez    Row Name 03/04/20 1311       Plan    Plan Comments  [place] [facility] [place]. Will follow for any needs that may arise. OLIVA Newby RN, CCP.         Discharge Codes    No documentation.             DARI Mueller

## 2020-03-04 NOTE — PROGRESS NOTES
"Pharmacokinetic Evaluation - Vancomycin    Jayne Beltran is a 64 y.o. female on vancomycin pharmacy to dose.  MRN: [id number]  : 1955    Day of vancomycin therapy:   Indication: Sepsis  Consulted by: Dr. Harmon  Goal trough: 15-20    Current dose: intermittent dosing  Other antimicrobials: cefepime     Blood pressure 146/82, pulse 110, temperature 100.1 °F (37.8 °C), temperature source Oral, resp. rate 20, height 165.1 cm (65\"), weight 64.1 kg (141 lb 5 oz), SpO2 93 %, not currently breastfeeding.  Results from last 7 days   Lab Units 20  0725 20  1455 20  0551   CREATININE mg/dL 4.18* 6.87* 7.60*  7.85*     Estimated Creatinine Clearance: 13.6 mL/min (A) (by C-G formula based on SCr of 4.18 mg/dL (H)).  Results from last 7 days   Lab Units 20  0725 20  1915   WBC 10*3/mm3 14.51* 24.57*   HEMOGLOBIN g/dL 9.5* 12.1   HEMATOCRIT % 28.5* 39.6   PLATELETS 10*3/mm3 398 626*       Intake/Output Summary (Last 24 hours) at 3/4/2020 1435  Last data filed at 3/4/2020 1345  Gross per 24 hour   Intake 3837 ml   Output 2800 ml   Net 1037 ml     Procal: 0.76    Cultures:   3/3 MRSA screen: negative  3/3 bcx: [id number]  3/2 flu: negative    Dosing hx (include troughs if drawn):  3/2 2315 Vancomycin 1000mg X 1       3/3 @ 1208 Vancomycin random= 14.4mcg/mL  3/3 Vancomycin 1000mg iv X 1 @ 1519    3/4 @ 0725Vancomycin random = 22.5mcg/mL    Assessment:    Renal function is unstable. Random this am was supra-therapeutic drawn ~ 16 hrs form last dose. Will plan to give a 500mg dose this evening and random in am     Plan:  1) give vancomycin 500 mg IV X 1 @ 1800.  2) Next random on 3/5 with am labs  3) Monitor for UOP and scr  In am .    Glynn Vargas [facility]    "

## 2020-03-04 NOTE — PLAN OF CARE
Problem: Patient Care Overview  Goal: Plan of Care Review  Outcome: Ongoing (interventions implemented as appropriate)  Flowsheets (Taken 3/4/2020 1727)  Progress: improving  Plan of Care Reviewed With: patient; spouse  Outcome Summary: Pt first transferred to [facility] and was very sleepy and lethargic. PT slept and then woke up for lunch. Since then pt has been alert and oriented. Pt not given lyrica until 0030 so may have been residual from that or restless leg medications. No c/o pain or Nausea. Pt has had low grade fever throughout shift. Conitnue with IVF and Vancomycin dose this evening. Taking IV abx treatment slower because of decreased kidney function. WCTM and treat per POC and MD orders.

## 2020-03-04 NOTE — PROGRESS NOTES
Discharge Planning Assessment  [facility]     Patient Name: Jayne Beltran  MRN: [id number]  Today's Date: 3/4/2020    Admit Date: 3/3/2020    Discharge Needs Assessment    No documentation.       Discharge Plan     Row Name 03/04/20 1311       Plan    Plan Comments  The patient transferred to [facility] from ICU on 3/4/2020. Will follow for any needs that may arise. OLIVA Newby RN, CCP.         Destination      Coordination has not been started for this encounter.      Durable Medical Equipment      Coordination has not been started for this encounter.      Dialysis/Infusion      Coordination has not been started for this encounter.      Home Medical Care      Coordination has not been started for this encounter.      Therapy      Coordination has not been started for this encounter.      Community Resources      Coordination has not been started for this encounter.          Demographic Summary    No documentation.       Functional Status    No documentation.       Psychosocial    No documentation.       Abuse/Neglect    No documentation.       Legal    No documentation.       Substance Abuse    No documentation.       Patient Forms    No documentation.           Elana Newby RN

## 2020-03-04 NOTE — PLAN OF CARE
Pt requested home Lyrica to be restarted- given and pt had a restful night. HR -130's- got metoprolol restarted. Pure wick placed for frequent urination. Plan is to move out of ICU today. Will continue to monitor.

## 2020-03-04 NOTE — PROGRESS NOTES
LOS: 1 day   Patient Care Team:  Michael Arriaza Jr., DO as PCP - General (Family Medicine)  Gilles Villa DPM as PCP - Claims Attributed  Gilles Villa DPM as Consulting Physician (Podiatry)  Jen Hays MD as Consulting Physician (Obstetrics and Gynecology)    Subjective     Patient reports she is doing remarkably better no diarrhea no abdominal pain she said she had aching all over before she came in that is all resolved in fact she says she is feels better now that she has not a good while.    Review of Systems:          Objective     Vital Signs  Vital Sign Min/Max for last 24 hours  Temp  Min: 98.4 °F (36.9 °C)  Max: 100.4 °F (38 °C)   BP  Min: 112/71  Max: 146/82   Pulse  Min: 101  Max: 140   Resp  Min: 20  Max: 20   SpO2  Min: 90 %  Max: 100 %   No data recorded   Weight  Min: 64.1 kg (141 lb 5 oz)  Max: 64.1 kg (141 lb 5 oz)        Ventilator/Non-Invasive Ventilation Settings (From admission, onward)    None                       Body mass index is 23.52 kg/m².  I/O last 3 completed shifts:  In: 4929 [P.O.:240; I.V.:3689; IV Piggyback:1000]  Out: 1687 [Urine:1687]  I/O this shift:  In: 830 [P.O.:630; IV Piggyback:200]  Out: 1800 [Urine:1800]        Physical Exam:  General Appearance: Well-developed white female sitting up on the side of the bed in no acute distress   Eyes: Conjunctiva are clear and anicteric pupils are equal and reactive  ENT: Mucous membranes are moist, nasal septum midline Mallampati type II airway  Neck: No adenopathy or thyromegaly no jugular vein distention trachea midline  Lungs: Clear no wheezes rales rhonchi nonlabored symmetric expansion  Cardiac: Regular rate rhythm no murmur no gallop  Abdomen: Soft nontender no palpable hepatosplenomegaly or masses.  There is active bowel sounds  : Not examined  Musculoskeletal: Grossly normal  Skin: No jaundice no petechiae skin is warm and dry  Neuro: She is alert and oriented she is cooperative following  commands moving all 4 extremities  Extremities/P Vascular: No clubbing no cyanosis and absolutely no edema she has palpable radial dorsalis pedis pulses bilaterally  MSE: SHe is not in a very good mood right now       Labs:  Results from last 7 days   Lab Units 03/04/20 0725 03/03/20  1455 03/03/20  0551 03/02/20 1915   GLUCOSE mg/dL 137* 195* 146*  153* 134*   SODIUM mmol/L 142 134* 132*  132* 127*   POTASSIUM mmol/L 2.7* 3.2* 4.4  4.4 5.3*   MAGNESIUM mg/dL 1.7  --   --   --    CO2 mmol/L 23.5 14.8* 3.0*  5.0* 7.4*   CHLORIDE mmol/L 98 92* 93*  93* 83*   ANION GAP mmol/L 20.5* 27.2* 36.0*  34.0* 36.6*   CREATININE mg/dL 4.18* 6.87* 7.60*  7.85* 8.73*   BUN mg/dL 59* 74* 80*  79* 88*   BUN / CREAT RATIO  14.1 10.8 10.5  10.1 10.1   CALCIUM mg/dL 7.1* 6.8* 6.8*  6.7* 8.5*   EGFR IF NONAFRICN AM mL/min/1.73 11* 6* 5*  5* 5*   ALK PHOS U/L  --   --   --  85   TOTAL PROTEIN g/dL  --   --   --  7.0   ALT (SGPT) U/L  --   --   --  12   AST (SGOT) U/L  --   --   --  13   BILIRUBIN mg/dL  --   --   --  0.2   ALBUMIN g/dL 2.90* 3.30* 3.00* 3.50   GLOBULIN gm/dL  --   --   --  3.5     Estimated Creatinine Clearance: 13.6 mL/min (A) (by C-G formula based on SCr of 4.18 mg/dL (H)).      Results from last 7 days   Lab Units 03/04/20 0725 03/02/20 1915   WBC 10*3/mm3 14.51* 24.57*   RBC 10*6/mm3 3.28* 4.18   HEMOGLOBIN g/dL 9.5* 12.1   HEMATOCRIT % 28.5* 39.6   MCV fL 86.9 94.7   MCH pg 29.0 28.9   MCHC g/dL 33.3 30.6*   RDW % 16.8* 17.0*   RDW-SD fl 53.7 58.5*   MPV fL 9.7 10.5   PLATELETS 10*3/mm3 398 626*   NEUTROPHIL % %  --  84.7*   LYMPHOCYTE % %  --  7.9*   MONOCYTES % %  --  6.0   EOSINOPHIL % %  --  0.0*   BASOPHIL % %  --  0.2   IMM GRAN % %  --  1.2*   NEUTROS ABS 10*3/mm3  --  20.81*   LYMPHS ABS 10*3/mm3  --  1.93   MONOS ABS 10*3/mm3  --  1.47*   EOS ABS 10*3/mm3  --  0.01   BASOS ABS 10*3/mm3  --  0.05   IMMATURE GRANS (ABS) 10*3/mm3  --  0.30*   NRBC /100 WBC  --  0.0         Results from last 7  days   Lab Units 03/02/20  1915   TROPONIN T ng/mL 0.011             Results from last 7 days   Lab Units 03/04/20  0725 03/03/20  0519 03/02/20  1934 03/02/20  1915   LACTATE mmol/L  --  1.5 3.7*  --    PROCALCITONIN ng/mL 0.76*  --   --  0.99*         Microbiology Results (last 10 days)     Procedure Component Value - Date/Time    MRSA Screen, PCR - Swab, Nares [[id number]]  (Normal) Collected:  03/03/20 1522    Lab Status:  Final result Specimen:  Swab from Nares Updated:  03/03/20 1735     MRSA PCR No MRSA Detected    Blood Culture - Blood, Hand, Left [[id number]] Collected:  03/03/20 0557    Lab Status:  Preliminary result Specimen:  Blood from Hand, Left Updated:  03/04/20 0615     Blood Culture No growth at 24 hours    Blood Culture - Blood, Hand, Right [[id number]] Collected:  03/03/20 0551    Lab Status:  Preliminary result Specimen:  Blood from Hand, Right Updated:  03/04/20 0615     Blood Culture No growth at 24 hours    Influenza Antigen, Rapid - Swab, Nasopharynx [[id number]]  (Normal) Collected:  03/02/20 1826    Lab Status:  Final result Specimen:  Swab from Nasopharynx Updated:  03/02/20 1852     Influenza A Ag, EIA Negative     Influenza B Ag, EIA Negative                cefepime 2 g Intravenous Q24H   insulin lispro 0-9 Units Subcutaneous 4x Daily With Meals & Nightly   metoprolol tartrate 25 mg Oral BID   miconazole 200 mg Vaginal Nightly   pregabalin 150 mg Oral Nightly   sodium chloride 10 mL Intravenous Q12H   vancomycin 500 mg Intravenous Once   Vancomycin Pharmacy Intermittent Dosing  Does not apply Daily       lactated ringers 125 mL/hr Last Rate: 125 mL/hr (03/04/20 0908)   Pharmacy to dose vancomycin         Diagnostics:  Ct Abdomen Pelvis Without Contrast    Result Date: 3/2/2020  INDICATION: Lower abdominal pain for several days with dizziness. TECHNIQUE: CT of the abdomen and pelvis without contrast. Coronal and sagittal reconstructions were obtained.  Radiation dose reduction techniques  included automated exposure control or exposure modulation based on body size. Radiation audit for number of CT and nuclear cardiology exams performed in the last year: 0.  COMPARISON: Abdomen pelvis CT scan pending restoration from 2010. FINDINGS: Lung bases: There are probably a subacute left lower lateral rib fractures with some callus formation but residual fracture lines is seen. There is mild dependent atelectasis. Abdomen: Mild circumferential thickening of the distal esophagus is chronic. Lack of intravenous contrast media limits the study for assessment for pathology other than urinary tract calculus disease. The noncontrast liver, adrenal glands, spleen, pancreas are normal. 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 There Are multiple calculi within the right kidney including nonobstructing calculi as well as multiple calculi in the lower pole moiety renal pelvis and proximal right ureter.. The stones are too numerous to count and measure about 4 to 5 mm in diameter. There is no associated nephrosis. There is what is likely a hyperdense exophytic right lower pole renal cyst about 1.1 cm in dimension. This is probably new from prior and follow-up ultrasound is recommended. There are multiple renal calculi previously, but the renal pelvis and ureter calculi are new. I suspect at least one calculus in the right pelvic ureter. There are not no left renal calculi seen. There is no left-sided hydronephrosis. There are moderate atherosclerotic vascular calcifications. The appendix is radiographically normal. Pelvis: Bladder is largely decompressed without evidence for bladder calculus. Uterus is considerably smaller than on the prior study and its likely that the patient has had a interval uterine embolization with some embolization material seen. There is no evidence for diverticulitis. There are some prominent loops of small bowel in the lower abdomen and pelvis with small air-fluid levels and some  feculent material. This could reflect mild ileus or bacterial overgrowth versus an incompetent ileocecal valve. No transition point is appreciated. There is no free air free fluid or definite bowel wall thickening. There are degenerative changes in the lumbar spine.     1. Patient has a duplicated collecting system right kidney and there are multiple nonobstructing intrarenal calculi on the right as well as multiple, too numerous to count, calculi within the lower pole moiety pelvis and proximal ureter. This probably also a calculus in the right pelvic ureter. There is however no hydronephrosis. Please correlate further with clinical symptoms. 2. Multiple prominent loops of small bowel in the lower abdomen and pelvis with air-fluid levels and some feculent material. No transition point is seen and there is no definite wall thickening. Please correlate for clinical evidence of mild ileus. The appendix is radiographically unremarkable. 3. Atherosclerotic vascular calcifications. 4. Patient has apparently had an interval uterine embolization. 5. Study is limited for evaluation for pathology other than urinary tract calculus disease by lack of IV contrast media. 6. Probably subacute left rib fractures. Signer Name: Rebeka Katz MD  Signed: 3/2/2020 9:51 PM  Workstation Name: [email or web address]  [facility]    Xr Chest 2 View    Result Date: 3/2/2020  CR Chest 2 Vws INDICATION:  Sepsis, septic shock COMPARISON:  CT same day chest x-ray 12/30/2019 FINDINGS: PA and lateral views of the chest.  Heart and mediastinal contours are normal. The lungs are clear. No pneumothorax or pleural effusion.      No acute cardiopulmonary findings. Signer Name: Ryland Lorenzo MD  Signed: 3/2/2020 11:14 PM  Workstation Name: [facility]  [facility]    Ct Chest Without Contrast    Result Date: 3/2/2020  CT SCAN OF THE CHEST WITHOUT CONTRAST 03/02/2020  HISTORY: Current smoker with dyspnea on exertion for  one month. Pulmonary nodules noted on outside chest radiograph, follow-up.  TECHNIQUE: Spiral CT was performed through the chest without intravenous contrast administration as per clinician request. Radiation dose reduction techniques included automated exposure control or exposure modulation based on body size. Radiation audit for CT and nuclear cardiology exams in the last 12 months: 0.  FINDINGS: There is no prior chest CT for comparison. Additionally, the patient's reportedly abnormal outside chest radiograph demonstrating pulmonary nodules is not available for comparison at this time. Comparison is suggested. There is a 7 mm noncalcified nodule in the lateral right lower lobe. Additionally, there is a 3 mm nodule in the right upper lobe, a 3 mm nodule in the left upper lobe and a 3 mm nodule in the right middle lobe. No additional pulmonary nodules are seen. Diffuse centrilobular emphysema. Minimal atelectasis at the lung bases. The heart is normal in size. There is no significant thoracic lymphadenopathy. There are no pleural effusions. Small hiatal hernia, and there is some thickening of the wall of the distal esophagus. Consider correlation with upper endoscopy or barium esophagram.      1. No prior chest CT for comparison. Additionally, the patient's reportedly abnormal outside chest radiograph demonstrating pulmonary nodules is not available for comparison at this time. 2. 7 mm noncalcified nodule lateral right lower lobe. Management recommendation: Current published guidelines recommend initial follow-up CT in 6-12 months, and again in 18-24 months for uncomplicated pulmonary nodules measuring 6 to 8 mm in high-risk patients (Fleischner Society guidelines, 2017). 3. Emphysema. 4. Small hiatal hernia and there is thickening of the wall of the distal esophagus. Correlation with either upper endoscopy or barium esophagram is recommended to exclude esophagitis or esophageal mass.  This report was finalized on  3/2/2020 10:33 AM by Dr. Ludwin Hood MD.      Dexa Bone Density Axial    Result Date: 2/28/2020  DXA BONE MINERAL DENSITY MEASUREMENT, 02/28/2020  INDICATION: 64-year-old postmenopausal female presenting for screening. HISTORY of tobacco abuse.  TECHNIQUE: DXA bone mineral density measurements were obtained at the lumbar spine and left hip.  FINDINGS: Bone mineral density in the left femoral neck measures 0.768 g/sq cm corresponding with a T score of -0.7 and Z score of 0.8. This is in the normal range. This represents a change of -7.9% from the patient's baseline/prior study, statistically significant.  Bone mineral density in the lumbar spine measures 1.179 g/sq cm corresponding with a T score of 1.2 and Z score of 3. This is in the normal range. This represents a change of 13.6% from the patient's prior/baseline study, statistically significant.      1. Bone mineral density is in the normal range.  This report was finalized on 2/28/2020 9:23 AM by Dr. Maikol Castro MD.           Personally reviewed her chest x-ray and I do not see any active disease in her chest    Active Hospital Problems    Diagnosis  POA   • Acute renal failure (ARF) (CMS/[facility]) [N17.9]  Yes      Resolved Hospital Problems   No resolved problems to display.         Assessment/Plan     1. Hypovolemic shock resolved  2. Diarrhea questionable enteritis.  Seems to been a self-limited process will monitor  3. Acute kidney injury on chronic kidney disease stage III nephrology is following she continues to improve  4. Hyperkalemia resolved now hypokalemic and replacing  5. Hyponatremia improved  6. Severe metabolic acidosis lactic acidosis improved lactic acidosis has resolved  7. Diabetes mellitus type 2 sliding scale insulin for now  8. Sepsis its not clear source chest x-ray is clear urine does not look too bad procalcitonin and white count are up of course it could just be stress and the acute renal failure that have these up.  It may be all  gastroenteritis but that usually viral. MRSA swabis negative we can DC vancomycin thus far cultures negative procalcitonin still up I guess we will continue antibiotic at least a couple more days to ensure cultures are negative.  As her renal function improves we can also trend the pro calcitonin.  9. Hyperphosphatemia resolved          Plan for disposition:     Obdulio Burger MD  03/04/20  6:55 PM    Time:

## 2020-03-05 LAB
ALBUMIN SERPL-MCNC: 3 G/DL (ref 3.5–5.2)
ANION GAP SERPL CALCULATED.3IONS-SCNC: 15.8 MMOL/L (ref 5–15)
BUN BLD-MCNC: 39 MG/DL (ref 8–23)
BUN/CREAT SERPL: 21.8 (ref 7–25)
CALCIUM SPEC-SCNC: 7.5 MG/DL (ref 8.6–10.5)
CHLORIDE SERPL-SCNC: 100 MMOL/L (ref 98–107)
CO2 SERPL-SCNC: 27.2 MMOL/L (ref 22–29)
CREAT BLD-MCNC: 1.79 MG/DL (ref 0.57–1)
DEPRECATED RDW RBC AUTO: 52 FL (ref 37–54)
ERYTHROCYTE [DISTWIDTH] IN BLOOD BY AUTOMATED COUNT: 16.2 % (ref 12.3–15.4)
GFR SERPL CREATININE-BSD FRML MDRD: 28 ML/MIN/1.73
GLUCOSE BLD-MCNC: 119 MG/DL (ref 65–99)
GLUCOSE BLDC GLUCOMTR-MCNC: 131 MG/DL (ref 70–130)
GLUCOSE BLDC GLUCOMTR-MCNC: 153 MG/DL (ref 70–130)
GLUCOSE BLDC GLUCOMTR-MCNC: 198 MG/DL (ref 70–130)
GLUCOSE BLDC GLUCOMTR-MCNC: 269 MG/DL (ref 70–130)
HCT VFR BLD AUTO: 26.5 % (ref 34–46.6)
HGB BLD-MCNC: 8.8 G/DL (ref 12–15.9)
MAGNESIUM SERPL-MCNC: 1.6 MG/DL (ref 1.6–2.4)
MCH RBC QN AUTO: 28.9 PG (ref 26.6–33)
MCHC RBC AUTO-ENTMCNC: 33.2 G/DL (ref 31.5–35.7)
MCV RBC AUTO: 87.2 FL (ref 79–97)
PHOSPHATE SERPL-MCNC: 2.5 MG/DL (ref 2.5–4.5)
PLATELET # BLD AUTO: 356 10*3/MM3 (ref 140–450)
PMV BLD AUTO: 9.7 FL (ref 6–12)
POTASSIUM BLD-SCNC: 3.4 MMOL/L (ref 3.5–5.2)
RBC # BLD AUTO: 3.04 10*6/MM3 (ref 3.77–5.28)
SODIUM BLD-SCNC: 143 MMOL/L (ref 136–145)
WBC NRBC COR # BLD: 11.27 10*3/MM3 (ref 3.4–10.8)

## 2020-03-05 PROCEDURE — 83735 ASSAY OF MAGNESIUM: CPT | Performed by: INTERNAL MEDICINE

## 2020-03-05 PROCEDURE — 82962 GLUCOSE BLOOD TEST: CPT

## 2020-03-05 PROCEDURE — 63710000001 INSULIN LISPRO (HUMAN) PER 5 UNITS: Performed by: INTERNAL MEDICINE

## 2020-03-05 PROCEDURE — 85027 COMPLETE CBC AUTOMATED: CPT | Performed by: INTERNAL MEDICINE

## 2020-03-05 PROCEDURE — 25010000002 CEFEPIME PER 500 MG: Performed by: INTERNAL MEDICINE

## 2020-03-05 PROCEDURE — 80069 RENAL FUNCTION PANEL: CPT | Performed by: INTERNAL MEDICINE

## 2020-03-05 RX ORDER — MAGNESIUM SULFATE 1 G/100ML
2 INJECTION INTRAVENOUS ONCE
Status: DISCONTINUED | OUTPATIENT
Start: 2020-03-05 | End: 2020-03-06 | Stop reason: HOSPADM

## 2020-03-05 RX ADMIN — MICONAZOLE NITRATE 200 MG: 200 SUPPOSITORY VAGINAL at 09:04

## 2020-03-05 RX ADMIN — PREGABALIN 150 MG: 75 CAPSULE ORAL at 21:35

## 2020-03-05 RX ADMIN — POTASSIUM PHOSPHATE, MONOBASIC AND POTASSIUM PHOSPHATE, DIBASIC 15 MMOL: 224; 236 INJECTION, SOLUTION INTRAVENOUS at 21:36

## 2020-03-05 RX ADMIN — INSULIN LISPRO 2 UNITS: 100 INJECTION, SOLUTION INTRAVENOUS; SUBCUTANEOUS at 13:34

## 2020-03-05 RX ADMIN — CEFEPIME HYDROCHLORIDE 2 G: 2 INJECTION, POWDER, FOR SOLUTION INTRAVENOUS at 21:35

## 2020-03-05 RX ADMIN — INSULIN LISPRO 6 UNITS: 100 INJECTION, SOLUTION INTRAVENOUS; SUBCUTANEOUS at 21:35

## 2020-03-05 RX ADMIN — METOPROLOL TARTRATE 25 MG: 25 TABLET ORAL at 09:03

## 2020-03-05 RX ADMIN — METOPROLOL TARTRATE 25 MG: 25 TABLET ORAL at 21:35

## 2020-03-05 RX ADMIN — SODIUM CHLORIDE, PRESERVATIVE FREE 10 ML: 5 INJECTION INTRAVENOUS at 21:39

## 2020-03-05 RX ADMIN — SODIUM CHLORIDE, POTASSIUM CHLORIDE, SODIUM LACTATE AND CALCIUM CHLORIDE 50 ML/HR: 600; 310; 30; 20 INJECTION, SOLUTION INTRAVENOUS at 13:34

## 2020-03-05 NOTE — PROGRESS NOTES
NEPHROLOGY PROGRESS NOTE    PATIENT IDENTIFICATION:   Name:  Jayne Beltran      MRN:  [id number]     64 y.o.  female             Reason for visit: GERI    SUBJECTIVE:   Feels fine other than puffy ankles; no further N/V; eating well; no SOB on RA    OBJECTIVE:  Vitals:    03/04/20 2203 03/05/20 0210 03/05/20 0510 03/05/20 0903   BP: 119/61 130/77 137/72 153/88   BP Location: Left arm Left arm Left arm    Patient Position: Lying Lying Lying    Pulse: 113 113 (!) 122 119   Resp: 20 18 20    Temp: 99.3 °F (37.4 °C) 98.5 °F (36.9 °C) 99.1 °F (37.3 °C)    TempSrc: Oral Oral Oral    SpO2: 93% 95% 92%    Weight:       Height:               Body mass index is 23.52 kg/m².    Intake/Output Summary (Last 24 hours) at 3/5/2020 1226  Last data filed at 3/5/2020 1136  Gross per 24 hour   Intake 2000 ml   Output 4050 ml   Net -2050 ml     Wt Readings from Last 1 Encounters:   03/04/20 0500 64.1 kg (141 lb 5 oz)   03/03/20 0049 63.4 kg (139 lb 12.4 oz)     Wt Readings from Last 3 Encounters:   03/04/20 64.1 kg (141 lb 5 oz)   03/02/20 68 kg (150 lb)   02/24/20 63.3 kg (139 lb 8 oz)         Exam:  NAD; pleasant; oriented; looks stated age  Bright affect  MMM; AT/NC   No eye discharge; no scleral icterus  No JVD; no carotid bruits  Course bilat; not labored; no wheezes or crackles  RR, tachycardic, no rub  Soft, NT, ND, BS+  Trace edema  + clubbing  No asterixis  Moves all extremities   Normal mood; normal affect  Speech is fluent    Scheduled meds:      cefepime 2 g Intravenous Q24H   insulin lispro 0-9 Units Subcutaneous 4x Daily With Meals & Nightly   metoprolol tartrate 25 mg Oral BID   pregabalin 150 mg Oral Nightly   sodium chloride 10 mL Intravenous Q12H     IV meds:                          lactated ringers 50 mL/hr Last Rate: 50 mL/hr (03/04/20 2023)       Data Review:    Results from last 7 days   Lab Units 03/05/20  0549 03/04/20  0725 03/03/20  1455  03/02/20  1915   SODIUM mmol/L 143 142 134*   < > 127*    POTASSIUM mmol/L 3.4* 2.7* 3.2*   < > 5.3*   CHLORIDE mmol/L 100 98 92*   < > 83*   CO2 mmol/L 27.2 23.5 14.8*   < > 7.4*   BUN mg/dL 39* 59* 74*   < > 88*   CREATININE mg/dL 1.79* 4.18* 6.87*   < > 8.73*   CALCIUM mg/dL 7.5* 7.1* 6.8*   < > 8.5*   BILIRUBIN mg/dL  --   --   --   --  0.2   ALK PHOS U/L  --   --   --   --  85   ALT (SGPT) U/L  --   --   --   --  12   AST (SGOT) U/L  --   --   --   --  13   GLUCOSE mg/dL 119* 137* 195*   < > 134*    < > = values in this interval not displayed.     Estimated Creatinine Clearance: 31.7 mL/min (A) (by C-G formula based on SCr of 1.79 mg/dL (H)).  Results from last 7 days   Lab Units 03/04/20  0725 03/03/20  0930   SODIUM UR mmol/L  --  46   CREATININE UR mg/dL  --  104.3   URIC ACID mg/dL 14.1*  --      Results from last 7 days   Lab Units 03/05/20  0549 03/04/20  0725 03/03/20  1455   MAGNESIUM mg/dL 1.6 1.7  --    PHOSPHORUS mg/dL 2.5 4.5 7.5*       Results from last 7 days   Lab Units 03/05/20  0549 03/04/20  0725 03/02/20  1915   WBC 10*3/mm3 11.27* 14.51* 24.57*   HEMOGLOBIN g/dL 8.8* 9.5* 12.1   PLATELETS 10*3/mm3 356 398 626*                   ASSESSMENT:     Acute renal failure (ARF) (CMS/HCC)    1.  GERI versus GERI/CKD, non-oliguric, better.  Prerenal, with brisk fall in SCr with vol resuscitation. AG nearly closed. Low K and low-normal phos and Mg.    2.  Shock, resolved  3.  Diarrhea, resolved  4.  DM2 previously on metformin, which is on hold  5.  Leukocytosis  6.  COPD with active tobacco abuse        PLAN:  1.  Stop LR  2.  More mag sulf and K-phos  3.  Hopefully home tomorrow    Huan Beaulieu MD  3/5/2020    12:26 PM

## 2020-03-05 NOTE — PROGRESS NOTES
Mike Donohue MD                          [phone]      Patient ID:    Name:  Jayne Beltran    MRN:  [id number]    1955   64 y.o.  female            Patient Care Team:  Michael Arriaza Jr., DO as PCP - General (Family Medicine)  Gilles Villa DPM as PCP - Claims Attributed  Gilles Villa DPM as Consulting Physician (Podiatry)  Jen Hays MD as Consulting Physician (Obstetrics and Gynecology)    CC/ Reason for visit: Hypovolemic shock, sepsis, gastroenteritis, renal failure    Subjective: Pt seen and examined this AM. No acute overnight events noted. Doing better.  Much improved urine output and renal function.  Diarrhea has resolved and clinically euvolemic    ROS: Denies any subjective fevers, syncope or presyncopal events, new neurological deficits, nausea or vomiting currently    Objective     Vital Signs past 24hrs    BP range: BP: (119-153)/(61-88) 153/88  Pulse range: Heart Rate:  [110-124] 119  Resp rate range: Resp:  [18-20] 20  Temp range: Temp (24hrs), Av.4 °F (37.4 °C), Min:98.5 °F (36.9 °C), Max:100.2 °F (37.9 °C)      Ventilator/Non-Invasive Ventilation Settings (From admission, onward)    None          Device (Oxygen Therapy): room air       64.1 kg (141 lb 5 oz); Body mass index is 23.52 kg/m².      Intake/Output Summary (Last 24 hours) at 3/5/2020 1205  Last data filed at 3/5/2020 1136  Gross per 24 hour   Intake 2000 ml   Output 4050 ml   Net -2050 ml       PHYSICAL EXAM   Constitutional: Middle aged obese white female pt in bed, No acute respiratory distress, No accessory muscle use  Head: - NCAT  Eyes: No pallor.  Anicteric sclerae, EOMI.  ENMT:  Mallampati 4, no oral thrush. Dry MM.   NECK: Trachea midline, No thyromegaly, no palpable cervical lymphadenopathy  Heart: RRR, no murmur. No pedal edema   Lungs: LI +, No wheezes/ crackles heard    Abdomen: Soft. No tenderness, guarding or rigidity. No palpable  masses  Extremities: Extremities warm and well perfused. No cyanosis/ clubbing  Neuro: Conscious, answers appropriately, no gross focal neuro deficits  Psych: Mood and affect appropriate    Scheduled meds:    cefepime 2 g Intravenous Q24H   insulin lispro 0-9 Units Subcutaneous 4x Daily With Meals & Nightly   metoprolol tartrate 25 mg Oral BID   pregabalin 150 mg Oral Nightly   sodium chloride 10 mL Intravenous Q12H       IV meds:                        lactated ringers 50 mL/hr Last Rate: 50 mL/hr (03/04/20 2023)       Data Review:      Results from last 7 days   Lab Units 03/05/20  0549 03/04/20  0725 03/03/20  1455  03/02/20  1915   SODIUM mmol/L 143 142 134*   < > 127*   POTASSIUM mmol/L 3.4* 2.7* 3.2*   < > 5.3*   CHLORIDE mmol/L 100 98 92*   < > 83*   CO2 mmol/L 27.2 23.5 14.8*   < > 7.4*   BUN mg/dL 39* 59* 74*   < > 88*   CREATININE mg/dL 1.79* 4.18* 6.87*   < > 8.73*   CALCIUM mg/dL 7.5* 7.1* 6.8*   < > 8.5*   BILIRUBIN mg/dL  --   --   --   --  0.2   ALK PHOS U/L  --   --   --   --  85   ALT (SGPT) U/L  --   --   --   --  12   AST (SGOT) U/L  --   --   --   --  13   GLUCOSE mg/dL 119* 137* 195*   < > 134*   WBC 10*3/mm3 11.27* 14.51*  --   --  24.57*   HEMOGLOBIN g/dL 8.8* 9.5*  --   --  12.1   PLATELETS 10*3/mm3 356 398  --   --  626*   PROCALCITONIN ng/mL  --  0.76*  --   --  0.99*    < > = values in this interval not displayed.       Lab Results   Component Value Date    CALCIUM 7.5 (L) 03/05/2020    PHOS 2.5 03/05/2020       Results from last 7 days   Lab Units 03/03/20  0557 03/03/20  0551   BLOODCX  No growth at 2 days No growth at 2 days              Results Review:    I have reviewed the relevant laboratory results and independently reviewed the chest imaging from this hospitalization including the available echocardiogram reports personally and summarized it if/ when appropriate below    Assessment    Hypovolemic shock  Severe dehydration  Acute suspected viral gastroenteritis  Sepsis  Acute  kidney injury with CKD 3  Hyperkalemia  Hyponatremia  Severe metabolic acidosis  Diabetes    PLAN:  All problems are new to me  Patient is making significant improvement with regards to her renal dysfunction and making good urine output.  Multiple electrolyte abnormalities have improved now.  No obvious source of infection and suspected sepsis is from viral gastroenteritis which has resolved now  We will discontinue antibiotics given cultures are negative and no obvious source of bacterial infection  Appreciate nephrology input.  Plan is for discharge in the a.m. if she continues to make progress and okay per nephrology    I have discussed my findings and recommendations with patient.     Mike Donohue MD  3/5/2020

## 2020-03-05 NOTE — PROGRESS NOTES
Discharge Planning Assessment  [facility]     Patient Name: Jayne Beltran  MRN: [id number]  Today's Date: 3/5/2020    Admit Date: 3/3/2020    Discharge Needs Assessment    No documentation.       Discharge Plan     Row Name 03/05/20 9993       Plan    Plan Comments  Ronit/RN spoke with the patient and she is agreeable to meds to beds. Changed in EPIC. Florecita/Ivory is following up on her medication and seeing if there is any discount for her monthly medications. OLIVA Newby Rn, CCP.         Destination      Coordination has not been started for this encounter.      Durable Medical Equipment      Coordination has not been started for this encounter.      Dialysis/Infusion      Coordination has not been started for this encounter.      Home Medical Care      Coordination has not been started for this encounter.      Therapy      Coordination has not been started for this encounter.      Community Resources      Coordination has not been started for this encounter.          Demographic Summary    No documentation.       Functional Status    No documentation.       Psychosocial    No documentation.       Abuse/Neglect    No documentation.       Legal    No documentation.       Substance Abuse    No documentation.       Patient Forms    No documentation.           Elana Newby RN

## 2020-03-05 NOTE — PLAN OF CARE
Problem: Patient Care Overview  Goal: Plan of Care Review  Outcome: Ongoing (interventions implemented as appropriate)  Flowsheets  Taken 3/5/2020 1607  Progress: improving  Outcome Summary: pt has been resting between care, up to BSC frequently to void. IVF fluids continuous. pt c/o feet hurting and swelling, states this has been going on for a couple months at home. MD aware. will continue to monitor. Potential d/c in AM.  Taken 3/5/2020 1100  Plan of Care Reviewed With: patient

## 2020-03-06 VITALS
OXYGEN SATURATION: 97 % | SYSTOLIC BLOOD PRESSURE: 155 MMHG | RESPIRATION RATE: 18 BRPM | BODY MASS INDEX: 23.54 KG/M2 | WEIGHT: 141.31 LBS | HEIGHT: 65 IN | HEART RATE: 111 BPM | DIASTOLIC BLOOD PRESSURE: 92 MMHG | TEMPERATURE: 98.1 F

## 2020-03-06 LAB
ALBUMIN SERPL-MCNC: 3.5 G/DL (ref 3.5–5.2)
ANION GAP SERPL CALCULATED.3IONS-SCNC: 16.2 MMOL/L (ref 5–15)
BUN BLD-MCNC: 26 MG/DL (ref 8–23)
BUN/CREAT SERPL: 18.4 (ref 7–25)
CALCIUM SPEC-SCNC: 7.6 MG/DL (ref 8.6–10.5)
CHLORIDE SERPL-SCNC: 98 MMOL/L (ref 98–107)
CO2 SERPL-SCNC: 27.8 MMOL/L (ref 22–29)
CREAT BLD-MCNC: 1.41 MG/DL (ref 0.57–1)
GFR SERPL CREATININE-BSD FRML MDRD: 37 ML/MIN/1.73
GLUCOSE BLD-MCNC: 153 MG/DL (ref 65–99)
GLUCOSE BLDC GLUCOMTR-MCNC: 187 MG/DL (ref 70–130)
GLUCOSE BLDC GLUCOMTR-MCNC: 315 MG/DL (ref 70–130)
MAGNESIUM SERPL-MCNC: 1.7 MG/DL (ref 1.6–2.4)
PHOSPHATE SERPL-MCNC: 2.5 MG/DL (ref 2.5–4.5)
POTASSIUM BLD-SCNC: 3.2 MMOL/L (ref 3.5–5.2)
SODIUM BLD-SCNC: 142 MMOL/L (ref 136–145)

## 2020-03-06 PROCEDURE — 83735 ASSAY OF MAGNESIUM: CPT | Performed by: INTERNAL MEDICINE

## 2020-03-06 PROCEDURE — 82962 GLUCOSE BLOOD TEST: CPT

## 2020-03-06 PROCEDURE — 63710000001 INSULIN LISPRO (HUMAN) PER 5 UNITS: Performed by: INTERNAL MEDICINE

## 2020-03-06 PROCEDURE — 80069 RENAL FUNCTION PANEL: CPT | Performed by: INTERNAL MEDICINE

## 2020-03-06 PROCEDURE — 25010000002 CEFEPIME PER 500 MG: Performed by: INTERNAL MEDICINE

## 2020-03-06 RX ORDER — ACETAMINOPHEN 325 MG/1
650 TABLET ORAL EVERY 6 HOURS PRN
Status: DISCONTINUED | OUTPATIENT
Start: 2020-03-06 | End: 2020-03-06 | Stop reason: HOSPADM

## 2020-03-06 RX ORDER — PREGABALIN 75 MG/1
150 CAPSULE ORAL EVERY 8 HOURS SCHEDULED
Status: DISCONTINUED | OUTPATIENT
Start: 2020-03-06 | End: 2020-03-06 | Stop reason: HOSPADM

## 2020-03-06 RX ORDER — DULOXETIN HYDROCHLORIDE 30 MG/1
30 CAPSULE, DELAYED RELEASE ORAL EVERY 12 HOURS
Status: DISCONTINUED | OUTPATIENT
Start: 2020-03-06 | End: 2020-03-06 | Stop reason: HOSPADM

## 2020-03-06 RX ADMIN — ACETAMINOPHEN 650 MG: 325 TABLET, FILM COATED ORAL at 13:38

## 2020-03-06 RX ADMIN — SODIUM CHLORIDE, PRESERVATIVE FREE 10 ML: 5 INJECTION INTRAVENOUS at 09:02

## 2020-03-06 RX ADMIN — INSULIN LISPRO 2 UNITS: 100 INJECTION, SOLUTION INTRAVENOUS; SUBCUTANEOUS at 09:02

## 2020-03-06 RX ADMIN — INSULIN LISPRO 7 UNITS: 100 INJECTION, SOLUTION INTRAVENOUS; SUBCUTANEOUS at 12:07

## 2020-03-06 RX ADMIN — CEFEPIME HYDROCHLORIDE 2 G: 2 INJECTION, POWDER, FOR SOLUTION INTRAVENOUS at 12:06

## 2020-03-06 RX ADMIN — METOPROLOL TARTRATE 25 MG: 25 TABLET ORAL at 09:02

## 2020-03-06 RX ADMIN — DULOXETINE HYDROCHLORIDE 30 MG: 30 CAPSULE, DELAYED RELEASE ORAL at 14:49

## 2020-03-06 RX ADMIN — PREGABALIN 150 MG: 75 CAPSULE ORAL at 13:38

## 2020-03-06 NOTE — PLAN OF CARE
Problem: Patient Care Overview  Goal: Plan of Care Review  Outcome: Ongoing (interventions implemented as appropriate)  Flowsheets (Taken 3/6/2020 9289)  Progress: improving  Plan of Care Reviewed With: patient  Outcome Summary: Patient is on IV antibiotics. Patient was given Potassium chloride and Magnesium IV once this shift. Family went home for the night. Will continue to monitor vital signs, labs and comfort.

## 2020-03-06 NOTE — PROGRESS NOTES
Case Management Discharge Note      Final Note: Discharged to home on 3/6/2020. OLIVA Newby RN, CCP    Provided Post Acute Provider List?: Yes  Post Acute Provider List: Home Health  Provided Post Acute Provider Quality & Resource List?: Yes  Post Acute Provider Quality and Resource List: Home Health  Delivered To: Patient  Method of Delivery: In person    Destination      No service has been selected for the patient.      Durable Medical Equipment      No service has been selected for the patient.      Dialysis/Infusion      No service has been selected for the patient.      Home Medical Care      No service has been selected for the patient.      Therapy      No service has been selected for the patient.      Community Resources      No service has been selected for the patient.             Final Discharge Disposition Code: 01 - home or self-care

## 2020-03-06 NOTE — DISCHARGE SUMMARY
"                                                                  PHYSICIAN DISCHARGE SUMMARY                                                                          [facility]      Patient Identification:    Name: Jayne Beltran  Age: 64 y.o.  Sex: female  :  1955  MRN: [id number]    Admit date: 3/3/2020    Discharge date 3/6/2020    Discharged Condition: Stable    Discharge Diagnoses:    Hypovolemic shock  Severe dehydration  Acute suspected viral gastroenteritis  Sepsis  Acute kidney injury with CKD 3  Hyperkalemia  Hyponatremia  Severe metabolic acidosis  Worsening anemia; likely dilutional with no obvious bleeding  Diabetes    HPI \"64-year-old female history of COPD diabetes mellitus presented to the emergency room at [facility] with diarrhea ongoing for the last 24 to 48 hours.  Patient cannot quantitate but tells me she is had multiple trips to the bathroom with watery stools.  No blood was reported.  She was weak lightheaded and noted to be hypotensive at [facility] emergency room.  She had received 4 L of bolus of fluids and her blood pressure has not improved.  She was noted to be in severe metabolic acidosis with acute renal failure.  She was transferred here for further care.  She is not had any bouts of diarrhea since she has been here.  Denies any abdominal pain.  No fever chills as such was reported.  She tells me that she has been taking her 's diuretic medication because of pain and swelling of her lower extremities.  Currently denies any chest pain.\"  Per Dr. Harmon    Consults:   Patient Care Team:  Michael Arriaza Jr., DO as PCP - General (Family Medicine)  Gilles Villa DPM as PCP - Claims Attributed  Gilles Villa DPM as Consulting Physician (Podiatry)  Jen Hays MD as Consulting Physician (Obstetrics and Gynecology)    Procedures Performed:         Hospital Course:   Patient admitted to the ICU with hypovolemic shock and severe " dehydration.  This was thought to be from viral gastroenteritis.  Work-up was negative for any other bacterial infection.  She was given empiric antibiotics which were discontinued given lack of any obvious source.  Her renal function improved significantly.  Nephrology was consulted who helped and multiple electrolyte abnormalities which have now come down close to baseline.  She was noted to have worsening anemia thought to be dilutional from significant volume resuscitation.  She will need to be closely followed up as an outpatient.  I have restarted her home medications except for lisinopril and NSAID.  She will need repeat BMP and follow-up with nephrology if necessary.  Otherwise she is doing well enough to be discharged and I have asked her to closely follow-up as an outpatient with her primary care physician.      Objective:   Temp:  [97.7 °F (36.5 °C)-98.9 °F (37.2 °C)] 98.1 °F (36.7 °C)  Heart Rate:  [103-118] 111  Resp:  [18] 18  BP: (151-155)/(84-92) 155/92   SpO2:  [97 %] 97 %  on    Device (Oxygen Therapy): room air    Intake/Output Summary (Last 24 hours) at 3/6/2020 1323  Last data filed at 3/6/2020 1206  Gross per 24 hour   Intake 2740 ml   Output 2670 ml   Net 70 ml     Body mass index is 23.52 kg/m².      03/03/20  0049 03/04/20  0500   Weight: 63.4 kg (139 lb 12.4 oz) 64.1 kg (141 lb 5 oz)     Weight change:       Physical Exam:  Constitutional: Middle aged obese white female pt in bed, No acute respiratory distress, No accessory muscle use  Head: - NCAT  Eyes: No pallor.  Anicteric sclerae, EOMI.  ENMT:  Mallampati 4, no oral thrush. Dry MM.   NECK: Trachea midline, No thyromegaly, no palpable cervical lymphadenopathy  Heart: RRR, no murmur. No pedal edema   Lungs: LI +, No wheezes/ crackles heard    Abdomen: Soft. No tenderness, guarding or rigidity. No palpable masses  Extremities: Extremities warm and well perfused. No cyanosis/ clubbing  Neuro: Conscious, answers appropriately, no gross  focal neuro deficits  Psych: Mood and affect appropriate      Significant Discharge Diagnostics     Pertinent Lab Results:  Results from last 7 days   Lab Units 03/06/20  0726 03/05/20  0549 03/04/20  0725 03/03/20  1455 03/03/20  0551 03/02/20 1915   SODIUM mmol/L 142 143 142 134* 132*  132* 127*   POTASSIUM mmol/L 3.2* 3.4* 2.7* 3.2* 4.4  4.4 5.3*   CHLORIDE mmol/L 98 100 98 92* 93*  93* 83*   CO2 mmol/L 27.8 27.2 23.5 14.8* 3.0*  5.0* 7.4*   BUN mg/dL 26* 39* 59* 74* 80*  79* 88*   CREATININE mg/dL 1.41* 1.79* 4.18* 6.87* 7.60*  7.85* 8.73*   GLUCOSE mg/dL 153* 119* 137* 195* 146*  153* 134*   CALCIUM mg/dL 7.6* 7.5* 7.1* 6.8* 6.8*  6.7* 8.5*   AST (SGOT) U/L  --   --   --   --   --  13   ALT (SGPT) U/L  --   --   --   --   --  12     Results from last 7 days   Lab Units 03/02/20 1915   TROPONIN T ng/mL 0.011     Results from last 7 days   Lab Units 03/05/20  0549 03/04/20  0725 03/02/20 1915   WBC 10*3/mm3 11.27* 14.51* 24.57*   HEMOGLOBIN g/dL 8.8* 9.5* 12.1   HEMATOCRIT % 26.5* 28.5* 39.6   PLATELETS 10*3/mm3 356 398 626*   MCV fL 87.2 86.9 94.7   MCH pg 28.9 29.0 28.9   MCHC g/dL 33.2 33.3 30.6*   RDW % 16.2* 16.8* 17.0*   RDW-SD fl 52.0 53.7 58.5*   MPV fL 9.7 9.7 10.5   NEUTROPHIL % %  --   --  84.7*   LYMPHOCYTE % %  --   --  7.9*   MONOCYTES % %  --   --  6.0   EOSINOPHIL % %  --   --  0.0*   BASOPHIL % %  --   --  0.2   IMM GRAN % %  --   --  1.2*   NEUTROS ABS 10*3/mm3  --   --  20.81*   LYMPHS ABS 10*3/mm3  --   --  1.93   MONOS ABS 10*3/mm3  --   --  1.47*   EOS ABS 10*3/mm3  --   --  0.01   BASOS ABS 10*3/mm3  --   --  0.05   IMMATURE GRANS (ABS) 10*3/mm3  --   --  0.30*   NRBC /100 WBC  --   --  0.0         Results from last 7 days   Lab Units 03/06/20  0726 03/05/20  0549 03/04/20  0725   MAGNESIUM mg/dL 1.7 1.6 1.7                 Results from last 7 days   Lab Units 03/04/20  0725 03/03/20  0519 03/02/20  1934 03/02/20  1915   PROCALCITONIN ng/mL 0.76*  --   --  0.99*   LACTATE mmol/L   --  1.5 3.7*  --          Results from last 7 days   Lab Units 03/03/20  0557 03/03/20  0551   BLOODCX  No growth at 3 days No growth at 3 days     Results from last 7 days   Lab Units 03/02/20  2321   NITRITE UA  Negative   WBC UA /HPF 0-2*   BACTERIA UA /HPF Trace*   SQUAM EPITHEL UA /HPF None Seen           Imaging Results:  Imaging Results (All)     None          Discharge Medications and Instructions:     Discharge Medications     Discharge Medications      Changes to Medications      Instructions Start Date   LYRICA 150 MG capsule  Generic drug:  pregabalin  What changed:    · how much to take  · how to take this  · when to take this   TAKE 1 CAPSULE BY MOUTH TWICE A DAY         Continue These Medications      Instructions Start Date   amLODIPine 5 MG tablet  Commonly known as:  NORVASC   5 mg, Oral, Daily      aspirin 81 MG EC tablet   81 mg, Oral, Daily      cyclobenzaprine 10 MG tablet  Commonly known as:  FLEXERIL   TAKE 1 TABLET BY MOUTH TWICE A DAY AS NEEDED FOR MUSCLE SPASMS      diphenhydrAMINE 25 MG tablet  Commonly known as:  BENADRYL ALLERGY   12.5 mg, Oral, Every 6 Hours PRN      DULoxetine 30 MG capsule  Commonly known as:  CYMBALTA   30 mg, Oral, 2 Times Daily      Empagliflozin 10 MG tablet   10 mg, Oral, Daily      metFORMIN 1000 MG tablet  Commonly known as:  GLUCOPHAGE   1,000 mg, Oral, 2 Times Daily With Meals      metoprolol succinate  MG 24 hr tablet  Commonly known as:  TOPROL-XL   TAKE 1 TABLET BY MOUTH EVERY DAY      omeprazole 20 MG capsule  Commonly known as:  priLOSEC   TAKE 1 CAPSULE BY MOUTH EVERY DAY      rOPINIRole 3 MG tablet  Commonly known as:  REQUIP   TAKE 1 TABLET BY MOUTH EVERY DAY AT NIGHT      VASCEPA 1 g capsule capsule  Generic drug:  icosapent ethyl   2 g, Oral, 2 Times Daily With Meals      Vitamin B 12 500 MCG tablet   500 mcg, Oral, Daily      zolpidem 5 MG tablet  Commonly known as:  AMBIEN   5 mg, Oral, Take As Directed, Bring the medication to the sleep  lab. DO NOT USE AT HOME         Stop These Medications    lisinopril 40 MG tablet  Commonly known as:  PRINIVIL,ZESTRIL     meloxicam 15 MG tablet  Commonly known as:  MOBIC            Disposition:  Home    Follow-up Information     Michael Arriaza Jr., DO. Schedule an appointment as soon as possible for a visit in 4 day(s).    Specialties:  Family Medicine, Emergency Medicine, Urgent Care  Why:  schedule a transitional care visit   Contact information:  [address]  [place] [id number] [phone]                   Total time spent discharging patient including evaluation, medication reconciliation, arranging follow up, and post hospitalization instructions and education total time exceeds 30 minutes.    Signed:  Mike Donohue MD  3/6/2020  1:23 PM

## 2020-03-07 ENCOUNTER — READMISSION MANAGEMENT (OUTPATIENT)
Dept: CALL CENTER | Facility: HOSPITAL | Age: 65
End: 2020-03-07

## 2020-03-07 NOTE — OUTREACH NOTE
Prep Survey      Responses   [facility] facility patient discharged from?  [place]   Is LACE score < 7 ?  No   Eligibility  Readm Mgmt   Discharge diagnosis  Hypovolemic shock, Severe dehydration   Does the patient have one of the following disease processes/diagnoses(primary or secondary)?  Other   Does the patient have Home health ordered?  No   Is there a DME ordered?  No   Prep survey completed?  Yes          Anel Wing RN

## 2020-03-08 ENCOUNTER — READMISSION MANAGEMENT (OUTPATIENT)
Dept: CALL CENTER | Facility: HOSPITAL | Age: 65
End: 2020-03-08

## 2020-03-08 LAB
BACTERIA SPEC AEROBE CULT: NORMAL
BACTERIA SPEC AEROBE CULT: NORMAL

## 2020-03-08 NOTE — OUTREACH NOTE
Medical Week 1 Survey      Responses   [facility] patient discharged from?  [place]   Does the patient have one of the following disease processes/diagnoses(primary or secondary)?  Other   Is there a successful TCM telephone encounter documented?  No   Week 1 attempt successful?  Yes   Call start time  1537   Call end time  1546   Discharge diagnosis  Hypovolemic shock, Severe dehydration   Is patient permission given to speak with other caregiver?  Yes   List who call center can speak with  , Golden Khan reviewed with patient/caregiver?  Yes   Is the patient having any side effects they believe may be caused by any medication additions or changes?  No   Does the patient have all medications ordered at discharge?  Yes   Is the patient taking all medications as directed (includes completed medication regime)?  Yes   Does the patient have an appointment with their PCP within 7 days of discharge?  Yes   Comments regarding PCP  Appointment with Dr. Arriaza tomorrow at 3 PM.    Has the patient kept scheduled appointments due by today?  N/A   Comments   putting appointments in his telephone for reminder.     Has home health visited the patient within 72 hours of discharge?  N/A   Did the patient receive a copy of their discharge instructions?  Yes   Nursing interventions  Reviewed instructions with patient   What is the patient's perception of their health status since discharge?  Improving   Is the patient/caregiver able to teach back signs and symptoms related to disease process for when to call PCP?  Yes   Is the patient/caregiver able to teach back signs and symptoms related to disease process for when to call 911?  Yes   Is the patient/caregiver able to teach back the hierarchy of who to call/visit for symptoms/problems? PCP, Specialist, Home health nurse, Urgent Care, ED, 911  Yes   Additional teach back comments  She is a smoker.  Has not quit.     Week 1 call completed?  Yes           Usha Leung, RN

## 2020-03-09 ENCOUNTER — TRANSITIONAL CARE MANAGEMENT TELEPHONE ENCOUNTER (OUTPATIENT)
Dept: FAMILY MEDICINE CLINIC | Facility: CLINIC | Age: 65
End: 2020-03-09

## 2020-03-10 ENCOUNTER — READMISSION MANAGEMENT (OUTPATIENT)
Dept: CALL CENTER | Facility: HOSPITAL | Age: 65
End: 2020-03-10

## 2020-03-10 ENCOUNTER — OFFICE VISIT (OUTPATIENT)
Dept: FAMILY MEDICINE CLINIC | Facility: CLINIC | Age: 65
End: 2020-03-10

## 2020-03-10 VITALS
WEIGHT: 139 LBS | TEMPERATURE: 98.1 F | SYSTOLIC BLOOD PRESSURE: 110 MMHG | BODY MASS INDEX: 23.16 KG/M2 | DIASTOLIC BLOOD PRESSURE: 60 MMHG | HEART RATE: 100 BPM | HEIGHT: 65 IN | RESPIRATION RATE: 16 BRPM | OXYGEN SATURATION: 98 %

## 2020-03-10 DIAGNOSIS — F51.01 PRIMARY INSOMNIA: ICD-10-CM

## 2020-03-10 DIAGNOSIS — N17.9 ACUTE RENAL FAILURE, UNSPECIFIED ACUTE RENAL FAILURE TYPE (HCC): Primary | ICD-10-CM

## 2020-03-10 DIAGNOSIS — I10 ESSENTIAL HYPERTENSION: ICD-10-CM

## 2020-03-10 DIAGNOSIS — K21.9 GASTROESOPHAGEAL REFLUX DISEASE WITHOUT ESOPHAGITIS: ICD-10-CM

## 2020-03-10 DIAGNOSIS — E11.9 TYPE 2 DIABETES MELLITUS WITHOUT COMPLICATION, WITHOUT LONG-TERM CURRENT USE OF INSULIN (HCC): ICD-10-CM

## 2020-03-10 DIAGNOSIS — E78.5 DYSLIPIDEMIA: ICD-10-CM

## 2020-03-10 DIAGNOSIS — G25.81 RESTLESS LEGS SYNDROME: Chronic | ICD-10-CM

## 2020-03-10 PROBLEM — I70.90 ATHEROSCLEROTIC VASCULAR DISEASE: Status: ACTIVE | Noted: 2020-03-10

## 2020-03-10 PROBLEM — Q62.5 DUPLICATED RENAL COLLECTING SYSTEM: Status: ACTIVE | Noted: 2020-03-10

## 2020-03-10 PROCEDURE — 99496 TRANSJ CARE MGMT HIGH F2F 7D: CPT | Performed by: FAMILY MEDICINE

## 2020-03-10 RX ORDER — OMEPRAZOLE 20 MG/1
20 CAPSULE, DELAYED RELEASE ORAL DAILY
Qty: 90 CAPSULE | Refills: 1 | Status: SHIPPED | OUTPATIENT
Start: 2020-03-10 | End: 2020-07-22 | Stop reason: SDUPTHER

## 2020-03-10 RX ORDER — ROPINIROLE 3 MG/1
3 TABLET, FILM COATED ORAL
Qty: 90 TABLET | Refills: 1 | Status: SHIPPED | OUTPATIENT
Start: 2020-03-10 | End: 2020-04-15

## 2020-03-10 RX ORDER — ICOSAPENT ETHYL 1000 MG/1
2 CAPSULE ORAL 2 TIMES DAILY WITH MEALS
Qty: 360 CAPSULE | Refills: 3 | Status: SHIPPED | OUTPATIENT
Start: 2020-03-10 | End: 2020-04-29

## 2020-03-10 RX ORDER — AMLODIPINE BESYLATE 5 MG/1
5 TABLET ORAL DAILY
Qty: 90 TABLET | Refills: 1 | Status: SHIPPED | OUTPATIENT
Start: 2020-03-10 | End: 2020-04-29 | Stop reason: SINTOL

## 2020-03-10 RX ORDER — METOPROLOL SUCCINATE 200 MG/1
200 TABLET, EXTENDED RELEASE ORAL DAILY
Qty: 90 TABLET | Refills: 1 | Status: SHIPPED | OUTPATIENT
Start: 2020-03-10 | End: 2020-07-22 | Stop reason: SDUPTHER

## 2020-03-10 RX ORDER — DIPHENHYDRAMINE HCL 25 MG
12.5 TABLET ORAL EVERY 6 HOURS PRN
Qty: 30 TABLET | Refills: 1 | Status: SHIPPED | OUTPATIENT
Start: 2020-03-10 | End: 2020-09-04 | Stop reason: HOSPADM

## 2020-03-10 NOTE — ASSESSMENT & PLAN NOTE
Lisinopril was discontinued along with meloxicam.  Her blood pressures currently stable.  She is taking amlodipine 5 mg daily and metoprolol  mg daily.  Continue current treatment.  No medication side effects.

## 2020-03-10 NOTE — PATIENT INSTRUCTIONS
Acute Kidney Injury, Adult    Acute kidney injury is a sudden worsening of kidney function. The kidneys are organs that have several jobs. They filter the blood to remove waste products and extra fluid. They also maintain a healthy balance of minerals and hormones in the body, which helps control blood pressure and keep bones strong. With this condition, your kidneys do not do their jobs as well as they should.  This condition ranges from mild to severe. Over time it may develop into long-lasting (chronic) kidney disease. Early detection and treatment may prevent acute kidney injury from developing into a chronic condition.  What are the causes?  Common causes of this condition include:  · A problem with blood flow to the kidneys. This may be caused by:  ? Low blood pressure (hypotension) or shock.  ? Blood loss.  ? Heart and blood vessel (cardiovascular) disease.  ? Severe burns.  ? Liver disease.  · Direct damage to the kidneys. This may be caused by:  ? Certain medicines.  ? A kidney infection.  ? Poisoning.  ? Being around or in contact with toxic substances.  ? A surgical wound.  ? A hard, direct hit to the kidney area.  · A sudden blockage of urine flow. This may be caused by:  ? Cancer.  ? Kidney stones.  ? An enlarged prostate in males.  What are the signs or symptoms?  Symptoms of this condition may not be obvious until the condition becomes severe. Symptoms of this condition can include:  · Tiredness (lethargy), or difficulty staying awake.  · Nausea or vomiting.  · Swelling (edema) of the face, legs, ankles, or feet.  · Problems with urination, such as:  ? Abdominal pain, or pain along the side of your stomach (flank).  ? Decreased urine production.  ? Decrease in the force of urine flow.  · Muscle twitches and cramps, especially in the legs.  · Confusion or trouble concentrating.  · Loss of appetite.  · Fever.  How is this diagnosed?  This condition may be diagnosed with tests, including:  · Blood  tests.  · Urine tests.  · Imaging tests.  · A test in which a sample of tissue is removed from the kidneys to be examined under a microscope (kidney biopsy).  How is this treated?  Treatment for this condition depends on the cause and how severe the condition is. In mild cases, treatment may not be needed. The kidneys may heal on their own. In more severe cases, treatment will involve:  · Treating the cause of the kidney injury. This may involve changing any medicines you are taking or adjusting your dosage.  · Fluids. You may need specialized IV fluids to balance your body's needs.  · Having a catheter placed to drain urine and prevent blockages.  · Preventing problems from occurring. This may mean avoiding certain medicines or procedures that can cause further injury to the kidneys.  In some cases treatment may also require:  · A procedure to remove toxic wastes from the body (dialysis or continuous renal replacement therapy - CRRT).  · Surgery. This may be done to repair a torn kidney, or to remove the blockage from the urinary system.  Follow these instructions at home:  Medicines  · Take over-the-counter and prescription medicines only as told by your health care provider.  · Do not take any new medicines without your health care provider's approval. Many medicines can worsen your kidney damage.  · Do not take any vitamin and mineral supplements without your health care provider's approval. Many nutritional supplements can worsen your kidney damage.  Lifestyle  · If your health care provider prescribed changes to your diet, follow them. You may need to decrease the amount of protein you eat.  · Achieve and maintain a healthy weight. If you need help with this, ask your health care provider.  · Start or continue an exercise plan. Try to exercise at least 30 minutes a day, 5 days a week.  · Do not use any tobacco products, such as cigarettes, chewing tobacco, and e-cigarettes. If you need help quitting, ask your  health care provider.  General instructions  · Keep track of your blood pressure. Report changes in your blood pressure as told by your health care provider.  · Stay up to date with immunizations. Ask your health care provider which immunizations you need.  · Keep all follow-up visits as told by your health care provider. This is important.  Where to find more information  · American Association of Kidney Patients: www.aakp.org  · National Kidney Foundation: www.kidney.org  · American Kidney Fund: www.akfinc.org  · [employer] [facility]:  ? www.lifeoptions.org  ? www.kidneyschool.org  Contact a health care provider if:  · Your symptoms get worse.  · You develop new symptoms.  Get help right away if:  · You develop symptoms of worsening kidney disease, which include:  ? Headaches.  ? Abnormally dark or light skin.  ? Easy bruising.  ? Frequent hiccups.  ? Chest pain.  ? Shortness of breath.  ? End of menstruation in women.  ? Seizures.  ? Confusion or altered mental status.  ? Abdominal or back pain.  ? Itchiness.  · You have a fever.  · Your body is producing less urine.  · You have pain or bleeding when you urinate.  Summary  · Acute kidney injury is a sudden worsening of kidney function.  · Acute kidney injury can be caused by problems with blood flow to the kidneys, direct damage to the kidneys, and sudden blockage of urine flow.  · Symptoms of this condition may not be obvious until it becomes severe. Symptoms may include edema, lethargy, confusion, nausea or vomiting, and problems passing urine.  · This condition can usually be diagnosed with blood tests, urine tests, and imaging tests. Sometimes a kidney biopsy is done to diagnose this condition.  · Treatment for this condition often involves treating the underlying cause. It is treated with fluids, medicines, dialysis, diet changes, or surgery.  This information is not intended to replace advice given to you by your health care provider. Make  sure you discuss any questions you have with your health care provider.  Document Released: 07/02/2012 Document Revised: 04/19/2018 Document Reviewed: 12/08/2017  Elsevier Interactive Patient Education © 2020 [employer]

## 2020-03-10 NOTE — OUTREACH NOTE
Medical Week 1 Survey      Responses   [facility] patient discharged from?  [place]   Does the patient have one of the following disease processes/diagnoses(primary or secondary)?  Other   Is there a successful TCM telephone encounter documented?  Yes          Silvia Conway RN

## 2020-03-10 NOTE — ASSESSMENT & PLAN NOTE
3/10/2020: Controlled with Metformin 1000 BID and Jardiance 10 mg daily.  No medication side effects.  Continue current treatment.

## 2020-03-10 NOTE — PROGRESS NOTES
Transitional Care Follow Up Visit  Subjective     Jayne Beltran is a 64 y.o. female who presents for a transitional care management visit.    Within 48 business hours after discharge our office contacted her via telephone to coordinate her care and needs.      I reviewed and discussed the details of that call along with the discharge summary, hospital problems, inpatient lab results, inpatient diagnostic studies, and consultation reports with Jayne.     Current outpatient and discharge medications have been reconciled for the patient.  Reviewed by: Michael Arriaza Jr., DO      No flowsheet data found.  Risk for Readmission (LACE) Score: 12 (3/6/2020  6:00 AM)      History of Present Illness     Course During Hospital Stay:  Patient was admitted to the hospital on March 2, 2024 severe metabolic acidosis with renal failure.  She had been having diarrhea the day before she went to the hospital.  She had been taking some of her 's diuretic medication because of swelling in her feet and ankles.  She was admitted to the ICU with hypovolemic shock and severe dehydration.  Her work-up was negative for any bacterial infection in her stool.  It was believed that she had viral gastroenteritis.  She was given empiric antibiotics that were discontinued due to her cultures and work-up being negative.  She was taken off metformin, lisinopril, and meloxicam.  She has resumed taking her metformin and Jardiance.  They advised her to stay off the meloxicam and lisinopril.    She states that Dr. Jen Hays, OB/GYN, as an pelvic ultrasound scheduled for tomorrow for a possible pelvic mass.    The following portions of the patient's history were reviewed and updated as appropriate: allergies, current medications, past family history, past medical history, past social history, past surgical history and problem list.    Review of Systems   Constitutional: Negative for activity change and unexpected weight change.   HENT:  Negative for congestion.    Respiratory: Negative for shortness of breath and wheezing.    Cardiovascular: Negative for chest pain and palpitations.   Gastrointestinal: Negative for abdominal pain, blood in stool and constipation.   Genitourinary: Negative for difficulty urinating and hematuria.   Musculoskeletal: Negative for gait problem.   Skin: Negative for color change and rash.       Objective   Physical Exam   Constitutional: She is oriented to person, place, and time. She appears well-developed and well-nourished. No distress.   HENT:   Head: Normocephalic and atraumatic.   Right Ear: External ear normal.   Left Ear: External ear normal.   Nose: Nose normal.   Mouth/Throat: Oropharynx is clear and moist. No oropharyngeal exudate.   Eyes: Lids are normal. Right eye exhibits no discharge. Left eye exhibits no discharge. No scleral icterus.   Neck: Trachea normal, normal range of motion and full passive range of motion without pain. Neck supple. No tracheal deviation and no edema present. No thyromegaly present.   Cardiovascular: Normal rate, regular rhythm, normal heart sounds and intact distal pulses. Exam reveals no gallop and no friction rub.   No murmur heard.  Pulmonary/Chest: Effort normal and breath sounds normal. No stridor. No tachypnea and no bradypnea. No respiratory distress. She has no decreased breath sounds. She has no wheezes. She has no rales. She exhibits no tenderness.   Abdominal: Normal appearance. There is no hepatosplenomegaly.   Musculoskeletal: She exhibits no edema.   Lymphadenopathy:        Head (right side): No submental, no submandibular, no tonsillar, no preauricular, no posterior auricular and no occipital adenopathy present.        Head (left side): No submental, no submandibular, no tonsillar, no preauricular, no posterior auricular and no occipital adenopathy present.     She has no cervical adenopathy.        Right cervical: No superficial cervical, no deep cervical and no  posterior cervical adenopathy present.       Left cervical: No superficial cervical, no deep cervical and no posterior cervical adenopathy present.   Neurological: She is alert and oriented to person, place, and time. She has normal strength and normal reflexes. She is not disoriented.   Skin: Skin is warm, dry and intact. Capillary refill takes less than 2 seconds. No rash noted. She is not diaphoretic. No cyanosis or erythema. No pallor. Nails show no clubbing.   Psychiatric: She has a normal mood and affect. Her behavior is normal. Cognition and memory are normal.   Nursing note and vitals reviewed.      Assessment/Plan   Jayne was seen today for transitional care management.    Diagnoses and all orders for this visit:    Acute renal failure, unspecified acute renal failure type (CMS/[facility])    Essential hypertension  -     amLODIPine (NORVASC) 5 MG tablet; Take 1 tablet by mouth Daily.  -     metoprolol succinate XL (TOPROL-XL) 200 MG 24 hr tablet; Take 1 tablet by mouth Daily.    Type 2 diabetes mellitus without complication, without long-term current use of insulin (CMS/[facility])  -     Empagliflozin 10 MG tablet; Take 10 mg by mouth Daily.    Primary insomnia  -     diphenhydrAMINE (BENADRYL ALLERGY) 25 MG tablet; Take 0.5 tablets by mouth Every 6 (Six) Hours As Needed for Itching or Sleep.    Gastroesophageal reflux disease without esophagitis  -     omeprazole (priLOSEC) 20 MG capsule; Take 1 capsule by mouth Daily.    Restless legs syndrome  -     rOPINIRole (REQUIP) 3 MG tablet; Take 1 tablet by mouth every night at bedtime.    Dyslipidemia  -     VASCEPA 1 g capsule capsule; Take 2 g by mouth 2 (Two) Times a Day With Meals.      Medicines were refilled, including amlodipine, Requip, omeprazole.

## 2020-03-11 ENCOUNTER — LAB (OUTPATIENT)
Dept: FAMILY MEDICINE CLINIC | Facility: CLINIC | Age: 65
End: 2020-03-11

## 2020-03-11 DIAGNOSIS — G45.8 OTHER SPECIFIED TRANSIENT CEREBRAL ISCHEMIAS: ICD-10-CM

## 2020-03-11 DIAGNOSIS — E55.9 VITAMIN D DEFICIENCY: ICD-10-CM

## 2020-03-11 DIAGNOSIS — G25.81 RESTLESS LEGS SYNDROME: ICD-10-CM

## 2020-03-11 DIAGNOSIS — I10 ESSENTIAL HYPERTENSION: ICD-10-CM

## 2020-03-11 DIAGNOSIS — F51.01 PRIMARY INSOMNIA: ICD-10-CM

## 2020-03-11 DIAGNOSIS — E11.9 TYPE 2 DIABETES MELLITUS WITHOUT COMPLICATION, WITHOUT LONG-TERM CURRENT USE OF INSULIN (HCC): ICD-10-CM

## 2020-03-11 DIAGNOSIS — E78.5 DYSLIPIDEMIA: ICD-10-CM

## 2020-03-12 ENCOUNTER — PROCEDURE VISIT (OUTPATIENT)
Dept: OBSTETRICS AND GYNECOLOGY | Facility: CLINIC | Age: 65
End: 2020-03-12

## 2020-03-12 ENCOUNTER — OFFICE VISIT (OUTPATIENT)
Dept: OBSTETRICS AND GYNECOLOGY | Facility: CLINIC | Age: 65
End: 2020-03-12

## 2020-03-12 ENCOUNTER — TELEPHONE (OUTPATIENT)
Dept: FAMILY MEDICINE CLINIC | Facility: CLINIC | Age: 65
End: 2020-03-12

## 2020-03-12 VITALS
HEIGHT: 65 IN | BODY MASS INDEX: 22.99 KG/M2 | WEIGHT: 138 LBS | DIASTOLIC BLOOD PRESSURE: 62 MMHG | SYSTOLIC BLOOD PRESSURE: 110 MMHG

## 2020-03-12 DIAGNOSIS — K64.4 EXTERNAL HEMORRHOIDS: ICD-10-CM

## 2020-03-12 DIAGNOSIS — Z80.41 FAMILY HISTORY OF OVARIAN CANCER: ICD-10-CM

## 2020-03-12 DIAGNOSIS — D21.9 FIBROIDS: ICD-10-CM

## 2020-03-12 DIAGNOSIS — Q62.5 DUPLICATED RENAL COLLECTING SYSTEM: ICD-10-CM

## 2020-03-12 DIAGNOSIS — N17.9 ACUTE RENAL FAILURE, UNSPECIFIED ACUTE RENAL FAILURE TYPE (HCC): ICD-10-CM

## 2020-03-12 DIAGNOSIS — N95.0 PMB (POSTMENOPAUSAL BLEEDING): Primary | ICD-10-CM

## 2020-03-12 DIAGNOSIS — N20.0 MULTIPLE KIDNEY STONES: ICD-10-CM

## 2020-03-12 DIAGNOSIS — R79.89 ELEVATED PLATELET COUNT: ICD-10-CM

## 2020-03-12 DIAGNOSIS — I10 ESSENTIAL HYPERTENSION: ICD-10-CM

## 2020-03-12 DIAGNOSIS — D64.9 LOW HEMOGLOBIN: ICD-10-CM

## 2020-03-12 DIAGNOSIS — N89.8 VAGINAL DISCHARGE: ICD-10-CM

## 2020-03-12 DIAGNOSIS — D72.829 LEUKOCYTOSIS, UNSPECIFIED TYPE: Primary | ICD-10-CM

## 2020-03-12 PROBLEM — Q51.9: Status: ACTIVE | Noted: 2020-03-12

## 2020-03-12 LAB
ALBUMIN SERPL-MCNC: 3.4 G/DL (ref 3.5–5.2)
ALBUMIN/GLOB SERPL: 1.4 G/DL
ALP SERPL-CCNC: 82 U/L (ref 39–117)
ALT SERPL-CCNC: 13 U/L (ref 1–33)
AST SERPL-CCNC: 11 U/L (ref 1–32)
BASOPHILS # BLD AUTO: 0.09 10*3/MM3 (ref 0–0.2)
BASOPHILS NFR BLD AUTO: 0.7 % (ref 0–1.5)
BILIRUB SERPL-MCNC: <0.2 MG/DL (ref 0.2–1.2)
BUN SERPL-MCNC: 23 MG/DL (ref 8–23)
BUN/CREAT SERPL: 14.3 (ref 7–25)
CALCIUM SERPL-MCNC: 8.6 MG/DL (ref 8.6–10.5)
CHLORIDE SERPL-SCNC: 99 MMOL/L (ref 98–107)
CHOLEST SERPL-MCNC: 133 MG/DL (ref 0–200)
CHOLEST/HDLC SERPL: 4.03 {RATIO}
CO2 SERPL-SCNC: 24.8 MMOL/L (ref 22–29)
CREAT SERPL-MCNC: 1.61 MG/DL (ref 0.57–1)
EOSINOPHIL # BLD AUTO: 0.32 10*3/MM3 (ref 0–0.4)
EOSINOPHIL NFR BLD AUTO: 2.5 % (ref 0.3–6.2)
ERYTHROCYTE [DISTWIDTH] IN BLOOD BY AUTOMATED COUNT: 15.4 % (ref 12.3–15.4)
GLOBULIN SER CALC-MCNC: 2.5 GM/DL
GLUCOSE SERPL-MCNC: 188 MG/DL (ref 65–99)
HBA1C MFR BLD: 6.2 % (ref 4.8–5.6)
HCT VFR BLD AUTO: 31.9 % (ref 34–46.6)
HDLC SERPL-MCNC: 33 MG/DL (ref 40–60)
HGB BLD-MCNC: 10.2 G/DL (ref 12–15.9)
IMM GRANULOCYTES # BLD AUTO: 0.13 10*3/MM3 (ref 0–0.05)
IMM GRANULOCYTES NFR BLD AUTO: 1 % (ref 0–0.5)
LDLC SERPL CALC-MCNC: 50 MG/DL (ref 0–100)
LYMPHOCYTES # BLD AUTO: 3.68 10*3/MM3 (ref 0.7–3.1)
LYMPHOCYTES NFR BLD AUTO: 29.3 % (ref 19.6–45.3)
MCH RBC QN AUTO: 28.7 PG (ref 26.6–33)
MCHC RBC AUTO-ENTMCNC: 32 G/DL (ref 31.5–35.7)
MCV RBC AUTO: 89.9 FL (ref 79–97)
MONOCYTES # BLD AUTO: 1.07 10*3/MM3 (ref 0.1–0.9)
MONOCYTES NFR BLD AUTO: 8.5 % (ref 5–12)
NEUTROPHILS # BLD AUTO: 7.29 10*3/MM3 (ref 1.7–7)
NEUTROPHILS NFR BLD AUTO: 58 % (ref 42.7–76)
NRBC BLD AUTO-RTO: 0 /100 WBC (ref 0–0.2)
PLATELET # BLD AUTO: 477 10*3/MM3 (ref 140–450)
POTASSIUM SERPL-SCNC: 4.6 MMOL/L (ref 3.5–5.2)
PROT SERPL-MCNC: 5.9 G/DL (ref 6–8.5)
RBC # BLD AUTO: 3.55 10*6/MM3 (ref 3.77–5.28)
SODIUM SERPL-SCNC: 138 MMOL/L (ref 136–145)
TRIGL SERPL-MCNC: 249 MG/DL (ref 0–150)
VLDLC SERPL CALC-MCNC: 49.8 MG/DL
WBC # BLD AUTO: 12.58 10*3/MM3 (ref 3.4–10.8)

## 2020-03-12 PROCEDURE — 76830 TRANSVAGINAL US NON-OB: CPT | Performed by: OBSTETRICS & GYNECOLOGY

## 2020-03-12 PROCEDURE — 99214 OFFICE O/P EST MOD 30 MIN: CPT | Performed by: OBSTETRICS & GYNECOLOGY

## 2020-03-12 PROCEDURE — 58100 BIOPSY OF UTERUS LINING: CPT | Performed by: OBSTETRICS & GYNECOLOGY

## 2020-03-12 NOTE — PROGRESS NOTES
Please call the patient regarding her result(s).  I discussed the results with the patient today, and informed her of her elevated white blood cell count, low hemoglobin, and elevated platelet count.  I informed her that I would be referring her to hematology, to which she agreed.  The referral has been placed.  Also informed her that her kidneys were still weak and that I was placing a referral to nephrology, to which she also agreed.

## 2020-03-12 NOTE — TELEPHONE ENCOUNTER
I called the patient and discussed her recent CT scan results, which she had done while she was in the hospital.  I informed her that there was an abnormality of the uterus.  She said that she saw Dr. Jen Hays, OB/GYN, today, and that she did a uterine biopsy and a pelvic ultrasound.  I told the patient that I would check with Dr. Hays, to inform her that you had an abnormality of your uterus on the recent CT scan, in case she did not already know.  I also told the patient that I was referring her to nephrology for her duplicated right renal collecting system, and the multiple kidney stones in the right kidney.  She said that she has had kidney stones in the past and has had to have a lithotripsy in the past.  She states she is not having any pain currently.  No fever.  No nausea or vomiting.  No dysuria.  No abdominal pain.  No other symptoms.  She says she feels well.  I told her that if she develops any pain, nausea, vomiting, fever, chills, or any other symptoms, go to the emergency room.  She says she understands and agrees.  Also explained to her that her white blood cell count is elevated, her hemoglobin is low, and her platelet count is elevated, and that I am referring her to hematology for further evaluation and management.  She said she understands and agrees.

## 2020-03-12 NOTE — PROGRESS NOTES
Jayne Beltran is a 64 y.o. patient who presents for follow up of   Chief Complaint   Patient presents with   • Procedure     endo bx     65 yo est pt here for US and endo bx for  VB and discussion of her BRCA results. Her US today shows a 5.9 cm uterus with an EL 0.29 cm and a pedunculated fibroid that measures 2.1 x 1.8 cm. This is compared to her CT scan on 3/2/2020. The radiologist noted that The patient likely had an interval uterine embolization, so I called radiology and had them look at her films from 2010, which noted that she had a large uterus > 10 cm and a 6 cm fundal fibroid. She has not had any procedures, so this is likely involution due to menopause.  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  The patient was also tested for BRCA due to having a daughter with ovarian cancer at age 23.  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 Her TC= 8 %.         The following portions of the patient's history were reviewed and updated as appropriate: allergies, current medications and problem list.    Review of Systems   Constitutional: Negative for appetite change, fatigue, fever and unexpected weight change.   Eyes: Negative for photophobia and visual disturbance.   Respiratory: Positive for shortness of breath. Negative for cough.    Cardiovascular: Negative for chest pain and palpitations.   Gastrointestinal: Negative for abdominal distention, abdominal pain, constipation, diarrhea and nausea.   Endocrine: Negative for cold intolerance and heat intolerance.   Genitourinary: Positive for vaginal bleeding. Negative for dyspareunia, dysuria, menstrual problem, pelvic pain and vaginal discharge.   Musculoskeletal: Negative for back pain.   Skin: Negative for color change and rash.   Neurological: Positive for numbness (in feet from Dm). Negative for headaches.   Hematological: Negative for adenopathy. Does not  "bruise/bleed easily.   Psychiatric/Behavioral: Negative for dysphoric mood. The patient is not nervous/anxious.        /62   Ht 165.1 cm (65\")   Wt 62.6 kg (138 lb)   LMP  (LMP Unknown)   Breastfeeding No   BMI 22.96 kg/m²     Physical Exam   Constitutional: She is oriented to person, place, and time. She appears well-developed and well-nourished.   HENT:   Head: Normocephalic and atraumatic.   Eyes: Conjunctivae are normal. No scleral icterus.   Neck: Neck supple. No thyromegaly present.   Abdominal: Soft. She exhibits no distension and no mass. There is no tenderness. There is no rebound and no guarding. No hernia.   Genitourinary: Uterus normal. Rectal exam shows external hemorrhoid. Pelvic exam was performed with patient supine. There is no rash, tenderness, lesion or injury on the right labia. There is no rash, tenderness, lesion or injury on the left labia. Cervix exhibits no motion tenderness, no discharge and no friability. Right adnexum displays no mass, no tenderness and no fullness. Left adnexum displays no mass, no tenderness and no fullness. No erythema, tenderness or bleeding in the vagina. No foreign body in the vagina. No signs of injury around the vagina. Vaginal discharge found.   Neurological: She is alert and oriented to person, place, and time.   Skin: Skin is warm and dry.   Psychiatric: She has a normal mood and affect. Her behavior is normal. Judgment and thought content normal.   Nursing note and vitals reviewed.  PROCEDURE NOTE    Procedures      Diagnosis  Post menopausal bleeding       No LMP recorded (lmp unknown). Patient is postmenopausal.         UCG: not done  Menopausal Yes   HRT  negative   Current contraception: post menopausal status    Applying Universal Precautions I properly identified the patient and sought her signature with informed consent.  The reason for the biopsy was discussed.  Using a betadine prep on the cervix the cervix was grasped with a tenaculum and " "the uterus sounded to  6 cm.  A disposable Pipelle was used to retrieve the specimen by passing it 5 times into the cavity hoping to sample more than one area.     Additional procedures necessary were not necessary  The specimen was labelled and placed in a formalin container.  Tissue was inadequate despite GOOD sampling  The patient tolerated the procedure    Instructions were given on vaginal rest, OTC tylenol, Motrin or Aleve, and expected future bleeding.  Resulting and follow up were discussed.          A/P:  1.  VB- EL is thin at 0.23 cm and endometrial biopsy today showed very little tissue despite good sampling.  We did discuss that if she continues to have bleeding, will then recommend D&C  2.  Vaginal discharge- check NuSwab Y/M/B  3. External hemorrhoid- this may be where her bleeding is coming from. Refer GI.   4. Fibroid-pedunculated fibroid has gotten significantly smaller.  Suspect subinvolution from menopause.  5. Family history of ovarian cancer- The patient is genetic screening was negative for the BRCA 1 and 2 genetic mutations.  The patient did test positive for a \"variant of uncertain clinical significance\".  This is essentially a gene of interest in the development of hereditary cancers.  Currently, there is not enough data to determine if this gene poses an increased risk of cancer, however, the patient will be placed in a database once the research is sufficient to answer if this genetic change increases the risk of cancer or not, we will both be contacted with updated information and recommendations.  100s of these variance have been identified, but only a small handful have become clinically important. Her TC= 8 %. S/Sx of ovarian cancer d/w pt.   6. RHM- UTD 2/2020 annual    Assessment/Plan   Jayne was seen today for procedure.    Diagnoses and all orders for this visit:    PMB (postmenopausal bleeding)  -     NuSwab BV & Candida - Swab, Vagina  -     Genital Mycoplasmas VIKTORIA, Swab - " Swab, Vagina  -     Reference Histopathology                 No follow-ups on file.      Jen Hays MD  3/12/2020  09:55

## 2020-03-16 ENCOUNTER — TELEPHONE (OUTPATIENT)
Dept: OBSTETRICS AND GYNECOLOGY | Facility: CLINIC | Age: 65
End: 2020-03-16

## 2020-03-16 LAB
DX ICD CODE: NORMAL
PATH REPORT.FINAL DX SPEC: NORMAL
PATH REPORT.GROSS SPEC: NORMAL
PATH REPORT.SITE OF ORIGIN SPEC: NORMAL
PATHOLOGIST NAME: NORMAL
PAYMENT PROCEDURE: NORMAL

## 2020-03-16 NOTE — TELEPHONE ENCOUNTER
----- Message from Michael Arriaza Jr., DO sent at 3/12/2020  5:52 PM EDT -----  Regarding: Uterine abnormality on CT  Dr. Hays,    I know you saw this patient today but I wasn't sure if you were aware of the uterine abnormality that was seen on the 3/2/2020 CT scan from her recent hospitalization.  I just wanted to touch base with you in case you were not already aware.  I am interested to know what you make of the radiologists comments about the CT findings regarding her uterus.  If you would like to discuss her case, my number is [phone].  Or, let me know the best time to call you and I will be glad to call.  Thank you.      Sincerely,    Calvin

## 2020-03-16 NOTE — PROGRESS NOTES
"PIP= biopsy is normal but does not have any uterine tissue. IF she has recurrent  VB, she will then needs a D&C. I reviewed her CT scans from 2020 and 2010 and her uterus is much smaller now than it used to be- that is the \"abnormality\" they noted on her CT report. This is a normal occurrence after menopause."

## 2020-03-16 NOTE — TELEPHONE ENCOUNTER
I called and reviewed her CT scan both from 2020 and from 2010.  2010 she had a large uterus with a 7 cm fundal fibroid.  Her CT in 2020 shows that her uterus is much smaller, which is likely due to involution from menopause.  I also did an ultrasound which showed a normal uterus with a very thin endometrial lining and a 3 cm fundal fibroid.  I spoke with Dr. Arriaza and let him know about the comparison.   Jen Hays MD

## 2020-03-19 LAB
A VAGINAE DNA VAG QL NAA+PROBE: ABNORMAL SCORE
BVAB2 DNA VAG QL NAA+PROBE: ABNORMAL SCORE
C ALBICANS DNA VAG QL NAA+PROBE: NEGATIVE
C GLABRATA DNA VAG QL NAA+PROBE: POSITIVE
M GENITALIUM DNA SPEC QL NAA+PROBE: NEGATIVE
M HOMINIS DNA SPEC QL NAA+PROBE: NEGATIVE
MEGA1 DNA VAG QL NAA+PROBE: ABNORMAL SCORE
UREAPLASMA DNA SPEC QL NAA+PROBE: NEGATIVE

## 2020-03-19 NOTE — PROGRESS NOTES
PIP= patient has an unusual form of yeast that is not treated with Diflucan.  Rx for Terazol 7 called in.

## 2020-03-20 RX ORDER — MELOXICAM 15 MG/1
15 TABLET ORAL DAILY PRN
Qty: 90 TABLET | Refills: 0 | Status: SHIPPED | OUTPATIENT
Start: 2020-03-20 | End: 2020-06-24

## 2020-04-07 ENCOUNTER — CONSULT (OUTPATIENT)
Dept: ONCOLOGY | Facility: CLINIC | Age: 65
End: 2020-04-07

## 2020-04-07 ENCOUNTER — LAB (OUTPATIENT)
Dept: LAB | Facility: HOSPITAL | Age: 65
End: 2020-04-07

## 2020-04-07 VITALS
RESPIRATION RATE: 18 BRPM | TEMPERATURE: 98.2 F | WEIGHT: 151 LBS | HEART RATE: 99 BPM | SYSTOLIC BLOOD PRESSURE: 147 MMHG | HEIGHT: 63 IN | DIASTOLIC BLOOD PRESSURE: 82 MMHG | BODY MASS INDEX: 26.75 KG/M2 | OXYGEN SATURATION: 95 %

## 2020-04-07 DIAGNOSIS — D72.820 LYMPHOCYTOSIS: Primary | ICD-10-CM

## 2020-04-07 DIAGNOSIS — D72.829 LEUKOCYTOSIS, UNSPECIFIED TYPE: Primary | ICD-10-CM

## 2020-04-07 DIAGNOSIS — D64.9 LOW HEMOGLOBIN: ICD-10-CM

## 2020-04-07 DIAGNOSIS — R79.89 ELEVATED PLATELET COUNT: ICD-10-CM

## 2020-04-07 LAB
BASOPHILS # BLD AUTO: 0.14 10*3/MM3 (ref 0–0.2)
BASOPHILS NFR BLD AUTO: 1 % (ref 0–1.5)
DEPRECATED RDW RBC AUTO: 53.6 FL (ref 37–54)
EOSINOPHIL # BLD AUTO: 0.41 10*3/MM3 (ref 0–0.4)
EOSINOPHIL NFR BLD AUTO: 2.9 % (ref 0.3–6.2)
ERYTHROCYTE [DISTWIDTH] IN BLOOD BY AUTOMATED COUNT: 16.4 % (ref 12.3–15.4)
ERYTHROCYTE [SEDIMENTATION RATE] IN BLOOD: 38 MM/HR (ref 0–20)
FERRITIN SERPL-MCNC: 11 NG/ML (ref 13–150)
FOLATE SERPL-MCNC: 8.31 NG/ML (ref 4.78–24.2)
HCT VFR BLD AUTO: 34 % (ref 34–46.6)
HGB BLD-MCNC: 10.6 G/DL (ref 12–15.9)
IMM GRANULOCYTES # BLD AUTO: 0.19 10*3/MM3 (ref 0–0.05)
IMM GRANULOCYTES NFR BLD AUTO: 1.3 % (ref 0–0.5)
IRON 24H UR-MRATE: 31 MCG/DL (ref 37–145)
IRON SATN MFR SERPL: 9 % (ref 20–50)
LDH SERPL-CCNC: 215 U/L (ref 135–214)
LYMPHOCYTES # BLD AUTO: 3.63 10*3/MM3 (ref 0.7–3.1)
LYMPHOCYTES NFR BLD AUTO: 25.7 % (ref 19.6–45.3)
MCH RBC QN AUTO: 27.9 PG (ref 26.6–33)
MCHC RBC AUTO-ENTMCNC: 31.2 G/DL (ref 31.5–35.7)
MCV RBC AUTO: 89.5 FL (ref 79–97)
MONOCYTES # BLD AUTO: 1.34 10*3/MM3 (ref 0.1–0.9)
MONOCYTES NFR BLD AUTO: 9.5 % (ref 5–12)
NEUTROPHILS # BLD AUTO: 8.41 10*3/MM3 (ref 1.7–7)
NEUTROPHILS NFR BLD AUTO: 59.6 % (ref 42.7–76)
NRBC BLD AUTO-RTO: 0 /100 WBC (ref 0–0.2)
PLATELET # BLD AUTO: 437 10*3/MM3 (ref 140–450)
PMV BLD AUTO: 11.4 FL (ref 6–12)
RBC # BLD AUTO: 3.8 10*6/MM3 (ref 3.77–5.28)
TIBC SERPL-MCNC: 356 MCG/DL (ref 298–536)
UIBC SERPL-MCNC: 325 MCG/DL (ref 112–346)
VIT B12 BLD-MCNC: >2000 PG/ML (ref 211–946)
WBC NRBC COR # BLD: 14.12 10*3/MM3 (ref 3.4–10.8)

## 2020-04-07 PROCEDURE — 82728 ASSAY OF FERRITIN: CPT | Performed by: INTERNAL MEDICINE

## 2020-04-07 PROCEDURE — 88182 CELL MARKER STUDY: CPT

## 2020-04-07 PROCEDURE — 83883 ASSAY NEPHELOMETRY NOT SPEC: CPT | Performed by: INTERNAL MEDICINE

## 2020-04-07 PROCEDURE — 85652 RBC SED RATE AUTOMATED: CPT | Performed by: INTERNAL MEDICINE

## 2020-04-07 PROCEDURE — 83540 ASSAY OF IRON: CPT | Performed by: INTERNAL MEDICINE

## 2020-04-07 PROCEDURE — 86334 IMMUNOFIX E-PHORESIS SERUM: CPT | Performed by: INTERNAL MEDICINE

## 2020-04-07 PROCEDURE — 88185 FLOWCYTOMETRY/TC ADD-ON: CPT

## 2020-04-07 PROCEDURE — 83550 IRON BINDING TEST: CPT | Performed by: INTERNAL MEDICINE

## 2020-04-07 PROCEDURE — 84165 PROTEIN E-PHORESIS SERUM: CPT | Performed by: INTERNAL MEDICINE

## 2020-04-07 PROCEDURE — 82746 ASSAY OF FOLIC ACID SERUM: CPT | Performed by: INTERNAL MEDICINE

## 2020-04-07 PROCEDURE — 84155 ASSAY OF PROTEIN SERUM: CPT | Performed by: INTERNAL MEDICINE

## 2020-04-07 PROCEDURE — 82784 ASSAY IGA/IGD/IGG/IGM EACH: CPT | Performed by: INTERNAL MEDICINE

## 2020-04-07 PROCEDURE — 83615 LACTATE (LD) (LDH) ENZYME: CPT | Performed by: INTERNAL MEDICINE

## 2020-04-07 PROCEDURE — 82607 VITAMIN B-12: CPT | Performed by: INTERNAL MEDICINE

## 2020-04-07 PROCEDURE — 85025 COMPLETE CBC W/AUTO DIFF WBC: CPT

## 2020-04-07 PROCEDURE — 36415 COLL VENOUS BLD VENIPUNCTURE: CPT

## 2020-04-07 PROCEDURE — 88184 FLOWCYTOMETRY/ TC 1 MARKER: CPT

## 2020-04-07 PROCEDURE — 99204 OFFICE O/P NEW MOD 45 MIN: CPT | Performed by: INTERNAL MEDICINE

## 2020-04-07 RX ORDER — TERBINAFINE HYDROCHLORIDE 250 MG/1
250 TABLET ORAL DAILY
COMMUNITY
Start: 2020-03-09 | End: 2020-09-04 | Stop reason: HOSPADM

## 2020-04-07 NOTE — PROGRESS NOTES
Subjective     REASON FOR CONSULTATION: Leukocytosis, anemia  Provide an opinion on any further workup or treatment                             REQUESTING PHYSICIAN:  Dr. Arriaza    RECORDS OBTAINED:  Records of the patients history including those obtained from the referring provider were reviewed and summarized in detail.      History of Present Illness   This is a very pleasant 64-year-old woman who smokes 1/2 pack of tobacco a day, has diabetes and COPD.  Records also indicate prior history of TIA although she is unsure of the diagnosis.  The patient is seen for abnormal CBC at the request of her primary care provider.    Reviewing her records, the patient has had a chronic leukocytosis since 2015.  Her white blood cell count typically runs 11-13,000 at baseline.  Prior CBCs have shown lymphocytosis on the differential.  The patient was admitted to the ICU in March 2020 with hypovolemic shock and acute kidney injury after becoming volume depleted secondary to suspected viral gastroenteritis.  During her hospital stay her white blood cell count peaked at 24.5 on 3/2/2020 and then returned to baseline 11-12,000.  She developed anemia during the hospital stay hemoglobin tamika 8.8 but she did not require blood transfusion.  She had thrombocytosis on admission which normalized at the time of discharge.    Today she reports feeling well no unusual weight loss, night sweats, lymphadenopathy, nausea, vomiting, dysuria, fevers or chills.    Past Medical History:   Diagnosis Date   • COPD (chronic obstructive pulmonary disease) (CMS/HCC)    • Diabetes mellitus (CMS/HCC)     type 2   • Fibroid    • Hypertension    • Leukocytosis    • Neuromuscular disorder (CMS/HCC)    • Skin cancer     nose   • TIA (transient ischemic attack)         Past Surgical History:   Procedure Laterality Date   • CHOLECYSTECTOMY     • COLONOSCOPY     • KIDNEY STONE SURGERY          Current Outpatient Medications on File Prior to Visit    Medication Sig Dispense Refill   • amLODIPine (NORVASC) 5 MG tablet Take 1 tablet by mouth Daily. 90 tablet 1   • aspirin 81 MG EC tablet Take 81 mg by mouth Daily.     • Cyanocobalamin (VITAMIN B 12) 500 MCG tablet Take 500 mcg by mouth Daily.     • diphenhydrAMINE (BENADRYL ALLERGY) 25 MG tablet Take 0.5 tablets by mouth Every 6 (Six) Hours As Needed for Itching or Sleep. 30 tablet 1   • DULoxetine (CYMBALTA) 30 MG capsule Take 1 capsule by mouth 2 (Two) Times a Day. 60 capsule 5   • Empagliflozin 10 MG tablet Take 10 mg by mouth Daily. 90 tablet 1   • LYRICA 150 MG capsule Take 1 capsule by mouth 2 (Two) Times a Day. 15 capsule 0   • meloxicam (MOBIC) 15 MG tablet TAKE 1 TABLET BY MOUTH DAILY AS NEEDED FOR MODERATE PAIN. 90 tablet 0   • metFORMIN (GLUCOPHAGE) 1000 MG tablet Take 1 tablet by mouth 2 (Two) Times a Day With Meals. 180 tablet 5   • metoprolol succinate XL (TOPROL-XL) 200 MG 24 hr tablet Take 1 tablet by mouth Daily. 90 tablet 1   • omeprazole (priLOSEC) 20 MG capsule Take 1 capsule by mouth Daily. 90 capsule 1   • rOPINIRole (REQUIP) 3 MG tablet Take 1 tablet by mouth every night at bedtime. 90 tablet 1   • terbinafine (lamiSIL) 250 MG tablet Take 250 mg by mouth Daily.     • VASCEPA 1 g capsule capsule Take 2 g by mouth 2 (Two) Times a Day With Meals. 360 capsule 3     No current facility-administered medications on file prior to visit.         ALLERGIES:    Allergies   Allergen Reactions   • Codeine Hives and Hallucinations        Social History     Socioeconomic History   • Marital status:      Spouse name: Golden   • Number of children: Not on file   • Years of education: Not on file   • Highest education level: Not on file   Occupational History     Employer: RETIRED   Tobacco Use   • Smoking status: Current Every Day Smoker     Packs/day: 1.50     Types: Cigarettes   • Smokeless tobacco: Never Used   Substance and Sexual Activity   • Alcohol use: No   • Drug use: No   • Sexual  "activity: Not Currently     Birth control/protection: Post-menopausal        Family History   Problem Relation Age of Onset   • Heart disease Mother    • Diabetes Mother    • Hypertension Mother    • Diabetes Father    • Deep vein thrombosis Father    • Depression Father    • Cancer Father    • Liver disease Father    • Breast cancer Maternal Grandmother         ? age dx   • Dementia Maternal Grandmother    • Hypertension Maternal Grandmother    • Ovarian cancer Daughter 23   • Diabetes Daughter    • Depression Daughter    • Diabetes Sister    • Diabetes Brother    • Heart disease Brother    • Heart disease Maternal Uncle    • Cancer Paternal Uncle    • Clotting disorder Paternal Grandmother    • Colon cancer Neg Hx         Review of Systems   Constitutional: Negative.    HENT: Negative.    Respiratory: Positive for shortness of breath (Chronic, stable). Negative for cough and choking.    Cardiovascular: Negative.    Gastrointestinal: Negative.    Endocrine: Negative.    Genitourinary: Negative.    Musculoskeletal: Negative.    Allergic/Immunologic: Negative.    Neurological: Negative.    Hematological: Negative.    Psychiatric/Behavioral: Negative.           Objective     Vitals:    04/07/20 1020   BP: 147/82   Pulse: 99   Resp: 18   Temp: 98.2 °F (36.8 °C)   TempSrc: Oral   SpO2: 95%   Weight: 68.5 kg (151 lb)   Height: 160 cm (62.99\")  Comment: New Ht   PainSc: 0-No pain     Current Status 4/7/2020   ECOG score 0       Physical Exam    CON: pleasant well-developed adult woman  HEENT: no icterus, no thrush, moist membranes  NECK: no jvd  LYMPH: no cervical or supraclavicular lad  CV: RRR, S1S2, no murmur  RESP: cta bilat, no wheezing, no rales  GI: soft, non-tender, no splenomegaly, +bs  MUSC: no edema, normal gait  NEURO: alert and oriented x3, normal strength  PSYCH: normal mood and affect  SKIN: no bruising or induration    RECENT LABS:  Hematology WBC   Date Value Ref Range Status   04/07/2020 14.12 (H) 3.40 - " 10.80 10*3/mm3 Final   03/11/2020 12.58 (H) 3.40 - 10.80 10*3/mm3 Final     RBC   Date Value Ref Range Status   04/07/2020 3.80 3.77 - 5.28 10*6/mm3 Final   03/11/2020 3.55 (L) 3.77 - 5.28 10*6/mm3 Final     Hemoglobin   Date Value Ref Range Status   04/07/2020 10.6 (L) 12.0 - 15.9 g/dL Final     Hematocrit   Date Value Ref Range Status   04/07/2020 34.0 34.0 - 46.6 % Final     Platelets   Date Value Ref Range Status   04/07/2020 437 140 - 450 10*3/mm3 Final      CT chest without contrast 3/2/2020: 7 mm noncalcified nodule in the lateral right lower lobe, 3 mm nodule right upper lobe, 3 mm nodule left upper lobe, 3 mm nodule right middle lobe, diffuse emphysema, thickening of the distal esophagus    CT abdomen/pelvis 3/2/2020: Duplicated collecting system on the right, nonobstructing renal calculi too numerous to count, prominent loops of small bowel no transition point, no significant lymphadenopathy,    Assessment/Plan     1.  Chronic leukocytosis: The patient has had an elevated white blood cell count at least for 5 years, differential showing somewhat chronic mild lymphocytosis.  Today CBC shows an absolute neutrophil count 8.4, absolute lymphocyte count 3.6, absolute monocyte count 1.3 and absolute eosinophil count 0.41.  Given the chronic lymphocytosis I will check a peripheral blood flow cytometry to exclude monoclonal B-cell populations.  If that is negative her leukocytosis is probably inflammatory, potentially related to tobacco use.    2.  Anemia: The patient developed significant anemia during recent hospital stay for hypovolemic shock.  Her hemoglobin now has improved to 10.6.  I will further evaluate with ferritin, iron profile, B12, folate, SPEP, YVETTE and free light chain ratio    3.  Thrombocytosis: Platelet count normal today    4.  Tobacco abuse/lung nodules: Followed by her PCP and pulmonary medicine.  The patient was advised to stop smoking.    We will plan to call the patient with today's  evaluation results.  Otherwise I will see her back in 3 to 4 months with a CBC.

## 2020-04-08 LAB
ALBUMIN SERPL-MCNC: 3.5 G/DL (ref 2.9–4.4)
ALBUMIN/GLOB SERPL: 1.1 {RATIO} (ref 0.7–1.7)
ALPHA1 GLOB FLD ELPH-MCNC: 0.2 G/DL (ref 0–0.4)
ALPHA2 GLOB SERPL ELPH-MCNC: 1 G/DL (ref 0.4–1)
B-GLOBULIN SERPL ELPH-MCNC: 1.1 G/DL (ref 0.7–1.3)
GAMMA GLOB SERPL ELPH-MCNC: 0.8 G/DL (ref 0.4–1.8)
GLOBULIN SER CALC-MCNC: 3.1 G/DL (ref 2.2–3.9)
IGA SERPL-MCNC: 176 MG/DL (ref 87–352)
IGG SERPL-MCNC: 747 MG/DL (ref 586–1602)
IGM SERPL-MCNC: 130 MG/DL (ref 26–217)
KAPPA LC SERPL-MCNC: 69 MG/L (ref 3.3–19.4)
KAPPA LC/LAMBDA SER: 1.71 {RATIO} (ref 0.26–1.65)
LAMBDA LC FREE SERPL-MCNC: 40.4 MG/L (ref 5.7–26.3)
Lab: NORMAL
M-SPIKE: NORMAL G/DL
PROT PATTERN SERPL IFE-IMP: NORMAL
PROT SERPL-MCNC: 6.6 G/DL (ref 6–8.5)
REF LAB TEST METHOD: NORMAL

## 2020-04-10 ENCOUNTER — TELEPHONE (OUTPATIENT)
Dept: ONCOLOGY | Facility: HOSPITAL | Age: 65
End: 2020-04-10

## 2020-04-10 NOTE — TELEPHONE ENCOUNTER
----- Message from Glynn Melara MD sent at 4/10/2020  1:27 PM EDT -----  Please let her know that her blood work returned okay except she is iron deficient.  She needs to begin ferrous sulfate 325 mg daily to build up iron stores.  If she has trouble tolerating secondary to nausea or constipation have her call to consider IV iron.  She needs to see GI to evaluate cause of iron deficiency as most common cause is GI blood loss.

## 2020-04-10 NOTE — TELEPHONE ENCOUNTER
Reviewed Dr. Asencio note with pt in detail. She will start Ferrous Sulfate and schedule an appt with her GI MD. Pt V/U.

## 2020-04-13 ENCOUNTER — TELEPHONE (OUTPATIENT)
Dept: FAMILY MEDICINE CLINIC | Facility: CLINIC | Age: 65
End: 2020-04-13

## 2020-04-13 NOTE — TELEPHONE ENCOUNTER
I filled out the patient's Jardiance papers and she has requested.  Please call her and let her know that she can come pick them up. Thank you.

## 2020-04-15 DIAGNOSIS — G25.81 RESTLESS LEGS SYNDROME: Chronic | ICD-10-CM

## 2020-04-15 RX ORDER — ROPINIROLE 3 MG/1
TABLET, FILM COATED ORAL
Qty: 90 TABLET | Refills: 1 | Status: SHIPPED | OUTPATIENT
Start: 2020-04-15 | End: 2020-08-24

## 2020-04-20 ENCOUNTER — TELEMEDICINE (OUTPATIENT)
Dept: GASTROENTEROLOGY | Facility: CLINIC | Age: 65
End: 2020-04-20

## 2020-04-20 DIAGNOSIS — E11.21 TYPE 2 DIABETES MELLITUS WITH DIABETIC NEPHROPATHY, WITHOUT LONG-TERM CURRENT USE OF INSULIN (HCC): ICD-10-CM

## 2020-04-20 DIAGNOSIS — Z12.11 ENCOUNTER FOR SCREENING FOR MALIGNANT NEOPLASM OF COLON: ICD-10-CM

## 2020-04-20 DIAGNOSIS — N18.30 STAGE 3 CHRONIC KIDNEY DISEASE (HCC): ICD-10-CM

## 2020-04-20 DIAGNOSIS — K64.4 EXTERNAL HEMORRHOIDS: Primary | ICD-10-CM

## 2020-04-20 DIAGNOSIS — K21.9 GASTROESOPHAGEAL REFLUX DISEASE, ESOPHAGITIS PRESENCE NOT SPECIFIED: ICD-10-CM

## 2020-04-20 PROCEDURE — 99203 OFFICE O/P NEW LOW 30 MIN: CPT | Performed by: INTERNAL MEDICINE

## 2020-04-20 NOTE — PROGRESS NOTES
Jayne Beltran is a 64 y.o. female with a past medical history noted below who presents for evaluation of No chief complaint on file.  Pt seen via Video Visit    Subjective     External Hemorrhoids since her last child who is 38.  Recent adm for diarrhea nd GERI, but continues to go1-2 times a day and avoids greasy and gassy foods.    Doesn't skip any days. Did take Abx for Pneumonia and is not on a probiotic at this time.    Does have Kidney stones but none lodged and is to see  in June for possible ESWL            Past Medical History:   Diagnosis Date   • COPD (chronic obstructive pulmonary disease) (CMS/HCC)    • Diabetes mellitus (CMS/HCC)     type 2   • Fibroid    • Hypertension    • Leukocytosis    • Neuromuscular disorder (CMS/HCC)    • Skin cancer     nose   • TIA (transient ischemic attack)          Current Outpatient Medications:   •  amLODIPine (NORVASC) 5 MG tablet, Take 1 tablet by mouth Daily., Disp: 90 tablet, Rfl: 1  •  aspirin 81 MG EC tablet, Take 81 mg by mouth Daily., Disp: , Rfl:   •  Cyanocobalamin (VITAMIN B 12) 500 MCG tablet, Take 500 mcg by mouth Daily., Disp: , Rfl:   •  diphenhydrAMINE (BENADRYL ALLERGY) 25 MG tablet, Take 0.5 tablets by mouth Every 6 (Six) Hours As Needed for Itching or Sleep., Disp: 30 tablet, Rfl: 1  •  DULoxetine (CYMBALTA) 30 MG capsule, Take 1 capsule by mouth 2 (Two) Times a Day., Disp: 60 capsule, Rfl: 5  •  Empagliflozin 10 MG tablet, Take 10 mg by mouth Daily., Disp: 90 tablet, Rfl: 1  •  hydrocortisone (ANUSOL-HC) 2.5 % rectal cream, Insert  into the rectum 2 (Two) Times a Day., Disp: 30 g, Rfl: 5  •  LYRICA 150 MG capsule, Take 1 capsule by mouth 2 (Two) Times a Day., Disp: 15 capsule, Rfl: 0  •  meloxicam (MOBIC) 15 MG tablet, TAKE 1 TABLET BY MOUTH DAILY AS NEEDED FOR MODERATE PAIN., Disp: 90 tablet, Rfl: 0  •  metFORMIN (GLUCOPHAGE) 1000 MG tablet, Take 1 tablet by mouth 2 (Two) Times a Day With Meals., Disp: 180 tablet, Rfl: 5  •  metoprolol succinate  XL (TOPROL-XL) 200 MG 24 hr tablet, Take 1 tablet by mouth Daily., Disp: 90 tablet, Rfl: 1  •  omeprazole (priLOSEC) 20 MG capsule, Take 1 capsule by mouth Daily., Disp: 90 capsule, Rfl: 1  •  rOPINIRole (REQUIP) 3 MG tablet, TAKE 1 TABLET BY MOUTH EVERY DAY EVERY NIGHT, Disp: 90 tablet, Rfl: 1  •  terbinafine (lamiSIL) 250 MG tablet, Take 250 mg by mouth Daily., Disp: , Rfl:   •  VASCEPA 1 g capsule capsule, Take 2 g by mouth 2 (Two) Times a Day With Meals., Disp: 360 capsule, Rfl: 3    Allergies   Allergen Reactions   • Codeine Hives and Hallucinations       Social History     Socioeconomic History   • Marital status:      Spouse name: Golden   • Number of children: 2   • Years of education: 12   • Highest education level: High school graduate   Occupational History     Employer: RETIRED   Tobacco Use   • Smoking status: Current Every Day Smoker     Packs/day: 1.50     Types: Cigarettes   • Smokeless tobacco: Never Used   Substance and Sexual Activity   • Alcohol use: No   • Drug use: No   • Sexual activity: Not Currently     Birth control/protection: Post-menopausal       Family History   Problem Relation Age of Onset   • Heart disease Mother          at age 63   • Diabetes Mother    • Hypertension Mother    • Diabetes Father    • Deep vein thrombosis Father    • Depression Father    • Liver disease Father    • Lung cancer Father 58         at age 68   • Breast cancer Maternal Grandmother         ? age dx   • Dementia Maternal Grandmother    • Hypertension Maternal Grandmother    • Ovarian cancer Daughter 23   • Diabetes Daughter    • Depression Daughter    • Diabetes Sister    • Diabetes Brother    • Heart disease Brother    • Cancer Brother          at age 43   • Heart disease Maternal Uncle    • Cancer Paternal Uncle    • Clotting disorder Paternal Grandmother    • Colon cancer Neg Hx    • Colon polyps Neg Hx        Review of Systems    Objective     There were no vitals filed  for this visit.  There were no vitals filed for this visit.  There is no height or weight on file to calculate BMI.    Physical Exam    WBC   Date Value Ref Range Status   04/07/2020 14.12 (H) 3.40 - 10.80 10*3/mm3 Final   03/11/2020 12.58 (H) 3.40 - 10.80 10*3/mm3 Final     RBC   Date Value Ref Range Status   04/07/2020 3.80 3.77 - 5.28 10*6/mm3 Final   03/11/2020 3.55 (L) 3.77 - 5.28 10*6/mm3 Final     Hemoglobin   Date Value Ref Range Status   04/07/2020 10.6 (L) 12.0 - 15.9 g/dL Final     Hematocrit   Date Value Ref Range Status   04/07/2020 34.0 34.0 - 46.6 % Final     MCV   Date Value Ref Range Status   04/07/2020 89.5 79.0 - 97.0 fL Final     MCH   Date Value Ref Range Status   04/07/2020 27.9 26.6 - 33.0 pg Final     MCHC   Date Value Ref Range Status   04/07/2020 31.2 (L) 31.5 - 35.7 g/dL Final     RDW   Date Value Ref Range Status   04/07/2020 16.4 (H) 12.3 - 15.4 % Final     RDW-SD   Date Value Ref Range Status   04/07/2020 53.6 37.0 - 54.0 fl Final     MPV   Date Value Ref Range Status   04/07/2020 11.4 6.0 - 12.0 fL Final     Platelets   Date Value Ref Range Status   04/07/2020 437 140 - 450 10*3/mm3 Final     Neutrophil %   Date Value Ref Range Status   04/07/2020 59.6 42.7 - 76.0 % Final     Lymphocyte %   Date Value Ref Range Status   04/07/2020 25.7 19.6 - 45.3 % Final     Monocyte %   Date Value Ref Range Status   04/07/2020 9.5 5.0 - 12.0 % Final     Eosinophil %   Date Value Ref Range Status   04/07/2020 2.9 0.3 - 6.2 % Final     Basophil %   Date Value Ref Range Status   04/07/2020 1.0 0.0 - 1.5 % Final     Immature Grans %   Date Value Ref Range Status   04/07/2020 1.3 (H) 0.0 - 0.5 % Final     Neutrophils, Absolute   Date Value Ref Range Status   04/07/2020 8.41 (H) 1.70 - 7.00 10*3/mm3 Final     Lymphocytes, Absolute   Date Value Ref Range Status   04/07/2020 3.63 (H) 0.70 - 3.10 10*3/mm3 Final     Monocytes, Absolute   Date Value Ref Range Status   04/07/2020 1.34 (H) 0.10 - 0.90 10*3/mm3  Final     Eosinophils, Absolute   Date Value Ref Range Status   04/07/2020 0.41 (H) 0.00 - 0.40 10*3/mm3 Final     Basophils, Absolute   Date Value Ref Range Status   04/07/2020 0.14 0.00 - 0.20 10*3/mm3 Final     Immature Grans, Absolute   Date Value Ref Range Status   04/07/2020 0.19 (H) 0.00 - 0.05 10*3/mm3 Final     nRBC   Date Value Ref Range Status   04/07/2020 0.0 0.0 - 0.2 /100 WBC Final       Glucose   Date Value Ref Range Status   03/06/2020 153 (H) 65 - 99 mg/dL Final     Sodium   Date Value Ref Range Status   03/11/2020 138 136 - 145 mmol/L Final   03/06/2020 142 136 - 145 mmol/L Final     Potassium   Date Value Ref Range Status   03/11/2020 4.6 3.5 - 5.2 mmol/L Final   03/06/2020 3.2 (L) 3.5 - 5.2 mmol/L Final     CO2   Date Value Ref Range Status   03/06/2020 27.8 22.0 - 29.0 mmol/L Final     Total CO2   Date Value Ref Range Status   03/11/2020 24.8 22.0 - 29.0 mmol/L Final     Chloride   Date Value Ref Range Status   03/11/2020 99 98 - 107 mmol/L Final   03/06/2020 98 98 - 107 mmol/L Final     Anion Gap   Date Value Ref Range Status   03/06/2020 16.2 (H) 5.0 - 15.0 mmol/L Final     Creatinine   Date Value Ref Range Status   03/11/2020 1.61 (H) 0.57 - 1.00 mg/dL Final   03/06/2020 1.41 (H) 0.57 - 1.00 mg/dL Final     BUN   Date Value Ref Range Status   03/11/2020 23 8 - 23 mg/dL Final   03/06/2020 26 (H) 8 - 23 mg/dL Final     BUN/Creatinine Ratio   Date Value Ref Range Status   03/11/2020 14.3 7.0 - 25.0 Final   03/06/2020 18.4 7.0 - 25.0 Final     Calcium   Date Value Ref Range Status   03/11/2020 8.6 8.6 - 10.5 mg/dL Final   03/06/2020 7.6 (L) 8.6 - 10.5 mg/dL Final     eGFR Non  Am   Date Value Ref Range Status   03/11/2020 32 (L) >60 mL/min/1.73 Final     eGFR Non  Amer   Date Value Ref Range Status   03/06/2020 37 (L) >60 mL/min/1.73 Final     Alkaline Phosphatase   Date Value Ref Range Status   03/11/2020 82 39 - 117 U/L Final   03/02/2020 85 39 - 117 U/L Final     Total Protein    Date Value Ref Range Status   03/02/2020 7.0 6.0 - 8.5 g/dL Final     ALT (SGPT)   Date Value Ref Range Status   03/11/2020 13 1 - 33 U/L Final   03/02/2020 12 1 - 33 U/L Final     AST (SGOT)   Date Value Ref Range Status   03/11/2020 11 1 - 32 U/L Final   03/02/2020 13 1 - 32 U/L Final     Total Bilirubin   Date Value Ref Range Status   03/11/2020 <0.2 (L) 0.2 - 1.2 mg/dL Final   03/02/2020 0.2 0.2 - 1.2 mg/dL Final     Albumin   Date Value Ref Range Status   04/07/2020 3.5 2.9 - 4.4 g/dL Final   03/06/2020 3.50 3.50 - 5.20 g/dL Final     Globulin   Date Value Ref Range Status   03/02/2020 3.5 gm/dL Final     A/G Ratio   Date Value Ref Range Status   04/07/2020 1.1 0.7 - 1.7 Final         Imaging Results (Last 7 Days)     ** No results found for the last 168 hours. **            No notes on file    Assessment/Plan     Diagnoses and all orders for this visit:    External hemorrhoids    Stage 3 chronic kidney disease (CMS/[facility])    Encounter for screening for malignant neoplasm of colon    Gastroesophageal reflux disease, esophagitis presence not specified    Type 2 diabetes mellitus with diabetic nephropathy, without long-term current use of insulin (CMS/[facility])    Other orders  -     hydrocortisone (ANUSOL-HC) 2.5 % rectal cream; Insert  into the rectum 2 (Two) Times a Day.    Daily Probiotic and topical therapy, will follow up in 2 months prior to sched her Colonoscopy (1.5 day prep)     I have discussed the above plan with the patient.  They verbalize understanding and are in agreement with the plan.  They have been advised to contact the office for any questions, concerns, or changes related to their health.    Dictated utilizing Dragon dictation

## 2020-04-29 ENCOUNTER — TELEPHONE (OUTPATIENT)
Dept: FAMILY MEDICINE CLINIC | Facility: CLINIC | Age: 65
End: 2020-04-29

## 2020-04-29 ENCOUNTER — OFFICE VISIT (OUTPATIENT)
Dept: FAMILY MEDICINE CLINIC | Facility: CLINIC | Age: 65
End: 2020-04-29

## 2020-04-29 VITALS
WEIGHT: 161 LBS | SYSTOLIC BLOOD PRESSURE: 148 MMHG | HEART RATE: 120 BPM | HEIGHT: 65 IN | DIASTOLIC BLOOD PRESSURE: 72 MMHG | OXYGEN SATURATION: 96 % | TEMPERATURE: 98.4 F | BODY MASS INDEX: 26.82 KG/M2

## 2020-04-29 DIAGNOSIS — E11.9 TYPE 2 DIABETES MELLITUS WITHOUT COMPLICATION, WITHOUT LONG-TERM CURRENT USE OF INSULIN (HCC): ICD-10-CM

## 2020-04-29 DIAGNOSIS — N18.30 CKD (CHRONIC KIDNEY DISEASE) STAGE 3, GFR 30-59 ML/MIN (HCC): Primary | ICD-10-CM

## 2020-04-29 DIAGNOSIS — N17.9 ACUTE RENAL FAILURE, UNSPECIFIED ACUTE RENAL FAILURE TYPE (HCC): ICD-10-CM

## 2020-04-29 DIAGNOSIS — I10 ESSENTIAL HYPERTENSION: ICD-10-CM

## 2020-04-29 DIAGNOSIS — R60.0 BILATERAL LOWER EXTREMITY EDEMA: ICD-10-CM

## 2020-04-29 DIAGNOSIS — H02.849 EDEMA OF EYELID, UNSPECIFIED LATERALITY: ICD-10-CM

## 2020-04-29 PROBLEM — R60.1 GENERALIZED EDEMA: Status: ACTIVE | Noted: 2020-04-29

## 2020-04-29 PROCEDURE — 99215 OFFICE O/P EST HI 40 MIN: CPT | Performed by: FAMILY MEDICINE

## 2020-04-29 RX ORDER — POTASSIUM CHLORIDE 750 MG/1
10 TABLET, EXTENDED RELEASE ORAL DAILY
Qty: 30 TABLET | Refills: 5 | Status: SHIPPED | OUTPATIENT
Start: 2020-04-29 | End: 2020-09-04 | Stop reason: HOSPADM

## 2020-04-29 RX ORDER — FUROSEMIDE 20 MG/1
20 TABLET ORAL DAILY
Qty: 30 TABLET | Refills: 5 | Status: SHIPPED | OUTPATIENT
Start: 2020-04-29 | End: 2020-07-22 | Stop reason: SDUPTHER

## 2020-04-29 RX ORDER — LISINOPRIL 10 MG/1
10 TABLET ORAL DAILY
Qty: 30 TABLET | Refills: 3 | Status: SHIPPED | OUTPATIENT
Start: 2020-04-29 | End: 2020-05-06

## 2020-04-29 NOTE — ASSESSMENT & PLAN NOTE
In March 2020 she had a suspected viral gastroenteritis that led to dehydration and hypovolemic shock that landed her in the ICU.  She subsequently had acute kidney failure and her creatinine went over 8.  Her creatinine has improved to around 1.6.  She has been on amlodipine for hypertension.  We are going to stop that today in case that may be causing or contributing to her edema in her lower extremities and upper eyelids and hands.  I am calling in a prescription for furosemide 20 mg daily along with potassium 10 mEq daily.  She is going to wait 1 week before starting the furosemide and were going to have a telephone or video visit prior to her starting the furosemide to assess whether the amlodipine was contributing to her edema or not.

## 2020-04-29 NOTE — ASSESSMENT & PLAN NOTE
Creatinine and GFR have improved since March when she had acute renal failure for hypovolemic shock.  Refer to nephrology.  Amlodipine was discontinued today as possible contributor or cause of her lower extremity, and, and upper inner eyelid edema.  Her blood pressure was elevated at 148.  We started lisinopril 10 mg daily.  We are also adding furosemide 20 mg daily and potassium 10 mEq daily.  She is going to start that in 1 week after we see how her swelling responds to having stopped the amlodipine.

## 2020-04-29 NOTE — PROGRESS NOTES
Subjective   Jayne Beltran is a 64 y.o. female is here for   Chief Complaint   Patient presents with   • Edema       History of Present Illness     Ms. Beltran states that her hands and feet started swelling worse last week.  She says her  noticed that her face was a little swollen this morning.  She states that the swelling in her hands and feet gets worse at night.  She does not know what is causing this.  She says she has had this happen in the past with fluid retention.    She states her eyes were puffy and swollen a couple years ago and they are puffy and swollen again today.    She has kidney stones and is scheduled to see a urologist on June 1, 2020.    She has diabetes and her blood sugars been doing very well.    The following portions of the patient's history were reviewed and updated as appropriate: allergies, current medications, past family history, past medical history, past social history, past surgical history and problem list.     reports that she has been smoking cigarettes. She has been smoking about 1.50 packs per day. She has never used smokeless tobacco. She reports that she does not drink alcohol or use drugs.    Review of Systems   Constitutional: Negative for activity change and unexpected weight change.   Respiratory: Negative for shortness of breath and wheezing.    Cardiovascular: Negative for chest pain and palpitations.   Gastrointestinal: Negative for abdominal pain, blood in stool and constipation.   Genitourinary: Negative for difficulty urinating and hematuria.   Musculoskeletal: Negative for gait problem.   Skin: Negative for color change and rash.        Puffy and swollen eyes        PHQ-9 Depression Screening  Little interest or pleasure in doing things?     Feeling down, depressed, or hopeless?     Trouble falling or staying asleep, or sleeping too much?     Feeling tired or having little energy?     Poor appetite or overeating?     Feeling bad about yourself - or  "that you are a failure or have let yourself or your family down?     Trouble concentrating on things, such as reading the newspaper or watching television?     Moving or speaking so slowly that other people could have noticed? Or the opposite - being so fidgety or restless that you have been moving around a lot more than usual?     Thoughts that you would be better off dead, or of hurting yourself in some way?     PHQ-9 Total Score     If you checked off any problems, how difficult have these problems made it for you to do your work, take care of things at home, or get along with other people?           Objective   /72 (BP Location: Right arm, Patient Position: Sitting, Cuff Size: Adult)   Pulse 120   Temp 98.4 °F (36.9 °C)   Ht 165.1 cm (65\")   Wt 73 kg (161 lb)   LMP  (LMP Unknown)   SpO2 96%   BMI 26.79 kg/m²   Physical Exam   Constitutional: She is oriented to person, place, and time. She appears well-developed and well-nourished. No distress.   HENT:   Head: Normocephalic and atraumatic.   Eyes: Pupils are equal, round, and reactive to light. Conjunctivae and EOM are normal.   Upper inner eyelid edema   Pulmonary/Chest: Effort normal and breath sounds normal.   Musculoskeletal: She exhibits edema.   The inner canthus of the upper eyelids of both edematous.   Neurological: She is alert and oriented to person, place, and time.   Skin: Skin is warm and dry. No rash noted. She is not diaphoretic. No erythema. No pallor.   Psychiatric: She has a normal mood and affect. Her behavior is normal. Judgment and thought content normal.   Nursing note and vitals reviewed.      Procedures    Assessment/Plan   Diagnoses and all orders for this visit:    1. CKD (chronic kidney disease) stage 3, GFR 30-59 ml/min (CMS/[facility]) (Primary)  -     Ambulatory Referral to Nephrology  -     CBC & Differential  -     Comprehensive Metabolic Panel    2. Essential hypertension  Assessment & Plan:  148/72 today.  Discontinue " amlodipine as a possible contributor or cause of her lower extremity edema as well as the swelling she is experiencing in her hands and the upper inner eyelid edema.  Start lisinopril 10 mg daily.  We are also referring to nephrology for her chronic kidney disease.  She had severe kidney failure during an episode of hypovolemic shock due to severe dehydration from a viral gastroenteritis in March 2020.  She has a home electronic blood pressure monitoring device and is going to check and record her blood pressure at home, which we will review by video visit in 1 week.    Orders:  -     lisinopril (PRINIVIL,ZESTRIL) 10 MG tablet; Take 1 tablet by mouth Daily.  Dispense: 30 tablet; Refill: 3  -     CBC & Differential  -     Comprehensive Metabolic Panel    3. Type 2 diabetes mellitus without complication, without long-term current use of insulin (CMS/[facility])  Assessment & Plan:  Controlled with metformin 1000 mg twice daily and Jardiance 10 mg daily.  No medication side effects.  Continue current treatment.        4. Acute renal failure, unspecified acute renal failure type (CMS/[facility])  Assessment & Plan:  Creatinine and GFR have improved since March when she had acute renal failure for hypovolemic shock.  Refer to nephrology.  Amlodipine was discontinued today as possible contributor or cause of her lower extremity, and, and upper inner eyelid edema.  Her blood pressure was elevated at 148.  We started lisinopril 10 mg daily.  We are also adding furosemide 20 mg daily and potassium 10 mEq daily.  She is going to start that in 1 week after we see how her swelling responds to having stopped the amlodipine.      5. Bilateral lower extremity edema  Assessment & Plan:  In March 2020 she had a suspected viral gastroenteritis that led to dehydration and hypovolemic shock that landed her in the ICU.  She subsequently had acute kidney failure and her creatinine went over 8.  Her creatinine has improved to around 1.6.  She has been  on amlodipine for hypertension.  We are going to stop that today in case that may be causing or contributing to her edema in her lower extremities and upper eyelids and hands.  I am calling in a prescription for furosemide 20 mg daily along with potassium 10 mEq daily.  She is going to wait 1 week before starting the furosemide and were going to have a telephone or video visit prior to her starting the furosemide to assess whether the amlodipine was contributing to her edema or not.    Orders:  -     furosemide (Lasix) 20 MG tablet; Take 1 tablet by mouth Daily.  Dispense: 30 tablet; Refill: 5  -     potassium chloride (K-DUR,KLOR-CON) 10 MEQ CR tablet; Take 1 tablet by mouth Daily.  Dispense: 30 tablet; Refill: 5    6. Edema of eyelid, unspecified laterality         I spent over 40 minutes with the patient, of which more than 50% was counseling, including acute kidney failure, chronic kidney disease, edema, medication side effects, and blood pressure management.

## 2020-04-29 NOTE — ASSESSMENT & PLAN NOTE
148/72 today.  Discontinue amlodipine as a possible contributor or cause of her lower extremity edema as well as the swelling she is experiencing in her hands and the upper inner eyelid edema.  Start lisinopril 10 mg daily.  We are also referring to nephrology for her chronic kidney disease.  She had severe kidney failure during an episode of hypovolemic shock due to severe dehydration from a viral gastroenteritis in March 2020.  She has a home electronic blood pressure monitoring device and is going to check and record her blood pressure at home, which we will review by video visit in 1 week.

## 2020-04-29 NOTE — TELEPHONE ENCOUNTER
Pt called this morning and states she started having increased swelling in her feet, legs, hands, and face.  She states she believes she is building up fluid. She states she is urinating well.  She states she has not traveled anywhere and is not having any symptoms. No fever, chills cough, SOA, N/V/D, loss of taste or smell. No other symptoms.  Pt advised to come to the office this morning for an appointment at 11:30 am.  She agrees.

## 2020-04-29 NOTE — ASSESSMENT & PLAN NOTE
Controlled with metformin 1000 mg twice daily and Jardiance 10 mg daily.  No medication side effects.  Continue current treatment.

## 2020-04-30 LAB
ALBUMIN SERPL-MCNC: 3.8 G/DL (ref 3.5–5.2)
ALBUMIN/GLOB SERPL: 1.5 G/DL
ALP SERPL-CCNC: 99 U/L (ref 39–117)
ALT SERPL-CCNC: 14 U/L (ref 1–33)
AST SERPL-CCNC: 14 U/L (ref 1–32)
BASOPHILS # BLD AUTO: 0.09 10*3/MM3 (ref 0–0.2)
BASOPHILS NFR BLD AUTO: 1 % (ref 0–1.5)
BILIRUB SERPL-MCNC: 0.3 MG/DL (ref 0.2–1.2)
BUN SERPL-MCNC: 12 MG/DL (ref 8–23)
BUN/CREAT SERPL: 7.6 (ref 7–25)
CALCIUM SERPL-MCNC: 8.5 MG/DL (ref 8.6–10.5)
CHLORIDE SERPL-SCNC: 101 MMOL/L (ref 98–107)
CO2 SERPL-SCNC: 20.2 MMOL/L (ref 22–29)
CREAT SERPL-MCNC: 1.58 MG/DL (ref 0.57–1)
EOSINOPHIL # BLD AUTO: 0.31 10*3/MM3 (ref 0–0.4)
EOSINOPHIL NFR BLD AUTO: 3.5 % (ref 0.3–6.2)
ERYTHROCYTE [DISTWIDTH] IN BLOOD BY AUTOMATED COUNT: 16.9 % (ref 12.3–15.4)
GLOBULIN SER CALC-MCNC: 2.5 GM/DL
GLUCOSE SERPL-MCNC: 260 MG/DL (ref 65–99)
HCT VFR BLD AUTO: 32.3 % (ref 34–46.6)
HGB BLD-MCNC: 10.4 G/DL (ref 12–15.9)
IMM GRANULOCYTES # BLD AUTO: 0.06 10*3/MM3 (ref 0–0.05)
IMM GRANULOCYTES NFR BLD AUTO: 0.7 % (ref 0–0.5)
LYMPHOCYTES # BLD AUTO: 1.99 10*3/MM3 (ref 0.7–3.1)
LYMPHOCYTES NFR BLD AUTO: 22.3 % (ref 19.6–45.3)
MCH RBC QN AUTO: 28.8 PG (ref 26.6–33)
MCHC RBC AUTO-ENTMCNC: 32.2 G/DL (ref 31.5–35.7)
MCV RBC AUTO: 89.5 FL (ref 79–97)
MONOCYTES # BLD AUTO: 0.77 10*3/MM3 (ref 0.1–0.9)
MONOCYTES NFR BLD AUTO: 8.6 % (ref 5–12)
NEUTROPHILS # BLD AUTO: 5.69 10*3/MM3 (ref 1.7–7)
NEUTROPHILS NFR BLD AUTO: 63.9 % (ref 42.7–76)
NRBC BLD AUTO-RTO: 0.1 /100 WBC (ref 0–0.2)
PLATELET # BLD AUTO: 400 10*3/MM3 (ref 140–450)
POTASSIUM SERPL-SCNC: 4.5 MMOL/L (ref 3.5–5.2)
PROT SERPL-MCNC: 6.3 G/DL (ref 6–8.5)
RBC # BLD AUTO: 3.61 10*6/MM3 (ref 3.77–5.28)
SODIUM SERPL-SCNC: 139 MMOL/L (ref 136–145)
WBC # BLD AUTO: 8.91 10*3/MM3 (ref 3.4–10.8)

## 2020-04-30 NOTE — PROGRESS NOTES
Please call the patient regarding her result(s). Please let her know that her anemia and kidney function are stable, and slightly improved.  Her WBC is now normal.

## 2020-05-04 RX ORDER — TRIAMTERENE AND HYDROCHLOROTHIAZIDE 37.5; 25 MG/1; MG/1
1 TABLET ORAL DAILY
Qty: 90 TABLET | Refills: 2 | OUTPATIENT
Start: 2020-05-04

## 2020-05-15 ENCOUNTER — TELEMEDICINE (OUTPATIENT)
Dept: FAMILY MEDICINE CLINIC | Facility: CLINIC | Age: 65
End: 2020-05-15

## 2020-05-15 DIAGNOSIS — R60.0 BILATERAL LOWER EXTREMITY EDEMA: Primary | ICD-10-CM

## 2020-05-15 PROCEDURE — 99213 OFFICE O/P EST LOW 20 MIN: CPT | Performed by: FAMILY MEDICINE

## 2020-05-15 NOTE — PROGRESS NOTES
Subjective   Jayne Beltran is a 64 y.o. female is here for   Chief Complaint   Patient presents with   • Edema       You have chosen to receive care through a telehealth visit.  Do you consent to use a video/audio connection for your medical care today? Yes    History of Present Illness     Patient states that her podiatrist, Dr. Villa, so that the Lyrica could possibly be causing her swelling.  I advised her to stop the Lyrica to see if her swelling improves.      The following portions of the patient's history were reviewed and updated as appropriate: allergies, current medications, past family history, past medical history, past social history, past surgical history and problem list.     reports that she has been smoking cigarettes. She has been smoking about 1.50 packs per day. She has never used smokeless tobacco. She reports that she does not drink alcohol or use drugs.    Review of Systems   Constitutional: Negative for activity change and unexpected weight change.   Respiratory: Negative for shortness of breath and wheezing.    Cardiovascular: Negative for chest pain and palpitations.   Gastrointestinal: Negative for abdominal pain, blood in stool and constipation.   Genitourinary: Negative for difficulty urinating and hematuria.   Musculoskeletal: Negative for gait problem.   Skin: Negative for color change and rash.        PHQ-9 Depression Screening  Little interest or pleasure in doing things?     Feeling down, depressed, or hopeless?     Trouble falling or staying asleep, or sleeping too much?     Feeling tired or having little energy?     Poor appetite or overeating?     Feeling bad about yourself - or that you are a failure or have let yourself or your family down?     Trouble concentrating on things, such as reading the newspaper or watching television?     Moving or speaking so slowly that other people could have noticed? Or the opposite - being so fidgety or restless that you have been moving  around a lot more than usual?     Thoughts that you would be better off dead, or of hurting yourself in some way?     PHQ-9 Total Score     If you checked off any problems, how difficult have these problems made it for you to do your work, take care of things at home, or get along with other people?           Objective   LMP  (LMP Unknown)   Physical Exam   Constitutional: She appears well-developed and well-nourished. No distress.   HENT:   Head: Normocephalic and atraumatic.   Eyes: EOM are normal. Right eye exhibits no discharge. Left eye exhibits no discharge.   Neck: Neck normal appearance.  Pulmonary/Chest: Effort normal. No stridor.   Musculoskeletal:         General: Edema present.   Neurological: She is alert.   Skin: No rash noted. She is not diaphoretic.   Psychiatric: She has a normal mood and affect. She mood appears normal. Her affect is normal. Her behavior is normal. Thought content is normal. She does not express abnormal judgement.        Procedures    Assessment/Plan   Diagnoses and all orders for this visit:    1. Bilateral lower extremity edema (Primary)  Assessment & Plan:   I advised her to stop the Lyrica to see if her swelling improves.  She is going to follow-up with me in 1 week, and if her swelling has significantly improved, I recommend that she stay off Lyrica, and we will try amitriptyline for her neuropathy.              I spent over 25 minutes with the patient, of which more than 50% was counseling, including medication side effects and swelling.

## 2020-05-15 NOTE — ASSESSMENT & PLAN NOTE
I advised her to stop the Lyrica to see if her swelling improves.  She is going to follow-up with me in 1 week, and if her swelling has significantly improved, I recommend that she stay off Lyrica, and we will try amitriptyline for her neuropathy.

## 2020-05-15 NOTE — PATIENT INSTRUCTIONS
Edema    Edema is an abnormal buildup of fluids in the body tissues and under the skin. Swelling of the legs, feet, and ankles is a common symptom that becomes more likely as you get older. Swelling is also common in looser tissues, like around the eyes. When the affected area is squeezed, the fluid may move out of that spot and leave a dent for a few moments. This dent is called pitting edema.  There are many possible causes of edema. Eating too much salt (sodium) and being on your feet or sitting for a long time can cause edema in your legs, feet, and ankles. Hot weather may make edema worse. Common causes of edema include:  · Heart failure.  · Liver or kidney disease.  · Weak leg blood vessels.  · Cancer.  · An injury.  · Pregnancy.  · Medicines.  · Being obese.  · Low protein levels in the blood.  Edema is usually painless. Your skin may look swollen or shiny.  Follow these instructions at home:  · Keep the affected body part raised (elevated) above the level of your heart when you are sitting or lying down.  · Do not sit still or stand for long periods of time.  · Do not wear tight clothing. Do not wear garters on your upper legs.  · Exercise your legs to get your circulation going. This helps to move the fluid back into your blood vessels, and it may help the swelling go down.  · Wear elastic bandages or support stockings to reduce swelling as told by your health care provider.  · Eat a low-salt (low-sodium) diet to reduce fluid as told by your health care provider.  · Depending on the cause of your swelling, you may need to limit how much fluid you drink (fluid restriction).  · Take over-the-counter and prescription medicines only as told by your health care provider.  Contact a health care provider if:  · Your edema does not get better with treatment.  · You have heart, liver, or kidney disease and have symptoms of edema.  · You have sudden and unexplained weight gain.  Get help right away if:  · You develop  shortness of breath or chest pain.  · You cannot breathe when you lie down.  · You develop pain, redness, or warmth in the swollen areas.  · You have heart, liver, or kidney disease and suddenly get edema.  · You have a fever and your symptoms suddenly get worse.  Summary  · Edema is an abnormal buildup of fluids in the body tissues and under the skin.  · Eating too much salt (sodium) and being on your feet or sitting for a long time can cause edema in your legs, feet, and ankles.  · Keep the affected body part raised (elevated) above the level of your heart when you are sitting or lying down.  This information is not intended to replace advice given to you by your health care provider. Make sure you discuss any questions you have with your health care provider.  Document Released: 12/18/2006 Document Revised: 01/20/2018 Document Reviewed: 01/20/2018  [employer] Interactive Patient Education © 2020 [employer]

## 2020-05-26 ENCOUNTER — TELEPHONE (OUTPATIENT)
Dept: FAMILY MEDICINE CLINIC | Facility: CLINIC | Age: 65
End: 2020-05-26

## 2020-05-26 PROCEDURE — U0002 COVID-19 LAB TEST NON-CDC: HCPCS | Performed by: OBSTETRICS & GYNECOLOGY

## 2020-05-29 ENCOUNTER — TRANSCRIBE ORDERS (OUTPATIENT)
Dept: ADMINISTRATIVE | Facility: HOSPITAL | Age: 65
End: 2020-05-29

## 2020-05-29 DIAGNOSIS — R91.1 LUNG NODULE: Primary | ICD-10-CM

## 2020-06-24 RX ORDER — MELOXICAM 15 MG/1
15 TABLET ORAL DAILY PRN
Qty: 90 TABLET | Refills: 0 | Status: SHIPPED | OUTPATIENT
Start: 2020-06-24 | End: 2020-09-04 | Stop reason: HOSPADM

## 2020-06-25 ENCOUNTER — OFFICE VISIT (OUTPATIENT)
Dept: GASTROENTEROLOGY | Facility: CLINIC | Age: 65
End: 2020-06-25

## 2020-06-25 ENCOUNTER — LAB (OUTPATIENT)
Dept: LAB | Facility: HOSPITAL | Age: 65
End: 2020-06-25

## 2020-06-25 VITALS — TEMPERATURE: 98 F | BODY MASS INDEX: 28.24 KG/M2 | HEIGHT: 64 IN | WEIGHT: 165.4 LBS

## 2020-06-25 DIAGNOSIS — R19.7 DIARRHEA, UNSPECIFIED TYPE: ICD-10-CM

## 2020-06-25 DIAGNOSIS — K21.9 GERD WITHOUT ESOPHAGITIS: ICD-10-CM

## 2020-06-25 DIAGNOSIS — R19.7 DIARRHEA, UNSPECIFIED TYPE: Primary | ICD-10-CM

## 2020-06-25 PROCEDURE — 99213 OFFICE O/P EST LOW 20 MIN: CPT | Performed by: NURSE PRACTITIONER

## 2020-06-25 RX ORDER — MONTELUKAST SODIUM 4 MG/1
1 TABLET, CHEWABLE ORAL 2 TIMES DAILY
Qty: 60 TABLET | Refills: 5 | Status: SHIPPED | OUTPATIENT
Start: 2020-06-25 | End: 2020-06-25 | Stop reason: SDUPTHER

## 2020-06-25 RX ORDER — MONTELUKAST SODIUM 4 MG/1
1 TABLET, CHEWABLE ORAL 2 TIMES DAILY
Qty: 60 TABLET | Refills: 5 | Status: SHIPPED | OUTPATIENT
Start: 2020-06-25 | End: 2020-08-05 | Stop reason: SDUPTHER

## 2020-06-25 NOTE — PROGRESS NOTES
PATIENT INFORMATION  Jayne Beltran       - 1955    CHIEF COMPLAINT  Chief Complaint   Patient presents with   • Follow-up     2 mo follow up on Diarrhea & hemorrhoids       HISTORY OF PRESENT ILLNESS  Diarrhea wakes her up in the middle of the night. Is afraid to leave the house d/t incontinence.  Since GB removed over 5 years ago.  4-5 loose partially formed stools.  Omeprazole has helped her acid reflux and has not had any in the last month. She did not get her hemmoroid cream from the pharmacy and will f/u on that with the pharmacy. Had antibiotics in Nov and March and diarrhea worse since then but has had diarrhea since her GB removal. She does say that her poop stinks badly.           REVIEW OF SYSTEMS  Review of Systems   Gastrointestinal: Positive for diarrhea.   All other systems reviewed and are negative.        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]   • Multiple kidney stones [N20.0]   • Multiple kidney stones [N20.0]   • Duplicated renal collecting system [Q62.5]   • Atherosclerotic vascular disease [I70.90]   • Acute renal failure (ARF) (CMS/HCC) [N17.9]   • PMB (postmenopausal bleeding) [N95.0]   • Family history of ovarian cancer [Z80.41]   • Primary insomnia [F51.01]   • Leukocytosis [D72.829]   • Diabetic autonomic neuropathy associated with type 2 diabetes mellitus (CMS/HCC) [E11.43]   • Leg mass, right [R22.41]   • Rib pain on left side [R07.81]   • Chronic respiratory failure with hypoxia (CMS/HCC) [J96.11]   • Intermittent claudication (CMS/HCC) [I73.9]   • Muscle spasm of both lower legs [M62.838]   • Tobacco use disorder [F17.200]   • Abnormal lung sounds [R09.89]   • Vitamin D deficiency [E55.9]   • Dyslipidemia [E78.5]   • Bone lesion  [M89.9]   • GERD without esophagitis [K21.9]   • Inflamed seborrheic keratosis [L82.0]   • Type 2 diabetes mellitus without complication, without long-term current use of insulin (CMS/HCC) [E11.9]   • Peripheral neuropathy [G62.9]   • Restless legs syndrome [G25.81]   • Essential hypertension [I10]   • Snoring [R06.83]   • TIA (transient ischemic attack) [G45.9]   • Benign paroxysmal positional vertigo [H81.10]         PAST MEDICAL HISTORY  Past Medical History:   Diagnosis Date   • COPD (chronic obstructive pulmonary disease) (CMS/HCC)    • Diabetes mellitus (CMS/HCC)     type 2   • Fibroid    • Hypertension    • Leukocytosis    • Neuromuscular disorder (CMS/HCC)    • Skin cancer     nose   • TIA (transient ischemic attack)          SURGICAL HISTORY  Past Surgical History:   Procedure Laterality Date   • CHOLECYSTECTOMY     • COLONOSCOPY     • KIDNEY STONE SURGERY           FAMILY HISTORY  Family History   Problem Relation Age of Onset   • Heart disease Mother          at age 63   • Diabetes Mother    • Hypertension Mother    • Diabetes Father    • Deep vein thrombosis Father    • Depression Father    • Liver disease Father    • Lung cancer Father 58         at age 68   • Breast cancer Maternal Grandmother         ? age dx   • Dementia Maternal Grandmother    • Hypertension Maternal Grandmother    • Ovarian cancer Daughter 23   • Diabetes Daughter    • Depression Daughter    • Diabetes Sister    • Diabetes Brother    • Heart disease Brother    • Cancer Brother          at age 43   • Heart disease Maternal Uncle    • Cancer Paternal Uncle    • Clotting disorder Paternal Grandmother    • Colon cancer Neg Hx    • Colon polyps Neg Hx          SOCIAL HISTORY  Social History     Occupational History     Employer: RETIRED   Tobacco Use   • Smoking status: Current Every Day Smoker     Packs/day: 1.50     Types: Cigarettes   • Smokeless tobacco: Never Used   Substance and Sexual Activity   • Alcohol  use: No   • Drug use: No   • Sexual activity: Not Currently     Birth control/protection: Post-menopausal         CURRENT MEDICATIONS    Current Outpatient Medications:   •  albuterol sulfate  (90 Base) MCG/ACT inhaler, Inhale 2 puffs Every 4 (Four) Hours As Needed for Wheezing for up to 30 days., Disp: 1 inhaler, Rfl: 0  •  aspirin 81 MG EC tablet, Take 81 mg by mouth Daily., Disp: , Rfl:   •  colestipol (COLESTID) 1 g tablet, Take 1 tablet by mouth 2 (Two) Times a Day., Disp: 60 tablet, Rfl: 5  •  Cyanocobalamin (VITAMIN B 12) 500 MCG tablet, Take 500 mcg by mouth Daily., Disp: , Rfl:   •  diphenhydrAMINE (BENADRYL ALLERGY) 25 MG tablet, Take 0.5 tablets by mouth Every 6 (Six) Hours As Needed for Itching or Sleep., Disp: 30 tablet, Rfl: 1  •  DULoxetine (CYMBALTA) 30 MG capsule, Take 1 capsule by mouth 2 (Two) Times a Day., Disp: 60 capsule, Rfl: 5  •  Empagliflozin 10 MG tablet, Take 10 mg by mouth Daily., Disp: 90 tablet, Rfl: 1  •  furosemide (Lasix) 20 MG tablet, Take 1 tablet by mouth Daily., Disp: 30 tablet, Rfl: 5  •  hydrocortisone (ANUSOL-HC) 2.5 % rectal cream, Insert  into the rectum 2 (Two) Times a Day., Disp: 30 g, Rfl: 5  •  lisinopril (PRINIVIL,ZESTRIL) 20 MG tablet, Take 1 tablet by mouth Daily., Disp: 30 tablet, Rfl: 5  •  LYRICA 150 MG capsule, Take 1 capsule by mouth 2 (Two) Times a Day., Disp: 15 capsule, Rfl: 0  •  meloxicam (MOBIC) 15 MG tablet, TAKE 1 TABLET BY MOUTH DAILY AS NEEDED FOR MODERATE PAIN, Disp: 90 tablet, Rfl: 0  •  metFORMIN (GLUCOPHAGE) 1000 MG tablet, Take 1 tablet by mouth 2 (Two) Times a Day With Meals., Disp: 180 tablet, Rfl: 5  •  metoprolol succinate XL (TOPROL-XL) 200 MG 24 hr tablet, Take 1 tablet by mouth Daily., Disp: 90 tablet, Rfl: 1  •  omeprazole (priLOSEC) 20 MG capsule, Take 1 capsule by mouth Daily., Disp: 90 capsule, Rfl: 1  •  potassium chloride (K-DUR,KLOR-CON) 10 MEQ CR tablet, Take 1 tablet by mouth Daily., Disp: 30 tablet, Rfl: 5  •  rOPINIRole  "(REQUIP) 3 MG tablet, TAKE 1 TABLET BY MOUTH EVERY DAY EVERY NIGHT, Disp: 90 tablet, Rfl: 1  •  terbinafine (lamiSIL) 250 MG tablet, Take 250 mg by mouth Daily., Disp: , Rfl:     ALLERGIES  Lyrica [pregabalin]    VITALS  Vitals:    06/25/20 0848   Temp: 98 °F (36.7 °C)   TempSrc: Temporal   Weight: 75 kg (165 lb 6.4 oz)   Height: 162.6 cm (64.02\")       LAST RESULTS   Admission on 05/26/2020, Discharged on 05/26/2020   Component Date Value Ref Range Status   • COVID19 05/26/2020 Not Detected  Not Detected - Ref. Range Final     No results found.    PHYSICAL EXAM  Debilities/Disabilities Identified: None  Emotional Behavior: Appropriate  Physical Exam   Constitutional: She is oriented to person, place, and time. She appears well-developed and well-nourished.   HENT:   Head: Normocephalic and atraumatic.   Eyes: Pupils are equal, round, and reactive to light. Conjunctivae are normal.   Neck: Normal range of motion. Neck supple.   Cardiovascular: Normal rate and regular rhythm.   Pulmonary/Chest: Effort normal and breath sounds normal.   Abdominal: Soft. Bowel sounds are normal. She exhibits no distension. There is tenderness in the epigastric area.   Mild epigastric tenderness   Musculoskeletal: Normal range of motion.   Lymphadenopathy:     She has no cervical adenopathy.   Neurological: She is alert and oriented to person, place, and time.   Skin: Skin is warm and dry.   Psychiatric: She has a normal mood and affect. Her behavior is normal.   Nursing note and vitals reviewed.      CLINICAL DATA REVIEWED   reviewed previous lab results and integrated with today's visit, reviewed notes from other physicians and/or last GI encounter, reviewed previous endoscopy results and available photos    ASSESSMENT  Diagnoses and all orders for this visit:    Diarrhea, unspecified type  -     Discontinue: colestipol (COLESTID) 1 g tablet; Take 1 tablet by mouth 2 (Two) Times a Day.  -     Clostridium Difficile EIA - Stool, Per " Rectum; Future  -     colestipol (COLESTID) 1 g tablet; Take 1 tablet by mouth 2 (Two) Times a Day.    GERD without esophagitis      PLAN  Return in about 4 weeks (around 7/23/2020) for Recheck.     Cdiff study and colestid trial for diarrhea with 1 month f/u.  May also use immodium as needed for diarrhea.     HB improved continue omeprazole and probiotic.  Colon screening 5/2/2014 with hyperplastic polyps so 10 year recall in 2024.      I have discussed the above plan with the patient.  They verbalize understanding and are in agreement with the plan.  They have been advised to contact the office for any questions, concerns, or changes related to their health.        Seen and examined today by Dr. Jake Solis and TRISH Stoner.

## 2020-06-26 ENCOUNTER — OFFICE VISIT (OUTPATIENT)
Dept: FAMILY MEDICINE CLINIC | Facility: CLINIC | Age: 65
End: 2020-06-26

## 2020-06-26 VITALS
BODY MASS INDEX: 27.49 KG/M2 | HEART RATE: 92 BPM | SYSTOLIC BLOOD PRESSURE: 132 MMHG | TEMPERATURE: 99.7 F | OXYGEN SATURATION: 95 % | HEIGHT: 65 IN | DIASTOLIC BLOOD PRESSURE: 70 MMHG | WEIGHT: 165 LBS

## 2020-06-26 DIAGNOSIS — M79.672 LEFT FOOT PAIN: ICD-10-CM

## 2020-06-26 DIAGNOSIS — L98.9 SORE ON LEG: Primary | ICD-10-CM

## 2020-06-26 PROCEDURE — 99213 OFFICE O/P EST LOW 20 MIN: CPT | Performed by: FAMILY MEDICINE

## 2020-06-26 NOTE — PROGRESS NOTES
Subjective   Jayne Beltran is a 64 y.o. female is here for   Chief Complaint   Patient presents with   • Foot Injury     left started with a blister 4 months ago       History of Present Illness     She states that about 4 months ago she got a blister on her left foot.  She states that it has not completely healed.  Seems to get better and a little worse.  She thinks she got bit by a spider. She states it is getting better, slowly. She does not want to see a dermatologist at this time. She agrees to accept a dermatology referral if the sore does not heal completely in 1 month.      The following portions of the patient's history were reviewed and updated as appropriate: allergies, current medications, past family history, past medical history, past social history, past surgical history and problem list.     reports that she has been smoking cigarettes. She has been smoking about 1.50 packs per day. She has never used smokeless tobacco. She reports that she does not drink alcohol or use drugs.    Review of Systems   Constitutional: Negative for activity change and unexpected weight change.   HENT: Negative for congestion.    Respiratory: Negative for shortness of breath.    Cardiovascular: Negative for chest pain and palpitations.   Gastrointestinal: Negative for abdominal pain, blood in stool and constipation.   Genitourinary: Negative for difficulty urinating and hematuria.   Musculoskeletal: Negative for gait problem.   Skin: Negative for color change and rash.        Left foot sore        PHQ-9 Depression Screening  Little interest or pleasure in doing things?     Feeling down, depressed, or hopeless?     Trouble falling or staying asleep, or sleeping too much?     Feeling tired or having little energy?     Poor appetite or overeating?     Feeling bad about yourself - or that you are a failure or have let yourself or your family down?     Trouble concentrating on things, such as reading the newspaper or  "watching television?     Moving or speaking so slowly that other people could have noticed? Or the opposite - being so fidgety or restless that you have been moving around a lot more than usual?     Thoughts that you would be better off dead, or of hurting yourself in some way?     PHQ-9 Total Score     If you checked off any problems, how difficult have these problems made it for you to do your work, take care of things at home, or get along with other people?           Objective   /70 (BP Location: Left arm, Patient Position: Sitting, Cuff Size: Adult)   Pulse 92   Temp 99.7 °F (37.6 °C) (Tympanic)   Ht 165.1 cm (65\")   Wt 74.8 kg (165 lb)   LMP  (LMP Unknown)   SpO2 95%   BMI 27.46 kg/m²   Physical Exam   Constitutional: She is oriented to person, place, and time. She appears well-developed and well-nourished. No distress.   HENT:   Head: Normocephalic and atraumatic.   Right Ear: External ear normal.   Left Ear: External ear normal.   Nose: Nose normal.   Mouth/Throat: Oropharynx is clear and moist. No oropharyngeal exudate.   Eyes: Lids are normal. Right eye exhibits no discharge. Left eye exhibits no discharge. No scleral icterus.   Neck: Trachea normal, normal range of motion and full passive range of motion without pain. Neck supple. No tracheal deviation and no edema present. No thyromegaly present.   Cardiovascular: Normal rate, regular rhythm, normal heart sounds and intact distal pulses. Exam reveals no gallop and no friction rub.   No murmur heard.  Pulmonary/Chest: Effort normal and breath sounds normal. No stridor. No tachypnea and no bradypnea. No respiratory distress. She has no decreased breath sounds. She has no wheezes. She has no rales. She exhibits no tenderness.   Abdominal: Normal appearance. There is no hepatosplenomegaly.   Musculoskeletal: She exhibits no edema.   Lymphadenopathy:        Head (right side): No submental, no submandibular, no tonsillar, no preauricular, no " posterior auricular and no occipital adenopathy present.        Head (left side): No submental, no submandibular, no tonsillar, no preauricular, no posterior auricular and no occipital adenopathy present.     She has no cervical adenopathy.        Right cervical: No superficial cervical, no deep cervical and no posterior cervical adenopathy present.       Left cervical: No superficial cervical, no deep cervical and no posterior cervical adenopathy present.   Neurological: She is alert and oriented to person, place, and time. She has normal strength and normal reflexes. She is not disoriented.   Skin: Skin is warm, dry and intact. Capillary refill takes less than 2 seconds. No rash noted. She is not diaphoretic. No cyanosis or erythema. No pallor. Nails show no clubbing.   On the medial aspect of the left foot in the midfoot area there is a small 1 cm diameter sore that is scabbed and healing.  There is mild surrounding induration.  There is no pus or drainage.   Psychiatric: She has a normal mood and affect. Her behavior is normal. Cognition and memory are normal.   Nursing note and vitals reviewed.      Procedures    Assessment/Plan   Diagnoses and all orders for this visit:    1. Sore on leg (Primary)  Assessment & Plan:  She states it is getting better, slowly. She does not want to see a dermatologist at this time. She agrees to accept a dermatology referral if the sore does not heal completely in 1 month.        2. Left foot pain  -     XR Foot 3+ View Left; Future

## 2020-06-26 NOTE — ASSESSMENT & PLAN NOTE
She states it is getting better, slowly. She does not want to see a dermatologist at this time. She agrees to accept a dermatology referral if the sore does not heal completely in 1 month.

## 2020-06-29 RX ORDER — AMLODIPINE BESYLATE 10 MG/1
TABLET ORAL
Qty: 90 TABLET | Refills: 1 | OUTPATIENT
Start: 2020-06-29

## 2020-07-06 ENCOUNTER — TELEPHONE (OUTPATIENT)
Dept: FAMILY MEDICINE CLINIC | Facility: CLINIC | Age: 65
End: 2020-07-06

## 2020-07-06 NOTE — TELEPHONE ENCOUNTER
I spoke with Jayne about getting the x-ray done for her foot.  She stated she really does not want to go.  I ask her if she would go and she said I will try to get up there and get it done.  MARINA Yu CMA

## 2020-07-22 DIAGNOSIS — E11.9 TYPE 2 DIABETES MELLITUS WITHOUT COMPLICATION, WITHOUT LONG-TERM CURRENT USE OF INSULIN (HCC): ICD-10-CM

## 2020-07-22 DIAGNOSIS — R60.0 BILATERAL LOWER EXTREMITY EDEMA: ICD-10-CM

## 2020-07-22 DIAGNOSIS — I10 ESSENTIAL HYPERTENSION: ICD-10-CM

## 2020-07-22 DIAGNOSIS — K21.9 GASTROESOPHAGEAL REFLUX DISEASE WITHOUT ESOPHAGITIS: ICD-10-CM

## 2020-07-22 RX ORDER — FUROSEMIDE 20 MG/1
20 TABLET ORAL DAILY
Qty: 90 TABLET | Refills: 1 | Status: SHIPPED | OUTPATIENT
Start: 2020-07-22 | End: 2020-09-04 | Stop reason: HOSPADM

## 2020-07-22 RX ORDER — OMEPRAZOLE 20 MG/1
20 CAPSULE, DELAYED RELEASE ORAL DAILY
Qty: 90 CAPSULE | Refills: 1 | Status: ON HOLD | OUTPATIENT
Start: 2020-07-22 | End: 2021-06-11

## 2020-07-22 RX ORDER — METOPROLOL SUCCINATE 200 MG/1
200 TABLET, EXTENDED RELEASE ORAL DAILY
Qty: 90 TABLET | Refills: 1 | Status: SHIPPED | OUTPATIENT
Start: 2020-07-22 | End: 2021-01-15 | Stop reason: SDUPTHER

## 2020-07-27 DIAGNOSIS — E11.9 TYPE 2 DIABETES MELLITUS WITHOUT COMPLICATION, WITHOUT LONG-TERM CURRENT USE OF INSULIN (HCC): Primary | ICD-10-CM

## 2020-07-27 RX ORDER — LANCETS
1 EACH MISCELLANEOUS
Qty: 200 EACH | Refills: 11 | Status: SHIPPED | OUTPATIENT
Start: 2020-07-27 | End: 2021-08-27

## 2020-07-27 RX ORDER — BLOOD-GLUCOSE METER
1 EACH MISCELLANEOUS
Qty: 1 KIT | Refills: 2 | Status: SHIPPED | OUTPATIENT
Start: 2020-07-27

## 2020-07-27 RX ORDER — ISOPROPYL ALCOHOL 0.75 G/1
1 SWAB TOPICAL
Qty: 200 EACH | Refills: 11 | Status: SHIPPED | OUTPATIENT
Start: 2020-07-27 | End: 2021-10-28

## 2020-07-27 RX ORDER — BLOOD GLUCOSE CONTROL HIGH,LOW
1 EACH MISCELLANEOUS
Qty: 1 EACH | Refills: 0 | Status: CANCELLED | OUTPATIENT
Start: 2020-07-27

## 2020-07-27 RX ORDER — BLOOD-GLUCOSE METER
1 EACH MISCELLANEOUS
Qty: 1 KIT | Refills: 0 | Status: CANCELLED | OUTPATIENT
Start: 2020-07-27

## 2020-07-27 RX ORDER — LANCETS
EACH MISCELLANEOUS
Qty: 102 EACH | Refills: 12 | Status: CANCELLED | OUTPATIENT
Start: 2020-07-27

## 2020-07-27 RX ORDER — BLOOD GLUCOSE CONTROL HIGH,LOW
1 EACH MISCELLANEOUS
Qty: 5 EACH | Refills: 5 | Status: SHIPPED | OUTPATIENT
Start: 2020-07-27

## 2020-07-27 RX ORDER — ISOPROPYL ALCOHOL 0.75 G/1
SWAB TOPICAL
Status: CANCELLED | OUTPATIENT
Start: 2020-07-27

## 2020-07-30 DIAGNOSIS — I10 ESSENTIAL HYPERTENSION: ICD-10-CM

## 2020-07-30 RX ORDER — LISINOPRIL 10 MG/1
TABLET ORAL
Qty: 90 TABLET | Refills: 2 | OUTPATIENT
Start: 2020-07-30

## 2020-08-04 ENCOUNTER — TELEPHONE (OUTPATIENT)
Dept: ONCOLOGY | Facility: CLINIC | Age: 65
End: 2020-08-04

## 2020-08-04 NOTE — TELEPHONE ENCOUNTER
PT'S , ZINA, CALLED TO RESCHEDULE APPT. PT IS IN [place] VISITING THEIR [facility]. SHE WILL CALL TO RESCHEDULE.    IF YOU NEED TO REACH OUT, HER PHONE NUMBER IS:  [phone]

## 2020-08-05 ENCOUNTER — APPOINTMENT (OUTPATIENT)
Dept: LAB | Facility: HOSPITAL | Age: 65
End: 2020-08-05

## 2020-08-05 DIAGNOSIS — R19.7 DIARRHEA, UNSPECIFIED TYPE: ICD-10-CM

## 2020-08-05 RX ORDER — MONTELUKAST SODIUM 4 MG/1
1 TABLET, CHEWABLE ORAL 2 TIMES DAILY
Qty: 60 TABLET | Refills: 5 | Status: ON HOLD | OUTPATIENT
Start: 2020-08-05 | End: 2021-06-11

## 2020-08-24 ENCOUNTER — TELEPHONE (OUTPATIENT)
Dept: FAMILY MEDICINE CLINIC | Facility: CLINIC | Age: 65
End: 2020-08-24

## 2020-08-24 ENCOUNTER — APPOINTMENT (OUTPATIENT)
Dept: GENERAL RADIOLOGY | Facility: HOSPITAL | Age: 65
End: 2020-08-24

## 2020-08-24 ENCOUNTER — HOSPITAL ENCOUNTER (INPATIENT)
Facility: HOSPITAL | Age: 65
LOS: 11 days | Discharge: HOME HEALTH | End: 2020-09-04
Attending: EMERGENCY MEDICINE | Admitting: INTERNAL MEDICINE

## 2020-08-24 ENCOUNTER — APPOINTMENT (OUTPATIENT)
Dept: CT IMAGING | Facility: HOSPITAL | Age: 65
End: 2020-08-24

## 2020-08-24 DIAGNOSIS — N17.9 ACUTE RENAL FAILURE, UNSPECIFIED ACUTE RENAL FAILURE TYPE (HCC): ICD-10-CM

## 2020-08-24 DIAGNOSIS — E87.5 HYPERKALEMIA: ICD-10-CM

## 2020-08-24 DIAGNOSIS — R55 SYNCOPE AND COLLAPSE: Primary | ICD-10-CM

## 2020-08-24 LAB
ALBUMIN SERPL-MCNC: 4.2 G/DL (ref 3.5–5.2)
ALBUMIN/GLOB SERPL: 1.6 G/DL
ALP SERPL-CCNC: 121 U/L (ref 39–117)
ALT SERPL W P-5'-P-CCNC: 16 U/L (ref 1–33)
ANION GAP SERPL CALCULATED.3IONS-SCNC: 45.8 MMOL/L (ref 5–15)
ARTERIAL PATENCY WRIST A: POSITIVE
ARTERIAL PATENCY WRIST A: POSITIVE
AST SERPL-CCNC: 15 U/L (ref 1–32)
ATMOSPHERIC PRESS: 749.7 MMHG
ATMOSPHERIC PRESS: 753.2 MMHG
B PARAPERT DNA SPEC QL NAA+PROBE: NOT DETECTED
B PERT DNA SPEC QL NAA+PROBE: NOT DETECTED
BACTERIA UR QL AUTO: ABNORMAL /HPF
BASE EXCESS BLDA CALC-SCNC: -17 MMOL/L (ref 0–2)
BASE EXCESS BLDA CALC-SCNC: <-30 MMOL/L (ref 0–2)
BDY SITE: ABNORMAL
BDY SITE: ABNORMAL
BILIRUB SERPL-MCNC: 0.3 MG/DL (ref 0–1.2)
BILIRUB UR QL STRIP: NEGATIVE
BUN SERPL-MCNC: 73 MG/DL (ref 8–23)
BUN/CREAT SERPL: 7.2 (ref 7–25)
C PNEUM DNA NPH QL NAA+NON-PROBE: NOT DETECTED
CALCIUM SPEC-SCNC: 8.7 MG/DL (ref 8.6–10.5)
CHLORIDE SERPL-SCNC: 84 MMOL/L (ref 98–107)
CLARITY UR: ABNORMAL
CO2 SERPL-SCNC: 2.2 MMOL/L (ref 22–29)
COLOR UR: YELLOW
CREAT SERPL-MCNC: 10.13 MG/DL (ref 0.57–1)
D-LACTATE SERPL-SCNC: 13.4 MMOL/L (ref 0.5–2)
D-LACTATE SERPL-SCNC: 22.7 MMOL/L (ref 0.5–2)
DEPRECATED RDW RBC AUTO: 54.6 FL (ref 37–54)
ERYTHROCYTE [DISTWIDTH] IN BLOOD BY AUTOMATED COUNT: 16.7 % (ref 12.3–15.4)
FLUAV H1 2009 PAND RNA NPH QL NAA+PROBE: NOT DETECTED
FLUAV H1 HA GENE NPH QL NAA+PROBE: NOT DETECTED
FLUAV H3 RNA NPH QL NAA+PROBE: NOT DETECTED
FLUAV SUBTYP SPEC NAA+PROBE: NOT DETECTED
FLUBV RNA ISLT QL NAA+PROBE: NOT DETECTED
GAS FLOW AIRWAY: 2 LPM
GFR SERPL CREATININE-BSD FRML MDRD: 4 ML/MIN/1.73
GFR SERPL CREATININE-BSD FRML MDRD: ABNORMAL ML/MIN/{1.73_M2}
GLOBULIN UR ELPH-MCNC: 2.7 GM/DL
GLUCOSE BLDC GLUCOMTR-MCNC: 116 MG/DL (ref 70–130)
GLUCOSE BLDC GLUCOMTR-MCNC: 203 MG/DL (ref 70–130)
GLUCOSE SERPL-MCNC: 72 MG/DL (ref 65–99)
GLUCOSE UR STRIP-MCNC: NEGATIVE MG/DL
HADV DNA SPEC NAA+PROBE: NOT DETECTED
HBV SURFACE AG SERPL QL IA: NORMAL
HCO3 BLDA-SCNC: 2.2 MMOL/L (ref 22–28)
HCO3 BLDA-SCNC: 8.8 MMOL/L (ref 22–28)
HCOV 229E RNA SPEC QL NAA+PROBE: NOT DETECTED
HCOV HKU1 RNA SPEC QL NAA+PROBE: NOT DETECTED
HCOV NL63 RNA SPEC QL NAA+PROBE: NOT DETECTED
HCOV OC43 RNA SPEC QL NAA+PROBE: NOT DETECTED
HCT VFR BLD AUTO: 42 % (ref 34–46.6)
HGB BLD-MCNC: 13.3 G/DL (ref 12–15.9)
HGB UR QL STRIP.AUTO: ABNORMAL
HMPV RNA NPH QL NAA+NON-PROBE: NOT DETECTED
HPIV1 RNA SPEC QL NAA+PROBE: NOT DETECTED
HPIV2 RNA SPEC QL NAA+PROBE: NOT DETECTED
HPIV3 RNA NPH QL NAA+PROBE: NOT DETECTED
HPIV4 P GENE NPH QL NAA+PROBE: NOT DETECTED
HYALINE CASTS UR QL AUTO: ABNORMAL /LPF
INHALED O2 CONCENTRATION: 21 %
KETONES UR QL STRIP: ABNORMAL
LACTATE HOLD SPECIMEN: NORMAL
LEUKOCYTE ESTERASE UR QL STRIP.AUTO: ABNORMAL
LYMPHOCYTES # BLD MANUAL: 2.52 10*3/MM3 (ref 0.7–3.1)
LYMPHOCYTES NFR BLD MANUAL: 5 % (ref 5–12)
LYMPHOCYTES NFR BLD MANUAL: 9.9 % (ref 19.6–45.3)
M PNEUMO IGG SER IA-ACNC: NOT DETECTED
MCH RBC QN AUTO: 28.5 PG (ref 26.6–33)
MCHC RBC AUTO-ENTMCNC: 31.7 G/DL (ref 31.5–35.7)
MCV RBC AUTO: 89.9 FL (ref 79–97)
MODALITY: ABNORMAL
MODALITY: ABNORMAL
MONOCYTES # BLD AUTO: 1.27 10*3/MM3 (ref 0.1–0.9)
NEUTROPHILS # BLD AUTO: 21.62 10*3/MM3 (ref 1.7–7)
NEUTROPHILS NFR BLD MANUAL: 85.1 % (ref 42.7–76)
NITRITE UR QL STRIP: NEGATIVE
O2 A-A PPRESDIFF RESPIRATORY: 0.9 MMHG
PCO2 BLDA: 19.1 MM HG (ref 35–45)
PCO2 BLDA: 21.7 MM HG (ref 35–45)
PH BLDA: 6.68 PH UNITS (ref 7.35–7.45)
PH BLDA: 7.22 PH UNITS (ref 7.35–7.45)
PH UR STRIP.AUTO: 6.5 [PH] (ref 5–8)
PLAT MORPH BLD: NORMAL
PLATELET # BLD AUTO: 583 10*3/MM3 (ref 140–450)
PMV BLD AUTO: 11.6 FL (ref 6–12)
PO2 BLDA: 113.7 MM HG (ref 80–100)
PO2 BLDA: 67.4 MM HG (ref 80–100)
POTASSIUM SERPL-SCNC: 6.1 MMOL/L (ref 3.5–5.2)
PROT SERPL-MCNC: 6.9 G/DL (ref 6–8.5)
PROT UR QL STRIP: ABNORMAL
RBC # BLD AUTO: 4.67 10*6/MM3 (ref 3.77–5.28)
RBC # UR: ABNORMAL /HPF
RBC MORPH BLD: NORMAL
REF LAB TEST METHOD: ABNORMAL
RHINOVIRUS RNA SPEC NAA+PROBE: NOT DETECTED
RSV RNA NPH QL NAA+NON-PROBE: NOT DETECTED
SAO2 % BLDCOA: 88.1 % (ref 92–99)
SAO2 % BLDCOA: 89.6 % (ref 92–99)
SARS-COV-2 RNA NPH QL NAA+NON-PROBE: NOT DETECTED
SODIUM SERPL-SCNC: 132 MMOL/L (ref 136–145)
SP GR UR STRIP: 1.03 (ref 1–1.03)
SQUAMOUS #/AREA URNS HPF: ABNORMAL /HPF
TOTAL RATE: 18 BREATHS/MINUTE
TOTAL RATE: 24 BREATHS/MINUTE
TROPONIN T SERPL-MCNC: 0.02 NG/ML (ref 0–0.03)
UROBILINOGEN UR QL STRIP: ABNORMAL
WBC # BLD AUTO: 25.41 10*3/MM3 (ref 3.4–10.8)
WBC MORPH BLD: NORMAL
WBC UR QL AUTO: ABNORMAL /HPF

## 2020-08-24 PROCEDURE — 93010 ELECTROCARDIOGRAM REPORT: CPT | Performed by: INTERNAL MEDICINE

## 2020-08-24 PROCEDURE — P9612 CATHETERIZE FOR URINE SPEC: HCPCS

## 2020-08-24 PROCEDURE — 71045 X-RAY EXAM CHEST 1 VIEW: CPT

## 2020-08-24 PROCEDURE — 02HV33Z INSERTION OF INFUSION DEVICE INTO SUPERIOR VENA CAVA, PERCUTANEOUS APPROACH: ICD-10-PCS | Performed by: INTERNAL MEDICINE

## 2020-08-24 PROCEDURE — 85007 BL SMEAR W/DIFF WBC COUNT: CPT | Performed by: EMERGENCY MEDICINE

## 2020-08-24 PROCEDURE — 25010000002 HEPARIN (PORCINE) PER 1000 UNITS: Performed by: INTERNAL MEDICINE

## 2020-08-24 PROCEDURE — 25010000002 PIPERACILLIN SOD-TAZOBACTAM PER 1 G: Performed by: INTERNAL MEDICINE

## 2020-08-24 PROCEDURE — 84484 ASSAY OF TROPONIN QUANT: CPT | Performed by: EMERGENCY MEDICINE

## 2020-08-24 PROCEDURE — 36600 WITHDRAWAL OF ARTERIAL BLOOD: CPT

## 2020-08-24 PROCEDURE — 93005 ELECTROCARDIOGRAM TRACING: CPT | Performed by: EMERGENCY MEDICINE

## 2020-08-24 PROCEDURE — 87040 BLOOD CULTURE FOR BACTERIA: CPT | Performed by: INTERNAL MEDICINE

## 2020-08-24 PROCEDURE — 0202U NFCT DS 22 TRGT SARS-COV-2: CPT | Performed by: EMERGENCY MEDICINE

## 2020-08-24 PROCEDURE — 82803 BLOOD GASES ANY COMBINATION: CPT

## 2020-08-24 PROCEDURE — 80053 COMPREHEN METABOLIC PANEL: CPT | Performed by: EMERGENCY MEDICINE

## 2020-08-24 PROCEDURE — 81001 URINALYSIS AUTO W/SCOPE: CPT | Performed by: EMERGENCY MEDICINE

## 2020-08-24 PROCEDURE — 25010000002 VANCOMYCIN 10 G RECONSTITUTED SOLUTION: Performed by: INTERNAL MEDICINE

## 2020-08-24 PROCEDURE — 99284 EMERGENCY DEPT VISIT MOD MDM: CPT

## 2020-08-24 PROCEDURE — 83605 ASSAY OF LACTIC ACID: CPT | Performed by: EMERGENCY MEDICINE

## 2020-08-24 PROCEDURE — 82962 GLUCOSE BLOOD TEST: CPT

## 2020-08-24 PROCEDURE — 85025 COMPLETE CBC W/AUTO DIFF WBC: CPT | Performed by: EMERGENCY MEDICINE

## 2020-08-24 PROCEDURE — 70450 CT HEAD/BRAIN W/O DYE: CPT

## 2020-08-24 PROCEDURE — B548ZZA ULTRASONOGRAPHY OF SUPERIOR VENA CAVA, GUIDANCE: ICD-10-PCS | Performed by: INTERNAL MEDICINE

## 2020-08-24 PROCEDURE — 87340 HEPATITIS B SURFACE AG IA: CPT | Performed by: INTERNAL MEDICINE

## 2020-08-24 PROCEDURE — 5A1D70Z PERFORMANCE OF URINARY FILTRATION, INTERMITTENT, LESS THAN 6 HOURS PER DAY: ICD-10-PCS | Performed by: INTERNAL MEDICINE

## 2020-08-24 RX ORDER — ONDANSETRON 4 MG/1
4 TABLET, FILM COATED ORAL EVERY 6 HOURS PRN
Status: DISCONTINUED | OUTPATIENT
Start: 2020-08-24 | End: 2020-09-04 | Stop reason: HOSPADM

## 2020-08-24 RX ORDER — NICOTINE POLACRILEX 4 MG
15 LOZENGE BUCCAL
Status: DISCONTINUED | OUTPATIENT
Start: 2020-08-24 | End: 2020-09-04 | Stop reason: HOSPADM

## 2020-08-24 RX ORDER — ROPINIROLE 3 MG/1
3 TABLET, FILM COATED ORAL NIGHTLY
COMMUNITY
End: 2021-09-02 | Stop reason: SDUPTHER

## 2020-08-24 RX ORDER — SODIUM CHLORIDE 0.9 % (FLUSH) 0.9 %
10 SYRINGE (ML) INJECTION AS NEEDED
Status: DISCONTINUED | OUTPATIENT
Start: 2020-08-24 | End: 2020-09-04 | Stop reason: HOSPADM

## 2020-08-24 RX ORDER — DULOXETIN HYDROCHLORIDE 60 MG/1
60 CAPSULE, DELAYED RELEASE ORAL DAILY
COMMUNITY
End: 2020-09-04 | Stop reason: HOSPADM

## 2020-08-24 RX ORDER — HEPARIN SODIUM 5000 [USP'U]/ML
5000 INJECTION, SOLUTION INTRAVENOUS; SUBCUTANEOUS EVERY 12 HOURS SCHEDULED
Status: DISCONTINUED | OUTPATIENT
Start: 2020-08-24 | End: 2020-09-04 | Stop reason: HOSPADM

## 2020-08-24 RX ORDER — DEXTROSE MONOHYDRATE 25 G/50ML
25 INJECTION, SOLUTION INTRAVENOUS
Status: DISCONTINUED | OUTPATIENT
Start: 2020-08-24 | End: 2020-09-04 | Stop reason: HOSPADM

## 2020-08-24 RX ORDER — NICOTINE POLACRILEX 4 MG
15 LOZENGE BUCCAL
Status: DISCONTINUED | OUTPATIENT
Start: 2020-08-24 | End: 2020-09-01 | Stop reason: SDUPTHER

## 2020-08-24 RX ORDER — NOREPINEPHRINE BIT/0.9 % NACL 8 MG/250ML
INFUSION BOTTLE (ML) INTRAVENOUS
Status: COMPLETED
Start: 2020-08-24 | End: 2020-08-24

## 2020-08-24 RX ORDER — DEXTROSE MONOHYDRATE 25 G/50ML
25 INJECTION, SOLUTION INTRAVENOUS
Status: DISCONTINUED | OUTPATIENT
Start: 2020-08-24 | End: 2020-09-01 | Stop reason: SDUPTHER

## 2020-08-24 RX ORDER — ONDANSETRON 2 MG/ML
4 INJECTION INTRAMUSCULAR; INTRAVENOUS EVERY 6 HOURS PRN
Status: DISCONTINUED | OUTPATIENT
Start: 2020-08-24 | End: 2020-09-04 | Stop reason: HOSPADM

## 2020-08-24 RX ORDER — LISINOPRIL 10 MG/1
10 TABLET ORAL DAILY
COMMUNITY
End: 2020-09-04 | Stop reason: HOSPADM

## 2020-08-24 RX ORDER — BISACODYL 5 MG/1
5 TABLET, DELAYED RELEASE ORAL DAILY PRN
Status: DISCONTINUED | OUTPATIENT
Start: 2020-08-24 | End: 2020-09-04 | Stop reason: HOSPADM

## 2020-08-24 RX ORDER — PREGABALIN 150 MG/1
150 CAPSULE ORAL 3 TIMES DAILY
COMMUNITY
End: 2020-09-08 | Stop reason: HOSPADM

## 2020-08-24 RX ORDER — AMLODIPINE BESYLATE 5 MG/1
5 TABLET ORAL DAILY
COMMUNITY
End: 2020-09-04 | Stop reason: HOSPADM

## 2020-08-24 RX ORDER — SODIUM BICARBONATE IN D5W 150/1000ML
150 PLASTIC BAG, INJECTION (ML) INTRAVENOUS ONCE
Status: DISCONTINUED | OUTPATIENT
Start: 2020-08-24 | End: 2020-08-24

## 2020-08-24 RX ORDER — NOREPINEPHRINE BIT/0.9 % NACL 8 MG/250ML
.02-.3 INFUSION BOTTLE (ML) INTRAVENOUS
Status: DISCONTINUED | OUTPATIENT
Start: 2020-08-24 | End: 2020-08-26

## 2020-08-24 RX ORDER — SODIUM CHLORIDE 0.9 % (FLUSH) 0.9 %
10 SYRINGE (ML) INJECTION EVERY 12 HOURS SCHEDULED
Status: DISCONTINUED | OUTPATIENT
Start: 2020-08-24 | End: 2020-09-04 | Stop reason: HOSPADM

## 2020-08-24 RX ORDER — SODIUM BICARBONATE IN D5W 150/1000ML
150 PLASTIC BAG, INJECTION (ML) INTRAVENOUS CONTINUOUS
Status: DISPENSED | OUTPATIENT
Start: 2020-08-24 | End: 2020-08-25

## 2020-08-24 RX ORDER — ALBUMIN (HUMAN) 12.5 G/50ML
12.5 SOLUTION INTRAVENOUS AS NEEDED
Status: DISCONTINUED | OUTPATIENT
Start: 2020-08-24 | End: 2020-08-25

## 2020-08-24 RX ORDER — BISACODYL 10 MG
10 SUPPOSITORY, RECTAL RECTAL DAILY PRN
Status: DISCONTINUED | OUTPATIENT
Start: 2020-08-24 | End: 2020-09-04 | Stop reason: HOSPADM

## 2020-08-24 RX ADMIN — Medication 0.06 MCG/KG/MIN: at 15:39

## 2020-08-24 RX ADMIN — SODIUM CHLORIDE 1000 ML: 9 INJECTION, SOLUTION INTRAVENOUS at 14:27

## 2020-08-24 RX ADMIN — SODIUM BICARBONATE 50 MEQ: 84 INJECTION, SOLUTION INTRAVENOUS at 14:47

## 2020-08-24 RX ADMIN — SODIUM CHLORIDE 1000 ML: 9 INJECTION, SOLUTION INTRAVENOUS at 12:27

## 2020-08-24 RX ADMIN — HEPARIN SODIUM 5000 UNITS: 5000 INJECTION INTRAVENOUS; SUBCUTANEOUS at 21:01

## 2020-08-24 RX ADMIN — SODIUM BICARBONATE 50 MEQ: 84 INJECTION, SOLUTION INTRAVENOUS at 14:45

## 2020-08-24 RX ADMIN — VANCOMYCIN HYDROCHLORIDE 1500 MG: 10 INJECTION, POWDER, LYOPHILIZED, FOR SOLUTION INTRAVENOUS at 21:47

## 2020-08-24 RX ADMIN — TAZOBACTAM SODIUM AND PIPERACILLIN SODIUM 3.38 G: 375; 3 INJECTION, SOLUTION INTRAVENOUS at 21:01

## 2020-08-24 RX ADMIN — SODIUM BICARBONATE 150 MEQ/1,000 ML IN DEXTROSE 5 % INTRAVENOUS 150 MEQ: SOLUTION at 20:34

## 2020-08-24 RX ADMIN — SODIUM BICARBONATE 150 MEQ/1,000 ML IN DEXTROSE 5 % INTRAVENOUS 150 MEQ: SOLUTION at 14:57

## 2020-08-24 RX ADMIN — SODIUM CHLORIDE, PRESERVATIVE FREE 10 ML: 5 INJECTION INTRAVENOUS at 20:55

## 2020-08-25 ENCOUNTER — APPOINTMENT (OUTPATIENT)
Dept: CT IMAGING | Facility: HOSPITAL | Age: 65
End: 2020-08-25

## 2020-08-25 LAB
ALBUMIN SERPL-MCNC: 3 G/DL (ref 3.5–5.2)
ALBUMIN SERPL-MCNC: 3.1 G/DL (ref 3.5–5.2)
ALBUMIN SERPL-MCNC: 3.1 G/DL (ref 3.5–5.2)
ALBUMIN/GLOB SERPL: 1.4 G/DL
ALP SERPL-CCNC: 116 U/L (ref 39–117)
ALT SERPL W P-5'-P-CCNC: 30 U/L (ref 1–33)
ANION GAP SERPL CALCULATED.3IONS-SCNC: 21.6 MMOL/L (ref 5–15)
ANION GAP SERPL CALCULATED.3IONS-SCNC: 24.3 MMOL/L (ref 5–15)
ANION GAP SERPL CALCULATED.3IONS-SCNC: 37.4 MMOL/L (ref 5–15)
ARTERIAL PATENCY WRIST A: POSITIVE
AST SERPL-CCNC: 60 U/L (ref 1–32)
ATMOSPHERIC PRESS: 753.5 MMHG
BASE EXCESS BLDA CALC-SCNC: -14.2 MMOL/L (ref 0–2)
BDY SITE: ABNORMAL
BILIRUB SERPL-MCNC: 0.7 MG/DL (ref 0–1.2)
BUN SERPL-MCNC: 14 MG/DL (ref 8–23)
BUN SERPL-MCNC: 15 MG/DL (ref 8–23)
BUN SERPL-MCNC: 38 MG/DL (ref 8–23)
BUN/CREAT SERPL: 6 (ref 7–25)
BUN/CREAT SERPL: 6.1 (ref 7–25)
BUN/CREAT SERPL: 7.6 (ref 7–25)
C DIFF TOX GENS STL QL NAA+PROBE: NEGATIVE
CALCIUM SPEC-SCNC: 6.9 MG/DL (ref 8.6–10.5)
CALCIUM SPEC-SCNC: 7 MG/DL (ref 8.6–10.5)
CALCIUM SPEC-SCNC: 7.2 MG/DL (ref 8.6–10.5)
CHLORIDE SERPL-SCNC: 85 MMOL/L (ref 98–107)
CHLORIDE SERPL-SCNC: 94 MMOL/L (ref 98–107)
CHLORIDE SERPL-SCNC: 96 MMOL/L (ref 98–107)
CO2 SERPL-SCNC: 11.6 MMOL/L (ref 22–29)
CO2 SERPL-SCNC: 17.7 MMOL/L (ref 22–29)
CO2 SERPL-SCNC: 19.4 MMOL/L (ref 22–29)
CREAT SERPL-MCNC: 2.31 MG/DL (ref 0.57–1)
CREAT SERPL-MCNC: 2.49 MG/DL (ref 0.57–1)
CREAT SERPL-MCNC: 4.98 MG/DL (ref 0.57–1)
D-LACTATE SERPL-SCNC: 12.8 MMOL/L (ref 0.5–2)
D-LACTATE SERPL-SCNC: 15 MMOL/L (ref 0.5–2)
D-LACTATE SERPL-SCNC: 2.4 MMOL/L (ref 0.5–2)
D-LACTATE SERPL-SCNC: 7.1 MMOL/L (ref 0.5–2)
DEPRECATED RDW RBC AUTO: 51.4 FL (ref 37–54)
ERYTHROCYTE [DISTWIDTH] IN BLOOD BY AUTOMATED COUNT: 16.3 % (ref 12.3–15.4)
GAS FLOW AIRWAY: 2 LPM
GFR SERPL CREATININE-BSD FRML MDRD: 19 ML/MIN/1.73
GFR SERPL CREATININE-BSD FRML MDRD: 21 ML/MIN/1.73
GFR SERPL CREATININE-BSD FRML MDRD: 9 ML/MIN/1.73
GFR SERPL CREATININE-BSD FRML MDRD: ABNORMAL ML/MIN/{1.73_M2}
GLOBULIN UR ELPH-MCNC: 2.2 GM/DL
GLUCOSE BLDC GLUCOMTR-MCNC: 109 MG/DL (ref 70–130)
GLUCOSE BLDC GLUCOMTR-MCNC: 155 MG/DL (ref 70–130)
GLUCOSE BLDC GLUCOMTR-MCNC: 164 MG/DL (ref 70–130)
GLUCOSE BLDC GLUCOMTR-MCNC: 222 MG/DL (ref 70–130)
GLUCOSE BLDC GLUCOMTR-MCNC: 251 MG/DL (ref 70–130)
GLUCOSE BLDC GLUCOMTR-MCNC: 95 MG/DL (ref 70–130)
GLUCOSE SERPL-MCNC: 138 MG/DL (ref 65–99)
GLUCOSE SERPL-MCNC: 160 MG/DL (ref 65–99)
GLUCOSE SERPL-MCNC: 235 MG/DL (ref 65–99)
HCO3 BLDA-SCNC: 11 MMOL/L (ref 22–28)
HCT VFR BLD AUTO: 32.6 % (ref 34–46.6)
HGB BLD-MCNC: 10.6 G/DL (ref 12–15.9)
MAGNESIUM SERPL-MCNC: 1.9 MG/DL (ref 1.6–2.4)
MCH RBC QN AUTO: 28.1 PG (ref 26.6–33)
MCHC RBC AUTO-ENTMCNC: 32.5 G/DL (ref 31.5–35.7)
MCV RBC AUTO: 86.5 FL (ref 79–97)
MODALITY: ABNORMAL
PCO2 BLDA: 24.2 MM HG (ref 35–45)
PH BLDA: 7.27 PH UNITS (ref 7.35–7.45)
PHOSPHATE SERPL-MCNC: 2 MG/DL (ref 2.5–4.5)
PHOSPHATE SERPL-MCNC: 2.1 MG/DL (ref 2.5–4.5)
PLATELET # BLD AUTO: 383 10*3/MM3 (ref 140–450)
PMV BLD AUTO: 11.2 FL (ref 6–12)
PO2 BLDA: 64.1 MM HG (ref 80–100)
POTASSIUM SERPL-SCNC: 3.1 MMOL/L (ref 3.5–5.2)
POTASSIUM SERPL-SCNC: 3.5 MMOL/L (ref 3.5–5.2)
POTASSIUM SERPL-SCNC: 3.7 MMOL/L (ref 3.5–5.2)
PROCALCITONIN SERPL-MCNC: 14.06 NG/ML (ref 0–0.25)
PROT SERPL-MCNC: 5.2 G/DL (ref 6–8.5)
RBC # BLD AUTO: 3.77 10*6/MM3 (ref 3.77–5.28)
SAO2 % BLDCOA: 89.6 % (ref 92–99)
SODIUM SERPL-SCNC: 134 MMOL/L (ref 136–145)
SODIUM SERPL-SCNC: 136 MMOL/L (ref 136–145)
SODIUM SERPL-SCNC: 137 MMOL/L (ref 136–145)
TOTAL RATE: 20 BREATHS/MINUTE
VANCOMYCIN SERPL-MCNC: 18.4 MCG/ML (ref 5–40)
WBC # BLD AUTO: 29.8 10*3/MM3 (ref 3.4–10.8)

## 2020-08-25 PROCEDURE — 87493 C DIFF AMPLIFIED PROBE: CPT | Performed by: INTERNAL MEDICINE

## 2020-08-25 PROCEDURE — 25010000002 HEPARIN (PORCINE) PER 1000 UNITS: Performed by: INTERNAL MEDICINE

## 2020-08-25 PROCEDURE — 82803 BLOOD GASES ANY COMBINATION: CPT

## 2020-08-25 PROCEDURE — 85027 COMPLETE CBC AUTOMATED: CPT | Performed by: INTERNAL MEDICINE

## 2020-08-25 PROCEDURE — 71250 CT THORAX DX C-: CPT

## 2020-08-25 PROCEDURE — 25010000002 VANCOMYCIN 750 MG RECONSTITUTED SOLUTION: Performed by: INTERNAL MEDICINE

## 2020-08-25 PROCEDURE — 83735 ASSAY OF MAGNESIUM: CPT | Performed by: INTERNAL MEDICINE

## 2020-08-25 PROCEDURE — 80053 COMPREHEN METABOLIC PANEL: CPT | Performed by: INTERNAL MEDICINE

## 2020-08-25 PROCEDURE — 36600 WITHDRAWAL OF ARTERIAL BLOOD: CPT

## 2020-08-25 PROCEDURE — 94640 AIRWAY INHALATION TREATMENT: CPT

## 2020-08-25 PROCEDURE — 94799 UNLISTED PULMONARY SVC/PX: CPT

## 2020-08-25 PROCEDURE — 83605 ASSAY OF LACTIC ACID: CPT | Performed by: INTERNAL MEDICINE

## 2020-08-25 PROCEDURE — 74176 CT ABD & PELVIS W/O CONTRAST: CPT

## 2020-08-25 PROCEDURE — 84145 PROCALCITONIN (PCT): CPT | Performed by: INTERNAL MEDICINE

## 2020-08-25 PROCEDURE — 25010000002 PIPERACILLIN SOD-TAZOBACTAM PER 1 G: Performed by: INTERNAL MEDICINE

## 2020-08-25 PROCEDURE — 82962 GLUCOSE BLOOD TEST: CPT

## 2020-08-25 PROCEDURE — 80202 ASSAY OF VANCOMYCIN: CPT | Performed by: INTERNAL MEDICINE

## 2020-08-25 PROCEDURE — 63710000001 INSULIN LISPRO (HUMAN) PER 5 UNITS: Performed by: INTERNAL MEDICINE

## 2020-08-25 PROCEDURE — 80069 RENAL FUNCTION PANEL: CPT | Performed by: INTERNAL MEDICINE

## 2020-08-25 RX ORDER — HEPARIN SODIUM 1000 [USP'U]/ML
3100 INJECTION, SOLUTION INTRAVENOUS; SUBCUTANEOUS AS NEEDED
Status: DISCONTINUED | OUTPATIENT
Start: 2020-08-25 | End: 2020-09-04 | Stop reason: HOSPADM

## 2020-08-25 RX ORDER — OXYCODONE HYDROCHLORIDE AND ACETAMINOPHEN 5; 325 MG/1; MG/1
1 TABLET ORAL EVERY 8 HOURS PRN
Status: DISPENSED | OUTPATIENT
Start: 2020-08-25 | End: 2020-09-01

## 2020-08-25 RX ORDER — IPRATROPIUM BROMIDE AND ALBUTEROL SULFATE 2.5; .5 MG/3ML; MG/3ML
3 SOLUTION RESPIRATORY (INHALATION)
Status: DISCONTINUED | OUTPATIENT
Start: 2020-08-25 | End: 2020-08-28

## 2020-08-25 RX ADMIN — SODIUM BICARBONATE 150 MEQ/1,000 ML IN DEXTROSE 5 % INTRAVENOUS 150 MEQ: SOLUTION at 04:47

## 2020-08-25 RX ADMIN — IPRATROPIUM BROMIDE AND ALBUTEROL SULFATE 3 ML: 2.5; .5 SOLUTION RESPIRATORY (INHALATION) at 19:36

## 2020-08-25 RX ADMIN — INSULIN LISPRO 2 UNITS: 100 INJECTION, SOLUTION INTRAVENOUS; SUBCUTANEOUS at 12:43

## 2020-08-25 RX ADMIN — SODIUM CHLORIDE 750 MG: 900 INJECTION, SOLUTION INTRAVENOUS at 15:18

## 2020-08-25 RX ADMIN — IPRATROPIUM BROMIDE AND ALBUTEROL SULFATE 3 ML: 2.5; .5 SOLUTION RESPIRATORY (INHALATION) at 11:02

## 2020-08-25 RX ADMIN — HEPARIN SODIUM 5000 UNITS: 5000 INJECTION INTRAVENOUS; SUBCUTANEOUS at 20:05

## 2020-08-25 RX ADMIN — SODIUM CHLORIDE, PRESERVATIVE FREE 10 ML: 5 INJECTION INTRAVENOUS at 20:05

## 2020-08-25 RX ADMIN — TAZOBACTAM SODIUM AND PIPERACILLIN SODIUM 3.38 G: 375; 3 INJECTION, SOLUTION INTRAVENOUS at 08:31

## 2020-08-25 RX ADMIN — TAZOBACTAM SODIUM AND PIPERACILLIN SODIUM 3.38 G: 375; 3 INJECTION, SOLUTION INTRAVENOUS at 20:05

## 2020-08-25 RX ADMIN — SODIUM CHLORIDE, PRESERVATIVE FREE 10 ML: 5 INJECTION INTRAVENOUS at 08:31

## 2020-08-25 RX ADMIN — Medication 0.1 MCG/KG/MIN: at 03:37

## 2020-08-25 RX ADMIN — HEPARIN SODIUM 5000 UNITS: 5000 INJECTION INTRAVENOUS; SUBCUTANEOUS at 08:39

## 2020-08-25 RX ADMIN — IPRATROPIUM BROMIDE AND ALBUTEROL SULFATE 3 ML: 2.5; .5 SOLUTION RESPIRATORY (INHALATION) at 15:52

## 2020-08-25 RX ADMIN — OXYCODONE AND ACETAMINOPHEN 1 TABLET: 5; 325 TABLET ORAL at 17:52

## 2020-08-25 RX ADMIN — INSULIN LISPRO 6 UNITS: 100 INJECTION, SOLUTION INTRAVENOUS; SUBCUTANEOUS at 06:50

## 2020-08-25 RX ADMIN — HEPARIN SODIUM 3100 UNITS: 1000 INJECTION, SOLUTION INTRAVENOUS; SUBCUTANEOUS at 15:41

## 2020-08-26 LAB
ALBUMIN SERPL-MCNC: 3.1 G/DL (ref 3.5–5.2)
ALBUMIN SERPL-MCNC: 3.4 G/DL (ref 3.5–5.2)
ALBUMIN/GLOB SERPL: 1.3 G/DL
ALP SERPL-CCNC: 91 U/L (ref 39–117)
ALT SERPL W P-5'-P-CCNC: 21 U/L (ref 1–33)
ANION GAP SERPL CALCULATED.3IONS-SCNC: 17.7 MMOL/L (ref 5–15)
ANION GAP SERPL CALCULATED.3IONS-SCNC: 20.2 MMOL/L (ref 5–15)
AST SERPL-CCNC: 25 U/L (ref 1–32)
BILIRUB SERPL-MCNC: 0.7 MG/DL (ref 0–1.2)
BUN SERPL-MCNC: 24 MG/DL (ref 8–23)
BUN SERPL-MCNC: 26 MG/DL (ref 8–23)
BUN/CREAT SERPL: 7 (ref 7–25)
BUN/CREAT SERPL: 7.6 (ref 7–25)
CALCIUM SPEC-SCNC: 7.3 MG/DL (ref 8.6–10.5)
CALCIUM SPEC-SCNC: 8 MG/DL (ref 8.6–10.5)
CHLORIDE SERPL-SCNC: 96 MMOL/L (ref 98–107)
CHLORIDE SERPL-SCNC: 98 MMOL/L (ref 98–107)
CO2 SERPL-SCNC: 22.3 MMOL/L (ref 22–29)
CO2 SERPL-SCNC: 22.8 MMOL/L (ref 22–29)
CREAT SERPL-MCNC: 3.15 MG/DL (ref 0.57–1)
CREAT SERPL-MCNC: 3.71 MG/DL (ref 0.57–1)
D-LACTATE SERPL-SCNC: 1.9 MMOL/L (ref 0.5–2)
D-LACTATE SERPL-SCNC: 2.5 MMOL/L (ref 0.5–2)
DEPRECATED RDW RBC AUTO: 48.5 FL (ref 37–54)
ERYTHROCYTE [DISTWIDTH] IN BLOOD BY AUTOMATED COUNT: 16.4 % (ref 12.3–15.4)
GFR SERPL CREATININE-BSD FRML MDRD: 12 ML/MIN/1.73
GFR SERPL CREATININE-BSD FRML MDRD: 15 ML/MIN/1.73
GFR SERPL CREATININE-BSD FRML MDRD: ABNORMAL ML/MIN/{1.73_M2}
GLOBULIN UR ELPH-MCNC: 2.3 GM/DL
GLUCOSE BLDC GLUCOMTR-MCNC: 145 MG/DL (ref 70–130)
GLUCOSE BLDC GLUCOMTR-MCNC: 171 MG/DL (ref 70–130)
GLUCOSE BLDC GLUCOMTR-MCNC: 177 MG/DL (ref 70–130)
GLUCOSE BLDC GLUCOMTR-MCNC: 186 MG/DL (ref 70–130)
GLUCOSE SERPL-MCNC: 155 MG/DL (ref 65–99)
GLUCOSE SERPL-MCNC: 174 MG/DL (ref 65–99)
HCT VFR BLD AUTO: 31.2 % (ref 34–46.6)
HGB BLD-MCNC: 10.6 G/DL (ref 12–15.9)
MAGNESIUM SERPL-MCNC: 1.9 MG/DL (ref 1.6–2.4)
MCH RBC QN AUTO: 27.9 PG (ref 26.6–33)
MCHC RBC AUTO-ENTMCNC: 34 G/DL (ref 31.5–35.7)
MCV RBC AUTO: 82.1 FL (ref 79–97)
MRSA DNA SPEC QL NAA+PROBE: NORMAL
PHOSPHATE SERPL-MCNC: 2.4 MG/DL (ref 2.5–4.5)
PHOSPHATE SERPL-MCNC: 2.5 MG/DL (ref 2.5–4.5)
PLATELET # BLD AUTO: 276 10*3/MM3 (ref 140–450)
PMV BLD AUTO: 11.2 FL (ref 6–12)
POTASSIUM SERPL-SCNC: 2.9 MMOL/L (ref 3.5–5.2)
POTASSIUM SERPL-SCNC: 3 MMOL/L (ref 3.5–5.2)
PROCALCITONIN SERPL-MCNC: 24.82 NG/ML (ref 0–0.25)
PROT SERPL-MCNC: 5.4 G/DL (ref 6–8.5)
RBC # BLD AUTO: 3.8 10*6/MM3 (ref 3.77–5.28)
SODIUM SERPL-SCNC: 138 MMOL/L (ref 136–145)
SODIUM SERPL-SCNC: 139 MMOL/L (ref 136–145)
VANCOMYCIN SERPL-MCNC: 21.3 MCG/ML (ref 5–40)
WBC # BLD AUTO: 13.77 10*3/MM3 (ref 3.4–10.8)

## 2020-08-26 PROCEDURE — 85027 COMPLETE CBC AUTOMATED: CPT | Performed by: INTERNAL MEDICINE

## 2020-08-26 PROCEDURE — 25010000002 PIPERACILLIN SOD-TAZOBACTAM PER 1 G: Performed by: INTERNAL MEDICINE

## 2020-08-26 PROCEDURE — 25010000002 FUROSEMIDE PER 20 MG: Performed by: INTERNAL MEDICINE

## 2020-08-26 PROCEDURE — 84145 PROCALCITONIN (PCT): CPT | Performed by: INTERNAL MEDICINE

## 2020-08-26 PROCEDURE — 82962 GLUCOSE BLOOD TEST: CPT

## 2020-08-26 PROCEDURE — 80069 RENAL FUNCTION PANEL: CPT | Performed by: INTERNAL MEDICINE

## 2020-08-26 PROCEDURE — 83605 ASSAY OF LACTIC ACID: CPT | Performed by: INTERNAL MEDICINE

## 2020-08-26 PROCEDURE — 94799 UNLISTED PULMONARY SVC/PX: CPT

## 2020-08-26 PROCEDURE — 97110 THERAPEUTIC EXERCISES: CPT

## 2020-08-26 PROCEDURE — 97162 PT EVAL MOD COMPLEX 30 MIN: CPT

## 2020-08-26 PROCEDURE — 84100 ASSAY OF PHOSPHORUS: CPT | Performed by: INTERNAL MEDICINE

## 2020-08-26 PROCEDURE — 80053 COMPREHEN METABOLIC PANEL: CPT | Performed by: INTERNAL MEDICINE

## 2020-08-26 PROCEDURE — 25010000002 HEPARIN (PORCINE) PER 1000 UNITS: Performed by: INTERNAL MEDICINE

## 2020-08-26 PROCEDURE — 80202 ASSAY OF VANCOMYCIN: CPT | Performed by: INTERNAL MEDICINE

## 2020-08-26 PROCEDURE — 63710000001 INSULIN LISPRO (HUMAN) PER 5 UNITS: Performed by: INTERNAL MEDICINE

## 2020-08-26 PROCEDURE — 87641 MR-STAPH DNA AMP PROBE: CPT | Performed by: INTERNAL MEDICINE

## 2020-08-26 PROCEDURE — 83735 ASSAY OF MAGNESIUM: CPT | Performed by: INTERNAL MEDICINE

## 2020-08-26 PROCEDURE — 25010000002 VANCOMYCIN PER 500 MG: Performed by: INTERNAL MEDICINE

## 2020-08-26 RX ORDER — DULOXETIN HYDROCHLORIDE 30 MG/1
30 CAPSULE, DELAYED RELEASE ORAL DAILY
Status: DISCONTINUED | OUTPATIENT
Start: 2020-08-26 | End: 2020-09-04 | Stop reason: HOSPADM

## 2020-08-26 RX ORDER — ASPIRIN 81 MG/1
81 TABLET ORAL DAILY
Status: DISCONTINUED | OUTPATIENT
Start: 2020-08-26 | End: 2020-09-04 | Stop reason: HOSPADM

## 2020-08-26 RX ORDER — POTASSIUM CHLORIDE 750 MG/1
40 CAPSULE, EXTENDED RELEASE ORAL ONCE
Status: COMPLETED | OUTPATIENT
Start: 2020-08-26 | End: 2020-08-26

## 2020-08-26 RX ORDER — PANTOPRAZOLE SODIUM 40 MG/1
40 TABLET, DELAYED RELEASE ORAL EVERY MORNING
Status: DISCONTINUED | OUTPATIENT
Start: 2020-08-26 | End: 2020-09-04 | Stop reason: HOSPADM

## 2020-08-26 RX ORDER — PREGABALIN 75 MG/1
150 CAPSULE ORAL 3 TIMES DAILY
Status: DISCONTINUED | OUTPATIENT
Start: 2020-08-26 | End: 2020-09-04 | Stop reason: HOSPADM

## 2020-08-26 RX ORDER — METOPROLOL SUCCINATE 100 MG/1
200 TABLET, EXTENDED RELEASE ORAL DAILY
Status: DISCONTINUED | OUTPATIENT
Start: 2020-08-26 | End: 2020-09-04 | Stop reason: HOSPADM

## 2020-08-26 RX ORDER — ALBUMIN (HUMAN) 12.5 G/50ML
12.5 SOLUTION INTRAVENOUS AS NEEDED
Status: ACTIVE | OUTPATIENT
Start: 2020-08-26 | End: 2020-08-27

## 2020-08-26 RX ORDER — FUROSEMIDE 10 MG/ML
80 INJECTION INTRAMUSCULAR; INTRAVENOUS EVERY 8 HOURS
Status: COMPLETED | OUTPATIENT
Start: 2020-08-26 | End: 2020-08-27

## 2020-08-26 RX ADMIN — INSULIN LISPRO 2 UNITS: 100 INJECTION, SOLUTION INTRAVENOUS; SUBCUTANEOUS at 12:06

## 2020-08-26 RX ADMIN — FUROSEMIDE 80 MG: 10 INJECTION, SOLUTION INTRAMUSCULAR; INTRAVENOUS at 16:34

## 2020-08-26 RX ADMIN — METOPROLOL SUCCINATE 200 MG: 100 TABLET, EXTENDED RELEASE ORAL at 12:00

## 2020-08-26 RX ADMIN — HEPARIN SODIUM 5000 UNITS: 5000 INJECTION INTRAVENOUS; SUBCUTANEOUS at 08:26

## 2020-08-26 RX ADMIN — IPRATROPIUM BROMIDE AND ALBUTEROL SULFATE 3 ML: 2.5; .5 SOLUTION RESPIRATORY (INHALATION) at 14:43

## 2020-08-26 RX ADMIN — ROPINIROLE 3 MG: 2 TABLET, FILM COATED ORAL at 20:26

## 2020-08-26 RX ADMIN — PREGABALIN 150 MG: 75 CAPSULE ORAL at 20:26

## 2020-08-26 RX ADMIN — TAZOBACTAM SODIUM AND PIPERACILLIN SODIUM 3.38 G: 375; 3 INJECTION, SOLUTION INTRAVENOUS at 20:26

## 2020-08-26 RX ADMIN — TAZOBACTAM SODIUM AND PIPERACILLIN SODIUM 3.38 G: 375; 3 INJECTION, SOLUTION INTRAVENOUS at 08:26

## 2020-08-26 RX ADMIN — ASPIRIN 81 MG: 81 TABLET, COATED ORAL at 12:00

## 2020-08-26 RX ADMIN — PREGABALIN 150 MG: 75 CAPSULE ORAL at 12:00

## 2020-08-26 RX ADMIN — IPRATROPIUM BROMIDE AND ALBUTEROL SULFATE 3 ML: 2.5; .5 SOLUTION RESPIRATORY (INHALATION) at 07:17

## 2020-08-26 RX ADMIN — OXYCODONE AND ACETAMINOPHEN 1 TABLET: 5; 325 TABLET ORAL at 12:06

## 2020-08-26 RX ADMIN — PREGABALIN 150 MG: 75 CAPSULE ORAL at 16:34

## 2020-08-26 RX ADMIN — INSULIN LISPRO 2 UNITS: 100 INJECTION, SOLUTION INTRAVENOUS; SUBCUTANEOUS at 06:43

## 2020-08-26 RX ADMIN — OXYCODONE AND ACETAMINOPHEN 1 TABLET: 5; 325 TABLET ORAL at 04:09

## 2020-08-26 RX ADMIN — PANTOPRAZOLE SODIUM 40 MG: 40 TABLET, DELAYED RELEASE ORAL at 12:00

## 2020-08-26 RX ADMIN — FUROSEMIDE 80 MG: 10 INJECTION, SOLUTION INTRAMUSCULAR; INTRAVENOUS at 11:59

## 2020-08-26 RX ADMIN — VANCOMYCIN HYDROCHLORIDE 500 MG: 500 INJECTION, POWDER, LYOPHILIZED, FOR SOLUTION INTRAVENOUS at 16:34

## 2020-08-26 RX ADMIN — POTASSIUM CHLORIDE 40 MEQ: 10 CAPSULE, COATED, EXTENDED RELEASE ORAL at 12:00

## 2020-08-26 RX ADMIN — IPRATROPIUM BROMIDE AND ALBUTEROL SULFATE 3 ML: 2.5; .5 SOLUTION RESPIRATORY (INHALATION) at 11:47

## 2020-08-26 RX ADMIN — HEPARIN SODIUM 5000 UNITS: 5000 INJECTION INTRAVENOUS; SUBCUTANEOUS at 20:26

## 2020-08-26 RX ADMIN — DULOXETINE HYDROCHLORIDE 30 MG: 30 CAPSULE, DELAYED RELEASE ORAL at 12:00

## 2020-08-26 RX ADMIN — SODIUM CHLORIDE, PRESERVATIVE FREE 10 ML: 5 INJECTION INTRAVENOUS at 08:26

## 2020-08-27 LAB
ALBUMIN SERPL-MCNC: 3 G/DL (ref 3.5–5.2)
ALBUMIN/GLOB SERPL: 1.1 G/DL
ALP SERPL-CCNC: 88 U/L (ref 39–117)
ALT SERPL W P-5'-P-CCNC: 16 U/L (ref 1–33)
ANION GAP SERPL CALCULATED.3IONS-SCNC: 13 MMOL/L (ref 5–15)
AST SERPL-CCNC: 15 U/L (ref 1–32)
BILIRUB SERPL-MCNC: 0.9 MG/DL (ref 0–1.2)
BUN SERPL-MCNC: 33 MG/DL (ref 8–23)
BUN/CREAT SERPL: 7.2 (ref 7–25)
CALCIUM SPEC-SCNC: 8.2 MG/DL (ref 8.6–10.5)
CHLORIDE SERPL-SCNC: 98 MMOL/L (ref 98–107)
CO2 SERPL-SCNC: 28 MMOL/L (ref 22–29)
CREAT SERPL-MCNC: 4.59 MG/DL (ref 0.57–1)
DEPRECATED RDW RBC AUTO: 49.8 FL (ref 37–54)
ERYTHROCYTE [DISTWIDTH] IN BLOOD BY AUTOMATED COUNT: 16.3 % (ref 12.3–15.4)
GFR SERPL CREATININE-BSD FRML MDRD: 10 ML/MIN/1.73
GFR SERPL CREATININE-BSD FRML MDRD: ABNORMAL ML/MIN/{1.73_M2}
GLOBULIN UR ELPH-MCNC: 2.7 GM/DL
GLUCOSE BLDC GLUCOMTR-MCNC: 109 MG/DL (ref 70–130)
GLUCOSE BLDC GLUCOMTR-MCNC: 134 MG/DL (ref 70–130)
GLUCOSE BLDC GLUCOMTR-MCNC: 138 MG/DL (ref 70–130)
GLUCOSE BLDC GLUCOMTR-MCNC: 226 MG/DL (ref 70–130)
GLUCOSE SERPL-MCNC: 144 MG/DL (ref 65–99)
HCT VFR BLD AUTO: 29 % (ref 34–46.6)
HGB BLD-MCNC: 9.8 G/DL (ref 12–15.9)
MAGNESIUM SERPL-MCNC: 2.1 MG/DL (ref 1.6–2.4)
MCH RBC QN AUTO: 28.4 PG (ref 26.6–33)
MCHC RBC AUTO-ENTMCNC: 33.8 G/DL (ref 31.5–35.7)
MCV RBC AUTO: 84.1 FL (ref 79–97)
PHOSPHATE SERPL-MCNC: 3.4 MG/DL (ref 2.5–4.5)
PLATELET # BLD AUTO: 259 10*3/MM3 (ref 140–450)
PMV BLD AUTO: 11.4 FL (ref 6–12)
POTASSIUM SERPL-SCNC: 2.8 MMOL/L (ref 3.5–5.2)
PROT SERPL-MCNC: 5.7 G/DL (ref 6–8.5)
RBC # BLD AUTO: 3.45 10*6/MM3 (ref 3.77–5.28)
SODIUM SERPL-SCNC: 139 MMOL/L (ref 136–145)
VANCOMYCIN SERPL-MCNC: 23.8 MCG/ML (ref 5–40)
WBC # BLD AUTO: 10.27 10*3/MM3 (ref 3.4–10.8)

## 2020-08-27 PROCEDURE — 80202 ASSAY OF VANCOMYCIN: CPT | Performed by: INTERNAL MEDICINE

## 2020-08-27 PROCEDURE — 83735 ASSAY OF MAGNESIUM: CPT | Performed by: INTERNAL MEDICINE

## 2020-08-27 PROCEDURE — 80053 COMPREHEN METABOLIC PANEL: CPT | Performed by: INTERNAL MEDICINE

## 2020-08-27 PROCEDURE — 94799 UNLISTED PULMONARY SVC/PX: CPT

## 2020-08-27 PROCEDURE — 63710000001 INSULIN LISPRO (HUMAN) PER 5 UNITS: Performed by: INTERNAL MEDICINE

## 2020-08-27 PROCEDURE — 82962 GLUCOSE BLOOD TEST: CPT

## 2020-08-27 PROCEDURE — 25010000002 FUROSEMIDE PER 20 MG: Performed by: INTERNAL MEDICINE

## 2020-08-27 PROCEDURE — 25010000002 HEPARIN (PORCINE) PER 1000 UNITS: Performed by: INTERNAL MEDICINE

## 2020-08-27 PROCEDURE — 84100 ASSAY OF PHOSPHORUS: CPT | Performed by: INTERNAL MEDICINE

## 2020-08-27 PROCEDURE — 25010000002 PIPERACILLIN SOD-TAZOBACTAM PER 1 G: Performed by: INTERNAL MEDICINE

## 2020-08-27 PROCEDURE — 85027 COMPLETE CBC AUTOMATED: CPT | Performed by: INTERNAL MEDICINE

## 2020-08-27 RX ORDER — NICOTINE 21 MG/24HR
1 PATCH, TRANSDERMAL 24 HOURS TRANSDERMAL
Status: DISCONTINUED | OUTPATIENT
Start: 2020-08-27 | End: 2020-09-04 | Stop reason: HOSPADM

## 2020-08-27 RX ORDER — POTASSIUM CHLORIDE 750 MG/1
40 CAPSULE, EXTENDED RELEASE ORAL ONCE
Status: COMPLETED | OUTPATIENT
Start: 2020-08-27 | End: 2020-08-27

## 2020-08-27 RX ADMIN — NICOTINE 1 PATCH: 21 PATCH, EXTENDED RELEASE TRANSDERMAL at 17:13

## 2020-08-27 RX ADMIN — DULOXETINE HYDROCHLORIDE 30 MG: 30 CAPSULE, DELAYED RELEASE ORAL at 14:10

## 2020-08-27 RX ADMIN — PANTOPRAZOLE SODIUM 40 MG: 40 TABLET, DELAYED RELEASE ORAL at 06:07

## 2020-08-27 RX ADMIN — HEPARIN SODIUM 5000 UNITS: 5000 INJECTION INTRAVENOUS; SUBCUTANEOUS at 14:10

## 2020-08-27 RX ADMIN — METOPROLOL SUCCINATE 200 MG: 100 TABLET, EXTENDED RELEASE ORAL at 14:10

## 2020-08-27 RX ADMIN — HEPARIN SODIUM 5000 UNITS: 5000 INJECTION INTRAVENOUS; SUBCUTANEOUS at 20:54

## 2020-08-27 RX ADMIN — HEPARIN SODIUM 3100 UNITS: 1000 INJECTION, SOLUTION INTRAVENOUS; SUBCUTANEOUS at 11:39

## 2020-08-27 RX ADMIN — PREGABALIN 150 MG: 75 CAPSULE ORAL at 20:55

## 2020-08-27 RX ADMIN — IPRATROPIUM BROMIDE AND ALBUTEROL SULFATE 3 ML: 2.5; .5 SOLUTION RESPIRATORY (INHALATION) at 15:53

## 2020-08-27 RX ADMIN — SODIUM CHLORIDE, PRESERVATIVE FREE 10 ML: 5 INJECTION INTRAVENOUS at 14:11

## 2020-08-27 RX ADMIN — OXYCODONE AND ACETAMINOPHEN 1 TABLET: 5; 325 TABLET ORAL at 06:10

## 2020-08-27 RX ADMIN — INSULIN LISPRO 4 UNITS: 100 INJECTION, SOLUTION INTRAVENOUS; SUBCUTANEOUS at 17:13

## 2020-08-27 RX ADMIN — PREGABALIN 150 MG: 75 CAPSULE ORAL at 08:09

## 2020-08-27 RX ADMIN — ASPIRIN 81 MG: 81 TABLET, COATED ORAL at 14:10

## 2020-08-27 RX ADMIN — IPRATROPIUM BROMIDE AND ALBUTEROL SULFATE 3 ML: 2.5; .5 SOLUTION RESPIRATORY (INHALATION) at 07:10

## 2020-08-27 RX ADMIN — ROPINIROLE 3 MG: 2 TABLET, FILM COATED ORAL at 20:54

## 2020-08-27 RX ADMIN — PREGABALIN 150 MG: 75 CAPSULE ORAL at 16:11

## 2020-08-27 RX ADMIN — POTASSIUM CHLORIDE 40 MEQ: 10 CAPSULE, COATED, EXTENDED RELEASE ORAL at 08:09

## 2020-08-27 RX ADMIN — IPRATROPIUM BROMIDE AND ALBUTEROL SULFATE 3 ML: 2.5; .5 SOLUTION RESPIRATORY (INHALATION) at 21:38

## 2020-08-27 RX ADMIN — TAZOBACTAM SODIUM AND PIPERACILLIN SODIUM 3.38 G: 375; 3 INJECTION, SOLUTION INTRAVENOUS at 14:11

## 2020-08-27 RX ADMIN — SODIUM CHLORIDE, PRESERVATIVE FREE 10 ML: 5 INJECTION INTRAVENOUS at 20:55

## 2020-08-27 RX ADMIN — FUROSEMIDE 80 MG: 10 INJECTION, SOLUTION INTRAMUSCULAR; INTRAVENOUS at 06:06

## 2020-08-28 LAB
ALBUMIN SERPL-MCNC: 2.8 G/DL (ref 3.5–5.2)
ANION GAP SERPL CALCULATED.3IONS-SCNC: 11.5 MMOL/L (ref 5–15)
BUN SERPL-MCNC: 21 MG/DL (ref 8–23)
BUN/CREAT SERPL: 7 (ref 7–25)
CALCIUM SPEC-SCNC: 8.3 MG/DL (ref 8.6–10.5)
CHLORIDE SERPL-SCNC: 100 MMOL/L (ref 98–107)
CO2 SERPL-SCNC: 26.5 MMOL/L (ref 22–29)
CREAT SERPL-MCNC: 3.01 MG/DL (ref 0.57–1)
GFR SERPL CREATININE-BSD FRML MDRD: 16 ML/MIN/1.73
GLUCOSE BLDC GLUCOMTR-MCNC: 119 MG/DL (ref 70–130)
GLUCOSE BLDC GLUCOMTR-MCNC: 149 MG/DL (ref 70–130)
GLUCOSE BLDC GLUCOMTR-MCNC: 156 MG/DL (ref 70–130)
GLUCOSE BLDC GLUCOMTR-MCNC: 195 MG/DL (ref 70–130)
GLUCOSE SERPL-MCNC: 116 MG/DL (ref 65–99)
PHOSPHATE SERPL-MCNC: 3.2 MG/DL (ref 2.5–4.5)
POTASSIUM SERPL-SCNC: 3 MMOL/L (ref 3.5–5.2)
SODIUM SERPL-SCNC: 138 MMOL/L (ref 136–145)

## 2020-08-28 PROCEDURE — 94799 UNLISTED PULMONARY SVC/PX: CPT

## 2020-08-28 PROCEDURE — 97116 GAIT TRAINING THERAPY: CPT

## 2020-08-28 PROCEDURE — 82962 GLUCOSE BLOOD TEST: CPT

## 2020-08-28 PROCEDURE — 25010000002 PIPERACILLIN SOD-TAZOBACTAM PER 1 G: Performed by: INTERNAL MEDICINE

## 2020-08-28 PROCEDURE — 25010000002 HEPARIN (PORCINE) PER 1000 UNITS: Performed by: INTERNAL MEDICINE

## 2020-08-28 PROCEDURE — 80069 RENAL FUNCTION PANEL: CPT | Performed by: INTERNAL MEDICINE

## 2020-08-28 PROCEDURE — 97110 THERAPEUTIC EXERCISES: CPT

## 2020-08-28 PROCEDURE — 63710000001 INSULIN LISPRO (HUMAN) PER 5 UNITS: Performed by: INTERNAL MEDICINE

## 2020-08-28 RX ORDER — CEFDINIR 300 MG/1
300 CAPSULE ORAL
Status: COMPLETED | OUTPATIENT
Start: 2020-08-28 | End: 2020-08-30

## 2020-08-28 RX ORDER — POTASSIUM CHLORIDE 750 MG/1
40 CAPSULE, EXTENDED RELEASE ORAL ONCE
Status: COMPLETED | OUTPATIENT
Start: 2020-08-28 | End: 2020-08-28

## 2020-08-28 RX ORDER — IPRATROPIUM BROMIDE AND ALBUTEROL SULFATE 2.5; .5 MG/3ML; MG/3ML
3 SOLUTION RESPIRATORY (INHALATION) EVERY 4 HOURS PRN
Status: DISCONTINUED | OUTPATIENT
Start: 2020-08-28 | End: 2020-09-04 | Stop reason: HOSPADM

## 2020-08-28 RX ADMIN — PREGABALIN 150 MG: 75 CAPSULE ORAL at 20:04

## 2020-08-28 RX ADMIN — DULOXETINE HYDROCHLORIDE 30 MG: 30 CAPSULE, DELAYED RELEASE ORAL at 07:50

## 2020-08-28 RX ADMIN — TAZOBACTAM SODIUM AND PIPERACILLIN SODIUM 3.38 G: 375; 3 INJECTION, SOLUTION INTRAVENOUS at 01:35

## 2020-08-28 RX ADMIN — IPRATROPIUM BROMIDE AND ALBUTEROL SULFATE 3 ML: 2.5; .5 SOLUTION RESPIRATORY (INHALATION) at 11:06

## 2020-08-28 RX ADMIN — INSULIN LISPRO 2 UNITS: 100 INJECTION, SOLUTION INTRAVENOUS; SUBCUTANEOUS at 17:27

## 2020-08-28 RX ADMIN — INSULIN LISPRO 2 UNITS: 100 INJECTION, SOLUTION INTRAVENOUS; SUBCUTANEOUS at 11:49

## 2020-08-28 RX ADMIN — SODIUM CHLORIDE, PRESERVATIVE FREE 10 ML: 5 INJECTION INTRAVENOUS at 08:05

## 2020-08-28 RX ADMIN — PREGABALIN 150 MG: 75 CAPSULE ORAL at 07:51

## 2020-08-28 RX ADMIN — TAZOBACTAM SODIUM AND PIPERACILLIN SODIUM 3.38 G: 375; 3 INJECTION, SOLUTION INTRAVENOUS at 13:15

## 2020-08-28 RX ADMIN — ROPINIROLE 3 MG: 2 TABLET, FILM COATED ORAL at 20:03

## 2020-08-28 RX ADMIN — IPRATROPIUM BROMIDE AND ALBUTEROL SULFATE 3 ML: 2.5; .5 SOLUTION RESPIRATORY (INHALATION) at 07:21

## 2020-08-28 RX ADMIN — ASPIRIN 81 MG: 81 TABLET, COATED ORAL at 07:50

## 2020-08-28 RX ADMIN — PANTOPRAZOLE SODIUM 40 MG: 40 TABLET, DELAYED RELEASE ORAL at 06:13

## 2020-08-28 RX ADMIN — METOPROLOL SUCCINATE 200 MG: 100 TABLET, EXTENDED RELEASE ORAL at 07:50

## 2020-08-28 RX ADMIN — IPRATROPIUM BROMIDE AND ALBUTEROL SULFATE 3 ML: 2.5; .5 SOLUTION RESPIRATORY (INHALATION) at 17:15

## 2020-08-28 RX ADMIN — POTASSIUM CHLORIDE 40 MEQ: 10 CAPSULE, COATED, EXTENDED RELEASE ORAL at 17:30

## 2020-08-28 RX ADMIN — HEPARIN SODIUM 5000 UNITS: 5000 INJECTION INTRAVENOUS; SUBCUTANEOUS at 07:51

## 2020-08-28 RX ADMIN — NICOTINE 1 PATCH: 21 PATCH, EXTENDED RELEASE TRANSDERMAL at 07:52

## 2020-08-28 RX ADMIN — PREGABALIN 150 MG: 75 CAPSULE ORAL at 17:27

## 2020-08-28 RX ADMIN — SODIUM CHLORIDE, PRESERVATIVE FREE 10 ML: 5 INJECTION INTRAVENOUS at 20:04

## 2020-08-28 RX ADMIN — HEPARIN SODIUM 5000 UNITS: 5000 INJECTION INTRAVENOUS; SUBCUTANEOUS at 20:04

## 2020-08-28 RX ADMIN — CEFDINIR 300 MG: 300 CAPSULE ORAL at 19:57

## 2020-08-29 ENCOUNTER — APPOINTMENT (OUTPATIENT)
Dept: GENERAL RADIOLOGY | Facility: HOSPITAL | Age: 65
End: 2020-08-29

## 2020-08-29 LAB
ALBUMIN SERPL-MCNC: 3.2 G/DL (ref 3.5–5.2)
ANION GAP SERPL CALCULATED.3IONS-SCNC: 13.6 MMOL/L (ref 5–15)
BACTERIA SPEC AEROBE CULT: NORMAL
BACTERIA SPEC AEROBE CULT: NORMAL
BUN SERPL-MCNC: 31 MG/DL (ref 8–23)
BUN/CREAT SERPL: 6.4 (ref 7–25)
CALCIUM SPEC-SCNC: 8.6 MG/DL (ref 8.6–10.5)
CHLORIDE SERPL-SCNC: 99 MMOL/L (ref 98–107)
CO2 SERPL-SCNC: 23.4 MMOL/L (ref 22–29)
CREAT SERPL-MCNC: 4.82 MG/DL (ref 0.57–1)
GFR SERPL CREATININE-BSD FRML MDRD: 9 ML/MIN/1.73
GFR SERPL CREATININE-BSD FRML MDRD: ABNORMAL ML/MIN/{1.73_M2}
GLUCOSE BLDC GLUCOMTR-MCNC: 103 MG/DL (ref 70–130)
GLUCOSE BLDC GLUCOMTR-MCNC: 131 MG/DL (ref 70–130)
GLUCOSE BLDC GLUCOMTR-MCNC: 214 MG/DL (ref 70–130)
GLUCOSE SERPL-MCNC: 154 MG/DL (ref 65–99)
MAGNESIUM SERPL-MCNC: 2.2 MG/DL (ref 1.6–2.4)
PHOSPHATE SERPL-MCNC: 5.2 MG/DL (ref 2.5–4.5)
POTASSIUM SERPL-SCNC: 3.6 MMOL/L (ref 3.5–5.2)
SODIUM SERPL-SCNC: 136 MMOL/L (ref 136–145)
URATE SERPL-MCNC: 7.2 MG/DL (ref 2.4–5.7)

## 2020-08-29 PROCEDURE — 82962 GLUCOSE BLOOD TEST: CPT

## 2020-08-29 PROCEDURE — 71046 X-RAY EXAM CHEST 2 VIEWS: CPT

## 2020-08-29 PROCEDURE — 63710000001 INSULIN LISPRO (HUMAN) PER 5 UNITS: Performed by: INTERNAL MEDICINE

## 2020-08-29 PROCEDURE — 25010000002 HEPARIN (PORCINE) PER 1000 UNITS: Performed by: INTERNAL MEDICINE

## 2020-08-29 PROCEDURE — 80069 RENAL FUNCTION PANEL: CPT | Performed by: INTERNAL MEDICINE

## 2020-08-29 PROCEDURE — 83735 ASSAY OF MAGNESIUM: CPT | Performed by: INTERNAL MEDICINE

## 2020-08-29 PROCEDURE — 84550 ASSAY OF BLOOD/URIC ACID: CPT | Performed by: INTERNAL MEDICINE

## 2020-08-29 RX ORDER — ALBUMIN (HUMAN) 12.5 G/50ML
12.5 SOLUTION INTRAVENOUS AS NEEDED
Status: ACTIVE | OUTPATIENT
Start: 2020-08-29 | End: 2020-08-29

## 2020-08-29 RX ADMIN — ROPINIROLE 3 MG: 2 TABLET, FILM COATED ORAL at 21:06

## 2020-08-29 RX ADMIN — CEFDINIR 300 MG: 300 CAPSULE ORAL at 15:19

## 2020-08-29 RX ADMIN — PANTOPRAZOLE SODIUM 40 MG: 40 TABLET, DELAYED RELEASE ORAL at 06:33

## 2020-08-29 RX ADMIN — INSULIN LISPRO 4 UNITS: 100 INJECTION, SOLUTION INTRAVENOUS; SUBCUTANEOUS at 17:33

## 2020-08-29 RX ADMIN — SODIUM CHLORIDE, PRESERVATIVE FREE 10 ML: 5 INJECTION INTRAVENOUS at 21:08

## 2020-08-29 RX ADMIN — HEPARIN SODIUM 5000 UNITS: 5000 INJECTION INTRAVENOUS; SUBCUTANEOUS at 21:06

## 2020-08-29 RX ADMIN — OXYCODONE AND ACETAMINOPHEN 1 TABLET: 5; 325 TABLET ORAL at 15:23

## 2020-08-29 RX ADMIN — ASPIRIN 81 MG: 81 TABLET, COATED ORAL at 15:19

## 2020-08-29 RX ADMIN — HEPARIN SODIUM 3100 UNITS: 1000 INJECTION, SOLUTION INTRAVENOUS; SUBCUTANEOUS at 10:54

## 2020-08-29 RX ADMIN — PREGABALIN 150 MG: 75 CAPSULE ORAL at 21:06

## 2020-08-29 RX ADMIN — DULOXETINE HYDROCHLORIDE 30 MG: 30 CAPSULE, DELAYED RELEASE ORAL at 15:19

## 2020-08-29 RX ADMIN — METOPROLOL SUCCINATE 200 MG: 100 TABLET, EXTENDED RELEASE ORAL at 15:18

## 2020-08-29 RX ADMIN — PREGABALIN 150 MG: 75 CAPSULE ORAL at 15:18

## 2020-08-29 RX ADMIN — HEPARIN SODIUM 5000 UNITS: 5000 INJECTION INTRAVENOUS; SUBCUTANEOUS at 15:18

## 2020-08-30 LAB
ALBUMIN SERPL-MCNC: 3.1 G/DL (ref 3.5–5.2)
ANION GAP SERPL CALCULATED.3IONS-SCNC: 11.8 MMOL/L (ref 5–15)
BASOPHILS # BLD AUTO: 0.06 10*3/MM3 (ref 0–0.2)
BASOPHILS NFR BLD AUTO: 0.8 % (ref 0–1.5)
BUN SERPL-MCNC: 21 MG/DL (ref 8–23)
BUN/CREAT SERPL: 6 (ref 7–25)
CALCIUM SPEC-SCNC: 8.7 MG/DL (ref 8.6–10.5)
CHLORIDE SERPL-SCNC: 101 MMOL/L (ref 98–107)
CO2 SERPL-SCNC: 24.2 MMOL/L (ref 22–29)
CREAT SERPL-MCNC: 3.48 MG/DL (ref 0.57–1)
DEPRECATED RDW RBC AUTO: 52.8 FL (ref 37–54)
EOSINOPHIL # BLD AUTO: 0.22 10*3/MM3 (ref 0–0.4)
EOSINOPHIL NFR BLD AUTO: 2.8 % (ref 0.3–6.2)
ERYTHROCYTE [DISTWIDTH] IN BLOOD BY AUTOMATED COUNT: 16.2 % (ref 12.3–15.4)
GFR SERPL CREATININE-BSD FRML MDRD: 13 ML/MIN/1.73
GFR SERPL CREATININE-BSD FRML MDRD: ABNORMAL ML/MIN/{1.73_M2}
GLUCOSE BLDC GLUCOMTR-MCNC: 115 MG/DL (ref 70–130)
GLUCOSE BLDC GLUCOMTR-MCNC: 119 MG/DL (ref 70–130)
GLUCOSE BLDC GLUCOMTR-MCNC: 149 MG/DL (ref 70–130)
GLUCOSE BLDC GLUCOMTR-MCNC: 245 MG/DL (ref 70–130)
GLUCOSE SERPL-MCNC: 122 MG/DL (ref 65–99)
HCT VFR BLD AUTO: 37.4 % (ref 34–46.6)
HGB BLD-MCNC: 11.9 G/DL (ref 12–15.9)
IMM GRANULOCYTES # BLD AUTO: 0.19 10*3/MM3 (ref 0–0.05)
IMM GRANULOCYTES NFR BLD AUTO: 2.4 % (ref 0–0.5)
LYMPHOCYTES # BLD AUTO: 2.65 10*3/MM3 (ref 0.7–3.1)
LYMPHOCYTES NFR BLD AUTO: 33.5 % (ref 19.6–45.3)
MCH RBC QN AUTO: 28.3 PG (ref 26.6–33)
MCHC RBC AUTO-ENTMCNC: 31.8 G/DL (ref 31.5–35.7)
MCV RBC AUTO: 88.8 FL (ref 79–97)
MONOCYTES # BLD AUTO: 0.84 10*3/MM3 (ref 0.1–0.9)
MONOCYTES NFR BLD AUTO: 10.6 % (ref 5–12)
NEUTROPHILS NFR BLD AUTO: 3.95 10*3/MM3 (ref 1.7–7)
NEUTROPHILS NFR BLD AUTO: 49.9 % (ref 42.7–76)
NRBC BLD AUTO-RTO: 0 /100 WBC (ref 0–0.2)
PHOSPHATE SERPL-MCNC: 4.5 MG/DL (ref 2.5–4.5)
PLATELET # BLD AUTO: 238 10*3/MM3 (ref 140–450)
PMV BLD AUTO: 11.3 FL (ref 6–12)
POTASSIUM SERPL-SCNC: 3.8 MMOL/L (ref 3.5–5.2)
RBC # BLD AUTO: 4.21 10*6/MM3 (ref 3.77–5.28)
SODIUM SERPL-SCNC: 137 MMOL/L (ref 136–145)
WBC # BLD AUTO: 7.91 10*3/MM3 (ref 3.4–10.8)

## 2020-08-30 PROCEDURE — 25010000002 HEPARIN (PORCINE) PER 1000 UNITS: Performed by: INTERNAL MEDICINE

## 2020-08-30 PROCEDURE — 97110 THERAPEUTIC EXERCISES: CPT

## 2020-08-30 PROCEDURE — 94762 N-INVAS EAR/PLS OXIMTRY CONT: CPT

## 2020-08-30 PROCEDURE — 82962 GLUCOSE BLOOD TEST: CPT

## 2020-08-30 PROCEDURE — 85025 COMPLETE CBC W/AUTO DIFF WBC: CPT | Performed by: INTERNAL MEDICINE

## 2020-08-30 PROCEDURE — 80069 RENAL FUNCTION PANEL: CPT | Performed by: INTERNAL MEDICINE

## 2020-08-30 RX ORDER — ALBUMIN (HUMAN) 12.5 G/50ML
12.5 SOLUTION INTRAVENOUS AS NEEDED
Status: CANCELLED | OUTPATIENT
Start: 2020-08-31 | End: 2020-08-31

## 2020-08-30 RX ADMIN — NICOTINE 1 PATCH: 21 PATCH, EXTENDED RELEASE TRANSDERMAL at 09:01

## 2020-08-30 RX ADMIN — SODIUM CHLORIDE, PRESERVATIVE FREE 10 ML: 5 INJECTION INTRAVENOUS at 20:12

## 2020-08-30 RX ADMIN — HEPARIN SODIUM 5000 UNITS: 5000 INJECTION INTRAVENOUS; SUBCUTANEOUS at 20:11

## 2020-08-30 RX ADMIN — ASPIRIN 81 MG: 81 TABLET, COATED ORAL at 08:59

## 2020-08-30 RX ADMIN — PREGABALIN 150 MG: 75 CAPSULE ORAL at 20:11

## 2020-08-30 RX ADMIN — SODIUM CHLORIDE, PRESERVATIVE FREE 10 ML: 5 INJECTION INTRAVENOUS at 09:01

## 2020-08-30 RX ADMIN — ROPINIROLE 3 MG: 2 TABLET, FILM COATED ORAL at 20:11

## 2020-08-30 RX ADMIN — CEFDINIR 300 MG: 300 CAPSULE ORAL at 09:00

## 2020-08-30 RX ADMIN — DULOXETINE HYDROCHLORIDE 30 MG: 30 CAPSULE, DELAYED RELEASE ORAL at 08:59

## 2020-08-30 RX ADMIN — PREGABALIN 150 MG: 75 CAPSULE ORAL at 17:23

## 2020-08-30 RX ADMIN — PANTOPRAZOLE SODIUM 40 MG: 40 TABLET, DELAYED RELEASE ORAL at 06:16

## 2020-08-30 RX ADMIN — HEPARIN SODIUM 5000 UNITS: 5000 INJECTION INTRAVENOUS; SUBCUTANEOUS at 08:58

## 2020-08-30 RX ADMIN — METOPROLOL SUCCINATE 200 MG: 100 TABLET, EXTENDED RELEASE ORAL at 08:59

## 2020-08-30 RX ADMIN — PREGABALIN 150 MG: 75 CAPSULE ORAL at 08:59

## 2020-08-31 LAB
ALBUMIN SERPL-MCNC: 3.4 G/DL (ref 3.5–5.2)
ALBUMIN/GLOB SERPL: 1.2 G/DL
ALP SERPL-CCNC: 88 U/L (ref 39–117)
ALT SERPL W P-5'-P-CCNC: 21 U/L (ref 1–33)
ANION GAP SERPL CALCULATED.3IONS-SCNC: 14.4 MMOL/L (ref 5–15)
AST SERPL-CCNC: 23 U/L (ref 1–32)
BASOPHILS # BLD AUTO: 0.08 10*3/MM3 (ref 0–0.2)
BASOPHILS NFR BLD AUTO: 0.8 % (ref 0–1.5)
BILIRUB SERPL-MCNC: 0.3 MG/DL (ref 0–1.2)
BUN SERPL-MCNC: 34 MG/DL (ref 8–23)
BUN/CREAT SERPL: 7 (ref 7–25)
CALCIUM SPEC-SCNC: 9.1 MG/DL (ref 8.6–10.5)
CHLORIDE SERPL-SCNC: 99 MMOL/L (ref 98–107)
CO2 SERPL-SCNC: 23.6 MMOL/L (ref 22–29)
CREAT SERPL-MCNC: 4.89 MG/DL (ref 0.57–1)
DEPRECATED RDW RBC AUTO: 49.6 FL (ref 37–54)
EOSINOPHIL # BLD AUTO: 0.2 10*3/MM3 (ref 0–0.4)
EOSINOPHIL NFR BLD AUTO: 2 % (ref 0.3–6.2)
ERYTHROCYTE [DISTWIDTH] IN BLOOD BY AUTOMATED COUNT: 16.1 % (ref 12.3–15.4)
FERRITIN SERPL-MCNC: 172 NG/ML (ref 13–150)
FOLATE SERPL-MCNC: 6.03 NG/ML (ref 4.78–24.2)
GFR SERPL CREATININE-BSD FRML MDRD: 9 ML/MIN/1.73
GFR SERPL CREATININE-BSD FRML MDRD: ABNORMAL ML/MIN/{1.73_M2}
GLOBULIN UR ELPH-MCNC: 2.8 GM/DL
GLUCOSE BLDC GLUCOMTR-MCNC: 119 MG/DL (ref 70–130)
GLUCOSE BLDC GLUCOMTR-MCNC: 119 MG/DL (ref 70–130)
GLUCOSE BLDC GLUCOMTR-MCNC: 263 MG/DL (ref 70–130)
GLUCOSE SERPL-MCNC: 118 MG/DL (ref 65–99)
HCT VFR BLD AUTO: 34.1 % (ref 34–46.6)
HGB BLD-MCNC: 11.3 G/DL (ref 12–15.9)
IMM GRANULOCYTES # BLD AUTO: 0.21 10*3/MM3 (ref 0–0.05)
IMM GRANULOCYTES NFR BLD AUTO: 2.2 % (ref 0–0.5)
IRON 24H UR-MRATE: 82 MCG/DL (ref 37–145)
IRON SATN MFR SERPL: 20 % (ref 20–50)
LDH SERPL-CCNC: 302 U/L (ref 135–214)
LYMPHOCYTES # BLD AUTO: 2.58 10*3/MM3 (ref 0.7–3.1)
LYMPHOCYTES NFR BLD AUTO: 26.4 % (ref 19.6–45.3)
MCH RBC QN AUTO: 28.4 PG (ref 26.6–33)
MCHC RBC AUTO-ENTMCNC: 33.1 G/DL (ref 31.5–35.7)
MCV RBC AUTO: 85.7 FL (ref 79–97)
MONOCYTES # BLD AUTO: 0.89 10*3/MM3 (ref 0.1–0.9)
MONOCYTES NFR BLD AUTO: 9.1 % (ref 5–12)
NEUTROPHILS NFR BLD AUTO: 5.8 10*3/MM3 (ref 1.7–7)
NEUTROPHILS NFR BLD AUTO: 59.5 % (ref 42.7–76)
NRBC BLD AUTO-RTO: 0 /100 WBC (ref 0–0.2)
PHOSPHATE SERPL-MCNC: 6.1 MG/DL (ref 2.5–4.5)
PLATELET # BLD AUTO: 249 10*3/MM3 (ref 140–450)
PMV BLD AUTO: 11.6 FL (ref 6–12)
POTASSIUM SERPL-SCNC: 4 MMOL/L (ref 3.5–5.2)
PROT SERPL-MCNC: 6.2 G/DL (ref 6–8.5)
RBC # BLD AUTO: 3.98 10*6/MM3 (ref 3.77–5.28)
SODIUM SERPL-SCNC: 137 MMOL/L (ref 136–145)
TIBC SERPL-MCNC: 416 MCG/DL (ref 298–536)
TRANSFERRIN SERPL-MCNC: 279 MG/DL (ref 200–360)
VIT B12 BLD-MCNC: >2000 PG/ML (ref 211–946)
WBC # BLD AUTO: 9.76 10*3/MM3 (ref 3.4–10.8)

## 2020-08-31 PROCEDURE — 82962 GLUCOSE BLOOD TEST: CPT

## 2020-08-31 PROCEDURE — 82746 ASSAY OF FOLIC ACID SERUM: CPT | Performed by: INTERNAL MEDICINE

## 2020-08-31 PROCEDURE — 82728 ASSAY OF FERRITIN: CPT | Performed by: INTERNAL MEDICINE

## 2020-08-31 PROCEDURE — 82784 ASSAY IGA/IGD/IGG/IGM EACH: CPT | Performed by: INTERNAL MEDICINE

## 2020-08-31 PROCEDURE — 25010000002 HEPARIN (PORCINE) PER 1000 UNITS: Performed by: INTERNAL MEDICINE

## 2020-08-31 PROCEDURE — 84100 ASSAY OF PHOSPHORUS: CPT | Performed by: INTERNAL MEDICINE

## 2020-08-31 PROCEDURE — 86334 IMMUNOFIX E-PHORESIS SERUM: CPT | Performed by: INTERNAL MEDICINE

## 2020-08-31 PROCEDURE — 83615 LACTATE (LD) (LDH) ENZYME: CPT | Performed by: INTERNAL MEDICINE

## 2020-08-31 PROCEDURE — 85025 COMPLETE CBC W/AUTO DIFF WBC: CPT | Performed by: INTERNAL MEDICINE

## 2020-08-31 PROCEDURE — 97110 THERAPEUTIC EXERCISES: CPT

## 2020-08-31 PROCEDURE — 84466 ASSAY OF TRANSFERRIN: CPT | Performed by: INTERNAL MEDICINE

## 2020-08-31 PROCEDURE — 80053 COMPREHEN METABOLIC PANEL: CPT | Performed by: INTERNAL MEDICINE

## 2020-08-31 PROCEDURE — 82607 VITAMIN B-12: CPT | Performed by: INTERNAL MEDICINE

## 2020-08-31 PROCEDURE — 84165 PROTEIN E-PHORESIS SERUM: CPT | Performed by: INTERNAL MEDICINE

## 2020-08-31 PROCEDURE — 99222 1ST HOSP IP/OBS MODERATE 55: CPT | Performed by: INTERNAL MEDICINE

## 2020-08-31 PROCEDURE — 83540 ASSAY OF IRON: CPT | Performed by: INTERNAL MEDICINE

## 2020-08-31 PROCEDURE — 83883 ASSAY NEPHELOMETRY NOT SPEC: CPT | Performed by: INTERNAL MEDICINE

## 2020-08-31 RX ADMIN — SODIUM CHLORIDE, PRESERVATIVE FREE 10 ML: 5 INJECTION INTRAVENOUS at 21:07

## 2020-08-31 RX ADMIN — ROPINIROLE 3 MG: 2 TABLET, FILM COATED ORAL at 21:06

## 2020-08-31 RX ADMIN — METOPROLOL SUCCINATE 200 MG: 100 TABLET, EXTENDED RELEASE ORAL at 08:13

## 2020-08-31 RX ADMIN — HEPARIN SODIUM 3100 UNITS: 1000 INJECTION, SOLUTION INTRAVENOUS; SUBCUTANEOUS at 18:30

## 2020-08-31 RX ADMIN — PANTOPRAZOLE SODIUM 40 MG: 40 TABLET, DELAYED RELEASE ORAL at 06:09

## 2020-08-31 RX ADMIN — PREGABALIN 150 MG: 75 CAPSULE ORAL at 08:13

## 2020-08-31 RX ADMIN — PREGABALIN 150 MG: 75 CAPSULE ORAL at 21:07

## 2020-08-31 RX ADMIN — NICOTINE 1 PATCH: 21 PATCH, EXTENDED RELEASE TRANSDERMAL at 08:14

## 2020-08-31 RX ADMIN — OXYCODONE AND ACETAMINOPHEN 1 TABLET: 5; 325 TABLET ORAL at 21:06

## 2020-08-31 RX ADMIN — ASPIRIN 81 MG: 81 TABLET, COATED ORAL at 08:13

## 2020-08-31 RX ADMIN — HEPARIN SODIUM 5000 UNITS: 5000 INJECTION INTRAVENOUS; SUBCUTANEOUS at 21:07

## 2020-08-31 RX ADMIN — SODIUM CHLORIDE, PRESERVATIVE FREE 10 ML: 5 INJECTION INTRAVENOUS at 08:13

## 2020-08-31 RX ADMIN — DULOXETINE HYDROCHLORIDE 30 MG: 30 CAPSULE, DELAYED RELEASE ORAL at 08:13

## 2020-08-31 RX ADMIN — HEPARIN SODIUM 5000 UNITS: 5000 INJECTION INTRAVENOUS; SUBCUTANEOUS at 08:13

## 2020-09-01 ENCOUNTER — HOSPITAL ENCOUNTER (OUTPATIENT)
Dept: CT IMAGING | Facility: HOSPITAL | Age: 65
End: 2020-09-01

## 2020-09-01 LAB
ALBUMIN SERPL-MCNC: 3 G/DL (ref 2.9–4.4)
ALBUMIN SERPL-MCNC: 3.2 G/DL (ref 3.5–5.2)
ALBUMIN/GLOB SERPL: 1.1 {RATIO} (ref 0.7–1.7)
ALPHA1 GLOB FLD ELPH-MCNC: 0.3 G/DL (ref 0–0.4)
ALPHA2 GLOB SERPL ELPH-MCNC: 1 G/DL (ref 0.4–1)
ANION GAP SERPL CALCULATED.3IONS-SCNC: 10.9 MMOL/L (ref 5–15)
B-GLOBULIN SERPL ELPH-MCNC: 0.9 G/DL (ref 0.7–1.3)
BUN SERPL-MCNC: 22 MG/DL (ref 8–23)
BUN/CREAT SERPL: 6.5 (ref 7–25)
CALCIUM SPEC-SCNC: 8.7 MG/DL (ref 8.6–10.5)
CHLORIDE SERPL-SCNC: 101 MMOL/L (ref 98–107)
CO2 SERPL-SCNC: 25.1 MMOL/L (ref 22–29)
CREAT SERPL-MCNC: 3.36 MG/DL (ref 0.57–1)
GAMMA GLOB SERPL ELPH-MCNC: 0.6 G/DL (ref 0.4–1.8)
GFR SERPL CREATININE-BSD FRML MDRD: 14 ML/MIN/1.73
GFR SERPL CREATININE-BSD FRML MDRD: ABNORMAL ML/MIN/{1.73_M2}
GLOBULIN SER CALC-MCNC: 2.8 G/DL (ref 2.2–3.9)
GLUCOSE BLDC GLUCOMTR-MCNC: 127 MG/DL (ref 70–130)
GLUCOSE BLDC GLUCOMTR-MCNC: 133 MG/DL (ref 70–130)
GLUCOSE BLDC GLUCOMTR-MCNC: 178 MG/DL (ref 70–130)
GLUCOSE BLDC GLUCOMTR-MCNC: 188 MG/DL (ref 70–130)
GLUCOSE SERPL-MCNC: 134 MG/DL (ref 65–99)
IGA SERPL-MCNC: 165 MG/DL (ref 87–352)
IGG SERPL-MCNC: 602 MG/DL (ref 586–1602)
IGM SERPL-MCNC: 109 MG/DL (ref 26–217)
INTERPRETATION SERPL IEP-IMP: ABNORMAL
KAPPA LC SERPL-MCNC: 98.5 MG/L (ref 3.3–19.4)
KAPPA LC/LAMBDA SER: 2.08 {RATIO} (ref 0.26–1.65)
LAMBDA LC FREE SERPL-MCNC: 47.3 MG/L (ref 5.7–26.3)
Lab: ABNORMAL
M-SPIKE: ABNORMAL G/DL
PHOSPHATE SERPL-MCNC: 5.3 MG/DL (ref 2.5–4.5)
POTASSIUM SERPL-SCNC: 3.9 MMOL/L (ref 3.5–5.2)
PROT SERPL-MCNC: 5.8 G/DL (ref 6–8.5)
SODIUM SERPL-SCNC: 137 MMOL/L (ref 136–145)

## 2020-09-01 PROCEDURE — 82962 GLUCOSE BLOOD TEST: CPT

## 2020-09-01 PROCEDURE — 97530 THERAPEUTIC ACTIVITIES: CPT

## 2020-09-01 PROCEDURE — 99232 SBSQ HOSP IP/OBS MODERATE 35: CPT | Performed by: INTERNAL MEDICINE

## 2020-09-01 PROCEDURE — 25010000002 HEPARIN (PORCINE) PER 1000 UNITS: Performed by: INTERNAL MEDICINE

## 2020-09-01 PROCEDURE — 80069 RENAL FUNCTION PANEL: CPT | Performed by: INTERNAL MEDICINE

## 2020-09-01 PROCEDURE — 63710000001 INSULIN LISPRO (HUMAN) PER 5 UNITS: Performed by: INTERNAL MEDICINE

## 2020-09-01 PROCEDURE — 97110 THERAPEUTIC EXERCISES: CPT

## 2020-09-01 RX ORDER — FOLIC ACID 1 MG/1
1 TABLET ORAL DAILY
Status: DISCONTINUED | OUTPATIENT
Start: 2020-09-01 | End: 2020-09-04 | Stop reason: HOSPADM

## 2020-09-01 RX ORDER — TORSEMIDE 20 MG/1
40 TABLET ORAL DAILY
Status: DISCONTINUED | OUTPATIENT
Start: 2020-09-01 | End: 2020-09-04 | Stop reason: HOSPADM

## 2020-09-01 RX ADMIN — INSULIN LISPRO 2 UNITS: 100 INJECTION, SOLUTION INTRAVENOUS; SUBCUTANEOUS at 12:10

## 2020-09-01 RX ADMIN — PREGABALIN 150 MG: 75 CAPSULE ORAL at 09:39

## 2020-09-01 RX ADMIN — OXYCODONE AND ACETAMINOPHEN 1 TABLET: 5; 325 TABLET ORAL at 09:44

## 2020-09-01 RX ADMIN — PANTOPRAZOLE SODIUM 40 MG: 40 TABLET, DELAYED RELEASE ORAL at 06:30

## 2020-09-01 RX ADMIN — METOPROLOL SUCCINATE 200 MG: 100 TABLET, EXTENDED RELEASE ORAL at 09:39

## 2020-09-01 RX ADMIN — NICOTINE 1 PATCH: 21 PATCH, EXTENDED RELEASE TRANSDERMAL at 09:50

## 2020-09-01 RX ADMIN — SODIUM CHLORIDE, PRESERVATIVE FREE 10 ML: 5 INJECTION INTRAVENOUS at 20:35

## 2020-09-01 RX ADMIN — PREGABALIN 150 MG: 75 CAPSULE ORAL at 20:35

## 2020-09-01 RX ADMIN — TORSEMIDE 40 MG: 20 TABLET ORAL at 16:27

## 2020-09-01 RX ADMIN — ROPINIROLE 3 MG: 2 TABLET, FILM COATED ORAL at 20:35

## 2020-09-01 RX ADMIN — PREGABALIN 150 MG: 75 CAPSULE ORAL at 16:27

## 2020-09-01 RX ADMIN — DULOXETINE HYDROCHLORIDE 30 MG: 30 CAPSULE, DELAYED RELEASE ORAL at 09:39

## 2020-09-01 RX ADMIN — SODIUM CHLORIDE, PRESERVATIVE FREE 10 ML: 5 INJECTION INTRAVENOUS at 09:50

## 2020-09-01 RX ADMIN — HEPARIN SODIUM 5000 UNITS: 5000 INJECTION INTRAVENOUS; SUBCUTANEOUS at 20:35

## 2020-09-01 RX ADMIN — ASPIRIN 81 MG: 81 TABLET, COATED ORAL at 09:39

## 2020-09-01 RX ADMIN — FOLIC ACID 1 MG: 1 TABLET ORAL at 18:42

## 2020-09-01 RX ADMIN — HEPARIN SODIUM 5000 UNITS: 5000 INJECTION INTRAVENOUS; SUBCUTANEOUS at 09:39

## 2020-09-01 NOTE — PROGRESS NOTES
"  [facility] DAILY PROGRESS NOTE    Patient Identification  Name: Jayne Beltran  Age: 65 y.o.  Sex: female  :  1955  MRN: [id number]    Date: 2020             Subjective:  Interval History:   Doing well    Objective:    Scheduled Meds:  aspirin 81 mg Oral Daily   DULoxetine 30 mg Oral Daily   heparin (porcine) 5,000 Units Subcutaneous Q12H   insulin lispro 0-9 Units Subcutaneous TID AC   metoprolol succinate  mg Oral Daily   nicotine 1 patch Transdermal Q24H   pantoprazole 40 mg Oral QAM   pregabalin 150 mg Oral TID   rOPINIRole 3 mg Oral Nightly   sodium chloride 10 mL Intravenous Q12H     Continuous Infusions:   PRN Meds:bisacodyl  •  bisacodyl  •  dextrose  •  dextrose  •  dextrose  •  dextrose  •  glucagon (human recombinant)  •  glucagon (human recombinant)  •  heparin (porcine)  •  ipratropium-albuterol  •  ondansetron **OR** ondansetron  •  oxyCODONE-acetaminophen  •  sodium chloride    Vital signs in last 24 hours:  Temp:  [98.1 °F (36.7 °C)-98.8 °F (37.1 °C)] 98.2 °F (36.8 °C)  Heart Rate:  [78-91] 80  Resp:  [18-20] 20  BP: (141-173)/(84-94) 147/86    Intake/Output:    Intake/Output Summary (Last 24 hours) at 2020 0915  Last data filed at 2020 0100  Gross per 24 hour   Intake 240 ml   Output 1100 ml   Net -860 ml       Exam:  /86 (BP Location: Right arm, Patient Position: Lying)   Pulse 80   Temp 98.2 °F (36.8 °C) (Oral)   Resp 20   Ht 167.6 cm (66\")   Wt 69.9 kg (154 lb 3.2 oz)   LMP  (LMP Unknown)   SpO2 91%   BMI 24.89 kg/m²     General Appearance:    Alert, cooperative, no distress, appears stated age   Back:     Symmetric, no CVA tenderness   Lungs:     Clear to auscultation bilaterally, respirations unlabored   Heart:    Regular rate and rhythm, strong peripheral pulses   Abdomen:    Soft   Genitalia:   Not examined   Extremities:   Extremities normal, atraumatic, no cyanosis or edema   Skin:   Skin color, texture, turgor normal, no rashes or lesions    "     Data Review:  All labs (24hrs):   Recent Results (from the past 24 hour(s))   POC Glucose Once    Collection Time: 08/31/20 11:04 AM   Result Value Ref Range    Glucose 119 70 - 130 mg/dL   POC Glucose Once    Collection Time: 08/31/20  9:32 PM   Result Value Ref Range    Glucose 263 (H) 70 - 130 mg/dL   POC Glucose Once    Collection Time: 09/01/20  6:00 AM   Result Value Ref Range    Glucose 133 (H) 70 - 130 mg/dL   Renal Function Panel    Collection Time: 09/01/20  6:52 AM   Result Value Ref Range    Glucose 134 (H) 65 - 99 mg/dL    BUN 22 8 - 23 mg/dL    Creatinine 3.36 (H) 0.57 - 1.00 mg/dL    Sodium 137 136 - 145 mmol/L    Potassium 3.9 3.5 - 5.2 mmol/L    Chloride 101 98 - 107 mmol/L    CO2 25.1 22.0 - 29.0 mmol/L    Calcium 8.7 8.6 - 10.5 mg/dL    Albumin 3.20 (L) 3.50 - 5.20 g/dL    Phosphorus 5.3 (H) 2.5 - 4.5 mg/dL    Anion Gap 10.9 5.0 - 15.0 mmol/L    BUN/Creatinine Ratio 6.5 (L) 7.0 - 25.0    eGFR Non African Amer 14 (L) >60 mL/min/1.73    eGFR   Amer           Assessment:    Syncope and collapse      Right Renal Mass, 2cm  Right Renal Stones, non-obstructing  Duplicated Right upper collecting systemn    Plan:    - No Acute urologic intevention  - Follow-up with Dr. Mikie Mejia ([facility]) 1-2 weeks after Discharge - Schedulers messaged.      Mikie Mejia MD  9/1/2020  09:15

## 2020-09-01 NOTE — PROGRESS NOTES
"   LOS: 8 days    Patient Care Team:  Michael Arriaza Jr., DO as PCP - General (Family Medicine)  Gilles Villa DPM as PCP - Claims Attributed  Gilles Villa DPM as Consulting Physician (Podiatry)  Jen Hays MD as Consulting Physician (Obstetrics and Gynecology)  Michael Arriaza Jr., DO as Referring Physician (Family Medicine)  Glynn Melara MD as Consulting Physician (Hematology and Oncology)    Chief Complaint:    Chief Complaint   Patient presents with   • Syncope     multiple x since saturday      Follow UP GERI  Subjective     Interval History:    Tolerated dialysis yesterday.   UOP 1100 cc.  Eating. Not soa. Bowels moving.  Getting out of bed.     Objective     Vital Signs  Temp:  [98.1 °F (36.7 °C)-98.8 °F (37.1 °C)] 98.2 °F (36.8 °C)  Heart Rate:  [78-91] 84  Resp:  [18-20] 20  BP: (139-173)/(84-94) 139/85    Flowsheet Rows      First Filed Value   Admission Height  167.6 cm (66\") Documented at 08/24/2020 1159   Admission Weight  74.8 kg (165 lb) Documented at 08/24/2020 1217          I/O this shift:  In: 480 [P.O.:480]  Out: -   I/O last 3 completed shifts:  In: 720 [P.O.:720]  Out: 2200 [Urine:2200]    Intake/Output Summary (Last 24 hours) at 9/1/2020 1403  Last data filed at 9/1/2020 1314  Gross per 24 hour   Intake 480 ml   Output 700 ml   Net -220 ml       Physical Exam:  GEN NAD alert, comfortable.  Lying in bed .  Neck RIJ shiley day 7 , no JVD  Heart RRR no s3 or rub  Lungs clear to auscultation, no wheezing .  Abd soft, + bs, nontender .  Ext no pedal/ankle edema 2+ Radial pulses   Skin no rash     Results Review:    Results from last 7 days   Lab Units 09/01/20  0652 08/31/20  0624 08/30/20  0615  08/27/20  0412  08/26/20  0500   SODIUM mmol/L 137 137 137   < > 139   < > 138   POTASSIUM mmol/L 3.9 4.0 3.8   < > 2.8*   < > 3.0*   CHLORIDE mmol/L 101 99 101   < > 98   < > 98   CO2 mmol/L 25.1 23.6 24.2   < > 28.0   < > 22.3   BUN mg/dL 22 34* 21   < > 33*   < > 24* "   CREATININE mg/dL 3.36* 4.89* 3.48*   < > 4.59*   < > 3.15*   CALCIUM mg/dL 8.7 9.1 8.7   < > 8.2*   < > 7.3*   BILIRUBIN mg/dL  --  0.3  --   --  0.9  --  0.7   ALK PHOS U/L  --  88  --   --  88  --  91   ALT (SGPT) U/L  --  21  --   --  16  --  21   AST (SGOT) U/L  --  23  --   --  15  --  25   GLUCOSE mg/dL 134* 118* 122*   < > 144*   < > 174*    < > = values in this interval not displayed.       Estimated Creatinine Clearance: 18.2 mL/min (A) (by C-G formula based on SCr of 3.36 mg/dL (H)).    Results from last 7 days   Lab Units 09/01/20  0652 08/31/20  0624 08/30/20  0615 08/29/20  0440  08/27/20 0412 08/26/20  0500   MAGNESIUM mg/dL  --   --   --  2.2  --  2.1  --  1.9   PHOSPHORUS mg/dL 5.3* 6.1* 4.5 5.2*   < > 3.4   < > 2.4*    < > = values in this interval not displayed.       Results from last 7 days   Lab Units 08/29/20  0440   URIC ACID mg/dL 7.2*       Results from last 7 days   Lab Units 08/31/20  0624 08/30/20  0615 08/27/20 0412 08/26/20  0944   WBC 10*3/mm3 9.76 7.91 10.27 13.77*   HEMOGLOBIN g/dL 11.3* 11.9* 9.8* 10.6*   PLATELETS 10*3/mm3 249 238 259 276               Imaging Results (Last 24 Hours)     ** No results found for the last 24 hours. **          aspirin 81 mg Oral Daily   DULoxetine 30 mg Oral Daily   heparin (porcine) 5,000 Units Subcutaneous Q12H   insulin lispro 0-9 Units Subcutaneous TID AC   metoprolol succinate  mg Oral Daily   nicotine 1 patch Transdermal Q24H   pantoprazole 40 mg Oral QAM   pregabalin 150 mg Oral TID   rOPINIRole 3 mg Oral Nightly   sodium chloride 10 mL Intravenous Q12H          Medication Review:   Current Facility-Administered Medications   Medication Dose Route Frequency Provider Last Rate Last Dose   • aspirin EC tablet 81 mg  81 mg Oral Daily Evert Alcantara MD   81 mg at 09/01/20 0939   • bisacodyl (DULCOLAX) EC tablet 5 mg  5 mg Oral Daily PRN Evert Alcantara MD       • bisacodyl (DULCOLAX) suppository 10 mg  10 mg Rectal Daily PRN Quinton  Evert ADDISON MD       • dextrose (D50W) 25 g/ 50mL Intravenous Solution 25 g  25 g Intravenous Q15 Min PRN Evert Alcantara MD       • dextrose (GLUTOSE) oral gel 15 g  15 g Oral Q15 Min PRN Evert Alcantara MD       • DULoxetine (CYMBALTA) DR capsule 30 mg  30 mg Oral Daily Evert Alcantara MD   30 mg at 09/01/20 0939   • glucagon (human recombinant) (GLUCAGEN DIAGNOSTIC) injection 1 mg  1 mg Subcutaneous Q15 Min PRN Evert Alcantara MD       • heparin (porcine) 5000 UNIT/ML injection 5,000 Units  5,000 Units Subcutaneous Q12H Evert Alcantara MD   5,000 Units at 09/01/20 0939   • heparin (porcine) injection 3,100 Units  3,100 Units Intracatheter PRN Evert Alcantara MD   3,100 Units at 08/31/20 1830   • insulin lispro (humaLOG) injection 0-9 Units  0-9 Units Subcutaneous TID AC Evert Alcantara MD   2 Units at 09/01/20 1210   • ipratropium-albuterol (DUO-NEB) nebulizer solution 3 mL  3 mL Nebulization Q4H PRN Evert Alcantara MD       • metoprolol succinate XL (TOPROL-XL) 24 hr tablet 200 mg  200 mg Oral Daily Evert Alcantara MD   200 mg at 09/01/20 0939   • nicotine (NICODERM CQ) 21 MG/24HR patch 1 patch  1 patch Transdermal Q24H Evert Alcantara MD   1 patch at 09/01/20 0950   • ondansetron (ZOFRAN) tablet 4 mg  4 mg Oral Q6H PRN Evert Alcantara MD        Or   • ondansetron (ZOFRAN) injection 4 mg  4 mg Intravenous Q6H PRN Evert Alcantara MD       • oxyCODONE-acetaminophen (PERCOCET) 5-325 MG per tablet 1 tablet  1 tablet Oral Q8H PRN Evert Alcantara MD   1 tablet at 09/01/20 0944   • pantoprazole (PROTONIX) EC tablet 40 mg  40 mg Oral QAM Evert Alcantara MD   40 mg at 09/01/20 0630   • pregabalin (LYRICA) capsule 150 mg  150 mg Oral TID Evert Alcantara MD   150 mg at 09/01/20 0939   • rOPINIRole (REQUIP) tablet 3 mg  3 mg Oral Nightly Evert Alcantara MD   3 mg at 08/31/20 2106   • sodium chloride 0.9 % flush 10 mL  10 mL Intravenous Q12H Evert Alcantara MD   10 mL at 09/01/20 0950   • sodium chloride 0.9 % flush 10 mL  10  mL Intravenous PRN Evert Alcantara MD           Assessment/Plan   1.   Non-olig GERI, ATN due to shock.  No  evidence for renal recovery yet, but making over a liter of urine daily .  SCR rises in the absence of HD.  ATN due to shock.  CT showed large right sided stones but no hydronephrosis. Dialysis dependent since 8/25.  2. Severe metabolic acidemia with lactic acidosis. Multifactorial including metformin, hypotension. Resolved.   3. Septic shock with leukocytosis, now resolved.  Abx . Multifocal PNA on CT. Completed Antibiotic .  4. Syncope due to volume depletion and orthostasis .  5. Anemia - hgb 11.3 yesterday .  6. DM2.  Off metformin and jardiance.  Even if patient's renal function recovers, would NOT resume metformin as this is her second episode of acidemia   7. HTN - BP not optimal on 200 mg Toprol XL .  Add diuretic .    Plan  1.  Check am chem. If creatinine stable, hold dialysis.   If higher crt, will need HD and then TDC.   Paloma Clifton MD  09/01/20  14:03

## 2020-09-01 NOTE — PROGRESS NOTES
Mike Donohue MD                          [phone]      Patient ID:    Name:  Jayne Beltran    MRN:  [id number]    1955   65 y.o.  female            Patient Care Team:  Michael Arriaza Jr., DO as PCP - General (Family Medicine)  Gilles Villa DPM as PCP - Claims Attributed  Gilles Villa DPM as Consulting Physician (Podiatry)  Jen Hays MD as Consulting Physician (Obstetrics and Gynecology)  Michael Arriaza Jr., DO as Referring Physician (Family Medicine)  Glynn Melara MD as Consulting Physician (Hematology and Oncology)    CC/ Reason for visit: Acute renal failure, severe acidosis, pneumonia, shock    Subjective: Pt seen and examined this AM. No acute overnight events noted. Doing better.  Eagerly waiting to know if she needs to be getting dialysis.  Urology and oncology evaluation noted.  Renal function is improved some.    ROS: Denies any subjective fevers, syncope or presyncopal events, new neurological deficits, nausea or vomiting currently    Objective     Vital Signs past 24hrs    BP range: BP: (139-149)/(84-92) 139/85  Pulse range: Heart Rate:  [80-91] 84  Resp rate range: Resp:  [18-20] 20  Temp range: Temp (24hrs), Av.5 °F (36.9 °C), Min:98.2 °F (36.8 °C), Max:98.8 °F (37.1 °C)      Ventilator/Non-Invasive Ventilation Settings (From admission, onward)    None          Device (Oxygen Therapy): room air       69.9 kg (154 lb 3.2 oz); Body mass index is 24.89 kg/m².      Intake/Output Summary (Last 24 hours) at 2020 1649  Last data filed at 2020 1314  Gross per 24 hour   Intake 480 ml   Output 700 ml   Net -220 ml       PHYSICAL EXAM   Constitutional: Middle aged pt in bed, No acute respiratory distress, no accessory muscle use  Head: - NCAT  Eyes: No pallor.  Anicteric sclerae, EOMI.  ENMT:  Mallampati 4, no oral thrush. Dry MM.   NECK: Trachea midline, No thyromegaly, no palpable cervical lymphadenopathy  Heart:  RRR, no murmur. No pedal edema   Lungs: LI +, No wheezes/ crackles heard    Abdomen: Soft. No tenderness, guarding or rigidity. No palpable masses  Extremities: Extremities warm and well perfused. No cyanosis/ clubbing  Neuro: Conscious, answers appropriately, no gross focal neuro deficits  Psych: Mood and affect appropriate    Scheduled meds:      aspirin 81 mg Oral Daily   DULoxetine 30 mg Oral Daily   heparin (porcine) 5,000 Units Subcutaneous Q12H   insulin lispro 0-9 Units Subcutaneous TID AC   metoprolol succinate  mg Oral Daily   nicotine 1 patch Transdermal Q24H   pantoprazole 40 mg Oral QAM   pregabalin 150 mg Oral TID   rOPINIRole 3 mg Oral Nightly   sodium chloride 10 mL Intravenous Q12H   torsemide 40 mg Oral Daily       IV meds:                           Data Review:      Results from last 7 days   Lab Units 09/01/20  0652 08/31/20  0624 08/30/20  0615  08/27/20  0412  08/26/20  0500   SODIUM mmol/L 137 137 137   < > 139   < > 138   POTASSIUM mmol/L 3.9 4.0 3.8   < > 2.8*   < > 3.0*   CHLORIDE mmol/L 101 99 101   < > 98   < > 98   CO2 mmol/L 25.1 23.6 24.2   < > 28.0   < > 22.3   BUN mg/dL 22 34* 21   < > 33*   < > 24*   CREATININE mg/dL 3.36* 4.89* 3.48*   < > 4.59*   < > 3.15*   CALCIUM mg/dL 8.7 9.1 8.7   < > 8.2*   < > 7.3*   BILIRUBIN mg/dL  --  0.3  --   --  0.9  --  0.7   ALK PHOS U/L  --  88  --   --  88  --  91   ALT (SGPT) U/L  --  21  --   --  16  --  21   AST (SGOT) U/L  --  23  --   --  15  --  25   GLUCOSE mg/dL 134* 118* 122*   < > 144*   < > 174*   WBC 10*3/mm3  --  9.76 7.91  --  10.27   < >  --    HEMOGLOBIN g/dL  --  11.3* 11.9*  --  9.8*   < >  --    PLATELETS 10*3/mm3  --  249 238  --  259   < >  --    PROCALCITONIN ng/mL  --   --   --   --   --   --  24.82*    < > = values in this interval not displayed.       Lab Results   Component Value Date    CALCIUM 8.7 09/01/2020    PHOS 5.3 (H) 09/01/2020                    Results Review:    I have reviewed the relevant laboratory  results and independently reviewed the chest imaging from this hospitalization including the available echocardiogram reports personally and summarized it if/ when appropriate below    Assessment    Acute severe metabolic acidosis  Acute lactic acidosis -while on metformin  Multifocal pneumonia  Septic shock  Acute on chronic renal failure 3  Acute metabolic encephalopathy; resolved  Abnormal cranial lesions -awaiting further evaluation  COPD not in exacerbation  Anemia  Renal mass -2.3 cm in the right kidney  Incidental renal stones and abnormal right renal collecting system  Medical noncompliance  Diabetes    PLAN:  Currently on dialysis with right IJ catheter and awaiting tunneled catheter placement if there is no clinical recovery.  Appreciate nephrology input and electrolyte abnormalities have improved now  Antibiotics for pneumonia and septic shock noted.  Oncology input appreciated  Urology consultation and need for outpatient follow-up noted  Noted recommendation to not restart metformin due to recurrent lactic acidosis    DC once cleared by all consultants.    I have discussed my findings and recommendations with patient.     Mike Donohue MD  9/1/2020

## 2020-09-01 NOTE — PROGRESS NOTES
Continued Stay Note  [facility]     Patient Name: Jayne Beltran  MRN: [id number]  Today's Date: 9/1/2020    Admit Date: 8/24/2020    Discharge Plan     Row Name 09/01/20 1513       Plan    Plan  Home with [facility]- following for outpt HD setup    Patient/Family in Agreement with Plan  yes    Plan Comments  CCP spoke with [facility] manager [phone], she recieved referral and updated her hd is not confirmed as of yet, pending Cr level. She states Dr. Huan Beaulieu is Medical Director out there, CCP explained will continue to follow for outpt HD setup. [facility]by rivers/ccp        Discharge Codes    No documentation.             Amirah Shirley, RN

## 2020-09-01 NOTE — PROGRESS NOTES
LOS: 8 days   Patient Care Team:  Michael Arriaza Jr., DO as PCP - General (Family Medicine)  Gilles Villa DPM as PCP - Claims Attributed  Gilles Villa DPM as Consulting Physician (Podiatry)  Jen Hays MD as Consulting Physician (Obstetrics and Gynecology)  Michael Arriaza Jr., DO as Referring Physician (Family Medicine)  Glynn Melara MD as Consulting Physician (Hematology and Oncology)    Chief Complaint: Acute renal injury, on hemodialysis.  Abnormal CT scan examination with right kidney mass and lytic lesion in the skull.    Interval History:  Patient is stable no changes.  Continue hemodialysis.  Serum protein was negative for monoclonal proteins by YVETTE and SPEP as reported on 9/1/2020.      A comprehensive 14 point review of systems was performed and was negative except as mentioned.    Vital Signs:  Temp:  [98.2 °F (36.8 °C)-98.8 °F (37.1 °C)] 98.2 °F (36.8 °C)  Heart Rate:  [80-91] 84  Resp:  [18-20] 20  BP: (139-149)/(84-92) 139/85    Intake/Output Summary (Last 24 hours) at 9/1/2020 1722  Last data filed at 9/1/2020 1700  Gross per 24 hour   Intake 720 ml   Output 700 ml   Net 20 ml       Physical Exam:   General Appearance:    No acute distress, alert and oriented x4.  Right neck dialysis catheter in place.   Lungs:     Clear to auscultation bilaterally     Heart:    Regular rhythm and normal rate.  No murmurs, gallops, or       rubs.   Abdomen:     Soft, non-tender, non-distended.    Extremities:   Moves all extremities well.  No clubbing, cyanosis, or edema.     Results Review:   Results from last 7 days   Lab Units 08/31/20  0624   WBC 10*3/mm3 9.76   HEMOGLOBIN g/dL 11.3*   HEMATOCRIT % 34.1   PLATELETS 10*3/mm3 249     Lab Results   Component Value Date    NEUTROABS 5.80 08/31/2020     Results from last 7 days   Lab Units 09/01/20  0652   SODIUM mmol/L 137   POTASSIUM mmol/L 3.9   CHLORIDE mmol/L 101   CO2 mmol/L 25.1   BUN mg/dL 22   CREATININE mg/dL 3.36*    GLUCOSE mg/dL 134*   CALCIUM mg/dL 8.7     Lab Results   Component Value Date    OROZXYTD06 >2,000 (H) 08/31/2020     Lab Results   Component Value Date    IRON 82 08/31/2020    TIBC 416 08/31/2020    FERRITIN 172.00 (H) 08/31/2020   iron sats 20%     Lab Results   Component Value Date    FOLATE 6.03 08/31/2020                 Results from last 7 days   Lab Units 08/29/20  0440   MAGNESIUM mg/dL 2.2           Component      Latest Ref Rng & Units 8/31/2020   IgG      586 - 1602 mg/dL 602   IgA      87 - 352 mg/dL 165   IgM      26 - 217 mg/dL 109   Total Protein      6.0 - 8.5 g/dL 5.8 (L)   Albumin      2.9 - 4.4 g/dL 3.0   Alpha-1-Globulin      0.0 - 0.4 g/dL 0.3   Alpha-2-Globulin      0.4 - 1.0 g/dL 1.0   Beta Globulin      0.7 - 1.3 g/dL 0.9   Gamma Globulin      0.4 - 1.8 g/dL 0.6   M-Daniel      Not Observed g/dL Not Observed   Globulin      2.2 - 3.9 g/dL 2.8   A/G Ratio      0.7 - 1.7 1.1   Immunofixation Reflex, Serum       Comment   Please note       Comment   Kappa FLC      3.3 - 19.4 mg/L 98.5 (H)   Free Lambda Light Chains      5.7 - 26.3 mg/L 47.3 (H)   Kappa/Lambda Ratio      0.26 - 1.65 2.08 (H)       I reviewed the patient's new clinical results.          ASSESSMENT:   1. Acute renal failure, started on hemodialysis on 08/24/2020. Renal function has much improved but still elevated. Patient has been followed by Nephrology service.     2. Right kidney mass. Discussed with the patient, I recommended the patient to be evaluated by a urologist. Patient reports she was seen by a urologist previously multiple times about 4 years ago due to multiple episodes of kidney stones. She was seen by Dr. Cohen per records. I will consult Dr. Cohen for evaluation. Based on CT scan images at least this is stable compared to 03/02/2020.   · Patient was seen by urology service Dr. Mikie Mejia on 9/1/2020.  There is no acute issues.  Patient will follow up with  with him as outpatient in a couple weeks.    3.  Lucent lesions in the skull. I discussed with the patient I obtained laboratory study for further evaluation.   · Laboratory study on 8/31/2020 reported negative for monoclonal protein in the serum, so was not SPEP.  She also had a normal serum IgG IgA and IgM, however she had elevated free kappa chain 98.5 mg/L, elevated free lambda chain 47.3 and a kappa/lambda ratio 2.08.  · Discussed with the patient on 9/1/2020, I recommended to have 24 urine for paraprotein studies.  Occasionally serum immunofixation could be negative white 24 urine study can have positive monoclonal proteins.    4. Anemia, mild. Laboratory study in 04/2020 was clearly iron deficient and she was started on oral iron treatment.   · Obtained laboratory study on 8/31/2020 which reported ferritin 172 and iron saturation 20% with supratherapeutic vitamin B12 level and low normal folate.   · I think the patient could benefit from oral folic acid treatment.  Especially in the setting of starting hemodialysis.    · She currently does not qualify for erythropoietin.      Plan:  1. Start folic acid 1 mg oral daily.  2. Collecting 24-hour urine for paraprotein studies.  3. Continue hemodialysis and followed by nephrology service.  4. Patient will follow up with the urologist Dr. Mikie Mejia as outpatient.  5. We will arrange patient come back to see Dr. Melara in about 1 month.    Discussed with the patient at the bedside.  I also spoke with nursing staff.    We will sign off.  Please call if having questions.      Walter Yost MD PhD  09/01/20  17:22

## 2020-09-01 NOTE — PLAN OF CARE
Problem: Fall Risk (Adult)  Goal: Absence of Fall  Outcome: Ongoing (interventions implemented as appropriate)  Flowsheets (Taken 8/26/2020 1844 by Bertin Alex RN)  Absence of Fall: making progress toward outcome     Problem: Skin Injury Risk (Adult)  Goal: Skin Health and Integrity  Outcome: Ongoing (interventions implemented as appropriate)     Problem: Patient Care Overview  Goal: Plan of Care Review  Outcome: Ongoing (interventions implemented as appropriate)  Goal: Individualization and Mutuality  Outcome: Ongoing (interventions implemented as appropriate)  Goal: Discharge Needs Assessment  Outcome: Ongoing (interventions implemented as appropriate)  Goal: Interprofessional Rounds/Family Conf  Outcome: Ongoing (interventions implemented as appropriate)     Problem: Renal Failure/Kidney Injury, Acute (Adult)  Goal: Signs and Symptoms of Listed Potential Problems Will be Absent, Minimized or Managed (Renal Failure/Kidney Injury, Acute)  Outcome: Ongoing (interventions implemented as appropriate)     Problem: Sepsis/Septic Shock (Adult)  Goal: Signs and Symptoms of Listed Potential Problems Will be Absent, Minimized or Managed (Sepsis/Septic Shock)  Outcome: Ongoing (interventions implemented as appropriate)     Problem: Hemodialysis (Adult)  Goal: Signs and Symptoms of Listed Potential Problems Will be Absent, Minimized or Managed (Hemodialysis)  Outcome: Ongoing (interventions implemented as appropriate)

## 2020-09-01 NOTE — PLAN OF CARE
Problem: Patient Care Overview  Goal: Plan of Care Review  Outcome: Ongoing (interventions implemented as appropriate)  Flowsheets (Taken 9/1/2020 0917)  Progress: improving  Plan of Care Reviewed With: patient  Outcome Summary: Pt tolerated treatment well this date. Increased gait distance to 350ft w/ SBA only. Performed a few standing exercises and balance activities, in which pt did very well. Some difficulty noted during tandem stance w/ her L foot in front, stating that her L knee usually has more pain d/t arthritis. Encouraged pt to ambulate w/ nsg in hallways. PT will sign off at this time, all goals met. Patient was wearing a face mask during this therapy encounter. Therapist used appropriate personal protective equipment including eye protection, mask, and gloves.  Mask used was standard procedure mask. Appropriate PPE was worn during the entire therapy session. Hand hygiene was completed before and after therapy session. Patient is not in enhanced droplet precautions.

## 2020-09-01 NOTE — CONSULTS
[facility]      Patient Identification:  NAME:  Jayne Beltran  Age:  65 y.o.   Sex:  female   :  1955   MRN:  [id number]       Chief complaint/Reason for consult: Renal mass    History of present illness:  65 y.o. female admitted to the hospital for metabolic acidosis and GERI on CKD requiring dialysis.  CT scan showed a 2 cm right renal lesion stable from previous March CT scan.  It also showed right renal calculi and a partially duplicated system.  Patient denies flank pain this admission and prior to admission.  She reports a history of stones in the distant past.  She denies hematuria and states her urine output has increased today.      Past medical history:  Past Medical History:   Diagnosis Date   • COPD (chronic obstructive pulmonary disease) (CMS/HCC)    • Diabetes mellitus (CMS/HCC)     type 2   • Fibroid    • Hypertension    • Leukocytosis    • Neuromuscular disorder (CMS/HCC)    • Skin cancer     nose   • TIA (transient ischemic attack)        Past surgical history:  Past Surgical History:   Procedure Laterality Date   • CHOLECYSTECTOMY     • COLONOSCOPY     • KIDNEY STONE SURGERY         Allergies:  Patient has no known allergies.    Home medications:  Medications Prior to Admission   Medication Sig Dispense Refill Last Dose   • colestipol (COLESTID) 1 g tablet Take 1 tablet by mouth 2 (Two) Times a Day. 60 tablet 5    • DULoxetine (CYMBALTA) 60 MG capsule Take 60 mg by mouth Daily.      • Empagliflozin 10 MG tablet Take 10 mg by mouth Daily. 90 tablet 1 Taking   • furosemide (Lasix) 20 MG tablet Take 1 tablet by mouth Daily. 90 tablet 1    • hydrocortisone (ANUSOL-HC) 2.5 % rectal cream Insert  into the rectum 2 (Two) Times a Day. 30 g 5 Taking   • lisinopril (PRINIVIL,ZESTRIL) 10 MG tablet Take 10 mg by mouth Daily.      • meloxicam (MOBIC) 15 MG tablet TAKE 1 TABLET BY MOUTH DAILY AS NEEDED FOR MODERATE PAIN 90 tablet 0 Taking   • metoprolol succinate XL (TOPROL-XL) 200 MG  24 hr tablet Take 1 tablet by mouth Daily. 90 tablet 1    • omeprazole (priLOSEC) 20 MG capsule Take 1 capsule by mouth Daily. 90 capsule 1    • potassium chloride (K-DUR,KLOR-CON) 10 MEQ CR tablet Take 1 tablet by mouth Daily. 30 tablet 5 Taking   • rOPINIRole (REQUIP) 1 MG tablet Take 3 mg by mouth At Night As Needed.      • terbinafine (lamiSIL) 250 MG tablet Take 250 mg by mouth Daily.   Taking   • Alcohol Swabs (B-D SINGLE USE SWABS REGULAR) pads 1 each 3 (Three) Times a Day Before Meals. 200 each 11    • amLODIPine (NORVASC) 5 MG tablet Take 5 mg by mouth Daily.      • aspirin 81 MG EC tablet Take 81 mg by mouth Daily.   Taking   • Blood Glucose Calibration (ACCU-CHEK LUIS ALBERTO) solution 1 each by In Vitro route 3 (Three) Times a Day Before Meals. 5 each 5    • Blood Glucose Monitoring Suppl (ACCU-CHEK LUIS ALBERTO PLUS) w/Device kit 1 each 3 (Three) Times a Day Before Meals. 1 kit 2    • Cyanocobalamin (VITAMIN B 12) 500 MCG tablet Take 500 mcg by mouth Daily.   Taking   • diphenhydrAMINE (BENADRYL ALLERGY) 25 MG tablet Take 0.5 tablets by mouth Every 6 (Six) Hours As Needed for Itching or Sleep. 30 tablet 1 Taking   • glucose blood (Accu-Chek Luis Alberto Plus) test strip 1 each by Other route 3 (Three) Times a Day Before Meals. Use as instructed 200 each 11    • Lancets (ACCU-CHEK MULTICLIX) lancets 1 each by Other route 3 (Three) Times a Day Before Meals. Use as instructed 200 each 11    • Lancets Misc. (ACCU-CHEK MULTICLIX LANCET DEV) kit 1 each 3 (Three) Times a Day Before Meals. 200 each 11    • metFORMIN (Glucophage) 1000 MG tablet Take 1 tablet by mouth 2 (Two) Times a Day With Meals. 180 tablet 1    • pregabalin (Lyrica) 150 MG capsule Take 150 mg by mouth 3 (Three) Times a Day.           Hospital medications:    aspirin 81 mg Oral Daily   DULoxetine 30 mg Oral Daily   heparin (porcine) 5,000 Units Subcutaneous Q12H   insulin lispro 0-9 Units Subcutaneous TID AC   metoprolol succinate  mg Oral Daily   nicotine 1  patch Transdermal Q24H   pantoprazole 40 mg Oral QAM   pregabalin 150 mg Oral TID   rOPINIRole 3 mg Oral Nightly   sodium chloride 10 mL Intravenous Q12H        bisacodyl  •  bisacodyl  •  dextrose  •  dextrose  •  dextrose  •  dextrose  •  glucagon (human recombinant)  •  glucagon (human recombinant)  •  heparin (porcine)  •  ipratropium-albuterol  •  ondansetron **OR** ondansetron  •  oxyCODONE-acetaminophen  •  sodium chloride    Family history:  Family History   Problem Relation Age of Onset   • Heart disease Mother          at age 63   • Diabetes Mother    • Hypertension Mother    • Diabetes Father    • Deep vein thrombosis Father    • Depression Father    • Liver disease Father    • Lung cancer Father 58         at age 68   • Breast cancer Maternal Grandmother         ? age dx   • Dementia Maternal Grandmother    • Hypertension Maternal Grandmother    • Ovarian cancer Daughter 23   • Diabetes Daughter    • Depression Daughter    • Diabetes Sister    • Diabetes Brother    • Heart disease Brother    • Cancer Brother          at age 43   • Heart disease Maternal Uncle    • Cancer Paternal Uncle    • Clotting disorder Paternal Grandmother    • Colon cancer Neg Hx    • Colon polyps Neg Hx        Social history:  Social History     Tobacco Use   • Smoking status: Current Every Day Smoker     Packs/day: 1.50     Types: Cigarettes   • Smokeless tobacco: Never Used   Substance Use Topics   • Alcohol use: No   • Drug use: No       REVIEW OF SYSTEMS:  Constitutional - Negative for fevers, chills, letthargy  Eyes/Ears/Nose/Mouth/Throat - Negative for changes in vision or hearing  Cardiovascular - Negative for chest pain, dysrhythmia  Respiratory - Negative for dyspnea or cough  Gastrointestinal - Negative for nausea or vomiting  Genitourinary - Negative for dysuria or hematuria  Hematologic/Lymphatic - Negative for bruising or edema  Skin - Negative for erythema or rash  Psych - Negative      Objective:  TMax 24 hours:   Temp (24hrs), Av.3 °F (36.8 °C), Min:97.9 °F (36.6 °C), Max:98.8 °F (37.1 °C)      Vitals Ranges:   Temp:  [97.9 °F (36.6 °C)-98.8 °F (37.1 °C)] 98.8 °F (37.1 °C)  Heart Rate:  [78-91] 83  Resp:  [18] 18  BP: (149-173)/(77-94) 149/92    Intake/Output Last 3 shifts:  I/O last 3 completed shifts:  In: 1235 [P.O.:1235]  Out: 1500 [Urine:1500]     Physical Exam:    General Appearance:    Alert, cooperative, NAD   HEENT:    No trauma, pupils reactive, hearing intact   Back:     No CVA tenderness, no masses   Lungs:     Respirations unlabored, no wheezing    Heart:    No cyanosis, No significant edema   Abdomen:     Soft, NDNT, no masses, no guarding   :    No suprapubic distention or tenderness to palpation    Extremities:   No edema, no deformity   Lymphatic:   No neck or groin LAD   Skin:   No bleeding, bruising or rashes   Neuro/Psych:   Orientation intact, mood/affect pleasant, no focal findings       Results review:   I reviewed the patient's new clinical results.    Data review:  Lab Results (last 24 hours)     Procedure Component Value Units Date/Time    POC Glucose Once [[id number]]  (Abnormal) Collected:  20    Specimen:  Blood Updated:  20     Glucose 263 mg/dL     POC Glucose Once [[id number]]  (Normal) Collected:  20 1104    Specimen:  Blood Updated:  20 1106     Glucose 119 mg/dL     Vitamin B12 [[id number]]  (Abnormal) Collected:  20    Specimen:  Blood Updated:  20     Vitamin B-12 >2,000 pg/mL     Narrative:       Results may be falsely increased if patient taking Biotin.      Folate [[id number]]  (Normal) Collected:  20    Specimen:  Blood Updated:  20     Folate 6.03 ng/mL     Narrative:       Results may be falsely increased if patient taking Biotin.      Phosphorus [[id number]]  (Abnormal) Collected:  20    Specimen:  Blood Updated:  20     Phosphorus 6.1 mg/dL      Comprehensive Metabolic Panel [[id number]]  (Abnormal) Collected:  08/31/20 0624    Specimen:  Blood Updated:  08/31/20 0847     Glucose 118 mg/dL      BUN 34 mg/dL      Creatinine 4.89 mg/dL      Sodium 137 mmol/L      Potassium 4.0 mmol/L      Chloride 99 mmol/L      CO2 23.6 mmol/L      Calcium 9.1 mg/dL      Total Protein 6.2 g/dL      Albumin 3.40 g/dL      ALT (SGPT) 21 U/L      AST (SGOT) 23 U/L      Alkaline Phosphatase 88 U/L      Total Bilirubin 0.3 mg/dL      eGFR Non African Amer 9 mL/min/1.73      Comment: <15 Indicative of kidney failure.        eGFR   Amer --     Comment: <15 Indicative of kidney failure.        Globulin 2.8 gm/dL      A/G Ratio 1.2 g/dL      BUN/Creatinine Ratio 7.0     Anion Gap 14.4 mmol/L     Narrative:       GFR Normal >60  Chronic Kidney Disease <60  Kidney Failure <15      Iron Profile [[id number]]  (Normal) Collected:  08/31/20 0624    Specimen:  Blood Updated:  08/31/20 0842     Iron 82 mcg/dL      Iron Saturation 20 %      Transferrin 279 mg/dL      TIBC 416 mcg/dL     Lactate Dehydrogenase [[id number]]  (Abnormal) Collected:  08/31/20 0624    Specimen:  Blood Updated:  08/31/20 0842      U/L     Ferritin [[id number]]  (Abnormal) Collected:  08/31/20 0624    Specimen:  Blood Updated:  08/31/20 0837     Ferritin 172.00 ng/mL     Narrative:       Results may be falsely decreased if patient taking Biotin.      YVETTE + PE [[id number]] Collected:  08/31/20 0624    Specimen:  Blood Updated:  08/31/20 0830    CBC & Differential [[id number]] Collected:  08/31/20 0624    Specimen:  Blood Updated:  08/31/20 0801    Narrative:       The following orders were created for panel order CBC & Differential.  Procedure                               Abnormality         Status                     ---------                               -----------         ------                     CBC Auto Differential[[id number]]        Abnormal            Final result                 Please view results  for these tests on the individual orders.    CBC Auto Differential [[id number]]  (Abnormal) Collected:  08/31/20 0624    Specimen:  Blood Updated:  08/31/20 0801     WBC 9.76 10*3/mm3      RBC 3.98 10*6/mm3      Hemoglobin 11.3 g/dL      Hematocrit 34.1 %      MCV 85.7 fL      MCH 28.4 pg      MCHC 33.1 g/dL      RDW 16.1 %      RDW-SD 49.6 fl      MPV 11.6 fL      Platelets 249 10*3/mm3      Neutrophil % 59.5 %      Lymphocyte % 26.4 %      Monocyte % 9.1 %      Eosinophil % 2.0 %      Basophil % 0.8 %      Immature Grans % 2.2 %      Neutrophils, Absolute 5.80 10*3/mm3      Lymphocytes, Absolute 2.58 10*3/mm3      Monocytes, Absolute 0.89 10*3/mm3      Eosinophils, Absolute 0.20 10*3/mm3      Basophils, Absolute 0.08 10*3/mm3      Immature Grans, Absolute 0.21 10*3/mm3      nRBC 0.0 /100 WBC     Immunoglobulin Free LT Chains Blood [[id number]] Collected:  08/31/20 0624    Specimen:  Blood Updated:  08/31/20 0752    POC Glucose Once [[id number]]  (Normal) Collected:  08/31/20 0552    Specimen:  Blood Updated:  08/31/20 0554     Glucose 119 mg/dL            Imaging:  Imaging Results (Last 24 Hours)     ** No results found for the last 24 hours. 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  Will follow clinically        Antonio MATOS  MD [facility]  [address], [address]  [place] [address]  [phone]  08/31/20  23:46

## 2020-09-01 NOTE — THERAPY TREATMENT NOTE
Patient Name: Jayne Beltran  : 1955    MRN: [id number]                              Today's Date: 2020       Admit Date: 2020    Visit Dx:     ICD-10-CM ICD-9-CM   1. Syncope and collapse R55 780.2   2. Acute renal failure, unspecified acute renal failure type (CMS/HCC) N17.9 584.9   3. Hyperkalemia E87.5 276.7     Patient Active Problem List   Diagnosis   • Essential hypertension   • Snoring   • TIA (transient ischemic attack)   • GERD without esophagitis   • Inflamed seborrheic keratosis   • Type 2 diabetes mellitus without complication, without long-term current use of insulin (CMS/HCC)   • Peripheral neuropathy   • Restless legs syndrome   • Benign paroxysmal positional vertigo   • Bone lesion   • Vitamin D deficiency   • Dyslipidemia   • Muscle spasm of both lower legs   • Tobacco use disorder   • Abnormal lung sounds   • Intermittent claudication (CMS/HCC)   • Chronic respiratory failure with hypoxia (CMS/HCC)   • Leg mass, right   • Rib pain on left side   • Diabetic autonomic neuropathy associated with type 2 diabetes mellitus (CMS/HCC)   • Leukocytosis   • Primary insomnia   • PMB (postmenopausal bleeding)   • Family history of ovarian cancer   • Acute renal failure (ARF) (CMS/HCC)   • Duplicated renal collecting system   • Atherosclerotic vascular disease   • Elevated platelet count   • Low hemoglobin   • Other anomalies of uterus   • Multiple kidney stones   • Multiple kidney stones   • Generalized edema   • Bilateral lower extremity edema   • Eyelid edema   • Diarrhea   • Sore on leg   • Syncope and collapse     Past Medical History:   Diagnosis Date   • COPD (chronic obstructive pulmonary disease) (CMS/HCC)    • Diabetes mellitus (CMS/HCC)     type 2   • Fibroid    • Hypertension    • Leukocytosis    • Neuromuscular disorder (CMS/HCC)    • Skin cancer     nose   • TIA (transient ischemic attack)      Past Surgical History:   Procedure Laterality Date   • CHOLECYSTECTOMY     •  COLONOSCOPY     • KIDNEY STONE SURGERY       General Information     Row Name 09/01/20 0950          PT Evaluation Time/Intention    Document Type  therapy note (daily note)  -     Mode of Treatment  physical therapy  -     Row Name 09/01/20 0950          General Information    Existing Precautions/Restrictions  fall  -     Row Name 09/01/20 0950          Cognitive Assessment/Intervention- PT/OT    Orientation Status (Cognition)  oriented x 4  -       User Key  (r) = Recorded By, (t) = Taken By, (c) = Cosigned By    Initials Name Provider Type     Ana Laura Dalal PTA Physical Therapy Assistant        Mobility     Row Name 09/01/20 0950          Bed Mobility Assessment/Treatment    Bed Mobility Assessment/Treatment  supine-sit  -     Supine-Sit [place] (Bed Mobility)  conditional independence  -     Assistive Device (Bed Mobility)  bed rails;head of bed elevated  -[facility] Name 09/01/20 0950          Sit-Stand Transfer    Sit-Stand [place] (Transfers)  supervision  -[facility] Name 09/01/20 0950          Gait/Stairs Assessment/Training    [place] Level (Gait)  stand by assist  -     Distance in Feet (Gait)  350  -     Pattern (Gait)  step-through  -     Deviations/Abnormal Patterns (Gait)  jorge decreased;stride length decreased  -       User Key  (r) = Recorded By, (t) = Taken By, (c) = Cosigned By    Initials Name Provider Type     Ana Laura Dalal PTA Physical Therapy Assistant        Obj/Interventions     Row Name 09/01/20 0951          Therapeutic Exercise    Comment (Therapeutic Exercise)  performed side stepping 15ft to each side; also completed 10 full squats w/out UE support; SBA-CGA provided  -     Row Name 09/01/20 0951          Static Standing Balance    Level of [place] (Supported Standing, Static Balance)  contact guard assist;minimal assist, 75% patient effort  -     Time Able to Maintain Position (Supported Standing, Static Balance)  15 to  30 seconds  -     Comment (Supported Standing, Static Balance)  tandem stance for 30 seconds 2x; more difficulty when having L foot in front  -     Row Name 09/01/20 0951          Dynamic Standing Balance    Level of [place], Reaches Outside Midline (Standing, Dynamic Balance)  standby assist;contact guard assist  -SM     Comment, Reaches Outside Midline (Standing, Dynamic Balance)  performed backwards walking 15ft x2, increasing speed on 2nd trial; no LOBs  -       User Key  (r) = Recorded By, (t) = Taken By, (c) = Cosigned By    Initials Name Provider Type    Ana Laura Sands PTA Physical Therapy Assistant        Goals/Plan    No documentation.       Clinical Impression     Row Name 09/01/20 0957          Pain Assessment    Additional Documentation  Pain Scale: Numbers Pre/Post-Treatment (Group)  -     Row Name 09/01/20 0957          Pain Scale: Numbers Pre/Post-Treatment    Pain Scale: Numbers, Pretreatment  0/10 - no pain  -SM     Pain Scale: Numbers, Post-Treatment  0/10 - no pain  -SM     Row Name 09/01/20 0957          Positioning and Restraints    Pre-Treatment Position  in bed  -SM     Post Treatment Position  bed  -SM     In Bed  sitting EOB;call light within reach;encouraged to call for assist  -SM       User Key  (r) = Recorded By, (t) = Taken By, (c) = Cosigned By    Initials Name Provider Type    Ana Laura Sands PTA Physical Therapy Assistant        Outcome Measures     Row Name 09/01/20 0958          How much help from another person do you currently need...    Turning from your back to your side while in flat bed without using bedrails?  4  -SM     Moving from lying on back to sitting on the side of a flat bed without bedrails?  4  -SM     Moving to and from a bed to a chair (including a wheelchair)?  4  -SM     Standing up from a chair using your arms (e.g., wheelchair, bedside chair)?  4  -SM     Climbing 3-5 steps with a railing?  3  -SM     To walk in hospital room?  4   -[facility] 6 Clicks Score (PT)  23  -     Row Name 09/01/20 0958          Functional Assessment    Outcome Measure Options  -[facility] 6 Clicks Basic Mobility (PT)  -       User Key  (r) = Recorded By, (t) = Taken By, (c) = Cosigned By    Initials Name Provider Type    LISETTE Dalal Ana Laura Nai, REAGAN Physical Therapy Assistant        Physical Therapy Education                 Title: PT OT SLP Therapies (Done)     Topic: Physical Therapy (Done)     Point: Mobility training (Done)     Description:   Instruct learner(s) on safety and technique for assisting patient out of bed, chair or wheelchair.  Instruct in the proper use of assistive devices, such as walker, crutches, cane or brace.              Patient Friendly Description:   It's important to get you on your feet again, but we need to do so in a way that is safe for you. Falling has serious consequences, and your personal safety is the most important thing of all.        When it's time to get out of bed, one of us or a family member will sit next to you on the bed to give you support.     If your doctor or nurse tells you to use a walker, crutches, a cane, or a brace, be sure you use it every time you get out of bed, even if you think you don't need it.    Learning Progress Summary           Patient Acceptance, E,TB,D, VU by  at 9/1/2020 0959    Acceptance, E,TB,D, VU,DU,NR by  at 9/1/2020 0032    Acceptance, E,TB,D, VU by  at 8/31/2020 0939    Acceptance, E, VU by  at 8/30/2020 1326    Comment:  care of feet with decrease sensation and neuropathy.  Fall prevention and home safety discussed    Acceptance, E,TB,D, VU by  at 8/28/2020 0905    Acceptance, E,TB,D, VU,DU,NR by  at 8/26/2020 2345    Acceptance, E,D, NR by  at 8/26/2020 1535                   Point: Home exercise program (Done)     Description:   Instruct learner(s) on appropriate technique for monitoring, assisting and/or progressing patient with therapeutic exercises and activities.               Learning Progress Summary           Patient Acceptance, E,TB,D, VU by  at 9/1/2020 0959    Acceptance, E,TB,D, VU,DU,NR by  at 9/1/2020 0032    Acceptance, E,TB,D, VU by  at 8/31/2020 0939    Acceptance, E, VU by  at 8/30/2020 1326    Comment:  care of feet with decrease sensation and neuropathy.  Fall prevention and home safety discussed    Acceptance, E,TB,D, VU by  at 8/28/2020 0905    Acceptance, E,TB,D, VU,DU,NR by  at 8/26/2020 2345    Acceptance, E,D, NR by  at 8/26/2020 1535                   Point: Body mechanics (Done)     Description:   Instruct learner(s) on proper positioning and spine alignment for patient and/or caregiver during mobility tasks and/or exercises.              Learning Progress Summary           Patient Acceptance, E,TB,D, VU by  at 9/1/2020 0959    Acceptance, E,TB,D, VU,DU,NR by  at 9/1/2020 0032    Acceptance, E,TB,D, VU by  at 8/31/2020 0939    Acceptance, E, VU by  at 8/30/2020 1326    Comment:  care of feet with decrease sensation and neuropathy.  Fall prevention and home safety discussed    Acceptance, E,TB,D, VU by  at 8/28/2020 0905    Acceptance, E,TB,D, VU,DU,NR by  at 8/26/2020 2345    Acceptance, E,D, NR by  at 8/26/2020 1535                   Point: Precautions (Done)     Description:   Instruct learner(s) on prescribed precautions during mobility and gait tasks              Learning Progress Summary           Patient Acceptance, E,TB,D, VU by  at 9/1/2020 0959    Acceptance, E,TB,D, VU,DU,NR by  at 9/1/2020 0032    Acceptance, E,TB,D, VU by  at 8/31/2020 0939    Acceptance, E, VU by  at 8/30/2020 1326    Comment:  care of feet with decrease sensation and neuropathy.  Fall prevention and home safety discussed    Acceptance, E,TB,D, VU by  at 8/28/2020 0905    Acceptance, E,TB,D, VU,DU,NR by  at 8/26/2020 2345    Acceptance, E,D, NR by PC at 8/26/2020 1535                               User Key     Initials Effective Dates  Name Provider Type Discipline    SI 05/18/15 -  Lo Suarez PTA Physical Therapy Assistant PT     04/03/18 -  Moraima Blanchard PT Physical Therapist PT     04/06/17 -  Merle Granados, HOSEA Registered Nurse Nurse     03/07/18 -  Ana Laura Dalal PTA Physical Therapy Assistant PT              PT Recommendation and Plan     Outcome Summary/Treatment Plan (PT)  Anticipated Discharge Disposition (PT): home with assist  Plan of Care Reviewed With: patient  Progress: improving  Outcome Summary: Pt tolerated treatment well this date. Increased gait distance to 350ft w/ SBA only. Performed a few standing exercises and balance activities, in which pt did very well. Some difficulty noted during tandem stance w/ her L foot in front, stating that her L knee usually has more pain d/t arthritis. Encouraged pt to ambulate w/ nsg in hallways. PT will sign off at this time, all goals met. Patient was wearing a face mask during this therapy encounter. Therapist used appropriate personal protective equipment including eye protection, mask, and gloves.  Mask used was standard procedure mask. Appropriate PPE was worn during the entire therapy session. Hand hygiene was completed before and after therapy session. Patient is not in enhanced droplet precautions.     Time Calculation:   PT Charges     Row Name 09/01/20 1003             Time Calculation    Start Time  0908  -      Stop Time  0936  -      Time Calculation (min)  28 min  -      PT Received On  09/01/20  -        User Key  (r) = Recorded By, (t) = Taken By, (c) = Cosigned By    Initials Name Provider Type     Ana Laura Dalal PTA Physical Therapy Assistant        Therapy Charges for Today     Code Description Service Date Service Provider Modifiers Qty    36971754876 HC PT THER PROC EA 15 MIN 8/31/2020 Ana Laura Dalal PTA GP 1    69043413174 HC PT THER PROC EA 15 MIN 9/1/2020 Ana Laura Dalal PTA GP 1    28091641185 HC PT THERAPEUTIC ACT EA 15  MIN 9/1/2020 Ana Laura Dalal, PTA GP 1          PT G-Codes  Outcome Measure Options: AM-PAC 6 Clicks Basic Mobility (PT)  AM-PAC 6 Clicks Score (PT): 23    Ana Laura Dalal, PTA  9/1/2020

## 2020-09-02 ENCOUNTER — TELEPHONE (OUTPATIENT)
Dept: ONCOLOGY | Facility: CLINIC | Age: 65
End: 2020-09-02

## 2020-09-02 ENCOUNTER — APPOINTMENT (OUTPATIENT)
Dept: CARDIOLOGY | Facility: HOSPITAL | Age: 65
End: 2020-09-02

## 2020-09-02 PROBLEM — N17.9 ACUTE RENAL FAILURE (HCC): Status: ACTIVE | Noted: 2020-08-24

## 2020-09-02 LAB
ALBUMIN SERPL-MCNC: 3.4 G/DL (ref 3.5–5.2)
ANION GAP SERPL CALCULATED.3IONS-SCNC: 12 MMOL/L (ref 5–15)
BH CV VAS MEAS BASILIC ANTECUBITAL FOSSA LEFT: 0.42 CM
BH CV VAS MEAS BASILIC ANTECUBITAL FOSSA RIGHT: 0.32 CM
BH CV VAS MEAS BASILIC FOREARM LEFT - DIST: 0.27 CM
BH CV VAS MEAS BASILIC FOREARM LEFT - MID: 0.3 CM
BH CV VAS MEAS BASILIC FOREARM LEFT - PROX: 0.35 CM
BH CV VAS MEAS BASILIC FOREARM RIGHT - DIST: 0.21 CM
BH CV VAS MEAS BASILIC FOREARM RIGHT - MID: 0.15 CM
BH CV VAS MEAS BASILIC FOREARM RIGHT - PROX: 0.23 CM
BH CV VAS MEAS BASILIC UPPER ARM LEFT - DIST: 0.44 CM
BH CV VAS MEAS BASILIC UPPER ARM LEFT - MID: 0.6 CM
BH CV VAS MEAS BASILIC UPPER ARM LEFT - PROX: 0.55 CM
BH CV VAS MEAS BASILIC UPPER ARM RIGHT - DIST: 0.53 CM
BH CV VAS MEAS BASILIC UPPER ARM RIGHT - MID: 0.57 CM
BH CV VAS MEAS CEPHALIC ANTECUBITAL FOSSA LEFT: 0.24 CM
BH CV VAS MEAS CEPHALIC ANTECUBITAL FOSSA RIGHT: 0.4 CM
BH CV VAS MEAS CEPHALIC FOREARM LEFT - DIST: 0.14 CM
BH CV VAS MEAS CEPHALIC FOREARM LEFT - MID: 0.2 CM
BH CV VAS MEAS CEPHALIC FOREARM LEFT - PROX: 0.29 CM
BH CV VAS MEAS CEPHALIC FOREARM RIGHT - DIST: 0.2 CM
BH CV VAS MEAS CEPHALIC FOREARM RIGHT - MID: 0.22 CM
BH CV VAS MEAS CEPHALIC FOREARM RIGHT - PROX: 0.18 CM
BH CV VAS MEAS CEPHALIC UPPER ARM LEFT - DIST: 0.25 CM
BH CV VAS MEAS CEPHALIC UPPER ARM LEFT - MID: 0.19 CM
BH CV VAS MEAS CEPHALIC UPPER ARM LEFT - PROX: 0.18 CM
BH CV VAS MEAS CEPHALIC UPPER ARM RIGHT - DIST: 0.21 CM
BH CV VAS MEAS CEPHALIC UPPER ARM RIGHT - MID: 0.18 CM
BH CV VAS MEAS CEPHALIC UPPER ARM RIGHT - PROX: 0.28 CM
BH CV VAS MEAS RADIAL UPPER ARM LEFT - DIST: 0.26 CM
BH CV VAS MEAS RADIAL UPPER ARM LEFT - MID: 0.25 CM
BH CV VAS MEAS RADIAL UPPER ARM LEFT - PROX: 0.33 CM
BH CV VAS MEAS RADIAL UPPER ARM RIGHT - DIST: 0.24 CM
BH CV VAS MEAS RADIAL UPPER ARM RIGHT - MID: 0.24 CM
BH CV VAS MEAS RADIAL UPPER ARM RIGHT - PROX: 0.24 CM
BUN SERPL-MCNC: 31 MG/DL (ref 8–23)
BUN/CREAT SERPL: 6.9 (ref 7–25)
CALCIUM SPEC-SCNC: 8.6 MG/DL (ref 8.6–10.5)
CHLORIDE SERPL-SCNC: 100 MMOL/L (ref 98–107)
CO2 SERPL-SCNC: 23 MMOL/L (ref 22–29)
CREAT SERPL-MCNC: 4.47 MG/DL (ref 0.57–1)
GFR SERPL CREATININE-BSD FRML MDRD: 10 ML/MIN/1.73
GFR SERPL CREATININE-BSD FRML MDRD: ABNORMAL ML/MIN/{1.73_M2}
GLUCOSE BLDC GLUCOMTR-MCNC: 134 MG/DL (ref 70–130)
GLUCOSE BLDC GLUCOMTR-MCNC: 150 MG/DL (ref 70–130)
GLUCOSE BLDC GLUCOMTR-MCNC: 188 MG/DL (ref 70–130)
GLUCOSE BLDC GLUCOMTR-MCNC: 192 MG/DL (ref 70–130)
GLUCOSE SERPL-MCNC: 129 MG/DL (ref 65–99)
PHOSPHATE SERPL-MCNC: 6.3 MG/DL (ref 2.5–4.5)
POTASSIUM SERPL-SCNC: 4 MMOL/L (ref 3.5–5.2)
SODIUM SERPL-SCNC: 135 MMOL/L (ref 136–145)
UPPER ARTERIAL LEFT ARM BRACHIAL LENGTH: 0.43 CM
UPPER ARTERIAL RIGHT ARM BRACHIAL LENGTH: 0.45 CM

## 2020-09-02 PROCEDURE — 84156 ASSAY OF PROTEIN URINE: CPT | Performed by: INTERNAL MEDICINE

## 2020-09-02 PROCEDURE — 84166 PROTEIN E-PHORESIS/URINE/CSF: CPT | Performed by: INTERNAL MEDICINE

## 2020-09-02 PROCEDURE — 86335 IMMUNFIX E-PHORSIS/URINE/CSF: CPT | Performed by: INTERNAL MEDICINE

## 2020-09-02 PROCEDURE — 25010000002 HEPARIN (PORCINE) PER 1000 UNITS: Performed by: INTERNAL MEDICINE

## 2020-09-02 PROCEDURE — 83883 ASSAY NEPHELOMETRY NOT SPEC: CPT | Performed by: INTERNAL MEDICINE

## 2020-09-02 PROCEDURE — 63710000001 INSULIN LISPRO (HUMAN) PER 5 UNITS: Performed by: INTERNAL MEDICINE

## 2020-09-02 PROCEDURE — 80069 RENAL FUNCTION PANEL: CPT | Performed by: INTERNAL MEDICINE

## 2020-09-02 PROCEDURE — 81050 URINALYSIS VOLUME MEASURE: CPT | Performed by: INTERNAL MEDICINE

## 2020-09-02 PROCEDURE — 93985 DUP-SCAN HEMO COMPL BI STD: CPT

## 2020-09-02 PROCEDURE — 82962 GLUCOSE BLOOD TEST: CPT

## 2020-09-02 RX ORDER — OXYCODONE HYDROCHLORIDE AND ACETAMINOPHEN 5; 325 MG/1; MG/1
1 TABLET ORAL EVERY 8 HOURS PRN
Status: DISCONTINUED | OUTPATIENT
Start: 2020-09-02 | End: 2020-09-04 | Stop reason: HOSPADM

## 2020-09-02 RX ADMIN — FOLIC ACID 1 MG: 1 TABLET ORAL at 08:16

## 2020-09-02 RX ADMIN — DULOXETINE HYDROCHLORIDE 30 MG: 30 CAPSULE, DELAYED RELEASE ORAL at 08:16

## 2020-09-02 RX ADMIN — INSULIN LISPRO 2 UNITS: 100 INJECTION, SOLUTION INTRAVENOUS; SUBCUTANEOUS at 08:18

## 2020-09-02 RX ADMIN — NICOTINE 1 PATCH: 21 PATCH, EXTENDED RELEASE TRANSDERMAL at 08:15

## 2020-09-02 RX ADMIN — PREGABALIN 150 MG: 75 CAPSULE ORAL at 21:00

## 2020-09-02 RX ADMIN — ASPIRIN 81 MG: 81 TABLET, COATED ORAL at 08:16

## 2020-09-02 RX ADMIN — TORSEMIDE 40 MG: 20 TABLET ORAL at 08:16

## 2020-09-02 RX ADMIN — PREGABALIN 150 MG: 75 CAPSULE ORAL at 16:43

## 2020-09-02 RX ADMIN — HEPARIN SODIUM 3100 UNITS: 1000 INJECTION, SOLUTION INTRAVENOUS; SUBCUTANEOUS at 10:00

## 2020-09-02 RX ADMIN — SODIUM CHLORIDE, PRESERVATIVE FREE 10 ML: 5 INJECTION INTRAVENOUS at 08:20

## 2020-09-02 RX ADMIN — SODIUM CHLORIDE, PRESERVATIVE FREE 10 ML: 5 INJECTION INTRAVENOUS at 21:01

## 2020-09-02 RX ADMIN — ROPINIROLE 3 MG: 2 TABLET, FILM COATED ORAL at 21:00

## 2020-09-02 RX ADMIN — METOPROLOL SUCCINATE 200 MG: 100 TABLET, EXTENDED RELEASE ORAL at 08:16

## 2020-09-02 RX ADMIN — OXYCODONE HYDROCHLORIDE AND ACETAMINOPHEN 1 TABLET: 5; 325 TABLET ORAL at 21:00

## 2020-09-02 RX ADMIN — PREGABALIN 150 MG: 75 CAPSULE ORAL at 08:17

## 2020-09-02 RX ADMIN — INSULIN LISPRO 2 UNITS: 100 INJECTION, SOLUTION INTRAVENOUS; SUBCUTANEOUS at 16:43

## 2020-09-02 RX ADMIN — HEPARIN SODIUM 5000 UNITS: 5000 INJECTION INTRAVENOUS; SUBCUTANEOUS at 08:20

## 2020-09-02 RX ADMIN — PANTOPRAZOLE SODIUM 40 MG: 40 TABLET, DELAYED RELEASE ORAL at 08:16

## 2020-09-02 RX ADMIN — HEPARIN SODIUM 5000 UNITS: 5000 INJECTION INTRAVENOUS; SUBCUTANEOUS at 21:00

## 2020-09-02 NOTE — PROGRESS NOTES
Continued Stay Note  [facility]     Patient Name: Jayne Beltran  MRN: [id number]  Today's Date: 9/2/2020    Admit Date: 8/24/2020    Discharge Plan     Row Name 09/02/20 1626       Plan    Plan  Home with [facility] HH and Outpt HD setup at [facility] AT 6:40AM    Patient/Family in Agreement with Plan  yes    Plan Comments  Reviewed clinicals, pt will need HD setup, TDC placement tomorrow. [facility] called Page/[facility] HD clinic manager [phone] with update. She states clinic days are MWF and she has 1st shift(640AM) and 2nd shift (12:20)available. CCP called pt and she wishes to go to 6:40am time slot. CCP updated Page, tenative start date will be Monday Sept 7 at 6am, to sign papers for her 6:40am start time. CCP to update her on friday to confirm anticipated dc . maximino rivers/ccp        Discharge Codes    No documentation.       Expected Discharge Date and Time     Expected Discharge Date Expected Discharge Time    Sep 4, 2020             Amirah Shirley RN

## 2020-09-02 NOTE — PROGRESS NOTES
Mike Donohue MD                          [phone]      Patient ID:    Name:  Jayne Beltran    MRN:  [id number]    1955   65 y.o.  female            Patient Care Team:  Michael Arriaza Jr., DO as PCP - General (Family Medicine)  Gilles Villa DPM as PCP - Claims Attributed  Gilles Villa DPM as Consulting Physician (Podiatry)  Jen Hays MD as Consulting Physician (Obstetrics and Gynecology)  Michael Arriaza Jr., DO as Referring Physician (Family Medicine)  Glynn Melara MD as Consulting Physician (Hematology and Oncology)    CC/ Reason for visit: Acute renal failure, severe acidosis, pneumonia, shock    Subjective: Pt seen and examined this AM. No acute overnight events noted.  Renal function has worsened and the plan is to continue with dialysis and get tunneled dialysis catheter per nephrology.  Vascular surgery has been consulted.  Patient understands the importance to follow  recommendations    ROS: Denies any subjective fevers, syncope or presyncopal events, new neurological deficits, nausea or vomiting currently    Objective     Vital Signs past 24hrs    BP range: BP: (134-151)/(82-93) 144/93  Pulse range: Heart Rate:  [80-87] 82  Resp rate range: Resp:  [16-20] 16  Temp range: Temp (24hrs), Av.2 °F (36.8 °C), Min:98 °F (36.7 °C), Max:98.3 °F (36.8 °C)      Ventilator/Non-Invasive Ventilation Settings (From admission, onward)    None          Device (Oxygen Therapy): room air       69.5 kg (153 lb 3.5 oz); Body mass index is 24.74 kg/m².      Intake/Output Summary (Last 24 hours) at 2020 1641  Last data filed at 2020 1045  Gross per 24 hour   Intake 480 ml   Output 1250 ml   Net -770 ml       PHYSICAL EXAM   Constitutional: Middle aged pt in bed, No acute respiratory distress, no accessory muscle use  Head: - NCAT  Eyes: No pallor.  Anicteric sclerae, EOMI.  ENMT:  Mallampati 4, no oral thrush. Dry MM.   NECK:  Trachea midline, No thyromegaly, no palpable cervical lymphadenopathy  Heart: RRR, no murmur. No pedal edema   Lungs: LI +, No wheezes/ crackles heard    Abdomen: Soft. No tenderness, guarding or rigidity. No palpable masses  Extremities: Extremities warm and well perfused. No cyanosis/ clubbing  Neuro: Conscious, answers appropriately, no gross focal neuro deficits  Psych: Mood and affect appropriate    Scheduled meds:      aspirin 81 mg Oral Daily   DULoxetine 30 mg Oral Daily   folic acid 1 mg Oral Daily   heparin (porcine) 5,000 Units Subcutaneous Q12H   insulin lispro 0-9 Units Subcutaneous TID AC   metoprolol succinate  mg Oral Daily   nicotine 1 patch Transdermal Q24H   pantoprazole 40 mg Oral QAM   pregabalin 150 mg Oral TID   rOPINIRole 3 mg Oral Nightly   sodium chloride 10 mL Intravenous Q12H   torsemide 40 mg Oral Daily       IV meds:                           Data Review:      Results from last 7 days   Lab Units 09/02/20  0440 09/01/20  0652 08/31/20  0624 08/30/20  0615  08/27/20  0412   SODIUM mmol/L 135* 137 137 137   < > 139   POTASSIUM mmol/L 4.0 3.9 4.0 3.8   < > 2.8*   CHLORIDE mmol/L 100 101 99 101   < > 98   CO2 mmol/L 23.0 25.1 23.6 24.2   < > 28.0   BUN mg/dL 31* 22 34* 21   < > 33*   CREATININE mg/dL 4.47* 3.36* 4.89* 3.48*   < > 4.59*   CALCIUM mg/dL 8.6 8.7 9.1 8.7   < > 8.2*   BILIRUBIN mg/dL  --   --  0.3  --   --  0.9   ALK PHOS U/L  --   --  88  --   --  88   ALT (SGPT) U/L  --   --  21  --   --  16   AST (SGOT) U/L  --   --  23  --   --  15   GLUCOSE mg/dL 129* 134* 118* 122*   < > 144*   WBC 10*3/mm3  --   --  9.76 7.91  --  10.27   HEMOGLOBIN g/dL  --   --  11.3* 11.9*  --  9.8*   PLATELETS 10*3/mm3  --   --  249 238  --  259    < > = values in this interval not displayed.       Lab Results   Component Value Date    CALCIUM 8.6 09/02/2020    PHOS 6.3 (H) 09/02/2020                    Results Review:    I have reviewed the relevant laboratory results and independently  reviewed the chest imaging from this hospitalization including the available echocardiogram reports personally and summarized it if/ when appropriate below    Assessment    Acute severe metabolic acidosis  Acute lactic acidosis -while on metformin  Multifocal pneumonia  Septic shock  Acute on chronic renal failure 3  Acute metabolic encephalopathy; resolved  Abnormal cranial lesions -awaiting further evaluation  COPD not in exacerbation  Anemia  Renal mass -2.3 cm in the right kidney  Incidental renal stones and abnormal right renal collecting system  Medical noncompliance  Diabetes    PLAN:  Currently on dialysis with right IJ catheter and awaiting tunneled catheter placement now. Appreciate nephrology input   Antibiotics for pneumonia and septic shock noted.  Oncology input appreciated  Urology consultation and need for outpatient follow-up noted  Noted recommendation to not restart metformin due to recurrent lactic acidosis    DC once cleared by all consultants.    I have discussed my findings and recommendations with patient.     Mike Donohue MD  9/2/2020

## 2020-09-02 NOTE — CONSULTS
Name: Jayne Beltran ADMIT: 2020   : 1955  PCP: Michael Arriaza Jr., DO    MRN: [id number] LOS: 9 days   AGE/SEX: 65 y.o. female  ROOM: [address]      Patient Care Team:  Michael Arriaza Jr., DO as PCP - General (Family Medicine)  Gilles Villa DPM as PCP - Claims Attributed  Gilles Villa DPM as Consulting Physician (Podiatry)  Jen Hays MD as Consulting Physician (Obstetrics and Gynecology)  Michael Arriaza Jr., DO as Referring Physician (Family Medicine)  Glynn Melara MD as Consulting Physician (Hematology and Oncology)  Chief Complaint   Patient presents with   • Syncope     multiple x since saturday      CC: Hemodialysis access evaluation.    Subjective     Inpatient Vascular Surgery Consult  Consult performed by: Sergio Durant MD  Consult ordered by: Paloma Clifton MD  Reason for consult: Hemodialysis access evaluation.        Patient is a nice 65-year-old lady referred to me by Dr. Paloma Clifton with nephrology.  Patient has a right sided dialysis catheter that was placed back on 2020.  This is not functioning on dialysis today.    Review of Systems   All other systems reviewed and are negative.      Past Medical History:   Diagnosis Date   • COPD (chronic obstructive pulmonary disease) (CMS/HCC)    • Diabetes mellitus (CMS/HCC)     type 2   • Fibroid    • Hypertension    • Leukocytosis    • Neuromuscular disorder (CMS/HCC)    • Skin cancer     nose   • TIA (transient ischemic attack)      Past Surgical History:   Procedure Laterality Date   • CHOLECYSTECTOMY     • COLONOSCOPY     • KIDNEY STONE SURGERY       Family History   Problem Relation Age of Onset   • Heart disease Mother          at age 63   • Diabetes Mother    • Hypertension Mother    • Diabetes Father    • Deep vein thrombosis Father    • Depression Father    • Liver disease Father    • Lung cancer Father 58         at age 68   • Breast cancer Maternal  Grandmother         ? age dx   • Dementia Maternal Grandmother    • Hypertension Maternal Grandmother    • Ovarian cancer Daughter 23   • Diabetes Daughter    • Depression Daughter    • Diabetes Sister    • Diabetes Brother    • Heart disease Brother    • Cancer Brother          at age 43   • Heart disease Maternal Uncle    • Cancer Paternal Uncle    • Clotting disorder Paternal Grandmother    • Colon cancer Neg Hx    • Colon polyps Neg Hx      Social History     Tobacco Use   • Smoking status: Current Every Day Smoker     Packs/day: 1.50     Types: Cigarettes   • Smokeless tobacco: Never Used   Substance Use Topics   • Alcohol use: No   • Drug use: No     Medications Prior to Admission   Medication Sig Dispense Refill Last Dose   • colestipol (COLESTID) 1 g tablet Take 1 tablet by mouth 2 (Two) Times a Day. 60 tablet 5    • DULoxetine (CYMBALTA) 60 MG capsule Take 60 mg by mouth Daily.      • Empagliflozin 10 MG tablet Take 10 mg by mouth Daily. 90 tablet 1 Taking   • furosemide (Lasix) 20 MG tablet Take 1 tablet by mouth Daily. 90 tablet 1    • hydrocortisone (ANUSOL-HC) 2.5 % rectal cream Insert  into the rectum 2 (Two) Times a Day. 30 g 5 Taking   • lisinopril (PRINIVIL,ZESTRIL) 10 MG tablet Take 10 mg by mouth Daily.      • meloxicam (MOBIC) 15 MG tablet TAKE 1 TABLET BY MOUTH DAILY AS NEEDED FOR MODERATE PAIN 90 tablet 0 Taking   • metoprolol succinate XL (TOPROL-XL) 200 MG 24 hr tablet Take 1 tablet by mouth Daily. 90 tablet 1    • omeprazole (priLOSEC) 20 MG capsule Take 1 capsule by mouth Daily. 90 capsule 1    • potassium chloride (K-DUR,KLOR-CON) 10 MEQ CR tablet Take 1 tablet by mouth Daily. 30 tablet 5 Taking   • rOPINIRole (REQUIP) 1 MG tablet Take 3 mg by mouth At Night As Needed.      • terbinafine (lamiSIL) 250 MG tablet Take 250 mg by mouth Daily.   Taking   • Alcohol Swabs (B-D SINGLE USE SWABS REGULAR) pads 1 each 3 (Three) Times a Day Before Meals. 200 each 11    • amLODIPine (NORVASC) 5  MG tablet Take 5 mg by mouth Daily.      • aspirin 81 MG EC tablet Take 81 mg by mouth Daily.   Taking   • Blood Glucose Calibration (ACCU-CHEK LUIS ALBERTO) solution 1 each by In Vitro route 3 (Three) Times a Day Before Meals. 5 each 5    • Blood Glucose Monitoring Suppl (ACCU-CHEK LUIS ALBERTO PLUS) w/Device kit 1 each 3 (Three) Times a Day Before Meals. 1 kit 2    • Cyanocobalamin (VITAMIN B 12) 500 MCG tablet Take 500 mcg by mouth Daily.   Taking   • diphenhydrAMINE (BENADRYL ALLERGY) 25 MG tablet Take 0.5 tablets by mouth Every 6 (Six) Hours As Needed for Itching or Sleep. 30 tablet 1 Taking   • glucose blood (Accu-Chek Luis Alberto Plus) test strip 1 each by Other route 3 (Three) Times a Day Before Meals. Use as instructed 200 each 11    • Lancets (ACCU-CHEK MULTICLIX) lancets 1 each by Other route 3 (Three) Times a Day Before Meals. Use as instructed 200 each 11    • Lancets Misc. (ACCU-CHEK MULTICLIX LANCET DEV) kit 1 each 3 (Three) Times a Day Before Meals. 200 each 11    • metFORMIN (Glucophage) 1000 MG tablet Take 1 tablet by mouth 2 (Two) Times a Day With Meals. 180 tablet 1    • pregabalin (Lyrica) 150 MG capsule Take 150 mg by mouth 3 (Three) Times a Day.          aspirin 81 mg Oral Daily   DULoxetine 30 mg Oral Daily   folic acid 1 mg Oral Daily   heparin (porcine) 5,000 Units Subcutaneous Q12H   insulin lispro 0-9 Units Subcutaneous TID AC   metoprolol succinate  mg Oral Daily   nicotine 1 patch Transdermal Q24H   pantoprazole 40 mg Oral QAM   pregabalin 150 mg Oral TID   rOPINIRole 3 mg Oral Nightly   sodium chloride 10 mL Intravenous Q12H   torsemide 40 mg Oral Daily        bisacodyl  •  bisacodyl  •  dextrose  •  dextrose  •  glucagon (human recombinant)  •  heparin (porcine)  •  ipratropium-albuterol  •  ondansetron **OR** ondansetron  •  oxyCODONE-acetaminophen  •  sodium chloride  Patient has no known allergies.    Objective     Physical Exam:  Physical Exam   Constitutional: She is oriented to person, place,  "and time. She appears well-developed and well-nourished.   HENT:   Head: Normocephalic and atraumatic.   Eyes: Pupils are equal, round, and reactive to light. EOM are normal.   Neck: Normal range of motion. Neck supple.   Cardiovascular: Normal rate and regular rhythm.   Pulmonary/Chest: Effort normal and breath sounds normal.   Abdominal: Soft. Bowel sounds are normal.   Musculoskeletal: Normal range of motion. She exhibits no edema.   Neurological: She is alert and oriented to person, place, and time.   Skin: Skin is warm and dry. Capillary refill takes less than 2 seconds.   Psychiatric: She has a normal mood and affect. Her behavior is normal.   Vitals reviewed.  2+ palpable bilateral radial pulses.    Vital Signs and Labs:  Vital Signs Patient Vitals for the past 24 hrs:   BP Temp Temp src Pulse Resp SpO2 Height Weight   09/02/20 0850 -- -- -- -- -- -- 167.6 cm (65.98\") 69.5 kg (153 lb 3.5 oz)   09/02/20 0706 148/90 98.2 °F (36.8 °C) Oral 81 18 95 % -- --   09/02/20 0500 -- -- -- -- -- -- -- 69.5 kg (153 lb 3.5 oz)   09/01/20 2300 151/92 98.2 °F (36.8 °C) Oral 87 18 95 % -- --   09/01/20 1900 134/82 98.3 °F (36.8 °C) Oral 80 20 94 % -- --   09/01/20 1314 139/85 98.2 °F (36.8 °C) Oral 84 20 95 % -- --     I/O:  I/O last 3 completed shifts:  In: 720 [P.O.:720]  Out: 1750 [Urine:1750]    CBC    Results from last 7 days   Lab Units 08/31/20  0624 08/30/20  0615 08/27/20  0412   WBC 10*3/mm3 9.76 7.91 10.27   HEMOGLOBIN g/dL 11.3* 11.9* 9.8*   PLATELETS 10*3/mm3 249 238 259     BMP   Results from last 7 days   Lab Units 09/02/20  0440 09/01/20  0652 08/31/20  0624 08/30/20  0615 08/29/20  0440 08/28/20  0453 08/27/20  0412   SODIUM mmol/L 135* 137 137 137 136 138 139   POTASSIUM mmol/L 4.0 3.9 4.0 3.8 3.6 3.0* 2.8*   CHLORIDE mmol/L 100 101 99 101 99 100 98   CO2 mmol/L 23.0 25.1 23.6 24.2 23.4 26.5 28.0   BUN mg/dL 31* 22 34* 21 31* 21 33*   CREATININE mg/dL 4.47* 3.36* 4.89* 3.48* 4.82* 3.01* 4.59*   GLUCOSE mg/dL " 129* 134* 118* 122* 154* 116* 144*   MAGNESIUM mg/dL  --   --   --   --  2.2  --  2.1   PHOSPHORUS mg/dL 6.3* 5.3* 6.1* 4.5 5.2* 3.2 3.4     Cr Clearance Estimated Creatinine Clearance: 13.6 mL/min (A) (by C-G formula based on SCr of 4.47 mg/dL (H)).  Coag     HbA1C   Lab Results   Component Value Date    HGBA1C 6.20 (H) 03/11/2020    HGBA1C 6.60 (H) 02/11/2020    HGBA1C 6.18 (H) 06/18/2019     Blood Glucose   Glucose   Date/Time Value Ref Range Status   09/02/2020 0632 150 (H) 70 - 130 mg/dL Final   09/01/2020 2107 178 (H) 70 - 130 mg/dL Final   09/01/2020 1632 127 70 - 130 mg/dL Final   09/01/2020 1046 188 (H) 70 - 130 mg/dL Final   09/01/2020 0600 133 (H) 70 - 130 mg/dL Final   08/31/2020 2132 263 (H) 70 - 130 mg/dL Final   08/31/2020 1104 119 70 - 130 mg/dL Final   08/31/2020 0552 119 70 - 130 mg/dL Final     Infection     CMP   Results from last 7 days   Lab Units 09/02/20  0440 09/01/20  0652 08/31/20  0624 08/30/20  0615 08/29/20  0440 08/28/20  0453 08/27/20  0412   SODIUM mmol/L 135* 137 137 137 136 138 139   POTASSIUM mmol/L 4.0 3.9 4.0 3.8 3.6 3.0* 2.8*   CHLORIDE mmol/L 100 101 99 101 99 100 98   CO2 mmol/L 23.0 25.1 23.6 24.2 23.4 26.5 28.0   BUN mg/dL 31* 22 34* 21 31* 21 33*   CREATININE mg/dL 4.47* 3.36* 4.89* 3.48* 4.82* 3.01* 4.59*   GLUCOSE mg/dL 129* 134* 118* 122* 154* 116* 144*   ALBUMIN g/dL 3.40* 3.20* 3.40*  3.0 3.10* 3.20* 2.80* 3.00*   BILIRUBIN mg/dL  --   --  0.3  --   --   --  0.9   ALK PHOS U/L  --   --  88  --   --   --  88   AST (SGOT) U/L  --   --  23  --   --   --  15   ALT (SGPT) U/L  --   --  21  --   --   --  16     ABG      UA      BIANCA  No results found for: POCMETH, POCAMPHET, POCBARBITUR, POCBENZO, POCCOCAINE, POCOPIATES, POCOXYCODO, POCPHENCYC, POCPROPOXY, POCTHC, POCTRICYC  Radiology(recent) No radiology results for the last day    Active Hospital Problems    Diagnosis  POA   • **Acute renal failure (CMS/HCC) [N17.9]  Unknown   • Syncope and collapse [R55]  Yes       Resolved Hospital Problems   No resolved problems to display.     Problem Points:  4:  Patient has a new problem, with additional work-up planned  Total problem points:4 or more    Data Points:  1:  I have reviewed or order clinical lab test  1:  I have reviewed or order radiology test (except heart catheterization or echo)  Total data points:2    Risk Points:  High:  Chronic illness with severe exacerbation or progression    MDM requires 2/3 (Problem points, Data points and Risk)  MDM Prob point Data point Risk   SF 1 1 Minimal   Low 2 2 Low   Mod 3 3 Moderate   High 4 4 High     Code requires 3/3 (MDM, History and Exam)  Code MDM History Exam Time   28811 SF/Low Detailed Detailed 30   59956 Mod Comprehensive Comprehensive 50   75845 High Comprehensive Comprehensive 70     Detailed history:  4 elements HPI or status of 3 chronic problems; 2-9 system ROS  Comprehensive:  4 elements HPI or status of 3 chronic problems;  10 system ROS    Detailed Exam:  12 findings from any organ system  Comprehensive Exam:  2 findings from each of 9 systems.   18088    Assessment/Plan       Acute renal failure (CMS/HCC)    Syncope and collapse      65 y.o. female patient has a poorly/nonfunctioning right Shiley catheter placed in the jugular vein placed by pulmonology on initial presentation.  This failed to function for hemodialysis today.  I have been asked to evaluate for possible tunneled catheter placement.  I think the patient is appropriate for conversion from a non-tunneled to tunneled catheter placement tomorrow in the operating room.  We will also go ahead and get her upper extremity arms mapped for future knowledge if she remains persistent renal failure.  Also can place her arm in protection if a adequate vein is identified.    Personal protective equipment used for this patient encounter:  Patient wearing surgical mask [x]    Provider wearing a surgical mask [x]    Gloves [x]    Eye protection [x]    Face Shield []     Shira []    N 95 respirator or CAPR/PAPR []   Duration of interaction 10 minutes    I discussed the patients findings and my recommendations with patient and nursing staff.    Sergio Durant MD  09/02/20  10:47    Please call my office with any question: [phone]

## 2020-09-02 NOTE — CONSULTS
"Adult Nutrition  Assessment/PES    Patient Name:  Jayne Beltran  YOB: 1955  MRN: [id number]  Admit Date:  8/24/2020    Assessment Date:  9/2/2020    Comments:  Nutrition consult for Renal diet instruction. Material provided and discussed with patient. Will remain available if questions arise.    Reason for Assessment     Row Name 09/02/20 0850          Reason for Assessment    Reason For Assessment  physician consult     Diagnosis  -- renal failure, HTN, DM, right kidney mass           Anthropometrics     Row Name 09/02/20 0850 09/02/20 0500       Anthropometrics    Height  167.6 cm (65.98\")  --    Weight  69.5 kg (153 lb 3.5 oz) not weighed by RD  69.5 kg (153 lb 3.5 oz)       Ideal Body Weight (IBW)    Ideal Body Weight (IBW) (kg)  59.54  --    % Ideal Body Weight  116.73  --       Body Mass Index (BMI)    BMI (kg/m2)  24.79  --    BMI Assessment  BMI 18.5-24.9: normal  --        Labs/Tests/Procedures/Meds     Row Name 09/02/20 0851          Labs/Procedures/Meds    Lab Results Reviewed  reviewed     Lab Results Comments  na, glu, bun, cr, phos, alb        Diagnostic Tests/Procedures    Diagnostic Test/Procedure Reviewed  reviewed     Diagnostic Test/Procedures Comments  dialysis        Medications    Pertinent Medications Reviewed  reviewed     Pertinent Medications Comments  folic acid, insulin protonix,         Physical Findings     Row Name 09/02/20 0852          Physical Findings    Skin  -- edie 23         Estimated/Assessed Needs     Row Name 09/02/20 0850          Calculation Measurements    Height  167.6 cm (65.98\")         Nutrition Prescription Ordered     Row Name 09/02/20 0852          Nutrition Prescription PO    Current PO Diet  Regular     Common Modifiers  Consistent Carbohydrate                 Problem/Interventions:  Problem 1     Row Name 09/02/20 0853          Nutrition Diagnoses Problem 1    Problem 1  Knowledge Deficit     Etiology (related to)  Medical Diagnosis     "           Intervention Goal     Row Name 09/02/20 0853          Intervention Goal    General  Maintain nutrition;Disease management/therapy     PO  Maintain intake     Weight  Maintain weight         Nutrition Intervention     Row Name 09/02/20 0854          Nutrition Intervention    RD/Tech Action  Follow Tx progress;Care plan reviewd           Education/Evaluation     Row Name 09/02/20 0854          Education    Education  Provided education regarding     Provided education regarding  Diet rationale        Monitor/Evaluation    Monitor  Per protocol           Electronically signed by:  Waleska Henderson RD  09/02/20 08:54

## 2020-09-02 NOTE — PLAN OF CARE
Pulmonology paged for pain medication for head ache, up ad chetna, 24 hour urine started at 1930, hemodialysis ordered for 9/2 depending on Creatinine level, call Nephrology if crt <4 before starting HD, will CTM

## 2020-09-02 NOTE — TELEPHONE ENCOUNTER
----- Message from Walter Yost MD PhD sent at 9/1/2020 11:24 PM EDT -----  Regarding: Appointment  Please arrange patient come back to see Dr. Melara in 3 to 4 weeks.  Check a CBC.  Patient was just started on hemodialysis a week earlier.    Thank you very much!    Priyank

## 2020-09-02 NOTE — PROGRESS NOTES
"   LOS: 9 days    Patient Care Team:  Michael Arriaza Jr., DO as PCP - General (Family Medicine)  Gilles Villa DPM as PCP - Claims Attributed  Gilles Villa DPM as Consulting Physician (Podiatry)  Jen Hays MD as Consulting Physician (Obstetrics and Gynecology)  Michael Arriaza Jr., DO as Referring Physician (Family Medicine)  Glynn Melara MD as Consulting Physician (Hematology and Oncology)    Chief Complaint:    Chief Complaint   Patient presents with   • Syncope     multiple x since saturday      Follow UP GERI  Subjective     Interval History:    On dialysis.  QB only 200 with constant venous alarms . Catheter very positional.  dw patient need for TDC.  She understands and agrees to proceed.     Objective     Vital Signs  Temp:  [98.2 °F (36.8 °C)-98.3 °F (36.8 °C)] 98.2 °F (36.8 °C)  Heart Rate:  [80-87] 81  Resp:  [18-20] 18  BP: (134-151)/(82-92) 148/90    Flowsheet Rows      First Filed Value   Admission Height  167.6 cm (66\") Documented at 08/24/2020 1159   Admission Weight  74.8 kg (165 lb) Documented at 08/24/2020 1217          No intake/output data recorded.  I/O last 3 completed shifts:  In: 720 [P.O.:720]  Out: 1750 [Urine:1750]    Intake/Output Summary (Last 24 hours) at 9/2/2020 0950  Last data filed at 9/2/2020 0500  Gross per 24 hour   Intake 480 ml   Output 1050 ml   Net -570 ml       Physical Exam:  GEN NAD alert, comfortable.  Lying in bed on dialysis.  Venous pressure alarming.   max.  Paient wearing mask   Neck RIJ lexi day 8 , no JVD  Heart RRR no s3 or rub  Lungs clear to auscultation, no wheezing .  Abd soft, + bs, nontender .  Ext no pedal/ankle edema .  1+Radial pulses   Skin no rash     Results Review:    Results from last 7 days   Lab Units 09/02/20  0440 09/01/20  0652 08/31/20  0624  08/27/20  0412   SODIUM mmol/L 135* 137 137   < > 139   POTASSIUM mmol/L 4.0 3.9 4.0   < > 2.8*   CHLORIDE mmol/L 100 101 99   < > 98   CO2 mmol/L 23.0 25.1 23.6 "   < > 28.0   BUN mg/dL 31* 22 34*   < > 33*   CREATININE mg/dL 4.47* 3.36* 4.89*   < > 4.59*   CALCIUM mg/dL 8.6 8.7 9.1   < > 8.2*   BILIRUBIN mg/dL  --   --  0.3  --  0.9   ALK PHOS U/L  --   --  88  --  88   ALT (SGPT) U/L  --   --  21  --  16   AST (SGOT) U/L  --   --  23  --  15   GLUCOSE mg/dL 129* 134* 118*   < > 144*    < > = values in this interval not displayed.       Estimated Creatinine Clearance: 13.6 mL/min (A) (by C-G formula based on SCr of 4.47 mg/dL (H)).    Results from last 7 days   Lab Units 09/02/20  0440 09/01/20  0652 08/31/20  0624  08/29/20  0440  08/27/20  0412   MAGNESIUM mg/dL  --   --   --   --  2.2  --  2.1   PHOSPHORUS mg/dL 6.3* 5.3* 6.1*   < > 5.2*   < > 3.4    < > = values in this interval not displayed.       Results from last 7 days   Lab Units 08/29/20  0440   URIC ACID mg/dL 7.2*       Results from last 7 days   Lab Units 08/31/20  0624 08/30/20  0615 08/27/20  0412   WBC 10*3/mm3 9.76 7.91 10.27   HEMOGLOBIN g/dL 11.3* 11.9* 9.8*   PLATELETS 10*3/mm3 249 238 259               Imaging Results (Last 24 Hours)     ** No results found for the last 24 hours. **          aspirin 81 mg Oral Daily   DULoxetine 30 mg Oral Daily   folic acid 1 mg Oral Daily   heparin (porcine) 5,000 Units Subcutaneous Q12H   insulin lispro 0-9 Units Subcutaneous TID AC   metoprolol succinate  mg Oral Daily   nicotine 1 patch Transdermal Q24H   pantoprazole 40 mg Oral QAM   pregabalin 150 mg Oral TID   rOPINIRole 3 mg Oral Nightly   sodium chloride 10 mL Intravenous Q12H   torsemide 40 mg Oral Daily          Medication Review:   Current Facility-Administered Medications   Medication Dose Route Frequency Provider Last Rate Last Dose   • aspirin EC tablet 81 mg  81 mg Oral Daily Evert Alcantara MD   81 mg at 09/02/20 0816   • bisacodyl (DULCOLAX) EC tablet 5 mg  5 mg Oral Daily PRN Evert Alcantara MD       • bisacodyl (DULCOLAX) suppository 10 mg  10 mg Rectal Daily PRN Evert Alcantara MD       •  dextrose (D50W) 25 g/ 50mL Intravenous Solution 25 g  25 g Intravenous Q15 Min PRN Evert Alcantara MD       • dextrose (GLUTOSE) oral gel 15 g  15 g Oral Q15 Min PRN Evert Alcantara MD       • DULoxetine (CYMBALTA) DR capsule 30 mg  30 mg Oral Daily Evert Alcantara MD   30 mg at 09/02/20 0816   • folic acid (FOLVITE) tablet 1 mg  1 mg Oral Daily Walter Yost MD PhD   1 mg at 09/02/20 0816   • glucagon (human recombinant) (GLUCAGEN DIAGNOSTIC) injection 1 mg  1 mg Subcutaneous Q15 Min PRN Evert Alcantara MD       • heparin (porcine) 5000 UNIT/ML injection 5,000 Units  5,000 Units Subcutaneous Q12H Evert Alcantara MD   5,000 Units at 09/02/20 0820   • heparin (porcine) injection 3,100 Units  3,100 Units Intracatheter PRN Evert Alcantara MD   3,100 Units at 08/31/20 1830   • insulin lispro (humaLOG) injection 0-9 Units  0-9 Units Subcutaneous TID AC Evert Alcantara MD   2 Units at 09/02/20 0818   • ipratropium-albuterol (DUO-NEB) nebulizer solution 3 mL  3 mL Nebulization Q4H PRN Evert Alcantara MD       • metoprolol succinate XL (TOPROL-XL) 24 hr tablet 200 mg  200 mg Oral Daily Evert Alcantara MD   200 mg at 09/02/20 0816   • nicotine (NICODERM CQ) 21 MG/24HR patch 1 patch  1 patch Transdermal Q24H Evert Alcantara MD   1 patch at 09/02/20 0815   • ondansetron (ZOFRAN) tablet 4 mg  4 mg Oral Q6H PRN Evert Alcantara MD        Or   • ondansetron (ZOFRAN) injection 4 mg  4 mg Intravenous Q6H PRN Evert Alcantara MD       • oxyCODONE-acetaminophen (PERCOCET) 5-325 MG per tablet 1 tablet  1 tablet Oral Q8H PRN Olaf Espinoza MD       • pantoprazole (PROTONIX) EC tablet 40 mg  40 mg Oral QAM Evert Alcantara MD   40 mg at 09/02/20 0816   • pregabalin (LYRICA) capsule 150 mg  150 mg Oral TID Evert Alcantara MD   150 mg at 09/02/20 0817   • rOPINIRole (REQUIP) tablet 3 mg  3 mg Oral Nightly Evert Alcantara MD   3 mg at 09/01/20 2035   • sodium chloride 0.9 % flush 10 mL  10 mL Intravenous Q12H Evert Alcantara MD   10  mL at 09/02/20 0820   • sodium chloride 0.9 % flush 10 mL  10 mL Intravenous PRN Evert Alcantara MD       • torsemide (DEMADEX) tablet 40 mg  40 mg Oral Daily Paloma Clifton MD   40 mg at 09/02/20 0816       Assessment/Plan   1.   Non-olig GERI, ATN due to shock.  No  evidence for renal recovery yet, but making over a liter of urine daily .  SCR rises in the absence of HD.  Unable to dialyze today due to inadequate flow on shiley .  CT showed large right sided stones and hyperdense mass.   Dialysis dependent since 8/25.  Will need TDC.  2. Severe metabolic acidemia with lactic acidosis. Multifactorial including metformin, hypotension. Resolved.   3. Septic shock with leukocytosis, now resolved.   Multifocal PNA on CT. Completed Antibiotic .  4. Syncope due to volume depletion and orthostasis .  5. Anemia -Dr. Priyank rueda noted and appreciated. Lytic lesion on skull and right renal mass.  Elevated light chain Kappa/lambda ratio. 24 hour urine for paraprotein studies.  follow up.   6. DM2.  Off metformin and jardiance.  Even if patient's renal function recovers, would NOT resume metformin as this is her second episode of acidemia   7. HTN - BP not optimal on 200 mg Toprol XL .  Torsemide added yesterday.     Plan  1.  Stop dialysis now due to inadequate QB on shiley.  Ask for TDC.  Plan dialysis tomorrow .  Paloma Clifton MD  09/02/20  09:50

## 2020-09-03 ENCOUNTER — APPOINTMENT (OUTPATIENT)
Dept: GENERAL RADIOLOGY | Facility: HOSPITAL | Age: 65
End: 2020-09-03

## 2020-09-03 ENCOUNTER — ANESTHESIA EVENT (OUTPATIENT)
Dept: PERIOP | Facility: HOSPITAL | Age: 65
End: 2020-09-03

## 2020-09-03 ENCOUNTER — ANESTHESIA (OUTPATIENT)
Dept: PERIOP | Facility: HOSPITAL | Age: 65
End: 2020-09-03

## 2020-09-03 LAB
ALBUMIN SERPL-MCNC: 3.2 G/DL (ref 3.5–5.2)
ANION GAP SERPL CALCULATED.3IONS-SCNC: 13.6 MMOL/L (ref 5–15)
BUN SERPL-MCNC: 35 MG/DL (ref 8–23)
BUN/CREAT SERPL: 6.6 (ref 7–25)
CALCIUM SPEC-SCNC: 8.9 MG/DL (ref 8.6–10.5)
CHLORIDE SERPL-SCNC: 99 MMOL/L (ref 98–107)
CO2 SERPL-SCNC: 24.4 MMOL/L (ref 22–29)
CREAT SERPL-MCNC: 5.32 MG/DL (ref 0.57–1)
DEPRECATED RDW RBC AUTO: 49.5 FL (ref 37–54)
ERYTHROCYTE [DISTWIDTH] IN BLOOD BY AUTOMATED COUNT: 15.8 % (ref 12.3–15.4)
GFR SERPL CREATININE-BSD FRML MDRD: 8 ML/MIN/1.73
GFR SERPL CREATININE-BSD FRML MDRD: ABNORMAL ML/MIN/{1.73_M2}
GLUCOSE BLDC GLUCOMTR-MCNC: 109 MG/DL (ref 70–130)
GLUCOSE BLDC GLUCOMTR-MCNC: 114 MG/DL (ref 70–130)
GLUCOSE BLDC GLUCOMTR-MCNC: 157 MG/DL (ref 70–130)
GLUCOSE BLDC GLUCOMTR-MCNC: 251 MG/DL (ref 70–130)
GLUCOSE SERPL-MCNC: 200 MG/DL (ref 65–99)
HCT VFR BLD AUTO: 30.5 % (ref 34–46.6)
HGB BLD-MCNC: 9.9 G/DL (ref 12–15.9)
MCH RBC QN AUTO: 27.9 PG (ref 26.6–33)
MCHC RBC AUTO-ENTMCNC: 32.5 G/DL (ref 31.5–35.7)
MCV RBC AUTO: 85.9 FL (ref 79–97)
PHOSPHATE SERPL-MCNC: 6.3 MG/DL (ref 2.5–4.5)
PLATELET # BLD AUTO: 209 10*3/MM3 (ref 140–450)
PMV BLD AUTO: 11.9 FL (ref 6–12)
POTASSIUM SERPL-SCNC: 4.2 MMOL/L (ref 3.5–5.2)
RBC # BLD AUTO: 3.55 10*6/MM3 (ref 3.77–5.28)
SODIUM SERPL-SCNC: 137 MMOL/L (ref 136–145)
WBC # BLD AUTO: 8.04 10*3/MM3 (ref 3.4–10.8)

## 2020-09-03 PROCEDURE — B548ZZA ULTRASONOGRAPHY OF SUPERIOR VENA CAVA, GUIDANCE: ICD-10-PCS | Performed by: SURGERY

## 2020-09-03 PROCEDURE — 25010000002 HEPARIN (PORCINE) PER 1000 UNITS: Performed by: INTERNAL MEDICINE

## 2020-09-03 PROCEDURE — C1894 INTRO/SHEATH, NON-LASER: HCPCS | Performed by: SURGERY

## 2020-09-03 PROCEDURE — 25010000002 MIDAZOLAM PER 1 MG: Performed by: ANESTHESIOLOGY

## 2020-09-03 PROCEDURE — 76000 FLUOROSCOPY <1 HR PHYS/QHP: CPT

## 2020-09-03 PROCEDURE — 25010000002 HEPARIN (PORCINE) PER 1000 UNITS: Performed by: SURGERY

## 2020-09-03 PROCEDURE — 25010000002 FENTANYL CITRATE (PF) 100 MCG/2ML SOLUTION: Performed by: ANESTHESIOLOGY

## 2020-09-03 PROCEDURE — 82962 GLUCOSE BLOOD TEST: CPT

## 2020-09-03 PROCEDURE — 02HV33Z INSERTION OF INFUSION DEVICE INTO SUPERIOR VENA CAVA, PERCUTANEOUS APPROACH: ICD-10-PCS | Performed by: SURGERY

## 2020-09-03 PROCEDURE — C1750 CATH, HEMODIALYSIS,LONG-TERM: HCPCS | Performed by: SURGERY

## 2020-09-03 PROCEDURE — 85027 COMPLETE CBC AUTOMATED: CPT | Performed by: INTERNAL MEDICINE

## 2020-09-03 PROCEDURE — 25010000003 CEFAZOLIN IN DEXTROSE 2-4 GM/100ML-% SOLUTION: Performed by: SURGERY

## 2020-09-03 PROCEDURE — 25010000002 PROPOFOL 10 MG/ML EMULSION: Performed by: ANESTHESIOLOGY

## 2020-09-03 PROCEDURE — 25010000003 LIDOCAINE 1 % SOLUTION 20 ML VIAL: Performed by: SURGERY

## 2020-09-03 PROCEDURE — 80069 RENAL FUNCTION PANEL: CPT | Performed by: INTERNAL MEDICINE

## 2020-09-03 RX ORDER — DIPHENHYDRAMINE HYDROCHLORIDE 50 MG/ML
12.5 INJECTION INTRAMUSCULAR; INTRAVENOUS
Status: DISCONTINUED | OUTPATIENT
Start: 2020-09-03 | End: 2020-09-03 | Stop reason: HOSPADM

## 2020-09-03 RX ORDER — HYDROMORPHONE HYDROCHLORIDE 1 MG/ML
0.5 INJECTION, SOLUTION INTRAMUSCULAR; INTRAVENOUS; SUBCUTANEOUS
Status: DISCONTINUED | OUTPATIENT
Start: 2020-09-03 | End: 2020-09-03 | Stop reason: HOSPADM

## 2020-09-03 RX ORDER — LIDOCAINE HYDROCHLORIDE 10 MG/ML
0.5 INJECTION, SOLUTION EPIDURAL; INFILTRATION; INTRACAUDAL; PERINEURAL ONCE AS NEEDED
Status: DISCONTINUED | OUTPATIENT
Start: 2020-09-03 | End: 2020-09-03 | Stop reason: HOSPADM

## 2020-09-03 RX ORDER — PROPOFOL 10 MG/ML
VIAL (ML) INTRAVENOUS CONTINUOUS PRN
Status: DISCONTINUED | OUTPATIENT
Start: 2020-09-03 | End: 2020-09-03 | Stop reason: SURG

## 2020-09-03 RX ORDER — NALOXONE HCL 0.4 MG/ML
0.2 VIAL (ML) INJECTION AS NEEDED
Status: DISCONTINUED | OUTPATIENT
Start: 2020-09-03 | End: 2020-09-03 | Stop reason: HOSPADM

## 2020-09-03 RX ORDER — PROPOFOL 10 MG/ML
VIAL (ML) INTRAVENOUS AS NEEDED
Status: DISCONTINUED | OUTPATIENT
Start: 2020-09-03 | End: 2020-09-03 | Stop reason: SURG

## 2020-09-03 RX ORDER — PROMETHAZINE HYDROCHLORIDE 25 MG/1
25 TABLET ORAL ONCE AS NEEDED
Status: DISCONTINUED | OUTPATIENT
Start: 2020-09-03 | End: 2020-09-03 | Stop reason: HOSPADM

## 2020-09-03 RX ORDER — HYDRALAZINE HYDROCHLORIDE 20 MG/ML
5 INJECTION INTRAMUSCULAR; INTRAVENOUS
Status: DISCONTINUED | OUTPATIENT
Start: 2020-09-03 | End: 2020-09-03 | Stop reason: HOSPADM

## 2020-09-03 RX ORDER — SODIUM CHLORIDE 0.9 % (FLUSH) 0.9 %
3-10 SYRINGE (ML) INJECTION AS NEEDED
Status: DISCONTINUED | OUTPATIENT
Start: 2020-09-03 | End: 2020-09-03 | Stop reason: HOSPADM

## 2020-09-03 RX ORDER — ONDANSETRON 2 MG/ML
4 INJECTION INTRAMUSCULAR; INTRAVENOUS ONCE AS NEEDED
Status: DISCONTINUED | OUTPATIENT
Start: 2020-09-03 | End: 2020-09-03 | Stop reason: HOSPADM

## 2020-09-03 RX ORDER — DIPHENHYDRAMINE HCL 25 MG
25 CAPSULE ORAL
Status: DISCONTINUED | OUTPATIENT
Start: 2020-09-03 | End: 2020-09-03 | Stop reason: HOSPADM

## 2020-09-03 RX ORDER — SODIUM CHLORIDE, SODIUM LACTATE, POTASSIUM CHLORIDE, CALCIUM CHLORIDE 600; 310; 30; 20 MG/100ML; MG/100ML; MG/100ML; MG/100ML
9 INJECTION, SOLUTION INTRAVENOUS CONTINUOUS
Status: DISCONTINUED | OUTPATIENT
Start: 2020-09-03 | End: 2020-09-03

## 2020-09-03 RX ORDER — OXYCODONE AND ACETAMINOPHEN 7.5; 325 MG/1; MG/1
1 TABLET ORAL ONCE AS NEEDED
Status: DISCONTINUED | OUTPATIENT
Start: 2020-09-03 | End: 2020-09-03 | Stop reason: HOSPADM

## 2020-09-03 RX ORDER — MIDAZOLAM HYDROCHLORIDE 1 MG/ML
1 INJECTION INTRAMUSCULAR; INTRAVENOUS
Status: COMPLETED | OUTPATIENT
Start: 2020-09-03 | End: 2020-09-03

## 2020-09-03 RX ORDER — FENTANYL CITRATE 50 UG/ML
50 INJECTION, SOLUTION INTRAMUSCULAR; INTRAVENOUS
Status: DISCONTINUED | OUTPATIENT
Start: 2020-09-03 | End: 2020-09-03 | Stop reason: HOSPADM

## 2020-09-03 RX ORDER — SODIUM CHLORIDE 0.9 % (FLUSH) 0.9 %
3 SYRINGE (ML) INJECTION EVERY 12 HOURS SCHEDULED
Status: DISCONTINUED | OUTPATIENT
Start: 2020-09-03 | End: 2020-09-03 | Stop reason: HOSPADM

## 2020-09-03 RX ORDER — CEFAZOLIN SODIUM 2 G/100ML
2 INJECTION, SOLUTION INTRAVENOUS
Status: COMPLETED | OUTPATIENT
Start: 2020-09-04 | End: 2020-09-03

## 2020-09-03 RX ORDER — SODIUM CHLORIDE 9 MG/ML
INJECTION, SOLUTION INTRAVENOUS CONTINUOUS PRN
Status: DISCONTINUED | OUTPATIENT
Start: 2020-09-03 | End: 2020-09-03 | Stop reason: SURG

## 2020-09-03 RX ORDER — FLUMAZENIL 0.1 MG/ML
0.2 INJECTION INTRAVENOUS AS NEEDED
Status: DISCONTINUED | OUTPATIENT
Start: 2020-09-03 | End: 2020-09-03 | Stop reason: HOSPADM

## 2020-09-03 RX ORDER — HYDROCODONE BITARTRATE AND ACETAMINOPHEN 7.5; 325 MG/1; MG/1
1 TABLET ORAL ONCE AS NEEDED
Status: DISCONTINUED | OUTPATIENT
Start: 2020-09-03 | End: 2020-09-03 | Stop reason: HOSPADM

## 2020-09-03 RX ORDER — HEPARIN SODIUM 1000 [USP'U]/ML
INJECTION, SOLUTION INTRAVENOUS; SUBCUTANEOUS AS NEEDED
Status: DISCONTINUED | OUTPATIENT
Start: 2020-09-03 | End: 2020-09-03 | Stop reason: HOSPADM

## 2020-09-03 RX ORDER — PROMETHAZINE HYDROCHLORIDE 25 MG/1
25 SUPPOSITORY RECTAL ONCE AS NEEDED
Status: DISCONTINUED | OUTPATIENT
Start: 2020-09-03 | End: 2020-09-03 | Stop reason: HOSPADM

## 2020-09-03 RX ORDER — LABETALOL HYDROCHLORIDE 5 MG/ML
5 INJECTION, SOLUTION INTRAVENOUS
Status: DISCONTINUED | OUTPATIENT
Start: 2020-09-03 | End: 2020-09-03 | Stop reason: HOSPADM

## 2020-09-03 RX ORDER — EPHEDRINE SULFATE 50 MG/ML
5 INJECTION, SOLUTION INTRAVENOUS ONCE AS NEEDED
Status: DISCONTINUED | OUTPATIENT
Start: 2020-09-03 | End: 2020-09-03 | Stop reason: HOSPADM

## 2020-09-03 RX ADMIN — SODIUM CHLORIDE, PRESERVATIVE FREE 10 ML: 5 INJECTION INTRAVENOUS at 21:00

## 2020-09-03 RX ADMIN — PROPOFOL 15 MG: 10 INJECTION, EMULSION INTRAVENOUS at 12:15

## 2020-09-03 RX ADMIN — FOLIC ACID 1 MG: 1 TABLET ORAL at 09:15

## 2020-09-03 RX ADMIN — SODIUM CHLORIDE, PRESERVATIVE FREE 10 ML: 5 INJECTION INTRAVENOUS at 09:15

## 2020-09-03 RX ADMIN — NICOTINE 1 PATCH: 21 PATCH, EXTENDED RELEASE TRANSDERMAL at 09:16

## 2020-09-03 RX ADMIN — HEPARIN SODIUM 5000 UNITS: 5000 INJECTION INTRAVENOUS; SUBCUTANEOUS at 21:00

## 2020-09-03 RX ADMIN — ROPINIROLE 3 MG: 2 TABLET, FILM COATED ORAL at 21:00

## 2020-09-03 RX ADMIN — MIDAZOLAM 0.5 MG: 1 INJECTION INTRAMUSCULAR; INTRAVENOUS at 11:47

## 2020-09-03 RX ADMIN — CEFAZOLIN SODIUM 2 G: 2 INJECTION, SOLUTION INTRAVENOUS at 11:52

## 2020-09-03 RX ADMIN — DULOXETINE HYDROCHLORIDE 30 MG: 30 CAPSULE, DELAYED RELEASE ORAL at 09:15

## 2020-09-03 RX ADMIN — OXYCODONE HYDROCHLORIDE AND ACETAMINOPHEN 1 TABLET: 5; 325 TABLET ORAL at 21:02

## 2020-09-03 RX ADMIN — SODIUM CHLORIDE: 9 INJECTION, SOLUTION INTRAVENOUS at 11:52

## 2020-09-03 RX ADMIN — ASPIRIN 81 MG: 81 TABLET, COATED ORAL at 09:15

## 2020-09-03 RX ADMIN — FENTANYL CITRATE 50 MCG: 50 INJECTION, SOLUTION INTRAMUSCULAR; INTRAVENOUS at 12:59

## 2020-09-03 RX ADMIN — PANTOPRAZOLE SODIUM 40 MG: 40 TABLET, DELAYED RELEASE ORAL at 09:15

## 2020-09-03 RX ADMIN — PREGABALIN 150 MG: 75 CAPSULE ORAL at 09:15

## 2020-09-03 RX ADMIN — PROPOFOL 100 MCG/KG/MIN: 10 INJECTION, EMULSION INTRAVENOUS at 12:02

## 2020-09-03 RX ADMIN — PREGABALIN 150 MG: 75 CAPSULE ORAL at 21:00

## 2020-09-03 RX ADMIN — METOPROLOL SUCCINATE 200 MG: 100 TABLET, EXTENDED RELEASE ORAL at 09:15

## 2020-09-03 RX ADMIN — HEPARIN SODIUM 5000 UNITS: 5000 INJECTION INTRAVENOUS; SUBCUTANEOUS at 09:14

## 2020-09-03 RX ADMIN — MIDAZOLAM 1 MG: 1 INJECTION INTRAMUSCULAR; INTRAVENOUS at 12:00

## 2020-09-03 RX ADMIN — TORSEMIDE 40 MG: 20 TABLET ORAL at 09:15

## 2020-09-03 RX ADMIN — HEPARIN SODIUM 3100 UNITS: 1000 INJECTION, SOLUTION INTRAVENOUS; SUBCUTANEOUS at 16:29

## 2020-09-03 NOTE — OP NOTE
Date of Admission:  8/24/2020 09/03/20  Sergio Durant MD  [facility]    Preoperative Diagnosis: Acute renal failure    Postoperative Diagnosis: same as above    Procedure Performed:     1)  Glidepath catheter insertion into the Right internal jugular vein  2)  Ultrasound guided vascular access  3)  Radiologic supervision of catheter tip placement    CPT 78723, 97820, 19210    Surgeon: Sergio Durant MD    Assistant:  Kimberlee BRUNSON , Provided critical assistance in exposure, retraction, and suction that overall decrease blood loss and operative time.    Anesthesia: MAC with local    Estimated Blood Loss:  Minimal    Findings:     PatentRight internal jugular vein vein on ultrasound.  Under real time ultrasound visualization needle accessed of the vein was performed.  Ultrasound image of access printed and stored in the medical record.    Both blue and red ports draw and flush without resistance    Successful  placement of a 23 cm Palindrome catheter in the Right internal jugular vein.    Implants:    Nothing was implanted during the procedure    Specimen: none    Complications: none    Dispo: to PACU    Indication for procedure: 65 y.o. female with patient has a poorly/nonfunctioning right jugular Shiley.  She has had failure to resolve her acute renal failure.  I discussed with her the risk, benefits, and alternatives about placing tunneled catheter.  Informed consent was obtained.    Description of procedure:     The patient was taken to the operating room. Vein was interrogated with an ultrasound which appeared to be adequate for catheter placement. Surgical sites were prepped and draped in the usual sterile fashion. A full surgical timeout was performed.  The skin overlying the vein was infiltrated with local. Under ultrasound guidance, the vein was accessed and wire was placed under fluoroscopic guidance into the cava.  11 bladed scalpel was used to make a half centimeter skin neck at the  vascular insertion site.  Local anesthetic was used to anesthetize the tunneling tract of the palindrome catheter.  Half centimeters skin neck was made at the planned exit site of the catheter.  Serial dilation was performed under fluoroscopic guidance to vascular access site with the supplied vascular dilators.  Peel-away sheath and dilator were placed under fluoroscopic guidance over the guidewire.  Palindrome catheter was tunneled from the exit site to the vascular insertion site.  Wire and dilator were removed from the peel-away sheath.  Catheter was placed down into the peel-away sheath into the vena cava.  Catheter was withdrawn until adequate tip placement under fluoroscopic guidance.  Fluoroscopy of the apex of the catheter was performed to ensure no kinking.  Catheter was secured in place with nylon sutures.  Thre vascular access site was closed using Vicryl suture.  Sterile dressings were applied throughout.    At case completion the right internal jugular Shiley catheter was removed.  Direct pressure was applied until adequate hemostasis was obtained.    Sergio Durant MD  09/03/20    Active Hospital Problems    Diagnosis  POA   • **Acute renal failure (CMS/HCC) [N17.9]  Unknown   • Syncope and collapse [R55]  Yes      Resolved Hospital Problems   No resolved problems to display.       .

## 2020-09-03 NOTE — ANESTHESIA POSTPROCEDURE EVALUATION
"Patient: Jayne Beltran    Procedure Summary     Date:  09/03/20 Room / Location:  [facility] OR  / [facility] MAIN OR    Anesthesia Start:  1152 Anesthesia Stop:  1241    Procedure:  HEMODIALYSIS CATHETER INSERTION (N/A ) Diagnosis:       Acute renal failure, unspecified acute renal failure type (CMS/HCC)      (Acute renal failure, unspecified acute renal failure type (CMS/HCC) [N17.9])    Surgeon:  Sergio Durant MD Provider:  Chantel Ferraro MD    Anesthesia Type:  MAC ASA Status:  3          Anesthesia Type: MAC    Vitals  Vitals Value Taken Time   /92 9/3/2020  1:20 PM   Temp 36.9 °C (98.5 °F) 9/3/2020 12:40 PM   Pulse 80 9/3/2020  1:34 PM   Resp 16 9/3/2020  1:00 PM   SpO2 95 % 9/3/2020  1:34 PM   Vitals shown include unvalidated device data.        Post Anesthesia Care and Evaluation    Patient location during evaluation: PACU  Patient participation: complete - patient participated  Level of consciousness: awake and alert  Pain management: adequate  Airway patency: patent  Anesthetic complications: No anesthetic complications    Cardiovascular status: acceptable  Respiratory status: acceptable  Hydration status: acceptable    Comments: /88   Pulse 84   Temp 36.9 °C (98.5 °F) (Oral)   Resp 16   Ht 167.6 cm (65.98\")   Wt 70.3 kg (154 lb 15.7 oz)   LMP  (LMP Unknown)   SpO2 100%   BMI 25.03 kg/m²         "

## 2020-09-03 NOTE — PLAN OF CARE
Vitals stable. Tunnel catheter placed. Dialysis today.    Problem: Fall Risk (Adult)  Goal: Absence of Fall  Outcome: Ongoing (interventions implemented as appropriate)     Problem: Skin Injury Risk (Adult)  Goal: Skin Health and Integrity  Outcome: Ongoing (interventions implemented as appropriate)     Problem: Patient Care Overview  Goal: Plan of Care Review  Outcome: Ongoing (interventions implemented as appropriate)  Goal: Individualization and Mutuality  Outcome: Ongoing (interventions implemented as appropriate)  Goal: Discharge Needs Assessment  Outcome: Ongoing (interventions implemented as appropriate)  Goal: Interprofessional Rounds/Family Conf  Outcome: Ongoing (interventions implemented as appropriate)     Problem: Renal Failure/Kidney Injury, Acute (Adult)  Goal: Signs and Symptoms of Listed Potential Problems Will be Absent, Minimized or Managed (Renal Failure/Kidney Injury, Acute)  Outcome: Ongoing (interventions implemented as appropriate)     Problem: Sepsis/Septic Shock (Adult)  Goal: Signs and Symptoms of Listed Potential Problems Will be Absent, Minimized or Managed (Sepsis/Septic Shock)  Outcome: Ongoing (interventions implemented as appropriate)     Problem: Hemodialysis (Adult)  Goal: Signs and Symptoms of Listed Potential Problems Will be Absent, Minimized or Managed (Hemodialysis)  Outcome: Ongoing (interventions implemented as appropriate)

## 2020-09-03 NOTE — PROGRESS NOTES
Mike Donohue MD                          [phone]      Patient ID:    Name:  Jayne Beltran    MRN:  [id number]    1955   65 y.o.  female            Patient Care Team:  Michael Arriaza Jr., DO as PCP - General (Family Medicine)  Gilles Villa DPM as PCP - Claims Attributed  Gilles Villa DPM as Consulting Physician (Podiatry)  Jen Hays MD as Consulting Physician (Obstetrics and Gynecology)  Michael Arriaza Jr., DO as Referring Physician (Family Medicine)  Glynn Melara MD as Consulting Physician (Hematology and Oncology)    CC/ Reason for visit: Acute renal failure, severe acidosis, pneumonia, shock    Subjective: Pt seen and examined this AM. No acute overnight events noted.  Tunnel dialysis catheter today.  Awaiting repeat labs to plan discharge    ROS: Denies any subjective fevers, syncope or presyncopal events, new neurological deficits, nausea or vomiting currently    Objective     Vital Signs past 24hrs    BP range: BP: (102-166)/() 156/101  Pulse range: Heart Rate:  [72-90] 80  Resp rate range: Resp:  [14-18] 16  Temp range: Temp (24hrs), Av.2 °F (36.8 °C), Min:97.7 °F (36.5 °C), Max:98.5 °F (36.9 °C)      Ventilator/Non-Invasive Ventilation Settings (From admission, onward)    None          Device (Oxygen Therapy): room air       70.3 kg (154 lb 15.7 oz); Body mass index is 25.03 kg/m².      Intake/Output Summary (Last 24 hours) at 9/3/2020 1615  Last data filed at 9/3/2020 1339  Gross per 24 hour   Intake 575 ml   Output 1650 ml   Net -1075 ml       PHYSICAL EXAM   Constitutional: Middle aged pt in bed, No acute respiratory distress, no accessory muscle use  Head: - NCAT  Eyes: No pallor.  Anicteric sclerae, EOMI.  ENMT:  Mallampati 4, no oral thrush. Dry MM.   NECK: Trachea midline, No thyromegaly, no palpable cervical lymphadenopathy  Heart: RRR, no murmur. No pedal edema   Lungs: LI +, No wheezes/ crackles  heard    Abdomen: Soft. No tenderness, guarding or rigidity. No palpable masses  Extremities: Extremities warm and well perfused. No cyanosis/ clubbing  Neuro: Conscious, answers appropriately, no gross focal neuro deficits  Psych: Mood and affect appropriate    Scheduled meds:      aspirin 81 mg Oral Daily   DULoxetine 30 mg Oral Daily   folic acid 1 mg Oral Daily   heparin (porcine) 5,000 Units Subcutaneous Q12H   insulin lispro 0-9 Units Subcutaneous TID AC   metoprolol succinate  mg Oral Daily   nicotine 1 patch Transdermal Q24H   pantoprazole 40 mg Oral QAM   pregabalin 150 mg Oral TID   rOPINIRole 3 mg Oral Nightly   sodium chloride 10 mL Intravenous Q12H   torsemide 40 mg Oral Daily       IV meds:                           Data Review:      Results from last 7 days   Lab Units 09/03/20  0423 09/02/20  0440 09/01/20  0652 08/31/20  0624 08/30/20  0615   SODIUM mmol/L 137 135* 137 137 137   POTASSIUM mmol/L 4.2 4.0 3.9 4.0 3.8   CHLORIDE mmol/L 99 100 101 99 101   CO2 mmol/L 24.4 23.0 25.1 23.6 24.2   BUN mg/dL 35* 31* 22 34* 21   CREATININE mg/dL 5.32* 4.47* 3.36* 4.89* 3.48*   CALCIUM mg/dL 8.9 8.6 8.7 9.1 8.7   BILIRUBIN mg/dL  --   --   --  0.3  --    ALK PHOS U/L  --   --   --  88  --    ALT (SGPT) U/L  --   --   --  21  --    AST (SGOT) U/L  --   --   --  23  --    GLUCOSE mg/dL 200* 129* 134* 118* 122*   WBC 10*3/mm3 8.04  --   --  9.76 7.91   HEMOGLOBIN g/dL 9.9*  --   --  11.3* 11.9*   PLATELETS 10*3/mm3 209  --   --  249 238       Lab Results   Component Value Date    CALCIUM 8.9 09/03/2020    PHOS 6.3 (H) 09/03/2020                    Results Review:    I have reviewed the relevant laboratory results and independently reviewed the chest imaging from this hospitalization including the available echocardiogram reports personally and summarized it if/ when appropriate below    Assessment    Acute severe metabolic acidosis  Acute lactic acidosis -while on metformin  Multifocal pneumonia  Septic  shock  Acute on chronic renal failure 3  Acute metabolic encephalopathy; resolved  Abnormal cranial lesions -awaiting further evaluation  COPD not in exacerbation  Anemia  Renal mass -2.3 cm in the right kidney  Incidental renal stones and abnormal right renal collecting system  Medical noncompliance  Diabetes    PLAN:  Patient got tunneled dialysis catheter uneventfully.  Dialysis plan per nephrology noted.  Oncology input appreciated -outpatient follow-up with Dr. Melara  Urology consultation and need for outpatient follow-up noted  Noted recommendation to not restart metformin due to recurrent lactic acidosis    DC in AM once cleared by all consultants.    I have discussed my findings and recommendations with patient.     Mike Donohue MD  9/3/2020

## 2020-09-03 NOTE — PLAN OF CARE
Problem: Fall Risk (Adult)  Goal: Absence of Fall  Outcome: Ongoing (interventions implemented as appropriate)     Problem: Skin Injury Risk (Adult)  Goal: Skin Health and Integrity  Outcome: Ongoing (interventions implemented as appropriate)     Problem: Patient Care Overview  Goal: Plan of Care Review  Outcome: Ongoing (interventions implemented as appropriate)  Flowsheets (Taken 9/3/2020 6323)  Progress: no change  Plan of Care Reviewed With: patient  Outcome Summary: vss overnight. NPO at midnight for tunnel cath placement today. prn meds for headache with relief. 24 hr urine collection completed overnight. rested quietly between care. Ctm   Goal: Individualization and Mutuality  Outcome: Ongoing (interventions implemented as appropriate)  Goal: Discharge Needs Assessment  Outcome: Ongoing (interventions implemented as appropriate)  Goal: Interprofessional Rounds/Family Conf  Outcome: Ongoing (interventions implemented as appropriate)     Problem: Renal Failure/Kidney Injury, Acute (Adult)  Goal: Signs and Symptoms of Listed Potential Problems Will be Absent, Minimized or Managed (Renal Failure/Kidney Injury, Acute)  Outcome: Ongoing (interventions implemented as appropriate)     Problem: Sepsis/Septic Shock (Adult)  Goal: Signs and Symptoms of Listed Potential Problems Will be Absent, Minimized or Managed (Sepsis/Septic Shock)  Outcome: Ongoing (interventions implemented as appropriate)     Problem: Hemodialysis (Adult)  Goal: Signs and Symptoms of Listed Potential Problems Will be Absent, Minimized or Managed (Hemodialysis)  Outcome: Ongoing (interventions implemented as appropriate)

## 2020-09-03 NOTE — PROGRESS NOTES
"   LOS: 10 days    Patient Care Team:  Michael Arriaza Jr., DO as PCP - General (Family Medicine)  Gilles Villa DPM as PCP - Claims Attributed  Gilles Villa DPM as Consulting Physician (Podiatry)  Jen Hays MD as Consulting Physician (Obstetrics and Gynecology)  Michael Arriaza Jr., DO as Referring Physician (Family Medicine)  Glynn Melara MD as Consulting Physician (Hematology and Oncology)    Chief Complaint:    Chief Complaint   Patient presents with   • Syncope     multiple x since saturday      Follow UP GERI  Subjective     Interval History:    On dialysis.   on new RIJ TDC.  Feels fine. Hungry.  Urine output increasing . Bowels moving .    Objective     Vital Signs  Temp:  [97.7 °F (36.5 °C)-98.5 °F (36.9 °C)] 98.2 °F (36.8 °C)  Heart Rate:  [72-90] 80  Resp:  [14-18] 16  BP: (102-166)/() 156/101    Flowsheet Rows      First Filed Value   Admission Height  167.6 cm (66\") Documented at 08/24/2020 1159   Admission Weight  74.8 kg (165 lb) Documented at 08/24/2020 1217          I/O this shift:  In: 575 [P.O.:75; I.V.:500]  Out: 250 [Urine:250]  I/O last 3 completed shifts:  In: 240 [P.O.:240]  Out: 2050 [Urine:2050]    Intake/Output Summary (Last 24 hours) at 9/3/2020 1601  Last data filed at 9/3/2020 1339  Gross per 24 hour   Intake 575 ml   Output 1650 ml   Net -1075 ml       Physical Exam:  GEN NAD alert, comfortable.  Lying in bed on dialysis.  RIJ TDC    Paient wearing mask   Heart RRR no s3 or rub  Lungs clear to auscultation, no wheezing .  Abd soft, + bs, nontender .  Ext no pedal/ankle edema .    Skin no rash  Neuro speech fluent, moves all 4 ext .     Results Review:    Results from last 7 days   Lab Units 09/03/20  0423 09/02/20  0440 09/01/20  0652 08/31/20  0624   SODIUM mmol/L 137 135* 137 137   POTASSIUM mmol/L 4.2 4.0 3.9 4.0   CHLORIDE mmol/L 99 100 101 99   CO2 mmol/L 24.4 23.0 25.1 23.6   BUN mg/dL 35* 31* 22 34*   CREATININE mg/dL 5.32* " 4.47* 3.36* 4.89*   CALCIUM mg/dL 8.9 8.6 8.7 9.1   BILIRUBIN mg/dL  --   --   --  0.3   ALK PHOS U/L  --   --   --  88   ALT (SGPT) U/L  --   --   --  21   AST (SGOT) U/L  --   --   --  23   GLUCOSE mg/dL 200* 129* 134* 118*       Estimated Creatinine Clearance: 11.5 mL/min (A) (by C-G formula based on SCr of 5.32 mg/dL (H)).    Results from last 7 days   Lab Units 09/03/20  0423 09/02/20  0440 09/01/20  0652  08/29/20  0440   MAGNESIUM mg/dL  --   --   --   --  2.2   PHOSPHORUS mg/dL 6.3* 6.3* 5.3*   < > 5.2*    < > = values in this interval not displayed.       Results from last 7 days   Lab Units 08/29/20  0440   URIC ACID mg/dL 7.2*       Results from last 7 days   Lab Units 09/03/20  0423 08/31/20  0624 08/30/20  0615   WBC 10*3/mm3 8.04 9.76 7.91   HEMOGLOBIN g/dL 9.9* 11.3* 11.9*   PLATELETS 10*3/mm3 209 249 238               Imaging Results (Last 24 Hours)     Procedure Component Value Units Date/Time    XR Chest Post CVA Port [[id number]] Collected:  09/03/20 1312     Updated:  09/03/20 1316    Narrative:       ONE VIEW PORTABLE CHEST AT 12:50 PM     HISTORY: Vascular catheter placement.     FINDINGS: There is been recent insertion of a right-sided vascular  catheter which ends near the junction of the SVC and right atrium. There  is no pneumothorax. The lungs are clear and the heart size is normal.     This report was finalized on 9/3/2020 1:13 PM by Dr. Ricco Silva M.D.        PICC W Fluoro Surgery Only [[id number]] Resulted:  09/03/20 1245     Updated:  09/03/20 1245    Narrative:       This procedure was auto-finalized with no dictation required.          aspirin 81 mg Oral Daily   DULoxetine 30 mg Oral Daily   folic acid 1 mg Oral Daily   heparin (porcine) 5,000 Units Subcutaneous Q12H   insulin lispro 0-9 Units Subcutaneous TID AC   metoprolol succinate  mg Oral Daily   nicotine 1 patch Transdermal Q24H   pantoprazole 40 mg Oral QAM   pregabalin 150 mg Oral TID   rOPINIRole 3 mg Oral Nightly    sodium chloride 10 mL Intravenous Q12H   torsemide 40 mg Oral Daily          Medication Review:   Current Facility-Administered Medications   Medication Dose Route Frequency Provider Last Rate Last Dose   • aspirin EC tablet 81 mg  81 mg Oral Daily Sergio Durant MD   81 mg at 09/03/20 0915   • bisacodyl (DULCOLAX) EC tablet 5 mg  5 mg Oral Daily PRN Sergio Durant MD       • bisacodyl (DULCOLAX) suppository 10 mg  10 mg Rectal Daily PRN Sergio Durant MD       • dextrose (D50W) 25 g/ 50mL Intravenous Solution 25 g  25 g Intravenous Q15 Min PRN Sergio Durant MD       • dextrose (GLUTOSE) oral gel 15 g  15 g Oral Q15 Min PRN Sergio Durant MD       • DULoxetine (CYMBALTA) DR capsule 30 mg  30 mg Oral Daily Sergio Durant MD   30 mg at 09/03/20 0915   • folic acid (FOLVITE) tablet 1 mg  1 mg Oral Daily Sergio Durant MD   1 mg at 09/03/20 0915   • glucagon (human recombinant) (GLUCAGEN DIAGNOSTIC) injection 1 mg  1 mg Subcutaneous Q15 Min PRN Sergio Durant MD       • heparin (porcine) 5000 UNIT/ML injection 5,000 Units  5,000 Units Subcutaneous Q12H Sergio Durant MD   5,000 Units at 09/03/20 0914   • heparin (porcine) injection 3,100 Units  3,100 Units Intracatheter PRN Sergio Durant MD   3,100 Units at 09/02/20 1000   • insulin lispro (humaLOG) injection 0-9 Units  0-9 Units Subcutaneous TID AC Sergio Durant MD   2 Units at 09/02/20 1643   • ipratropium-albuterol (DUO-NEB) nebulizer solution 3 mL  3 mL Nebulization Q4H PRN Sergio Durant MD       • metoprolol succinate XL (TOPROL-XL) 24 hr tablet 200 mg  200 mg Oral Daily Sergio Durant MD   200 mg at 09/03/20 0915   • nicotine (NICODERM CQ) 21 MG/24HR patch 1 patch  1 patch Transdermal Q24H Sergio Durant MD   1 patch at 09/03/20 0916   • ondansetron (ZOFRAN) tablet 4 mg  4 mg Oral Q6H PRN Sergio Durant MD        Or   • ondansetron (ZOFRAN) injection 4 mg  4 mg Intravenous Q6H PRN Sergio Durant MD       •  oxyCODONE-acetaminophen (PERCOCET) 5-325 MG per tablet 1 tablet  1 tablet Oral Q8H PRN Sergio Durant MD   1 tablet at 09/02/20 2100   • pantoprazole (PROTONIX) EC tablet 40 mg  40 mg Oral QAM Sergio Durant MD   40 mg at 09/03/20 0915   • pregabalin (LYRICA) capsule 150 mg  150 mg Oral TID Sergio Durant MD   150 mg at 09/03/20 0915   • rOPINIRole (REQUIP) tablet 3 mg  3 mg Oral Nightly Sergio Durant MD   3 mg at 09/02/20 2100   • sodium chloride 0.9 % flush 10 mL  10 mL Intravenous Q12H Sergio Durant MD   10 mL at 09/03/20 0915   • sodium chloride 0.9 % flush 10 mL  10 mL Intravenous PRN Sergio Durant MD       • torsemide (DEMADEX) tablet 40 mg  40 mg Oral Daily Sergio Durant MD   40 mg at 09/03/20 0915       Assessment/Plan   1.   Non-olig GERI, ATN due to shock.  No  evidence for renal recovery yet, but making over a liter of urine daily .  SCR rises in the absence of HD. .  CT showed large right sided stones and hyperdense mass.   Dialysis dependent since 8/25. TDC placed this am.  Working great .  2. Severe metabolic acidemia with lactic acidosis. Multifactorial including metformin, hypotension. Resolved.   3. Septic shock with leukocytosis, now resolved.   Multifocal PNA on CT. Completed Antibiotic .  4. Syncope due to volume depletion and orthostasis .  5. Anemia -Dr. Priyank rueda noted and appreciated. Lytic lesion on skull and right renal mass.  Elevated light chain Kappa/lambda ratio. 24 hour urine for paraprotein studies.  follow up.   6. DM2.  Off metformin and jardiance.  Even if patient's renal function recovers, would NOT resume metformin as this is her second episode of acidemia   7. HTN - BP not optimal on 200 mg Toprol XL .  Torsemide added yesterday.     Plan  1.  Outpt dialysis arranged for start on Monday at [place].  Will check chem and urine output in am to determine if short treatment needed prior to dc.      Paloma Clifton MD  09/03/20  16:01

## 2020-09-03 NOTE — ANESTHESIA PREPROCEDURE EVALUATION
Anesthesia Evaluation     no history of anesthetic complications:  NPO Solid Status: > 8 hours  NPO Liquid Status: > 4 hours           Airway   Mallampati: III  Dental    (+) edentulous    Pulmonary    (+) a smoker Current Abstained day of surgery, COPD,   Cardiovascular - normal exam    (+) hypertension,       Neuro/Psych  (+) TIA, syncope,     GI/Hepatic/Renal/Endo    (+)  GERD,  renal disease ESRD, diabetes mellitus,     Musculoskeletal     Abdominal    Substance History      OB/GYN          Other   blood dyscrasia anemia,   history of cancer                    Anesthesia Plan    ASA 3     MAC     intravenous induction     Anesthetic plan, all risks, benefits, and alternatives have been provided, discussed and informed consent has been obtained with: patient.

## 2020-09-04 ENCOUNTER — TELEPHONE (OUTPATIENT)
Dept: FAMILY MEDICINE CLINIC | Facility: CLINIC | Age: 65
End: 2020-09-04

## 2020-09-04 ENCOUNTER — READMISSION MANAGEMENT (OUTPATIENT)
Dept: CALL CENTER | Facility: HOSPITAL | Age: 65
End: 2020-09-04

## 2020-09-04 ENCOUNTER — APPOINTMENT (OUTPATIENT)
Dept: CT IMAGING | Facility: HOSPITAL | Age: 65
End: 2020-09-04

## 2020-09-04 VITALS
OXYGEN SATURATION: 94 % | HEART RATE: 75 BPM | BODY MASS INDEX: 24.8 KG/M2 | DIASTOLIC BLOOD PRESSURE: 65 MMHG | RESPIRATION RATE: 20 BRPM | TEMPERATURE: 98.5 F | SYSTOLIC BLOOD PRESSURE: 115 MMHG | HEIGHT: 66 IN | WEIGHT: 154.32 LBS

## 2020-09-04 LAB
ALBUMIN SERPL-MCNC: 3.2 G/DL (ref 3.5–5.2)
ANION GAP SERPL CALCULATED.3IONS-SCNC: 14.2 MMOL/L (ref 5–15)
BUN SERPL-MCNC: 18 MG/DL (ref 8–23)
BUN/CREAT SERPL: 4.7 (ref 7–25)
CALCIUM SPEC-SCNC: 8.8 MG/DL (ref 8.6–10.5)
CHLORIDE SERPL-SCNC: 98 MMOL/L (ref 98–107)
CO2 SERPL-SCNC: 23.8 MMOL/L (ref 22–29)
CREAT SERPL-MCNC: 3.83 MG/DL (ref 0.57–1)
GFR SERPL CREATININE-BSD FRML MDRD: 12 ML/MIN/1.73
GFR SERPL CREATININE-BSD FRML MDRD: ABNORMAL ML/MIN/{1.73_M2}
GLUCOSE BLDC GLUCOMTR-MCNC: 177 MG/DL (ref 70–130)
GLUCOSE BLDC GLUCOMTR-MCNC: 192 MG/DL (ref 70–130)
GLUCOSE BLDC GLUCOMTR-MCNC: 260 MG/DL (ref 70–130)
GLUCOSE SERPL-MCNC: 309 MG/DL (ref 65–99)
PHOSPHATE SERPL-MCNC: 3.9 MG/DL (ref 2.5–4.5)
POTASSIUM SERPL-SCNC: 3.8 MMOL/L (ref 3.5–5.2)
SODIUM SERPL-SCNC: 136 MMOL/L (ref 136–145)

## 2020-09-04 PROCEDURE — 99284 EMERGENCY DEPT VISIT MOD MDM: CPT

## 2020-09-04 PROCEDURE — 63710000001 INSULIN LISPRO (HUMAN) PER 5 UNITS: Performed by: SURGERY

## 2020-09-04 PROCEDURE — 82962 GLUCOSE BLOOD TEST: CPT

## 2020-09-04 PROCEDURE — 80069 RENAL FUNCTION PANEL: CPT | Performed by: INTERNAL MEDICINE

## 2020-09-04 PROCEDURE — 25010000002 HEPARIN (PORCINE) PER 1000 UNITS: Performed by: SURGERY

## 2020-09-04 PROCEDURE — 70450 CT HEAD/BRAIN W/O DYE: CPT

## 2020-09-04 PROCEDURE — 72125 CT NECK SPINE W/O DYE: CPT

## 2020-09-04 RX ORDER — TORSEMIDE 20 MG/1
40 TABLET ORAL DAILY
Qty: 30 TABLET | Refills: 0 | Status: SHIPPED | OUTPATIENT
Start: 2020-09-05 | End: 2020-10-06

## 2020-09-04 RX ORDER — NICOTINE 21 MG/24HR
1 PATCH, TRANSDERMAL 24 HOURS TRANSDERMAL
Qty: 30 EACH | Refills: 0 | Status: SHIPPED | OUTPATIENT
Start: 2020-09-05 | End: 2020-10-29

## 2020-09-04 RX ADMIN — PREGABALIN 150 MG: 75 CAPSULE ORAL at 08:01

## 2020-09-04 RX ADMIN — INSULIN LISPRO 6 UNITS: 100 INJECTION, SOLUTION INTRAVENOUS; SUBCUTANEOUS at 12:16

## 2020-09-04 RX ADMIN — PREGABALIN 150 MG: 75 CAPSULE ORAL at 16:10

## 2020-09-04 RX ADMIN — DULOXETINE HYDROCHLORIDE 30 MG: 30 CAPSULE, DELAYED RELEASE ORAL at 08:01

## 2020-09-04 RX ADMIN — OXYCODONE HYDROCHLORIDE AND ACETAMINOPHEN 1 TABLET: 5; 325 TABLET ORAL at 08:05

## 2020-09-04 RX ADMIN — SODIUM CHLORIDE, PRESERVATIVE FREE 10 ML: 5 INJECTION INTRAVENOUS at 08:06

## 2020-09-04 RX ADMIN — ASPIRIN 81 MG: 81 TABLET, COATED ORAL at 08:01

## 2020-09-04 RX ADMIN — PANTOPRAZOLE SODIUM 40 MG: 40 TABLET, DELAYED RELEASE ORAL at 06:29

## 2020-09-04 RX ADMIN — FOLIC ACID 1 MG: 1 TABLET ORAL at 08:01

## 2020-09-04 RX ADMIN — TORSEMIDE 40 MG: 20 TABLET ORAL at 08:01

## 2020-09-04 RX ADMIN — HEPARIN SODIUM 5000 UNITS: 5000 INJECTION INTRAVENOUS; SUBCUTANEOUS at 08:02

## 2020-09-04 RX ADMIN — METOPROLOL SUCCINATE 200 MG: 100 TABLET, EXTENDED RELEASE ORAL at 08:01

## 2020-09-04 RX ADMIN — NICOTINE 1 PATCH: 21 PATCH, EXTENDED RELEASE TRANSDERMAL at 08:06

## 2020-09-04 RX ADMIN — INSULIN LISPRO 2 UNITS: 100 INJECTION, SOLUTION INTRAVENOUS; SUBCUTANEOUS at 08:02

## 2020-09-04 RX ADMIN — INSULIN LISPRO 2 UNITS: 100 INJECTION, SOLUTION INTRAVENOUS; SUBCUTANEOUS at 16:44

## 2020-09-04 NOTE — PROGRESS NOTES
"   LOS: 11 days    Patient Care Team:  Michael Arriaza Jr., DO as PCP - General (Family Medicine)  Gilles Villa DPM as PCP - Claims Attributed  Gilles Villa DPM as Consulting Physician (Podiatry)  Jen Hays MD as Consulting Physician (Obstetrics and Gynecology)  Michael Arriaza Jr., DO as Referring Physician (Family Medicine)  Glynn Melara MD as Consulting Physician (Hematology and Oncology)    Chief Complaint:    Chief Complaint   Patient presents with   • Syncope     multiple x since saturday      Follow UP GERI  Subjective     Interval History:    Feels fine; no SOB on RA; appetite fair; last HD was yesterday    Objective     Vital Signs  Temp:  [98 °F (36.7 °C)-98.6 °F (37 °C)] 98.3 °F (36.8 °C)  Heart Rate:  [72-93] 83  Resp:  [14-20] 20  BP: (136-166)/() 150/86    Flowsheet Rows      First Filed Value   Admission Height  167.6 cm (66\") Documented at 08/24/2020 1159   Admission Weight  74.8 kg (165 lb) Documented at 08/24/2020 1217          I/O this shift:  In: 120 [P.O.:120]  Out: -   I/O last 3 completed shifts:  In: 575 [P.O.:75; I.V.:500]  Out: 1350 [Urine:1350]    Intake/Output Summary (Last 24 hours) at 9/4/2020 1131  Last data filed at 9/4/2020 0900  Gross per 24 hour   Intake 695 ml   Output 250 ml   Net 445 ml       Physical Exam:  GEN NAD alert, comfortable; appropriate   Heart RRR no s3 or rub  Lungs clear to auscultation, no wheezing tho few crackles heard; not labored  Right chest TDC  Abd soft, + bs, nontender ND  Ext no pedal/ankle edema .    Skin no rash  Neuro speech fluent, moves all 4 ext .     Results Review:    Results from last 7 days   Lab Units 09/04/20  0853 09/03/20  0423 09/02/20  0440  08/31/20  0624   SODIUM mmol/L 136 137 135*   < > 137   POTASSIUM mmol/L 3.8 4.2 4.0   < > 4.0   CHLORIDE mmol/L 98 99 100   < > 99   CO2 mmol/L 23.8 24.4 23.0   < > 23.6   BUN mg/dL 18 35* 31*   < > 34*   CREATININE mg/dL 3.83* 5.32* 4.47*   < > 4.89* "   CALCIUM mg/dL 8.8 8.9 8.6   < > 9.1   BILIRUBIN mg/dL  --   --   --   --  0.3   ALK PHOS U/L  --   --   --   --  88   ALT (SGPT) U/L  --   --   --   --  21   AST (SGOT) U/L  --   --   --   --  23   GLUCOSE mg/dL 309* 200* 129*   < > 118*    < > = values in this interval not displayed.       Estimated Creatinine Clearance: 16 mL/min (A) (by C-G formula based on SCr of 3.83 mg/dL (H)).    Results from last 7 days   Lab Units 09/04/20  0853 09/03/20  0423 09/02/20  0440  08/29/20  0440   MAGNESIUM mg/dL  --   --   --   --  2.2   PHOSPHORUS mg/dL 3.9 6.3* 6.3*   < > 5.2*    < > = values in this interval not displayed.       Results from last 7 days   Lab Units 08/29/20  0440   URIC ACID mg/dL 7.2*       Results from last 7 days   Lab Units 09/03/20  0423 08/31/20  0624 08/30/20  0615   WBC 10*3/mm3 8.04 9.76 7.91   HEMOGLOBIN g/dL 9.9* 11.3* 11.9*   PLATELETS 10*3/mm3 209 249 238               Imaging Results (Last 24 Hours)     Procedure Component Value Units Date/Time    XR Chest Post CVA Port [[id number]] Collected:  09/03/20 1312     Updated:  09/03/20 1316    Narrative:       ONE VIEW PORTABLE CHEST AT 12:50 PM     HISTORY: Vascular catheter placement.     FINDINGS: There is been recent insertion of a right-sided vascular  catheter which ends near the junction of the SVC and right atrium. There  is no pneumothorax. The lungs are clear and the heart size is normal.     This report was finalized on 9/3/2020 1:13 PM by Dr. Ricco Silva M.D.        PICC W Fluoro Surgery Only [[id number]] Resulted:  09/03/20 1245     Updated:  09/03/20 1245    Narrative:       This procedure was auto-finalized with no dictation required.          aspirin 81 mg Oral Daily   DULoxetine 30 mg Oral Daily   folic acid 1 mg Oral Daily   heparin (porcine) 5,000 Units Subcutaneous Q12H   insulin lispro 0-9 Units Subcutaneous TID AC   metoprolol succinate  mg Oral Daily   nicotine 1 patch Transdermal Q24H   pantoprazole 40 mg Oral QAM    pregabalin 150 mg Oral TID   rOPINIRole 3 mg Oral Nightly   sodium chloride 10 mL Intravenous Q12H   torsemide 40 mg Oral Daily          Medication Review:   Current Facility-Administered Medications   Medication Dose Route Frequency Provider Last Rate Last Dose   • aspirin EC tablet 81 mg  81 mg Oral Daily Sergio Durant MD   81 mg at 09/04/20 0801   • bisacodyl (DULCOLAX) EC tablet 5 mg  5 mg Oral Daily PRN Sergio Durant MD       • bisacodyl (DULCOLAX) suppository 10 mg  10 mg Rectal Daily PRN Sergio Durant MD       • dextrose (D50W) 25 g/ 50mL Intravenous Solution 25 g  25 g Intravenous Q15 Min PRN Sregio Durant MD       • dextrose (GLUTOSE) oral gel 15 g  15 g Oral Q15 Min PRN Sergio Durant MD       • DULoxetine (CYMBALTA) DR capsule 30 mg  30 mg Oral Daily Sergio Durant MD   30 mg at 09/04/20 0801   • folic acid (FOLVITE) tablet 1 mg  1 mg Oral Daily Sergio Durant MD   1 mg at 09/04/20 0801   • glucagon (human recombinant) (GLUCAGEN DIAGNOSTIC) injection 1 mg  1 mg Subcutaneous Q15 Min PRN Sergio Durant MD       • heparin (porcine) 5000 UNIT/ML injection 5,000 Units  5,000 Units Subcutaneous Q12H Sergio Durant MD   5,000 Units at 09/04/20 0802   • heparin (porcine) injection 3,100 Units  3,100 Units Intracatheter PRN Sergio Durant MD   3,100 Units at 09/03/20 1629   • insulin lispro (humaLOG) injection 0-9 Units  0-9 Units Subcutaneous TID AC Sergio Durant MD   2 Units at 09/04/20 0802   • ipratropium-albuterol (DUO-NEB) nebulizer solution 3 mL  3 mL Nebulization Q4H PRN Sergio Durant MD       • metoprolol succinate XL (TOPROL-XL) 24 hr tablet 200 mg  200 mg Oral Daily Sergio Durant MD   200 mg at 09/04/20 0801   • nicotine (NICODERM CQ) 21 MG/24HR patch 1 patch  1 patch Transdermal Q24H Sergio Durant MD   1 patch at 09/04/20 0806   • ondansetron (ZOFRAN) tablet 4 mg  4 mg Oral Q6H PRN Sergio Durant MD        Or   • ondansetron (ZOFRAN) injection 4 mg   4 mg Intravenous Q6H PRN Sergio Durant MD       • oxyCODONE-acetaminophen (PERCOCET) 5-325 MG per tablet 1 tablet  1 tablet Oral Q8H PRN Sergio Durant MD   1 tablet at 09/04/20 0805   • pantoprazole (PROTONIX) EC tablet 40 mg  40 mg Oral QAM Sergio Durant MD   40 mg at 09/04/20 0629   • pregabalin (LYRICA) capsule 150 mg  150 mg Oral TID Sergio Durant MD   150 mg at 09/04/20 0801   • rOPINIRole (REQUIP) tablet 3 mg  3 mg Oral Nightly Sergio Durant MD   3 mg at 09/03/20 2100   • sodium chloride 0.9 % flush 10 mL  10 mL Intravenous Q12H Sergio Durant MD   10 mL at 09/04/20 0806   • sodium chloride 0.9 % flush 10 mL  10 mL Intravenous PRN Sergio Durant MD       • torsemide (DEMADEX) tablet 40 mg  40 mg Oral Daily Sergio Durant MD   40 mg at 09/04/20 0801       Assessment/Plan   1.   Non-olig GERI, ATN due to shock.  No  evidence for renal recovery yet, but making over a liter of urine daily.  SCR rises in the absence of HD.  CT showed large right sided stones and hyperdense mass.   Dialysis dependent since 8/25. TDC placed 9/3.  2. Severe metabolic acidemia with lactic acidosis. Multifactorial including metformin, hypotension. Resolved.   3. Septic shock with leukocytosis, now resolved.   Multifocal PNA on CT. Completed Antibiotic .  4. Syncope due to volume depletion and orthostasis .  5. Anemia -Dr. Priyank rueda noted and appreciated. Lytic lesion on skull and right renal mass.  Elevated light chain Kappa/lambda ratio. 24 hour urine for paraprotein studies.  follow up.   6. DM2.  Off metformin and jardiance.  Even if patient's renal function recovers, would NOT resume metformin as this is her second episode of acidemia   7. HTN, improving    Plan  1.  Outpt dialysis arranged for start on Monday at [place]; I called orders in  2.  Will arrange renal panel pre-HD on Monday  3.  D/c anytime from renal view    Huan Beaulieu MD  09/04/20  11:31

## 2020-09-04 NOTE — PROGRESS NOTES
Continued Stay Note  [facility]     Patient Name: Jayne Beltran  MRN: [id number]  Today's Date: 9/4/2020    Admit Date: 8/24/2020    Discharge Plan     Row Name 09/04/20 0957       Plan    Plan  Home with [facility] and outpt hd setup at [facility] at 6:40am    Patient/Family in Agreement with Plan  yes    Plan Comments  Anticipate dc soon, CCP faxed over additional HD clinicals to [facility] [phone] to [facility] HD clinic. CCP explained anticipate dc today or over the weekend. She requests CCP to ask Nephrologist to call her for orders for Monday. Left vm for [facility] to confirm they can accept at [place]. maximino rivers/ccp        Discharge Codes    No documentation.       Expected Discharge Date and Time     Expected Discharge Date Expected Discharge Time    Sep 4, 2020             Amirah Shirley RN

## 2020-09-04 NOTE — TELEPHONE ENCOUNTER
Mallory with [facility] called and would like to get some verbal orders from Dr. Arriaza.  Mallory can be reached at [phone].  MARINA Yu CMA

## 2020-09-04 NOTE — PLAN OF CARE
Problem: Fall Risk (Adult)  Goal: Absence of Fall  Outcome: Ongoing (interventions implemented as appropriate)     Problem: Skin Injury Risk (Adult)  Goal: Skin Health and Integrity  Outcome: Ongoing (interventions implemented as appropriate)     Problem: Patient Care Overview  Goal: Plan of Care Review  Outcome: Ongoing (interventions implemented as appropriate)  Flowsheets (Taken 9/4/2020 2566)  Progress: improving  Plan of Care Reviewed With: patient  Outcome Summary: vss. prn meds x1 for neck pain from where previous IJ cath removed yesterday. slept well. no complaints overnight. possible d/c today. ctm  Goal: Individualization and Mutuality  Outcome: Ongoing (interventions implemented as appropriate)  Goal: Discharge Needs Assessment  Outcome: Ongoing (interventions implemented as appropriate)  Goal: Interprofessional Rounds/Family Conf  Outcome: Ongoing (interventions implemented as appropriate)     Problem: Renal Failure/Kidney Injury, Acute (Adult)  Goal: Signs and Symptoms of Listed Potential Problems Will be Absent, Minimized or Managed (Renal Failure/Kidney Injury, Acute)  Outcome: Ongoing (interventions implemented as appropriate)     Problem: Sepsis/Septic Shock (Adult)  Goal: Signs and Symptoms of Listed Potential Problems Will be Absent, Minimized or Managed (Sepsis/Septic Shock)  Outcome: Ongoing (interventions implemented as appropriate)     Problem: Hemodialysis (Adult)  Goal: Signs and Symptoms of Listed Potential Problems Will be Absent, Minimized or Managed (Hemodialysis)  Outcome: Ongoing (interventions implemented as appropriate)

## 2020-09-04 NOTE — OUTREACH NOTE
Prep Survey      Responses   [facility] facility patient discharged from?  [place]   Is LACE score < 7 ?  No   Eligibility  [facility]   Date of Admission  08/24/20   Date of Discharge  09/04/20   Discharge Disposition  Home-Health Care Svc   Discharge diagnosis  septic shock, PNA, A/C renal failure,Acute severe metabolic acidosis, metabolic encephalopathy   COVID-19 Test Status  Negative   Does the patient have one of the following disease processes/diagnoses(primary or secondary)?  Sepsis   Does the patient have Home health ordered?  Yes   What is the Home health agency?   [facility]    Is there a DME ordered?  No   General alerts for this patient  HD MWF   Prep survey completed?  Yes          Umm Maynard RN

## 2020-09-04 NOTE — PROGRESS NOTES
Name: Jayne Beltran ADMIT: 2020   : 1955  PCP: Michael Arriaza Jr.,     MRN: [id number] LOS: 11 days   AGE/SEX: 65 y.o. female  ROOM: [address]    [place], Subsequent Hospital Care    Chief Complaint   Patient presents with   • Syncope     multiple x since saturday      CC: Hemodialysis access follow-up.  Subjective     65 y.o. female patient resting comfortably this morning without focal complaints.    Review of Systems    Objective     Scheduled Medications:     aspirin 81 mg Oral Daily   DULoxetine 30 mg Oral Daily   folic acid 1 mg Oral Daily   heparin (porcine) 5,000 Units Subcutaneous Q12H   insulin lispro 0-9 Units Subcutaneous TID AC   metoprolol succinate  mg Oral Daily   nicotine 1 patch Transdermal Q24H   pantoprazole 40 mg Oral QAM   pregabalin 150 mg Oral TID   rOPINIRole 3 mg Oral Nightly   sodium chloride 10 mL Intravenous Q12H   torsemide 40 mg Oral Daily       Active Infusions:       As Needed Medications:  bisacodyl  •  bisacodyl  •  dextrose  •  dextrose  •  glucagon (human recombinant)  •  heparin (porcine)  •  ipratropium-albuterol  •  ondansetron **OR** ondansetron  •  oxyCODONE-acetaminophen  •  sodium chloride    Vital Signs  Vital Signs Patient Vitals for the past 24 hrs:   BP Temp Temp src Pulse Resp SpO2 Weight   20 0713 150/86 98.3 °F (36.8 °C) Oral 83 20 93 % --   20 0500 -- -- -- -- -- -- 70 kg (154 lb 5.2 oz)   20 2300 153/79 98.6 °F (37 °C) Oral 93 20 92 % --   20 1951 156/91 98.3 °F (36.8 °C) Oral 74 18 97 % --   20 1405 (!) 156/101 98.2 °F (36.8 °C) Oral 80 16 95 % --   20 1345 136/83 98 °F (36.7 °C) Oral 80 16 93 % --   20 1330 155/92 -- -- 72 16 100 % --   20 1315 166/95 -- -- 82 16 100 % --     I/O:  I/O last 3 completed shifts:  In: 575 [P.O.:75; I.V.:500]  Out: 1350 [Urine:1350]    Physical Exam:  Physical Exam    Results Review:     CBC    Results from last 7 days   Lab Units  09/03/20  0423 08/31/20  0624 08/30/20  0615   WBC 10*3/mm3 8.04 9.76 7.91   HEMOGLOBIN g/dL 9.9* 11.3* 11.9*   PLATELETS 10*3/mm3 209 249 238     BMP   Results from last 7 days   Lab Units 09/04/20  0853 09/03/20  0423 09/02/20  0440 09/01/20  0652 08/31/20  0624 08/30/20  0615 08/29/20  0440   SODIUM mmol/L 136 137 135* 137 137 137 136   POTASSIUM mmol/L 3.8 4.2 4.0 3.9 4.0 3.8 3.6   CHLORIDE mmol/L 98 99 100 101 99 101 99   CO2 mmol/L 23.8 24.4 23.0 25.1 23.6 24.2 23.4   BUN mg/dL 18 35* 31* 22 34* 21 31*   CREATININE mg/dL 3.83* 5.32* 4.47* 3.36* 4.89* 3.48* 4.82*   GLUCOSE mg/dL 309* 200* 129* 134* 118* 122* 154*   MAGNESIUM mg/dL  --   --   --   --   --   --  2.2   PHOSPHORUS mg/dL 3.9 6.3* 6.3* 5.3* 6.1* 4.5 5.2*     Cr Clearance Estimated Creatinine Clearance: 16 mL/min (A) (by C-G formula based on SCr of 3.83 mg/dL (H)).  Coag     HbA1C   Lab Results   Component Value Date    HGBA1C 6.20 (H) 03/11/2020    HGBA1C 6.60 (H) 02/11/2020    HGBA1C 6.18 (H) 06/18/2019       Assessment/Plan     Assessment & Plan      Acute renal failure (CMS/HCC)    Syncope and collapse      65 y.o. female postop day #1 right jugular tunneled catheter placement without complications.    Cephalic veins are inadequate bilaterally on vein mapping.  She does appear to have a good left basilic vein.  We will place her in left arm precautions.  She can follow-up with me as an outpatient to discuss possible longer-term dialysis access options if no renal function recovery.  Otherwise will sign off.  Please feel free to call with further questions.    Personal protective equipment used for this patient encounter:  Patient wearing surgical mask []    Provider wearing a surgical mask [x]    Gloves [x]    Eye protection [x]    Face Shield []    Gown []    N 95 respirator or CAPR/PAPR []   Duration of interaction 5 minutes    Sergio Durant MD  09/04/20  13:06    Please call my office with any question: [phone]    [facility]  Problems    Diagnosis  POA   • **Acute renal failure (CMS/HCC) [N17.9]  Unknown   • Syncope and collapse [R55]  Yes      Resolved Hospital Problems   No resolved problems to display.

## 2020-09-04 NOTE — CONSULTS
Instructed patient on Humalog pen device-pt able to return demonstrate without difficulty. Reviewed carb counting/ meal planning with pt who verbalized understanding.States drinks 2-24 oz regular sodas a day. Strongly encouraged pt to avoid sugary drinks because of the effects on her BG levels and told her that she will require more insulin at meal time if she continues to drink them. Reviewed signs, symptoms, and treatment for hypoglycemia. Pt engaged in learning about meal planning and insulin injections. Encouraged pt to call with any questions or concerns.

## 2020-09-04 NOTE — DISCHARGE INSTR - APPOINTMENTS
[facility] Phone #[phone]. Your first HD appointment is on Monday September 7, 2020 AT 6AM to sign consent/admission papers, and actual HD start time is 6:40am.

## 2020-09-04 NOTE — DISCHARGE SUMMARY
"                                                                  PHYSICIAN DISCHARGE SUMMARY                                                                          [facility]      Patient Identification:    Name: Jayne Beltran  Age: 65 y.o.  Sex: female  :  1955  MRN: [id number]    Admit date: 2020    Discharge date 2020    Discharged Condition: Stable    Discharge Diagnoses:    Acute severe metabolic acidosis  Acute lactic acidosis - likely metformin  Multifocal pneumonia  Septic shock  Acute on chronic renal failure 3  Acute metabolic encephalopathy; resolved  Abnormal cranial lesions - o/p onc f/u for possible multiple myeloma  COPD not in exacerbation  Anemia  Renal mass -2.3 cm in the right kidney  Incidental renal stones and abnormal right renal collecting system  Medical noncompliance  Diabetes    HPI \"Jayne Beltran is a 65 y.o. female with history of recent admission for acute metabolic acidosis with acute kidney injury that was treated with IV hydration and improved over the hospital course.  The patient has underlying diabetes mellitus, she is on metformin plus other medication.  She was discharged home after her last admission with the instruction to follow-up with nephrology which she never did.  She came into the ER today because she was getting dizzy the day prior and she had multiple syncopal episodes.  She has positive orthostatic symptoms, and she had 6 episode of syncope over the last 24 hours without any associated chest pain or shortness of breath wheezing there is no abdominal pain there is no vomiting or diarrhea.  Patient had no recent adjustment in her medication she had no focal motor deficit.  Despite her severe acidosis noted on her ABG she was surprisingly still responsive but she is definitely confused and lethargic.  Her lactic acid was severely elevated, her pH was below 7, her renal function was showing severe hyperkalemia and nephrology were " "consulted, the patient was to be admitted to the ICU with emergency hemodialysis to correct the acidosis and hyperkalemia.  Patient had CT of the head to rule out any underlying intracranial abnormality like stroke or bleed and it came back negative for that but the finding on the skull bone lesions was suggestive of possible multiple myeloma even though the blood test were not suggestive of that as much.\" per     Consults:   Patient Care Team:  Michael Arriaza Jr., DO as PCP - General (Family Medicine)  Gilles Villa DPM as PCP - Claims Attributed  Gilles Villa DPM as Consulting Physician (Podiatry)  Jen Hays MD as Consulting Physician (Obstetrics and Gynecology)  Michael Arriaza Jr., DO as Referring Physician (Family Medicine)  Glynn Melara MD as Consulting Physician (Hematology and Oncology)    Procedures Performed:  Procedure(s):  HEMODIALYSIS CATHETER INSERTION       Hospital Course:   Patient admitted to the ICU with acute metabolic acidosis and multifocal pneumonia including septic shock.  She did well with antibiotics and improved with regards to her acidosis and lactic acidosis thought to be from worsening renal failure.  She was seen by nephrology and eventually had to be started on dialysis.  She did not recover over several days and hence is now being set up for outpatient dialysis through tunneled dialysis catheter.  Vascular surgery has followed the patient and placed the catheter and will evaluate patient outside for AV fistula if she continues to not make renal recovery.  She was noted to have incidental renal mass and abdominal renal collecting system evaluated by urology requiring outpatient follow-up.  Oncology was consulted due to abnormal cranial lesions and concern for multiple myeloma and she has a follow-up outpatient for the same    She did have some uncontrolled diabetes and her home oral hypoglycemics had to be discontinued due to renal failure " and hence she will be discharged on subcutaneous lispro to be used with meals.  She will follow-up with primary care and be started on long-acting insulin if she has higher insulin needs once she goes back home.  Patient will be given new insulin supplies and glucometer at discharge.    She has been medically noncompliant previously and was strongly warned about compliance with dialysis and close follow-up with several consultants as mentioned below.  She understands and agrees with the plan of discharge today.    Objective:   Temp:  [98 °F (36.7 °C)-98.6 °F (37 °C)] 98.3 °F (36.8 °C)  Heart Rate:  [72-93] 83  Resp:  [16-20] 20  BP: (136-156)/() 150/86   SpO2:  [92 %-100 %] 93 %  on    Device (Oxygen Therapy): room air    Intake/Output Summary (Last 24 hours) at 9/4/2020 1327  Last data filed at 9/4/2020 1300  Gross per 24 hour   Intake 675 ml   Output 350 ml   Net 325 ml     Body mass index is 24.92 kg/m².      09/02/20  0850 09/03/20  0703 09/04/20  0500   Weight: 69.5 kg (153 lb 3.5 oz) (not weighed by RD) 70.3 kg (154 lb 15.7 oz) 70 kg (154 lb 5.2 oz)     Weight change: 0.8 kg (1 lb 12.2 oz)      Physical Exam:  Constitutional: Middle aged pt in bed, No acute respiratory distress, no accessory muscle use  Head: - NCAT  Eyes: No pallor.  Anicteric sclerae, EOMI.  ENMT:  Mallampati 4, no oral thrush. Moist MM. RIJ TDC +  NECK: Trachea midline, No thyromegaly, no palpable cervical lymphadenopathy  Heart: RRR, no murmur. No pedal edema   Lungs: LI +, No wheezes/ crackles heard    Abdomen: Soft. No tenderness, guarding or rigidity. No palpable masses  Extremities: Extremities warm and well perfused. No cyanosis/ clubbing  Neuro: Conscious, answers appropriately, no gross focal neuro deficits  Psych: Mood and affect appropriate    Significant Discharge Diagnostics     Pertinent Lab Results:  Results from last 7 days   Lab Units 09/04/20  0853 09/03/20  0423 09/02/20  0440 09/01/20  0652 08/31/20  0624  08/30/20  0615 08/29/20  0440   SODIUM mmol/L 136 137 135* 137 137 137 136   POTASSIUM mmol/L 3.8 4.2 4.0 3.9 4.0 3.8 3.6   CHLORIDE mmol/L 98 99 100 101 99 101 99   CO2 mmol/L 23.8 24.4 23.0 25.1 23.6 24.2 23.4   BUN mg/dL 18 35* 31* 22 34* 21 31*   CREATININE mg/dL 3.83* 5.32* 4.47* 3.36* 4.89* 3.48* 4.82*   GLUCOSE mg/dL 309* 200* 129* 134* 118* 122* 154*   CALCIUM mg/dL 8.8 8.9 8.6 8.7 9.1 8.7 8.6   AST (SGOT) U/L  --   --   --   --  23  --   --    ALT (SGPT) U/L  --   --   --   --  21  --   --          Results from last 7 days   Lab Units 09/03/20  0423 08/31/20  0624 08/30/20  0615   WBC 10*3/mm3 8.04 9.76 7.91   HEMOGLOBIN g/dL 9.9* 11.3* 11.9*   HEMATOCRIT % 30.5* 34.1 37.4   PLATELETS 10*3/mm3 209 249 238   MCV fL 85.9 85.7 88.8   MCH pg 27.9 28.4 28.3   MCHC g/dL 32.5 33.1 31.8   RDW % 15.8* 16.1* 16.2*   RDW-SD fl 49.5 49.6 52.8   MPV fL 11.9 11.6 11.3   NEUTROPHIL % %  --  59.5 49.9   LYMPHOCYTE % %  --  26.4 33.5   MONOCYTES % %  --  9.1 10.6   EOSINOPHIL % %  --  2.0 2.8   BASOPHIL % %  --  0.8 0.8   IMM GRAN % %  --  2.2* 2.4*   NEUTROS ABS 10*3/mm3  --  5.80 3.95   LYMPHS ABS 10*3/mm3  --  2.58 2.65   MONOS ABS 10*3/mm3  --  0.89 0.84   EOS ABS 10*3/mm3  --  0.20 0.22   BASOS ABS 10*3/mm3  --  0.08 0.06   IMMATURE GRANS (ABS) 10*3/mm3  --  0.21* 0.19*   NRBC /100 WBC  --  0.0 0.0         Results from last 7 days   Lab Units 08/29/20  0440   MAGNESIUM mg/dL 2.2                                       Imaging Results:  Imaging Results (All)     Procedure Component Value Units Date/Time    XR Chest Post CVA Port [[id number]] Collected:  09/03/20 1312     Updated:  09/03/20 1316    Narrative:       ONE VIEW PORTABLE CHEST AT 12:50 PM     HISTORY: Vascular catheter placement.     FINDINGS: There is been recent insertion of a right-sided vascular  catheter which ends near the junction of the SVC and right atrium. There  is no pneumothorax. The lungs are clear and the heart size is normal.     This report was  finalized on 9/3/2020 1:13 PM by Dr. Ricco Silva M.D.       IR PICC W Fluoro Surgery Only [[id number]] Resulted:  09/03/20 1245     Updated:  09/03/20 1245    Narrative:       This procedure was auto-finalized with no dictation required.    XR Chest PA & Lateral [[id number]] Collected:  08/29/20 2009     Updated:  08/29/20 2016    Narrative:       PA AND LATERAL CHEST     HISTORY: Syncopal episode Sunday.     COMPARISON: Portable chest 08/24/2020.     FINDINGS: Cardiomediastinal silhouette is within normal limits. Aortic  vascular calcifications are present. Right IJ catheter with tip in the  SVC. Mild chronic-appearing increased interstitial markings are present,  though the lungs appear clear of focal airspace disease, and there is no  evidence of pulmonary edema or pleural effusion.       Impression:       No evidence for active disease in the chest.     This report was finalized on 8/29/2020 8:13 PM by Dr. Lazaro Nuñez M.D.       CT Abdomen Pelvis Without Contrast [[id number]] Collected:  08/25/20 1047     Updated:  08/25/20 1605    Narrative:       CT CHEST, ABDOMEN, AND PELVIS WITHOUT CONTRAST.     HISTORY: 65-year-old female with unresolved pneumonia. Abdominal  distention.     TECHNIQUE: Radiation dose reduction techniques were utilized, including  automated exposure control and exposure modulation based on body size.   3 mm images were obtained through the chest, abdomen, and pelvis without  the administration of IV contrast. Compared with outside facility  previous CTs from 03/02/2020.     FINDINGS:  CHEST: There are patchy dense and ground glass airspace opacities  throughout both lung fields both centrally and peripherally. The  consolidations are the largest and most dense at the upper lobes. There  are no pleural or pericardial effusions. There is no interstitial edema.  There are shotty nodes scattered throughout the mediastinum. No  pathologically enlarged nodes are seen. Pulmonary arteries are  enlarged  with the main pulmonary artery measuring 3.3 cm transversely, previously  3.1 cm. There is a small amount of fluid/secretions within the dependent  aspect of the trachea and a tiny amount within the dependent aspect of  both main stem bronchi. There are healing and old left lateral rib  fractures which were present previously as well.     ABDOMEN/PELVIS: Noncontrasted liver appears unremarkable other than mild  fatty infiltration. Splenic size is normal. Noncontrasted pancreas  appears unremarkable. Adrenals are mildly hyperplastic, more prominent  on the left. Noncontrasted left kidney appears unremarkable. There is a  partially duplicated right renal collecting system. There are several  large stones within the right kidney and there are also 2 adjacent  stones within the right UPJ. The largest of these measures 1.1 cm. There  was a larger number of stones within the upper pole moiety renal pelvis  with minimal hydronephrosis. There are no stones within the renal pelvis  of the lower pole moiety or within the combined ureter. Partially  exophytic from the lower pole of the right kidney is a hyperdense  approximately 2.3 cm lesion with internal density measurements of 47  Hounsfield units. The appearance is stable. No acute bowel abnormality  is seen. There are no dilated small or large bowel loops. The appendix  appears normal. Noncontrasted uterus and adnexa appear  unremarkable.There is no free fluid or lymphadenopathy. There are  moderately extensive abdominal aortic atherosclerotic changes without  aneurysmal dilatation.       Impression:       1. Extensive mixed density airspace consolidations within both lung  fields as described. The appearance of the multifocal pneumonia is  nonspecific and can be seen with organizing pneumonia and BOOP. Given  the presence of mucus/secretions within the trachea and mainstem  bronchi, aspiration should be considered as an etiology as well. The  appearance is not  typical for COVID-19 pneumonia, but cannot be  excluded.  2. Partially duplicated right renal collecting system with large stones.  There are 2 stones within the renal pelvis of the upper pole moiety with  minimal hydronephrosis. There are no stones within the combined right  ureter.  3. The hyperdense approximately 2.3 cm right renal lesion is stable, but  is new since a previous CT of the abdomen from 2005. A proteinaceous  cyst is possible, but a solid mass cannot be excluded. Conservative  surveillance is recommended. Further characterization can be performed  with a renal mass protocol CT of the abdomen or a noncontrasted CT of  the abdomen can be performed in 6 months for assessment of size  stability.     This report was finalized on 8/25/2020 4:02 PM by Dr. Joy Junior M.D.       CT Chest Without Contrast [[id number]] Collected:  08/25/20 1047     Updated:  08/25/20 1605    Narrative:       CT CHEST, ABDOMEN, AND PELVIS WITHOUT CONTRAST.     HISTORY: 65-year-old female with unresolved pneumonia. Abdominal  distention.     TECHNIQUE: Radiation dose reduction techniques were utilized, including  automated exposure control and exposure modulation based on body size.   3 mm images were obtained through the chest, abdomen, and pelvis without  the administration of IV contrast. Compared with outside facility  previous CTs from 03/02/2020.     FINDINGS:  CHEST: There are patchy dense and ground glass airspace opacities  throughout both lung fields both centrally and peripherally. The  consolidations are the largest and most dense at the upper lobes. There  are no pleural or pericardial effusions. There is no interstitial edema.  There are shotty nodes scattered throughout the mediastinum. No  pathologically enlarged nodes are seen. Pulmonary arteries are enlarged  with the main pulmonary artery measuring 3.3 cm transversely, previously  3.1 cm. There is a small amount of fluid/secretions within the dependent  aspect  of the trachea and a tiny amount within the dependent aspect of  both main stem bronchi. There are healing and old left lateral rib  fractures which were present previously as well.     ABDOMEN/PELVIS: Noncontrasted liver appears unremarkable other than mild  fatty infiltration. Splenic size is normal. Noncontrasted pancreas  appears unremarkable. Adrenals are mildly hyperplastic, more prominent  on the left. Noncontrasted left kidney appears unremarkable. There is a  partially duplicated right renal collecting system. There are several  large stones within the right kidney and there are also 2 adjacent  stones within the right UPJ. The largest of these measures 1.1 cm. There  was a larger number of stones within the upper pole moiety renal pelvis  with minimal hydronephrosis. There are no stones within the renal pelvis  of the lower pole moiety or within the combined ureter. Partially  exophytic from the lower pole of the right kidney is a hyperdense  approximately 2.3 cm lesion with internal density measurements of 47  Hounsfield units. The appearance is stable. No acute bowel abnormality  is seen. There are no dilated small or large bowel loops. The appendix  appears normal. Noncontrasted uterus and adnexa appear  unremarkable.There is no free fluid or lymphadenopathy. There are  moderately extensive abdominal aortic atherosclerotic changes without  aneurysmal dilatation.       Impression:       1. Extensive mixed density airspace consolidations within both lung  fields as described. The appearance of the multifocal pneumonia is  nonspecific and can be seen with organizing pneumonia and BOOP. Given  the presence of mucus/secretions within the trachea and mainstem  bronchi, aspiration should be considered as an etiology as well. The  appearance is not typical for COVID-19 pneumonia, but cannot be  excluded.  2. Partially duplicated right renal collecting system with large stones.  There are 2 stones within the  renal pelvis of the upper pole moiety with  minimal hydronephrosis. There are no stones within the combined right  ureter.  3. The hyperdense approximately 2.3 cm right renal lesion is stable, but  is new since a previous CT of the abdomen from 2005. A proteinaceous  cyst is possible, but a solid mass cannot be excluded. Conservative  surveillance is recommended. Further characterization can be performed  with a renal mass protocol CT of the abdomen or a noncontrasted CT of  the abdomen can be performed in 6 months for assessment of size  stability.     This report was finalized on 8/25/2020 4:02 PM by Dr. Joy Junior M.D.       XR Chest Post CVA Port [[id number]] Collected:  08/24/20 1637     Updated:  08/24/20 2057    Narrative:       STAT FRONTAL PROJECTION OF THE CHEST     CLINICAL HISTORY: Line placement.     FINDINGS: There is a new right IJ approach central venous catheter which  terminates at the level of the SVC. There is no evidence for a  pneumothorax or hemothorax. There is pulmonary vascular engorgement as  well as mild interstitial indistinctness suggestive of hydrostatic  interstitial pulmonary edema although the findings could potentially  represent a diffuse interstitial pneumonia and correlation is suggested.  These findings are more prominent when compared to the prior study from  1:36 PM today. The cardiomediastinal silhouette is unchanged when  compared to the prior study from earlier today at 1:36 PM. The osseous  structures are unremarkable.     This report was finalized on 8/24/2020 8:54 PM by Dr. Umang Liriano M.D.       CT Head Without Contrast [[id number]] Collected:  08/24/20 1433     Updated:  08/24/20 1553    Narrative:       EMERGENCY NONCONTRAST HEAD CT 08/24/2020     CLINICAL HISTORY: Multiple syncopal episodes. The patient is very  lethargic, cannot stay awake long enough to give history. Has multiple  abrasions on arms and legs.     TECHNIQUE: Spiral CT images were obtained from the  base of the skull to  the vertex without intravenous contrast. Images were reformatted and are  submitted in 3 mm thick axial CT sections with brain algorithm and 2 mm  thick axial CT sections with high-resolution bone algorithm and 2 mm  thick sagittal and coronal reconstructions were performed and submitted  in brain algorithm.     COMPARISON: There are no prior studies from [facility]  for comparison.     FINDINGS: The brain parenchyma is normal in attenuation. The ventricles  are normal in size. I see no focal mass effect and no midline shift and  no extra-axial fluid collections are identified. There is no evidence of  acute intracranial hemorrhage. No acute skull fracture is identified.  There are multiple tiny faint lucencies within the calvarium. This is  concerning for either myeloma or multiple tiny osseous metastases.  Paranasal sinuses, mastoid air cells and middle ear cavities are clear.  No acute skull fracture is identified.       Impression:       1. There are multiple tiny faint lucent lesions in the calvarium. They  range from 2-6 mm in size, too numerous to count. Findings are not  entirely specific but could certainly be seen in the setting of multiple  myeloma or potentially but less likely osseous metastases and this needs  to be correlated clinically. The remainder of the head CT is within  normal limits with no acute skull fracture or intracranial hemorrhage  identified.     The results were communicated to Dr. Tracy in the emergency room by  telephone on 08/24/2020 at 1 PM.     Radiation dose reduction techniques were utilized, including automated  exposure control and exposure modulation based on body size.     This report was finalized on 8/24/2020 3:50 PM by Dr. Jesse Anand M.D.        Sonosite Portable [[id number]] Resulted:  08/24/20 1527     Updated:  08/24/20 1527    Narrative:       This procedure was auto-finalized with no dictation required.    XR Chest 1 View  [[id number]] Collected:  08/24/20 1410     Updated:  08/24/20 1415    Narrative:       XR CHEST 1 VW-     HISTORY: Female who is 65 years-old,  short of breath     TECHNIQUE: frontal chest     COMPARISON: 3/2/2020     FINDINGS: The heart is mildly enlarged. Aorta is calcified. Slight  prominence of vascular and interstitial markings. No focal pulmonary  consolidation, pleural effusion, or pneumothorax. No acute osseous  process.       Impression:       No focal pulmonary consolidation. Mild cardiomegaly with  slight prominence of vascular and interstitial markings.     This report was finalized on 8/24/2020 2:11 PM by Dr. Yordy Bo M.D.             Discharge Medications and Instructions:     Discharge Medications     Discharge Medications      New Medications      Instructions Start Date   insulin lispro 100 UNIT/ML injection  Commonly known as:  humaLOG   0-9 Units, Subcutaneous, 3 Times Daily Before Meals      nicotine 21 MG/24HR patch  Commonly known as:  NICODERM CQ   1 patch, Transdermal, Every 24 Hours Scheduled   Start Date:  September 5, 2020     torsemide 20 MG tablet  Commonly known as:  DEMADEX   40 mg, Oral, Daily   Start Date:  September 5, 2020        Continue These Medications      Instructions Start Date   Accu-Chek Ilana Plus w/Device kit   1 each, Does not apply, 3 Times Daily Before Meals      Accu-Chek Ilana solution   1 each, In Vitro, 3 Times Daily Before Meals      Accu-Chek Multiclix Lancet Dev kit   1 each, Does not apply, 3 Times Daily Before Meals      accu-chek multiclix lancets   1 each, Other, 3 Times Daily Before Meals, Use as instructed      aspirin 81 MG EC tablet   81 mg, Oral, Daily      B-D SINGLE USE SWABS REGULAR pads   1 each, Does not apply, 3 Times Daily Before Meals      colestipol 1 g tablet  Commonly known as:  COLESTID   1 g, Oral, 2 Times Daily      glucose blood test strip  Commonly known as:  Accu-Chek Ilana Plus   1 each, Other, 3 Times Daily Before Meals,  Use as instructed      hydrocortisone 2.5 % rectal cream  Commonly known as:  ANUSOL-HC   Rectal, 2 Times Daily      Lyrica 150 MG capsule  Generic drug:  pregabalin   150 mg, Oral, 3 Times Daily      metoprolol succinate  MG 24 hr tablet  Commonly known as:  TOPROL-XL   200 mg, Oral, Daily      omeprazole 20 MG capsule  Commonly known as:  priLOSEC   20 mg, Oral, Daily      rOPINIRole 1 MG tablet  Commonly known as:  REQUIP   3 mg, Oral, Nightly PRN      Vitamin B 12 500 MCG tablet   500 mcg, Oral, Daily         Stop These Medications    amLODIPine 5 MG tablet  Commonly known as:  NORVASC     diphenhydrAMINE 25 MG tablet  Commonly known as:  Benadryl Allergy     DULoxetine 60 MG capsule  Commonly known as:  CYMBALTA     Empagliflozin 10 MG tablet     furosemide 20 MG tablet  Commonly known as:  Lasix     lisinopril 10 MG tablet  Commonly known as:  PRINIVIL,ZESTRIL     meloxicam 15 MG tablet  Commonly known as:  MOBIC     metFORMIN 1000 MG tablet  Commonly known as:  Glucophage     potassium chloride 10 MEQ CR tablet  Commonly known as:  K-DUR,KLOR-CON     terbinafine 250 MG tablet  Commonly known as:  lamiSIL            Disposition:  Home with      Contact information for follow-up providers     Michael Arriaza Jr., DO. Schedule an appointment as soon as possible for a visit in 1 week(s).    Specialties:  Family Medicine, Emergency Medicine, Urgent Care  Contact information:  [address]  [facility] [id number] [phone]             Gilles Villa DPM .    Specialty:  Podiatry  Contact information:  [address]  [facility] [id number] [phone]             Glynn Melara MD. Schedule an appointment as soon as possible for a visit in 1 month(s).    Specialties:  Hematology and Oncology, Oncology, Hematology  Contact information:  [address]  [address]  [facility] [address]  [phone]             Mikie Mejia MD. Schedule an appointment as soon as possible for a  visit in 1 week(s).    Specialty:  Urology  Contact information:  [address]  [place] [id number]  [phone]                   Contact information for after-discharge care     Dialysis/Infusion     [facility] .    Service:  Dialysis  Contact information:  [address]  [place] [address] [phone]                 Home Medical Care     [facility] .    Service:  Home Health Services  Contact information:  [address]  [facility] [address] [phone]                             Total time spent discharging patient including evaluation, medication reconciliation, arranging follow up, and post hospitalization instructions and education total time exceeds 30 minutes.    Signed:  Mike Donohue MD  9/4/2020  13:27

## 2020-09-05 ENCOUNTER — APPOINTMENT (OUTPATIENT)
Dept: CT IMAGING | Facility: HOSPITAL | Age: 65
End: 2020-09-05

## 2020-09-05 ENCOUNTER — READMISSION MANAGEMENT (OUTPATIENT)
Dept: CALL CENTER | Facility: HOSPITAL | Age: 65
End: 2020-09-05

## 2020-09-05 ENCOUNTER — HOSPITAL ENCOUNTER (INPATIENT)
Facility: HOSPITAL | Age: 65
LOS: 3 days | Discharge: HOME | End: 2020-09-08
Attending: EMERGENCY MEDICINE | Admitting: INTERNAL MEDICINE
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
## 2020-09-05 DIAGNOSIS — R07.81 RIB PAIN ON LEFT SIDE: ICD-10-CM

## 2020-09-05 DIAGNOSIS — S06.5XAA SDH (SUBDURAL HEMATOMA) (HCC): ICD-10-CM

## 2020-09-05 DIAGNOSIS — I60.9 SAH (SUBARACHNOID HEMORRHAGE) (HCC): ICD-10-CM

## 2020-09-05 DIAGNOSIS — G25.81 RESTLESS LEGS SYNDROME: Primary | Chronic | ICD-10-CM

## 2020-09-05 LAB
ANION GAP SERPL CALCULATED.3IONS-SCNC: 13.1 MMOL/L (ref 5–15)
BASOPHILS # BLD AUTO: 0.07 10*3/MM3 (ref 0–0.2)
BASOPHILS NFR BLD AUTO: 0.5 % (ref 0–1.5)
BUN SERPL-MCNC: 25 MG/DL (ref 8–23)
BUN/CREAT SERPL: 5.8 (ref 7–25)
CALCIUM SPEC-SCNC: 9.1 MG/DL (ref 8.6–10.5)
CHLORIDE SERPL-SCNC: 99 MMOL/L (ref 98–107)
CHOLEST SERPL-MCNC: 165 MG/DL (ref 0–200)
CO2 SERPL-SCNC: 24.9 MMOL/L (ref 22–29)
CREAT SERPL-MCNC: 4.29 MG/DL (ref 0.57–1)
DEPRECATED RDW RBC AUTO: 54.7 FL (ref 37–54)
EOSINOPHIL # BLD AUTO: 0.1 10*3/MM3 (ref 0–0.4)
EOSINOPHIL NFR BLD AUTO: 0.7 % (ref 0.3–6.2)
ERYTHROCYTE [DISTWIDTH] IN BLOOD BY AUTOMATED COUNT: 16.5 % (ref 12.3–15.4)
FREE KAPPA LT CHAINS, 24HR: 153.2 MG/24 HR
FREE LAMBDA LT CHAINS, 24 HR: 21.02 MG/24 HR
GFR SERPL CREATININE-BSD FRML MDRD: 10 ML/MIN/1.73
GFR SERPL CREATININE-BSD FRML MDRD: ABNORMAL ML/MIN/{1.73_M2}
GLUCOSE BLDC GLUCOMTR-MCNC: 112 MG/DL (ref 70–130)
GLUCOSE BLDC GLUCOMTR-MCNC: 170 MG/DL (ref 70–130)
GLUCOSE BLDC GLUCOMTR-MCNC: 181 MG/DL (ref 70–130)
GLUCOSE BLDC GLUCOMTR-MCNC: 182 MG/DL (ref 70–130)
GLUCOSE BLDC GLUCOMTR-MCNC: 184 MG/DL (ref 70–130)
GLUCOSE BLDC GLUCOMTR-MCNC: 197 MG/DL (ref 70–130)
GLUCOSE SERPL-MCNC: 195 MG/DL (ref 65–99)
HBA1C MFR BLD: 6.6 % (ref 4.8–5.6)
HCT VFR BLD AUTO: 32.5 % (ref 34–46.6)
HDLC SERPL-MCNC: 36 MG/DL (ref 40–60)
HGB BLD-MCNC: 10.3 G/DL (ref 12–15.9)
IMM GRANULOCYTES # BLD AUTO: 0.13 10*3/MM3 (ref 0–0.05)
IMM GRANULOCYTES NFR BLD AUTO: 1 % (ref 0–0.5)
INR PPP: 0.86 (ref 0.9–1.1)
KAPPA LC UR-MCNC: 98.84 MG/L (ref 0.63–113.79)
KAPPA LC/LAMBDA UR: 7.29 {RATIO} (ref 1.03–31.76)
LAMBDA LC UR-MCNC: 13.56 MG/L (ref 0.47–11.77)
LDLC SERPL CALC-MCNC: 72 MG/DL (ref 0–100)
LDLC/HDLC SERPL: 1.99 {RATIO}
LYMPHOCYTES # BLD AUTO: 1.96 10*3/MM3 (ref 0.7–3.1)
LYMPHOCYTES NFR BLD AUTO: 14.3 % (ref 19.6–45.3)
MCH RBC QN AUTO: 28.5 PG (ref 26.6–33)
MCHC RBC AUTO-ENTMCNC: 31.7 G/DL (ref 31.5–35.7)
MCV RBC AUTO: 89.8 FL (ref 79–97)
MONOCYTES # BLD AUTO: 0.93 10*3/MM3 (ref 0.1–0.9)
MONOCYTES NFR BLD AUTO: 6.8 % (ref 5–12)
NEUTROPHILS NFR BLD AUTO: 10.48 10*3/MM3 (ref 1.7–7)
NEUTROPHILS NFR BLD AUTO: 76.7 % (ref 42.7–76)
NRBC BLD AUTO-RTO: 0 /100 WBC (ref 0–0.2)
PLATELET # BLD AUTO: 236 10*3/MM3 (ref 140–450)
PMV BLD AUTO: 11.6 FL (ref 6–12)
POTASSIUM SERPL-SCNC: 4.4 MMOL/L (ref 3.5–5.2)
PROTHROMBIN TIME: 11.7 SECONDS (ref 11.7–14.2)
RBC # BLD AUTO: 3.62 10*6/MM3 (ref 3.77–5.28)
SODIUM SERPL-SCNC: 137 MMOL/L (ref 136–145)
TRIGL SERPL-MCNC: 287 MG/DL (ref 0–150)
VLDLC SERPL-MCNC: 57.4 MG/DL (ref 5–40)
WBC # BLD AUTO: 13.67 10*3/MM3 (ref 3.4–10.8)

## 2020-09-05 PROCEDURE — 92610 EVALUATE SWALLOWING FUNCTION: CPT

## 2020-09-05 PROCEDURE — 82962 GLUCOSE BLOOD TEST: CPT

## 2020-09-05 PROCEDURE — G0378 HOSPITAL OBSERVATION PER HR: HCPCS

## 2020-09-05 PROCEDURE — 85610 PROTHROMBIN TIME: CPT | Performed by: EMERGENCY MEDICINE

## 2020-09-05 PROCEDURE — 25010000002 ONDANSETRON PER 1 MG: Performed by: EMERGENCY MEDICINE

## 2020-09-05 PROCEDURE — 97110 THERAPEUTIC EXERCISES: CPT

## 2020-09-05 PROCEDURE — 25010000002 MORPHINE PER 10 MG: Performed by: EMERGENCY MEDICINE

## 2020-09-05 PROCEDURE — 83036 HEMOGLOBIN GLYCOSYLATED A1C: CPT | Performed by: INTERNAL MEDICINE

## 2020-09-05 PROCEDURE — 97165 OT EVAL LOW COMPLEX 30 MIN: CPT

## 2020-09-05 PROCEDURE — 97162 PT EVAL MOD COMPLEX 30 MIN: CPT

## 2020-09-05 PROCEDURE — 63710000001 INSULIN LISPRO (HUMAN) PER 5 UNITS: Performed by: INTERNAL MEDICINE

## 2020-09-05 PROCEDURE — 70450 CT HEAD/BRAIN W/O DYE: CPT

## 2020-09-05 PROCEDURE — 97535 SELF CARE MNGMENT TRAINING: CPT

## 2020-09-05 PROCEDURE — 25010000002 MORPHINE PER 10 MG: Performed by: INTERNAL MEDICINE

## 2020-09-05 PROCEDURE — 80048 BASIC METABOLIC PNL TOTAL CA: CPT | Performed by: EMERGENCY MEDICINE

## 2020-09-05 PROCEDURE — 80061 LIPID PANEL: CPT | Performed by: INTERNAL MEDICINE

## 2020-09-05 PROCEDURE — 99221 1ST HOSP IP/OBS SF/LOW 40: CPT | Performed by: NEUROLOGICAL SURGERY

## 2020-09-05 PROCEDURE — 85025 COMPLETE CBC W/AUTO DIFF WBC: CPT | Performed by: EMERGENCY MEDICINE

## 2020-09-05 RX ORDER — ONDANSETRON 2 MG/ML
4 INJECTION INTRAMUSCULAR; INTRAVENOUS ONCE
Status: COMPLETED | OUTPATIENT
Start: 2020-09-05 | End: 2020-09-05

## 2020-09-05 RX ORDER — NICOTINE 21 MG/24HR
1 PATCH, TRANSDERMAL 24 HOURS TRANSDERMAL
Status: DISCONTINUED | OUTPATIENT
Start: 2020-09-05 | End: 2020-09-08 | Stop reason: HOSPADM

## 2020-09-05 RX ORDER — MORPHINE SULFATE 2 MG/ML
4 INJECTION, SOLUTION INTRAMUSCULAR; INTRAVENOUS EVERY 4 HOURS PRN
Status: DISCONTINUED | OUTPATIENT
Start: 2020-09-05 | End: 2020-09-08 | Stop reason: HOSPADM

## 2020-09-05 RX ORDER — PREGABALIN 75 MG/1
75 CAPSULE ORAL DAILY
Status: DISCONTINUED | OUTPATIENT
Start: 2020-09-05 | End: 2020-09-08 | Stop reason: HOSPADM

## 2020-09-05 RX ORDER — ENALAPRILAT 2.5 MG/2ML
1.25 INJECTION INTRAVENOUS EVERY 6 HOURS PRN
Status: DISCONTINUED | OUTPATIENT
Start: 2020-09-05 | End: 2020-09-08 | Stop reason: HOSPADM

## 2020-09-05 RX ORDER — TORSEMIDE 20 MG/1
40 TABLET ORAL DAILY
Status: DISCONTINUED | OUTPATIENT
Start: 2020-09-05 | End: 2020-09-08

## 2020-09-05 RX ORDER — ACETAMINOPHEN 325 MG/1
650 TABLET ORAL EVERY 4 HOURS PRN
Status: DISCONTINUED | OUTPATIENT
Start: 2020-09-05 | End: 2020-09-08 | Stop reason: HOSPADM

## 2020-09-05 RX ORDER — SODIUM CHLORIDE 0.9 % (FLUSH) 0.9 %
10 SYRINGE (ML) INJECTION AS NEEDED
Status: DISCONTINUED | OUTPATIENT
Start: 2020-09-05 | End: 2020-09-08 | Stop reason: HOSPADM

## 2020-09-05 RX ORDER — DEXTROSE MONOHYDRATE 25 G/50ML
25 INJECTION, SOLUTION INTRAVENOUS
Status: DISCONTINUED | OUTPATIENT
Start: 2020-09-05 | End: 2020-09-08 | Stop reason: HOSPADM

## 2020-09-05 RX ORDER — NICOTINE POLACRILEX 4 MG
15 LOZENGE BUCCAL
Status: DISCONTINUED | OUTPATIENT
Start: 2020-09-05 | End: 2020-09-08 | Stop reason: HOSPADM

## 2020-09-05 RX ORDER — ACETAMINOPHEN 650 MG/1
650 SUPPOSITORY RECTAL EVERY 4 HOURS PRN
Status: DISCONTINUED | OUTPATIENT
Start: 2020-09-05 | End: 2020-09-08 | Stop reason: HOSPADM

## 2020-09-05 RX ORDER — MORPHINE SULFATE 2 MG/ML
4 INJECTION, SOLUTION INTRAMUSCULAR; INTRAVENOUS ONCE
Status: COMPLETED | OUTPATIENT
Start: 2020-09-05 | End: 2020-09-05

## 2020-09-05 RX ORDER — HYDROCODONE BITARTRATE AND ACETAMINOPHEN 5; 325 MG/1; MG/1
1 TABLET ORAL EVERY 4 HOURS PRN
Status: DISCONTINUED | OUTPATIENT
Start: 2020-09-05 | End: 2020-09-08 | Stop reason: HOSPADM

## 2020-09-05 RX ORDER — SODIUM CHLORIDE 0.9 % (FLUSH) 0.9 %
10 SYRINGE (ML) INJECTION EVERY 12 HOURS SCHEDULED
Status: DISCONTINUED | OUTPATIENT
Start: 2020-09-05 | End: 2020-09-08 | Stop reason: HOSPADM

## 2020-09-05 RX ORDER — PANTOPRAZOLE SODIUM 40 MG/1
40 TABLET, DELAYED RELEASE ORAL EVERY MORNING
Status: DISCONTINUED | OUTPATIENT
Start: 2020-09-05 | End: 2020-09-08 | Stop reason: HOSPADM

## 2020-09-05 RX ORDER — METOPROLOL SUCCINATE 100 MG/1
200 TABLET, EXTENDED RELEASE ORAL DAILY
Status: DISCONTINUED | OUTPATIENT
Start: 2020-09-05 | End: 2020-09-08 | Stop reason: HOSPADM

## 2020-09-05 RX ADMIN — INSULIN LISPRO 2 UNITS: 100 INJECTION, SOLUTION INTRAVENOUS; SUBCUTANEOUS at 17:51

## 2020-09-05 RX ADMIN — HYDROCODONE BITARTRATE AND ACETAMINOPHEN 1 TABLET: 5; 325 TABLET ORAL at 08:36

## 2020-09-05 RX ADMIN — SODIUM CHLORIDE, PRESERVATIVE FREE 10 ML: 5 INJECTION INTRAVENOUS at 21:00

## 2020-09-05 RX ADMIN — TORSEMIDE 40 MG: 20 TABLET ORAL at 08:37

## 2020-09-05 RX ADMIN — SODIUM CHLORIDE, PRESERVATIVE FREE 10 ML: 5 INJECTION INTRAVENOUS at 08:40

## 2020-09-05 RX ADMIN — MORPHINE SULFATE 4 MG: 2 INJECTION, SOLUTION INTRAMUSCULAR; INTRAVENOUS at 01:30

## 2020-09-05 RX ADMIN — NICOTINE 1 PATCH: 21 PATCH, EXTENDED RELEASE TRANSDERMAL at 08:36

## 2020-09-05 RX ADMIN — PREGABALIN 75 MG: 75 CAPSULE ORAL at 08:41

## 2020-09-05 RX ADMIN — ONDANSETRON 4 MG: 2 INJECTION INTRAMUSCULAR; INTRAVENOUS at 01:30

## 2020-09-05 RX ADMIN — HYDROCODONE BITARTRATE AND ACETAMINOPHEN 1 TABLET: 5; 325 TABLET ORAL at 22:30

## 2020-09-05 RX ADMIN — INSULIN LISPRO 2 UNITS: 100 INJECTION, SOLUTION INTRAVENOUS; SUBCUTANEOUS at 21:00

## 2020-09-05 RX ADMIN — INSULIN LISPRO 2 UNITS: 100 INJECTION, SOLUTION INTRAVENOUS; SUBCUTANEOUS at 04:17

## 2020-09-05 RX ADMIN — SODIUM CHLORIDE, PRESERVATIVE FREE 10 ML: 5 INJECTION INTRAVENOUS at 02:31

## 2020-09-05 RX ADMIN — PANTOPRAZOLE SODIUM 40 MG: 40 TABLET, DELAYED RELEASE ORAL at 06:46

## 2020-09-05 RX ADMIN — MORPHINE SULFATE 4 MG: 2 INJECTION, SOLUTION INTRAMUSCULAR; INTRAVENOUS at 06:46

## 2020-09-05 RX ADMIN — INSULIN LISPRO 2 UNITS: 100 INJECTION, SOLUTION INTRAVENOUS; SUBCUTANEOUS at 12:04

## 2020-09-05 RX ADMIN — HYDROCODONE BITARTRATE AND ACETAMINOPHEN 1 TABLET: 5; 325 TABLET ORAL at 17:50

## 2020-09-05 NOTE — ED NOTES
Pt given mask upon arrival. RN wearing mask and eye protection during pt interactions.    Pt  calls ahead and reports that pt was just discharged after being here for 10 days. Pt reports that she slipped on a ramp and fell. Pt does not recall hitting her head but reports having severe head pain. Pt reports that she doesn't remember anything after falling. Pt is alert but is unable to recall who the president is and states that is something she is normally able to recall.     Shiloh Maynard RN  09/04/20 8014

## 2020-09-05 NOTE — PLAN OF CARE
Problem: Patient Care Overview  Goal: Plan of Care Review  Flowsheets (Taken 9/5/2020 2929)  Plan of Care Reviewed With: patient  Outcome Summary: OT eval completed. Pt presents with dizziness with transfers,WFL UE strength/coordination, CGA with adls, feel pt to be followed during IP stay, if balance/dizziness not improve possible home health OT keiry

## 2020-09-05 NOTE — ED NOTES
PT  called to inform of pt fall, Call back # for him is [phone]     Shiloh Maynard RN  09/04/20 5930

## 2020-09-05 NOTE — THERAPY EVALUATION
Acute Care - Occupational Therapy Initial Evaluation    [facility]     Patient Name: Jayne Beltran  : 1955  MRN: [id number]    Today's Date: 2020  Onset of Illness/Injury or Date of Surgery: 20    Date of Referral to OT: 20  Referring Physician: Dr Dawson      Admit Date: 2020         ICD-10-CM ICD-9-CM   1. SAH (subarachnoid hemorrhage) (CMS/HCC) I60.9 430   2. SDH (subdural hematoma) (CMS/HCC) S06.5X9A 432.1   3. Fall, initial encounter W19.XXXA E888.9       Patient Active Problem List   Diagnosis   • Essential hypertension   • Snoring   • TIA (transient ischemic attack)   • GERD without esophagitis   • Inflamed seborrheic keratosis   • Type 2 diabetes mellitus without complication, without long-term current use of insulin (CMS/HCC)   • Peripheral neuropathy   • Restless legs syndrome   • Benign paroxysmal positional vertigo   • Bone lesion   • Vitamin D deficiency   • Dyslipidemia   • Muscle spasm of both lower legs   • Tobacco use disorder   • Abnormal lung sounds   • Intermittent claudication (CMS/HCC)   • Chronic respiratory failure with hypoxia (CMS/HCC)   • Leg mass, right   • Rib pain on left side   • Diabetic autonomic neuropathy associated with type 2 diabetes mellitus (CMS/HCC)   • Leukocytosis   • Primary insomnia   • PMB (postmenopausal bleeding)   • Family history of ovarian cancer   • Acute renal failure (ARF) (CMS/HCC)   • Duplicated renal collecting system   • Atherosclerotic vascular disease   • Elevated platelet count   • Low hemoglobin   • Other anomalies of uterus   • Multiple kidney stones   • Multiple kidney stones   • Generalized edema   • Bilateral lower extremity edema   • Eyelid edema   • Diarrhea   • Sore on leg   • Syncope and collapse   • Acute renal failure (CMS/HCC)   • SAH (subarachnoid hemorrhage) (CMS/HCC)       Past Medical History:   Diagnosis Date   • COPD (chronic obstructive pulmonary disease) (CMS/HCC)    • Diabetes mellitus (CMS/HCC)      type 2   • Fibroid    • Hypertension    • Leukocytosis    • Neuromuscular disorder (CMS/HCC)    • Skin cancer     nose   • TIA (transient ischemic attack)        Past Surgical History:   Procedure Laterality Date   • CHOLECYSTECTOMY     • COLONOSCOPY     • INSERTION HEMODIALYSIS CATHETER N/A 9/3/2020    Procedure: HEMODIALYSIS CATHETER INSERTION;  Surgeon: Sergio Durant MD;  Location: [facility] MAIN OR;  Service: Vascular;  Laterality: N/A;   • KIDNEY STONE SURGERY              IRF OT ASSESSMENT FLOWSHEET (last 72 hours)      IRF Occupational Therapy Evaluation    No documentation.          Occupational Therapy Education                 Title: PT OT SLP Therapies (Done)     Topic: Occupational Therapy (Done)     Point: ADL training (Done)     Description:   Instruct learner(s) on proper safety adaptation and remediation techniques during self care or transfers.   Instruct in proper use of assistive devices.              Learning Progress Summary           Patient Acceptance, E, VU by DN at 9/5/2020 1602    Comment:  Ot Role, commode transfers, adls adaptations                   Point: Home exercise program (Done)     Description:   Instruct learner(s) on appropriate technique for monitoring, assisting and/or progressing therapeutic exercises/activities.              Learning Progress Summary           Patient Acceptance, E, VU by DN at 9/5/2020 1602    Comment:  Ot Role, commode transfers, adls adaptations                   Point: Precautions (Done)     Description:   Instruct learner(s) on prescribed precautions during self-care and functional transfers.              Learning Progress Summary           Patient Acceptance, E, VU by DN at 9/5/2020 1602    Comment:  Ot Role, commode transfers, adls adaptations                   Point: Body mechanics (Done)     Description:   Instruct learner(s) on proper positioning and spine alignment during self-care, functional mobility activities and/or exercises.               Learning Progress Summary           Patient Acceptance, E, VU by DN at 9/5/2020 1602    Comment:  Ot Role, commode transfers, adls adaptations                               User Key     Initials Effective Dates Name Provider Type Discipline    DN 06/08/18 -  Rodney Mejia OT Occupational Therapist OT                    OT Recommendation and Plan    Anticipated Discharge Disposition (OT): home with home health, home with assist, home  Therapy Frequency (OT Eval): evaluation only    Plan of Care Review  Plan of Care Reviewed With: patient  Plan of Care Reviewed With: patient      Outcome Measures     Row Name 09/05/20 1600             How much help from another is currently needed...    Putting on and taking off regular lower body clothing?  3  -DN      Bathing (including washing, rinsing, and drying)  3  -DN      Toileting (which includes using toilet bed pan or urinal)  3  -DN      Putting on and taking off regular upper body clothing  4  -DN      Taking care of personal grooming (such as brushing teeth)  4  -DN      Eating meals  4  -DN      AM-PAC 6 Clicks Score (OT)  21  -DN         Functional Assessment    Outcome Measure Options  AM-PAC 6 Clicks Daily Activity (OT)  -DN        User Key  (r) = Recorded By, (t) = Taken By, (c) = Cosigned By    Initials Name Provider Type    DN Rodney Mejia OT Occupational Therapist            Time Calculation:     OT Start Time: 1503  OT Stop Time: 1526  OT Time Calculation (min): 23 min  Therapy Charges for Today     Code Description Service Date Service Provider Modifiers Qty    [id number]  OT EVAL LOW COMPLEXITY 2 9/5/2020 Rodney Mejia OT GO 1    [id number]  OT SELF CARE/MGMT/TRAIN EA 15 MIN 9/5/2020 Rodney Mejia OT GO 1                   Rodney Mejia OT  9/5/2020

## 2020-09-05 NOTE — H&P
[facility]    CC: fall    HPI:  Mrs. Beltran is a 66yo with ESRD.  She had a loss of balance and fell striking her head.  She was walking on a ramp.  She doesn't recall all the details of the event but is pretty oriented at the moment and has neurologic deficits.  She does have a severe headache.  Ct imaging shows ICH.    Past Medical History:   Diagnosis Date   • COPD (chronic obstructive pulmonary disease) (CMS/HCC)    • Diabetes mellitus (CMS/HCC)     type 2   • Fibroid    • Hypertension    • Leukocytosis    • Neuromuscular disorder (CMS/HCC)    • Skin cancer     nose   • TIA (transient ischemic attack)      Social History     Socioeconomic History   • Marital status:      Spouse name: Golden   • Number of children: 2   • Years of education: 12   • Highest education level: High school graduate   Occupational History     Employer: RETIRED   Tobacco Use   • Smoking status: Current Every Day Smoker     Packs/day: 0.50     Types: Cigarettes   • Smokeless tobacco: Never Used   Substance and Sexual Activity   • Alcohol use: No   • Drug use: No   • Sexual activity: Not Currently     Birth control/protection: Post-menopausal     Family History   Problem Relation Age of Onset   • Heart disease Mother          at age 63   • Diabetes Mother    • Hypertension Mother    • Diabetes Father    • Deep vein thrombosis Father    • Depression Father    • Liver disease Father    • Lung cancer Father 58         at age 68   • Breast cancer Maternal Grandmother         ? age dx   • Dementia Maternal Grandmother    • Hypertension Maternal Grandmother    • Ovarian cancer Daughter 23   • Diabetes Daughter    • Depression Daughter    • Diabetes Sister    • Diabetes Brother    • Heart disease Brother    • Cancer Brother          at age 43   • Heart disease Maternal Uncle    • Cancer Paternal Uncle    • Clotting disorder Paternal Grandmother    • Colon cancer Neg Hx    • Colon polyps Neg Hx       MEDS: reviewed as per chart  ALL: NKDA  ROS: 12 point negative except as in HPI    Vital Sign Min/Max for last 24 hours  Temp  Min: 96.4 °F (35.8 °C)  Max: 98.5 °F (36.9 °C)   BP  Min: 115/65  Max: 150/86   Pulse  Min: 75  Max: 87   Resp  Min: 16  Max: 20   SpO2  Min: 90 %  Max: 96 %   No data recorded   Weight  Min: 69.9 kg (154 lb)  Max: 70 kg (154 lb 5.2 oz)     GEN:  NAD,  AxOx3  HEENT: PERRL, EOMI, no icterus, mmm, no jvd, trachea midline, neck supple  CHEST: CTA bilat, no wheezes, no crackles, no use of accessory muscles  CV: RRR, no m/g/r  ABD: soft, nt, nd +bs, no hepatosplenomegaly  EXT: no c/c/e  SKIN: no rashes, no xanthomas, nl turgor, warm, dry  LYMPH: no palpable cervical or supraclavicular lymphadenopathy  NEURO: CN 2-12 intact and symmetric bilaterally  PSYCH: nl affect, nl orientation, nl judgement, nl mood  MSK: No kyphoscoliosis, 5/5 strength ue and le bilaterally    Labs: 9/5: reviewed:  Glucose 30.9  Bun 18  Cr 3.83  Na 136  Bicarb 23    CT head: 9/5: reviewed:  1. There are bifrontal areas of hemorrhage seen near the vertex. These  are favored to represent areas of subdural hemorrhage, and there is an  additional area of subdural hemorrhage seen overlying the left cerebral  convexity, measuring up to 3 mm in thickness. There is also some  subarachnoid hemorrhage within the left frontal lobe as well. There is  no midline shift or mass effect at this time.  2. Air-fluid level noted within the left maxillary sinus. Correlation  with any evidence of sinusitis is recommended.    A/P:  1. Acute subdural and subarachnoid hemorrhage 2/2 mechanical fall -- admit to ICU, control BP, serial neurologic checks, repeat ct imaging in time, consult to BRYN  2. ESRD -- m,w,f diaylsis  3. DMII with hyperglycemia -- ssi  4. Tobacco abuse  5. Copd

## 2020-09-05 NOTE — CONSULTS
Referring Provider: Dr. Tyrese Dawson  Reason for Consultation: recent GERI/ATN on HD    Subjective     Chief complaint   Chief Complaint   Patient presents with   • Fall   • Head Injury       History of present illness:  64 yo WF with CKD3 at baseline just d/c'd yesterday following hospitalization for severe metabolic acidosis with shock attributed to multifocal pneumonia and metformin use, complicated by severe GERI requiring initiation of HD that is still ongoing.  She was re-admitted late last night after she fell at home after she lost her balance on a ramp.  Had brief LOC.  She now has SAH.  Last HD was 9/3; arrangements have been made for temporary outpatient HD at [facility].  Neurosurgery is following; serial imaging planned.  No indications for any operative intervention far.  · Breathing is comfortable on room air  · Appetite is good; no N/V; modest headache but improving  · No urinary complaints    Past Medical History:   Diagnosis Date   • COPD (chronic obstructive pulmonary disease) (CMS/HCC)    • Diabetes mellitus (CMS/HCC)     type 2   • Fibroid    • Hypertension    • Leukocytosis    • Neuromuscular disorder (CMS/HCC)    • Skin cancer     nose   • TIA (transient ischemic attack)      Past Surgical History:   Procedure Laterality Date   • CHOLECYSTECTOMY     • COLONOSCOPY     • INSERTION HEMODIALYSIS CATHETER N/A 9/3/2020    Procedure: HEMODIALYSIS CATHETER INSERTION;  Surgeon: Sergio Durant MD;  Location: [facility] OR;  Service: Vascular;  Laterality: N/A;   • KIDNEY STONE SURGERY       Family History   Problem Relation Age of Onset   • Heart disease Mother          at age 63   • Diabetes Mother    • Hypertension Mother    • Diabetes Father    • Deep vein thrombosis Father    • Depression Father    • Liver disease Father    • Lung cancer Father 58         at age 68   • Breast cancer Maternal Grandmother         ? age dx   • Dementia Maternal Grandmother    • Hypertension Maternal  "Grandmother    • Ovarian cancer Daughter 23   • Diabetes Daughter    • Depression Daughter    • Diabetes Sister    • Diabetes Brother    • Heart disease Brother    • Cancer Brother          at age 43   • Heart disease Maternal Uncle    • Cancer Paternal Uncle    • Clotting disorder Paternal Grandmother    • Colon cancer Neg Hx    • Colon polyps Neg Hx      Social History     Tobacco Use   • Smoking status: Current Every Day Smoker     Packs/day: 0.50     Types: Cigarettes   • Smokeless tobacco: Never Used   Substance Use Topics   • Alcohol use: No   • Drug use: No     Medications Prior to Admission   Medication Sig Dispense Refill Last Dose   • Alcohol Swabs (B-D SINGLE USE SWABS REGULAR) pads 1 each 3 (Three) Times a Day Before Meals. 200 each 11    • aspirin 81 MG EC tablet Take 81 mg by mouth Daily.   Taking   • Blood Glucose Calibration (ACCU-CHEK LUIS ALBERTO) solution 1 each by In Vitro route 3 (Three) Times a Day Before Meals. 5 each 5    • Blood Glucose Monitoring Suppl (ACCU-CHEK LUIS ALBERTO PLUS) w/Device kit 1 each 3 (Three) Times a Day Before Meals. 1 kit 2    • colestipol (COLESTID) 1 g tablet Take 1 tablet by mouth 2 (Two) Times a Day. 60 tablet 5    • Cyanocobalamin (VITAMIN B 12) 500 MCG tablet Take 500 mcg by mouth Daily.   Taking   • glucose blood (Accu-Chek Luis Alberto Plus) test strip 1 each by Other route 3 (Three) Times a Day Before Meals. Use as instructed 200 each 11    • hydrocortisone (ANUSOL-HC) 2.5 % rectal cream Insert  into the rectum 2 (Two) Times a Day. 30 g 5 Taking   • insulin aspart (novoLOG) 100 UNIT/ML injection Inject 0-9 Units under the skin into the appropriate area as directed 3 (Three) Times a Day Before Meals. Discard vial 28 days after opening 10 mL 1    • Insulin Syringe-Needle U-100 31G X \" 0.5 ML misc Use to inject insulin three times a day 100 each 0    • Lancets (ACCU-CHEK MULTICLIX) lancets 1 each by Other route 3 (Three) Times a Day Before Meals. Use as instructed 200 each " "11    • Lancets Misc. (ACCU-CHEK MULTICLIX LANCET DEV) kit 1 each 3 (Three) Times a Day Before Meals. 200 each 11    • metoprolol succinate XL (TOPROL-XL) 200 MG 24 hr tablet Take 1 tablet by mouth Daily. 90 tablet 1    • nicotine (NICODERM CQ) 21 MG/24HR patch Place 1 patch on the skin as directed by provider Daily. 30 each 0    • omeprazole (priLOSEC) 20 MG capsule Take 1 capsule by mouth Daily. 90 capsule 1    • pregabalin (Lyrica) 150 MG capsule Take 150 mg by mouth 3 (Three) Times a Day.      • rOPINIRole (REQUIP) 1 MG tablet Take 3 mg by mouth At Night As Needed.      • torsemide (DEMADEX) 20 MG tablet Take 2 tablets by mouth Daily. 30 tablet 0      Allergies:  Patient has no known allergies.    Review of Systems  14-point ROS performed and all negative except for pertinent +/-'s detailed in HPI.     Objective     Vital Signs  Temp:  [96.4 °F (35.8 °C)-98.6 °F (37 °C)] 98.6 °F (37 °C)  Heart Rate:  [64-87] 74  Resp:  [16] 16  BP: ()/(45-86) 93/66    Flowsheet Rows      First Filed Value   Admission Height  163.8 cm (64.5\") Documented at 09/04/2020 2235   Admission Weight  69.9 kg (154 lb) Documented at 09/05/2020 0032           I/O this shift:  In: 120 [P.O.:120]  Out: -   I/O last 3 completed shifts:  In: 120 [P.O.:120]  Out: -     Intake/Output Summary (Last 24 hours) at 9/5/2020 1407  Last data filed at 9/5/2020 1322  Gross per 24 hour   Intake 240 ml   Output --   Net 240 ml       Physical Exam:  NAD; pleasant; oriented; looks stated age; appropriate  MMM; no scleral icterus  No JVD; no carotid bruits  Crackles bilat; not labored on room air  Right chest TDC  RRR, no rub  Soft, NT, ND, BS+  No significant edema  No clubbing  No asterixis  Moves all extremities     Results Review:  Results from last 7 days   Lab Units 09/05/20  0129 09/04/20  0853 09/03/20  0423  08/31/20  0624   SODIUM mmol/L 137 136 137   < > 137   POTASSIUM mmol/L 4.4 3.8 4.2   < > 4.0   CHLORIDE mmol/L 99 98 99   < > 99   CO2 " mmol/L 24.9 23.8 24.4   < > 23.6   BUN mg/dL 25* 18 35*   < > 34*   CREATININE mg/dL 4.29* 3.83* 5.32*   < > 4.89*   CALCIUM mg/dL 9.1 8.8 8.9   < > 9.1   BILIRUBIN mg/dL  --   --   --   --  0.3   ALK PHOS U/L  --   --   --   --  88   ALT (SGPT) U/L  --   --   --   --  21   AST (SGOT) U/L  --   --   --   --  23   GLUCOSE mg/dL 195* 309* 200*   < > 118*    < > = values in this interval not displayed.       Estimated Creatinine Clearance: 12.5 mL/min (A) (by C-G formula based on SCr of 4.29 mg/dL (H)).    Results from last 7 days   Lab Units 09/04/20  0853 09/03/20  0423 09/02/20  0440   PHOSPHORUS mg/dL 3.9 6.3* 6.3*       Results from last 7 days   Lab Units 09/05/20  0129 09/03/20  0423 08/31/20  0624 08/30/20  0615   WBC 10*3/mm3 13.67* 8.04 9.76 7.91   HEMOGLOBIN g/dL 10.3* 9.9* 11.3* 11.9*   PLATELETS 10*3/mm3 236 209 249 238       Results from last 7 days   Lab Units 09/05/20  0129   INR  0.86*       Active Medications    insulin lispro 0-9 Units Subcutaneous Q4H   metoprolol succinate  mg Oral Daily   nicotine 1 patch Transdermal Q24H   pantoprazole 40 mg Oral QAM   pregabalin 75 mg Oral Daily   sodium chloride 10 mL Intravenous Q12H   torsemide 40 mg Oral Daily          Assessment/Plan   Assessment  1.   Non-olig GERI, ATN due to shock.  No  evidence for renal recovery yet.  SCR rises in the absence of HD.    Volume status fine; electrolytes are compensated.  Dialysis dependent since 8/25. TDC placed 9/3.  2.  Fall 9/4/2020 with subsequent SAH and subdural hematoma  3.  Recent severe metabolic acidemia with lactic acidosis last month. Multifactorial including metformin, sepsis, hypotension. Resolved.   4. Septic shock last month due to multifocal PNA on CT. Completed Antibiotic .   5. Anemia.   Lytic lesion on skull and right renal mass.  Elevated light chain kappa/lambda ratio. 24 hour urine for paraprotein studies.  follow up arranged.   6. DM2.  Off metformin and jardiance.  Even if patient's renal  function recovers, would NOT resume metformin as this is her second episode of acidemia   7. HTN, improving        SAH (subarachnoid hemorrhage) (CMS/HCC)      Plan  1.  Barring any surprises, plan next HD 9/7  2.  Serial imaging of the head    I discussed the patient's findings and my recommendations with the patient    Huan Beaulieu MD  09/05/20  14:07

## 2020-09-05 NOTE — THERAPY EVALUATION
Patient Name: Jayne Beltran  : 1955    MRN: [id number]                              Today's Date: 2020       Admit Date: 2020    Visit Dx:     ICD-10-CM ICD-9-CM   1. SAH (subarachnoid hemorrhage) (CMS/HCC) I60.9 430   2. SDH (subdural hematoma) (CMS/HCC) S06.5X9A 432.1   3. Fall, initial encounter W19.XXXA E888.9     Patient Active Problem List   Diagnosis   • Essential hypertension   • Snoring   • TIA (transient ischemic attack)   • GERD without esophagitis   • Inflamed seborrheic keratosis   • Type 2 diabetes mellitus without complication, without long-term current use of insulin (CMS/HCC)   • Peripheral neuropathy   • Restless legs syndrome   • Benign paroxysmal positional vertigo   • Bone lesion   • Vitamin D deficiency   • Dyslipidemia   • Muscle spasm of both lower legs   • Tobacco use disorder   • Abnormal lung sounds   • Intermittent claudication (CMS/HCC)   • Chronic respiratory failure with hypoxia (CMS/HCC)   • Leg mass, right   • Rib pain on left side   • Diabetic autonomic neuropathy associated with type 2 diabetes mellitus (CMS/HCC)   • Leukocytosis   • Primary insomnia   • PMB (postmenopausal bleeding)   • Family history of ovarian cancer   • Acute renal failure (ARF) (CMS/HCC)   • Duplicated renal collecting system   • Atherosclerotic vascular disease   • Elevated platelet count   • Low hemoglobin   • Other anomalies of uterus   • Multiple kidney stones   • Multiple kidney stones   • Generalized edema   • Bilateral lower extremity edema   • Eyelid edema   • Diarrhea   • Sore on leg   • Syncope and collapse   • Acute renal failure (CMS/HCC)   • SAH (subarachnoid hemorrhage) (CMS/HCC)     Past Medical History:   Diagnosis Date   • COPD (chronic obstructive pulmonary disease) (CMS/HCC)    • Diabetes mellitus (CMS/HCC)     type 2   • Fibroid    • Hypertension    • Leukocytosis    • Neuromuscular disorder (CMS/HCC)    • Skin cancer     nose   • TIA (transient ischemic attack)      Past  Surgical History:   Procedure Laterality Date   • CHOLECYSTECTOMY     • COLONOSCOPY     • INSERTION HEMODIALYSIS CATHETER N/A 9/3/2020    Procedure: HEMODIALYSIS CATHETER INSERTION;  Surgeon: Sergio Durant MD;  Location: [facility];  Service: Vascular;  Laterality: N/A;   • KIDNEY STONE SURGERY       General Information     Row Name 09/05/20 1541          PT Evaluation Time/Intention    Document Type  evaluation  -CS     Mode of Treatment  physical therapy  -CS     Row Name 09/05/20 1541          General Information    Patient Profile Reviewed?  yes  -CS     Existing Precautions/Restrictions  fall  -CS     Row Name 09/05/20 1541          Relationship/Environment    Lives With  spouse  -CS     Row Name 09/05/20 1541          Resource/Environmental Concerns    Current Living Arrangements  home/apartment/condo  -CS     Row Name 09/05/20 1541          Cognitive Assessment/Intervention- PT/OT    Orientation Status (Cognition)  oriented x 3  -CS     Row Name 09/05/20 1541          Safety Issues, Functional Mobility    Impairments Affecting Function (Mobility)  balance;endurance/activity tolerance;visual/perceptual  -CS     Comment, Safety Issues/Impairments (Mobility)  dizziness  -CS       User Key  (r) = Recorded By, (t) = Taken By, (c) = Cosigned By    Initials Name Provider Type    CS Ministerio Fields, PT Physical Therapist        Mobility     Row Name 09/05/20 1543          Bed Mobility Assessment/Treatment    Bed Mobility Assessment/Treatment  supine-sit;sit-supine  -CS     Supine-Sit [place] (Bed Mobility)  minimum assist (75% patient effort);contact guard;verbal cues  -CS     Sit-Supine [place] (Bed Mobility)  contact guard;minimum assist (75% patient effort);verbal cues  -CS     Assistive Device (Bed Mobility)  bed rails;head of bed elevated  -CS     Row Name 09/05/20 1543          Sit-Stand Transfer    Sit-Stand [place] (Transfers)  minimum assist (75% patient effort);verbal cues;nonverbal  cues (demo/gesture)  -CS     Row Name 09/05/20 1543          Gait/Stairs Assessment/Training    Distance in Feet (Gait)  several sidesteps towards the head of bed  -CS     Comment (Gait/Stairs)  dizziness limiting mobility  -CS       User Key  (r) = Recorded By, (t) = Taken By, (c) = Cosigned By    Initials Name Provider Type    Ministerio Gibbons, PT Physical Therapist        Obj/Interventions     Row Name 09/05/20 1545          General ROM    GENERAL ROM COMMENTS  WFL  -CS     Row Name 09/05/20 1545          MMT (Manual Muscle Testing)    General MMT Comments  >3/5  -CS       User Key  (r) = Recorded By, (t) = Taken By, (c) = Cosigned By    Initials Name Provider Type    Ministerio Gibbons, PT Physical Therapist        Goals/Plan     Row Name 09/05/20 1547          Bed Mobility Goal 1 (PT)    Activity/Assistive Device (Bed Mobility Goal 1, PT)  sit to supine;supine to sit  -CS     [place] Level/Cues Needed (Bed Mobility Goal 1, PT)  conditional independence  -CS     Time Frame (Bed Mobility Goal 1, PT)  10 days  -CS     Row Name 09/05/20 1547          Transfer Goal 1 (PT)    Activity/Assistive Device (Transfer Goal 1, PT)  sit-to-stand/stand-to-sit;bed-to-chair/chair-to-bed  -CS     [place] Level/Cues Needed (Transfer Goal 1, PT)  conditional independence  -CS     Time Frame (Transfer Goal 1, PT)  1 week  -     Row Name 09/05/20 1547          Gait Training Goal 1 (PT)    Activity/Assistive Device (Gait Training Goal 1, PT)  assistive device use  -CS     [place] Level (Gait Training Goal 1, PT)  conditional independence  -CS     Distance (Gait Goal 1, PT)  150  -CS     Time Frame (Gait Training Goal 1, PT)  10 days  -CS       User Key  (r) = Recorded By, (t) = Taken By, (c) = Cosigned By    Initials Name Provider Type    Ministerio Gibbons, PT Physical Therapist        Clinical Impression     Row Name 09/05/20 1546          Pain Assessment    Additional Documentation  Pain Scale: FACES  Pre/Post-Treatment (Group)  -CS     Row Name 09/05/20 1546          Pain Scale: Numbers Pre/Post-Treatment    Pain Intervention(s)  Repositioned;Ambulation/increased activity  -CS     Row Name 09/05/20 1546          Pain Scale: FACES Pre/Post-Treatment    Pain: FACES Scale, Pretreatment  2-->hurts little bit  -CS     Pain: FACES Scale, Post-Treatment  2-->hurts little bit  -CS     Row Name 09/05/20 1546          Plan of Care Review    Plan of Care Reviewed With  patient  -CS     Row Name 09/05/20 1546          Physical Therapy Clinical Impression    Patient/Family Goals Statement (PT Clinical Impression)  home  -CS     Criteria for Skilled Interventions Met (PT Clinical Impression)  yes  -CS     Rehab Potential (PT Clinical Summary)  good, to achieve stated therapy goals  -CS     Row Name 09/05/20 1546          Vital Signs    O2 Delivery Pre Treatment  room air  -CS     Row Name 09/05/20 1546          Positioning and Restraints    Pre-Treatment Position  in bed  -CS     Post Treatment Position  bed  -CS     In Bed  supine;call light within reach;encouraged to call for assist;exit alarm on  -CS       User Key  (r) = Recorded By, (t) = Taken By, (c) = Cosigned By    Initials Name Provider Type    Ministerio Gibbons, PT Physical Therapist        Outcome Measures     Row Name 09/05/20 1548          How much help from another person do you currently need...    Turning from your back to your side while in flat bed without using bedrails?  3  -CS     Moving from lying on back to sitting on the side of a flat bed without bedrails?  3  -CS     Moving to and from a bed to a chair (including a wheelchair)?  3  -CS     Standing up from a chair using your arms (e.g., wheelchair, bedside chair)?  3  -CS     Climbing 3-5 steps with a railing?  1  -CS     To walk in hospital room?  2  -CS     AM-PAC 6 Clicks Score (PT)  15  -CS     Row Name 09/05/20 1548          Functional Assessment    Outcome Measure Options  AM-PAC 6 Clicks  Basic Mobility (PT)  -CS       User Key  (r) = Recorded By, (t) = Taken By, (c) = Cosigned By    Initials Name Provider Type    Ministerio Gibbons, PT Physical Therapist        Physical Therapy Education                 Title: PT OT SLP Therapies (Done)     Topic: Physical Therapy (Done)     Point: Mobility training (Done)     Description:   Instruct learner(s) on safety and technique for assisting patient out of bed, chair or wheelchair.  Instruct in the proper use of assistive devices, such as walker, crutches, cane or brace.              Patient Friendly Description:   It's important to get you on your feet again, but we need to do so in a way that is safe for you. Falling has serious consequences, and your personal safety is the most important thing of all.        When it's time to get out of bed, one of us or a family member will sit next to you on the bed to give you support.     If your doctor or nurse tells you to use a walker, crutches, a cane, or a brace, be sure you use it every time you get out of bed, even if you think you don't need it.    Learning Progress Summary           Patient Acceptance, E,TB, VU,NR by  at 9/5/2020 1548                   Point: Home exercise program (Done)     Description:   Instruct learner(s) on appropriate technique for monitoring, assisting and/or progressing patient with therapeutic exercises and activities.              Learning Progress Summary           Patient Acceptance, E,TB, VU,NR by  at 9/5/2020 1548                   Point: Body mechanics (Done)     Description:   Instruct learner(s) on proper positioning and spine alignment for patient and/or caregiver during mobility tasks and/or exercises.              Learning Progress Summary           Patient Acceptance, E,TB, VU,NR by  at 9/5/2020 1548                   Point: Precautions (Done)     Description:   Instruct learner(s) on prescribed precautions during mobility and gait tasks              Learning  Progress Summary           Patient Acceptance, E,TB, VU,NR by  at 9/5/2020 1548                               User Key     Initials Effective Dates Name Provider Type Discipline     05/14/18 -  Ministerio Fields, PT Physical Therapist PT              PT Recommendation and Plan  Planned Therapy Interventions (PT Eval): balance training, bed mobility training, neuromuscular re-education, home exercise program, patient/family education, transfer training, gait training  Outcome Summary/Treatment Plan (PT)  Anticipated Discharge Disposition (PT): home with home health, home with assist, skilled nursing facility  Plan of Care Reviewed With: patient     Time Calculation:   PT Charges     Row Name 09/05/20 1555             Time Calculation    Start Time  1505  -      Stop Time  1523  -      Time Calculation (min)  18 min  -      PT Received On  09/05/20  -      PT - Next Appointment  09/06/20  -      PT Goal Re-Cert Due Date  09/12/20  -        User Key  (r) = Recorded By, (t) = Taken By, (c) = Cosigned By    Initials Name Provider Type     Ministerio Fields, PT Physical Therapist        Therapy Charges for Today     Code Description Service Date Service Provider Modifiers Qty    54204580148 HC PT EVAL MOD COMPLEXITY 2 9/5/2020 Ministerio Fields, PT GP 1    17387349671 HC PT THER PROC EA 15 MIN 9/5/2020 Ministerio Fields, PT GP 1          PT G-Codes  Outcome Measure Options: AM-PAC 6 Clicks Basic Mobility (PT)  AM-PAC 6 Clicks Score (PT): 15    Ministerio Fields PT  9/5/2020

## 2020-09-05 NOTE — PLAN OF CARE
Problem: Patient Care Overview  Goal: Plan of Care Review  Outcome: Ongoing (interventions implemented as appropriate)  Flowsheets (Taken 9/5/2020 1004)  Progress: no change  Plan of Care Reviewed With: patient  Outcome Summary: Clinical Swallow Evaluation completed. Pt exhibited inconsistent light coughing with thins by cup and thins by straw. She reported difficulty masticating regular consistency. Recommend change diet to soft diet (chopped meats) with NTL. Meds with puree or pudding. Pt may have water protocol between meals. Will continue to follow.

## 2020-09-05 NOTE — THERAPY EVALUATION
Acute Care - Speech Language Pathology   Swallow Initial Evaluation [facility]     Patient Name: Jayne Beltran  : 1955  MRN: [id number]  Today's Date: 2020               Admit Date: 2020    Visit Dx:     ICD-10-CM ICD-9-CM   1. SAH (subarachnoid hemorrhage) (CMS/HCC) I60.9 430   2. SDH (subdural hematoma) (CMS/HCC) S06.5X9A 432.1   3. Fall, initial encounter W19.XXXA E888.9     Patient Active Problem List   Diagnosis   • Essential hypertension   • Snoring   • TIA (transient ischemic attack)   • GERD without esophagitis   • Inflamed seborrheic keratosis   • Type 2 diabetes mellitus without complication, without long-term current use of insulin (CMS/HCC)   • Peripheral neuropathy   • Restless legs syndrome   • Benign paroxysmal positional vertigo   • Bone lesion   • Vitamin D deficiency   • Dyslipidemia   • Muscle spasm of both lower legs   • Tobacco use disorder   • Abnormal lung sounds   • Intermittent claudication (CMS/HCC)   • Chronic respiratory failure with hypoxia (CMS/HCC)   • Leg mass, right   • Rib pain on left side   • Diabetic autonomic neuropathy associated with type 2 diabetes mellitus (CMS/HCC)   • Leukocytosis   • Primary insomnia   • PMB (postmenopausal bleeding)   • Family history of ovarian cancer   • Acute renal failure (ARF) (CMS/HCC)   • Duplicated renal collecting system   • Atherosclerotic vascular disease   • Elevated platelet count   • Low hemoglobin   • Other anomalies of uterus   • Multiple kidney stones   • Multiple kidney stones   • Generalized edema   • Bilateral lower extremity edema   • Eyelid edema   • Diarrhea   • Sore on leg   • Syncope and collapse   • Acute renal failure (CMS/HCC)   • SAH (subarachnoid hemorrhage) (CMS/HCC)     Past Medical History:   Diagnosis Date   • COPD (chronic obstructive pulmonary disease) (CMS/HCC)    • Diabetes mellitus (CMS/HCC)     type 2   • Fibroid    • Hypertension    • Leukocytosis    • Neuromuscular disorder (CMS/HCC)    •  Skin cancer     nose   • TIA (transient ischemic attack)      Past Surgical History:   Procedure Laterality Date   • CHOLECYSTECTOMY     • COLONOSCOPY     • INSERTION HEMODIALYSIS CATHETER N/A 9/3/2020    Procedure: HEMODIALYSIS CATHETER INSERTION;  Surgeon: Sergio Durant MD;  Location: [facility] MAIN OR;  Service: Vascular;  Laterality: N/A;   • KIDNEY STONE SURGERY          SWALLOW EVALUATION (last 72 hours)      SLP Adult Swallow Evaluation     Row Name 09/05/20 0900                   Rehab Evaluation    Document Type  evaluation  -AL        Subjective Information  complains of headache  -AL        Patient Observations  alert;cooperative  -AL        Patient/Family Observations  Pt sat upright on side of bed for evaluation.  -AL        Patient Effort  excellent  -AL        Symptoms Noted During/After Treatment  other (see comments) headache  -AL           General Information    Patient Profile Reviewed  yes  -AL        Pertinent History Of Current Problem  Subarachnoid hemorrhage due to fall.   -AL        Current Method of Nutrition  regular textures;thin liquids  -AL        Precautions/Limitations, Vision  WFL;for purposes of eval  -AL        Precautions/Limitations, Hearing  WFL;for purposes of eval  -AL        Prior Level of Function-Communication  WFL  -AL        Prior Level of Function-Swallowing  no diet consistency restrictions  -AL        Plans/Goals Discussed with  patient  -AL        Barriers to Rehab  none identified  -AL        Patient's Goals for Discharge  return to regular diet  -AL           Oral Motor and Function    Dentition Assessment  upper dentures/partial in place;other (see comments) edentulous  -AL        Secretion Management  WNL/WFL  -AL        Mucosal Quality  moist, healthy  -AL           Oral Musculature and Cranial Nerve Assessment    Oral Motor General Assessment  WFL  -AL           General Eating/Swallowing Observations    Respiratory Support Currently in Use  room air  -AL         Eating/Swallowing Skills  self-fed  -AL        Positioning During Eating  upright 90 degree  -AL        Utensils Used  spoon;cup;straw  -AL        Consistencies Trialed  regular textures;soft textures;pureed;thin liquids;nectar/syrup-thick liquids mixed  -AL        Pre SpO2 (%)  91  -AL        Post SpO2 (%)  91  -AL           Clinical Swallow Eval    Clinical Swallow Evaluation Summary  Pt exhibited inconsistent light cough with thins by cup and x1 with thins by straw. No overt s/s of aspiration or penetration observed with NTL, puree, and mixed. Pt did not trial regular due to reported difficulty masticating crackers with only top dentures. Recommend change diet to soft diet (chopped) with NTL, meds with puree or pudding. Recommend water protocol between meals.  -AL           Clinical Impression    SLP Swallowing Diagnosis  oral dysfunction;suspected pharyngeal dysfunction  -AL        Functional Impact  risk of aspiration/pneumonia  -AL        Rehab Potential/Prognosis, Swallowing  good, to achieve stated therapy goals  -AL        Swallow Criteria for Skilled Therapeutic Interventions Met  demonstrates skilled criteria  -AL           Recommendations    Therapy Frequency (Swallow)  PRN  -AL        Predicted Duration Therapy Intervention (Days)  until discharge  -AL        SLP Diet Recommendation  soft textures;chopped;nectar thick liquids;water between meals after oral care, with supervision  -AL        Recommended Diagnostics  reassess via clinical swallow evaluation  -AL        Recommended Precautions and Strategies  upright posture during/after eating;small bites of food and sips of liquid  -AL        SLP Rec. for Method of Medication Administration  meds whole;with pudding or applesauce  -AL        Monitor for Signs of Aspiration  yes;notify SLP if any concerns  -AL        Anticipated Dischage Disposition (SLP)  home with assist  -AL           Swallow Goals (SLP)    Oral Nutrition/Hydration Goal Selection (SLP)   oral nutrition/hydration, SLP goal 1  -AL           Oral Nutrition/Hydration Goal 1 (SLP)    Oral Nutrition/Hydration Goal 1, SLP  Pt will tolerate the least restrictive diet with NO cues.  -AL        Time Frame (Oral Nutrition/Hydration Goal 1, SLP)  by discharge  -AL          User Key  (r) = Recorded By, (t) = Taken By, (c) = Cosigned By    Initials Name Effective Dates    Aleida Gamez, MS CCC-SLP 08/30/19 -           EDUCATION  The patient has been educated in the following areas:   Dysphagia (Swallowing Impairment).    SLP Recommendation and Plan  SLP Swallowing Diagnosis: oral dysfunction, suspected pharyngeal dysfunction  SLP Diet Recommendation: soft textures, chopped, nectar thick liquids, water between meals after oral care, with supervision  Recommended Precautions and Strategies: upright posture during/after eating, small bites of food and sips of liquid  SLP Rec. for Method of Medication Administration: meds whole, with pudding or applesauce     Monitor for Signs of Aspiration: yes, notify SLP if any concerns  Recommended Diagnostics: reassess via clinical swallow evaluation  Swallow Criteria for Skilled Therapeutic Interventions Met: demonstrates skilled criteria  Anticipated Dischage Disposition (SLP): home with assist  Rehab Potential/Prognosis, Swallowing: good, to achieve stated therapy goals  Therapy Frequency (Swallow): PRN  Predicted Duration Therapy Intervention (Days): until discharge       Plan of Care Reviewed With: patient  Progress: no change  Outcome Summary: Clinical Swallow Evaluation completed. Pt exhibited inconsistent light coughing with thins by cup and thins by straw. She reported difficulty masticating regular consistency. Recommend change diet to soft diet (chopped meats) with NTL. Meds with puree or pudding. Pt may have water protocol between meals. Will continue to follow.     Patient was not wearing a face mask during this therapy encounter. Therapist used appropriate  personal protective equipment including mask, eye protection and gloves.  Mask used was standard procedure mask. Appropriate PPE was worn during the entire therapy session. Hand hygiene was completed before and after therapy session. Patient is not in enhanced droplet precautions.         SLP GOALS     Row Name 09/05/20 0900             Oral Nutrition/Hydration Goal 1 (SLP)    Oral Nutrition/Hydration Goal 1, SLP  Pt will tolerate the least restrictive diet with NO cues.  -AL      Time Frame (Oral Nutrition/Hydration Goal 1, SLP)  by discharge  -AL        User Key  (r) = Recorded By, (t) = Taken By, (c) = Cosigned By    Initials Name Provider Type    Aleida Gamez MS CCC-SLP Speech and Language Pathologist           SLP Outcome Measures (last 72 hours)      SLP Outcome Measures     Row Name 09/05/20 1000             SLP Outcome Measures    Outcome Measure Used?  Adult NOMS  -AL         Adult FCM Scores    FCM Chosen  Swallowing  -AL      Swallowing FCM Score  4  -AL        User Key  (r) = Recorded By, (t) = Taken By, (c) = Cosigned By    Initials Name Effective Dates    Aleida Gamez MS CCC-SLP 08/30/19 -            Time Calculation:   Time Calculation- SLP     Row Name 09/05/20 1011             Time Calculation- SLP    SLP Start Time  0845  -AL      SLP Stop Time  0930  -AL      SLP Time Calculation (min)  45 min  -AL        User Key  (r) = Recorded By, (t) = Taken By, (c) = Cosigned By    Initials Name Provider Type    Aleida Gamez MS CCC-SLP Speech and Language Pathologist          Therapy Charges for Today     Code Description Service Date Service Provider Modifiers Qty    [id number] HC ST EVAL ORAL PHARYNG SWALLOW 3 9/5/2020 Aleida Bermudez MS CCC-ANNA GN 1               MS TIFFANY Schuler  9/5/2020

## 2020-09-05 NOTE — ED PROVIDER NOTES
EMERGENCY DEPARTMENT ENCOUNTER    Room Number:  I374/1  Date of encounter:  9/5/2020  PCP: Michael Arriaza Jr., DO    HPI:  Context: Jayne Beltran is a 65 y.o. female who presents to the ED c/o chief complaint of headache after a fall approximately 4 hours ago.  Patient states she was discharged from the hospital yesterday.  She was walking up a ramp at her house when she slipped and fell backwards hitting her head.  With a positive loss of consciousness and she does have a mild bit of trauma amnesia.  She denies nausea or vomiting but states that she has a headache at the top of her head.  She denies any other injury.  She denies neck pain, back pain, chest pain, abdominal pain.  She denies weakness or numbness of an arm or leg.  She is a dialysis patient and gets hemodialysis on Monday Wednesday and Friday.  She did have hemodialysis yesterday.    MEDICAL HISTORY REVIEW  Reviewed in EPIC  Patient was discharged yesterday.  She had a SARS-CoV-2 assay on August 24, 2020 which was negative.  PAST MEDICAL HISTORY  Active Ambulatory Problems     Diagnosis Date Noted   • Essential hypertension 03/24/2016   • Snoring 03/24/2016   • TIA (transient ischemic attack) 03/24/2016   • GERD without esophagitis 06/15/2017   • Inflamed seborrheic keratosis 06/15/2017   • Type 2 diabetes mellitus without complication, without long-term current use of insulin (CMS/[facility]) 06/15/2017   • Peripheral neuropathy 06/15/2017   • Restless legs syndrome 06/15/2017   • Benign paroxysmal positional vertigo 03/22/2016   • Bone lesion 10/13/2017   • Vitamin D deficiency 11/01/2017   • Dyslipidemia 11/01/2017   • Muscle spasm of both lower legs 08/29/2019   • Tobacco use disorder 08/29/2019   • Abnormal lung sounds 08/29/2019   • Intermittent claudication (CMS/[facility]) 09/05/2019   • Chronic respiratory failure with hypoxia (CMS/[facility]) 12/13/2019   • Leg mass, right 12/30/2019   • Rib pain on left side 12/30/2019   • Diabetic autonomic neuropathy  associated with type 2 diabetes mellitus (CMS/HCC) 01/10/2020   • Leukocytosis 2020   • Primary insomnia 2020   • PMB (postmenopausal bleeding) 2020   • Family history of ovarian cancer 2020   • Acute renal failure (ARF) (CMS/HCC) 2020   • Duplicated renal collecting system 03/10/2020   • Atherosclerotic vascular disease 03/10/2020   • Elevated platelet count 2020   • Low hemoglobin 2020   • Other anomalies of uterus 2020   • Multiple kidney stones 2020   • Multiple kidney stones 2020   • Generalized edema 2020   • Bilateral lower extremity edema 2020   • Eyelid edema 2020   • Diarrhea 2020   • Sore on leg 2020   • Syncope and collapse 2020   • Acute renal failure (CMS/HCC) 2020     Resolved Ambulatory Problems     Diagnosis Date Noted   • Dizzy 2016   • Hypersomnolence    • Chronic bilateral low back pain without sciatica 2017   • Cervical radiculopathy, acute 2019   • Ingrown toenail 2019     Past Medical History:   Diagnosis Date   • COPD (chronic obstructive pulmonary disease) (CMS/HCC)    • Diabetes mellitus (CMS/HCC)    • Fibroid    • Hypertension    • Neuromuscular disorder (CMS/HCC)    • Skin cancer        PAST SURGICAL HISTORY  Past Surgical History:   Procedure Laterality Date   • CHOLECYSTECTOMY     • COLONOSCOPY     • INSERTION HEMODIALYSIS CATHETER N/A 9/3/2020    Procedure: HEMODIALYSIS CATHETER INSERTION;  Surgeon: Sergio Durant MD;  Location: [facility];  Service: Vascular;  Laterality: N/A;   • KIDNEY STONE SURGERY         FAMILY HISTORY  Family History   Problem Relation Age of Onset   • Heart disease Mother          at age 63   • Diabetes Mother    • Hypertension Mother    • Diabetes Father    • Deep vein thrombosis Father    • Depression Father    • Liver disease Father    • Lung cancer Father 58         at age 68   • Breast cancer Maternal  Grandmother         ? age dx   • Dementia Maternal Grandmother    • Hypertension Maternal Grandmother    • Ovarian cancer Daughter 23   • Diabetes Daughter    • Depression Daughter    • Diabetes Sister    • Diabetes Brother    • Heart disease Brother    • Cancer Brother          at age 43   • Heart disease Maternal Uncle    • Cancer Paternal Uncle    • Clotting disorder Paternal Grandmother    • Colon cancer Neg Hx    • Colon polyps Neg Hx        SOCIAL HISTORY  Social History     Socioeconomic History   • Marital status:      Spouse name: Golden   • Number of children: 2   • Years of education: 12   • Highest education level: High school graduate   Occupational History     Employer: RETIRED   Tobacco Use   • Smoking status: Current Every Day Smoker     Packs/day: 0.50     Types: Cigarettes   • Smokeless tobacco: Never Used   Substance and Sexual Activity   • Alcohol use: No   • Drug use: No   • Sexual activity: Not Currently     Birth control/protection: Post-menopausal       ALLERGIES  Patient has no known allergies.    The patient's allergies have been reviewed    REVIEW OF SYSTEMS  All systems reviewed and negative except for those discussed in HPI.     PHYSICAL EXAM  I have reviewed the triage vital signs and nursing notes.  ED Triage Vitals   Temp Heart Rate Resp BP SpO2   20 2235 20 2235 20 2235 20 2337 20 223   96.4 °F (35.8 °C) 87 16 140/76 95 %      Temp src Heart Rate Source Patient Position BP Location FiO2 (%)   20 223 -- -- --   Tympanic Monitor          GENERAL: Mild distress and in pain.  HENT: NCAT, PERRL, Nares patent.  She has tenderness on the vertex of her scalp.  There is no laceration, swelling or ecchymosis noted.  EYES: no scleral icterus, extraocular movements are intact  NECK: trachea midline, no ROM limitations.  Her C-spine is nontender to palpation.  She has a healing wound in the base of her right neck from her central  line.  CV: regular rhythm, regular rate.  There is a dialysis catheter noted in the right anterior chest wall.  It is clean dry intact.  RESPIRATORY: normal effort, clear to auscultation bilaterally  ABDOMEN: soft normoactive bowel sounds, nontender nondistended.  : deferred  MUSCULOSKELETAL: no deformity  NEURO: alert, moves all extremities, follows commands  SKIN: warm, dry    LAB RESULTS  Recent Results (from the past 24 hour(s))   POC Glucose Once    Collection Time: 09/04/20  5:53 AM   Result Value Ref Range    Glucose 192 (H) 70 - 130 mg/dL   Renal Function Panel    Collection Time: 09/04/20  8:53 AM   Result Value Ref Range    Glucose 309 (H) 65 - 99 mg/dL    BUN 18 8 - 23 mg/dL    Creatinine 3.83 (H) 0.57 - 1.00 mg/dL    Sodium 136 136 - 145 mmol/L    Potassium 3.8 3.5 - 5.2 mmol/L    Chloride 98 98 - 107 mmol/L    CO2 23.8 22.0 - 29.0 mmol/L    Calcium 8.8 8.6 - 10.5 mg/dL    Albumin 3.20 (L) 3.50 - 5.20 g/dL    Phosphorus 3.9 2.5 - 4.5 mg/dL    Anion Gap 14.2 5.0 - 15.0 mmol/L    BUN/Creatinine Ratio 4.7 (L) 7.0 - 25.0    eGFR Non African Amer 12 (L) >60 mL/min/1.73    eGFR  African Amer     POC Glucose Once    Collection Time: 09/04/20 11:09 AM   Result Value Ref Range    Glucose 260 (H) 70 - 130 mg/dL   POC Glucose Once    Collection Time: 09/04/20  4:30 PM   Result Value Ref Range    Glucose 177 (H) 70 - 130 mg/dL   Basic Metabolic Panel    Collection Time: 09/05/20  1:29 AM   Result Value Ref Range    Glucose 195 (H) 65 - 99 mg/dL    BUN 25 (H) 8 - 23 mg/dL    Creatinine 4.29 (H) 0.57 - 1.00 mg/dL    Sodium 137 136 - 145 mmol/L    Potassium 4.4 3.5 - 5.2 mmol/L    Chloride 99 98 - 107 mmol/L    CO2 24.9 22.0 - 29.0 mmol/L    Calcium 9.1 8.6 - 10.5 mg/dL    eGFR  African Amer      eGFR Non African Amer 10 (L) >60 mL/min/1.73    BUN/Creatinine Ratio 5.8 (L) 7.0 - 25.0    Anion Gap 13.1 5.0 - 15.0 mmol/L   Protime-INR    Collection Time: 09/05/20  1:29 AM   Result Value Ref Range    Protime 11.7 11.7 -  14.2 Seconds    INR 0.86 (L) 0.90 - 1.10   CBC Auto Differential    Collection Time: 09/05/20  1:29 AM   Result Value Ref Range    WBC 13.67 (H) 3.40 - 10.80 10*3/mm3    RBC 3.62 (L) 3.77 - 5.28 10*6/mm3    Hemoglobin 10.3 (L) 12.0 - 15.9 g/dL    Hematocrit 32.5 (L) 34.0 - 46.6 %    MCV 89.8 79.0 - 97.0 fL    MCH 28.5 26.6 - 33.0 pg    MCHC 31.7 31.5 - 35.7 g/dL    RDW 16.5 (H) 12.3 - 15.4 %    RDW-SD 54.7 (H) 37.0 - 54.0 fl    MPV 11.6 6.0 - 12.0 fL    Platelets 236 140 - 450 10*3/mm3    Neutrophil % 76.7 (H) 42.7 - 76.0 %    Lymphocyte % 14.3 (L) 19.6 - 45.3 %    Monocyte % 6.8 5.0 - 12.0 %    Eosinophil % 0.7 0.3 - 6.2 %    Basophil % 0.5 0.0 - 1.5 %    Immature Grans % 1.0 (H) 0.0 - 0.5 %    Neutrophils, Absolute 10.48 (H) 1.70 - 7.00 10*3/mm3    Lymphocytes, Absolute 1.96 0.70 - 3.10 10*3/mm3    Monocytes, Absolute 0.93 (H) 0.10 - 0.90 10*3/mm3    Eosinophils, Absolute 0.10 0.00 - 0.40 10*3/mm3    Basophils, Absolute 0.07 0.00 - 0.20 10*3/mm3    Immature Grans, Absolute 0.13 (H) 0.00 - 0.05 10*3/mm3    nRBC 0.0 0.0 - 0.2 /100 WBC   POC Glucose Once    Collection Time: 09/05/20  2:04 AM   Result Value Ref Range    Glucose 181 (H) 70 - 130 mg/dL       I ordered the above labs and reviewed the results.    RADIOLOGY  Ct Head Without Contrast    Result Date: 9/5/2020  CT OF THE HEAD WITHOUT CONTRAST  HISTORY: Headache following head trauma  COMPARISON: 08/24/2020  TECHNIQUE: Axial CT imaging was obtained through the brain. No IV contrast was administered.  FINDINGS: Bifrontal areas of hemorrhage are seen near the vertex, with additional areas of subarachnoid hemorrhage noted overlying the left frontal lobe. There is also a subdural hematoma overlying the left cerebral convexity, which measures up to 3 mm in thickness. There is no midline shift or mass effect. There is some periventricular and deep white matter microangiopathic change. No calvarial fracture is seen. Air-fluid level is noted within the left maxillary  sinus. This is new when compared to prior exam. Correlation with any evidence of sinusitis is recommended. There is probably also some fluid within the right maxillary sinus. There is mild partial opacification of the right mastoid air cells. Parieto-occipital soft tissue hematoma. Mottled appearance to the calvarium is stable.       1. There are bifrontal areas of hemorrhage seen near the vertex. These are favored to represent areas of subdural hemorrhage, and there is an additional area of subdural hemorrhage seen overlying the left cerebral convexity, measuring up to 3 mm in thickness. There is also some subarachnoid hemorrhage within the left frontal lobe as well. There is no midline shift or mass effect at this time. 2. Air-fluid level noted within the left maxillary sinus. Correlation with any evidence of sinusitis is recommended.  FINDINGS were called to LIBBY Eaton in the emergency department at 12:20 AM.  Radiation dose reduction techniques were utilized, including automated exposure control and exposure modulation based on body size.  This report was finalized on 9/5/2020 12:21 AM by Dr. Jennifer West M.D.      Ct Cervical Spine Without Contrast    Result Date: 9/5/2020  CT OF THE CERVICAL SPINE  HISTORY: Fall with memory loss  COMPARISON: None available.  TECHNIQUE: Axial CT imaging was obtained through the cervical spine. Coronal and sagittal reformatted images were obtained.  FINDINGS: No acute fracture or subluxation of the cervical spine is identified. There is mild anterolisthesis of C3 on C4. Degenerative changes of the cervical spine are noted. This results in severe intervertebral disc space narrowing at C4-C5, C5-C6, and C6-C7, with some bony ankylosis noted. Images through the lung apices demonstrate mosaic attenuation, which may reflect air trapping.  C2-C3: There is no canal stenosis. There is mild neural foraminal narrowing on the right. C3-C4: There is no significant canal stenosis.  Moderate neural foraminal narrowing is noted on the right. C4-C5: Disc osteophyte complex results in some canal narrowing. There is moderate neural foraminal narrowing on the right. C5-C6: Disc osteophyte complex results in some canal narrowing. There is moderate bilateral neural foraminal narrowing, right slightly greater than left. C5-C6: Disc osteophyte complex results in flattening of the thecal sac. Neural foraminal narrowing is noted on the left. C7-T1: There is no significant canal stenosis or neural foraminal narrowing.  Dystrophic calcification is noted within the thyroid gland.      No acute fracture or subluxation identified.  Radiation dose reduction techniques were utilized, including automated exposure control and exposure modulation based on body size.  This report was finalized on 9/5/2020 12:27 AM by Dr. Jennifer West M.D.        I ordered the above noted radiological studies. I reviewed the images and results. I agree with the radiologist interpretation.    PROCEDURES  Procedures    MEDICATIONS GIVEN IN ER  Medications   sodium chloride 0.9 % flush 10 mL (has no administration in time range)   sodium chloride 0.9 % flush 10 mL (10 mL Intravenous Given 9/5/20 0231)   sodium chloride 0.9 % flush 10 mL (has no administration in time range)   acetaminophen (TYLENOL) tablet 650 mg (has no administration in time range)     Or   acetaminophen (TYLENOL) suppository 650 mg (has no administration in time range)   HYDROcodone-acetaminophen (NORCO) 5-325 MG per tablet 1 tablet (has no administration in time range)   enalaprilat (VASOTEC) injection 1.25 mg (has no administration in time range)   morphine injection 4 mg (has no administration in time range)   metoprolol succinate XL (TOPROL-XL) 24 hr tablet 200 mg (has no administration in time range)   nicotine (NICODERM CQ) 21 MG/24HR patch 1 patch (has no administration in time range)   pantoprazole (PROTONIX) EC tablet 40 mg (has no administration in  time range)   pregabalin (LYRICA) capsule 150 mg (has no administration in time range)   rOPINIRole (REQUIP) tablet 3 mg (has no administration in time range)   torsemide (DEMADEX) tablet 40 mg (has no administration in time range)   dextrose (GLUTOSE) oral gel 15 g (has no administration in time range)   dextrose (D50W) 25 g/ 50mL Intravenous Solution 25 g (has no administration in time range)   glucagon (human recombinant) (GLUCAGEN DIAGNOSTIC) injection 1 mg (has no administration in time range)   insulin lispro (humaLOG) injection 0-9 Units (has no administration in time range)   morphine injection 4 mg (4 mg Intravenous Given 9/5/20 0130)   ondansetron (ZOFRAN) injection 4 mg (4 mg Intravenous Given 9/5/20 0130)       PROGRESS, DATA ANALYSIS, CONSULTS, AND MEDICAL DECISION MAKING  A complete history and physical exam have been performed.  All available laboratory and imaging results have been reviewed by myself prior to disposition.  Patient was placed in face mask in first look. Patient was wearing facemask when I entered the room and throughout our encounter. I wore full protective equipment throughout this patient encounter including a face mask, and gloves. Hand hygiene was performed before donning protective equipment and after removal when leaving the room.  MDM  After the initial H&P, I discussed pertinent information from history and physical exam with patient/family.  Discussed differential diagnosis.  Discussed plan for ED evaluation/work-up/treatment.  All questions answered.  Patient/family is agreeable with plan.  ED Course as of Sep 05 0249   Sat Sep 05, 2020   0106 I discussed the case with Dr. Garcia, neurosurgery.  He would like patient to be observed in the intensive care unit.  He is going to repeat the CT in the morning.    [DE]   0125 I discussed the case with Dr. Tyrese Dawson, pulmonology.  He is going admit patient to the ICU for observation.    [DE]   0130 I have updated patient's   results of the CAT scan and the need to be observed overnight.  I have answered all of his questions and addressed all of his concerns.  He is aware that the neurosurgeon may be calling him in a few hours to give him an update.    [DE]      ED Course User Index  [DE] Jarret Joya MD       AS OF 02:49 VITALS:    BP - 136/86  HR - 85  TEMP - 96.4 °F (35.8 °C) (Tympanic)  O2 SATS - 90%    DIAGNOSIS  Final diagnoses:   SAH (subarachnoid hemorrhage) (CMS/HCC)   SDH (subdural hematoma) (CMS/HCC)   Fall, initial encounter         DISPOSITION  ADMISSION    Discussed treatment plan and reason for admission with pt/family and admitting physician.  Pt/family voiced understanding of the plan for admission for further testing/treatment as needed.         Dragon disclaimer:   Much of this encounter note is an electronic transcription/translation of spoken language to printed text.  The electronic translation of spoken language may permit erroneous, or at times, nonsensical words or phrases to be inadvertently transcribed.  Although I have reviewed the note for such areas, some may still exist.     Jarret Joya MD  09/05/20 0250

## 2020-09-05 NOTE — NURSING NOTE
Acute rehab referral received via stroke order set. Please note that the acute rehab admission RNs will not be actively evaluating this patient. If it is felt that the patient is or will be acute rehab appropriate please call the admissions office at [phone] and a full evaluation will be initiated.    Thank you!    Bertin Cao RN  p   f

## 2020-09-05 NOTE — OUTREACH NOTE
Sepsis Week 2 Survey      Responses   [facility] patient discharged from?  [place]   COVID-19 Test Status  Negative   Does the patient have one of the following disease processes/diagnoses(primary or secondary)?  Sepsis   Week 2 attempt successful?  No   Revoke  Readmitted          Nelia Aguilar RN

## 2020-09-05 NOTE — CONSULTS
Consults    Patient Care Team:  Michael Arriaza Jr., DO as PCP - General (Family Medicine)  Gilles Villa DPM as PCP - Claims Attributed  Gilles Villa DPM as Consulting Physician (Podiatry)  Jen Hays MD as Consulting Physician (Obstetrics and Gynecology)  Michael Arriaza Jr., DO as Referring Physician (Family Medicine)  Glynn Melara MD as Consulting Physician (Hematology and Oncology)    Chief complaint:Ground level fall with closed head injury and headache    Subjective         History of Present Illness   65yr f s/p ground level fall with closed head injury. Per report she was discharged from the hospital yesterday.  He was recently admitted to the hospital for renal failure and the need to start dialysis . she was walking up a ramp at her house when she slipped and fell backwards hitting her head. There was a brief LOC. She denies any N/V.  On arrival to the emergency room she was complaining of headache involving her whole head.  She currently denies any headache.  Denies any visual changes.  Denies any speech changes.    Review of Systems   Eyes: Negative for redness and visual disturbance.   Respiratory: Negative for cough and wheezing.    Cardiovascular: Negative for chest pain.   Musculoskeletal: Negative for back pain, neck pain and neck stiffness.   Neurological: Negative for dizziness, facial asymmetry, numbness and headaches.   Psychiatric/Behavioral: Negative for confusion.        Past Medical History:   Diagnosis Date   • COPD (chronic obstructive pulmonary disease) (CMS/HCC)    • Diabetes mellitus (CMS/HCC)     type 2   • Fibroid    • Hypertension    • Leukocytosis    • Neuromuscular disorder (CMS/HCC)    • Skin cancer     nose   • TIA (transient ischemic attack)        Current Facility-Administered Medications:   •  Insert peripheral IV, , , Once **AND** sodium chloride 0.9 % flush 10 mL, 10 mL, Intravenous, PRN, Jarret Joya MD    Current Outpatient  "Medications:   •  Alcohol Swabs (B-D SINGLE USE SWABS REGULAR) pads, 1 each 3 (Three) Times a Day Before Meals., Disp: 200 each, Rfl: 11  •  aspirin 81 MG EC tablet, Take 81 mg by mouth Daily., Disp: , Rfl:   •  Blood Glucose Calibration (ACCU-CHEK LUIS ALBERTO) solution, 1 each by In Vitro route 3 (Three) Times a Day Before Meals., Disp: 5 each, Rfl: 5  •  Blood Glucose Monitoring Suppl (ACCU-CHEK LUIS ALBERTO PLUS) w/Device kit, 1 each 3 (Three) Times a Day Before Meals., Disp: 1 kit, Rfl: 2  •  colestipol (COLESTID) 1 g tablet, Take 1 tablet by mouth 2 (Two) Times a Day., Disp: 60 tablet, Rfl: 5  •  Cyanocobalamin (VITAMIN B 12) 500 MCG tablet, Take 500 mcg by mouth Daily., Disp: , Rfl:   •  glucose blood (Accu-Chek Luis Alberto Plus) test strip, 1 each by Other route 3 (Three) Times a Day Before Meals. Use as instructed, Disp: 200 each, Rfl: 11  •  hydrocortisone (ANUSOL-HC) 2.5 % rectal cream, Insert  into the rectum 2 (Two) Times a Day., Disp: 30 g, Rfl: 5  •  insulin aspart (novoLOG) 100 UNIT/ML injection, Inject 0-9 Units under the skin into the appropriate area as directed 3 (Three) Times a Day Before Meals. Discard vial 28 days after opening, Disp: 10 mL, Rfl: 1  •  Insulin Syringe-Needle U-100 31G X 5/16\" 0.5 ML misc, Use to inject insulin three times a day, Disp: 100 each, Rfl: 0  •  Lancets (ACCU-CHEK MULTICLIX) lancets, 1 each by Other route 3 (Three) Times a Day Before Meals. Use as instructed, Disp: 200 each, Rfl: 11  •  Lancets Misc. (ACCU-CHEK MULTICLIX LANCET DEV) kit, 1 each 3 (Three) Times a Day Before Meals., Disp: 200 each, Rfl: 11  •  metoprolol succinate XL (TOPROL-XL) 200 MG 24 hr tablet, Take 1 tablet by mouth Daily., Disp: 90 tablet, Rfl: 1  •  nicotine (NICODERM CQ) 21 MG/24HR patch, Place 1 patch on the skin as directed by provider Daily., Disp: 30 each, Rfl: 0  •  omeprazole (priLOSEC) 20 MG capsule, Take 1 capsule by mouth Daily., Disp: 90 capsule, Rfl: 1  •  pregabalin (Lyrica) 150 MG capsule, Take 150 " mg by mouth 3 (Three) Times a Day., Disp: , Rfl:   •  rOPINIRole (REQUIP) 1 MG tablet, Take 3 mg by mouth At Night As Needed., Disp: , Rfl:   •  torsemide (DEMADEX) 20 MG tablet, Take 2 tablets by mouth Daily., Disp: 30 tablet, Rfl: 0     No Known Allergies     Past Surgical History:   Procedure Laterality Date   • CHOLECYSTECTOMY     • COLONOSCOPY     • INSERTION HEMODIALYSIS CATHETER N/A 9/3/2020    Procedure: HEMODIALYSIS CATHETER INSERTION;  Surgeon: Sergio Durant MD;  Location: [facility];  Service: Vascular;  Laterality: N/A;   • KIDNEY STONE SURGERY       Social History     Tobacco Use   • Smoking status: Current Every Day Smoker     Packs/day: 0.50     Types: Cigarettes   • Smokeless tobacco: Never Used   Substance Use Topics   • Alcohol use: No   • Drug use: No     Family History   Problem Relation Age of Onset   • Heart disease Mother          at age 63   • Diabetes Mother    • Hypertension Mother    • Diabetes Father    • Deep vein thrombosis Father    • Depression Father    • Liver disease Father    • Lung cancer Father 58         at age 68   • Breast cancer Maternal Grandmother         ? age dx   • Dementia Maternal Grandmother    • Hypertension Maternal Grandmother    • Ovarian cancer Daughter 23   • Diabetes Daughter    • Depression Daughter    • Diabetes Sister    • Diabetes Brother    • Heart disease Brother    • Cancer Brother          at age 43   • Heart disease Maternal Uncle    • Cancer Paternal Uncle    • Clotting disorder Paternal Grandmother    • Colon cancer Neg Hx    • Colon polyps Neg Hx        Objective      Vital Signs  Temp:  [96.4 °F (35.8 °C)-98.5 °F (36.9 °C)] 96.4 °F (35.8 °C)  Heart Rate:  [75-87] 85  Resp:  [16-20] 16  BP: (115-150)/(65-86) 140/76    Physical Exam  Awake, alert, oriented x3  Pupils equal round reactive to light  Extraocular muscles intact  Face symmetric  Speech is fluent and clear  No pronator drift  Motor exam  Bilateral deltoids  5/5, bilateral biceps 5/5, bilateral triceps 5/5, bilateral wrist extension 5/5 bilateral hand  5/5  Bilateral hip flexion 5/5, bilateral knee extension 5/5, bilateral DF/PF 5/5  No clonus  No Sammy's reflex  2+ bilateral patellar reflexes  2+ bilateral biceps reflex  Steady normal gait  Able to walk heel-to-toe without difficulty  Able to detect  light touch in all 4 extremities  Heart-regular rate and rhythm  Lungs-clear to auscultation bilaterally, no wheezing  No carotid bruit    Results Review:   CT head without contrast.  I personally reviewed these images which demonstrate a very small area of bilateral traumatic subarachnoid hemorrhage over the frontal lobes along the convexity.  There is no significant mass-effect or midline shift associated with this hemorrhage.  No evidence of hydrocephalus.       Assessment/Plan       SAH (subarachnoid hemorrhage) (CMS/[facility])      Assessment & Plan  65yr f s/p ground level fall with closed head injury and bilateral traumatic subarachnoid hemorrhage  -She is currently neurologically intact without any headache  -Plan to admit to ICU with q1hr neurochecks  -plan for  Repeat CT later this AM to evaluate the evolution of the hemorrhage  -maintain SBP less than 150mmhg for now  -Recommend hold aspirin for now      I discussed the patients findings and my recommendations with the patient and the nursing staff  Elpidio Garcia MD  09/05/20  01:32    Time: 30min

## 2020-09-05 NOTE — PLAN OF CARE
Pt is a readmit. At home she fell and hit her head, lost consiousness has been diagnosed with acute subarachnoid hemorrhage and subdural hematoma. Her mobility this visit was limited by dizziness she was able to sit EOB, stood and took some sidesteps towards the head of bed. Plan is d/c home vs snf depending on progression with mobility.

## 2020-09-06 ENCOUNTER — APPOINTMENT (OUTPATIENT)
Dept: CT IMAGING | Facility: HOSPITAL | Age: 65
End: 2020-09-06

## 2020-09-06 LAB
ALBUMIN SERPL-MCNC: 3.6 G/DL (ref 3.5–5.2)
ANION GAP SERPL CALCULATED.3IONS-SCNC: 16 MMOL/L (ref 5–15)
BUN SERPL-MCNC: 36 MG/DL (ref 8–23)
BUN/CREAT SERPL: 6.3 (ref 7–25)
CALCIUM SPEC-SCNC: 8.6 MG/DL (ref 8.6–10.5)
CHLORIDE SERPL-SCNC: 98 MMOL/L (ref 98–107)
CO2 SERPL-SCNC: 25 MMOL/L (ref 22–29)
CREAT SERPL-MCNC: 5.71 MG/DL (ref 0.57–1)
GFR SERPL CREATININE-BSD FRML MDRD: 7 ML/MIN/1.73
GFR SERPL CREATININE-BSD FRML MDRD: ABNORMAL ML/MIN/{1.73_M2}
GLUCOSE BLDC GLUCOMTR-MCNC: 146 MG/DL (ref 70–130)
GLUCOSE BLDC GLUCOMTR-MCNC: 154 MG/DL (ref 70–130)
GLUCOSE BLDC GLUCOMTR-MCNC: 172 MG/DL (ref 70–130)
GLUCOSE BLDC GLUCOMTR-MCNC: 189 MG/DL (ref 70–130)
GLUCOSE BLDC GLUCOMTR-MCNC: 216 MG/DL (ref 70–130)
GLUCOSE SERPL-MCNC: 184 MG/DL (ref 65–99)
PHOSPHATE SERPL-MCNC: 6.5 MG/DL (ref 2.5–4.5)
POTASSIUM SERPL-SCNC: 4.6 MMOL/L (ref 3.5–5.2)
SODIUM SERPL-SCNC: 139 MMOL/L (ref 136–145)

## 2020-09-06 PROCEDURE — 82962 GLUCOSE BLOOD TEST: CPT

## 2020-09-06 PROCEDURE — 63710000001 INSULIN LISPRO (HUMAN) PER 5 UNITS: Performed by: INTERNAL MEDICINE

## 2020-09-06 PROCEDURE — 80069 RENAL FUNCTION PANEL: CPT | Performed by: INTERNAL MEDICINE

## 2020-09-06 PROCEDURE — 99231 SBSQ HOSP IP/OBS SF/LOW 25: CPT | Performed by: NEUROLOGICAL SURGERY

## 2020-09-06 PROCEDURE — 97110 THERAPEUTIC EXERCISES: CPT

## 2020-09-06 PROCEDURE — 70450 CT HEAD/BRAIN W/O DYE: CPT

## 2020-09-06 RX ORDER — ALBUMIN (HUMAN) 12.5 G/50ML
12.5 SOLUTION INTRAVENOUS AS NEEDED
Status: CANCELLED | OUTPATIENT
Start: 2020-09-07 | End: 2020-09-07

## 2020-09-06 RX ADMIN — PREGABALIN 75 MG: 75 CAPSULE ORAL at 08:49

## 2020-09-06 RX ADMIN — SODIUM CHLORIDE, PRESERVATIVE FREE 10 ML: 5 INJECTION INTRAVENOUS at 08:50

## 2020-09-06 RX ADMIN — HYDROCODONE BITARTRATE AND ACETAMINOPHEN 1 TABLET: 5; 325 TABLET ORAL at 18:19

## 2020-09-06 RX ADMIN — NICOTINE 1 PATCH: 21 PATCH, EXTENDED RELEASE TRANSDERMAL at 08:49

## 2020-09-06 RX ADMIN — INSULIN LISPRO 2 UNITS: 100 INJECTION, SOLUTION INTRAVENOUS; SUBCUTANEOUS at 15:58

## 2020-09-06 RX ADMIN — INSULIN LISPRO 2 UNITS: 100 INJECTION, SOLUTION INTRAVENOUS; SUBCUTANEOUS at 21:58

## 2020-09-06 RX ADMIN — HYDROCODONE BITARTRATE AND ACETAMINOPHEN 1 TABLET: 5; 325 TABLET ORAL at 06:40

## 2020-09-06 RX ADMIN — TORSEMIDE 40 MG: 20 TABLET ORAL at 08:49

## 2020-09-06 RX ADMIN — PANTOPRAZOLE SODIUM 40 MG: 40 TABLET, DELAYED RELEASE ORAL at 06:39

## 2020-09-06 RX ADMIN — HYDROCODONE BITARTRATE AND ACETAMINOPHEN 1 TABLET: 5; 325 TABLET ORAL at 12:56

## 2020-09-06 RX ADMIN — INSULIN LISPRO 4 UNITS: 100 INJECTION, SOLUTION INTRAVENOUS; SUBCUTANEOUS at 11:07

## 2020-09-06 RX ADMIN — SODIUM CHLORIDE, PRESERVATIVE FREE 10 ML: 5 INJECTION INTRAVENOUS at 20:44

## 2020-09-06 RX ADMIN — INSULIN LISPRO 2 UNITS: 100 INJECTION, SOLUTION INTRAVENOUS; SUBCUTANEOUS at 18:14

## 2020-09-06 NOTE — PLAN OF CARE
Pt walked in the room today 20' [place] and tolerated with mild complaints of dizziness. Also, she performed bed exercises. Will progress mobility as tolerated.

## 2020-09-06 NOTE — PROGRESS NOTES
NEPHROLOGY PROGRESS NOTE    PATIENT IDENTIFICATION:   Name:  Jayne Beltran      MRN:  [id number]     65 y.o.  female             Reason for visit:  Recent GERI/ATN on CKD3 presently on HD    SUBJECTIVE:   Headache continues; remains lucid and oriented; breathing is comfortable; appetite is good; no N/V    OBJECTIVE:  Vitals:    09/06/20 1000 09/06/20 1100 09/06/20 1135 09/06/20 1200   BP: 108/67 111/76  126/75   Pulse: 92 96  98   Resp:       Temp:   98.2 °F (36.8 °C)    TempSrc:   Oral    SpO2: 95% 94%  (!) 63%   Weight:       Height:               Body mass index is 26.03 kg/m².    Intake/Output Summary (Last 24 hours) at 9/6/2020 1444  Last data filed at 9/6/2020 1200  Gross per 24 hour   Intake 540 ml   Output --   Net 540 ml     Wt Readings from Last 1 Encounters:   09/05/20 0032 69.9 kg (154 lb)     Wt Readings from Last 3 Encounters:   09/05/20 69.9 kg (154 lb)   09/04/20 70 kg (154 lb 5.2 oz)   06/26/20 74.8 kg (165 lb)         Exam:  NAD; pleasant; oriented; looks stated age; appropriate  MMM; no scleral icterus  No JVD; no carotid bruits  Crackles bilat; not labored on room air  Right chest TDC  RR, tachycardic, no rub  Soft, NT, ND, BS+  No significant edema  No clubbing  No asterixis  Moves all extremities     Scheduled meds:    insulin lispro 0-9 Units Subcutaneous Q4H   metoprolol succinate  mg Oral Daily   nicotine 1 patch Transdermal Q24H   pantoprazole 40 mg Oral QAM   pregabalin 75 mg Oral Daily   sodium chloride 10 mL Intravenous Q12H   torsemide 40 mg Oral Daily     IV meds:                           Data Review:    Results from last 7 days   Lab Units 09/05/20  0129 09/04/20  0853 09/03/20  0423  08/31/20  0624   SODIUM mmol/L 137 136 137   < > 137   POTASSIUM mmol/L 4.4 3.8 4.2   < > 4.0   CHLORIDE mmol/L 99 98 99   < > 99   CO2 mmol/L 24.9 23.8 24.4   < > 23.6   BUN mg/dL 25* 18 35*   < > 34*   CREATININE mg/dL 4.29* 3.83* 5.32*   < > 4.89*   CALCIUM mg/dL 9.1 8.8 8.9   < > 9.1    BILIRUBIN mg/dL  --   --   --   --  0.3   ALK PHOS U/L  --   --   --   --  88   ALT (SGPT) U/L  --   --   --   --  21   AST (SGOT) U/L  --   --   --   --  23   GLUCOSE mg/dL 195* 309* 200*   < > 118*    < > = values in this interval not displayed.     Estimated Creatinine Clearance: 12.5 mL/min (A) (by C-G formula based on SCr of 4.29 mg/dL (H)).      Results from last 7 days   Lab Units 09/04/20  0853 09/03/20  0423 09/02/20  0440   PHOSPHORUS mg/dL 3.9 6.3* 6.3*       Results from last 7 days   Lab Units 09/05/20  0129 09/03/20  0423 08/31/20  0624   WBC 10*3/mm3 13.67* 8.04 9.76   HEMOGLOBIN g/dL 10.3* 9.9* 11.3*   PLATELETS 10*3/mm3 236 209 249       Results from last 7 days   Lab Units 09/05/20  0129   INR  0.86*             ASSESSMENT:     SAH (subarachnoid hemorrhage) (CMS/[facility])    1.   Non-olig GERI, ATN due to shock.  No  evidence for renal recovery yet.  Pattern so far has been that SCR rises in the absence of HD.   Volume status fine; labs are still pending.  Dialysis dependent since 8/25. TDC placed 9/3.  2.  Fall 9/4/2020 with subsequent SAH and subdural hematoma  3.  Recent severe metabolic acidemia with lactic acidosis last month. Multifactorial including metformin, sepsis, hypotension. Resolved.   4. Septic shock last month due to multifocal PNA on CT. Completed Antibiotic .   5. Anemia.   Lytic lesion on skull and right renal mass.  Elevated light chain kappa/lambda ratio. 24-hour urine for paraprotein studies.  follow up arranged.   6. DM2.  Off metformin and jardiance.  Even if patient's renal function recovers, would NOT resume metformin as this is her second episode of acidemia   7. HTN, improving      PLAN:  1.  Await labs to be done shortly  2.  HD tomorrow barring any surprises in SCR trend    Huan Beaulieu MD  9/6/2020    14:44

## 2020-09-06 NOTE — PROGRESS NOTES
[facility]  [phone]  Hollis Garcia MD    Subjective:  LOS: 0    She complains of dizziness.  This is since the fall.  No other focal weakness.  Denies vision problem.  No nausea vomiting.    Objective   Vital Signs past 24hrs    Temp range: Temp (24hrs), Av.3 °F (36.8 °C), Min:98.2 °F (36.8 °C), Max:98.4 °F (36.9 °C)    BP range: BP: (105-141)/(50-87) 126/75  Pulse range: Heart Rate:  [73-98] 98  Resp rate range:      Device (Oxygen Therapy): nasal cannulaFlow (L/min):  [2] 2  Oxygen range:SpO2:  [63 %-95 %] 63 %      69.9 kg (154 lb); Body mass index is 26.03 kg/m².    Intake/Output Summary (Last 24 hours) at 2020 1422  Last data filed at 2020 1200  Gross per 24 hour   Intake 540 ml   Output --   Net 540 ml       Physical Exam   Constitutional: She is oriented to person, place, and time. She appears well-developed.   HENT:   Head: Normocephalic.   Eyes: Pupils are equal, round, and reactive to light.   Cardiovascular: Normal rate and regular rhythm.   No murmur heard.  Pulmonary/Chest: Effort normal. No respiratory distress. She has no wheezes. She has no rales.   Dialysis catheter in right upper chest.   Abdominal: Soft. Bowel sounds are normal. She exhibits no mass. There is no tenderness.   Musculoskeletal: She exhibits no edema.   Neurological: She is alert and oriented to person, place, and time.   Power intact and moving all 4 extremities.  Finger-nose appears normal.  No nystagmus or roving eye movements.   Skin: No rash noted.     Results Review:    I have reviewed the laboratory and imaging data since the last note by [facility] physician.  My annotations are noted in assessment and plan.    Medication Review:  I have reviewed the current MAR.  My annotations are noted in assessment and plan.       Plan   PCCM Problems  Acute subdural and subarachnoid hemorrhage traumatic  Dizziness  Recent GERI, requiring hemodialysis  Relevant Medical Diagnoses  CKD 3  DM2  Current  smoker  COPD      THESE ARE NEW MEDICAL PROBLEMS TO ME.    Plan of Treatment  She is having some persistent dizziness.  Cleared for transfer to floor bed.  I will asked neurology to see tomorrow.  Unclear reason for fall.    Recent pneumonia and acute kidney injury requiring hemodialysis.  Appreciate input by nephrology.    Continue sliding scale.  Keep off oral hypoglycemic agents.    Electronically signed by Hollis Garcia MD, 09/06/20, 2:22 PM.    Communication:  Please MESSAGE ME in EPIC on In-Patient Care of this patient.   BETWEEN THE HOURS OF 7 AM AND 4:30 PM ONLY AND NOT AFTER 9/11/20.    Part of this note may be an electronic transcription/translation of spoken language to printed text using the Dragon Dictation System.

## 2020-09-06 NOTE — PLAN OF CARE
Problem: Patient Care Overview  Goal: Plan of Care Review  Flowsheets  Taken 9/5/2020 1004 by Aleida Bermudez MS CCC-SLP  Progress: no change  Taken 9/6/2020 1819 by Kady Ley, RN  Plan of Care Reviewed With: patient  Taken 9/6/2020 1900 by Kady Ley, RN  Outcome Summary: Pt arrived to the floor at 1758. Pt AOx4. Pt stated that she had a headache and was given pain medicine. VSS. Pt ambulated well to wheelchair from bed and to the recliner.

## 2020-09-06 NOTE — PROGRESS NOTES
Neurosurgery progress note    Post trauma day #1 status post ground-level fall with closed head injury and bilateral traumatic subarachnoid hemorrhage      Subjective: No complaints overnight.  Patient reports that she feels better today.  Complains of occasional mild to moderate headache involving the whole head.  But reports headache is less severe than yesterday.    Objective:  Vitals:    09/06/20 1301 09/06/20 1400 09/06/20 1500 09/06/20 1551   BP:  106/54 109/61    Pulse: 97 97 98    Resp:       Temp:    98.8 °F (37.1 °C)   TempSrc:    Oral   SpO2: 95% 95% 93%    Weight:       Height:         Physical exam   awake, alert, oriented x3  Pupils equal round reactive to light  Extraocular muscles intact  Face symmetric  Speech is fluent and clear  No pronator drift  Motor exam  Bilateral deltoids 5/5, bilateral biceps 5/5, bilateral triceps 5/5, bilateral wrist extension 5/5 bilateral hand  5/5  Bilateral hip flexion 5/5, bilateral knee extension 5/5, bilateral DF/PF 5/5  Able to detect  light touch in all 4 extremities      Assessment/plan  65-year-old female status post ground-level fall with closed head injury and bilateral traumatic subarachnoid hemorrhages.  -Repeat CT head appears stable with no significant increase in hemorrhage.  -Okay to transfer to floor today from neurosurgery standpoint  -Recommend continue every 4 hour neuro checks for 24 hours  -Continue systolic blood pressure less than 150 mmHg  -Recommend hold aspirin for 1 week  -No surgical intervention indicated at this time however will be available if needed.  Recommend follow-up in my clinic in 4 weeks with repeat CT head.

## 2020-09-07 LAB
ALBUMIN SERPL-MCNC: 3.3 G/DL (ref 3.5–5.2)
ANION GAP SERPL CALCULATED.3IONS-SCNC: 15.8 MMOL/L (ref 5–15)
BUN SERPL-MCNC: 41 MG/DL (ref 8–23)
BUN/CREAT SERPL: 6.7 (ref 7–25)
CALCIUM SPEC-SCNC: 8.4 MG/DL (ref 8.6–10.5)
CHLORIDE SERPL-SCNC: 99 MMOL/L (ref 98–107)
CO2 SERPL-SCNC: 26.2 MMOL/L (ref 22–29)
CREAT SERPL-MCNC: 6.13 MG/DL (ref 0.57–1)
DEPRECATED RDW RBC AUTO: 53.4 FL (ref 37–54)
ERYTHROCYTE [DISTWIDTH] IN BLOOD BY AUTOMATED COUNT: 16.2 % (ref 12.3–15.4)
GFR SERPL CREATININE-BSD FRML MDRD: 7 ML/MIN/1.73
GFR SERPL CREATININE-BSD FRML MDRD: ABNORMAL ML/MIN/{1.73_M2}
GLUCOSE BLDC GLUCOMTR-MCNC: 133 MG/DL (ref 70–130)
GLUCOSE BLDC GLUCOMTR-MCNC: 149 MG/DL (ref 70–130)
GLUCOSE BLDC GLUCOMTR-MCNC: 164 MG/DL (ref 70–130)
GLUCOSE BLDC GLUCOMTR-MCNC: 226 MG/DL (ref 70–130)
GLUCOSE SERPL-MCNC: 129 MG/DL (ref 65–99)
HCT VFR BLD AUTO: 28.1 % (ref 34–46.6)
HGB BLD-MCNC: 9.2 G/DL (ref 12–15.9)
MCH RBC QN AUTO: 29.2 PG (ref 26.6–33)
MCHC RBC AUTO-ENTMCNC: 32.7 G/DL (ref 31.5–35.7)
MCV RBC AUTO: 89.2 FL (ref 79–97)
PHOSPHATE SERPL-MCNC: 6.5 MG/DL (ref 2.5–4.5)
PLATELET # BLD AUTO: 223 10*3/MM3 (ref 140–450)
PMV BLD AUTO: 11.2 FL (ref 6–12)
POTASSIUM SERPL-SCNC: 4.2 MMOL/L (ref 3.5–5.2)
RBC # BLD AUTO: 3.15 10*6/MM3 (ref 3.77–5.28)
SODIUM SERPL-SCNC: 141 MMOL/L (ref 136–145)
WBC # BLD AUTO: 14.65 10*3/MM3 (ref 3.4–10.8)

## 2020-09-07 PROCEDURE — 85027 COMPLETE CBC AUTOMATED: CPT | Performed by: INTERNAL MEDICINE

## 2020-09-07 PROCEDURE — 25010000002 HEPARIN (PORCINE) PER 1000 UNITS: Performed by: INTERNAL MEDICINE

## 2020-09-07 PROCEDURE — 82962 GLUCOSE BLOOD TEST: CPT

## 2020-09-07 PROCEDURE — 80069 RENAL FUNCTION PANEL: CPT | Performed by: INTERNAL MEDICINE

## 2020-09-07 PROCEDURE — 25010000002 METHYLPREDNISOLONE PER 125 MG: Performed by: PSYCHIATRY & NEUROLOGY

## 2020-09-07 PROCEDURE — 92526 ORAL FUNCTION THERAPY: CPT | Performed by: SPEECH-LANGUAGE PATHOLOGIST

## 2020-09-07 PROCEDURE — 99223 1ST HOSP IP/OBS HIGH 75: CPT | Performed by: PSYCHIATRY & NEUROLOGY

## 2020-09-07 PROCEDURE — 63710000001 INSULIN LISPRO (HUMAN) PER 5 UNITS: Performed by: INTERNAL MEDICINE

## 2020-09-07 PROCEDURE — 97530 THERAPEUTIC ACTIVITIES: CPT

## 2020-09-07 PROCEDURE — 99231 SBSQ HOSP IP/OBS SF/LOW 25: CPT | Performed by: NEUROLOGICAL SURGERY

## 2020-09-07 RX ORDER — HEPARIN SODIUM 1000 [USP'U]/ML
4000 INJECTION, SOLUTION INTRAVENOUS; SUBCUTANEOUS ONCE
Status: COMPLETED | OUTPATIENT
Start: 2020-09-07 | End: 2020-09-07

## 2020-09-07 RX ORDER — CLONAZEPAM 0.5 MG/1
0.5 TABLET ORAL 2 TIMES DAILY
Status: DISCONTINUED | OUTPATIENT
Start: 2020-09-07 | End: 2020-09-08 | Stop reason: HOSPADM

## 2020-09-07 RX ORDER — CLONAZEPAM 0.5 MG/1
0.5 TABLET ORAL ONCE
Status: COMPLETED | OUTPATIENT
Start: 2020-09-07 | End: 2020-09-07

## 2020-09-07 RX ADMIN — NICOTINE 1 PATCH: 21 PATCH, EXTENDED RELEASE TRANSDERMAL at 13:56

## 2020-09-07 RX ADMIN — HYDROCODONE BITARTRATE AND ACETAMINOPHEN 1 TABLET: 5; 325 TABLET ORAL at 13:56

## 2020-09-07 RX ADMIN — HYDROCODONE BITARTRATE AND ACETAMINOPHEN 1 TABLET: 5; 325 TABLET ORAL at 21:48

## 2020-09-07 RX ADMIN — METOPROLOL SUCCINATE 200 MG: 100 TABLET, EXTENDED RELEASE ORAL at 13:56

## 2020-09-07 RX ADMIN — HEPARIN SODIUM 4000 UNITS: 1000 INJECTION, SOLUTION INTRAVENOUS; SUBCUTANEOUS at 12:50

## 2020-09-07 RX ADMIN — CLONAZEPAM 0.5 MG: 0.5 TABLET ORAL at 17:13

## 2020-09-07 RX ADMIN — HYDROCODONE BITARTRATE AND ACETAMINOPHEN 1 TABLET: 5; 325 TABLET ORAL at 03:01

## 2020-09-07 RX ADMIN — PANTOPRAZOLE SODIUM 40 MG: 40 TABLET, DELAYED RELEASE ORAL at 06:18

## 2020-09-07 RX ADMIN — INSULIN LISPRO 4 UNITS: 100 INJECTION, SOLUTION INTRAVENOUS; SUBCUTANEOUS at 21:48

## 2020-09-07 RX ADMIN — INSULIN LISPRO 2 UNITS: 100 INJECTION, SOLUTION INTRAVENOUS; SUBCUTANEOUS at 17:13

## 2020-09-07 RX ADMIN — PREGABALIN 75 MG: 75 CAPSULE ORAL at 13:56

## 2020-09-07 RX ADMIN — SODIUM CHLORIDE, PRESERVATIVE FREE 10 ML: 5 INJECTION INTRAVENOUS at 13:57

## 2020-09-07 RX ADMIN — SODIUM CHLORIDE, PRESERVATIVE FREE 10 ML: 5 INJECTION INTRAVENOUS at 21:48

## 2020-09-07 RX ADMIN — SODIUM CHLORIDE 250 MG: 9 INJECTION, SOLUTION INTRAVENOUS at 18:58

## 2020-09-07 RX ADMIN — TORSEMIDE 40 MG: 20 TABLET ORAL at 13:56

## 2020-09-07 NOTE — THERAPY RE-EVALUATION
Acute Care - Speech Language Pathology   Swallow Re-Eval [facility]     Patient Name: Jayne Beltran  : 1955  MRN: [id number]  Today's Date: 2020  Onset of Illness/Injury or Date of Surgery: 20     Referring Physician: Dr Dawson      Admit Date: 2020    Visit Dx:      ICD-10-CM ICD-9-CM   1. SAH (subarachnoid hemorrhage) (CMS/HCC) I60.9 430   2. SDH (subdural hematoma) (CMS/HCC) S06.5X9A 432.1   3. Fall, initial encounter W19.XXXA E888.9     Patient Active Problem List   Diagnosis   • Essential hypertension   • Snoring   • TIA (transient ischemic attack)   • GERD without esophagitis   • Inflamed seborrheic keratosis   • Type 2 diabetes mellitus without complication, without long-term current use of insulin (CMS/HCC)   • Peripheral neuropathy   • Restless legs syndrome   • Benign paroxysmal positional vertigo   • Bone lesion   • Vitamin D deficiency   • Dyslipidemia   • Muscle spasm of both lower legs   • Tobacco use disorder   • Abnormal lung sounds   • Intermittent claudication (CMS/HCC)   • Chronic respiratory failure with hypoxia (CMS/HCC)   • Leg mass, right   • Rib pain on left side   • Diabetic autonomic neuropathy associated with type 2 diabetes mellitus (CMS/HCC)   • Leukocytosis   • Primary insomnia   • PMB (postmenopausal bleeding)   • Family history of ovarian cancer   • Acute renal failure (ARF) (CMS/HCC)   • Duplicated renal collecting system   • Atherosclerotic vascular disease   • Elevated platelet count   • Low hemoglobin   • Other anomalies of uterus   • Multiple kidney stones   • Multiple kidney stones   • Generalized edema   • Bilateral lower extremity edema   • Eyelid edema   • Diarrhea   • Sore on leg   • Syncope and collapse   • Acute renal failure (CMS/HCC)   • SAH (subarachnoid hemorrhage) (CMS/HCC)       Therapy Treatment  Rehabilitation Treatment Summary     Row Name 20 1400             Treatment Time/Intention    Discipline  speech language pathologist  -       Document Type  re-evaluation  -SA      Subjective Information  complains of thick liquids  -SA      Mode of Treatment  speech-language pathology  -SA      Patient/Family Observations  Sat self upright eob  -SA      Comment  Pt just back from dialysis.  RN requesting upgrade of liquids.  -SA      Recorded by [SA] Cari Mohan MS CCC-SLP 09/07/20 1416        User Key  (r) = Recorded By, (t) = Taken By, (c) = Cosigned By    Initials Name Effective Dates Discipline     Cari Mohan MS CCC-SLP 03/07/18 -  SLP          Outcome Summary         SLP GOALS     Row Name 09/07/20 1400 09/05/20 0900          Oral Nutrition/Hydration Goal 1 (SLP)    Oral Nutrition/Hydration Goal 1, SLP  Pt will tolerate the least restrictive diet with NO cues.  -SA  Pt will tolerate the least restrictive diet with NO cues.  -AL     Time Frame (Oral Nutrition/Hydration Goal 1, SLP)  by discharge  -SA  by discharge  -AL     Barriers (Oral Nutrition/Hydration Goal 1, SLP)  Re-Eval: Pt with no s/s aspiration with thin by cup or straw.  Pt stated content with current food diet d/t lack of lower dentition.  REC soft, chopped diet, thin liquids.  Meds as tolerated.  Upright with all po.  -SA  --     Progress/Outcomes (Oral Nutrition/Hydration Goal 1, SLP)  good progress toward goal  -SA  --       User Key  (r) = Recorded By, (t) = Taken By, (c) = Cosigned By    Initials Name Provider Type     Cari Mohan, MS CCC-SLP Speech and Language Pathologist    Aleida Gamez MS CCC-SLP Speech and Language Pathologist          EDUCATION  The patient has been educated in the following areas:   Dysphagia (Swallowing Impairment) Modified Diet Instruction.    SLP Recommendation and Plan                            SLP Outcome Measures (last 72 hours)      SLP Outcome Measures     Row Name 09/07/20 1400 09/05/20 1000          SLP Outcome Measures    Outcome Measure Used?  Adult NOMS  -SA  Adult NOMS  -AL        Adult FCM Scores    FCM Chosen   --  Swallowing  -AL     Swallowing FCM Score  5  -SA  4  -AL       User Key  (r) = Recorded By, (t) = Taken By, (c) = Cosigned By    Initials Name Effective Dates    Cari Yepez MS CCC-SLP 03/07/18 -     Aleida Gamez MS CCC-SLP 08/30/19 -              Time Calculation:   Time Calculation- SLP     Row Name 09/07/20 1418             Time Calculation- SLP    SLP Start Time  1400  -      SLP Received On  09/07/20  -        User Key  (r) = Recorded By, (t) = Taken By, (c) = Cosigned By    Initials Name Provider Type    Cari Yepez MS CCC-SLP Speech and Language Pathologist          Therapy Charges for Today     Code Description Service Date Service Provider Modifiers Qty    [id number]  ST TREATMENT SWALLOW 2 9/7/2020 Cari Mohan MS CCC-SLP GN 1                 Cari Mohan MS CCC-ANNA  9/7/2020

## 2020-09-07 NOTE — PLAN OF CARE
Problem: Patient Care Overview  Goal: Plan of Care Review  Outcome: Ongoing (interventions implemented as appropriate)  Flowsheets (Taken 9/7/2020 9177)  Plan of Care Reviewed With: patient  Outcome Summary: Pt with no s/s aspiration with thin by cup or straw.  Pt stated content with current food diet d/t lack of lower dentition.  REC soft, chopped diet, thin liquids.  Meds as tolerated.  Upright with all po.

## 2020-09-07 NOTE — SIGNIFICANT NOTE
09/07/20 1109   Rehab Treatment   Discipline physical therapist   Reason Treatment Not Performed unavailable for treatment  (Pt. [facility] for dialysis; will check back as time allows.)   Recommendation   PT - Next Appointment 09/08/20

## 2020-09-07 NOTE — CONSULTS
Patient Identification:  NAME:  Jayne Beltran  Age:  65 y.o.   Sex:  female   :  1955   MRN:  [id number]       Chief complaint: I hit my head, reason for consult head trauma vertigo  History of present illness: This patient is a 65-year-old right-handed white female with history of diabetes diabetic peripheral neuropathy hypertension history of TIAs who was in the garage with her  and she slipped and fell backwards striking the back of her head she is not sure if it landed on a ramp or on concrete she was knocked unconscious duration not known quality was unconsciousness no witnessed seizure activity associated symptoms she has a terrible headache context the patient on CT scan shows subarachnoid hemorrhage subdural hematoma but no evidence of acute stroke or intraparenchymal edema.  She denies any focal neurologic deficit she does have a swimmy head when she moves her head now that she did not have before.  Modifying factors holding her head still makes it better.  CT of the head has been reviewed by me with an independent eyeball review.      Past medical history:  Past Medical History:   Diagnosis Date   • COPD (chronic obstructive pulmonary disease) (CMS/HCC)    • Diabetes mellitus (CMS/HCC)     type 2   • Fibroid    • Hypertension    • Leukocytosis    • Neuromuscular disorder (CMS/HCC)    • Skin cancer     nose   • TIA (transient ischemic attack)        Past surgical history:  Past Surgical History:   Procedure Laterality Date   • CHOLECYSTECTOMY     • COLONOSCOPY     • INSERTION HEMODIALYSIS CATHETER N/A 9/3/2020    Procedure: HEMODIALYSIS CATHETER INSERTION;  Surgeon: Sergio Durant MD;  Location: [facility];  Service: Vascular;  Laterality: N/A;   • KIDNEY STONE SURGERY         Allergies:  Patient has no known allergies.    Home medications:  Medications Prior to Admission   Medication Sig Dispense Refill Last Dose   • Alcohol Swabs (B-D SINGLE USE SWABS REGULAR) pads 1 each 3  "(Three) Times a Day Before Meals. 200 each 11    • aspirin 81 MG EC tablet Take 81 mg by mouth Daily.   Taking   • Blood Glucose Calibration (ACCU-CHEK LUIS ALBERTO) solution 1 each by In Vitro route 3 (Three) Times a Day Before Meals. 5 each 5    • Blood Glucose Monitoring Suppl (ACCU-CHEK LUIS ALBERTO PLUS) w/Device kit 1 each 3 (Three) Times a Day Before Meals. 1 kit 2    • colestipol (COLESTID) 1 g tablet Take 1 tablet by mouth 2 (Two) Times a Day. 60 tablet 5    • Cyanocobalamin (VITAMIN B 12) 500 MCG tablet Take 500 mcg by mouth Daily.   Taking   • glucose blood (Accu-Chek Luis Alberto Plus) test strip 1 each by Other route 3 (Three) Times a Day Before Meals. Use as instructed 200 each 11    • hydrocortisone (ANUSOL-HC) 2.5 % rectal cream Insert  into the rectum 2 (Two) Times a Day. 30 g 5 Taking   • insulin aspart (novoLOG) 100 UNIT/ML injection Inject 0-9 Units under the skin into the appropriate area as directed 3 (Three) Times a Day Before Meals. Discard vial 28 days after opening 10 mL 1    • Insulin Syringe-Needle U-100 31G X 5/16\" 0.5 ML misc Use to inject insulin three times a day 100 each 0    • Lancets (ACCU-CHEK MULTICLIX) lancets 1 each by Other route 3 (Three) Times a Day Before Meals. Use as instructed 200 each 11    • Lancets Misc. (ACCU-CHEK MULTICLIX LANCET DEV) kit 1 each 3 (Three) Times a Day Before Meals. 200 each 11    • metoprolol succinate XL (TOPROL-XL) 200 MG 24 hr tablet Take 1 tablet by mouth Daily. 90 tablet 1    • nicotine (NICODERM CQ) 21 MG/24HR patch Place 1 patch on the skin as directed by provider Daily. 30 each 0    • omeprazole (priLOSEC) 20 MG capsule Take 1 capsule by mouth Daily. 90 capsule 1    • pregabalin (Lyrica) 150 MG capsule Take 150 mg by mouth 3 (Three) Times a Day.      • rOPINIRole (REQUIP) 1 MG tablet Take 3 mg by mouth At Night As Needed.      • torsemide (DEMADEX) 20 MG tablet Take 2 tablets by mouth Daily. 30 tablet 0         Hospital medications:    clonazePAM 0.5 mg Oral BID "   insulin lispro 0-9 Units Subcutaneous 4x Daily With Meals & Nightly   methylPREDNISolone sodium succinate 250 mg Intravenous Daily   metoprolol succinate  mg Oral Daily   nicotine 1 patch Transdermal Q24H   pantoprazole 40 mg Oral QAM   pregabalin 75 mg Oral Daily   sodium chloride 10 mL Intravenous Q12H   torsemide 40 mg Oral Daily        •  acetaminophen **OR** acetaminophen  •  dextrose  •  dextrose  •  enalaprilat  •  glucagon (human recombinant)  •  HYDROcodone-acetaminophen  •  Morphine  •  rOPINIRole  •  [COMPLETED] Insert peripheral IV **AND** sodium chloride  •  sodium chloride    Family history:  Family History   Problem Relation Age of Onset   • Heart disease Mother          at age 63   • Diabetes Mother    • Hypertension Mother    • Diabetes Father    • Deep vein thrombosis Father    • Depression Father    • Liver disease Father    • Lung cancer Father 58         at age 68   • Breast cancer Maternal Grandmother         ? age dx   • Dementia Maternal Grandmother    • Hypertension Maternal Grandmother    • Ovarian cancer Daughter 23   • Diabetes Daughter    • Depression Daughter    • Diabetes Sister    • Diabetes Brother    • Heart disease Brother    • Cancer Brother          at age 43   • Heart disease Maternal Uncle    • Cancer Paternal Uncle    • Clotting disorder Paternal Grandmother    • Colon cancer Neg Hx    • Colon polyps Neg Hx        Social history:  Social History     Tobacco Use   • Smoking status: Current Every Day Smoker     Packs/day: 0.50     Types: Cigarettes   • Smokeless tobacco: Never Used   Substance Use Topics   • Alcohol use: No   • Drug use: No       Review of systems:    Prior to this she had no headaches hair eyes ears nose throat skin bone joint  lymphatic hematologic oncologic complaints no chest pain abdominal pain bowel bladder incontinence fever chills or rash she does have numbness in her feet secondary to diabetes with peripheral neuropathy  that has been longstanding    Objective:  Vitals Ranges:   Temp:  [97.3 °F (36.3 °C)-99.6 °F (37.6 °C)] 97.3 °F (36.3 °C)  Heart Rate:  [] 116  Resp:  [16] 16  BP: (123-175)/(67-97) 144/92      Physical Exam:  She is awake and alert she does not move her head much she is pleasant but you can tell she is uncomfortable fund of knowledge good for the most part although she was knocked unconscious and does not remember a lot.  Recent and remote memory fair language function normal well-developed well-nourished in no distress attention span concentration is good.  Pupils 2 some slight reaction to light bilaterally extraocular movements are full without nystagmus nasolabial folds palate tongue symmetrical normal hearing facial sensation head turning shoulder shrug motor 5 out of 5 x 4 extremities no atrophy fasciculations rigidity or bradykinesia reflexes trace throughout symmetrical toes downgoing bilaterally sensation normal light touch face both arms and both legs coordination normal in the upper extremity Station and gait was impossible I thought she would fall or possibly pass out heart is regular without murmur next moderately supple without bruits extremities no clubbing cyanosis edema visual acuity normal at 3 feet    Results review:   I reviewed the patient's new clinical results.    Data review:  Lab Results (last 24 hours)     Procedure Component Value Units Date/Time    POC Glucose Once [[id number]]  (Abnormal) Collected:  09/07/20 0745    Specimen:  Blood Updated:  09/07/20 0746     Glucose 149 mg/dL     Renal Function Panel [[id number]]  (Abnormal) Collected:  09/07/20 0537    Specimen:  Blood Updated:  09/07/20 0625     Glucose 129 mg/dL      BUN 41 mg/dL      Creatinine 6.13 mg/dL      Sodium 141 mmol/L      Potassium 4.2 mmol/L      Chloride 99 mmol/L      CO2 26.2 mmol/L      Calcium 8.4 mg/dL      Albumin 3.30 g/dL      Phosphorus 6.5 mg/dL      Anion Gap 15.8 mmol/L      BUN/Creatinine Ratio 6.7      eGFR Non African Amer 7 mL/min/1.73      Comment: <15 Indicative of kidney failure.        eGFR   Amer --     Comment: <15 Indicative of kidney failure.       Narrative:       GFR Normal >60  Chronic Kidney Disease <60  Kidney Failure <15      POC Glucose Once [[id number]]  (Abnormal) Collected:  09/07/20 0616    Specimen:  Blood Updated:  09/07/20 0617     Glucose 133 mg/dL     CBC (No Diff) [[id number]]  (Abnormal) Collected:  09/07/20 0537    Specimen:  Blood Updated:  09/07/20 0552     WBC 14.65 10*3/mm3      RBC 3.15 10*6/mm3      Hemoglobin 9.2 g/dL      Hematocrit 28.1 %      MCV 89.2 fL      MCH 29.2 pg      MCHC 32.7 g/dL      RDW 16.2 %      RDW-SD 53.4 fl      MPV 11.2 fL      Platelets 223 10*3/mm3     POC Glucose Once [[id number]]  (Abnormal) Collected:  09/06/20 2115    Specimen:  Blood Updated:  09/06/20 2116     Glucose 172 mg/dL            Imaging:  Imaging Results (Last 24 Hours)     ** No results found for the last 24 hours. **      PPE worn by me and the patient washed it before I put it on washed up after I took it off disposed of everything appropriately was not within 6 feet of her for more than a few minutes during my exam no aerosols were used      Assessment and Plan:     Closed head trauma with concussion and associated subdural hematoma and subarachnoid hemorrhage.  She has been followed by neurosurgery and so far she does not appear to need a neurosurgical intervention.    She also has postconcussive vertigo that is made worse with any head movements.  I will give her Solu-Medrol 250 mg IV now and again tomorrow morning and she will be started on Klonopin 0.5 mg p.o. twice daily first dose stat.  Her exam is still nonfocal and she does complain of significant headache for which she is receiving Norco.  The Solu-Medrol should help the headache as well.    Tim Mae MD  09/07/20  16:14

## 2020-09-07 NOTE — THERAPY TREATMENT NOTE
Patient Name: Jayne Beltran  : 1955    MRN: [id number]                              Today's Date: 2020       Admit Date: 2020    Visit Dx:     ICD-10-CM ICD-9-CM   1. SAH (subarachnoid hemorrhage) (CMS/HCC) I60.9 430   2. SDH (subdural hematoma) (CMS/HCC) S06.5X9A 432.1   3. Fall, initial encounter W19.XXXA E888.9     Patient Active Problem List   Diagnosis   • Essential hypertension   • Snoring   • TIA (transient ischemic attack)   • GERD without esophagitis   • Inflamed seborrheic keratosis   • Type 2 diabetes mellitus without complication, without long-term current use of insulin (CMS/HCC)   • Peripheral neuropathy   • Restless legs syndrome   • Benign paroxysmal positional vertigo   • Bone lesion   • Vitamin D deficiency   • Dyslipidemia   • Muscle spasm of both lower legs   • Tobacco use disorder   • Abnormal lung sounds   • Intermittent claudication (CMS/HCC)   • Chronic respiratory failure with hypoxia (CMS/HCC)   • Leg mass, right   • Rib pain on left side   • Diabetic autonomic neuropathy associated with type 2 diabetes mellitus (CMS/HCC)   • Leukocytosis   • Primary insomnia   • PMB (postmenopausal bleeding)   • Family history of ovarian cancer   • Acute renal failure (ARF) (CMS/HCC)   • Duplicated renal collecting system   • Atherosclerotic vascular disease   • Elevated platelet count   • Low hemoglobin   • Other anomalies of uterus   • Multiple kidney stones   • Multiple kidney stones   • Generalized edema   • Bilateral lower extremity edema   • Eyelid edema   • Diarrhea   • Sore on leg   • Syncope and collapse   • Acute renal failure (CMS/HCC)   • SAH (subarachnoid hemorrhage) (CMS/HCC)     Past Medical History:   Diagnosis Date   • COPD (chronic obstructive pulmonary disease) (CMS/HCC)    • Diabetes mellitus (CMS/HCC)     type 2   • Fibroid    • Hypertension    • Leukocytosis    • Neuromuscular disorder (CMS/HCC)    • Skin cancer     nose   • TIA (transient ischemic attack)      Past  Surgical History:   Procedure Laterality Date   • CHOLECYSTECTOMY     • COLONOSCOPY     • INSERTION HEMODIALYSIS CATHETER N/A 9/3/2020    Procedure: HEMODIALYSIS CATHETER INSERTION;  Surgeon: Sergio Durant MD;  Location: [facility];  Service: Vascular;  Laterality: N/A;   • KIDNEY STONE SURGERY       General Information     Row Name 09/07/20 1627          PT Evaluation Time/Intention    Document Type  therapy note (daily note)  -     Mode of Treatment  physical therapy;individual therapy  -     Row Name 09/07/20 1627          General Information    Patient Profile Reviewed?  yes  -     Existing Precautions/Restrictions  fall  -     Row Name 09/07/20 1627          Cognitive Assessment/Intervention- PT/OT    Orientation Status (Cognition)  oriented x 3  -     Row Name 09/07/20 1627          Safety Issues, Functional Mobility    Impairments Affecting Function (Mobility)  balance;endurance/activity tolerance;visual/perceptual  -       User Key  (r) = Recorded By, (t) = Taken By, (c) = Cosigned By    Initials Name Provider Type     Florecita Lorenzo, PT Physical Therapist        Mobility     Row Name 09/07/20 1627          Bed Mobility Assessment/Treatment    Sit-Supine [place] (Bed Mobility)  contact guard;minimum assist (75% patient effort);verbal cues  -     Assistive Device (Bed Mobility)  bed rails;head of bed elevated  -     Comment (Bed Mobility)  sitting EOB at PT arrival;  -     Row Name 09/07/20 1627          Sit-Stand Transfer    Sit-Stand [place] (Transfers)  contact guard;nonverbal cues (demo/gesture);verbal cues  -     Row Name 09/07/20 1627          Gait/Stairs Assessment/Training    Gait/Stairs Assessment/Training  gait/ambulation assistive device  -     [place] Level (Gait)  contact guard;nonverbal cues (demo/gesture);verbal cues;1 person assist  -     Distance in Feet (Gait)  no AD; 10' x2 (to/from bathroom)  -     Deviations/Abnormal Patterns (Gait)   antalgic;stride length decreased;gait speed decreased;jorge decreased  -     Bilateral Gait Deviations  forward flexed posture  -     Comment (Gait/Stairs)  Slightly unsteady; able to self correct  -       User Key  (r) = Recorded By, (t) = Taken By, (c) = Cosigned By    Initials Name Provider Type     Florecita Lorenzo, [facility] Physical Therapist        Obj/Interventions     Row Name 09/07/20 1628          Therapeutic Exercise    Lower Extremity Range of Motion (Therapeutic Exercise)  -- seated marches, LAQ, heel/toe raises  -       User Key  (r) = Recorded By, (t) = Taken By, (c) = Cosigned By    Initials Name Provider Type     Florecita Lorenzo, PT Physical Therapist        Goals/Plan    No documentation.       Clinical Impression     Row Name 09/07/20 1628          Pain Scale: FACES Pre/Post-Treatment    Pain: FACES Scale, Pretreatment  0-->no hurt  -MH     Pain: FACES Scale, Post-Treatment  0-->no hurt  -     Row Name 09/07/20 1628          Plan of Care Review    Plan of Care Reviewed With  patient  -     Outcome Summary  Pt. fatigued this date; went for dialysis earlier in day. Pt. able to ambulate 10'x2 with no AD and CGAx1. Pt. slighlty unsteady but able to self correct. Performed ther ex seated EOB without complaint. Will continue to monitor and progress ambulation within pt. tolerance.  -     Row Name 09/07/20 1628          Positioning and Restraints    Pre-Treatment Position  in bed  -     Post Treatment Position  bed  -MH     In Bed  supine;call light within reach;encouraged to call for assist;exit alarm on  -       User Key  (r) = Recorded By, (t) = Taken By, (c) = Cosigned By    Initials Name Provider Type     Florecita Lorenzo, PT Physical Therapist        Outcome Measures     Row Name 09/07/20 1630          How much help from another person do you currently need...    Turning from your back to your side while in flat bed without using bedrails?  3  -     Moving from lying on back to  sitting on the side of a flat bed without bedrails?  3  -MH     Moving to and from a bed to a chair (including a wheelchair)?  3  -MH     Standing up from a chair using your arms (e.g., wheelchair, bedside chair)?  3  -MH     Climbing 3-5 steps with a railing?  2  -MH     To walk in hospital room?  3  -MH     AM-PAC 6 Clicks Score (PT)  17  -MH     Row Name 09/07/20 1630          Functional Assessment    Outcome Measure Options  AM-PAC 6 Clicks Basic Mobility (PT)  -       User Key  (r) = Recorded By, (t) = Taken By, (c) = Cosigned By    Initials Name Provider Type     Florecita Lorenzo, [facility] Physical Therapist        Physical Therapy Education                 Title: PT OT SLP Therapies (Done)     Topic: Physical Therapy (Done)     Point: Mobility training (Done)     Description:   Instruct learner(s) on safety and technique for assisting patient out of bed, chair or wheelchair.  Instruct in the proper use of assistive devices, such as walker, crutches, cane or brace.              Patient Friendly Description:   It's important to get you on your feet again, but we need to do so in a way that is safe for you. Falling has serious consequences, and your personal safety is the most important thing of all.        When it's time to get out of bed, one of us or a family member will sit next to you on the bed to give you support.     If your doctor or nurse tells you to use a walker, crutches, a cane, or a brace, be sure you use it every time you get out of bed, even if you think you don't need it.    Learning Progress Summary           Patient Acceptance, E,TB,D, VU,DU,NR by  at 9/7/2020 1630    Acceptance, E,TB, VU,NR by  at 9/6/2020 1447    Acceptance, E,TB, VU,NR by  at 9/5/2020 1548                   Point: Home exercise program (Done)     Description:   Instruct learner(s) on appropriate technique for monitoring, assisting and/or progressing patient with therapeutic exercises and activities.               Learning Progress Summary           Patient Acceptance, E,TB,D, VU,DU,NR by  at 9/7/2020 1630    Acceptance, E,TB, VU,NR by  at 9/6/2020 1447    Acceptance, E,TB, VU,NR by  at 9/5/2020 1548                   Point: Body mechanics (Done)     Description:   Instruct learner(s) on proper positioning and spine alignment for patient and/or caregiver during mobility tasks and/or exercises.              Learning Progress Summary           Patient Acceptance, E,TB,D, VU,DU,NR by  at 9/7/2020 1630    Acceptance, E,TB, VU,NR by  at 9/6/2020 1447    Acceptance, E,TB, VU,NR by  at 9/5/2020 1548                   Point: Precautions (Done)     Description:   Instruct learner(s) on prescribed precautions during mobility and gait tasks              Learning Progress Summary           Patient Acceptance, E,TB,D, VU,DU,NR by  at 9/7/2020 1630    Acceptance, E,TB, VU,NR by  at 9/6/2020 1447    Acceptance, E,TB, VU,NR by  at 9/5/2020 1548                               User Key     Initials Effective Dates Name Provider Type Discipline     05/14/18 -  Ministerio Fields, PT Physical Therapist PT     05/26/20 -  Florecita Lorenzo PT Physical Therapist PT              PT Recommendation and Plan     Outcome Summary/Treatment Plan (PT)  Anticipated Discharge Disposition (PT): home with home health, home with assist, skilled nursing facility  Plan of Care Reviewed With: patient  Outcome Summary: Pt. fatigued this date; went for dialysis earlier in day. Pt. able to ambulate 10'x2 with no AD and CGAx1. Pt. slighlty unsteady but able to self correct. Performed ther ex seated EOB without complaint. Will continue to monitor and progress ambulation within pt. tolerance.     Time Calculation:   PT Charges     Row Name 09/07/20 1630 09/07/20 1109          Time Calculation    Start Time  1616  -  --     Stop Time  1625  -  --     Time Calculation (min)  9 min  -  --     PT Received On  09/07/20  -  --     PT - Next  Appointment  09/08/20  -  09/08/20  -        Time Calculation- PT    Total Timed Code Minutes- PT  8 minute(s)  -  --       User Key  (r) = Recorded By, (t) = Taken By, (c) = Cosigned By    Initials Name Provider Type    Florecita Frey, PT Physical Therapist        Therapy Charges for Today     Code Description Service Date Service Provider Modifiers Qty    [id number]  PT THERAPEUTIC ACT EA 15 MIN 9/7/2020 Flroecita Lorenzo, PT GP 1          PT G-Codes  Outcome Measure Options: AM-PAC 6 Clicks Basic Mobility (PT)  AM-PAC 6 Clicks Score (PT): 17  AM-PAC 6 Clicks Score (OT): 21  Modified Rosa Scale: 2 - Slight disability.  Unable to carry out all previous activities but able to look after own affairs without assistance.    Florecita Lorenzo, [facility]  9/7/2020

## 2020-09-07 NOTE — PLAN OF CARE
Problem: Patient Care Overview  Goal: Plan of Care Review  Flowsheets  Taken 9/7/2020 1630  Plan of Care Reviewed With: patient  Taken 9/7/2020 1628  Outcome Summary: Pt. fatigued this date; went for dialysis earlier in day. Pt. able to ambulate 10'x2 with no AD and CGAx1. Pt. slightly unsteady but able to self correct. Performed ther ex seated EOB without complaint. Will continue to monitor and progress ambulation within pt. tolerance.   Patient was wearing a face mask during this therapy encounter. Therapist used appropriate personal protective equipment including eye protection, mask, and gloves.  Mask used was standard procedure mask. Appropriate PPE was worn during the entire therapy session. Hand hygiene was completed before and after therapy session. Patient is not in enhanced droplet precautions.

## 2020-09-07 NOTE — PLAN OF CARE
Problem: Patient Care Overview  Goal: Plan of Care Review  Outcome: Ongoing (interventions implemented as appropriate)  Flowsheets (Taken 9/7/2020 5330)  Progress: improving  Plan of Care Reviewed With: patient  Outcome Summary: VSS. AXO. Pain well controlled. PERRLA. NAD. No neuro changes noted this shift. CTM

## 2020-09-07 NOTE — PROGRESS NOTES
"   LOS: 1 day   Patient Care Team:  Michael Arriaza Jr., DO as PCP - General (Family Medicine)  Gilles Villa DPM as PCP - Claims Attributed  Gilles Villa DPM as Consulting Physician (Podiatry)  Jen Hays MD as Consulting Physician (Obstetrics and Gynecology)  Michael Arriaza Jr., DO as Referring Physician (Family Medicine)  Glynn Melara MD as Consulting Physician (Hematology and Oncology)    Chief Complaint: Traumatic subarachnoid hemorrhage after a fall    Subjective     Covering for .  Have been following this patient for traumatic subarachnoid hemorrhage after the patient fell.  She is having some slight headache but has no focal neurological deficits.  There was no CT scan today.  She is probably close to going home but will repeat the CT scan tomorrow.        Subjective:  Symptoms:  Improved.    Diet:  Adequate intake.    Activity level: Impaired due to weakness.    Pain:  She complains of pain that is mild.  She reports pain is improving.  Pain is well controlled.        History taken from: patient chart    Objective     Vital Signs  Temp:  [97.3 °F (36.3 °C)-99.6 °F (37.6 °C)] 97.6 °F (36.4 °C)  Heart Rate:  [] 103  Resp:  [16] 16  BP: (128-175)/(67-97) 150/86    Objective:  General Appearance:  Comfortable.    Vital signs: (most recent): Blood pressure 150/86, pulse 103, temperature 97.6 °F (36.4 °C), temperature source Oral, resp. rate 16, height 163.8 cm (64.5\"), weight 70.2 kg (154 lb 12.2 oz), SpO2 91 %, not currently breastfeeding.  Vital signs are normal.  No fever.    Output: Producing urine and producing stool.    HEENT: Normal HEENT exam.    Lungs:  Normal effort.    Heart: Normal rate.    Chest: Symmetric chest wall expansion.   Abdomen: Abdomen is soft.  Bowel sounds are normal.   There is no abdominal tenderness.   There is no mass.   Extremities: Normal range of motion.    Neurological: Patient is alert and oriented to person, place and time.  " Normal strength.  GCS score is 15.  Patient has normal reflexes, normal muscle tone and normal coordination.    Pupils:  Pupils are equal, round, and reactive to light.    Skin:  Warm.              Results Review:     I reviewed the patient's new clinical results.  I reviewed the patient's new imaging results and agree with the interpretation.  I reviewed the patient's other test results and agree with the interpretation      MedicatiCT HEAD WITHOUT CONTRAST     HISTORY: Subdural and subarachnoid hemorrhage     COMPARISON: 09/05/2020     TECHNIQUE: Axial CT imaging was obtained through the brain. No IV  contrast was administered.     FINDINGS:  Areas of subdural hemorrhage seen near the vertex are stable, as is the  left cerebral convexity subdural hematoma, measuring about 3 mm in  thickness. Additional areas of subarachnoid hemorrhage within the left  frontal lobe appear unchanged. No new areas of hemorrhage are seen.  There is no midline shift or mass effect.     IMPRESSION:  No significant interval change.     Radiation dose reduction techniques were utilized, including automated  exposure control and exposure modulation based on body size.     This report was finalized on 9/6/2020 4:50 AM by Dr. Jennifer West M.D.     on Review:   Current Facility-Administered Medications:   •  acetaminophen (TYLENOL) tablet 650 mg, 650 mg, Oral, Q4H PRN **OR** acetaminophen (TYLENOL) suppository 650 mg, 650 mg, Rectal, Q4H PRN, Hollis Garcia MD  •  clonazePAM (KlonoPIN) tablet 0.5 mg, 0.5 mg, Oral, BID, Tim Mae MD  •  clonazePAM (KlonoPIN) tablet 0.5 mg, 0.5 mg, Oral, Once, Tim Mae MD  •  dextrose (D50W) 25 g/ 50mL Intravenous Solution 25 g, 25 g, Intravenous, Q15 Min PRN, Hollis Garcia MD  •  dextrose (GLUTOSE) oral gel 15 g, 15 g, Oral, Q15 Min PRN, Hollis Garcia MD  •  enalaprilat (VASOTEC) injection 1.25 mg, 1.25 mg, Intravenous, Q6H PRN, Hollis Garcia MD  •  glucagon (human recombinant) (GLUCAGEN  DIAGNOSTIC) injection 1 mg, 1 mg, Subcutaneous, Q15 Min PRN, Hollis Garcia MD  •  HYDROcodone-acetaminophen (NORCO) 5-325 MG per tablet 1 tablet, 1 tablet, Oral, Q4H PRN, Hollis Garcia MD, 1 tablet at 09/07/20 1356  •  insulin lispro (humaLOG) injection 0-9 Units, 0-9 Units, Subcutaneous, 4x Daily With Meals & Nightly, Hollis Garcia MD, 2 Units at 09/06/20 2158  •  methylPREDNISolone sodium succinate (SOLU-Medrol) 250 mg in sodium chloride 0.9 % 100 mL IVPB, 250 mg, Intravenous, Q24H, Tim Mae MD  •  metoprolol succinate XL (TOPROL-XL) 24 hr tablet 200 mg, 200 mg, Oral, Daily, Hollis Garcia MD, 200 mg at 09/07/20 1356  •  morphine injection 4 mg, 4 mg, Intravenous, Q4H PRN, Hollis Garcia MD, 4 mg at 09/05/20 0646  •  nicotine (NICODERM CQ) 21 MG/24HR patch 1 patch, 1 patch, Transdermal, Q24H, Hollis Garcia MD, 1 patch at 09/07/20 1356  •  pantoprazole (PROTONIX) EC tablet 40 mg, 40 mg, Oral, QAM, Hollis Garcia MD, 40 mg at 09/07/20 0618  •  pregabalin (LYRICA) capsule 75 mg, 75 mg, Oral, Daily, Hollis Garcia MD, 75 mg at 09/07/20 1356  •  rOPINIRole (REQUIP) tablet 3 mg, 3 mg, Oral, Nightly PRN, Hollis Garcia MD  •  [COMPLETED] Insert peripheral IV, , , Once **AND** sodium chloride 0.9 % flush 10 mL, 10 mL, Intravenous, PRN, Hollis Garcia MD  •  sodium chloride 0.9 % flush 10 mL, 10 mL, Intravenous, Q12H, Hollis Garcia MD, 10 mL at 09/07/20 1357  •  sodium chloride 0.9 % flush 10 mL, 10 mL, Intravenous, PRN, Hollis Garcia MD  •  torsemide (DEMADEX) tablet 40 mg, 40 mg, Oral, Daily, Hollis Garcia MD, 40 mg at 09/07/20 7930    Assessment/Plan       SAH (subarachnoid hemorrhage) (CMS/[facility])      Assessment:    Condition: In stable condition.  Improving.   (Nearly back to baseline but still having some mild headache.).     Plan:   (We will repeat the CT scan tomorrow.  Probably going home soon.).       Sridhar Crowell MD  09/07/20  17:03    Time:

## 2020-09-07 NOTE — PLAN OF CARE
Dialysis today. Complaints of headache on return from dialysis, medicated with prn lortab and stated relief from pain. New orders received for dizziness. No neuro deficits noted. Up to bathroom with stand by assistance. Disliked thickened liquids, ST re-evaluated patient and upgraded diet.

## 2020-09-07 NOTE — PROGRESS NOTES
NEPHROLOGY PROGRESS NOTE    PATIENT IDENTIFICATION:   Name:  Jayne Beltran      MRN:  [id number]     65 y.o.  female             Reason for visit:  Recent GERI/ATN on CKD3 presently on HD    SUBJECTIVE:   Seen and examined on HD; no cramping or dizziness; still has significant headache; appetite fair; no N/V; dizzy when standing    OBJECTIVE:  Vitals:    09/06/20 2121 09/07/20 0243 09/07/20 0614 09/07/20 1300   BP: 151/83 132/67 128/76 175/97   BP Location: Left arm Left arm Left arm    Patient Position: Lying Lying Lying    Pulse: 100 103 96 107   Resp: 16 16 16 16   Temp: 99.6 °F (37.6 °C) 98.9 °F (37.2 °C) 98 °F (36.7 °C) 97.3 °F (36.3 °C)   TempSrc: Oral Oral Oral    SpO2: 92% 97% 96%    Weight:    70.2 kg (154 lb 12.2 oz)   Height:               Body mass index is 26.15 kg/m².    Intake/Output Summary (Last 24 hours) at 9/7/2020 1314  Last data filed at 9/7/2020 1300  Gross per 24 hour   Intake --   Output 500 ml   Net -500 ml     Wt Readings from Last 1 Encounters:   09/07/20 1300 70.2 kg (154 lb 12.2 oz)   09/05/20 0032 69.9 kg (154 lb)     Wt Readings from Last 3 Encounters:   09/07/20 70.2 kg (154 lb 12.2 oz)   09/04/20 70 kg (154 lb 5.2 oz)   06/26/20 74.8 kg (165 lb)         Exam:  NAD; pleasant; oriented; looks stated age; appropriate  MMM; no scleral icterus  No JVD; no carotid bruits  Crackles bilat; not labored on room air  Right chest TDC  RR, tachycardic, no rub  Soft, NT, ND, BS+  No significant edema  No clubbing  No asterixis  Moves all extremities     Scheduled meds:      insulin lispro 0-9 Units Subcutaneous 4x Daily With Meals & Nightly   metoprolol succinate  mg Oral Daily   nicotine 1 patch Transdermal Q24H   pantoprazole 40 mg Oral QAM   pregabalin 75 mg Oral Daily   sodium chloride 10 mL Intravenous Q12H   torsemide 40 mg Oral Daily     IV meds:                           Data Review:    Results from last 7 days   Lab Units 09/07/20  0537 09/06/20  1430 09/05/20  0129   SODIUM  mmol/L 141 139 137   POTASSIUM mmol/L 4.2 4.6 4.4   CHLORIDE mmol/L 99 98 99   CO2 mmol/L 26.2 25.0 24.9   BUN mg/dL 41* 36* 25*   CREATININE mg/dL 6.13* 5.71* 4.29*   CALCIUM mg/dL 8.4* 8.6 9.1   GLUCOSE mg/dL 129* 184* 195*     Estimated Creatinine Clearance: 8.8 mL/min (A) (by C-G formula based on SCr of 6.13 mg/dL (H)).      Results from last 7 days   Lab Units 09/07/20  0537 09/06/20  1430 09/04/20  0853   PHOSPHORUS mg/dL 6.5* 6.5* 3.9       Results from last 7 days   Lab Units 09/07/20  0537 09/05/20  0129 09/03/20  0423   WBC 10*3/mm3 14.65* 13.67* 8.04   HEMOGLOBIN g/dL 9.2* 10.3* 9.9*   PLATELETS 10*3/mm3 223 236 209       Results from last 7 days   Lab Units 09/05/20  0129   INR  0.86*             ASSESSMENT:     SAH (subarachnoid hemorrhage) (CMS/[facility])    1.  Non-olig GERI from ATN caused by shock.  No  evidence for renal recovery yet with SCR rising in the absence of HD.   Volume status fine.  Dialysis dependent since 8/25. TDC placed 9/3.  2.  Fall 9/4/2020 with subsequent SAH and subdural hematoma  3.  Recent severe metabolic acidemia with lactic acidosis last month. Multifactorial including metformin, sepsis, hypotension. Resolved.   4.  Septic shock last month due to multifocal PNA on CT. Completed Antibiotic .   5.  Anemia.   Lytic lesion on skull and right renal mass.  Elevated light chain kappa/lambda ratio. 24-hour urine for paraprotein studies.  follow up arranged.   6.  DM2.  Off metformin and jardiance.  Even if patient's renal function recovers, would NOT resume metformin as this is her second episode of acidemia   7.  HTN, labile readings      PLAN:  1.  HD MWF for now  2.  Neurosurgery evaluation continues    Huan Beaulieu MD  9/7/2020    13:14

## 2020-09-07 NOTE — PROGRESS NOTES
[facility]  [phone]  Hollis Garcia MD    Subjective:  LOS: 1    Still with dizziness but better. Denies new focal weakness or vision problems. No n/v/d. No cough or phlegm.    Objective   Vital Signs past 24hrs    Temp range: Temp (24hrs), Av.3 °F (36.8 °C), Min:97.3 °F (36.3 °C), Max:99.6 °F (37.6 °C)    BP range: BP: (128-175)/(67-97) 150/86  Pulse range: Heart Rate:  [] 103  Resp rate range: Resp:  [16] 16    Device (Oxygen Therapy): room airFlow (L/min):  [2] 2  Oxygen range:SpO2:  [91 %-97 %] 91 %      70.2 kg (154 lb 12.2 oz); Body mass index is 26.15 kg/m².    Intake/Output Summary (Last 24 hours) at 2020 1938  Last data filed at 2020 1356  Gross per 24 hour   Intake 120 ml   Output 500 ml   Net -380 ml       Physical Exam   Constitutional: She is oriented to person, place, and time. She appears well-developed.   HENT:   Head: Normocephalic.   Eyes: Pupils are equal, round, and reactive to light.   Cardiovascular: Normal rate and regular rhythm.   No murmur heard.  Pulmonary/Chest: Effort normal. No respiratory distress. She has no wheezes. She has no rales.   Dialysis catheter in right upper chest.   Abdominal: Soft. Bowel sounds are normal. She exhibits no mass. There is no tenderness.   Musculoskeletal: She exhibits no edema.   Neurological: She is alert and oriented to person, place, and time.   Power intact and moving all 4 extremities.  Finger-nose appears normal.  No nystagmus or roving eye movements.   Skin: No rash noted.     Results Review:    I have reviewed the laboratory and imaging data since the last note by [facility] physician.  My annotations are noted in assessment and plan.    Medication Review:  I have reviewed the current MAR.  My annotations are noted in assessment and plan.       Plan   PCCM Problems  Acute subdural and subarachnoid hemorrhage traumatic  Dizziness due to post concussive vertigo  Recent GERI, requiring hemodialysis  Relevant Medical  Diagnoses  CKD 3  DM2  Current smoker  COPD      Plan of Treatment  Appreciate neuro input, now with solumedrol and Klonipin.    Stable CT, repeat tomorrow per NS.    Recent pneumonia and acute kidney injury requiring hemodialysis.  Appreciate input by nephrology.    Continue sliding scale.  Keep off oral hypoglycemic agents.    Electronically signed by Hollis Garcia MD, 09/07/20, 7:42 PM.    Communication:  Please MESSAGE ME in EPIC on In-Patient Care of this patient.   BETWEEN THE HOURS OF 7 AM AND 4:30 PM ONLY AND NOT AFTER 9/11/20.    Part of this note may be an electronic transcription/translation of spoken language to printed text using the Dragon Dictation System.

## 2020-09-07 NOTE — PROGRESS NOTES
Case Management Discharge Note      Final Note: Pt disharged home with [facility]    Provided Post Acute Provider List?: Yes  Provided Post Acute Provider Quality & Resource List?: Refused  Delivered To: Patient  Method of Delivery: In person    Destination      No service has been selected for the patient.      Durable Medical Equipment      No service has been selected for the patient.      Dialysis/Infusion      Service Provider Request Status Selected Services Address Phone Number Fax Number    [facility] Selected [facility] [address] , [facility] [address] [phone]      Home Medical Care      Service Provider Request Status Selected Services Address Phone Number Fax Number    [facility] Selected Home Health Services [address] [address] [phone]      Therapy      No service has been selected for the patient.      Community Resources      No service has been selected for the patient.        Transportation Services  Private: Car    Final Discharge Disposition Code: 06 - home with home health care

## 2020-09-08 ENCOUNTER — TELEPHONE (OUTPATIENT)
Dept: FAMILY MEDICINE CLINIC | Facility: CLINIC | Age: 65
End: 2020-09-08

## 2020-09-08 ENCOUNTER — APPOINTMENT (OUTPATIENT)
Dept: CT IMAGING | Facility: HOSPITAL | Age: 65
End: 2020-09-08

## 2020-09-08 ENCOUNTER — READMISSION MANAGEMENT (OUTPATIENT)
Dept: CALL CENTER | Facility: HOSPITAL | Age: 65
End: 2020-09-08

## 2020-09-08 VITALS
SYSTOLIC BLOOD PRESSURE: 156 MMHG | HEIGHT: 65 IN | HEART RATE: 76 BPM | RESPIRATION RATE: 16 BRPM | TEMPERATURE: 98.6 F | WEIGHT: 151 LBS | BODY MASS INDEX: 25.16 KG/M2 | DIASTOLIC BLOOD PRESSURE: 88 MMHG | OXYGEN SATURATION: 94 %

## 2020-09-08 LAB
ALBUMIN 24H MFR UR ELPH: 43.1 %
ALBUMIN SERPL-MCNC: 3.8 G/DL (ref 3.5–5.2)
ALPHA1 GLOB 24H MFR UR ELPH: 5 %
ALPHA2 GLOB 24H MFR UR ELPH: 17.4 %
ANION GAP SERPL CALCULATED.3IONS-SCNC: 16.5 MMOL/L (ref 5–15)
B-GLOBULIN MFR UR ELPH: 16.8 %
BUN SERPL-MCNC: 35 MG/DL (ref 8–23)
BUN/CREAT SERPL: 7.7 (ref 7–25)
CALCIUM SPEC-SCNC: 9.1 MG/DL (ref 8.6–10.5)
CHLORIDE SERPL-SCNC: 97 MMOL/L (ref 98–107)
CO2 SERPL-SCNC: 22.5 MMOL/L (ref 22–29)
CREAT SERPL-MCNC: 4.55 MG/DL (ref 0.57–1)
DEPRECATED RDW RBC AUTO: 49.6 FL (ref 37–54)
ERYTHROCYTE [DISTWIDTH] IN BLOOD BY AUTOMATED COUNT: 15.6 % (ref 12.3–15.4)
GAMMA GLOB 24H MFR UR ELPH: 17.7 %
GFR SERPL CREATININE-BSD FRML MDRD: 10 ML/MIN/1.73
GFR SERPL CREATININE-BSD FRML MDRD: ABNORMAL ML/MIN/{1.73_M2}
GLUCOSE BLDC GLUCOMTR-MCNC: 291 MG/DL (ref 70–130)
GLUCOSE BLDC GLUCOMTR-MCNC: 360 MG/DL (ref 70–130)
GLUCOSE BLDC GLUCOMTR-MCNC: 365 MG/DL (ref 70–130)
GLUCOSE BLDC GLUCOMTR-MCNC: 390 MG/DL (ref 70–130)
GLUCOSE SERPL-MCNC: 376 MG/DL (ref 65–99)
HCT VFR BLD AUTO: 31.7 % (ref 34–46.6)
HGB BLD-MCNC: 10.3 G/DL (ref 12–15.9)
HIV 1 & 2 AB SER-IMP: ABNORMAL
INTERPRETATION UR IFE-IMP: ABNORMAL
M PROTEIN 24H MFR UR ELPH: ABNORMAL %
MCH RBC QN AUTO: 28.5 PG (ref 26.6–33)
MCHC RBC AUTO-ENTMCNC: 32.5 G/DL (ref 31.5–35.7)
MCV RBC AUTO: 87.6 FL (ref 79–97)
PHOSPHATE SERPL-MCNC: 3.9 MG/DL (ref 2.5–4.5)
PLATELET # BLD AUTO: 276 10*3/MM3 (ref 140–450)
PMV BLD AUTO: 11.5 FL (ref 6–12)
POTASSIUM SERPL-SCNC: 4.3 MMOL/L (ref 3.5–5.2)
PROT 24H UR-MRATE: 223 MG/24 HR (ref 30–150)
PROT UR-MCNC: 14.4 MG/DL
RBC # BLD AUTO: 3.62 10*6/MM3 (ref 3.77–5.28)
SODIUM SERPL-SCNC: 136 MMOL/L (ref 136–145)
WBC # BLD AUTO: 18.23 10*3/MM3 (ref 3.4–10.8)

## 2020-09-08 PROCEDURE — 85027 COMPLETE CBC AUTOMATED: CPT | Performed by: INTERNAL MEDICINE

## 2020-09-08 PROCEDURE — 82962 GLUCOSE BLOOD TEST: CPT

## 2020-09-08 PROCEDURE — 99231 SBSQ HOSP IP/OBS SF/LOW 25: CPT | Performed by: NEUROLOGICAL SURGERY

## 2020-09-08 PROCEDURE — 99231 SBSQ HOSP IP/OBS SF/LOW 25: CPT | Performed by: PSYCHIATRY & NEUROLOGY

## 2020-09-08 PROCEDURE — 25010000002 METHYLPREDNISOLONE PER 125 MG: Performed by: PSYCHIATRY & NEUROLOGY

## 2020-09-08 PROCEDURE — 70450 CT HEAD/BRAIN W/O DYE: CPT

## 2020-09-08 PROCEDURE — 63710000001 INSULIN LISPRO (HUMAN) PER 5 UNITS: Performed by: INTERNAL MEDICINE

## 2020-09-08 PROCEDURE — 80069 RENAL FUNCTION PANEL: CPT | Performed by: INTERNAL MEDICINE

## 2020-09-08 RX ORDER — PREGABALIN 75 MG/1
75 CAPSULE ORAL DAILY
Qty: 14 CAPSULE | Refills: 0 | Status: SHIPPED | OUTPATIENT
Start: 2020-09-09 | End: 2021-01-27 | Stop reason: ALTCHOICE

## 2020-09-08 RX ORDER — TORSEMIDE 20 MG/1
80 TABLET ORAL DAILY
Status: DISCONTINUED | OUTPATIENT
Start: 2020-09-08 | End: 2020-09-08 | Stop reason: HOSPADM

## 2020-09-08 RX ORDER — CLONAZEPAM 0.5 MG/1
0.5 TABLET ORAL 2 TIMES DAILY
Qty: 13 TABLET | Refills: 0 | Status: SHIPPED | OUTPATIENT
Start: 2020-09-08 | End: 2020-09-09 | Stop reason: SDUPTHER

## 2020-09-08 RX ADMIN — INSULIN LISPRO 6 UNITS: 100 INJECTION, SOLUTION INTRAVENOUS; SUBCUTANEOUS at 17:16

## 2020-09-08 RX ADMIN — PANTOPRAZOLE SODIUM 40 MG: 40 TABLET, DELAYED RELEASE ORAL at 05:43

## 2020-09-08 RX ADMIN — HYDROCODONE BITARTRATE AND ACETAMINOPHEN 1 TABLET: 5; 325 TABLET ORAL at 05:43

## 2020-09-08 RX ADMIN — METOPROLOL SUCCINATE 200 MG: 100 TABLET, EXTENDED RELEASE ORAL at 08:38

## 2020-09-08 RX ADMIN — CLONAZEPAM 0.5 MG: 0.5 TABLET ORAL at 08:39

## 2020-09-08 RX ADMIN — INSULIN LISPRO 8 UNITS: 100 INJECTION, SOLUTION INTRAVENOUS; SUBCUTANEOUS at 12:00

## 2020-09-08 RX ADMIN — NICOTINE 1 PATCH: 21 PATCH, EXTENDED RELEASE TRANSDERMAL at 08:40

## 2020-09-08 RX ADMIN — HYDROCODONE BITARTRATE AND ACETAMINOPHEN 1 TABLET: 5; 325 TABLET ORAL at 17:17

## 2020-09-08 RX ADMIN — TORSEMIDE 80 MG: 20 TABLET ORAL at 14:47

## 2020-09-08 RX ADMIN — SODIUM CHLORIDE 250 MG: 9 INJECTION, SOLUTION INTRAVENOUS at 17:16

## 2020-09-08 RX ADMIN — SODIUM CHLORIDE, PRESERVATIVE FREE 10 ML: 5 INJECTION INTRAVENOUS at 08:39

## 2020-09-08 RX ADMIN — ACETAMINOPHEN 650 MG: 325 TABLET, FILM COATED ORAL at 12:00

## 2020-09-08 RX ADMIN — PREGABALIN 75 MG: 75 CAPSULE ORAL at 08:39

## 2020-09-08 RX ADMIN — INSULIN LISPRO 8 UNITS: 100 INJECTION, SOLUTION INTRAVENOUS; SUBCUTANEOUS at 08:38

## 2020-09-08 NOTE — PROGRESS NOTES
"   LOS: 2 days    Patient Care Team:  Michael Arriaza Jr., DO as PCP - General (Family Medicine)  Gilles Villa DPM as PCP - Claims Attributed  Gilles Villa DPM as Consulting Physician (Podiatry)  Jen Hays MD as Consulting Physician (Obstetrics and Gynecology)  Michael Arriaza Jr., DO as Referring Physician (Family Medicine)  Glynn Melara MD as Consulting Physician (Hematology and Oncology)    Chief Complaint:    Chief Complaint   Patient presents with   • Fall   • Head Injury     Follow UP GERI  Subjective     Interval History:     Woke up without HA, but has one now. Vertigo ok if she gets up slowly and holds still.  Dialysis yesterday.     Review of Systems:   Bowels moving. Urinating. Eating. Not soa.     Objective     Vital Signs  Temp:  [97.3 °F (36.3 °C)-98.3 °F (36.8 °C)] 97.7 °F (36.5 °C)  Heart Rate:  [] 94  Resp:  [16-17] 16  BP: (144-175)/(86-97) 156/90    Flowsheet Rows      First Filed Value   Admission Height  163.8 cm (64.5\") Documented at 09/04/2020 2235   Admission Weight  69.9 kg (154 lb) Documented at 09/05/2020 0032          I/O this shift:  In: 440 [P.O.:440]  Out: -   I/O last 3 completed shifts:  In: 120 [P.O.:120]  Out: 500 [Other:500]    Intake/Output Summary (Last 24 hours) at 9/8/2020 0843  Last data filed at 9/8/2020 0819  Gross per 24 hour   Intake 560 ml   Output 500 ml   Net 60 ml       Physical Exam:  Awake, alert. Sitting on side of bed. No scleral icterus. Wound on right temple. Oriented. Speech fluent.   Moves all 4 ext.    Oral mucosa moist  Neck RIJ TDC  Heart RRR no s3 or rub  Lungs clear to auscultation  Abd + bs, soft, nontender  Ext no edema  Skin scattered ecchymoses      Results Review:    Results from last 7 days   Lab Units 09/08/20  0526 09/07/20  0537 09/06/20  1430   SODIUM mmol/L 136 141 139   POTASSIUM mmol/L 4.3 4.2 4.6   CHLORIDE mmol/L 97* 99 98   CO2 mmol/L 22.5 26.2 25.0   BUN mg/dL 35* 41* 36*   CREATININE mg/dL 4.55* " 6.13* 5.71*   CALCIUM mg/dL 9.1 8.4* 8.6   GLUCOSE mg/dL 376* 129* 184*       Estimated Creatinine Clearance: 11.8 mL/min (A) (by C-G formula based on SCr of 4.55 mg/dL (H)).    Results from last 7 days   Lab Units 09/08/20  0526 09/07/20  0537 09/06/20  1430   PHOSPHORUS mg/dL 3.9 6.5* 6.5*             Results from last 7 days   Lab Units 09/08/20  0526 09/07/20  0537 09/05/20  0129 09/03/20  0423   WBC 10*3/mm3 18.23* 14.65* 13.67* 8.04   HEMOGLOBIN g/dL 10.3* 9.2* 10.3* 9.9*   PLATELETS 10*3/mm3 276 223 236 209       Results from last 7 days   Lab Units 09/05/20  0129   INR  0.86*         Imaging Results (Last 24 Hours)     ** No results found for the last 24 hours. **          clonazePAM 0.5 mg Oral BID   insulin lispro 0-9 Units Subcutaneous 4x Daily With Meals & Nightly   methylPREDNISolone sodium succinate 250 mg Intravenous Q24H   metoprolol succinate  mg Oral Daily   nicotine 1 patch Transdermal Q24H   pantoprazole 40 mg Oral QAM   pregabalin 75 mg Oral Daily   sodium chloride 10 mL Intravenous Q12H   torsemide 80 mg Oral Daily          Medication Review:   Current Facility-Administered Medications   Medication Dose Route Frequency Provider Last Rate Last Dose   • acetaminophen (TYLENOL) tablet 650 mg  650 mg Oral Q4H PRN Hollis Garcia MD        Or   • acetaminophen (TYLENOL) suppository 650 mg  650 mg Rectal Q4H PRN Hollis Garcia MD       • clonazePAM (KlonoPIN) tablet 0.5 mg  0.5 mg Oral BID Tim Mae MD   0.5 mg at 09/08/20 0839   • dextrose (D50W) 25 g/ 50mL Intravenous Solution 25 g  25 g Intravenous Q15 Min PRN Hollis Garcia MD       • dextrose (GLUTOSE) oral gel 15 g  15 g Oral Q15 Min PRN Hollis Garcia MD       • enalaprilat (VASOTEC) injection 1.25 mg  1.25 mg Intravenous Q6H PRN Hollis Garcia MD       • glucagon (human recombinant) (GLUCAGEN DIAGNOSTIC) injection 1 mg  1 mg Subcutaneous Q15 Min PRN Hollis Garcia MD       • HYDROcodone-acetaminophen (NORCO) 5-325 MG per tablet 1 tablet  1  tablet Oral Q4H PRN Hollis Garcia MD   1 tablet at 09/08/20 0543   • insulin lispro (humaLOG) injection 0-9 Units  0-9 Units Subcutaneous 4x Daily With Meals & Nightly Hollis Garcia MD   8 Units at 09/08/20 0838   • methylPREDNISolone sodium succinate (SOLU-Medrol) 250 mg in sodium chloride 0.9 % 100 mL IVPB  250 mg Intravenous Q24H Tim Mae MD   250 mg at 09/07/20 1858   • metoprolol succinate XL (TOPROL-XL) 24 hr tablet 200 mg  200 mg Oral Daily Hollis Garcia MD   200 mg at 09/08/20 0838   • morphine injection 4 mg  4 mg Intravenous Q4H PRN Hollis Garcia MD   4 mg at 09/05/20 0646   • nicotine (NICODERM CQ) 21 MG/24HR patch 1 patch  1 patch Transdermal Q24H Hollis Garcia MD   1 patch at 09/08/20 0840   • pantoprazole (PROTONIX) EC tablet 40 mg  40 mg Oral QAM Hollis Garcia MD   40 mg at 09/08/20 0543   • pregabalin (LYRICA) capsule 75 mg  75 mg Oral Daily Hollis Garcia MD   75 mg at 09/08/20 0839   • rOPINIRole (REQUIP) tablet 3 mg  3 mg Oral Nightly PRN Hollis Garcia MD       • sodium chloride 0.9 % flush 10 mL  10 mL Intravenous PRN Hollis Garcia MD       • sodium chloride 0.9 % flush 10 mL  10 mL Intravenous Q12H Hollis Garcia MD   10 mL at 09/08/20 0839   • sodium chloride 0.9 % flush 10 mL  10 mL Intravenous PRN Hollis Garcia MD       • torsemide (DEMADEX) tablet 80 mg  80 mg Oral Daily Paloma Clifton MD           Assessment/Plan   1. GERI, nonoliguric.  Dialysis dependent since 8/25. TDC placed 9/3.  HD tomorrow.   2. Subarachnoid and subdural hematoma after fall, LOC.  HA better, but persists.  On Klonopin after steroid yesterday.  Vertigo persists.    3. Recent severe metabolic acidemia, GERI and metformin.  4.  DM2  5. HTN. Not controlled.  Increase torsemide.   6. Anemia.  Lytic lesion on skull and right renal mass.  Elevated Light chain Kappa/lambda ratio.  SPEP,  YVETTE negative for monoclonal 9/1/20  Needs   appt.   Has CBC follow up 9/29 with Dr. Glynn Melara.             Paloma Clifton,  MD  09/08/20  08:43

## 2020-09-08 NOTE — TELEPHONE ENCOUNTER
Nai with [facility] requesting orders for nursing to see Jayne. She is a new Dialysis patient as of 9/7/2020 her First session. She is scheduled Monday, Wednesday, Fridays and requesting nursing for a few weeks a couple of times a week to help Jayne with the changes and transitions. Nai can be reached at  [phone]

## 2020-09-08 NOTE — PROGRESS NOTES
Acute rehab referral received via stroke order set. Discharge plans noted. Will sign off.     Thank you!    Bertin Cao RN  p

## 2020-09-08 NOTE — DISCHARGE SUMMARY
DISCHARGE SUMMARY    Patient Name: Jayne Beltran  Age/Sex: 65 y.o. female  : 1955  MRN: [id number]  Patient Care Team:  Michael Arriaza Jr., DO as PCP - General (Family Medicine)  Gilles Villa DPM as PCP - Claims Attributed  Gilles Villa DPM as Consulting Physician (Podiatry)  Jen Hays MD as Consulting Physician (Obstetrics and Gynecology)  Michael Arriaza Jr., DO as Referring Physician (Family Medicine)  Glynn eMlara MD as Consulting Physician (Hematology and Oncology)       Date of Admit: 2020  Date of Discharge:  20  Discharge Condition: Stable    Discharge Diagnoses:  Acute subdural and subarachnoid hemorrhage traumatic  Dizziness due to post concussive vertigo  Recent GERI, requiring hemodialysis    Relevant Medical Diagnoses  CKD 3  DM2  Current smoker  COPD    History of present illness from H&P from 2020:   Mrs. Beltran is a 64yo with ESRD.  She had a loss of balance and fell striking her head.  She was walking on a ramp.  She doesn't recall all the details of the event but is pretty oriented at the moment and has neurologic deficits.  She does have a severe headache.  Ct imaging shows ICH.    Hospital Course:   Jayne Beltran presented to [facility] with complaints of balance problem and a fall with headache, had evidence of subdural hematoma, was evaluated and followed by neurosurgery and neurology.  Patient developed some dizziness that was due to a postconcussive vertigo, was evaluated by neurology, she did well with the steroids, and she was started on clonazepam which she will be on for a few more days after discharge for the vertigo.  She did develop some steroid-induced hyperglycemia, expect that to improve as the effect of the steroids is gone since she only get 2 doses.  She had chronic kidney disease which were stable  She has COPD with no acute exacerbation  Patient had some nocturnal desaturation but she  was doing fine without any oxygen supplement over the last 24 hours.  There was some concern about obstructive sleep apnea and nocturnal hypoxemia, that can be arranged for follow-up after discharge   Consider repeating home sleep study if still indicated.  She was having no more balance issues the day of discharge, she is going home, with family, and she was cleared for discharge by both neurology and neurosurgery.  Patient already has follow-up with neurosurgery in few weeks with repeat CAT scan, she is to stay off the aspirin for 1 more week and then she can go back on the 81 mg dose  Patient was encouraged strongly to contact neurosurgery in case she started to develop any worsening headache or any focal neurological deficit or worsening confusion.  Consults:   IP CONSULT TO NEUROSURGERY  IP CONSULT TO PULMONOLOGY  IP CONSULT TO NEURO CLINICAL SPECIALIST  IP CONSULT TO REHAB ADMISSION  IP CONSULT TO CASE MANAGEMENT   IP CONSULT TO DIABETES EDUCATOR  IP CONSULT TO NEUROSURGERY  IP CONSULT TO NEUROLOGY    Significant Discharge Diagnostics   Procedures Performed:         Pertinent Lab Results:  Results from last 7 days   Lab Units 09/08/20 0526 09/07/20 0537 09/06/20  1430 09/05/20  0129 09/04/20  0853 09/03/20  0423 09/02/20  0440   SODIUM mmol/L 136 141 139 137 136 137 135*   POTASSIUM mmol/L 4.3 4.2 4.6 4.4 3.8 4.2 4.0   CHLORIDE mmol/L 97* 99 98 99 98 99 100   CO2 mmol/L 22.5 26.2 25.0 24.9 23.8 24.4 23.0   BUN mg/dL 35* 41* 36* 25* 18 35* 31*   CREATININE mg/dL 4.55* 6.13* 5.71* 4.29* 3.83* 5.32* 4.47*   GLUCOSE mg/dL 376* 129* 184* 195* 309* 200* 129*   CALCIUM mg/dL 9.1 8.4* 8.6 9.1 8.8 8.9 8.6         Results from last 7 days   Lab Units 09/08/20 0526 09/07/20 0537 09/05/20 0129 09/03/20  0423   WBC 10*3/mm3 18.23* 14.65* 13.67* 8.04   HEMOGLOBIN g/dL 10.3* 9.2* 10.3* 9.9*   HEMATOCRIT % 31.7* 28.1* 32.5* 30.5*   PLATELETS 10*3/mm3 276 223 236 209   MCV fL 87.6 89.2 89.8 85.9   MCH pg  28.5 29.2 28.5 27.9   MCHC g/dL 32.5 32.7 31.7 32.5   RDW % 15.6* 16.2* 16.5* 15.8*   RDW-SD fl 49.6 53.4 54.7* 49.5   MPV fL 11.5 11.2 11.6 11.9   NEUTROPHIL % %  --   --  76.7*  --    LYMPHOCYTE % %  --   --  14.3*  --    MONOCYTES % %  --   --  6.8  --    EOSINOPHIL % %  --   --  0.7  --    BASOPHIL % %  --   --  0.5  --    IMM GRAN % %  --   --  1.0*  --    NEUTROS ABS 10*3/mm3  --   --  10.48*  --    LYMPHS ABS 10*3/mm3  --   --  1.96  --    MONOS ABS 10*3/mm3  --   --  0.93*  --    EOS ABS 10*3/mm3  --   --  0.10  --    BASOS ABS 10*3/mm3  --   --  0.07  --    IMMATURE GRANS (ABS) 10*3/mm3  --   --  0.13*  --    NRBC /100 WBC  --   --  0.0  --      Results from last 7 days   Lab Units 09/05/20  0129   INR  0.86*         Results from last 7 days   Lab Units 09/05/20  0549   CHOLESTEROL mg/dL 165   TRIGLYCERIDES mg/dL 287*   HDL CHOL mg/dL 36*   LDL CHOL mg/dL 72                                               Imaging Results:  Imaging Results (All)     Procedure Component Value Units Date/Time    CT Head Without Contrast [[id number]] Collected:  09/08/20 1124     Updated:  09/08/20 1235    Narrative:       NONCONTRAST HEAD CT 09/08/2020     CLINICAL HISTORY: Follow-up for traumatic subarachnoid hemorrhage.     TECHNIQUE: Spiral CT images were obtained from the base of the skull to  the vertex without intravenous contrast. Images were reformatted and  submitted in 3 mm thick axial CT sections with brain algorithm and 2 mm  thick sagittal and coronal reconstructions were performed and submitted  in brain algorithm.     COMPARISON: This is correlated to prior noncontrast head CTs from  [facility] on 08/24/2020 and 09/04/2020 and 09/05/2020  and the most recent head CT 2 days ago on 09/06/2020.     FINDINGS: There is some mild low density in the periventricular white  matter consistent with mild small vessel disease. On 09/04/2020 the  patient had head trauma and had thin slivers of acute  posttraumatic  subarachnoid hemorrhage in the left central sulcus and small nodular  foci of acute hemorrhage in the extra-axial space superior to the  superior medial frontoparietal junctions bilaterally and a thin 2-3 mm  thick acute subdural hematoma overlying the superolateral left  frontoparietal convexity. These foci of hemorrhage have remained stable.  There has been some indrawing of subdural fluid adjacent to the thin 3  mm thick acute subdural hematoma overlying the superolateral left  frontoparietal convexity compatible with an associated traumatic  subdural hygroma and the combination of the subdural fluid and 2-3 mm  thick acute rind of subdural hemorrhage maximally measures up to 4-5 mm  in thickness over the superolateral left frontoparietal convexity and  the indwelling of subdural fluid is new when compared to head CT  09/06/2020. There is no significant mass effect and no midline shift.  The ventricles are normal in size. There is mild low density in the  periventricular white matter consistent with mild small vessel disease.  The paranasal sinuses and mastoid air cells and middle ear cavities are  clear. There are multiple punctate lucencies in the calvarium ranging  from 2-5 mm in size, most pronounced at the vertex of the skull. This  could be from underlying multiple myeloma.       Impression:       1. There has been slight worsening since most recent head CT 09/06/2020.  The thin slivers of acute posttraumatic subarachnoid hemorrhage in the  superior left frontoparietal sulci and the small nodular foci of  hemorrhage in the extra-axial space likely subarachnoid overlying the  superomedial frontoparietal junction are unchanged and the thin 2-3 mm  thick acute subdural hematoma over the superolateral left frontoparietal  convexity is unchanged, but there has been interval indrawing of  subdural fluid adjacent to the acute subdural hematoma and the subdural  fluid suggests a developing  posttraumatic subdural hygroma which can  grow over time and the combination of subdural fluid and acute subdural  blood maximally now measures 4-5 mm in thickness from inner table skull  to superolateral left frontoparietal convexity and continued close  follow-up is warranted.  2. There are multiple tiny lucent areas in the calvarium, in particular  studding the vertex of the skull, raising the possibility of underlying  myeloma and this needs to be correlated clinically.      The results and recommendations were communicated to TRISH Sanchez, by telephone 09/08/2020 at 9:30 AM.           Radiation dose reduction techniques were utilized, including automated  exposure control and exposure modulation based on body size.     This report was finalized on 9/8/2020 12:32 PM by Dr. Jesse Anand M.D.       CT Head Without Contrast [[id number]] Collected:  09/06/20 0444     Updated:  09/06/20 0453    Narrative:       CT HEAD WITHOUT CONTRAST     HISTORY: Subdural and subarachnoid hemorrhage     COMPARISON: 09/05/2020     TECHNIQUE: Axial CT imaging was obtained through the brain. No IV  contrast was administered.     FINDINGS:  Areas of subdural hemorrhage seen near the vertex are stable, as is the  left cerebral convexity subdural hematoma, measuring about 3 mm in  thickness. Additional areas of subarachnoid hemorrhage within the left  frontal lobe appear unchanged. No new areas of hemorrhage are seen.  There is no midline shift or mass effect.       Impression:       No significant interval change.     Radiation dose reduction techniques were utilized, including automated  exposure control and exposure modulation based on body size.     This report was finalized on 9/6/2020 4:50 AM by Dr. Jennifer West M.D.       CT Head Without Contrast [[id number]] Collected:  09/05/20 0749     Updated:  09/05/20 0939    Narrative:       CT SCAN OF THE BRAIN WITHOUT CONTRAST     HISTORY: Follow-up of posttraumatic  subdural hematoma and subarachnoid  hemorrhage.     The CT scan was performed through the brain without contrast and  compared to yesterday's exam. Again noted are small globular foci of  acute probable subdural hemorrhage at the vertex bilaterally, left  slightly larger than right and remaining unchanged from yesterday. There  is also an acute subdural hematoma along the internal table on the left  measuring 3 mm in thickness that is unchanged. There is a small amount  of subcutaneous arachnoid hemorrhage high in the left frontal region  that is unchanged. There is no significant mass effect.     The ventricles remain normal in size and midline. A previous small  amount of fluid layering in the left maxillary sinus has completely or  nearly completely resolved. The mastoid air cells are clear.     CONCLUSION: Areas of subdural and subarachnoid hemorrhage mainly in the  left and showing no change from yesterday's exam. There is no  significant mass effect.     Radiation dose reduction techniques were utilized, including automated  exposure control and exposure modulation based on body size.     This report was finalized on 9/5/2020 9:35 AM by Dr. Ricco Silva M.D.       CT Cervical Spine Without Contrast [[id number]] Collected:  09/05/20 0021     Updated:  09/05/20 0030    Narrative:       CT OF THE CERVICAL SPINE     HISTORY: Fall with memory loss     COMPARISON: None available.     TECHNIQUE: Axial CT imaging was obtained through the cervical spine.  Coronal and sagittal reformatted images were obtained.     FINDINGS:  No acute fracture or subluxation of the cervical spine is identified.  There is mild anterolisthesis of C3 on C4. Degenerative changes of the  cervical spine are noted. This results in severe intervertebral disc  space narrowing at C4-C5, C5-C6, and C6-C7, with some bony ankylosis  noted. Images through the lung apices demonstrate mosaic attenuation,  which may reflect air trapping.     C2-C3:  There is no canal stenosis. There is mild neural foraminal  narrowing on the right.  C3-C4: There is no significant canal stenosis. Moderate neural foraminal  narrowing is noted on the right.  C4-C5: Disc osteophyte complex results in some canal narrowing. There is  moderate neural foraminal narrowing on the right.  C5-C6: Disc osteophyte complex results in some canal narrowing. There is  moderate bilateral neural foraminal narrowing, right slightly greater  than left.  C5-C6: Disc osteophyte complex results in flattening of the thecal sac.  Neural foraminal narrowing is noted on the left.  C7-T1: There is no significant canal stenosis or neural foraminal  narrowing.     Dystrophic calcification is noted within the thyroid gland.       Impression:       No acute fracture or subluxation identified.     Radiation dose reduction techniques were utilized, including automated  exposure control and exposure modulation based on body size.     This report was finalized on 9/5/2020 12:27 AM by Dr. Jennifer West M.D.       CT Head Without Contrast [[id number]] Collected:  09/05/20 0011     Updated:  09/05/20 0028    Narrative:       CT OF THE HEAD WITHOUT CONTRAST     HISTORY: Headache following head trauma     COMPARISON: 08/24/2020     TECHNIQUE: Axial CT imaging was obtained through the brain. No IV  contrast was administered.     FINDINGS:  Bifrontal areas of hemorrhage are seen near the vertex, with additional  areas of subarachnoid hemorrhage noted overlying the left frontal lobe.  There is also a subdural hematoma overlying the left cerebral convexity,  which measures up to 3 mm in thickness. There is no midline shift or  mass effect. There is some periventricular and deep white matter  microangiopathic change. No calvarial fracture is seen. Air-fluid level  is noted within the left maxillary sinus. This is new when compared to  prior exam. Correlation with any evidence of sinusitis is recommended.  There is  probably also some fluid within the right maxillary sinus.  There is mild partial opacification of the right mastoid air cells.  Parieto-occipital soft tissue hematoma. Mottled appearance to the  calvarium is stable.       Impression:          1. There are bifrontal areas of hemorrhage seen near the vertex. These  are favored to represent areas of subdural hemorrhage, and there is an  additional area of subdural hemorrhage seen overlying the left cerebral  convexity, measuring up to 3 mm in thickness. There is also some  subarachnoid hemorrhage within the left frontal lobe as well. There is  no midline shift or mass effect at this time.  2. Air-fluid level noted within the left maxillary sinus. Correlation  with any evidence of sinusitis is recommended.     FINDINGS were called to LIBBY Eaton in the emergency department at  12:20 AM.     Radiation dose reduction techniques were utilized, including automated  exposure control and exposure modulation based on body size.     This report was finalized on 9/5/2020 12:21 AM by Dr. Jennifer West M.D.             Objective:   Temp:  [97.6 °F (36.4 °C)-98.6 °F (37 °C)] 98.6 °F (37 °C)  Heart Rate:  [] 76  Resp:  [16-17] 16  BP: (150-166)/(79-92) 156/88   SpO2:  [91 %-94 %] 94 %  on    Device (Oxygen Therapy): room air    Intake/Output Summary (Last 24 hours) at 9/8/2020 1610  Last data filed at 9/8/2020 1500  Gross per 24 hour   Intake 680 ml   Output 300 ml   Net 380 ml     Body mass index is 25.52 kg/m².      09/07/20  1300 09/08/20  0733 09/08/20  0952   Weight: 70.2 kg (154 lb 12.2 oz) 68.5 kg (151 lb) 68.5 kg (151 lb)     Weight change:     Physical Exam:      General: Alert, cooperative, in no acute distress.         HEENT:  No oral lesions. No thrush. Oral mucosa moist.   Chest Wall:  No abnormalities observed.             Neck:  Trachea midline. No JVD.   Pulmonary:  Minimal expiratory rhonchi, no crackles. Respirations regular, even and unlabored.           Cardio:  Regular rhythm and normal rate. Normal S1 and S2. No murmur, gallop, rub or click.    Abdominal:  Soft, non-tender and non-distended. Normal bowel sounds. No masses. No organomegaly. No guarding. No rebound tenderness.  Extremities:  Moves all extremities well. No cyanosis. No redness. No edema.     Discharge Medications and Instructions:     Discharge Medications     Discharge Medications      New Medications      Instructions Start Date   clonazePAM 0.5 MG tablet  Commonly known as:  KlonoPIN   0.5 mg, Oral, 2 Times Daily         Changes to Medications      Instructions Start Date   pregabalin 75 MG capsule  Commonly known as:  LYRICA  What changed:    · medication strength  · how much to take  · when to take this   75 mg, Oral, Daily   Start Date:  September 9, 2020        Continue These Medications      Instructions Start Date   Accu-Chek Ilana Plus w/Device kit   1 each, Does not apply, 3 Times Daily Before Meals      Accu-Chek Ilana solution   1 each, In Vitro, 3 Times Daily Before Meals      Accu-Chek Multiclix Lancet Dev kit   1 each, Does not apply, 3 Times Daily Before Meals      accu-chek multiclix lancets   1 each, Other, 3 Times Daily Before Meals, Use as instructed      B-D SINGLE USE SWABS REGULAR pads   1 each, Does not apply, 3 Times Daily Before Meals      colestipol 1 g tablet  Commonly known as:  COLESTID   1 g, Oral, 2 Times Daily      glucose blood test strip  Commonly known as:  Accu-Chek Ilana Plus   1 each, Other, 3 Times Daily Before Meals, Use as instructed      hydrocortisone 2.5 % rectal cream  Commonly known as:  ANUSOL-HC   Rectal, 2 Times Daily      metoprolol succinate  MG 24 hr tablet  Commonly known as:  TOPROL-XL   200 mg, Oral, Daily      nicotine 21 MG/24HR patch  Commonly known as:  NICODERM CQ   1 patch, Transdermal, Every 24 Hours Scheduled      NovoLOG 100 UNIT/ML injection  Generic drug:  insulin aspart   0-9 Units, Subcutaneous, 3 Times Daily Before  "Meals      omeprazole 20 MG capsule  Commonly known as:  priLOSEC   20 mg, Oral, Daily      rOPINIRole 1 MG tablet  Commonly known as:  REQUIP   3 mg, Oral, Nightly PRN      torsemide 20 MG tablet  Commonly known as:  DEMADEX   40 mg, Oral, Daily      TRUEplus Insulin Syringe 31G X 5/16\" 0.5 ML misc  Generic drug:  Insulin Syringe-Needle U-100   Use to inject insulin three times a day      Vitamin B 12 500 MCG tablet   500 mcg, Oral, Daily         Stop These Medications    aspirin 81 MG EC tablet            Discharge Diet:    Dietary Orders (From admission, onward)     Start     Ordered    09/07/20 1429  Diet Soft Texture; Chopped; Thin; Cardiac, Consistent Carbohydrate, Renal  Diet Effective Now     Question Answer Comment   Diet Texture / Consistency Soft Texture    Select Texture: Chopped    Fluid Consistency Thin    Common Modifiers Cardiac    Common Modifiers Consistent Carbohydrate    Common Modifiers Renal        09/07/20 1428                Activity at Discharge:   As tolerated, fall precautions    Discharge disposition: Home    Discharge Instructions and Follow ups:  Follow-up with me in 4 weeks to assess for sleep study in order home sleep study October the follow-up visit  Follow-up Information     Michael Arriaza Jr., DO .    Specialties:  Family Medicine, Emergency Medicine, Urgent Care  Contact information:  [address]  [place] [address]  [phone]                 Future Appointments   Date Time Provider Department Center   9/29/2020 10:50 AM LAB CHAIR 1 JOHNNIE HOPKINS  LAB [facility]   9/29/2020 11:20 AM Glynn Melara MD [facility]   12/11/2020 10:00 AM Aime Coronado MD [facility] None        Medication Reconciliation: Please see electronically completed Med Rec.    Total time spent discharging patient including evaluation, medication reconciliation, arranging follow up, and post hospitalization instructions and education total time exceeds 30 minutes.     Evert ADDISON " MD [place]  09/08/20  16:10      Dictated utilizing Dragon dictation

## 2020-09-08 NOTE — PROGRESS NOTES
Patient Identification:  NAME:  Jayne Beltran  Age:  65 y.o.   Sex:  female   :  1955   MRN:  [id number]       Chief complaint: Postconcussive vertigo, subarachnoid hemorrhage subdural hematoma subdural hygroma    History of present illness: Her vertigo is much better today she is getting steroids and Klonopin.  She still has a headache but the pain medicine helps the CT by my independent eyeball review and by the radiology report show some increased size of the subdural collection more consistent with a mixed developing subdural hygroma as well as with some acute blood in it.  Neurosurgery is of course following.      Past medical history:  Past Medical History:   Diagnosis Date   • COPD (chronic obstructive pulmonary disease) (CMS/HCC)    • Diabetes mellitus (CMS/HCC)     type 2   • Fibroid    • Hypertension    • Leukocytosis    • Neuromuscular disorder (CMS/HCC)    • Skin cancer     nose   • TIA (transient ischemic attack)        Allergies:  Patient has no known allergies.    Home medications:  Medications Prior to Admission   Medication Sig Dispense Refill Last Dose   • Alcohol Swabs (B-D SINGLE USE SWABS REGULAR) pads 1 each 3 (Three) Times a Day Before Meals. 200 each 11    • aspirin 81 MG EC tablet Take 81 mg by mouth Daily.   Taking   • Blood Glucose Calibration (ACCU-CHEK LUIS ALBERTO) solution 1 each by In Vitro route 3 (Three) Times a Day Before Meals. 5 each 5    • Blood Glucose Monitoring Suppl (ACCU-CHEK LUIS ALBERTO PLUS) w/Device kit 1 each 3 (Three) Times a Day Before Meals. 1 kit 2    • colestipol (COLESTID) 1 g tablet Take 1 tablet by mouth 2 (Two) Times a Day. 60 tablet 5    • Cyanocobalamin (VITAMIN B 12) 500 MCG tablet Take 500 mcg by mouth Daily.   Taking   • glucose blood (Accu-Chek Luis Alberto Plus) test strip 1 each by Other route 3 (Three) Times a Day Before Meals. Use as instructed 200 each 11    • hydrocortisone (ANUSOL-HC) 2.5 % rectal cream Insert  into the rectum 2 (Two) Times a Day. 30 g 5  "Taking   • insulin aspart (novoLOG) 100 UNIT/ML injection Inject 0-9 Units under the skin into the appropriate area as directed 3 (Three) Times a Day Before Meals. Discard vial 28 days after opening 10 mL 1    • Insulin Syringe-Needle U-100 31G X 5/16\" 0.5 ML misc Use to inject insulin three times a day 100 each 0    • Lancets (ACCU-CHEK MULTICLIX) lancets 1 each by Other route 3 (Three) Times a Day Before Meals. Use as instructed 200 each 11    • Lancets Misc. (ACCU-CHEK MULTICLIX LANCET DEV) kit 1 each 3 (Three) Times a Day Before Meals. 200 each 11    • metoprolol succinate XL (TOPROL-XL) 200 MG 24 hr tablet Take 1 tablet by mouth Daily. 90 tablet 1    • nicotine (NICODERM CQ) 21 MG/24HR patch Place 1 patch on the skin as directed by provider Daily. 30 each 0    • omeprazole (priLOSEC) 20 MG capsule Take 1 capsule by mouth Daily. 90 capsule 1    • pregabalin (Lyrica) 150 MG capsule Take 150 mg by mouth 3 (Three) Times a Day.      • rOPINIRole (REQUIP) 1 MG tablet Take 3 mg by mouth At Night As Needed.      • torsemide (DEMADEX) 20 MG tablet Take 2 tablets by mouth Daily. 30 tablet 0         Hospital medications:    clonazePAM 0.5 mg Oral BID   insulin lispro 0-9 Units Subcutaneous 4x Daily With Meals & Nightly   methylPREDNISolone sodium succinate 250 mg Intravenous Q24H   metoprolol succinate  mg Oral Daily   nicotine 1 patch Transdermal Q24H   pantoprazole 40 mg Oral QAM   pregabalin 75 mg Oral Daily   sodium chloride 10 mL Intravenous Q12H   torsemide 80 mg Oral Daily        •  acetaminophen **OR** acetaminophen  •  dextrose  •  dextrose  •  enalaprilat  •  glucagon (human recombinant)  •  HYDROcodone-acetaminophen  •  Morphine  •  rOPINIRole  •  [COMPLETED] Insert peripheral IV **AND** sodium chloride  •  sodium chloride      Objective:  Vitals Ranges:   Temp:  [97.6 °F (36.4 °C)-98.5 °F (36.9 °C)] 98.5 °F (36.9 °C)  Heart Rate:  [] 93  Resp:  [16-17] 16  BP: (144-166)/(79-92) 150/79      Physical " Exam:  She is awake and moderately alert oriented x3 extraocular movements full without nystagmus or subjective diplopia good motor exam no drift reflexes trace throughout symmetrical toes downgoing bilaterally    Results review:   I reviewed the patient's new clinical results.    Data review:  Lab Results (last 24 hours)     Procedure Component Value Units Date/Time    POC Glucose Once [[id number]]  (Abnormal) Collected:  09/08/20 1141    Specimen:  Blood Updated:  09/08/20 1152     Glucose 360 mg/dL     POC Glucose Once [[id number]]  (Abnormal) Collected:  09/08/20 1139    Specimen:  Blood Updated:  09/08/20 1150     Glucose 390 mg/dL     POC Glucose Once [[id number]]  (Abnormal) Collected:  09/08/20 0804    Specimen:  Blood Updated:  09/08/20 0825     Glucose 365 mg/dL     Renal Function Panel [[id number]]  (Abnormal) Collected:  09/08/20 0526    Specimen:  Blood Updated:  09/08/20 0649     Glucose 376 mg/dL      BUN 35 mg/dL      Creatinine 4.55 mg/dL      Sodium 136 mmol/L      Potassium 4.3 mmol/L      Chloride 97 mmol/L      CO2 22.5 mmol/L      Calcium 9.1 mg/dL      Albumin 3.80 g/dL      Phosphorus 3.9 mg/dL      Anion Gap 16.5 mmol/L      BUN/Creatinine Ratio 7.7     eGFR Non African Amer 10 mL/min/1.73      Comment: <15 Indicative of kidney failure.        eGFR   Amer --     Comment: <15 Indicative of kidney failure.       Narrative:       GFR Normal >60  Chronic Kidney Disease <60  Kidney Failure <15      CBC (No Diff) [[id number]]  (Abnormal) Collected:  09/08/20 0526    Specimen:  Blood Updated:  09/08/20 0630     WBC 18.23 10*3/mm3      RBC 3.62 10*6/mm3      Hemoglobin 10.3 g/dL      Hematocrit 31.7 %      MCV 87.6 fL      MCH 28.5 pg      MCHC 32.5 g/dL      RDW 15.6 %      RDW-SD 49.6 fl      MPV 11.5 fL      Platelets 276 10*3/mm3     POC Glucose Once [[id number]]  (Abnormal) Collected:  09/07/20 2133    Specimen:  Blood Updated:  09/07/20 2136     Glucose 226 mg/dL     POC Glucose Once  [[id number]]  (Abnormal) Collected:  09/07/20 1630    Specimen:  Blood Updated:  09/07/20 1633     Glucose 164 mg/dL            Imaging:  Imaging Results (Last 24 Hours)     Procedure Component Value Units Date/Time    CT Head Without Contrast [[id number]] Collected:  09/08/20 1124     Updated:  09/08/20 1235    Narrative:       NONCONTRAST HEAD CT 09/08/2020     CLINICAL HISTORY: Follow-up for traumatic subarachnoid hemorrhage.     TECHNIQUE: Spiral CT images were obtained from the base of the skull to  the vertex without intravenous contrast. Images were reformatted and  submitted in 3 mm thick axial CT sections with brain algorithm and 2 mm  thick sagittal and coronal reconstructions were performed and submitted  in brain algorithm.     COMPARISON: This is correlated to prior noncontrast head CTs from  [facility] on 08/24/2020 and 09/04/2020 and 09/05/2020  and the most recent head CT 2 days ago on 09/06/2020.     FINDINGS: There is some mild low density in the periventricular white  matter consistent with mild small vessel disease. On 09/04/2020 the  patient had head trauma and had thin slivers of acute posttraumatic  subarachnoid hemorrhage in the left central sulcus and small nodular  foci of acute hemorrhage in the extra-axial space superior to the  superior medial frontoparietal junctions bilaterally and a thin 2-3 mm  thick acute subdural hematoma overlying the superolateral left  frontoparietal convexity. These foci of hemorrhage have remained stable.  There has been some indrawing of subdural fluid adjacent to the thin 3  mm thick acute subdural hematoma overlying the superolateral left  frontoparietal convexity compatible with an associated traumatic  subdural hygroma and the combination of the subdural fluid and 2-3 mm  thick acute rind of subdural hemorrhage maximally measures up to 4-5 mm  in thickness over the superolateral left frontoparietal convexity and  the indwelling of subdural  fluid is new when compared to head CT  09/06/2020. There is no significant mass effect and no midline shift.  The ventricles are normal in size. There is mild low density in the  periventricular white matter consistent with mild small vessel disease.  The paranasal sinuses and mastoid air cells and middle ear cavities are  clear. There are multiple punctate lucencies in the calvarium ranging  from 2-5 mm in size, most pronounced at the vertex of the skull. This  could be from underlying multiple myeloma.       Impression:       1. There has been slight worsening since most recent head CT 09/06/2020.  The thin slivers of acute posttraumatic subarachnoid hemorrhage in the  superior left frontoparietal sulci and the small nodular foci of  hemorrhage in the extra-axial space likely subarachnoid overlying the  superomedial frontoparietal junction are unchanged and the thin 2-3 mm  thick acute subdural hematoma over the superolateral left frontoparietal  convexity is unchanged, but there has been interval indrawing of  subdural fluid adjacent to the acute subdural hematoma and the subdural  fluid suggests a developing posttraumatic subdural hygroma which can  grow over time and the combination of subdural fluid and acute subdural  blood maximally now measures 4-5 mm in thickness from inner table skull  to superolateral left frontoparietal convexity and continued close  follow-up is warranted.  2. There are multiple tiny lucent areas in the calvarium, in particular  studding the vertex of the skull, raising the possibility of underlying  myeloma and this needs to be correlated clinically.      The results and recommendations were communicated to TRISH Sanchez, by telephone 09/08/2020 at 9:30 AM.           Radiation dose reduction techniques were utilized, including automated  exposure control and exposure modulation based on body size.     This report was finalized on 9/8/2020 12:32 PM by Dr. Jesse Anand M.D.             PPE worn at all times washed up before washed up afterwards disposed of everything properly was not within 6 feet of her for more than a few minutes no aerosols were used    Assessment and Plan:     She has a headache but notes that the pain medicine helps the vertigo postconcussive in nature it is better today.  Nonetheless by my independent eyeball review and by the radiology report she appears to have some slight increase in the subdural fluid collection now looking more like a slowly developing subdural hygroma with mixed acute blood as well.  Although she looks great today and is up walking around it is not clear whether or not this is something that will eventually need to be drained.  I will wait and see what neurosurgery has to say about her today.      Tim Mae MD  09/08/20  13:12

## 2020-09-08 NOTE — OUTREACH NOTE
Prep Survey      Responses   [facility] facility patient discharged from?  [place]   Is LACE score < 7 ?  No   Eligibility  [facility]   Date of Admission  09/05/20   Date of Discharge  09/08/20   Discharge Disposition  Home or Self Care   Discharge diagnosis  Acute subdural and subarachnoid hemorrhage traumatic  FALL   COVID-19 Test Status  Not tested   Does the patient have one of the following disease processes/diagnoses(primary or secondary)?  Stroke (TIA)   Does the patient have Home health ordered?  Yes   What is the Home health agency?    [facility]   Is there a DME ordered?  No   Prep survey completed?  Yes          Donna Eldridge RN

## 2020-09-08 NOTE — PROGRESS NOTES
Neurosurgery progress note     Post trauma day #3 status post ground-level fall with closed head injury and bilateral traumatic subarachnoid hemorrhage        Subjective: No complaints overnight.       Objective:  Vitals:    09/08/20 0733 09/08/20 0838 09/08/20 0952 09/08/20 1033   BP:  156/90  150/79   BP Location:    Right arm   Patient Position:    Sitting   Pulse:  94  93   Resp:    16   Temp:    98.5 °F (36.9 °C)   TempSrc:    Oral   SpO2:    94%   Weight: 68.5 kg (151 lb)  68.5 kg (151 lb)    Height:         Physical exam   awake, alert, oriented x3  Pupils equal round reactive to light  Extraocular muscles intact  Face symmetric  Speech is fluent and clear  No pronator drift  Motor exam  Bilateral deltoids 5/5, bilateral biceps 5/5, bilateral triceps 5/5, bilateral wrist extension 5/5 bilateral hand  5/5  Bilateral hip flexion 5/5, bilateral knee extension 5/5, bilateral DF/PF 5/5  Able to detect  light touch in all 4 extremities        Assessment/plan  65-year-old female status post ground-level fall with closed head injury and bilateral traumatic subarachnoid hemorrhages.  -Recommend hold aspirin for 1 week  -Doing well, continues to be at baseline without neurologic deficits.  Okay to DC home from neurosurgery perspective.   -Recommend follow-up in my clinic in 4 weeks with repeat CT head.  -Recommend follow-up as planned with hematology/oncology for further evaluation for suspected multiple myeloma

## 2020-09-08 NOTE — DISCHARGE PLACEMENT REQUEST
"Andres Beltran (65 y.o. Female)     Date of Birth Social Security Number Address Home Phone MRN    1955  [address]  [place] [address] [phone] [id number]    Taoist Marital Status          None        Admission Date Admission Type Admitting Provider Attending Provider Department, Room/Bed    9/5/20 Emergency Tyrese Dawson MD Kellie, Scott P, MD [address], P599/1    Discharge Date Discharge Disposition Discharge Destination                       Attending Provider:  Tyrese Dawson MD    Allergies:  No Known Allergies    Isolation:  None   Infection:  None   Code Status:  CPR    Ht:  163.8 cm (64.5\")   Wt:  68.5 kg (151 lb)    Admission Cmt:  None   Principal Problem:  None                Active Insurance as of 9/5/2020     Primary Coverage     Payor Plan Insurance Group Employer/Plan Group    HUMANA MEDICARE REPLACEMENT HUMANA MEDICARE REPLACEMENT [id number]     Payor Plan Address Payor Plan Phone Number Payor Plan Fax Number Effective Dates    [address] [address] [phone]  1/1/2020 - None Entered    [facility] [address]       Subscriber Name Subscriber Birth Date Member ID       ANDRES BELTRAN 1955 [id number]           Secondary Coverage     Payor Plan Insurance Group Employer/Plan Group    [employer]E [employer] LIFE [employer]ANCE      Payor Plan Address Payor Plan Phone Number Payor Plan Fax Number Effective Dates    [address] [phone]  1/1/2020 - None Entered    [facility] [id number]       Subscriber Name Subscriber Birth Date Member ID       ANDRES BELTRAN 1955 [id number]                 Emergency Contacts      (Rel.) Home Phone Work Phone Mobile Phone    Golden Beltran (Spouse) [phone] -- [phone]    MARIA VICTORIA HOYT(BRO IN LAW) (Relative) [phone] -- --    AMRIT HOYT (SIS N LAW) (Relative) [phone] -- --              "

## 2020-09-08 NOTE — PROGRESS NOTES
Discharge Planning Assessment  [facility]     Patient Name: Jayne Beltran  MRN: [id number]  Today's Date: 9/8/2020    Admit Date: 9/5/2020    Discharge Needs Assessment     Row Name 09/08/20 0929       Living Environment    Lives With  spouse    Name(s) of Who Lives With Patient  Golden Beltran- spouse    Current Living Arrangements  home/apartment/condo    Primary Care Provided by  self    Provides Primary Care For  no one    Family Caregiver if Needed  spouse    Quality of Family Relationships  supportive       Resource/Environmental Concerns    Resource/Environmental Concerns  none    Transportation Concerns  car, none       Transition Planning    Patient/Family Anticipates Transition to  home with family;home    Transportation Anticipated  family or friend will provide       Discharge Needs Assessment    Concerns to be Addressed  no discharge needs identified;denies needs/concerns at this time    Equipment Currently Used at Home  glucometer    Equipment Needed After Discharge  none    Provided Post Acute Provider List?  N/A        Discharge Plan     Row Name 09/08/20 8878       Plan    Plan  Return home with spouse; denies any needs    Plan Comments  Met w/ patient in room.  Introduced self and explained role.  Facesheet verified. Pt is newly dialysis patient, MW in [facility]; does not know her nephrologist.  S/p fall at home on her spouse's w/c ramp.  Pt has brother in [place] who is supportive.          Destination      Coordination has not been started for this encounter.      Durable Medical Equipment      Coordination has not been started for this encounter.      Dialysis/Infusion      Coordination has not been started for this encounter.      Home Medical Care      Coordination has not been started for this encounter.      Therapy      Coordination has not been started for this encounter.      Community Resources      Coordination has not been started for this encounter.          Demographic  Summary     Row Name 09/08/20 0928       General Information    Admission Type  inpatient    Arrived From  home    Referral Source  admission list    Reason for Consult  discharge planning    Preferred Language  English     Used During This Interaction  no       Contact Information    Permission Granted to Share Info With  family/designee;        Functional Status     Row Name 09/08/20 0929       Functional Status    Usual Activity Tolerance  excellent    Current Activity Tolerance  good       Functional Status, IADL    Medications  independent    Meal Preparation  independent    Housekeeping  independent    Laundry  independent    Shopping  independent       Mental Status    General Appearance WDL  WDL       Mental Status Summary    Recent Changes in Mental Status/Cognitive Functioning  no changes       Employment/    Employment Status  retired          Tomeka Troy RN

## 2020-09-08 NOTE — TELEPHONE ENCOUNTER
I called [facility], [place], operated by [facility] home health and authorized home health orders to proceed home health nursing for a few weeks a couple of times a week to help chronic with the changes and transitions.

## 2020-09-08 NOTE — PROGRESS NOTES
I was notified by RN of eunice. I called [place] dialysis unit and spoke to RN.  Reiterated order for heparin to ports of TDC only.  Keep systolic BP above 110.  NO weight off. DRY weight 68.5 KG.

## 2020-09-09 ENCOUNTER — TELEPHONE (OUTPATIENT)
Dept: NEUROSURGERY | Facility: CLINIC | Age: 65
End: 2020-09-09

## 2020-09-09 ENCOUNTER — TRANSITIONAL CARE MANAGEMENT TELEPHONE ENCOUNTER (OUTPATIENT)
Dept: CALL CENTER | Facility: HOSPITAL | Age: 65
End: 2020-09-09

## 2020-09-09 DIAGNOSIS — R07.81 RIB PAIN ON LEFT SIDE: ICD-10-CM

## 2020-09-09 DIAGNOSIS — I60.9 SAH (SUBARACHNOID HEMORRHAGE) (HCC): ICD-10-CM

## 2020-09-09 DIAGNOSIS — G96.08 SUBDURAL HYGROMA: Primary | ICD-10-CM

## 2020-09-09 DIAGNOSIS — G25.81 RESTLESS LEGS SYNDROME: Chronic | ICD-10-CM

## 2020-09-09 RX ORDER — CLONAZEPAM 0.5 MG/1
0.5 TABLET ORAL 2 TIMES DAILY
Qty: 13 TABLET | Refills: 0 | Status: SHIPPED | OUTPATIENT
Start: 2020-09-09 | End: 2020-09-29

## 2020-09-09 RX ORDER — CLONAZEPAM 0.5 MG/1
0.5 TABLET ORAL 2 TIMES DAILY
Qty: 13 TABLET | Refills: 0 | Status: SHIPPED | OUTPATIENT
Start: 2020-09-09 | End: 2020-09-09

## 2020-09-09 NOTE — TELEPHONE ENCOUNTER
----- Message from TRISH Cook sent at 9/8/2020  2:14 PM EDT -----  Regarding: f/u  Has small tSAH. Needs f/u 1 month with [facility].     #Addendum: Patient needs follow-up in 10 to 14 days with repeat CT head.  Has some new subdural hygroma.    Please confirm with me that you received this updated message

## 2020-09-09 NOTE — PROCEDURES
Ultrasound Guided Central Venous HD  Catheter Insertion Procedure Note      Indications:  vascular access and need for emergency hemodialysis     Procedure Details   Informed consent was obtained for the procedure, including sedation.  Risks of lung perforation, hemorrhage, arrhythmia, and adverse drug reaction were discussed.     Maximum sterile technique was used including usual patient drapes, antiseptics and physician sterile garments.    Under sterile conditions the skin above the on the right internal jugular vein was prepped with chlorhexidine and covered with a sterile drape. A sterile ultrasound probe was used to localize the target vein. It was clearly visualized. Local anesthesia was applied to the skin and subcutaneous tissues with lidocaine 1%. An 18-gauge needle was then inserted into the vein under ultrasound guidance. A guide wire was then threaded into the vein. A HD catheter with pig Tail  was then inserted into the vessel over the guide wire. The catheter was sutured into place and dressed following sterile protocol with Biopatch placed. All 3 ports were flushed and deemed patent.    Findings:  There were no changes to vital signs. All catheter ports were flushed with saline. Patient did tolerate procedure well.    Recommendations:  CXR ordered to verify placement.      Evert Alcantara MD  9/9/2020

## 2020-09-09 NOTE — OUTREACH NOTE
Call Center TCM Note      Responses   [facility] patient discharged from?  [place]   Does the patient have one of the following disease processes/diagnoses(primary or secondary)?  Stroke (TIA)   TCM attempt successful?  Yes   Call start time  1219   Call end time  1231   General alerts for this patient  HD MWF   Discharge diagnosis  Acute subdural and subarachnoid hemorrhage traumatic  FALL   Is patient permission given to speak with other caregiver?  No   List who call center can speak with  Golden   Person spoke with today (if not patient) and relationship  , Golden Khan reviewed with patient/caregiver?  Yes   Is the patient having any side effects they believe may be caused by any medication additions or changes?  No   Does the patient have all medications ordered at discharge?  Yes   Is the patient taking all medications as directed (includes completed medication regime)?  Yes   Does the patient have a primary care provider?   Yes   Does the patient have an appointment with their PCP within 7 days of discharge?  No   Comments regarding PCP  PCP Michael Arriaza. Hospital follow up appt not in place.    Nursing Interventions  Educated patient on importance of making appointment, Advised patient to make appointment [Message routed to office with appt need. ]   Has the patient kept scheduled appointments due by today?  Yes [ reports patient went to dialysis today. ]   What is the Home health agency?    [facility]   Has home health visited the patient within 72 hours of discharge?  Call prior to 72 hours   Psychosocial issues?  No   Does the patient require any assistance with activities of daily living such as eating, bathing, dressing, walking, etc.?  No   Does the patient have any residual symptoms from stroke/TIA?  Yes   Residual symptoms comments  Headache   Does the patient understand the diet ordered at discharge?  Yes [cardiac, consistent carb, renal]   Did the patient  receive a copy of their discharge instructions?  Yes   Nursing interventions  Reviewed instructions with patient   What is the patient's perception of their health status since discharge?  Improving   Nursing interventions  Nurse provided patient education   Is the patient/caregiver able to teach back the risk factors for a stroke?  Diabetes, High blood pressure-goal below 120/80   Is the patient/caregiver able to teach back signs and symptoms related to disease process for when to call PCP?  Yes   Is the patient/caregiver able to teach back signs and symptoms related to disease process for when to call 911?  Yes   Is the patient/caregiver able to teach back the hierarchy of who to call/visit for symptoms/problems? PCP, Specialist, Home health nurse, Urgent Care, ED, 911  Yes   TCM call completed?  Yes          Donna Anderson RN    9/9/2020, 12:31

## 2020-09-10 NOTE — TELEPHONE ENCOUNTER
CT Head scheduled at [facility] 9/22/20 @ 10:30am. Pt scheduled with Dr. Garcia 10/6/20 @ 10:30am. I initially scheduled CT Head and OV on 9/21, however patient reports that she has dialysis on MWF and can only do Tuesday appointments.

## 2020-09-10 NOTE — PROGRESS NOTES
Case Management Discharge Note      Final Note: Patient DC'd home with [facility]    Provided Post Acute Provider List?: N/A       Home Medical Care      Service Provider Request Status Selected Services Address Phone Number Fax Number    [facility] Selected Home Health Services [address] [address] [phone]           Transportation Services  Private: Car    Final Discharge Disposition Code: 06 - home with home health care

## 2020-09-16 DIAGNOSIS — I10 ESSENTIAL HYPERTENSION: ICD-10-CM

## 2020-09-16 DIAGNOSIS — K21.9 GASTROESOPHAGEAL REFLUX DISEASE WITHOUT ESOPHAGITIS: ICD-10-CM

## 2020-09-16 RX ORDER — AMLODIPINE BESYLATE 5 MG/1
TABLET ORAL
Qty: 90 TABLET | Refills: 0 | OUTPATIENT
Start: 2020-09-16

## 2020-09-16 RX ORDER — METOPROLOL SUCCINATE 200 MG/1
TABLET, EXTENDED RELEASE ORAL
Qty: 90 TABLET | Refills: 0 | OUTPATIENT
Start: 2020-09-16

## 2020-09-16 RX ORDER — OMEPRAZOLE 20 MG/1
CAPSULE, DELAYED RELEASE ORAL
Qty: 90 CAPSULE | Refills: 0 | OUTPATIENT
Start: 2020-09-16

## 2020-09-16 NOTE — TELEPHONE ENCOUNTER
Pt has appointment here tomorrow and her medicines will be reconciled when she is triaged and discussed further during her visit.

## 2020-09-17 ENCOUNTER — OFFICE VISIT (OUTPATIENT)
Dept: FAMILY MEDICINE CLINIC | Facility: CLINIC | Age: 65
End: 2020-09-17

## 2020-09-17 ENCOUNTER — TELEPHONE (OUTPATIENT)
Dept: FAMILY MEDICINE CLINIC | Facility: CLINIC | Age: 65
End: 2020-09-17

## 2020-09-17 VITALS
RESPIRATION RATE: 16 BRPM | BODY MASS INDEX: 24.66 KG/M2 | DIASTOLIC BLOOD PRESSURE: 80 MMHG | WEIGHT: 148 LBS | HEIGHT: 65 IN | HEART RATE: 85 BPM | OXYGEN SATURATION: 98 % | SYSTOLIC BLOOD PRESSURE: 120 MMHG | TEMPERATURE: 97.1 F

## 2020-09-17 DIAGNOSIS — E11.9 TYPE 2 DIABETES MELLITUS WITHOUT COMPLICATION, WITHOUT LONG-TERM CURRENT USE OF INSULIN (HCC): ICD-10-CM

## 2020-09-17 DIAGNOSIS — Z79.899 HIGH RISK MEDICATION USE: Primary | ICD-10-CM

## 2020-09-17 DIAGNOSIS — I60.9 SAH (SUBARACHNOID HEMORRHAGE) (HCC): ICD-10-CM

## 2020-09-17 DIAGNOSIS — E78.5 DYSLIPIDEMIA: ICD-10-CM

## 2020-09-17 PROBLEM — R52 PAIN, UNSPECIFIED: Status: ACTIVE | Noted: 2020-09-04

## 2020-09-17 PROBLEM — C44.301 UNSPECIFIED MALIGNANT NEOPLASM OF SKIN OF NOSE: Status: ACTIVE | Noted: 2020-09-04

## 2020-09-17 PROBLEM — J44.9 CHRONIC OBSTRUCTIVE PULMONARY DISEASE, UNSPECIFIED: Status: ACTIVE | Noted: 2020-09-04

## 2020-09-17 PROBLEM — Z48.00 ENCOUNTER FOR CHANGE OR REMOVAL OF NONSURGICAL WOUND DRESSING: Status: ACTIVE | Noted: 2020-09-04

## 2020-09-17 PROBLEM — D68.8 OTHER SPECIFIED COAGULATION DEFECTS: Status: ACTIVE | Noted: 2020-09-04

## 2020-09-17 PROBLEM — R06.02 SHORTNESS OF BREATH: Status: ACTIVE | Noted: 2020-09-04

## 2020-09-17 PROCEDURE — 99495 TRANSJ CARE MGMT MOD F2F 14D: CPT | Performed by: FAMILY MEDICINE

## 2020-09-17 RX ORDER — FOLIC ACID 1 MG/1
4 TABLET ORAL DAILY
Status: ON HOLD | COMMUNITY
Start: 2020-09-04 | End: 2021-06-15

## 2020-09-17 NOTE — PROGRESS NOTES
Transitional Care Follow Up Visit  Subjective     Jayne Beltran is a 65 y.o. female who presents for a transitional care management visit.    Within 48 business hours after discharge our office contacted her via telephone to coordinate her care and needs.       Course During Hospital Stay:  Date of Admit: 9/5/2020  Date of Discharge:  09/08/20  Discharge Condition: Stable     Discharge Diagnoses:  Acute subdural and subarachnoid hemorrhage traumatic  Dizziness due to post concussive vertigo  Recent GERI, requiring hemodialysis     Relevant Medical Diagnoses  CKD 3  DM2  Current smoker  COPD    She is now following with nephrology for dialysis.    She is going to see urology for kidney stones.    I reviewed and discussed the details of that call along with the discharge summary, hospital problems, inpatient lab results, inpatient diagnostic studies, and consultation reports with Jayne.     Current outpatient and discharge medications have been reconciled for the patient.  Reviewed by: Michael Arriaza Jr.,       Date of TCM Phone Call 9/8/2020   [facility]   Date of Admission 9/5/2020   Date of Discharge 9/8/2020   Discharge Disposition Home or Self Care     Risk for Readmission (LACE) Score: 12 (9/8/2020  6:00 AM)      History of Present Illness   Course During Hospital Stay:  Date of Admit: 9/5/2020  Date of Discharge:  09/08/20  Discharge Condition: Stable     Discharge Diagnoses:  Acute subdural and subarachnoid hemorrhage traumatic  Dizziness due to post concussive vertigo  Recent GERI, requiring hemodialysis     Relevant Medical Diagnoses  CKD 3  DM2  Current smoker  COPD     History of present illness from H&P from 9/5/2020:   Mrs. Beltran is a 66yo with ESRD.  She had a loss of balance and fell striking her head.  She was walking on a ramp.  She doesn't recall all the details of the event but is pretty oriented at the moment and has neurologic deficits.  She does have a severe headache.   Ct imaging shows ICH.     Hospital Course:   Jayne Beltran presented to [facility] with complaints of balance problem and a fall with headache, had evidence of subdural hematoma, was evaluated and followed by neurosurgery and neurology.  Patient developed some dizziness that was due to a postconcussive vertigo, was evaluated by neurology, she did well with the steroids, and she was started on clonazepam which she will be on for a few more days after discharge for the vertigo.  She did develop some steroid-induced hyperglycemia, expect that to improve as the effect of the steroids is gone since she only get 2 doses.  She had chronic kidney disease which were stable  She has COPD with no acute exacerbation  Patient had some nocturnal desaturation but she was doing fine without any oxygen supplement over the last 24 hours.  There was some concern about obstructive sleep apnea and nocturnal hypoxemia, that can be arranged for follow-up after discharge   Consider repeating home sleep study if still indicated.  She was having no more balance issues the day of discharge, she is going home, with family, and she was cleared for discharge by both neurology and neurosurgery.  Patient already has follow-up with neurosurgery in few weeks with repeat CAT scan, she is to stay off the aspirin for 1 more week and then she can go back on the 81 mg dose  Patient was encouraged strongly to contact neurosurgery in case she started to develop any worsening headache or any focal neurological deficit or worsening confusion.     The following portions of the patient's history were reviewed and updated as appropriate: allergies, current medications, past family history, past medical history, past social history, past surgical history and problem list.    Review of Systems   Constitutional: Negative for activity change and unexpected weight change.   Respiratory: Negative for shortness of breath and wheezing.     Cardiovascular: Negative for chest pain and palpitations.   Gastrointestinal: Negative for abdominal pain, blood in stool and constipation.   Genitourinary: Negative for difficulty urinating and hematuria.   Musculoskeletal: Negative for gait problem.   Skin: Negative for color change and rash.       Objective   Physical Exam  Vitals signs and nursing note reviewed.   Constitutional:       General: She is not in acute distress.     Appearance: Normal appearance. She is well-developed. She is not diaphoretic.   HENT:      Head: Normocephalic and atraumatic.      Right Ear: External ear normal.      Left Ear: External ear normal.      Nose: Nose normal.      Mouth/Throat:      Pharynx: No oropharyngeal exudate.   Eyes:      General: Lids are normal. No scleral icterus.        Right eye: No discharge.         Left eye: No discharge.   Neck:      Musculoskeletal: Full passive range of motion without pain, normal range of motion and neck supple. No edema.      Thyroid: No thyromegaly.      Trachea: Trachea normal. No tracheal deviation.   Cardiovascular:      Rate and Rhythm: Normal rate and regular rhythm.      Heart sounds: Normal heart sounds. No murmur. No friction rub. No gallop.    Pulmonary:      Effort: Pulmonary effort is normal. No tachypnea, bradypnea or respiratory distress.      Breath sounds: Normal breath sounds. No stridor. No decreased breath sounds, wheezing or rales.   Chest:      Chest wall: No tenderness.   Lymphadenopathy:      Head:      Right side of head: No submental, submandibular, tonsillar, preauricular, posterior auricular or occipital adenopathy.      Left side of head: No submental, submandibular, tonsillar, preauricular, posterior auricular or occipital adenopathy.      Cervical: No cervical adenopathy.      Right cervical: No superficial, deep or posterior cervical adenopathy.     Left cervical: No superficial, deep or posterior cervical adenopathy.   Skin:     General: Skin is warm  and dry.      Capillary Refill: Capillary refill takes less than 2 seconds.      Coloration: Skin is not pale.      Findings: No erythema or rash.      Nails: There is no clubbing.     Neurological:      Mental Status: She is alert and oriented to person, place, and time. She is not disoriented.      Deep Tendon Reflexes: Reflexes are normal and symmetric.   Psychiatric:         Behavior: Behavior normal.         Assessment/Plan   Problems Addressed this Visit     Type 2 diabetes mellitus without complication, without long-term current use of insulin (CMS/[facility])    Relevant Orders    Hemoglobin A1c    Dyslipidemia    Relevant Orders    Lipid Panel With / Chol / HDL Ratio    SAH (subarachnoid hemorrhage) (CMS/[facility])     Following with neurology           Other Visit Diagnoses     High risk medication use    -  Primary    Relevant Orders    CBC & Differential    Comprehensive Metabolic Panel    Hemoglobin A1c    Lipid Panel With / Chol / HDL Ratio

## 2020-09-17 NOTE — PATIENT INSTRUCTIONS
Subarachnoid Hemorrhage  Subarachnoid hemorrhage is bleeding in the area between the brain and the membrane that covers the brain (subarachnoid space). This bleeding increases the pressure on the brain and decreases blood flow to certain areas of the brain. Subarachnoid hemorrhage is a medical emergency. If this condition is not treated, it may cause permanent brain damage, stroke, or even death.  What are the causes?  This condition may be caused by:  · A burst blood vessel in the brain (ruptured brain aneurysm).  · A head injury.  · Bleeding from blood vessels that have developed abnormally (arteriovenous malformation).  · A bleeding disorder.  · Use of blood-thinning medicines (anticoagulants).  · Use of certain drugs, such as cocaine.  In some cases, the cause is not known.  What increases the risk?  You are more likely to develop this condition if:  · You smoke.  · You have high blood pressure (hypertension).  · You abuse alcohol.  · You are older than age 50.  · You are female, especially if you have gone through menopause.  · You have a family history of aneurysms.  · You have a certain genetic syndrome that results in kidney disease (autosomal dominant polycystic kidney disease) or connective tissue disease.  What are the signs or symptoms?  Symptoms of this condition include:  · A sudden, severe headache. The headache is often described as the worst headache ever experienced.  · Nausea or vomiting, especially when combined with other symptoms such as a headache.  · Sudden weakness or numbness of the face, arm, or leg, especially on one side of the body.  · Sudden trouble walking or difficulty moving an arm or leg.  · Sudden confusion.  · Trouble speaking (expressive aphasia) or understanding speech (receptive aphasia).  · Difficulty swallowing.  · Sudden trouble seeing out of one or both eyes.  · Double vision.  · Dizziness.  · Loss of balance or coordination.  · Sensitivity to light.  · Stiff  neck.  · Drowsiness.  · Loss of consciousness.  How is this diagnosed?  This condition is diagnosed based on a physical exam and your symptoms. You may have tests, such as:  · CT scan.  · MRI.  · A procedure to examine the blood vessels in the brain and neck (cerebral angiogram). For this test, a dye is injected into your bloodstream before X-rays are taken to examine blood flow. Dye makes the blood vessels easier to see.  · A procedure to remove and examine a small amount of the fluid that surrounds the brain and spinal cord (lumbar puncture).  · Blood tests.  · An ultrasound to monitor blood flow in the brain (transcranial Doppler).  How is this treated?  This condition often requires immediate treatment in the hospital to lower the risk of brain damage. Treatment depends on the cause, severity, and location of the bleeding and any damage that it has caused. Treatment goals are to stop bleeding, repair the cause of bleeding, relieve symptoms, and prevent problems. Treatment may include:  · Medicines that:  ? Reverse the effects of blood thinners, if you were taking blood thinners before the subarachnoid hemorrhage.  ? Lower the blood pressure (antihypertensives).  ? Relieve pain (analgesics).  ? Relieve nausea or vomiting.  ? Prevent seizures.  · Surgery to stop bleeding, repair the cause of the bleeding, or remove blood that has collected. This may involve:  ? A procedure that is done from inside the blood vessel, in which the aneurysm is filled with small, platinum coils (endovascular coiling).  ? Opening the skull (craniotomy) to reach the aneurysm and put a clip at the base of the aneurysm (surgical clipping).  · Surgery to relieve pressure on the brain by placing a tube (external ventricular drain, EVD) in the brain to drain blood.  · Physical, occupational, or speech-language therapy to improve any mental (cognitive) and day-to-day functions that are affected by your condition.  Other treatment depends on  "the cause and severity of symptoms, and how long the symptoms have lasted. Actions may be taken to prevent short-term and long-term problems, including lung infection (pneumonia) and blood clots in your legs.  Follow these instructions at home:  Medicines  · Take over-the-counter and prescription medicines only as told by your health care provider.  · Do not take any medicines that contain aspirin or NSAIDs unless your health care provider approves.  Lifestyle  · Do not use any products that contain nicotine or tobacco, such as cigarettes and e-cigarettes. If you need help quitting, ask your health care provider.  · Limit alcohol intake to no more than 1 drink a day for nonpregnant women and 2 drinks a day for men. One drink equals 12 oz of beer, 5 oz of wine, or 1½ oz of hard liquor.  Eating and drinking  · Follow instructions from your health care provider about whether eating and drinking are safe for you. You may need tests to make sure that you can swallow safely (swallow studies).  Driving  · Do not drive until your health care provider approves.  · Do not drive or use heavy machinery while taking prescription pain medicine.  General instructions  · Do physical, occupational, and speech-language therapy as recommended.  · Rest and limit activities as directed. Rest helps the brain to heal. Make sure you:  ? Get plenty of sleep.  ? Avoid activities that cause physical or mental stress.  · Check and write down your blood pressure as told by your health care provider.  · Keep all follow-up visits as told by your health care providers. This is important.  Contact a health care provider if:  · You have a stiff neck.  · You have a cough.  · You have a fever.  Get help right away if:  · You have any symptoms of a stroke. \"BE FAST\" is an easy way to remember the main warning signs of a stroke:  ? B - Balance. Signs are dizziness, sudden trouble walking, or loss of balance.  ? E - Eyes. Signs are trouble seeing or a " sudden change in vision.  ? F - Face. Signs are sudden weakness or numbness of the face, or the face or eyelid drooping on one side.  ? A - Arms. Signs are weakness or numbness in an arm. This happens suddenly and usually on one side of the body.  ? S - Speech. Signs are sudden trouble speaking, slurred speech, or trouble understanding what people say.  ? T - Time. Time to call emergency services. Write down what time symptoms started.  · You have other signs of a stroke, such as:  ? A sudden, severe headache with no known cause.  ? Nausea or vomiting.  ? Seizure.  These symptoms may represent a serious problem that is an emergency. Do not wait to see if the symptoms will go away. Get medical help right away. Call your local emergency services (911 in the [place] Do not drive yourself to the hospital.  Summary  · Subarachnoid hemorrhage is bleeding in the area between the brain and the membrane that covers the brain (subarachnoid space).  · Subarachnoid hemorrhage is a medical emergency. Treatment may include procedures to stop bleeding or reduce pressure in the skull, and medicines to prevent complications.  · Follow instructions from your health care provider about diet restrictions, activity restrictions, and medicines.  This information is not intended to replace advice given to you by your health care provider. Make sure you discuss any questions you have with your health care provider.  Document Released: 11/04/2005 Document Revised: 06/08/2020 Document Reviewed: 09/27/2018  Elsevier Patient Education © 2020 [employer]

## 2020-09-18 ENCOUNTER — READMISSION MANAGEMENT (OUTPATIENT)
Dept: CALL CENTER | Facility: HOSPITAL | Age: 65
End: 2020-09-18

## 2020-09-18 ENCOUNTER — TELEPHONE (OUTPATIENT)
Dept: FAMILY MEDICINE CLINIC | Facility: CLINIC | Age: 65
End: 2020-09-18

## 2020-09-18 DIAGNOSIS — E11.9 TYPE 2 DIABETES MELLITUS WITHOUT COMPLICATION, WITHOUT LONG-TERM CURRENT USE OF INSULIN (HCC): Primary | ICD-10-CM

## 2020-09-18 RX ORDER — INSULIN ASPART 100 [IU]/ML
5 INJECTION, SOLUTION INTRAVENOUS; SUBCUTANEOUS
Qty: 5 PEN | Refills: 3 | Status: SHIPPED | OUTPATIENT
Start: 2020-09-18 | End: 2021-02-09 | Stop reason: SDUPTHER

## 2020-09-18 RX ORDER — BLOOD SUGAR DIAGNOSTIC
1 STRIP MISCELLANEOUS
Qty: 200 EACH | Refills: 11 | Status: SHIPPED | OUTPATIENT
Start: 2020-09-18 | End: 2020-10-29 | Stop reason: SDUPTHER

## 2020-09-18 RX ORDER — BLOOD SUGAR DIAGNOSTIC
STRIP MISCELLANEOUS
Qty: 200 EACH | Refills: 11 | Status: SHIPPED | OUTPATIENT
Start: 2020-09-18 | End: 2020-09-18

## 2020-09-18 NOTE — TELEPHONE ENCOUNTER
Patient was changed to Novolog insulin in the hospital and was given the vials. Patient would like a rx to be sent to [facility] pharmacy for the Novolog pen, needles and alcohol pads.

## 2020-09-18 NOTE — TELEPHONE ENCOUNTER
Please let patient know the insulin pen, needles, and alcohol pad prescriptions were sent to the pharmacy.

## 2020-09-18 NOTE — OUTREACH NOTE
Stroke Week 2 Survey      Responses   [facility] patient discharged from?  [place]   Does the patient have one of the following disease processes/diagnoses(primary or secondary)?  Stroke (TIA)   Week 2 attempt successful?  Yes   Call start time  1147   Rescheduled  Rescheduled-pt requested   Call end time  1150   General alerts for this patient  HD MWF   Discharge diagnosis  Acute subdural and subarachnoid hemorrhage traumatic  FALL   Person spoke with today (if not patient) and relationship  , Golden Lam RN

## 2020-09-22 ENCOUNTER — HOSPITAL ENCOUNTER (OUTPATIENT)
Dept: CT IMAGING | Facility: HOSPITAL | Age: 65
Discharge: HOME | End: 2020-09-22
Admitting: NURSE PRACTITIONER

## 2020-09-22 ENCOUNTER — READMISSION MANAGEMENT (OUTPATIENT)
Dept: CALL CENTER | Facility: HOSPITAL | Age: 65
End: 2020-09-22

## 2020-09-22 DIAGNOSIS — G96.08 SUBDURAL HYGROMA: ICD-10-CM

## 2020-09-22 PROCEDURE — 70450 CT HEAD/BRAIN W/O DYE: CPT

## 2020-09-22 NOTE — OUTREACH NOTE
Stroke Week 2 Survey      Responses   [facility] patient discharged from?  [place]   Does the patient have one of the following disease processes/diagnoses(primary or secondary)?  Stroke (TIA)   Week 2 attempt successful?  Yes   Call start time  1649   Call end time  1653   [facility]  [facility]   Discharge diagnosis  Acute subdural and subarachnoid hemorrhage traumatic  FALL   Meds reviewed with patient/caregiver?  Yes   Is the patient having any side effects they believe may be caused by any medication additions or changes?  No   Does the patient have all medications ordered at discharge?  Yes   Is the patient taking all medications as directed (includes completed medication regime)?  Yes   Does the patient have a primary care provider?   Yes   Does the patient have an appointment with their PCP within 7 days of discharge?  Yes   Has the patient kept scheduled appointments due by today?  Yes   What is the Home health agency?    [facility]   Has home health visited the patient within 72 hours of discharge?  Yes   Psychosocial issues?  No   Does the patient require any assistance with activities of daily living such as eating, bathing, dressing, walking, etc.?  Yes   ADL comments  uses a cane   Does the patient have any residual symptoms from stroke/TIA?  Yes   Residual symptoms comments  occ headache   Does the patient understand the diet ordered at discharge?  No   Did the patient receive a copy of their discharge instructions?  Yes   Nursing interventions  Reviewed instructions with patient   What is the patient's perception of their health status since discharge?  Improving   Nursing interventions  Nurse provided patient education   Is the patient able to teach back FAST for Stroke?  Yes   Is the patient/caregiver able to teach back the risk factors for a stroke?  Diabetes, High blood pressure-goal below 120/80   Is the patient/caregiver able to teach back signs and symptoms related to  disease process for when to call PCP?  Yes   Is the patient/caregiver able to teach back signs and symptoms related to disease process for when to call 911?  Yes   Is the patient/caregiver able to teach back the hierarchy of who to call/visit for symptoms/problems? PCP, Specialist, Home health nurse, Urgent Care, ED, 911  Yes   Week 2 call completed?  Yes   Wrap up additional comments  she is doing well           Tressa Juan, RN

## 2020-09-24 RX ORDER — MELOXICAM 15 MG/1
15 TABLET ORAL DAILY PRN
Qty: 90 TABLET | Refills: 0 | OUTPATIENT
Start: 2020-09-24

## 2020-09-29 ENCOUNTER — LAB (OUTPATIENT)
Dept: LAB | Facility: HOSPITAL | Age: 65
End: 2020-09-29

## 2020-09-29 ENCOUNTER — TELEPHONE (OUTPATIENT)
Dept: FAMILY MEDICINE CLINIC | Facility: CLINIC | Age: 65
End: 2020-09-29

## 2020-09-29 ENCOUNTER — OFFICE VISIT (OUTPATIENT)
Dept: ONCOLOGY | Facility: CLINIC | Age: 65
End: 2020-09-29

## 2020-09-29 VITALS
WEIGHT: 155.7 LBS | BODY MASS INDEX: 25.94 KG/M2 | DIASTOLIC BLOOD PRESSURE: 82 MMHG | OXYGEN SATURATION: 99 % | HEIGHT: 65 IN | HEART RATE: 100 BPM | TEMPERATURE: 97.7 F | RESPIRATION RATE: 14 BRPM | SYSTOLIC BLOOD PRESSURE: 138 MMHG

## 2020-09-29 DIAGNOSIS — D72.820 LYMPHOCYTOSIS: ICD-10-CM

## 2020-09-29 DIAGNOSIS — D64.9 LOW HEMOGLOBIN: Primary | ICD-10-CM

## 2020-09-29 LAB
BASOPHILS # BLD AUTO: 0.07 10*3/MM3 (ref 0–0.2)
BASOPHILS NFR BLD AUTO: 0.6 % (ref 0–1.5)
DEPRECATED RDW RBC AUTO: 59.7 FL (ref 37–54)
EOSINOPHIL # BLD AUTO: 0.17 10*3/MM3 (ref 0–0.4)
EOSINOPHIL NFR BLD AUTO: 1.5 % (ref 0.3–6.2)
ERYTHROCYTE [DISTWIDTH] IN BLOOD BY AUTOMATED COUNT: 17.8 % (ref 12.3–15.4)
HCT VFR BLD AUTO: 31.5 % (ref 34–46.6)
HGB BLD-MCNC: 9.8 G/DL (ref 12–15.9)
IMM GRANULOCYTES # BLD AUTO: 0.09 10*3/MM3 (ref 0–0.05)
IMM GRANULOCYTES NFR BLD AUTO: 0.8 % (ref 0–0.5)
LYMPHOCYTES # BLD AUTO: 3.16 10*3/MM3 (ref 0.7–3.1)
LYMPHOCYTES NFR BLD AUTO: 27.8 % (ref 19.6–45.3)
MCH RBC QN AUTO: 28.2 PG (ref 26.6–33)
MCHC RBC AUTO-ENTMCNC: 31.1 G/DL (ref 31.5–35.7)
MCV RBC AUTO: 90.8 FL (ref 79–97)
MONOCYTES # BLD AUTO: 0.73 10*3/MM3 (ref 0.1–0.9)
MONOCYTES NFR BLD AUTO: 6.4 % (ref 5–12)
NEUTROPHILS NFR BLD AUTO: 62.9 % (ref 42.7–76)
NEUTROPHILS NFR BLD AUTO: 7.14 10*3/MM3 (ref 1.7–7)
NRBC BLD AUTO-RTO: 0 /100 WBC (ref 0–0.2)
PLATELET # BLD AUTO: 238 10*3/MM3 (ref 140–450)
PMV BLD AUTO: 11.5 FL (ref 6–12)
RBC # BLD AUTO: 3.47 10*6/MM3 (ref 3.77–5.28)
WBC # BLD AUTO: 11.36 10*3/MM3 (ref 3.4–10.8)

## 2020-09-29 PROCEDURE — 85025 COMPLETE CBC W/AUTO DIFF WBC: CPT

## 2020-09-29 PROCEDURE — 99213 OFFICE O/P EST LOW 20 MIN: CPT | Performed by: INTERNAL MEDICINE

## 2020-09-29 PROCEDURE — 36415 COLL VENOUS BLD VENIPUNCTURE: CPT

## 2020-09-29 NOTE — PROGRESS NOTES
Subjective     REASON FOR CONSULTATION: Leukocytosis, anemia  Provide an opinion on any further workup or treatment                             REQUESTING PHYSICIAN:  Dr. Arriaza    RECORDS OBTAINED:  Records of the patients history including those obtained from the referring provider were reviewed and summarized in detail.      History of Present Illness   This is a very pleasant 65-year-old woman who smokes 1/2 pack of tobacco a day, has diabetes and COPD.  Records also indicate prior history of TIA although she is unsure of the diagnosis.  The patient is seen for abnormal CBC at the request of her primary care provider.    Reviewing her records, the patient has had a chronic leukocytosis since 2015.  Her white blood cell count typically runs 11-13,000 at baseline.  Prior CBCs have shown lymphocytosis on the differential.  The patient was admitted to the ICU in March 2020 with hypovolemic shock and acute kidney injury after becoming volume depleted secondary to suspected viral gastroenteritis.  During her hospital stay her white blood cell count peaked at 24.5 on 3/2/2020 and then returned to baseline 11-12,000.  She developed anemia during the hospital stay hemoglobin tamika 8.8 but she did not require blood transfusion.  She had thrombocytosis on admission which normalized at the time of discharge.    Today she reports feeling well no unusual weight loss, night sweats, lymphadenopathy, nausea, vomiting, dysuria, fevers or chills.    The patient was admitted to [facility] September 2020 with metabolic acidosis, pneumonia and septic shock.  She had acute kidney injury and required initiation of hemodialysis.  Repeat SPEP and YVETTE on 8/31/2020 showed no monoclonality and a free light chain ratio was minimally elevated 2.08.  A 24-hour urine sample on 9/2/2020 showed no monoclonality.  CT abdomen/pelvis 8/25/2020 showed a hyperdense right renal lesion 2.3 cm followed by urology.    Past Medical History:    Diagnosis Date   • COPD (chronic obstructive pulmonary disease) (CMS/HCC)    • Diabetes mellitus (CMS/HCC)     type 2   • Fibroid    • Hypertension    • Leukocytosis    • Neuromuscular disorder (CMS/HCC)    • Skin cancer     nose   • TIA (transient ischemic attack)         Past Surgical History:   Procedure Laterality Date   • CHOLECYSTECTOMY     • COLONOSCOPY     • INSERTION HEMODIALYSIS CATHETER N/A 9/3/2020    Procedure: HEMODIALYSIS CATHETER INSERTION;  Surgeon: Sergio Durant MD;  Location: [facility] MAIN OR;  Service: Vascular;  Laterality: N/A;   • KIDNEY STONE SURGERY          Current Outpatient Medications on File Prior to Visit   Medication Sig Dispense Refill   • Alcohol Swabs (Alcohol Pads) 70 % pads 1 each 3 (Three) Times a Day Before Meals. 200 each 11   • Alcohol Swabs (B-D SINGLE USE SWABS REGULAR) pads 1 each 3 (Three) Times a Day Before Meals. 200 each 11   • Blood Glucose Calibration (ACCU-CHEK LUIS ALBERTO) solution 1 each by In Vitro route 3 (Three) Times a Day Before Meals. 5 each 5   • Blood Glucose Monitoring Suppl (ACCU-CHEK LUIS ALBERTO PLUS) w/Device kit 1 each 3 (Three) Times a Day Before Meals. 1 kit 2   • colestipol (COLESTID) 1 g tablet Take 1 tablet by mouth 2 (Two) Times a Day. 60 tablet 5   • Cyanocobalamin (VITAMIN B 12) 500 MCG tablet Take 500 mcg by mouth Daily.     • folic acid (FOLVITE) 1 MG tablet Take 4 tablets by mouth Daily.     • glucose blood (Accu-Chek Luis Alberto Plus) test strip 1 each by Other route 3 (Three) Times a Day Before Meals. Use as instructed 200 each 11   • insulin aspart (NovoLOG FlexPen) 100 UNIT/ML solution pen-injector sc pen Inject 5 Units under the skin into the appropriate area as directed 3 (Three) Times a Day With Meals. 5 pen 3   • insulin aspart (novoLOG) 100 UNIT/ML injection Inject 0-9 Units under the skin into the appropriate area as directed 3 (Three) Times a Day Before Meals. Discard vial 28 days after opening 10 mL 1   • Insulin Syringe-Needle U-100 31G X  "5/16\" 0.5 ML misc Inject 1 each under the skin into the appropriate area as directed 3 (Three) Times a Day Before Meals. 200 each 11   • Lancets (ACCU-CHEK MULTICLIX) lancets 1 each by Other route 3 (Three) Times a Day Before Meals. Use as instructed 200 each 11   • Lancets Misc. (ACCU-CHEK MULTICLIX LANCET DEV) kit 1 each 3 (Three) Times a Day Before Meals. 200 each 11   • metoprolol succinate XL (TOPROL-XL) 200 MG 24 hr tablet Take 1 tablet by mouth Daily. 90 tablet 1   • nicotine (NICODERM CQ) 21 MG/24HR patch Place 1 patch on the skin as directed by provider Daily. 30 each 0   • omeprazole (priLOSEC) 20 MG capsule Take 1 capsule by mouth Daily. 90 capsule 1   • pregabalin (LYRICA) 75 MG capsule Take 1 capsule by mouth Daily. 14 capsule 0   • rOPINIRole (REQUIP) 1 MG tablet Take 3 mg by mouth At Night As Needed.     • torsemide (DEMADEX) 20 MG tablet Take 2 tablets by mouth Daily. 30 tablet 0   • tuberculin (Tubersol) 5 UNIT/0.1ML injection Inject 0.1 mL into the appropriate area of the skin as directed by provider.     • [DISCONTINUED] clonazePAM (KlonoPIN) 0.5 MG tablet Take 1 tablet by mouth 2 (Two) Times a Day for 13 doses. 13 tablet 0   • [DISCONTINUED] hydrocortisone (ANUSOL-HC) 2.5 % rectal cream Insert  into the rectum 2 (Two) Times a Day. 30 g 5     No current facility-administered medications on file prior to visit.         ALLERGIES:    No Known Allergies     Social History     Socioeconomic History   • Marital status:      Spouse name: Golden   • Number of children: 2   • Years of education: 12   • Highest education level: High school graduate   Occupational History     Employer: RETIRED   Tobacco Use   • Smoking status: Current Every Day Smoker     Packs/day: 0.50     Types: Cigarettes   • Smokeless tobacco: Never Used   Substance and Sexual Activity   • Alcohol use: No   • Drug use: No   • Sexual activity: Not Currently     Birth control/protection: Post-menopausal        Family History " "  Problem Relation Age of Onset   • Heart disease Mother          at age 63   • Diabetes Mother    • Hypertension Mother    • Diabetes Father    • Deep vein thrombosis Father    • Depression Father    • Liver disease Father    • Lung cancer Father 58         at age 68   • Breast cancer Maternal Grandmother         ? age dx   • Dementia Maternal Grandmother    • Hypertension Maternal Grandmother    • Ovarian cancer Daughter 23   • Diabetes Daughter    • Depression Daughter    • Diabetes Sister    • Diabetes Brother    • Heart disease Brother    • Cancer Brother          at age 43   • Heart disease Maternal Uncle    • Cancer Paternal Uncle    • Clotting disorder Paternal Grandmother    • Colon cancer Neg Hx    • Colon polyps Neg Hx         Review of Systems   Constitutional: Positive for fatigue.   HENT: Negative.    Respiratory: Positive for shortness of breath (Chronic, stable). Negative for cough and choking.    Cardiovascular: Negative.    Gastrointestinal: Negative.    Endocrine: Negative.    Genitourinary: Positive for difficulty urinating.   Musculoskeletal: Negative.    Allergic/Immunologic: Negative.    Neurological: Negative.    Hematological: Negative.    Psychiatric/Behavioral: Negative.           Objective     Vitals:    20 1059   BP: 138/82   Pulse: 100   Resp: 14   Temp: 97.7 °F (36.5 °C)   TempSrc: Tympanic   SpO2: 99%   Weight: 70.6 kg (155 lb 11.2 oz)   Height: 165.1 cm (65\")   PainSc:   8   PainLoc: Head     Current Status 2020   ECOG score 1       Physical Exam    CON: pleasant well-developed adult woman  HEENT: no icterus, no thrush, moist membranes  NECK: no jvd  LYMPH: no cervical or supraclavicular lad  CV: RRR, S1S2, no murmur  RESP: cta bilat, no wheezing, no rales  Chest: Dialysis catheter in the right chest wall  GI: soft, non-tender, no splenomegaly, +bs  MUSC: no edema, normal gait  NEURO: alert and oriented x3, normal strength  PSYCH: normal mood and " affect  SKIN: no bruising or induration    RECENT LABS:  Hematology WBC   Date Value Ref Range Status   09/29/2020 11.36 (H) 3.40 - 10.80 10*3/mm3 Final   04/29/2020 8.91 3.40 - 10.80 10*3/mm3 Final     RBC   Date Value Ref Range Status   09/29/2020 3.47 (L) 3.77 - 5.28 10*6/mm3 Final   04/29/2020 3.61 (L) 3.77 - 5.28 10*6/mm3 Final     Hemoglobin   Date Value Ref Range Status   09/29/2020 9.8 (L) 12.0 - 15.9 g/dL Final     Hematocrit   Date Value Ref Range Status   09/29/2020 31.5 (L) 34.0 - 46.6 % Final     Platelets   Date Value Ref Range Status   09/29/2020 238 140 - 450 10*3/mm3 Final      CT chest without contrast 3/2/2020: 7 mm noncalcified nodule in the lateral right lower lobe, 3 mm nodule right upper lobe, 3 mm nodule left upper lobe, 3 mm nodule right middle lobe, diffuse emphysema, thickening of the distal esophagus    CT abdomen/pelvis 3/2/2020: Duplicated collecting system on the right, nonobstructing renal calculi too numerous to count, prominent loops of small bowel no transition point, no significant lymphadenopathy,    Assessment/Plan     1.  Chronic leukocytosis: The patient has had an elevated white blood cell count at least for 5 years, differential showing somewhat chronic mild lymphocytosis.  Peripheral blood flow cytometry April 2020 was negative for monoclonal populations.  Leukocytosis felt to be reactive.    2.  Anemia: Patient appears to have anemia of chronic kidney disease.  Evaluation for paraproteinemia in the setting of her kidney disease has been negative.    3.  Hyperdense right renal lesion 2.3 cm followed by urology    I will see the patient back in 3 to 4 months with a repeat CBC

## 2020-09-30 ENCOUNTER — TELEPHONE (OUTPATIENT)
Dept: FAMILY MEDICINE CLINIC | Facility: CLINIC | Age: 65
End: 2020-09-30

## 2020-09-30 DIAGNOSIS — F41.9 ANXIETY: Primary | ICD-10-CM

## 2020-09-30 DIAGNOSIS — E11.9 TYPE 2 DIABETES MELLITUS WITHOUT COMPLICATION, WITHOUT LONG-TERM CURRENT USE OF INSULIN (HCC): Primary | ICD-10-CM

## 2020-09-30 PROBLEM — D50.9 IRON DEFICIENCY ANEMIA: Status: ACTIVE | Noted: 2020-09-28

## 2020-09-30 RX ORDER — PEN NEEDLE, DIABETIC 29 G X1/2"
1 NEEDLE, DISPOSABLE MISCELLANEOUS 2 TIMES DAILY
Qty: 50 EACH | Refills: 11 | Status: SHIPPED | OUTPATIENT
Start: 2020-09-30 | End: 2020-10-12

## 2020-09-30 RX ORDER — CEPHALEXIN 250 MG/1
250 CAPSULE ORAL 4 TIMES DAILY
COMMUNITY
Start: 2020-09-29 | End: 2020-10-02

## 2020-09-30 RX ORDER — SENNA PLUS 8.6 MG/1
8.6 TABLET ORAL DAILY
COMMUNITY
Start: 2020-09-29 | End: 2021-08-18

## 2020-09-30 RX ORDER — HYDROCODONE BITARTRATE AND ACETAMINOPHEN 5; 325 MG/1; MG/1
1 TABLET ORAL 3 TIMES DAILY
COMMUNITY
Start: 2020-09-29 | End: 2020-11-11

## 2020-09-30 NOTE — TELEPHONE ENCOUNTER
I called and talked with the patient.  She said that she is having anxiety and panic attacks.  She says she has had 2 panic attacks at dialysis.  She says that she stays anxious in the evening and has difficulty sleeping.  I called on Zoloft 50 mg daily to the pharmacy.  She says she is never been on anything for anxiety or depression.

## 2020-09-30 NOTE — TELEPHONE ENCOUNTER
Patient's  called and stated that Jayne is having panic attack's.  She would like to know if this could be caused by any medication she is taking.  I offered to make an appt.  Her  said she was just there, please ask him to call me.  Avelino would like for Dr. Arriaza to call him at [phone].

## 2020-10-01 RX ORDER — MELOXICAM 15 MG/1
15 TABLET ORAL DAILY
COMMUNITY
End: 2020-10-03 | Stop reason: SINTOL

## 2020-10-03 RX ORDER — MELOXICAM 15 MG/1
15 TABLET ORAL DAILY
Qty: 90 TABLET | Refills: 0 | OUTPATIENT
Start: 2020-10-03

## 2020-10-05 ENCOUNTER — TELEPHONE (OUTPATIENT)
Dept: FAMILY MEDICINE CLINIC | Facility: CLINIC | Age: 65
End: 2020-10-05

## 2020-10-05 NOTE — TELEPHONE ENCOUNTER
Darius with Home Health is aware that per Dr Arriaza the patient may continue PT for 1 week, total of 2 visits.

## 2020-10-05 NOTE — TELEPHONE ENCOUNTER
Please call Silvia [phone] with home health and relay my verbal order to continue PT for 1 week, total of 2 visits.

## 2020-10-05 NOTE — TELEPHONE ENCOUNTER
Silvia with Home Health would like verbal order to continue PT for 1 week a total of 2 visits. She can be reached at [phone]

## 2020-10-06 ENCOUNTER — OFFICE VISIT (OUTPATIENT)
Dept: NEUROSURGERY | Facility: CLINIC | Age: 65
End: 2020-10-06

## 2020-10-06 VITALS
TEMPERATURE: 97.5 F | WEIGHT: 155.2 LBS | DIASTOLIC BLOOD PRESSURE: 76 MMHG | OXYGEN SATURATION: 95 % | SYSTOLIC BLOOD PRESSURE: 124 MMHG | HEIGHT: 65 IN | HEART RATE: 87 BPM | BODY MASS INDEX: 25.86 KG/M2

## 2020-10-06 DIAGNOSIS — S06.5XAA SUBDURAL HEMATOMA (HCC): Primary | ICD-10-CM

## 2020-10-06 PROCEDURE — 99213 OFFICE O/P EST LOW 20 MIN: CPT | Performed by: NEUROLOGICAL SURGERY

## 2020-10-06 RX ORDER — FUROSEMIDE 80 MG
80 TABLET ORAL 2 TIMES DAILY
COMMUNITY
End: 2021-06-18 | Stop reason: HOSPADM

## 2020-10-06 NOTE — PROGRESS NOTES
Subjective   History of Present Illness: Jayne Beltran is a 65 y.o. female is here today for hospital follow-up of subdural hygroma with CT Head 9/22/20. She was at [facility] 9/5/20-9/8/20 after she loss her balance and fell striking her head. Today Ms. Beltran reports that she feels good today.  She complains of occasional headaches however denies any new symptoms.  Denies any new numbness or weakness.  Denies any visual changes.  Denies any fevers, weight loss, seizures.     History of Present Illness    The following portions of the patient's history were reviewed and updated as appropriate: allergies, current medications, past family history, past social history, past surgical history and problem list.    Past Medical History:   Diagnosis Date   • COPD (chronic obstructive pulmonary disease) (CMS/HCC)    • Diabetes mellitus (CMS/HCC)     type 2   • Fibroid    • Hypertension    • Leukocytosis    • Neuromuscular disorder (CMS/HCC)    • Skin cancer     nose   • TIA (transient ischemic attack)         Past Surgical History:   Procedure Laterality Date   • CHOLECYSTECTOMY     • COLONOSCOPY     • INSERTION HEMODIALYSIS CATHETER N/A 9/3/2020    Procedure: HEMODIALYSIS CATHETER INSERTION;  Surgeon: Sergio Durant MD;  Location: [facility];  Service: Vascular;  Laterality: N/A;   • KIDNEY STONE SURGERY            Current Outpatient Medications:   •  Alcohol Swabs (Alcohol Pads) 70 % pads, 1 each 3 (Three) Times a Day Before Meals., Disp: 200 each, Rfl: 11  •  Alcohol Swabs (B-D SINGLE USE SWABS REGULAR) pads, 1 each 3 (Three) Times a Day Before Meals., Disp: 200 each, Rfl: 11  •  Blood Glucose Calibration (ACCU-CHEK LUIS ALBERTO) solution, 1 each by In Vitro route 3 (Three) Times a Day Before Meals., Disp: 5 each, Rfl: 5  •  Blood Glucose Monitoring Suppl (ACCU-CHEK LUIS ALBERTO PLUS) w/Device kit, 1 each 3 (Three) Times a Day Before Meals., Disp: 1 kit, Rfl: 2  •  colestipol (COLESTID) 1 g tablet, Take 1 tablet by mouth 2 (Two)  "Times a Day., Disp: 60 tablet, Rfl: 5  •  Cranberry-Vit C-Lactobacillus (CRANBERRY/PROBIOTIC/VIT C PO), Take 1 tablet by mouth Daily., Disp: , Rfl:   •  Cyanocobalamin (VITAMIN B 12) 500 MCG tablet, Take 500 mcg by mouth Daily., Disp: , Rfl:   •  folic acid (FOLVITE) 1 MG tablet, Take 4 tablets by mouth Daily., Disp: , Rfl:   •  furosemide (LASIX) 80 MG tablet, Take 80 mg by mouth 2 (Two) Times a Day., Disp: , Rfl:   •  glucose blood (Accu-Chek Ilana Plus) test strip, 1 each by Other route 3 (Three) Times a Day Before Meals. Use as instructed, Disp: 200 each, Rfl: 11  •  HYDROcodone-acetaminophen (NORCO) 5-325 MG per tablet, Take 1 tablet by mouth 3 (Three) Times a Day., Disp: , Rfl:   •  insulin aspart (NovoLOG FlexPen) 100 UNIT/ML solution pen-injector sc pen, Inject 5 Units under the skin into the appropriate area as directed 3 (Three) Times a Day With Meals., Disp: 5 pen, Rfl: 3  •  insulin aspart (novoLOG) 100 UNIT/ML injection, Inject 0-9 Units under the skin into the appropriate area as directed 3 (Three) Times a Day Before Meals. Discard vial 28 days after opening, Disp: 10 mL, Rfl: 1  •  Insulin Pen Needle (RELION PEN NEEDLE 31G/8MM) 31G X 8 MM misc, 1 each 2 (Two) Times a Day., Disp: 50 each, Rfl: 11  •  Insulin Syringe-Needle U-100 31G X 5/16\" 0.5 ML misc, Inject 1 each under the skin into the appropriate area as directed 3 (Three) Times a Day Before Meals., Disp: 200 each, Rfl: 11  •  Lancets (ACCU-CHEK MULTICLIX) lancets, 1 each by Other route 3 (Three) Times a Day Before Meals. Use as instructed, Disp: 200 each, Rfl: 11  •  Lancets Misc. (ACCU-CHEK MULTICLIX LANCET DEV) kit, 1 each 3 (Three) Times a Day Before Meals., Disp: 200 each, Rfl: 11  •  metoprolol succinate XL (TOPROL-XL) 200 MG 24 hr tablet, Take 1 tablet by mouth Daily., Disp: 90 tablet, Rfl: 1  •  nicotine (NICODERM CQ) 21 MG/24HR patch, Place 1 patch on the skin as directed by provider Daily., Disp: 30 each, Rfl: 0  •  omeprazole " (priLOSEC) 20 MG capsule, Take 1 capsule by mouth Daily., Disp: 90 capsule, Rfl: 1  •  pregabalin (LYRICA) 75 MG capsule, Take 1 capsule by mouth Daily., Disp: 14 capsule, Rfl: 0  •  rOPINIRole (REQUIP) 1 MG tablet, Take 3 mg by mouth At Night As Needed., Disp: , Rfl:   •  senna (senna) 8.6 MG tablet, Take 8.6 mg by mouth Daily., Disp: , Rfl:   •  sertraline (Zoloft) 50 MG tablet, Take 1 tablet by mouth Daily., Disp: 30 tablet, Rfl: 2  •  tuberculin (Tubersol) 5 UNIT/0.1ML injection, Inject 0.1 mL into the appropriate area of the skin as directed by provider., Disp: , Rfl:      Social History     Socioeconomic History   • Marital status:      Spouse name: Golden   • Number of children: 2   • Years of education: 12   • Highest education level: High school graduate   Occupational History     Employer: RETIRED   Tobacco Use   • Smoking status: Current Every Day Smoker     Packs/day: 0.25     Years: 45.00     Pack years: 11.25     Types: Cigarettes   • Smokeless tobacco: Never Used   • Tobacco comment: uses vape and smokes cigarettes   Substance and Sexual Activity   • Alcohol use: No   • Drug use: No   • Sexual activity: Yes     Partners: Male     Birth control/protection: Post-menopausal        Family History   Problem Relation Age of Onset   • Heart disease Mother          at age 63   • Diabetes Mother    • Hypertension Mother    • Diabetes Father    • Deep vein thrombosis Father    • Depression Father    • Liver disease Father    • Lung cancer Father 58         at age 68   • Breast cancer Maternal Grandmother         ? age dx   • Dementia Maternal Grandmother    • Hypertension Maternal Grandmother    • Ovarian cancer Daughter 23   • Diabetes Daughter    • Depression Daughter    • Diabetes Sister    • Diabetes Brother    • Heart disease Brother    • Cancer Brother          at age 43   • Heart disease Maternal Uncle    • Cancer Paternal Uncle    • Clotting disorder Paternal Grandmother   "  • Colon cancer Neg Hx    • Colon polyps Neg Hx         Review of Systems   Constitutional: Negative for chills and fever.   HENT: Negative for tinnitus and trouble swallowing.    Eyes: Negative for redness and visual disturbance.   Respiratory: Negative for cough and shortness of breath.    Gastrointestinal: Negative for abdominal pain, constipation, diarrhea, nausea and vomiting.   Genitourinary: Negative for difficulty urinating and frequency.        Denies bowel or bladder incontinence   Musculoskeletal: Negative for back pain, gait problem, neck pain and neck stiffness.        Denies issues with balance or gait   Neurological: Positive for speech difficulty and headaches. Negative for dizziness, weakness, light-headedness and numbness.        Tingling bilaterally in feet, which she r/t RLS   Psychiatric/Behavioral: Negative for confusion and decreased concentration.       Objective     Vitals:    10/06/20 1056   BP: 124/76   Pulse: 87   Temp: 97.5 °F (36.4 °C)   SpO2: 95%   Weight: 70.4 kg (155 lb 3.2 oz)   Height: 165.1 cm (65\")     Body mass index is 25.83 kg/m².      Physical Exam  Neurologic Exam    Awake, alert, oriented x3  Pupils equal round reactive to light  Extraocular muscles intact  Face symmetric  Speech is fluent and clear  No pronator drift  Motor exam  Bilateral deltoids 5/5, bilateral biceps 5/5, bilateral triceps 5/5, bilateral wrist extension 5/5 bilateral hand  5/5  Bilateral hip flexion 5/5, bilateral knee extension 5/5, bilateral DF/PF 5/5  No clonus  No Sammy's reflex  2+ bilateral patellar reflexes  2+ bilateral biceps reflex  Steady normal gait  Able to walk heel-to-toe without difficulty  Able to detect  light touch in all 4 extremities      Assessment/Plan   Independent Review of Radiographic Studies:      I personally reviewed the images from the following studies.  CT head without contrast  Her most recent CT head demonstrates complete resolution of the left sided convexity " subdural hematoma.  Most of the left frontal traumatic subarachnoid hemorrhage has cleared however there is some underlying contusion which is continuing to resolve.  There is some heterogeneity of her calvarium.     Medical Decision Making:    Next a 5-year-old female status post ground-level fall on 9/5/2020 with a left subdural hematoma and frontal contusion  -She had a repeat CT head on 9/22/2020 which demonstrated significant resolution of the left traumatic subarachnoid with now a small contusion underneath which is resolving.  The subdural hematoma is gone.  There is some heterogeneity of the calvarium which I will plan to follow-up on a repeat CT head in 1 month.   -I have told her that our next visit in 1 month can be a telephone visit unless she develops new symptoms.  -She complains of occasional headaches, however denies any new symptoms.  Denies any numbness or weakness.  Denies any visual changes..     I have discussed the heterogeneity in her calvarium with one of the radiologist and believe she should consider a screen for multiple myeloma, serum calcium irregularities, and abnormalities of the parathyroid gland.  As these conditions can cause a similar pattern of change within the calvarium.  The remainder of her axial skeleton does not show this heterogeneity.  I will have my office contact the patient to refer her to her primary care physician for this work-up.     Jayne was seen today for subdural hygroma.    Diagnoses and all orders for this visit:    Subdural hematoma (CMS/HCC)  -     CT Head Without Contrast; Future      Return in about 4 weeks (around 11/3/2020).

## 2020-10-12 DIAGNOSIS — E11.9 TYPE 2 DIABETES MELLITUS WITHOUT COMPLICATION, WITHOUT LONG-TERM CURRENT USE OF INSULIN (HCC): ICD-10-CM

## 2020-10-12 RX ORDER — PEN NEEDLE, DIABETIC 29 G X1/2"
1 NEEDLE, DISPOSABLE MISCELLANEOUS 3 TIMES DAILY
Qty: 90 EACH | Refills: 11 | Status: SHIPPED | OUTPATIENT
Start: 2020-10-12

## 2020-10-12 RX ORDER — PEN NEEDLE, DIABETIC 29 G X1/2"
1 NEEDLE, DISPOSABLE MISCELLANEOUS 3 TIMES DAILY
Qty: 90 EACH | Refills: 11 | Status: CANCELLED | OUTPATIENT
Start: 2020-10-12

## 2020-10-12 NOTE — TELEPHONE ENCOUNTER
I spoke with Jayne.  She stated she gives herself an insulin injection 3 times a day.  MARINA Yu CMA

## 2020-10-15 LAB
ALBUMIN SERPL-MCNC: 4 G/DL (ref 3.8–4.8)
ALBUMIN/GLOB SERPL: 1.7 {RATIO} (ref 1.2–2.2)
ALP SERPL-CCNC: 95 IU/L (ref 39–117)
ALT SERPL-CCNC: 14 IU/L (ref 0–32)
AST SERPL-CCNC: 18 IU/L (ref 0–40)
BASOPHILS # BLD AUTO: 0.1 X10E3/UL (ref 0–0.2)
BASOPHILS NFR BLD AUTO: 1 %
BILIRUB SERPL-MCNC: 0.4 MG/DL (ref 0–1.2)
BUN SERPL-MCNC: 8 MG/DL (ref 8–27)
BUN/CREAT SERPL: 6 (ref 12–28)
CALCIUM SERPL-MCNC: 8.6 MG/DL (ref 8.7–10.3)
CHLORIDE SERPL-SCNC: 101 MMOL/L (ref 96–106)
CHOLEST SERPL-MCNC: 163 MG/DL (ref 100–199)
CHOLEST/HDLC SERPL: 5.3 RATIO (ref 0–4.4)
CO2 SERPL-SCNC: 22 MMOL/L (ref 20–29)
CREAT SERPL-MCNC: 1.26 MG/DL (ref 0.57–1)
EOSINOPHIL # BLD AUTO: 0.1 X10E3/UL (ref 0–0.4)
EOSINOPHIL NFR BLD AUTO: 1 %
ERYTHROCYTE [DISTWIDTH] IN BLOOD BY AUTOMATED COUNT: 16.7 % (ref 11.7–15.4)
GLOBULIN SER CALC-MCNC: 2.3 G/DL (ref 1.5–4.5)
GLUCOSE SERPL-MCNC: 227 MG/DL (ref 65–99)
HBA1C MFR BLD: 7.4 % (ref 4.8–5.6)
HCT VFR BLD AUTO: 30 % (ref 34–46.6)
HDLC SERPL-MCNC: 31 MG/DL
HGB BLD-MCNC: 9.6 G/DL (ref 11.1–15.9)
IMM GRANULOCYTES # BLD AUTO: 0.1 X10E3/UL (ref 0–0.1)
IMM GRANULOCYTES NFR BLD AUTO: 1 %
LDLC SERPL CALC-MCNC: 55 MG/DL (ref 0–99)
LYMPHOCYTES # BLD AUTO: 2.2 X10E3/UL (ref 0.7–3.1)
LYMPHOCYTES NFR BLD AUTO: 22 %
MCH RBC QN AUTO: 28.7 PG (ref 26.6–33)
MCHC RBC AUTO-ENTMCNC: 32 G/DL (ref 31.5–35.7)
MCV RBC AUTO: 90 FL (ref 79–97)
MONOCYTES # BLD AUTO: 0.6 X10E3/UL (ref 0.1–0.9)
MONOCYTES NFR BLD AUTO: 6 %
NEUTROPHILS # BLD AUTO: 6.9 X10E3/UL (ref 1.4–7)
NEUTROPHILS NFR BLD AUTO: 69 %
PLATELET # BLD AUTO: 168 X10E3/UL (ref 150–450)
POTASSIUM SERPL-SCNC: 4.2 MMOL/L (ref 3.5–5.2)
PROT SERPL-MCNC: 6.3 G/DL (ref 6–8.5)
RBC # BLD AUTO: 3.34 X10E6/UL (ref 3.77–5.28)
SODIUM SERPL-SCNC: 139 MMOL/L (ref 134–144)
TRIGL SERPL-MCNC: 513 MG/DL (ref 0–149)
VLDLC SERPL CALC-MCNC: 77 MG/DL (ref 5–40)
WBC # BLD AUTO: 10 X10E3/UL (ref 3.4–10.8)

## 2020-10-15 NOTE — PROGRESS NOTES
Please call the patient regarding her result(s).  Please let her know that her blood count is stable, her hemoglobin A1c has increased to 7.4, her triglycerides are high, and her kidney function has significantly improved.  She needs to work on improving any sweetened beverages and minimizing excessive carbohydrates.

## 2020-10-17 ENCOUNTER — RESULTS ENCOUNTER (OUTPATIENT)
Dept: FAMILY MEDICINE CLINIC | Facility: CLINIC | Age: 65
End: 2020-10-17

## 2020-10-17 DIAGNOSIS — E11.9 TYPE 2 DIABETES MELLITUS WITHOUT COMPLICATION, WITHOUT LONG-TERM CURRENT USE OF INSULIN (HCC): ICD-10-CM

## 2020-10-17 DIAGNOSIS — Z79.899 HIGH RISK MEDICATION USE: ICD-10-CM

## 2020-10-17 DIAGNOSIS — E78.5 DYSLIPIDEMIA: ICD-10-CM

## 2020-10-29 ENCOUNTER — OFFICE VISIT (OUTPATIENT)
Dept: FAMILY MEDICINE CLINIC | Facility: CLINIC | Age: 65
End: 2020-10-29

## 2020-10-29 VITALS
DIASTOLIC BLOOD PRESSURE: 72 MMHG | BODY MASS INDEX: 26.82 KG/M2 | HEART RATE: 75 BPM | HEIGHT: 65 IN | OXYGEN SATURATION: 96 % | WEIGHT: 161 LBS | TEMPERATURE: 97.1 F | SYSTOLIC BLOOD PRESSURE: 122 MMHG

## 2020-10-29 DIAGNOSIS — Z23 NEED FOR VACCINATION: ICD-10-CM

## 2020-10-29 DIAGNOSIS — Z79.899 HIGH RISK MEDICATION USE: ICD-10-CM

## 2020-10-29 DIAGNOSIS — E11.9 TYPE 2 DIABETES MELLITUS WITHOUT COMPLICATION, WITHOUT LONG-TERM CURRENT USE OF INSULIN (HCC): ICD-10-CM

## 2020-10-29 DIAGNOSIS — N20.0 MULTIPLE KIDNEY STONES: Primary | ICD-10-CM

## 2020-10-29 DIAGNOSIS — R60.0 BILATERAL LOWER EXTREMITY EDEMA: ICD-10-CM

## 2020-10-29 DIAGNOSIS — Z86.79 HISTORY OF SUBDURAL HEMATOMA: ICD-10-CM

## 2020-10-29 DIAGNOSIS — I10 ESSENTIAL HYPERTENSION: ICD-10-CM

## 2020-10-29 PROBLEM — H02.849 EYELID EDEMA: Status: RESOLVED | Noted: 2020-04-29 | Resolved: 2020-10-29

## 2020-10-29 PROBLEM — R60.1 GENERALIZED EDEMA: Status: RESOLVED | Noted: 2020-04-29 | Resolved: 2020-10-29

## 2020-10-29 PROCEDURE — G0009 ADMIN PNEUMOCOCCAL VACCINE: HCPCS | Performed by: FAMILY MEDICINE

## 2020-10-29 PROCEDURE — 99215 OFFICE O/P EST HI 40 MIN: CPT | Performed by: FAMILY MEDICINE

## 2020-10-29 PROCEDURE — 90732 PPSV23 VACC 2 YRS+ SUBQ/IM: CPT | Performed by: FAMILY MEDICINE

## 2020-10-29 RX ORDER — EMPAGLIFLOZIN 10 MG/1
10 TABLET, FILM COATED ORAL DAILY
Qty: 30 TABLET | Refills: 5 | Status: SHIPPED | OUTPATIENT
Start: 2020-10-29 | End: 2021-06-22 | Stop reason: DRUGHIGH

## 2020-10-29 NOTE — ASSESSMENT & PLAN NOTE
Following with urology.  Awaiting cystoscopy for further stone removal on November 3, 2020 at [facility].

## 2020-10-29 NOTE — ASSESSMENT & PLAN NOTE
Following with urology.  Awaiting cystoscopy for further stone removal on November 3, 2020 at [facility].

## 2020-10-29 NOTE — ASSESSMENT & PLAN NOTE
Controlled with furosemide 80 mg daily.  No medication side effects.  Continue current treatment.

## 2020-10-29 NOTE — PROGRESS NOTES
Subjective   Jayne Beltran is a 65 y.o. female is here for   Chief Complaint   Patient presents with   • Hypertension   • Hyperlipidemia   • Foot Swelling     bilat       History of Present Illness     Patient is controlled hypertension.  Her blood pressure today is 122/72.    She was, but is no longer on dialysis.  She states it was the Metformin that put her into kidney failure.  She is no longer taking Metformin.  She has an appointment with Dr. Beaulieu, nephrology, on November 2, 2020.    She has diabetes and is taking NovoLog 150-199 2 units, 200-249 4 units, 250-299 6 units, 300-349 8 units, 350-399 10 units, 400 or above 12 units and call your doctor.  Her blood sugar has been ranging between 149-300's.  Blood makes her blood sugar go high so she is cut out the bread.  She was on Jardiance in the past but has not been taking it since she had renal failure.    She states she had cystoscopy last month to remove kidney stones.  She has another cystoscopy to get more stones on November 3, 2020 at [facility].    She states she would like to get her pneumonia vaccine today.    She is following with neurology for history of subdural hematoma. She had only been home floor four hours after getting out of the hospital for renal failure and fell and hit her head on the concrete and slipped down the ramp and hit her head.  She was hospitalized again and is following with neurology.    Health Maintenance Due   Topic Date Due   • DIABETIC EYE EXAM  02/22/2016   • URINE MICROALBUMIN  06/18/2020   • Pneumococcal Vaccine 65+ (2 of 2 - PPSV23) 08/29/2020   • DIABETIC FOOT EXAM  09/04/2020        reports that she has been smoking cigarettes. She has a 11.25 pack-year smoking history. She has never used smokeless tobacco. She reports that she does not drink alcohol or use drugs.    Review of Systems   Constitutional: Negative for activity change and unexpected weight change.   HENT: Negative for congestion.     Respiratory: Negative for shortness of breath and wheezing.    Cardiovascular: Positive for leg swelling. Negative for chest pain and palpitations.   Gastrointestinal: Negative for abdominal pain, blood in stool and constipation.   Genitourinary: Negative for difficulty urinating and hematuria.   Musculoskeletal: Negative for gait problem.   Skin: Negative for color change and rash.        PHQ-9 Depression Screening  Little interest or pleasure in doing things? 0   Feeling down, depressed, or hopeless? 0   Trouble falling or staying asleep, or sleeping too much?     Feeling tired or having little energy?     Poor appetite or overeating?     Feeling bad about yourself - or that you are a failure or have let yourself or your family down?     Trouble concentrating on things, such as reading the newspaper or watching television?     Moving or speaking so slowly that other people could have noticed? Or the opposite - being so fidgety or restless that you have been moving around a lot more than usual?     Thoughts that you would be better off dead, or of hurting yourself in some way?     PHQ-9 Total Score 0   If you checked off any problems, how difficult have these problems made it for you to do your work, take care of things at home, or get along with other people?       BP Readings from Last 12 Encounters:   10/29/20 122/72   10/06/20 124/76   09/29/20 138/82   09/17/20 120/80   09/08/20 156/88   09/04/20 115/65   06/26/20 132/70   05/26/20 125/71   04/29/20 148/72   04/07/20 147/82   03/12/20 110/62   03/10/20 110/60       Wt Readings from Last 12 Encounters:   10/29/20 73 kg (161 lb)   10/06/20 70.4 kg (155 lb 3.2 oz)   09/29/20 70.6 kg (155 lb 11.2 oz)   09/17/20 67.1 kg (148 lb)   09/08/20 68.5 kg (151 lb)   09/04/20 70 kg (154 lb 5.2 oz)   06/26/20 74.8 kg (165 lb)   06/25/20 75 kg (165 lb 6.4 oz)   05/26/20 74.4 kg (164 lb)   04/29/20 73 kg (161 lb)   04/07/20 68.5 kg (151 lb)   03/12/20 62.6 kg (138 lb)     "    Objective   /72 (BP Location: Left arm, Patient Position: Sitting, Cuff Size: Adult)   Pulse 75   Temp 97.1 °F (36.2 °C) (Temporal)   Ht 165.1 cm (65\")   Wt 73 kg (161 lb)   LMP  (LMP Unknown)   SpO2 96%   BMI 26.79 kg/m²   Physical Exam  Vitals signs and nursing note reviewed.   Constitutional:       General: She is not in acute distress.     Appearance: Normal appearance. She is well-developed. She is not diaphoretic.   HENT:      Head: Normocephalic and atraumatic.      Right Ear: External ear normal.      Left Ear: External ear normal.      Nose: Nose normal.      Mouth/Throat:      Pharynx: No oropharyngeal exudate.   Eyes:      General: Lids are normal. No scleral icterus.        Right eye: No discharge.         Left eye: No discharge.   Neck:      Musculoskeletal: Full passive range of motion without pain, normal range of motion and neck supple. No edema.      Thyroid: No thyromegaly.      Trachea: Trachea normal. No tracheal deviation.   Cardiovascular:      Rate and Rhythm: Normal rate and regular rhythm.      Heart sounds: Normal heart sounds. No murmur. No friction rub. No gallop.    Pulmonary:      Effort: Pulmonary effort is normal. No tachypnea, bradypnea or respiratory distress.      Breath sounds: Normal breath sounds. No stridor. No decreased breath sounds, wheezing or rales.   Chest:      Chest wall: No tenderness.   Musculoskeletal:      Right lower leg: Edema (1-2+) present.      Left lower leg: Edema (1-2+) present.   Lymphadenopathy:      Head:      Right side of head: No submental, submandibular, tonsillar, preauricular, posterior auricular or occipital adenopathy.      Left side of head: No submental, submandibular, tonsillar, preauricular, posterior auricular or occipital adenopathy.      Cervical: No cervical adenopathy.      Right cervical: No superficial, deep or posterior cervical adenopathy.     Left cervical: No superficial, deep or posterior cervical adenopathy. "   Skin:     General: Skin is warm and dry.      Capillary Refill: Capillary refill takes less than 2 seconds.      Coloration: Skin is not pale.      Findings: No erythema or rash.      Nails: There is no clubbing.     Neurological:      Mental Status: She is alert and oriented to person, place, and time. She is not disoriented.      Deep Tendon Reflexes: Reflexes are normal and symmetric.   Psychiatric:         Behavior: Behavior normal.         Procedures    Assessment/Plan   Diagnoses and all orders for this visit:    1. Multiple kidney stones (Primary)  Assessment & Plan:  Following with urology.  Awaiting cystoscopy for further stone removal on November 3, 2020 at [facility].        2. Need for vaccination  -     Pneumococcal Polysaccharide Vaccine 23-Valent Greater Than or Equal To 3yo Subcutaneous / IM    3. Essential hypertension  Assessment & Plan:  Controlled with metoprolol succinate  mg daily.  No medication side effects.  Continue current treatment.      4. Bilateral lower extremity edema  Assessment & Plan:  Controlled with furosemide 80 mg daily.  No medication side effects.  Continue current treatment.        5. History of subdural hematoma  Assessment & Plan:  Following with neurology.        6. Type 2 diabetes mellitus without complication, without long-term current use of insulin (CMS/[facility])  Assessment & Plan:  Continue Novolog SSI and resume Jardiance 10 mg daily. No medication side effects. Continue current treatment.      Orders:  -     Empagliflozin (Jardiance) 10 MG tablet; Take 10 mg by mouth Daily.  Dispense: 30 tablet; Refill: 5  -     Hemoglobin A1c; Future    7. High risk medication use  -     CBC & Differential; Future  -     Comprehensive Metabolic Panel; Future  -     Lipid Panel With / Chol / HDL Ratio; Future  -     UA / M With / Rflx Culture([facility] ONLY) - Urine, Clean Catch; Future    I spent over 40 minutes with the patient, of which more than 50% was counseling,  including diabetes blood sugar management, medication management, kidney issues. The patient was also counseled on diet and exercise to minimize or eliminate concentrated sweets and sweetened beverages, as well as cutting back on breads and pastas.              Return in about 3 months (around 1/29/2021) for DM2-U, HTN-C, Edema-C with labs 2+ days prior.

## 2020-10-29 NOTE — ASSESSMENT & PLAN NOTE
Continue Novolog SSI and resume Jardiance 10 mg daily. No medication side effects. Continue current treatment.

## 2020-11-09 ENCOUNTER — TELEPHONE (OUTPATIENT)
Dept: FAMILY MEDICINE CLINIC | Facility: CLINIC | Age: 65
End: 2020-11-09

## 2020-11-09 NOTE — TELEPHONE ENCOUNTER
PATIENT FEELS LIKE THE MEDICINE (SENOKOT, 8.5 MG) SHE IS TAKING FOR HER PANIC ATTACKS IS NOT WORKING. SHE WOULD LIKE TO KNOW IF THERE IS ANYTHING ELSE THAT CAN BE PRESCRIBED.     SHE SAID SHE HAS BEEN HAVING PANIC ATTACKS ALL WEEKEND LONG AND SHE ISN'T GETTING ANY SLEEP AT NIGHT BECAUSE OF THEM.     PATIENT CALLBACK [phone]      PHARMACY CONFIRMED     [facility] - [address] [facility] - [phone]  - [phone]   [phone]

## 2020-11-10 ENCOUNTER — HOSPITAL ENCOUNTER (OUTPATIENT)
Dept: CT IMAGING | Facility: HOSPITAL | Age: 65
Discharge: HOME | End: 2020-11-10
Admitting: NEUROLOGICAL SURGERY

## 2020-11-10 DIAGNOSIS — S06.5XAA SUBDURAL HEMATOMA (HCC): ICD-10-CM

## 2020-11-10 PROCEDURE — 70450 CT HEAD/BRAIN W/O DYE: CPT

## 2020-11-11 ENCOUNTER — OFFICE VISIT (OUTPATIENT)
Dept: FAMILY MEDICINE CLINIC | Facility: CLINIC | Age: 65
End: 2020-11-11

## 2020-11-11 VITALS
WEIGHT: 152 LBS | TEMPERATURE: 96.9 F | BODY MASS INDEX: 25.33 KG/M2 | SYSTOLIC BLOOD PRESSURE: 160 MMHG | HEIGHT: 65 IN | OXYGEN SATURATION: 97 % | HEART RATE: 86 BPM | RESPIRATION RATE: 16 BRPM | DIASTOLIC BLOOD PRESSURE: 82 MMHG

## 2020-11-11 DIAGNOSIS — F41.9 ANXIETY: Primary | ICD-10-CM

## 2020-11-11 DIAGNOSIS — E11.9 TYPE 2 DIABETES MELLITUS WITHOUT COMPLICATION, WITHOUT LONG-TERM CURRENT USE OF INSULIN (HCC): ICD-10-CM

## 2020-11-11 DIAGNOSIS — I10 ESSENTIAL HYPERTENSION: ICD-10-CM

## 2020-11-11 PROCEDURE — 99214 OFFICE O/P EST MOD 30 MIN: CPT | Performed by: FAMILY MEDICINE

## 2020-11-11 RX ORDER — LISINOPRIL 10 MG/1
10 TABLET ORAL DAILY
Qty: 30 TABLET | Refills: 3 | Status: SHIPPED | OUTPATIENT
Start: 2020-11-11 | End: 2021-01-14 | Stop reason: SDUPTHER

## 2020-11-11 RX ORDER — CITALOPRAM 10 MG/1
10 TABLET ORAL DAILY
Qty: 30 TABLET | Refills: 3 | Status: SHIPPED | OUTPATIENT
Start: 2020-11-11 | End: 2020-11-11

## 2020-11-11 NOTE — PROGRESS NOTES
Subjective   Jayne Beltran is a 65 y.o. female is here for   Chief Complaint   Patient presents with   • Anxiety       History of Present Illness     Pt has anxiety.  She did not bring her medicine list. She does not know if she is taking Sertraline or not. She agrees to go back home and get her medicines and come back to the office this afternoon.  When she brought her meds back to the office, Zoloft was in her medicine bag and she said she has been taking it but that it is not helping her any.    Her  states she has been taking more of her pain medicines than prescribed. He states she doubles up inappropriately on er pain meds.    He states she has been escalating her doses of Lyrica.  He states when she takes higher doses of Lyrica that she gets extremely irritable to the point that it causes severe distress to their marriage. Her dose was decreased from 225 to 150 mg.    She has HTN and her BP is high today.    Health Maintenance Due   Topic Date Due   • DIABETIC EYE EXAM  02/22/2016   • URINE MICROALBUMIN  06/18/2020   • DIABETIC FOOT EXAM  09/04/2020        reports that she has been smoking cigarettes. She has a 11.25 pack-year smoking history. She has never used smokeless tobacco. She reports that she does not drink alcohol or use drugs.    Review of Systems   Constitutional: Negative for activity change and unexpected weight change.   HENT: Negative for congestion.    Respiratory: Negative for shortness of breath and wheezing.    Cardiovascular: Negative for chest pain and palpitations.   Gastrointestinal: Negative for abdominal pain, blood in stool and constipation.   Genitourinary: Negative for difficulty urinating and hematuria.   Musculoskeletal: Negative for gait problem.   Skin: Negative for color change and rash.   Psychiatric/Behavioral: Positive for sleep disturbance. The patient is nervous/anxious.         PHQ-9 Depression Screening  Little interest or pleasure in doing things?    "  Feeling down, depressed, or hopeless?     Trouble falling or staying asleep, or sleeping too much?     Feeling tired or having little energy?     Poor appetite or overeating?     Feeling bad about yourself - or that you are a failure or have let yourself or your family down?     Trouble concentrating on things, such as reading the newspaper or watching television?     Moving or speaking so slowly that other people could have noticed? Or the opposite - being so fidgety or restless that you have been moving around a lot more than usual?     Thoughts that you would be better off dead, or of hurting yourself in some way?     PHQ-9 Total Score     If you checked off any problems, how difficult have these problems made it for you to do your work, take care of things at home, or get along with other people?       BP Readings from Last 12 Encounters:   11/11/20 160/82   10/29/20 122/72   10/06/20 124/76   09/29/20 138/82   09/17/20 120/80   09/08/20 156/88   09/04/20 115/65   06/26/20 132/70   05/26/20 125/71   04/29/20 148/72   04/07/20 147/82   03/12/20 110/62       Wt Readings from Last 12 Encounters:   11/11/20 68.9 kg (152 lb)   10/29/20 73 kg (161 lb)   10/06/20 70.4 kg (155 lb 3.2 oz)   09/29/20 70.6 kg (155 lb 11.2 oz)   09/17/20 67.1 kg (148 lb)   09/08/20 68.5 kg (151 lb)   09/04/20 70 kg (154 lb 5.2 oz)   06/26/20 74.8 kg (165 lb)   06/25/20 75 kg (165 lb 6.4 oz)   05/26/20 74.4 kg (164 lb)   04/29/20 73 kg (161 lb)   04/07/20 68.5 kg (151 lb)        Objective   /82 (BP Location: Left arm, Patient Position: Sitting, Cuff Size: Adult)   Pulse 86   Temp 96.9 °F (36.1 °C) (Temporal)   Resp 16   Ht 165.1 cm (65\")   Wt 68.9 kg (152 lb)   LMP  (LMP Unknown)   SpO2 97%   BMI 25.29 kg/m²   Physical Exam  Vitals signs and nursing note reviewed.   Constitutional:       General: She is not in acute distress.     Appearance: Normal appearance. She is well-developed. She is not diaphoretic.   HENT:      Head: " Normocephalic and atraumatic.      Right Ear: External ear normal.      Left Ear: External ear normal.      Nose: Nose normal.      Mouth/Throat:      Pharynx: No oropharyngeal exudate.   Eyes:      General: Lids are normal. No scleral icterus.        Right eye: No discharge.         Left eye: No discharge.   Neck:      Musculoskeletal: Full passive range of motion without pain, normal range of motion and neck supple. No edema.      Thyroid: No thyromegaly.      Trachea: Trachea normal. No tracheal deviation.   Cardiovascular:      Rate and Rhythm: Normal rate and regular rhythm.      Heart sounds: Normal heart sounds. No murmur. No friction rub. No gallop.    Pulmonary:      Effort: Pulmonary effort is normal. No tachypnea, bradypnea or respiratory distress.      Breath sounds: Normal breath sounds. No stridor. No decreased breath sounds, wheezing or rales.   Chest:      Chest wall: No tenderness.   Lymphadenopathy:      Head:      Right side of head: No submental, submandibular, tonsillar, preauricular, posterior auricular or occipital adenopathy.      Left side of head: No submental, submandibular, tonsillar, preauricular, posterior auricular or occipital adenopathy.      Cervical: No cervical adenopathy.      Right cervical: No superficial, deep or posterior cervical adenopathy.     Left cervical: No superficial, deep or posterior cervical adenopathy.   Skin:     General: Skin is warm and dry.      Capillary Refill: Capillary refill takes less than 2 seconds.      Coloration: Skin is not pale.      Findings: No erythema or rash.      Nails: There is no clubbing.     Neurological:      Mental Status: She is alert and oriented to person, place, and time. She is not disoriented.      Deep Tendon Reflexes: Reflexes are normal and symmetric.   Psychiatric:         Behavior: Behavior normal.         Procedures    Assessment/Plan   Diagnoses and all orders for this visit:    1. Anxiety (Primary)  Assessment &  Plan:  Today, call the phone number listed on the back of your insurance card and ask them for the names, phone numbers, and addresses of 3 psychiatrists, and 3 psychologists, that are in network with your insurance.  Then, call and make an appointment for yourself with 1 of the psychiatrists and 1 of the psychologist.      Orders:  -     citalopram (CeleXA) 10 MG tablet; Take 1 tablet by mouth Daily.  Dispense: 30 tablet; Refill: 3    2. Essential hypertension  -     lisinopril (PRINIVIL,ZESTRIL) 10 MG tablet; Take 1 tablet by mouth Daily.  Dispense: 30 tablet; Refill: 3    3. Type 2 diabetes mellitus without complication, without long-term current use of insulin (CMS/[facility])  Assessment & Plan:  Continue Novolog SSI and resume Jardiance 10 mg daily. No medication side effects. Continue current treatment.             Return in about 2 weeks (around 11/25/2020) for HTN-U, Review home BP journal.

## 2020-11-11 NOTE — PATIENT INSTRUCTIONS
Today, call the phone number listed on the back of your insurance card and ask them for the names, phone numbers, and addresses of 3 psychiatrists, and 3 psychologists, that are in network with your insurance.  Then, call and make an appointment for yourself with 1 of the psychiatrists and 1 of the psychologist.    [id number] [place] [address]    The phone number is [phone]   Walk-in hours are as follows:    Monday: 8:30 am - 2:30 pm  Tuesday:8:30 am - 2:30 pm  Wednesday:8:30 am - 2:30 pm  Thursday:8:30 am - 2:30 pm

## 2020-11-11 NOTE — ASSESSMENT & PLAN NOTE
Today, call the phone number listed on the back of your insurance card and ask them for the names, phone numbers, and addresses of 3 psychiatrists, and 3 psychologists, that are in network with your insurance.  Then, call and make an appointment for yourself with 1 of the psychiatrists and 1 of the psychologist.

## 2020-11-12 RX ORDER — CITALOPRAM 10 MG/1
10 TABLET ORAL DAILY
Qty: 30 TABLET | Refills: 3 | Status: SHIPPED | OUTPATIENT
Start: 2020-11-12 | End: 2020-11-16 | Stop reason: SDUPTHER

## 2020-11-16 DIAGNOSIS — F41.9 ANXIETY: ICD-10-CM

## 2020-11-16 RX ORDER — CITALOPRAM 10 MG/1
10 TABLET ORAL DAILY
Qty: 90 TABLET | Refills: 1 | Status: SHIPPED | OUTPATIENT
Start: 2020-11-16 | End: 2021-01-27 | Stop reason: SDUPTHER

## 2020-11-25 ENCOUNTER — OFFICE VISIT (OUTPATIENT)
Dept: FAMILY MEDICINE CLINIC | Facility: CLINIC | Age: 65
End: 2020-11-25

## 2020-11-25 VITALS
BODY MASS INDEX: 26.33 KG/M2 | WEIGHT: 158 LBS | HEIGHT: 65 IN | RESPIRATION RATE: 16 BRPM | OXYGEN SATURATION: 96 % | SYSTOLIC BLOOD PRESSURE: 130 MMHG | DIASTOLIC BLOOD PRESSURE: 72 MMHG | HEART RATE: 85 BPM

## 2020-11-25 DIAGNOSIS — G45.9 TIA (TRANSIENT ISCHEMIC ATTACK): ICD-10-CM

## 2020-11-25 DIAGNOSIS — F41.0 PANIC ATTACK: ICD-10-CM

## 2020-11-25 DIAGNOSIS — S49.91XA RIGHT SHOULDER INJURY, INITIAL ENCOUNTER: Primary | ICD-10-CM

## 2020-11-25 PROBLEM — N17.9 ACUTE RENAL FAILURE (HCC): Status: ACTIVE | Noted: 2020-11-25

## 2020-11-25 PROBLEM — G25.81 RESTLESS LEGS SYNDROME: Status: ACTIVE | Noted: 2020-11-25

## 2020-11-25 PROBLEM — C43.9 MALIGNANT MELANOMA (HCC): Status: ACTIVE | Noted: 2020-11-25

## 2020-11-25 PROBLEM — E11.9 DIABETES MELLITUS (HCC): Status: ACTIVE | Noted: 2020-11-25

## 2020-11-25 PROBLEM — N20.0 CALCULUS OF KIDNEY: Status: ACTIVE | Noted: 2020-11-25

## 2020-11-25 PROBLEM — M54.9 BACK PAIN: Status: ACTIVE | Noted: 2020-11-25

## 2020-11-25 PROCEDURE — 99214 OFFICE O/P EST MOD 30 MIN: CPT | Performed by: FAMILY MEDICINE

## 2020-11-25 NOTE — PROGRESS NOTES
Subjective   Jayne Beltran is a 65 y.o. female is here for   Chief Complaint   Patient presents with   • Follow-up   • Anxiety       History of Present Illness     Pt has controlled DM. She states her blood sugars have been normal.    She has CKD and is following with nephrology. She is scheduled for an xray and follow-up with nephrology on December 11, 2020.    She was throwing a trash bag and felt and heard a tearing in her right shoulder and now can not raise her right arm over her head.    She had a TIA in the past and is following with neurology and has an appointment with them on December 7, 2020.    She states her anxiety and panic attacks are now well controlled with Citalopram 10 mg daily.    Health Maintenance Due   Topic Date Due   • DIABETIC EYE EXAM  02/22/2016   • URINE MICROALBUMIN  06/18/2020   • DIABETIC FOOT EXAM  09/04/2020        reports that she has been smoking cigarettes. She has a 11.25 pack-year smoking history. She has never used smokeless tobacco. She reports that she does not drink alcohol or use drugs.    Review of Systems   Constitutional: Negative for activity change and unexpected weight change.   Respiratory: Negative for shortness of breath and wheezing.    Cardiovascular: Negative for chest pain and palpitations.   Gastrointestinal: Negative for abdominal pain, blood in stool and constipation.   Genitourinary: Negative for difficulty urinating and hematuria.   Musculoskeletal: Negative for gait problem.   Skin: Negative for color change and rash.        PHQ-9 Depression Screening  Little interest or pleasure in doing things?     Feeling down, depressed, or hopeless?     Trouble falling or staying asleep, or sleeping too much?     Feeling tired or having little energy?     Poor appetite or overeating?     Feeling bad about yourself - or that you are a failure or have let yourself or your family down?     Trouble concentrating on things, such as reading the newspaper or watching  "television?     Moving or speaking so slowly that other people could have noticed? Or the opposite - being so fidgety or restless that you have been moving around a lot more than usual?     Thoughts that you would be better off dead, or of hurting yourself in some way?     PHQ-9 Total Score     If you checked off any problems, how difficult have these problems made it for you to do your work, take care of things at home, or get along with other people?       BP Readings from Last 12 Encounters:   11/25/20 130/72   11/11/20 160/82   10/29/20 122/72   10/06/20 124/76   09/29/20 138/82   09/17/20 120/80   09/08/20 156/88   09/04/20 115/65   06/26/20 132/70   05/26/20 125/71   04/29/20 148/72   04/07/20 147/82       Wt Readings from Last 12 Encounters:   11/25/20 71.7 kg (158 lb)   11/11/20 68.9 kg (152 lb)   10/29/20 73 kg (161 lb)   10/06/20 70.4 kg (155 lb 3.2 oz)   09/29/20 70.6 kg (155 lb 11.2 oz)   09/17/20 67.1 kg (148 lb)   09/08/20 68.5 kg (151 lb)   09/04/20 70 kg (154 lb 5.2 oz)   06/26/20 74.8 kg (165 lb)   06/25/20 75 kg (165 lb 6.4 oz)   05/26/20 74.4 kg (164 lb)   04/29/20 73 kg (161 lb)        Objective   /72   Pulse 85   Resp 16   Ht 165.1 cm (65\")   Wt 71.7 kg (158 lb)   LMP  (LMP Unknown)   SpO2 96%   BMI 26.29 kg/m²   Physical Exam  Vitals signs and nursing note reviewed.   Constitutional:       General: She is not in acute distress.     Appearance: Normal appearance. She is well-developed. She is not diaphoretic.   HENT:      Head: Normocephalic and atraumatic.      Right Ear: External ear normal.      Left Ear: External ear normal.      Nose: Nose normal.      Mouth/Throat:      Pharynx: No oropharyngeal exudate.   Eyes:      General: Lids are normal. No scleral icterus.        Right eye: No discharge.         Left eye: No discharge.   Neck:      Musculoskeletal: Full passive range of motion without pain, normal range of motion and neck supple. No edema.      Thyroid: No thyromegaly. "      Trachea: Trachea normal. No tracheal deviation.   Cardiovascular:      Rate and Rhythm: Normal rate and regular rhythm.      Heart sounds: Normal heart sounds. No murmur. No friction rub. No gallop.    Pulmonary:      Effort: Pulmonary effort is normal. No tachypnea, bradypnea or respiratory distress.      Breath sounds: Normal breath sounds. No stridor. No decreased breath sounds, wheezing or rales.   Chest:      Chest wall: No tenderness.   Musculoskeletal:      Comments: Right shoulder AROM limited in abduction, unable abduct above 30 degrees.   Lymphadenopathy:      Head:      Right side of head: No submental, submandibular, tonsillar, preauricular, posterior auricular or occipital adenopathy.      Left side of head: No submental, submandibular, tonsillar, preauricular, posterior auricular or occipital adenopathy.      Cervical: No cervical adenopathy.      Right cervical: No superficial, deep or posterior cervical adenopathy.     Left cervical: No superficial, deep or posterior cervical adenopathy.   Skin:     General: Skin is warm and dry.      Capillary Refill: Capillary refill takes less than 2 seconds.      Coloration: Skin is not pale.      Findings: No erythema or rash.      Nails: There is no clubbing.     Neurological:      Mental Status: She is alert and oriented to person, place, and time. She is not disoriented.      Deep Tendon Reflexes: Reflexes are normal and symmetric.   Psychiatric:         Behavior: Behavior normal.         Procedures    Assessment/Plan   Diagnoses and all orders for this visit:    1. Right shoulder injury, initial encounter (Primary)  -     MRI Shoulder Right Without Contrast; Future    2. Panic attack  Assessment & Plan:  She states her anxiety and panic attacks are now well controlled with Citalopram 10 mg daily.      3. TIA (transient ischemic attack)  Assessment & Plan:  Following with neurology.               No follow-ups on file.

## 2020-11-25 NOTE — PATIENT INSTRUCTIONS
Rotator Cuff Tear    A rotator cuff tear is a partial or complete tear of the cord-like bands (tendons) that connect muscle to bone in the rotator cuff. The rotator cuff is a group of muscles and tendons that surround the shoulder joint and keep the upper arm bone (humerus) in the shoulder socket.  The tear can occur suddenly (acute tear) or can develop over a long period of time (chronic tear).  What are the causes?  Acute tears may be caused by:  · A fall, especially on an outstretched arm.  · Lifting very heavy objects with a jerking motion.  Chronic tears may be caused by overuse of the muscles. This may happen in sports, physical work, or activities in which your arm repeatedly moves over your head.  What increases the risk?  This condition is more likely to occur in:  · Athletes and workers who frequently use their shoulder or reach over their heads. This may include activities such as:  ? Tennis.  ? Baseball and softball.  ? Swimming and rowing.  ? Weightlifting.  ? Construction work.  ? Painting.  · People who smoke.  · Older people who have arthritis or poor blood supply. These can make the muscles and tendons weaker.  What are the signs or symptoms?  Symptoms of this condition depend on the type and severity of the injury:  · An acute tear may include a sudden tearing feeling, followed by severe pain that goes from your upper shoulder, down your arm, and toward your elbow.  · A chronic tear includes a gradual weakness and decreased shoulder motion as the pain gets worse. The pain is usually worse at night.  Both types may have symptoms such as:  · Pain that spreads (radiates) from the shoulder to the upper arm.  · Swelling and tenderness in front of the shoulder.  · Decreased range of motion.  · Pain when:  ? Reaching, pulling, or lifting the arm above the head.  ? Lowering the arm from above the head.  · Not being able to raise your arm out to the side.  · Difficulty placing the arm behind your back.  How  is this diagnosed?  This condition is diagnosed with a medical history and physical exam. Imaging tests may also be done, including:  · X-rays.  · MRI.  · Ultrasound.  · CT or MR arthrogram. During this test, a contrast material is injected into your shoulder and then images are taken.  How is this treated?  Treatment for this condition depends on the type and severity of the condition. In less severe cases, treatment may include:  · Rest. This may be done with a sling that holds the shoulder still (immobilization). Your health care provider may also recommend avoiding activities that involve lifting your arm over your head.  · Icing the shoulder.  · Anti-inflammatory medicines, such as aspirin or ibuprofen.  · Strengthening and stretching exercises. Your health care provider may recommend specific exercises to improve your range of motion and strengthen your shoulder.  In more severe cases, treatment may include:  · Physical therapy.  · Steroid injections.  · Surgery.  Follow these instructions at home:  Managing pain, stiffness, and swelling  · If directed, put ice on the injured area.  ? If you have a removable sling, remove it as told by your health care provider.  ? Put ice in a plastic bag.  ? Place a towel between your skin and the bag.  ? Leave the ice on for 20 minutes, 2-3 times a day.  · Raise (elevate) the injured area above the level of your heart while you are lying down.  · Find a comfortable sleeping position or sleep on a recliner, if available.  · Move your fingers often to avoid stiffness and to lessen swelling.  · Once the swelling has gone down, your health care provider may direct you to apply heat to relax the muscles. Use the heat source that your health care provider recommends, such as a moist heat pack or a heating pad.  ? Place a towel between your skin and the heat source.  ? Leave the heat on for 20-30 minutes.  ? Remove the heat if your skin turns bright red. This is especially  important if you are unable to feel pain, heat, or cold. You may have a greater risk of getting burned.  If you have a sling:  · Wear the sling as told by your health care provider. Remove it only as told by your health care provider.  · Loosen the sling if your fingers tingle, become numb, or turn cold and blue.  · Keep the sling clean.  · If the sling is not waterproof:  ? Do not let it get wet.  ? Cover it with a watertight covering when you take a bath or a shower.  Driving  · Do not drive or use heavy machinery while taking prescription pain medicine.  · Ask your health care provider when it is safe to drive if you have a sling on your arm.  Activity  · Rest your shoulder as told by your health care provider.  · Return to your normal activities as told by your health care provider. Ask your health care provider what activities are safe for you.  · Do any exercises or stretches as told by your health care provider.  General instructions  · Do not use any products that contain nicotine or tobacco, such as cigarettes and e-cigarettes. If you need help quitting, ask your health care provider.  · Take over-the-counter and prescription medicines only as told by your health care provider.  · Keep all follow-up visits as told by your health care provider. This is important.  Contact a health care provider if:  · Your pain gets worse.  · You have new pain in your arm, hands, or fingers.  · Medicine does not help your pain.  Get help right away if:  · Your arm, hand, or fingers are numb or tingling.  · Your arm, hand, or fingers are swollen or painful or they turn white or blue.  · Your hand or fingers on your injured arm are colder than your other hand.  Summary  · A rotator cuff tear is a partial or complete tear of the cord-like bands (tendons) that connect muscle to bone in the rotator cuff.  · The tear can occur suddenly (acute tear) or can develop over a long period of time (chronic tear).  · Treatment generally  includes rest, anti-inflammatory medicines, and icing. In some cases, physical therapy and steroid injections may be needed. In severe cases, surgery may be needed.  This information is not intended to replace advice given to you by your health care provider. Make sure you discuss any questions you have with your health care provider.  Document Revised: 11/30/2018 Document Reviewed: 03/05/2018  Elsevier Patient Education © 2020 [employer]

## 2020-12-03 NOTE — PROGRESS NOTES
Subjective   History of Present Illness: Jayne Beltran is a 65 y.o. female is here today for follow-up of a Ct head for a SDH on 9/5/20.      History of Present Illness  Patient with history of ground-level fall on 9/4/2020.  She slipped falling backward striking her head with a brief loss of consciousness.  She was found to have a small traumatic subarachnoid hemorrhage and left convexity SDH on aspirin. 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  She was advised to follow-up with PCP for further work-up of the calvarial findings.  CT head done on 11/20/20.  She denies headache, dizziness, vision changes, nausea vomiting.  She states that she did not speak with her family doctor regarding the abnormal calvarial findings.  She states that she has been off hemodialysis for several weeks.    You have chosen to receive care through a telephone visit. Do you consent to use a telephone visit for your medical care today? Yes   Patient/ POA reports current location is at home for duration of telehealth visit. I am located in office suite.       The following portions of the patient's history were reviewed and updated as appropriate: allergies, current medications and problem list.     Review of Systems   Eyes: Negative for visual disturbance.   Gastrointestinal: Negative for nausea.   Musculoskeletal: Negative for back pain.   Neurological: Negative for dizziness, tremors, seizures, syncope, speech difficulty, weakness, light-headedness, numbness and headaches.   Psychiatric/Behavioral: Negative for confusion and decreased concentration.       Objective     There were no vitals filed for this visit.  There is no height or weight on file to calculate BMI.      Physical Exam  Pulmonary:      Effort: Pulmonary effort is normal.   Neurological:      Mental Status: She is alert.    Psychiatric:         Mood and Affect: Mood normal.         Speech: Speech normal.         Thought Content: Thought content normal.       Neurologic Exam     Mental Status   Attention: normal. Concentration: normal.   Speech: speech is normal   Level of consciousness: alert  Knowledge: good.   Normal comprehension.           Assessment/Plan   Independent Review of Radiographic Studies:      I personally reviewed the images from the following studies.    CT head.  Complete resolution of left-sided subdural and subarachnoid hemorrhage.    Medical Decision Making:      Patient CT shows complete resolution of previously seen traumatic subdural and subarachnoid hemorrhage and contusion.  She denies any recent or recurrent headaches.  Denies any visual changes, nausea, weakness.  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  Patient denies cancer history but she does have a history of leukocytosis.  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  Her follow-up has been delayed.  She has an appointment December 22.  I have encouraged her to keep this follow-up appointment although she is feeling well to ensure that there is no further work-up needed for the skull findings.  She is agreeable.  Happily, she has been able to come off hemodialysis.  From neurosurgical standpoint, she needs no further follow-up with us.  She is advised to call with any questions or concerns in the future.      I spent 7 minutes in telephone discussion with the patient and family or POA.    Diagnoses and all orders for this visit:    1. Traumatic subarachnoid hemorrhage with loss of consciousness of 30 minutes or less, subsequent encounter (Primary)    2. Nonspecific abnormal findings on imaging examination of skull and head      Return if symptoms worsen or fail to improve.

## 2020-12-04 ENCOUNTER — TRANSCRIBE ORDERS (OUTPATIENT)
Dept: ADMINISTRATIVE | Facility: HOSPITAL | Age: 65
End: 2020-12-04

## 2020-12-04 ENCOUNTER — LAB (OUTPATIENT)
Dept: LAB | Facility: HOSPITAL | Age: 65
End: 2020-12-04

## 2020-12-04 ENCOUNTER — HOSPITAL ENCOUNTER (OUTPATIENT)
Dept: GENERAL RADIOLOGY | Facility: HOSPITAL | Age: 65
Discharge: HOME | End: 2020-12-04

## 2020-12-04 DIAGNOSIS — I13.10 MALIGNANT HYPERTENSIVE HEART AND CHRONIC KIDNEY DISEASE STAGE III (HCC): ICD-10-CM

## 2020-12-04 DIAGNOSIS — N20.0 URIC ACID NEPHROLITHIASIS: ICD-10-CM

## 2020-12-04 DIAGNOSIS — I13.10 MALIGNANT HYPERTENSIVE HEART AND CHRONIC KIDNEY DISEASE STAGE III (HCC): Primary | ICD-10-CM

## 2020-12-04 DIAGNOSIS — N18.30 MALIGNANT HYPERTENSIVE HEART AND CHRONIC KIDNEY DISEASE STAGE III (HCC): Primary | ICD-10-CM

## 2020-12-04 DIAGNOSIS — N18.30 MALIGNANT HYPERTENSIVE HEART AND CHRONIC KIDNEY DISEASE STAGE III (HCC): ICD-10-CM

## 2020-12-04 DIAGNOSIS — N20.0 URIC ACID NEPHROLITHIASIS: Primary | ICD-10-CM

## 2020-12-04 LAB
ALBUMIN SERPL-MCNC: 4.4 G/DL (ref 3.5–5.2)
ANION GAP SERPL CALCULATED.3IONS-SCNC: 14.4 MMOL/L (ref 5–15)
BUN SERPL-MCNC: 32 MG/DL (ref 8–23)
BUN/CREAT SERPL: 19.5 (ref 7–25)
CALCIUM SPEC-SCNC: 9.4 MG/DL (ref 8.6–10.5)
CHLORIDE SERPL-SCNC: 101 MMOL/L (ref 98–107)
CO2 SERPL-SCNC: 24.6 MMOL/L (ref 22–29)
CREAT SERPL-MCNC: 1.64 MG/DL (ref 0.57–1)
GFR SERPL CREATININE-BSD FRML MDRD: 31 ML/MIN/1.73
GLUCOSE SERPL-MCNC: 127 MG/DL (ref 65–99)
PHOSPHATE SERPL-MCNC: 4.7 MG/DL (ref 2.5–4.5)
POTASSIUM SERPL-SCNC: 3.3 MMOL/L (ref 3.5–5.2)
SODIUM SERPL-SCNC: 140 MMOL/L (ref 136–145)

## 2020-12-04 PROCEDURE — 80069 RENAL FUNCTION PANEL: CPT

## 2020-12-04 PROCEDURE — 74018 RADEX ABDOMEN 1 VIEW: CPT

## 2020-12-04 PROCEDURE — 36415 COLL VENOUS BLD VENIPUNCTURE: CPT

## 2020-12-07 ENCOUNTER — OFFICE VISIT (OUTPATIENT)
Dept: NEUROSURGERY | Facility: CLINIC | Age: 65
End: 2020-12-07

## 2020-12-07 DIAGNOSIS — R93.0 NONSPECIFIC ABNORMAL FINDINGS ON IMAGING EXAMINATION OF SKULL AND HEAD: ICD-10-CM

## 2020-12-07 DIAGNOSIS — S06.6X1D TRAUMATIC SUBARACHNOID HEMORRHAGE WITH LOSS OF CONSCIOUSNESS OF 30 MINUTES OR LESS, SUBSEQUENT ENCOUNTER: Primary | ICD-10-CM

## 2020-12-07 PROBLEM — S06.6X1A TRAUMATIC SUBARACHNOID HEMORRHAGE WITH LOSS OF CONSCIOUSNESS OF 30 MINUTES OR LESS (HCC): Status: ACTIVE | Noted: 2020-09-05

## 2020-12-07 PROBLEM — S06.6X1A TRAUMATIC SUBARACHNOID HEMORRHAGE WITH LOSS OF CONSCIOUSNESS OF 30 MINUTES OR LESS: Status: RESOLVED | Noted: 2020-09-05 | Resolved: 2020-12-07

## 2020-12-07 PROCEDURE — 99441 PR PHYS/QHP TELEPHONE EVALUATION 5-10 MIN: CPT | Performed by: NURSE PRACTITIONER

## 2020-12-11 ENCOUNTER — APPOINTMENT (OUTPATIENT)
Dept: SLEEP MEDICINE | Facility: HOSPITAL | Age: 65
End: 2020-12-11

## 2020-12-14 ENCOUNTER — TRANSCRIBE ORDERS (OUTPATIENT)
Dept: PREADMISSION TESTING | Facility: HOSPITAL | Age: 65
End: 2020-12-14

## 2020-12-14 DIAGNOSIS — Z01.818 OTHER SPECIFIED PRE-OPERATIVE EXAMINATION: Primary | ICD-10-CM

## 2020-12-18 ENCOUNTER — APPOINTMENT (OUTPATIENT)
Dept: PREADMISSION TESTING | Facility: HOSPITAL | Age: 65
End: 2020-12-18

## 2020-12-18 VITALS
HEIGHT: 65 IN | HEART RATE: 77 BPM | SYSTOLIC BLOOD PRESSURE: 179 MMHG | OXYGEN SATURATION: 96 % | BODY MASS INDEX: 26.74 KG/M2 | WEIGHT: 160.5 LBS | RESPIRATION RATE: 16 BRPM | DIASTOLIC BLOOD PRESSURE: 96 MMHG | TEMPERATURE: 98.3 F

## 2020-12-18 LAB
DEPRECATED RDW RBC AUTO: 48.1 FL (ref 37–54)
ERYTHROCYTE [DISTWIDTH] IN BLOOD BY AUTOMATED COUNT: 16.3 % (ref 12.3–15.4)
HCT VFR BLD AUTO: 38 % (ref 34–46.6)
HGB BLD-MCNC: 12 G/DL (ref 12–15.9)
MCH RBC QN AUTO: 25.9 PG (ref 26.6–33)
MCHC RBC AUTO-ENTMCNC: 31.6 G/DL (ref 31.5–35.7)
MCV RBC AUTO: 81.9 FL (ref 79–97)
PLATELET # BLD AUTO: 312 10*3/MM3 (ref 140–450)
PMV BLD AUTO: 11.1 FL (ref 6–12)
RBC # BLD AUTO: 4.64 10*6/MM3 (ref 3.77–5.28)
WBC # BLD AUTO: 9.82 10*3/MM3 (ref 3.4–10.8)

## 2020-12-18 PROCEDURE — U0004 COV-19 TEST NON-CDC HGH THRU: HCPCS | Performed by: NURSE PRACTITIONER

## 2020-12-18 PROCEDURE — 36415 COLL VENOUS BLD VENIPUNCTURE: CPT

## 2020-12-18 PROCEDURE — C9803 HOPD COVID-19 SPEC COLLECT: HCPCS | Performed by: NURSE PRACTITIONER

## 2020-12-18 PROCEDURE — 85027 COMPLETE CBC AUTOMATED: CPT

## 2020-12-18 RX ORDER — PREGABALIN 150 MG/1
150 CAPSULE ORAL 3 TIMES DAILY
COMMUNITY
End: 2021-06-22

## 2020-12-18 NOTE — DISCHARGE INSTRUCTIONS
Take the following medications the morning of surgery with a small sip of water:  LYRICA, CELEXA, METOPROLOL, OMEPRAZOLE    ARRIVAL TIME: 1230PM       General Instructions:  • Do not eat or drink anything after midnight the night before surgery.  • Infants may have breast milk up to four hours before surgery.  • Infants drinking formula may drink formula up to six hours before surgery.   • Patients who avoid smoking, chewing tobacco and alcohol for 4 weeks prior to surgery have a reduced risk of post-operative complications.  Quit smoking as many days before surgery as you can.  • Do not smoke, use chewing tobacco or drink alcohol the day of surgery.   • If applicable bring your C-PAP/ BI-PAP machine.  • Bring any papers given to you in the doctor’s office.  • Wear clean comfortable clothes.  • Do not wear contact lenses, false eyelashes or make-up.  Bring a case for your glasses.   • Bring crutches or walker if applicable.  • Remove all piercings.  Leave jewelry and any other valuables at home.  • Hair extensions with metal clips must be removed prior to surgery.  • The Pre-Admission Testing nurse will instruct you to bring medications if unable to obtain an accurate list in Pre-Admission Testing.        Preventing a Surgical Site Infection:  • For 2 to 3 days before surgery, avoid shaving with a razor because the razor can irritate skin and make it easier to develop an infection.    • Any areas of open skin can increase the risk of a post-operative wound infection by allowing bacteria to enter and travel throughout the body.  Notify your surgeon if you have any skin wounds / rashes even if it is not near the expected surgical site.  The area will need assessed to determine if surgery should be delayed until it is healed.  • The night prior to surgery shower using a fresh bar of anti-bacterial soap (such as Dial) and clean washcloth.  Sleep in a clean bed with clean clothing.  Do not allow pets to sleep with  you.  • Shower on the morning of surgery using a fresh bar of anti-bacterial soap (such as Dial) and clean washcloth.  Dry with a clean towel and dress in clean clothing.  • Ask your surgeon if you will be receiving antibiotics prior to surgery.  • Make sure you, your family, and all healthcare providers clean their hands with soap and water or an alcohol based hand  before caring for you or your wound.    Day of surgery:  Your arrival time is approximately two hours before your scheduled surgery time.  Upon arrival, a Pre-op nurse and Anesthesiologist will review your health history, obtain vital signs, and answer questions you may have.  The only belongings needed at this time will be your home medications and if applicable your C-PAP/BI-PAP machine.  A Pre-op nurse will start an IV and you may receive medication in preparation for surgery, including something to help you relax.      Please be aware that surgery does come with discomfort.  We want to make every effort to control your discomfort so please discuss any uncontrolled symptoms with your nurse.   Your doctor will most likely have prescribed pain medications.      If you are going home after surgery you will receive individualized written care instructions before being discharged.  A responsible adult must drive you to and from the hospital on the day of your surgery and stay with you for 24 hours.  Discharge prescriptions can be filled by the hospital pharmacy during regular pharmacy hours.  If you are having surgery late in the day/evening your prescription may be e-prescribed to your pharmacy.  Please verify your pharmacy hours or chose a 24 hour pharmacy to avoid not having access to your prescription because your pharmacy has closed for the day.    If you are staying overnight following surgery, you will be transported to your hospital room following the recovery period.  [facility] has all private rooms.    If you have any  questions please call Pre-Admission Testing at [phone].  Deductibles and co-payments are collected on the day of service. Please be prepared to pay the required co-pay, deductible or deposit on the day of service as defined by your plan.    Patient Education for Self-Quarantine Process    Following your COVID testing, we strongly recommend that you do not leave your home after you have been tested for COVID except to get medical care. This includes not going to work, school or to public areas.  If this is not possible for you to do please limit your activities to only required outings.  Be sure to wear a mask when you are with other people, practice social distancing and wash your hands frequently.      The following items provide additional details to keep you safe.  • Wash your hands with soap and water frequently for at least 20 seconds.   • Avoid touching your eyes, nose and mouth with unwashed hands.  • Do not share anything - utensils, towels, food from the same bowl.   • Have your own utensils, drinking glass, dishes, towels and bedding.   • Do not have visitors.   • Do use FaceTime to stay in touch with family and friends.  • You should stay in a specific room away from others if possible.   • Stay at least 6 feet away from others in the home if you cannot have a dedicated room to yourself.   • Do not snuggle with your pet. While the CDC says there is no evidence that pets can spread COVID-19 or be infected from humans, it is probably best to avoid “petting, snuggling, being kissed or licked and sharing food (during self-quarantine)”, according to the CDC.   • Sanitize household surfaces daily. Include all high touch areas (door handles, light switches, phones, countertops, etc.)  • Do not share a bathroom with others, if possible.   • Wear a mask around others in your home if you are unable to stay in a separate room or 6 feet apart. If  you are unable to wear a mask, have your family member wear a  mask if they must be within 6 feet of you.   Call your surgeon immediately if you experience any of the following symptoms:  • Sore Throat  • Shortness of Breath or difficulty breathing  • Cough  • Chills  • Body soreness or muscle pain  • Headache  • Fever  • New loss of taste or smell  • Do not arrive for your surgery ill.  Your procedure will need to be rescheduled to another time.  You will need to call your physician before the day of surgery to avoid any unnecessary exposure to hospital staff as well as other patients.

## 2020-12-19 LAB — SARS-COV-2 RNA RESP QL NAA+PROBE: NOT DETECTED

## 2020-12-21 ENCOUNTER — APPOINTMENT (OUTPATIENT)
Dept: MRI IMAGING | Facility: HOSPITAL | Age: 65
End: 2020-12-21

## 2020-12-21 ENCOUNTER — HOSPITAL ENCOUNTER (OUTPATIENT)
Facility: HOSPITAL | Age: 65
Setting detail: HOSPITAL OUTPATIENT SURGERY
Discharge: HOME | End: 2020-12-21
Attending: UROLOGY | Admitting: UROLOGY

## 2020-12-21 ENCOUNTER — ANESTHESIA EVENT (OUTPATIENT)
Dept: PERIOP | Facility: HOSPITAL | Age: 65
End: 2020-12-21

## 2020-12-21 ENCOUNTER — APPOINTMENT (OUTPATIENT)
Dept: GENERAL RADIOLOGY | Facility: HOSPITAL | Age: 65
End: 2020-12-21

## 2020-12-21 ENCOUNTER — ANESTHESIA (OUTPATIENT)
Dept: PERIOP | Facility: HOSPITAL | Age: 65
End: 2020-12-21

## 2020-12-21 VITALS
WEIGHT: 159 LBS | HEIGHT: 65 IN | SYSTOLIC BLOOD PRESSURE: 156 MMHG | DIASTOLIC BLOOD PRESSURE: 97 MMHG | RESPIRATION RATE: 16 BRPM | BODY MASS INDEX: 26.49 KG/M2 | OXYGEN SATURATION: 91 % | TEMPERATURE: 98.4 F | HEART RATE: 75 BPM

## 2020-12-21 DIAGNOSIS — N20.0 RENAL CALCULUS, RIGHT: Primary | ICD-10-CM

## 2020-12-21 LAB
GLUCOSE BLDC GLUCOMTR-MCNC: 145 MG/DL (ref 70–130)
GLUCOSE BLDC GLUCOMTR-MCNC: 150 MG/DL (ref 70–130)

## 2020-12-21 PROCEDURE — 25010000002 FENTANYL CITRATE (PF) 100 MCG/2ML SOLUTION: Performed by: ANESTHESIOLOGY

## 2020-12-21 PROCEDURE — 25010000002 ONDANSETRON PER 1 MG: Performed by: NURSE ANESTHETIST, CERTIFIED REGISTERED

## 2020-12-21 PROCEDURE — 25010000002 FENTANYL CITRATE (PF) 100 MCG/2ML SOLUTION: Performed by: NURSE ANESTHETIST, CERTIFIED REGISTERED

## 2020-12-21 PROCEDURE — 25010000002 MIDAZOLAM PER 1 MG: Performed by: ANESTHESIOLOGY

## 2020-12-21 PROCEDURE — 74420 UROGRAPHY RTRGR +-KUB: CPT

## 2020-12-21 PROCEDURE — 25010000003 CEFAZOLIN IN DEXTROSE 2-4 GM/100ML-% SOLUTION: Performed by: UROLOGY

## 2020-12-21 PROCEDURE — 25010000002 PROPOFOL 10 MG/ML EMULSION: Performed by: NURSE ANESTHETIST, CERTIFIED REGISTERED

## 2020-12-21 PROCEDURE — 0 IOTHALAMATE 60 % SOLUTION: Performed by: UROLOGY

## 2020-12-21 PROCEDURE — 82962 GLUCOSE BLOOD TEST: CPT

## 2020-12-21 PROCEDURE — C2617 STENT, NON-COR, TEM W/O DEL: HCPCS | Performed by: UROLOGY

## 2020-12-21 PROCEDURE — 25010000002 PHENYLEPHRINE PER 1 ML: Performed by: NURSE ANESTHETIST, CERTIFIED REGISTERED

## 2020-12-21 PROCEDURE — C1769 GUIDE WIRE: HCPCS | Performed by: UROLOGY

## 2020-12-21 DEVICE — URETERAL STENT
Type: IMPLANTABLE DEVICE | Site: URETER | Status: FUNCTIONAL
Brand: PERCUFLEX™ PLUS

## 2020-12-21 RX ORDER — LIDOCAINE HYDROCHLORIDE 10 MG/ML
0.5 INJECTION, SOLUTION EPIDURAL; INFILTRATION; INTRACAUDAL; PERINEURAL ONCE AS NEEDED
Status: DISCONTINUED | OUTPATIENT
Start: 2020-12-21 | End: 2020-12-21 | Stop reason: HOSPADM

## 2020-12-21 RX ORDER — CEPHALEXIN 250 MG/1
250 CAPSULE ORAL 2 TIMES DAILY
Qty: 6 CAPSULE | Refills: 0 | Status: SHIPPED | OUTPATIENT
Start: 2020-12-21 | End: 2020-12-24

## 2020-12-21 RX ORDER — SODIUM CHLORIDE, SODIUM LACTATE, POTASSIUM CHLORIDE, CALCIUM CHLORIDE 600; 310; 30; 20 MG/100ML; MG/100ML; MG/100ML; MG/100ML
9 INJECTION, SOLUTION INTRAVENOUS CONTINUOUS
Status: DISCONTINUED | OUTPATIENT
Start: 2020-12-21 | End: 2020-12-21 | Stop reason: HOSPADM

## 2020-12-21 RX ORDER — EPHEDRINE SULFATE 50 MG/ML
5 INJECTION, SOLUTION INTRAVENOUS ONCE AS NEEDED
Status: DISCONTINUED | OUTPATIENT
Start: 2020-12-21 | End: 2020-12-21 | Stop reason: HOSPADM

## 2020-12-21 RX ORDER — ONDANSETRON 2 MG/ML
4 INJECTION INTRAMUSCULAR; INTRAVENOUS ONCE AS NEEDED
Status: DISCONTINUED | OUTPATIENT
Start: 2020-12-21 | End: 2020-12-21 | Stop reason: HOSPADM

## 2020-12-21 RX ORDER — ONDANSETRON 4 MG/1
4 TABLET, FILM COATED ORAL DAILY PRN
Qty: 30 TABLET | Refills: 1 | Status: ON HOLD | OUTPATIENT
Start: 2020-12-21 | End: 2021-06-11

## 2020-12-21 RX ORDER — MIDAZOLAM HYDROCHLORIDE 1 MG/ML
1 INJECTION INTRAMUSCULAR; INTRAVENOUS
Status: DISCONTINUED | OUTPATIENT
Start: 2020-12-21 | End: 2020-12-21 | Stop reason: HOSPADM

## 2020-12-21 RX ORDER — FENTANYL CITRATE 50 UG/ML
50 INJECTION, SOLUTION INTRAMUSCULAR; INTRAVENOUS
Status: DISCONTINUED | OUTPATIENT
Start: 2020-12-21 | End: 2020-12-21 | Stop reason: HOSPADM

## 2020-12-21 RX ORDER — LABETALOL HYDROCHLORIDE 5 MG/ML
5 INJECTION, SOLUTION INTRAVENOUS
Status: DISCONTINUED | OUTPATIENT
Start: 2020-12-21 | End: 2020-12-21 | Stop reason: HOSPADM

## 2020-12-21 RX ORDER — SODIUM CHLORIDE 0.9 % (FLUSH) 0.9 %
3 SYRINGE (ML) INJECTION EVERY 12 HOURS SCHEDULED
Status: DISCONTINUED | OUTPATIENT
Start: 2020-12-21 | End: 2020-12-21 | Stop reason: HOSPADM

## 2020-12-21 RX ORDER — FAMOTIDINE 10 MG/ML
20 INJECTION, SOLUTION INTRAVENOUS ONCE
Status: COMPLETED | OUTPATIENT
Start: 2020-12-21 | End: 2020-12-21

## 2020-12-21 RX ORDER — DIPHENHYDRAMINE HCL 25 MG
25 CAPSULE ORAL
Status: DISCONTINUED | OUTPATIENT
Start: 2020-12-21 | End: 2020-12-21 | Stop reason: HOSPADM

## 2020-12-21 RX ORDER — CEFAZOLIN SODIUM 2 G/100ML
2 INJECTION, SOLUTION INTRAVENOUS ONCE
Status: COMPLETED | OUTPATIENT
Start: 2020-12-21 | End: 2020-12-21

## 2020-12-21 RX ORDER — HYDROCODONE BITARTRATE AND ACETAMINOPHEN 7.5; 325 MG/1; MG/1
1 TABLET ORAL ONCE AS NEEDED
Status: DISCONTINUED | OUTPATIENT
Start: 2020-12-21 | End: 2020-12-21 | Stop reason: HOSPADM

## 2020-12-21 RX ORDER — FLUMAZENIL 0.1 MG/ML
0.2 INJECTION INTRAVENOUS AS NEEDED
Status: DISCONTINUED | OUTPATIENT
Start: 2020-12-21 | End: 2020-12-21 | Stop reason: HOSPADM

## 2020-12-21 RX ORDER — PROMETHAZINE HYDROCHLORIDE 25 MG/1
25 SUPPOSITORY RECTAL ONCE AS NEEDED
Status: DISCONTINUED | OUTPATIENT
Start: 2020-12-21 | End: 2020-12-21 | Stop reason: HOSPADM

## 2020-12-21 RX ORDER — HYDROCODONE BITARTRATE AND ACETAMINOPHEN 5; 325 MG/1; MG/1
1 TABLET ORAL EVERY 6 HOURS PRN
Qty: 30 TABLET | Refills: 0 | Status: ON HOLD | OUTPATIENT
Start: 2020-12-21 | End: 2021-06-11

## 2020-12-21 RX ORDER — NALOXONE HCL 0.4 MG/ML
0.2 VIAL (ML) INJECTION AS NEEDED
Status: DISCONTINUED | OUTPATIENT
Start: 2020-12-21 | End: 2020-12-21 | Stop reason: HOSPADM

## 2020-12-21 RX ORDER — OXYCODONE AND ACETAMINOPHEN 7.5; 325 MG/1; MG/1
1 TABLET ORAL ONCE AS NEEDED
Status: DISCONTINUED | OUTPATIENT
Start: 2020-12-21 | End: 2020-12-21 | Stop reason: HOSPADM

## 2020-12-21 RX ORDER — SODIUM CHLORIDE 0.9 % (FLUSH) 0.9 %
3-10 SYRINGE (ML) INJECTION AS NEEDED
Status: DISCONTINUED | OUTPATIENT
Start: 2020-12-21 | End: 2020-12-21 | Stop reason: HOSPADM

## 2020-12-21 RX ORDER — DIPHENHYDRAMINE HYDROCHLORIDE 50 MG/ML
12.5 INJECTION INTRAMUSCULAR; INTRAVENOUS
Status: DISCONTINUED | OUTPATIENT
Start: 2020-12-21 | End: 2020-12-21 | Stop reason: HOSPADM

## 2020-12-21 RX ORDER — ONDANSETRON 2 MG/ML
INJECTION INTRAMUSCULAR; INTRAVENOUS AS NEEDED
Status: DISCONTINUED | OUTPATIENT
Start: 2020-12-21 | End: 2020-12-21 | Stop reason: SURG

## 2020-12-21 RX ORDER — LIDOCAINE HYDROCHLORIDE 20 MG/ML
INJECTION, SOLUTION INFILTRATION; PERINEURAL AS NEEDED
Status: DISCONTINUED | OUTPATIENT
Start: 2020-12-21 | End: 2020-12-21 | Stop reason: SURG

## 2020-12-21 RX ORDER — PHENAZOPYRIDINE HYDROCHLORIDE 100 MG/1
100 TABLET, FILM COATED ORAL 3 TIMES DAILY PRN
Qty: 9 TABLET | Refills: 0 | Status: ON HOLD | OUTPATIENT
Start: 2020-12-21 | End: 2021-06-11

## 2020-12-21 RX ORDER — MAGNESIUM HYDROXIDE 1200 MG/15ML
LIQUID ORAL AS NEEDED
Status: DISCONTINUED | OUTPATIENT
Start: 2020-12-21 | End: 2020-12-21 | Stop reason: HOSPADM

## 2020-12-21 RX ORDER — PROPOFOL 10 MG/ML
VIAL (ML) INTRAVENOUS AS NEEDED
Status: DISCONTINUED | OUTPATIENT
Start: 2020-12-21 | End: 2020-12-21 | Stop reason: SURG

## 2020-12-21 RX ORDER — PROMETHAZINE HYDROCHLORIDE 25 MG/1
25 TABLET ORAL ONCE AS NEEDED
Status: DISCONTINUED | OUTPATIENT
Start: 2020-12-21 | End: 2020-12-21 | Stop reason: HOSPADM

## 2020-12-21 RX ORDER — DOCUSATE SODIUM 100 MG/1
100 CAPSULE, LIQUID FILLED ORAL DAILY PRN
Qty: 30 CAPSULE | Refills: 1 | Status: ON HOLD | OUTPATIENT
Start: 2020-12-21 | End: 2021-06-11

## 2020-12-21 RX ORDER — HYDROMORPHONE HYDROCHLORIDE 1 MG/ML
0.5 INJECTION, SOLUTION INTRAMUSCULAR; INTRAVENOUS; SUBCUTANEOUS
Status: DISCONTINUED | OUTPATIENT
Start: 2020-12-21 | End: 2020-12-21 | Stop reason: HOSPADM

## 2020-12-21 RX ADMIN — MIDAZOLAM 1 MG: 1 INJECTION INTRAMUSCULAR; INTRAVENOUS at 15:19

## 2020-12-21 RX ADMIN — SODIUM CHLORIDE, POTASSIUM CHLORIDE, SODIUM LACTATE AND CALCIUM CHLORIDE 9 ML/HR: 600; 310; 30; 20 INJECTION, SOLUTION INTRAVENOUS at 12:51

## 2020-12-21 RX ADMIN — FENTANYL CITRATE 50 MCG: 50 INJECTION INTRAMUSCULAR; INTRAVENOUS at 15:36

## 2020-12-21 RX ADMIN — FENTANYL CITRATE 50 MCG: 50 INJECTION, SOLUTION INTRAMUSCULAR; INTRAVENOUS at 16:50

## 2020-12-21 RX ADMIN — PHENYLEPHRINE HYDROCHLORIDE 100 MCG: 10 INJECTION INTRAVENOUS at 15:47

## 2020-12-21 RX ADMIN — CEFAZOLIN SODIUM 2 G: 2 INJECTION, SOLUTION INTRAVENOUS at 15:37

## 2020-12-21 RX ADMIN — FAMOTIDINE 20 MG: 10 INJECTION INTRAVENOUS at 12:51

## 2020-12-21 RX ADMIN — ONDANSETRON HYDROCHLORIDE 4 MG: 2 SOLUTION INTRAMUSCULAR; INTRAVENOUS at 16:14

## 2020-12-21 RX ADMIN — FENTANYL CITRATE 50 MCG: 50 INJECTION, SOLUTION INTRAMUSCULAR; INTRAVENOUS at 17:01

## 2020-12-21 RX ADMIN — FENTANYL CITRATE 50 MCG: 50 INJECTION INTRAMUSCULAR; INTRAVENOUS at 16:03

## 2020-12-21 RX ADMIN — PROPOFOL 200 MG: 10 INJECTION, EMULSION INTRAVENOUS at 15:32

## 2020-12-21 RX ADMIN — LIDOCAINE HYDROCHLORIDE 100 MG: 20 INJECTION, SOLUTION INFILTRATION; PERINEURAL at 15:32

## 2020-12-21 NOTE — ANESTHESIA PROCEDURE NOTES
Airway  Urgency: elective    Date/Time: 12/21/2020 3:34 PM  Airway not difficult    General Information and Staff    Patient location during procedure: OR  CRNA: Rich Koch CRNA    Indications and Patient Condition    Preoxygenated: yes      Final Airway Details  Final airway type: supraglottic airway      Successful airway: classic  Size 4    Assessment: lips, teeth, and gum same as pre-op

## 2020-12-21 NOTE — ANESTHESIA POSTPROCEDURE EVALUATION
"Patient: Jayne Beltran    Procedure Summary     Date: 12/21/20 Room / Location: [facility] OR  / [facility] MAIN OR    Anesthesia Start: 1530 Anesthesia Stop: 1628    Procedure: CYSTOSCOPY, RIGHT URETEROSCOPY, RIGHT RETROGRADE PYELOGRAM, LASER LITHOTRIPSY WITH RIGHT URETERAL STENT EXCHANGE (Right ) Diagnosis:     Surgeon: Mikie Mejia MD Provider: Tim Espana MD    Anesthesia Type: general ASA Status: 3          Anesthesia Type: general    Vitals  Vitals Value Taken Time   /89 12/21/20 1700   Temp 36.9 °C (98.4 °F) 12/21/20 1625   Pulse 76 12/21/20 1709   Resp 16 12/21/20 1700   SpO2 93 % 12/21/20 1709   Vitals shown include unvalidated device data.        Post Anesthesia Care and Evaluation    Patient location during evaluation: PHASE II  Patient participation: complete - patient participated  Level of consciousness: awake and alert  Pain management: adequate  Airway patency: patent  Anesthetic complications: No anesthetic complications    Cardiovascular status: acceptable  Respiratory status: acceptable  Hydration status: acceptable    Comments: /97   Pulse 75   Temp 36.9 °C (98.4 °F) (Oral)   Resp 16   Ht 165.1 cm (65\")   Wt 72.1 kg (159 lb)   LMP  (LMP Unknown)   SpO2 91%   BMI 26.46 kg/m²         "

## 2020-12-21 NOTE — SIGNIFICANT NOTE
S/w Golden, pt . Advised that pt has left for OR, and he will receive an update via telephone from the surgeon post-procedure. Verbalized understanding.

## 2020-12-21 NOTE — H&P
[facility] History & Physical      Patient Identification:  NAME:  Jayne Beltran  Age:  65 y.o.   Sex:  female   :  1955   MRN:  [id number]       Chief complaint: Planned Procedure    History of present illness:  Jayne Beltran is a pleasant, 65 year old woman with recent history of right ureteral/renal calculi.  She returns for 2nd look ureteroscopy to make sure the right collecting system, is clear of stone.  NO changes since last seen in clinic      Past medical history:  Past Medical History:   Diagnosis Date   • COPD (chronic obstructive pulmonary disease) (CMS/HCC)    • Diabetes mellitus (CMS/HCC)     type 2   • Fibroid    • Hypertension    • Leukocytosis    • Neuromuscular disorder (CMS/HCC)    • Panic attack    • Skin cancer     nose   • TIA (transient ischemic attack)        Past surgical history:  Past Surgical History:   Procedure Laterality Date   • CHOLECYSTECTOMY     • COLONOSCOPY     • INSERTION HEMODIALYSIS CATHETER N/A 9/3/2020    Procedure: HEMODIALYSIS CATHETER INSERTION;  Surgeon: Sergio Durant MD;  Location: [facility];  Service: Vascular;  Laterality: N/A;   • KIDNEY STONE SURGERY         Allergies:  Metformin    Home medications:  Medications Prior to Admission   Medication Sig Dispense Refill Last Dose   • citalopram (CeleXA) 10 MG tablet Take 1 tablet by mouth Daily. 90 tablet 1 2020 at 0700   • colestipol (COLESTID) 1 g tablet Take 1 tablet by mouth 2 (Two) Times a Day. 60 tablet 5 2020 at 0700   • Cranberry-Vit C-Lactobacillus (CRANBERRY/PROBIOTIC/VIT C PO) Take 1 tablet by mouth Daily.   2020 at 0700   • Cyanocobalamin (VITAMIN B 12) 500 MCG tablet Take 500 mcg by mouth Daily.   2020 at 0700   • Empagliflozin (Jardiance) 10 MG tablet Take 10 mg by mouth Daily. 30 tablet 5 2020 at 0700   • furosemide (LASIX) 80 MG tablet Take 80 mg by mouth 2 (Two) Times a Day.   2020 at 0700   • lisinopril (PRINIVIL,ZESTRIL) 10 MG tablet  Take 1 tablet by mouth Daily. 30 tablet 3 12/20/2020 at 0700   • metoprolol succinate XL (TOPROL-XL) 200 MG 24 hr tablet Take 1 tablet by mouth Daily. 90 tablet 1 12/21/2020 at 0700   • senna (senna) 8.6 MG tablet Take 8.6 mg by mouth Daily.   12/21/2020 at 0700   • Alcohol Swabs (B-D SINGLE USE SWABS REGULAR) pads 1 each 3 (Three) Times a Day Before Meals. 200 each 11    • Blood Glucose Calibration (ACCU-CHEK LUIS ALBERTO) solution 1 each by In Vitro route 3 (Three) Times a Day Before Meals. 5 each 5    • Blood Glucose Monitoring Suppl (ACCU-CHEK LUIS ALBERTO PLUS) w/Device kit 1 each 3 (Three) Times a Day Before Meals. 1 kit 2    • folic acid (FOLVITE) 1 MG tablet Take 4 tablets by mouth Daily.   12/19/2020   • glucose blood (Accu-Chek Luis Alberto Plus) test strip 1 each by Other route 3 (Three) Times a Day Before Meals. Use as instructed 200 each 11    • insulin aspart (NovoLOG FlexPen) 100 UNIT/ML solution pen-injector sc pen Inject 5 Units under the skin into the appropriate area as directed 3 (Three) Times a Day With Meals. (Patient taking differently: Inject 5 Units under the skin into the appropriate area as directed 3 (Three) Times a Day With Meals. SSI-DEPENDS PM BLOOD GLUCOSE) 5 pen 3 12/17/2020   • Insulin Pen Needle (RELION PEN NEEDLE 31G/8MM) 31G X 8 MM misc 1 each 3 (Three) Times a Day. 90 each 11    • Lancets (ACCU-CHEK MULTICLIX) lancets 1 each by Other route 3 (Three) Times a Day Before Meals. Use as instructed 200 each 11    • Lancets Misc. (ACCU-CHEK MULTICLIX LANCET DEV) kit 1 each 3 (Three) Times a Day Before Meals. 200 each 11    • omeprazole (priLOSEC) 20 MG capsule Take 1 capsule by mouth Daily. 90 capsule 1 12/14/2020   • pregabalin (LYRICA) 150 MG capsule Take 150 mg by mouth 3 (Three) Times a Day.   12/18/2020   • pregabalin (LYRICA) 75 MG capsule Take 1 capsule by mouth Daily. (Patient taking differently: Take 150 mg by mouth Daily.) 14 capsule 0    • rOPINIRole (REQUIP) 1 MG tablet Take 3 mg by mouth Every  Night.   2020        Hospital medications:  ceFAZolin, 2 g, Intravenous, Once  sodium chloride, 3 mL, Intravenous, Q12H      lactated ringers, 9 mL/hr, Last Rate: 9 mL/hr (20 1251)      fentanyl  •  lidocaine PF 1%  •  midazolam  •  sodium chloride    Family history:  Family History   Problem Relation Age of Onset   • Heart disease Mother          at age 63   • Diabetes Mother    • Hypertension Mother    • Diabetes Father    • Deep vein thrombosis Father    • Depression Father    • Liver disease Father    • Lung cancer Father 58         at age 68   • Breast cancer Maternal Grandmother         ? age dx   • Dementia Maternal Grandmother    • Hypertension Maternal Grandmother    • Ovarian cancer Daughter 23   • Diabetes Daughter    • Depression Daughter    • Diabetes Sister    • Diabetes Brother    • Heart disease Brother    • Cancer Brother          at age 43   • Heart disease Maternal Uncle    • Cancer Paternal Uncle    • Clotting disorder Paternal Grandmother    • Colon cancer Neg Hx    • Colon polyps Neg Hx    • Malig Hyperthermia Neg Hx        Social history:  Social History     Tobacco Use   • Smoking status: Current Every Day Smoker     Packs/day: 0.25     Years: 45.00     Pack years: 11.25     Types: Cigarettes   • Smokeless tobacco: Never Used   • Tobacco comment: uses vape and smokes cigarettes   Substance Use Topics   • Alcohol use: No   • Drug use: No       REVIEW OF SYSTEMS:  Constitutional - Negative for fevers/chills  Eyes/Ears/Nose/Mouth/Throat - Negative for changes in vision  Cardiovascular - Negative for chest pain, dysrhythmia  Respiratory - Negative for dyspnea  Gastrointestinal - Negative for nausea or vomiting  Genitourinary - Negative for dysuria  Hematologic/Lymphatic - Negative for bruising  Skin - Negative for erythema  Endocrine - history of diabetes    Objective:  TMax 24 hours:   Temp (24hrs), Av.2 °F (36.8 °C), Min:98.2 °F (36.8 °C), Max:98.2 °F  (36.8 °C)      Vitals Ranges:   Temp:  [98.2 °F (36.8 °C)] 98.2 °F (36.8 °C)  Heart Rate:  [73] 73  Resp:  [20] 20  BP: (176)/(83) 176/83    Intake/Output Last 3 shifts:  No intake/output data recorded.     Physical Exam:    General Appearance:    Alert, cooperative, NAD   HEENT:    No trauma, pupils reactive, hearing intact   Back:     No CVA tenderness   Lungs:     Respirations unlabored, no wheezing    Heart:    RRR, intact peripheral pulses   Abdomen:     Soft, NDNT, no masses, no guarding   :    Not examined   Extremities:   No edema, no deformity   Lymphatic:   No neck or groin LAD   Skin:   No bleeding, bruising or rashes   Neuro/Psych:   Orientation intact, mood/affect pleasant, no focal findings       Results review:   I reviewed the patient's new clinical results.    Data review:  Lab Results (last 24 hours)     Procedure Component Value Units Date/Time    POC Glucose Once [[id number]]  (Abnormal) Collected: 12/21/20 1215    Specimen: Blood Updated: 12/21/20 1220     Glucose 145 mg/dL            Imaging:  Imaging Results (Last 24 Hours)     ** No results found for the last 24 hours. **             Assessment:       * No active hospital problems. *    Right urolithiasis  Right duplicated collecting system    We discussed the benefits/risks/expectations and the patient desires surgical intervention. Risks include bleeding, infection, urinary tract infection/sepsis, retained stone fragments, damage to adjacent tissues including the genitalia, chronic pain, numbness, incontinence, renal hematoma, stroke, heart attack, death, and need for additional procedures.    Plan:     Proceed with Cysto, Right ureteroscopy, Right retrograde pyelogram, Laser Lithotripsy, possible Basket Extraction, Right ureteral stent    Mikie Mejia MD  12/21/20  15:06 EST

## 2020-12-21 NOTE — ANESTHESIA PREPROCEDURE EVALUATION
Anesthesia Evaluation     Patient summary reviewed and Nursing notes reviewed                Airway   Mallampati: II  TM distance: >3 FB  Neck ROM: full  Dental    (+) upper dentures and edentulous    Pulmonary    (+) a smoker Current, COPD, shortness of breath,   Cardiovascular     ECG reviewed  Patient on routine beta blocker  Rate: normal    (+) hypertension,       Neuro/Psych  (+) TIA, syncope, numbness, psychiatric history Anxiety and Depression,     GI/Hepatic/Renal/Endo    (+)  GERD,  renal disease stones, diabetes mellitus type 2 using insulin,     Musculoskeletal     (+) back pain,   Abdominal    Substance History - negative use     OB/GYN negative ob/gyn ROS         Other      history of cancer                    Anesthesia Plan    ASA 3     general     intravenous induction     Anesthetic plan, all risks, benefits, and alternatives have been provided, discussed and informed consent has been obtained with: patient.

## 2020-12-21 NOTE — OP NOTE
Operative Note    12/21/2020    Jayne Beltran  65 y.o.  1955  female  [id number]    Surgeon(s) and Role:  Mikie Mejia MD - Primary     Pre-operative Diagnosis:   Right renal calculi  Right duplicated collecting system  Indwelling right ureteral stent    Post-operative Diagnosis: Same    Complications: None    Procedures:  1. Rigid Cystoscopy  2. Right Retrograde pyelogram  3. Right Ureteroscopy, flexible  4. Laser Lithotripsy  5. Right ureteral stent exchange  6. Fluoroscopy with Interpretation     Indications   Jayne Beltran is a pleasant, 65 year old woman with recent history of right renal calculi.  She has actuallyhad these stones for a long time, and they have grown over time.  She has been recommended therapy in the past, but has not pursued therapy until this year.  She returns for 2nd look ureteroscopy to make sure the right collecting system, is clear of stone, if the stones are parenchymal, or if the stones are, indeed, in the right lower moiety of the kidney. No changes since last seen in clinic    During the informed consent process, the procedure was discussed in detail including the risks of bleeding, infection, urethral stricture, ureteral injury, failure to access the stone, retained stone fragments, and stent migration.     Findings   - Orthotopic ureteral orifices  - Proximal bifurcation of the right ureter into upper and lower pole moieties  - Upper pole moiety clear of stone  - Lower pole moiety with multiple large collections of very dense stone - fragmented, but not entirely.  - new right ureteral stent x 2 (upper and lower pole moieties), [id number].    Description of procedure:  The patient was properly identified in the preoperative holding area and taken to the operating room were general anesthesia was induced. Sequential compression devices were placed on both lower extremities. The patient was placed in the cystolithotomy position and prepped and draped in a  sterile fashion with betadine. The patient was given antibiotics intravenously before the start of the surgery. After ensuring that all of the required equipment was ready and available a surgical pause was performed.     A 21 Sami rigid cystoscope was inserted into the bladder under direct visualization. The right ureteral stent was visualized. A Sensorwire was passed up the ureter under fluoroscopic guidance. The stent was then grasped and removed intact without resistance. The MR6 semirigid ureteroscope was passed into the distal right ureter. Proximal bifurcation of the right ureter into upper and lower pole moieties The semirigid scope was removed and the flexible ureteroscope was advanced into the right ureter upper pole moiety. This was surveyed and there were no stones in the upper pole. A right retrograde pyelogram was performed that the entire upper pole had been cleared.  Next, the same technique was used to access the lower pole moiety.  There were multiple large collections of very dense stone -     Laser lithotripsy was used to fragment the stones under direct visualization into dust and small fragments. A right retrograde pyelogram was performed again showing the lower pole moiety and the right collecting system was systematically surveyed for any remaining stone fragments - there were multiple large framents.  Howeve, visualization was poor. The ureteroscope was removed. The bladder was then emptied with the rigid cystoscope. The decision was made to leave two stents in place to facilitate drainage of both moieties and passage of the small stone fragments and dust that had been created. Over the safety wires, a [id number] ureteral stent was advanced with a good proximal coil on fluoroscopy and distal coil by direct visualization in both the upper and lower pole moieties. The safety wires were removed.     There were no apparent complications. The patient woke up in the operating room and was taken  to the recovery room in stable condition.     I was present and scrubbed for the entire procedure.     Specimens: None    Estimated Blood Loss:  None      Plan   - Transfer to PACU then home  - Antibiotics: Keflex for 3 days  - Return to clinic within 3 weeks with Dr. Mikie Mejia ([facility]) for repeat right ureteroscopy, laser lithotripsy - the Urology schedulers have been notified.  - Anticipate discharge when pain controlled       Mikie Mejia MD  [facility]  General & Reconstructive Urology  Office: [phone]  Fax: [phone]

## 2020-12-22 ENCOUNTER — APPOINTMENT (OUTPATIENT)
Dept: LAB | Facility: HOSPITAL | Age: 65
End: 2020-12-22

## 2020-12-26 ENCOUNTER — APPOINTMENT (OUTPATIENT)
Dept: LAB | Facility: HOSPITAL | Age: 65
End: 2020-12-26

## 2020-12-29 ENCOUNTER — OFFICE VISIT (OUTPATIENT)
Dept: ONCOLOGY | Facility: CLINIC | Age: 65
End: 2020-12-29

## 2020-12-29 ENCOUNTER — LAB (OUTPATIENT)
Dept: LAB | Facility: HOSPITAL | Age: 65
End: 2020-12-29

## 2020-12-29 VITALS
OXYGEN SATURATION: 93 % | BODY MASS INDEX: 26.27 KG/M2 | RESPIRATION RATE: 16 BRPM | TEMPERATURE: 98.2 F | DIASTOLIC BLOOD PRESSURE: 80 MMHG | HEIGHT: 65 IN | SYSTOLIC BLOOD PRESSURE: 164 MMHG | HEART RATE: 78 BPM | WEIGHT: 157.7 LBS

## 2020-12-29 DIAGNOSIS — D50.8 OTHER IRON DEFICIENCY ANEMIA: Primary | ICD-10-CM

## 2020-12-29 DIAGNOSIS — D72.820 LYMPHOCYTOSIS: ICD-10-CM

## 2020-12-29 DIAGNOSIS — D64.9 LOW HEMOGLOBIN: ICD-10-CM

## 2020-12-29 LAB
BASOPHILS # BLD AUTO: 0.08 10*3/MM3 (ref 0–0.2)
BASOPHILS NFR BLD AUTO: 0.8 % (ref 0–1.5)
DEPRECATED RDW RBC AUTO: 50.2 FL (ref 37–54)
EOSINOPHIL # BLD AUTO: 0.17 10*3/MM3 (ref 0–0.4)
EOSINOPHIL NFR BLD AUTO: 1.7 % (ref 0.3–6.2)
ERYTHROCYTE [DISTWIDTH] IN BLOOD BY AUTOMATED COUNT: 17.1 % (ref 12.3–15.4)
HCT VFR BLD AUTO: 39.8 % (ref 34–46.6)
HGB BLD-MCNC: 12.1 G/DL (ref 12–15.9)
IMM GRANULOCYTES # BLD AUTO: 0.04 10*3/MM3 (ref 0–0.05)
IMM GRANULOCYTES NFR BLD AUTO: 0.4 % (ref 0–0.5)
LYMPHOCYTES # BLD AUTO: 3.2 10*3/MM3 (ref 0.7–3.1)
LYMPHOCYTES NFR BLD AUTO: 31.5 % (ref 19.6–45.3)
MCH RBC QN AUTO: 24.8 PG (ref 26.6–33)
MCHC RBC AUTO-ENTMCNC: 30.4 G/DL (ref 31.5–35.7)
MCV RBC AUTO: 81.7 FL (ref 79–97)
MONOCYTES # BLD AUTO: 0.79 10*3/MM3 (ref 0.1–0.9)
MONOCYTES NFR BLD AUTO: 7.8 % (ref 5–12)
NEUTROPHILS NFR BLD AUTO: 5.89 10*3/MM3 (ref 1.7–7)
NEUTROPHILS NFR BLD AUTO: 57.8 % (ref 42.7–76)
NRBC BLD AUTO-RTO: 0 /100 WBC (ref 0–0.2)
PLATELET # BLD AUTO: 361 10*3/MM3 (ref 140–450)
PMV BLD AUTO: 12 FL (ref 6–12)
RBC # BLD AUTO: 4.87 10*6/MM3 (ref 3.77–5.28)
WBC # BLD AUTO: 10.17 10*3/MM3 (ref 3.4–10.8)

## 2020-12-29 PROCEDURE — 85025 COMPLETE CBC W/AUTO DIFF WBC: CPT

## 2020-12-29 PROCEDURE — 99213 OFFICE O/P EST LOW 20 MIN: CPT | Performed by: INTERNAL MEDICINE

## 2020-12-29 PROCEDURE — 36415 COLL VENOUS BLD VENIPUNCTURE: CPT

## 2020-12-29 NOTE — PROGRESS NOTES
Subjective     REASON FOR CONSULTATION: Leukocytosis, anemia  Provide an opinion on any further workup or treatment                             REQUESTING PHYSICIAN:  Dr. Arriaza    RECORDS OBTAINED:  Records of the patients history including those obtained from the referring provider were reviewed and summarized in detail.      History of Present Illness   This is a very pleasant 65-year-old woman who smokes 1/2 pack of tobacco a day, has diabetes and COPD.  Records also indicate prior history of TIA although she is unsure of the diagnosis.  The patient is seen for abnormal CBC at the request of her primary care provider.    Reviewing her records, the patient has had a chronic leukocytosis since 2015.  Her white blood cell count typically runs 11-13,000 at baseline.  Prior CBCs have shown lymphocytosis on the differential.  The patient was admitted to the ICU in March 2020 with hypovolemic shock and acute kidney injury after becoming volume depleted secondary to suspected viral gastroenteritis.  During her hospital stay her white blood cell count peaked at 24.5 on 3/2/2020 and then returned to baseline 11-12,000.  She developed anemia during the hospital stay hemoglobin tamika 8.8 but she did not require blood transfusion.  She had thrombocytosis on admission which normalized at the time of discharge.      The patient was admitted to [facility] September 2020 with metabolic acidosis, pneumonia and septic shock.  She had acute kidney injury and required initiation of hemodialysis.  Repeat SPEP and YVETTE on 8/31/2020 showed no monoclonality and a free light chain ratio was minimally elevated 2.08.  A 24-hour urine sample on 9/2/2020 showed no monoclonality.  CT abdomen/pelvis 8/25/2020 showed a hyperdense right renal lesion 2.3 cm followed by urology.    The patient follows up today for a CBC check.  She is doing well with no fever, chills, unusual weight loss, night sweats or lymphadenopathy.  She denies  worsening shortness of breath, lightheadedness, or dizziness.    Past Medical History:   Diagnosis Date   • COPD (chronic obstructive pulmonary disease) (CMS/HCC)    • Diabetes mellitus (CMS/HCC)     type 2   • Fibroid    • Hypertension    • Leukocytosis    • Neuromuscular disorder (CMS/HCC)    • Panic attack    • Skin cancer     nose   • TIA (transient ischemic attack)         Past Surgical History:   Procedure Laterality Date   • CHOLECYSTECTOMY     • COLONOSCOPY     • INSERTION HEMODIALYSIS CATHETER N/A 9/3/2020    Procedure: HEMODIALYSIS CATHETER INSERTION;  Surgeon: Sergio Durant MD;  Location: [facility];  Service: Vascular;  Laterality: N/A;   • KIDNEY STONE SURGERY     • URETEROSCOPY LASER LITHOTRIPSY WITH STENT INSERTION Right 12/21/2020    Procedure: CYSTOSCOPY, RIGHT URETEROSCOPY, RIGHT RETROGRADE PYELOGRAM, LASER LITHOTRIPSY WITH RIGHT URETERAL STENT EXCHANGE;  Surgeon: Mikie Mejia MD;  Location: [facility];  Service: Urology;  Laterality: Right;        Current Outpatient Medications on File Prior to Visit   Medication Sig Dispense Refill   • Alcohol Swabs (B-D SINGLE USE SWABS REGULAR) pads 1 each 3 (Three) Times a Day Before Meals. 200 each 11   • Blood Glucose Calibration (ACCU-CHEK LUIS ALBERTO) solution 1 each by In Vitro route 3 (Three) Times a Day Before Meals. 5 each 5   • Blood Glucose Monitoring Suppl (ACCU-CHEK LUIS ALBERTO PLUS) w/Device kit 1 each 3 (Three) Times a Day Before Meals. 1 kit 2   • citalopram (CeleXA) 10 MG tablet Take 1 tablet by mouth Daily. 90 tablet 1   • colestipol (COLESTID) 1 g tablet Take 1 tablet by mouth 2 (Two) Times a Day. 60 tablet 5   • Cranberry-Vit C-Lactobacillus (CRANBERRY/PROBIOTIC/VIT C PO) Take 1 tablet by mouth Daily.     • Cyanocobalamin (VITAMIN B 12) 500 MCG tablet Take 500 mcg by mouth Daily.     • docusate sodium (Colace) 100 MG capsule Take 1 capsule by mouth Daily As Needed for Constipation. 30 capsule 1   • Empagliflozin (Jardiance) 10 MG  tablet Take 10 mg by mouth Daily. 30 tablet 5   • folic acid (FOLVITE) 1 MG tablet Take 4 tablets by mouth Daily.     • furosemide (LASIX) 80 MG tablet Take 80 mg by mouth 2 (Two) Times a Day.     • glucose blood (Accu-Chek Ilana Plus) test strip 1 each by Other route 3 (Three) Times a Day Before Meals. Use as instructed 200 each 11   • HYDROcodone-acetaminophen (NORCO) 5-325 MG per tablet Take 1 tablet by mouth Every 6 (Six) Hours As Needed for Moderate Pain . 30 tablet 0   • insulin aspart (NovoLOG FlexPen) 100 UNIT/ML solution pen-injector sc pen Inject 5 Units under the skin into the appropriate area as directed 3 (Three) Times a Day With Meals. (Patient taking differently: Inject 5 Units under the skin into the appropriate area as directed 3 (Three) Times a Day With Meals. SSI-DEPENDS PM BLOOD GLUCOSE) 5 pen 3   • Insulin Pen Needle (RELION PEN NEEDLE 31G/8MM) 31G X 8 MM misc 1 each 3 (Three) Times a Day. 90 each 11   • Lancets (ACCU-CHEK MULTICLIX) lancets 1 each by Other route 3 (Three) Times a Day Before Meals. Use as instructed 200 each 11   • Lancets Misc. (ACCU-CHEK MULTICLIX LANCET DEV) kit 1 each 3 (Three) Times a Day Before Meals. 200 each 11   • lisinopril (PRINIVIL,ZESTRIL) 10 MG tablet Take 1 tablet by mouth Daily. 30 tablet 3   • metoprolol succinate XL (TOPROL-XL) 200 MG 24 hr tablet Take 1 tablet by mouth Daily. 90 tablet 1   • omeprazole (priLOSEC) 20 MG capsule Take 1 capsule by mouth Daily. 90 capsule 1   • ondansetron (Zofran) 4 MG tablet Take 1 tablet by mouth Daily As Needed for Nausea or Vomiting. 30 tablet 1   • phenazopyridine (Pyridium) 100 MG tablet Take 1 tablet by mouth 3 (Three) Times a Day As Needed for Bladder Spasms. 9 tablet 0   • pregabalin (LYRICA) 150 MG capsule Take 150 mg by mouth 3 (Three) Times a Day.     • pregabalin (LYRICA) 75 MG capsule Take 1 capsule by mouth Daily. (Patient taking differently: Take 150 mg by mouth Daily.) 14 capsule 0   • rOPINIRole (REQUIP) 1 MG  tablet Take 3 mg by mouth Every Night.     • senna (senna) 8.6 MG tablet Take 8.6 mg by mouth Daily.       No current facility-administered medications on file prior to visit.         ALLERGIES:    Allergies   Allergen Reactions   • Metformin Other (See Comments)     Kidney failure        Social History     Socioeconomic History   • Marital status:      Spouse name: Golden   • Number of children: 2   • Years of education: 12   • Highest education level: High school graduate   Occupational History     Employer: RETIRED   Tobacco Use   • Smoking status: Current Every Day Smoker     Packs/day: 0.25     Years: 45.00     Pack years: 11.25     Types: Cigarettes   • Smokeless tobacco: Never Used   • Tobacco comment: uses vape and smokes cigarettes   Substance and Sexual Activity   • Alcohol use: No   • Drug use: No   • Sexual activity: Yes     Partners: Male     Birth control/protection: Post-menopausal        Family History   Problem Relation Age of Onset   • Heart disease Mother          at age 63   • Diabetes Mother    • Hypertension Mother    • Diabetes Father    • Deep vein thrombosis Father    • Depression Father    • Liver disease Father    • Lung cancer Father 58         at age 68   • Breast cancer Maternal Grandmother         ? age dx   • Dementia Maternal Grandmother    • Hypertension Maternal Grandmother    • Ovarian cancer Daughter 23   • Diabetes Daughter    • Depression Daughter    • Diabetes Sister    • Diabetes Brother    • Heart disease Brother    • Cancer Brother          at age 43   • Heart disease Maternal Uncle    • Cancer Paternal Uncle    • Clotting disorder Paternal Grandmother    • Colon cancer Neg Hx    • Colon polyps Neg Hx    • Malig Hyperthermia Neg Hx         Review of Systems   Constitutional: Positive for fatigue.   HENT: Negative.    Respiratory: Positive for shortness of breath (Chronic, stable). Negative for cough and choking.    Cardiovascular: Negative.   "  Gastrointestinal: Negative.    Endocrine: Negative.    Genitourinary: Negative for difficulty urinating.   Musculoskeletal: Negative.    Allergic/Immunologic: Negative.    Neurological: Negative.    Hematological: Negative.    Psychiatric/Behavioral: Negative.           Objective     Vitals:    12/29/20 1507   BP: 164/80   Pulse: 78   Resp: 16   Temp: 98.2 °F (36.8 °C)   TempSrc: Infrared   SpO2: 93%   Weight: 71.5 kg (157 lb 11.2 oz)   Height: 165.1 cm (65\")   PainSc: 0-No pain     Current Status 12/29/2020   ECOG score 0       Physical Exam    CON: pleasant well-developed adult woman  HEENT: no icterus, no thrush, moist membranes  NECK: no jvd  LYMPH: no cervical or supraclavicular lad  CV: RRR, S1S2, no murmur  RESP: cta bilat, no wheezing, no rales  Chest: Dialysis catheter in the right chest wall  GI: soft, non-tender, no splenomegaly, +bs  MUSC: no edema, normal gait  NEURO: alert and oriented x3, normal strength  PSYCH: normal mood and affect  SKIN: no bruising or induration  Exam is unchanged-12/29/2020  RECENT LABS:  Hematology WBC   Date Value Ref Range Status   12/29/2020 10.17 3.40 - 10.80 10*3/mm3 Final   10/14/2020 10.0 3.4 - 10.8 x10E3/uL Final     RBC   Date Value Ref Range Status   12/29/2020 4.87 3.77 - 5.28 10*6/mm3 Final   10/14/2020 3.34 (L) 3.77 - 5.28 x10E6/uL Final     Hemoglobin   Date Value Ref Range Status   12/29/2020 12.1 12.0 - 15.9 g/dL Final     Hematocrit   Date Value Ref Range Status   12/29/2020 39.8 34.0 - 46.6 % Final     Platelets   Date Value Ref Range Status   12/29/2020 361 140 - 450 10*3/mm3 Final      CT chest without contrast 3/2/2020: 7 mm noncalcified nodule in the lateral right lower lobe, 3 mm nodule right upper lobe, 3 mm nodule left upper lobe, 3 mm nodule right middle lobe, diffuse emphysema, thickening of the distal esophagus    CT abdomen/pelvis 3/2/2020: Duplicated collecting system on the right, nonobstructing renal calculi too numerous to count, prominent " loops of small bowel no transition point, no significant lymphadenopathy,    Assessment/Plan     1.  Chronic leukocytosis: The patient has had an intermittent elevated white blood cell count at least for 5 years, differential showing somewhat chronic mild lymphocytosis.  Peripheral blood flow cytometry April 2020 was negative for monoclonal populations.  Leukocytosis felt to be reactive.  CBC today shows a normal white blood cell count 10.1 and minimal lymphocytosis ALC 3.2.    2.  Anemia: Patient appears to have anemia of chronic kidney disease.  Evaluation for paraproteinemia in the setting of her kidney disease has been negative.  Hemoglobin is normal today at 12.1.    3.  Hyperdense right renal lesion 2.3 cm followed by urology    I will defer the patient's care back to her primary care provider.  I would recommend she have a CBC 2-3 times a year.  I would be happy to see her back if there is significant changes to her CBC/differential.

## 2021-01-14 DIAGNOSIS — I10 ESSENTIAL HYPERTENSION: ICD-10-CM

## 2021-01-14 RX ORDER — LISINOPRIL 10 MG/1
10 TABLET ORAL DAILY
Qty: 30 TABLET | Refills: 3 | Status: SHIPPED | OUTPATIENT
Start: 2021-01-14 | End: 2021-06-22 | Stop reason: SDUPTHER

## 2021-01-14 NOTE — TELEPHONE ENCOUNTER
Caller: Golden Beltran    Relationship: Emergency Contact    Best call back number: [phone]    Medication needed:   lisinopril (PRINIVIL,ZESTRIL) 10 MG tablet    When do you need the refill by: ASAP    What details did the patient provide when requesting the medication: PATIENT IS OUT OF THIS MEDICATION    Does the patient have less than a 3 day supply:  [x] Yes  [] No    What is the patient's preferred pharmacy:    [facility] - [facility] [address] [facility] - [phone] [facility] [phone]

## 2021-01-15 DIAGNOSIS — I10 ESSENTIAL HYPERTENSION: ICD-10-CM

## 2021-01-15 RX ORDER — METOPROLOL SUCCINATE 200 MG/1
200 TABLET, EXTENDED RELEASE ORAL DAILY
Qty: 90 TABLET | Refills: 1 | Status: SHIPPED | OUTPATIENT
Start: 2021-01-15

## 2021-01-15 NOTE — TELEPHONE ENCOUNTER
The patients spouse called in requesting the status of the re-fill for     metoprolol succinate XL (TOPROL-XL) 200 MG 24 hr tablet    [facility]    Best call back # [phone]

## 2021-01-20 ENCOUNTER — LAB (OUTPATIENT)
Dept: FAMILY MEDICINE CLINIC | Facility: CLINIC | Age: 66
End: 2021-01-20

## 2021-01-20 DIAGNOSIS — E11.9 TYPE 2 DIABETES MELLITUS WITHOUT COMPLICATION, WITHOUT LONG-TERM CURRENT USE OF INSULIN (HCC): ICD-10-CM

## 2021-01-20 DIAGNOSIS — Z79.899 HIGH RISK MEDICATION USE: ICD-10-CM

## 2021-01-21 LAB
ALBUMIN SERPL-MCNC: 4 G/DL (ref 3.5–5.2)
ALBUMIN/GLOB SERPL: 1.5 G/DL
ALP SERPL-CCNC: 128 U/L (ref 39–117)
ALT SERPL-CCNC: 11 U/L (ref 1–33)
APPEARANCE UR: ABNORMAL
AST SERPL-CCNC: 13 U/L (ref 1–32)
BACTERIA #/AREA URNS HPF: ABNORMAL /HPF
BASOPHILS # BLD AUTO: 0.07 10*3/MM3 (ref 0–0.2)
BASOPHILS NFR BLD AUTO: 0.7 % (ref 0–1.5)
BILIRUB SERPL-MCNC: 0.2 MG/DL (ref 0–1.2)
BILIRUB UR QL STRIP: NEGATIVE
BUN SERPL-MCNC: 25 MG/DL (ref 8–23)
BUN/CREAT SERPL: 20.5 (ref 7–25)
CALCIUM SERPL-MCNC: 9 MG/DL (ref 8.6–10.5)
CHLORIDE SERPL-SCNC: 104 MMOL/L (ref 98–107)
CHOLEST SERPL-MCNC: 176 MG/DL (ref 0–200)
CHOLEST/HDLC SERPL: 4.76 {RATIO}
CO2 SERPL-SCNC: 24.8 MMOL/L (ref 22–29)
COLOR UR: YELLOW
CREAT SERPL-MCNC: 1.22 MG/DL (ref 0.57–1)
EOSINOPHIL # BLD AUTO: 0.05 10*3/MM3 (ref 0–0.4)
EOSINOPHIL NFR BLD AUTO: 0.5 % (ref 0.3–6.2)
EPI CELLS #/AREA URNS HPF: ABNORMAL /HPF (ref 0–10)
ERYTHROCYTE [DISTWIDTH] IN BLOOD BY AUTOMATED COUNT: 16.4 % (ref 12.3–15.4)
GLOBULIN SER CALC-MCNC: 2.6 GM/DL
GLUCOSE SERPL-MCNC: 220 MG/DL (ref 65–99)
GLUCOSE UR QL: ABNORMAL
HBA1C MFR BLD: 8.6 % (ref 4.8–5.6)
HCT VFR BLD AUTO: 35.6 % (ref 34–46.6)
HDLC SERPL-MCNC: 37 MG/DL (ref 40–60)
HGB BLD-MCNC: 11.3 G/DL (ref 12–15.9)
HGB UR QL STRIP: ABNORMAL
IMM GRANULOCYTES # BLD AUTO: 0.11 10*3/MM3 (ref 0–0.05)
IMM GRANULOCYTES NFR BLD AUTO: 1 % (ref 0–0.5)
KETONES UR QL STRIP: NEGATIVE
LDLC SERPL CALC-MCNC: 81 MG/DL (ref 0–100)
LEUKOCYTE ESTERASE UR QL STRIP: NEGATIVE
LYMPHOCYTES # BLD AUTO: 2.63 10*3/MM3 (ref 0.7–3.1)
LYMPHOCYTES NFR BLD AUTO: 24.7 % (ref 19.6–45.3)
MCH RBC QN AUTO: 25.2 PG (ref 26.6–33)
MCHC RBC AUTO-ENTMCNC: 31.7 G/DL (ref 31.5–35.7)
MCV RBC AUTO: 79.3 FL (ref 79–97)
MICRO URNS: ABNORMAL
MONOCYTES # BLD AUTO: 0.79 10*3/MM3 (ref 0.1–0.9)
MONOCYTES NFR BLD AUTO: 7.4 % (ref 5–12)
MUCOUS THREADS URNS QL MICRO: PRESENT /HPF
NEUTROPHILS # BLD AUTO: 7 10*3/MM3 (ref 1.7–7)
NEUTROPHILS NFR BLD AUTO: 65.7 % (ref 42.7–76)
NITRITE UR QL STRIP: NEGATIVE
NRBC BLD AUTO-RTO: 0 /100 WBC (ref 0–0.2)
PH UR STRIP: 5.5 [PH] (ref 5–7.5)
PLATELET # BLD AUTO: 341 10*3/MM3 (ref 140–450)
POTASSIUM SERPL-SCNC: 3.8 MMOL/L (ref 3.5–5.2)
PROT SERPL-MCNC: 6.6 G/DL (ref 6–8.5)
PROT UR QL STRIP: ABNORMAL
RBC # BLD AUTO: 4.49 10*6/MM3 (ref 3.77–5.28)
RBC #/AREA URNS HPF: >30 /HPF (ref 0–2)
SODIUM SERPL-SCNC: 140 MMOL/L (ref 136–145)
SP GR UR: 1.03 (ref 1–1.03)
TRIGL SERPL-MCNC: 361 MG/DL (ref 0–150)
URINALYSIS REFLEX: ABNORMAL
UROBILINOGEN UR STRIP-MCNC: 0.2 MG/DL (ref 0.2–1)
VLDLC SERPL CALC-MCNC: 58 MG/DL (ref 5–40)
WBC # BLD AUTO: 10.65 10*3/MM3 (ref 3.4–10.8)
WBC #/AREA URNS HPF: ABNORMAL /HPF (ref 0–5)

## 2021-01-21 NOTE — PROGRESS NOTES
Please call the patient regarding her result(s).  Please let her know that her hemoglobin A1c increased to 8.60.  Her triglycerides have improved significantly, and decreased from 513-361.  I recommend she continue to make significant improvements to her diet by eliminating excessive sugars and carbohydrates.  Please add uncontrolled diabetes to the reason for visit for her upcoming January 27, 2021 appointment.

## 2021-01-27 ENCOUNTER — OFFICE VISIT (OUTPATIENT)
Dept: FAMILY MEDICINE CLINIC | Facility: CLINIC | Age: 66
End: 2021-01-27

## 2021-01-27 VITALS
SYSTOLIC BLOOD PRESSURE: 176 MMHG | TEMPERATURE: 97.3 F | OXYGEN SATURATION: 95 % | BODY MASS INDEX: 26.82 KG/M2 | DIASTOLIC BLOOD PRESSURE: 84 MMHG | HEIGHT: 65 IN | HEART RATE: 79 BPM | WEIGHT: 161 LBS

## 2021-01-27 DIAGNOSIS — F41.9 ANXIETY: ICD-10-CM

## 2021-01-27 DIAGNOSIS — I10 ESSENTIAL HYPERTENSION: Primary | ICD-10-CM

## 2021-01-27 DIAGNOSIS — D72.820 LYMPHOCYTOSIS: ICD-10-CM

## 2021-01-27 PROCEDURE — 99214 OFFICE O/P EST MOD 30 MIN: CPT | Performed by: FAMILY MEDICINE

## 2021-01-27 RX ORDER — CITALOPRAM 10 MG/1
10 TABLET ORAL DAILY
Qty: 90 TABLET | Refills: 1 | Status: SHIPPED | OUTPATIENT
Start: 2021-01-27 | End: 2021-04-02 | Stop reason: SDUPTHER

## 2021-01-27 NOTE — PROGRESS NOTES
Chief Complaint  Diabetes and Hypertension    Subjective          Jayne Beltran presents to [facility] for     History of Present Illness    Pt has controlled anxiety.    She had leukocytosis that has resolved.    Pt has controlled HTN.    She has uncontrolled DM.    Health Maintenance Due   Topic Date Due   • DIABETIC EYE EXAM  02/22/2016   • URINE MICROALBUMIN  06/18/2020   • DIABETIC FOOT EXAM  09/04/2020   • ANNUAL WELLNESS VISIT  12/30/2020        reports that she has been smoking cigarettes. She has a 11.25 pack-year smoking history. She has never used smokeless tobacco. She reports that she does not drink alcohol or use drugs.    PHQ-9 Depression Screening  Little interest or pleasure in doing things?     Feeling down, depressed, or hopeless?     Trouble falling or staying asleep, or sleeping too much?     Feeling tired or having little energy?     Poor appetite or overeating?     Feeling bad about yourself - or that you are a failure or have let yourself or your family down?     Trouble concentrating on things, such as reading the newspaper or watching television?     Moving or speaking so slowly that other people could have noticed? Or the opposite - being so fidgety or restless that you have been moving around a lot more than usual?     Thoughts that you would be better off dead, or of hurting yourself in some way?     PHQ-9 Total Score     If you checked off any problems, how difficult have these problems made it for you to do your work, take care of things at home, or get along with other people?       Lab Results   Component Value Date    WBC 10.65 01/20/2021    HGB 11.3 (L) 01/20/2021    HCT 35.6 01/20/2021    MCV 79.3 01/20/2021     01/20/2021     Lab Results   Component Value Date    GLUCOSE 127 (H) 12/04/2020    BUN 25 (H) 01/20/2021    CREATININE 1.22 (H) 01/20/2021    EGFRIFNONA 44 (L) 01/20/2021    EGFRIFAFRI 53 (L) 01/20/2021    BCR 20.5 01/20/2021  "   K 3.8 01/20/2021    CO2 24.8 01/20/2021    CALCIUM 9.0 01/20/2021    PROTENTOTREF 6.6 01/20/2021    ALBUMIN 4.00 01/20/2021    LABIL2 1.5 01/20/2021    AST 13 01/20/2021    ALT 11 01/20/2021     Lab Results   Component Value Date    TSH 2.020 06/18/2019     Lab Results   Component Value Date    HGBA1C 8.60 (H) 01/20/2021     Brief Urine Lab Results  (Last result in the past 365 days)      Color   Clarity   Blood   Leuk Est   Nitrite   Protein   CREAT   Urine HCG        01/20/21 1039 Yellow Cloudy 3+ Negative Negative 1+               BP Readings from Last 12 Encounters:   01/27/21 176/84   12/29/20 164/80   12/21/20 156/97   12/18/20 179/96   11/25/20 130/72   11/11/20 160/82   10/29/20 122/72   10/06/20 124/76   09/29/20 138/82   09/17/20 120/80   09/08/20 156/88   09/04/20 115/65       Wt Readings from Last 12 Encounters:   01/27/21 73 kg (161 lb)   12/29/20 71.5 kg (157 lb 11.2 oz)   12/21/20 72.1 kg (159 lb)   12/18/20 72.8 kg (160 lb 8 oz)   11/25/20 71.7 kg (158 lb)   11/11/20 68.9 kg (152 lb)   10/29/20 73 kg (161 lb)   10/06/20 70.4 kg (155 lb 3.2 oz)   09/29/20 70.6 kg (155 lb 11.2 oz)   09/17/20 67.1 kg (148 lb)   09/08/20 68.5 kg (151 lb)   09/04/20 70 kg (154 lb 5.2 oz)       Objective   Vital Signs:   /84   Pulse 79   Temp 97.3 °F (36.3 °C)   Ht 165.1 cm (65\")   Wt 73 kg (161 lb)   SpO2 95%   BMI 26.79 kg/m²     Physical Exam  Vitals signs and nursing note reviewed.   Constitutional:       General: She is not in acute distress.     Appearance: Normal appearance. She is well-developed. She is not diaphoretic.   HENT:      Head: Normocephalic and atraumatic.      Right Ear: External ear normal.      Left Ear: External ear normal.      Nose: Nose normal.      Mouth/Throat:      Pharynx: No oropharyngeal exudate.   Eyes:      General: Lids are normal. No scleral icterus.        Right eye: No discharge.         Left eye: No discharge.   Neck:      Musculoskeletal: Full passive range of motion " without pain, normal range of motion and neck supple. No edema.      Thyroid: No thyromegaly.      Trachea: Trachea normal. No tracheal deviation.   Cardiovascular:      Rate and Rhythm: Normal rate and regular rhythm.      Heart sounds: Normal heart sounds. No murmur. No friction rub. No gallop.    Pulmonary:      Effort: Pulmonary effort is normal. No tachypnea, bradypnea or respiratory distress.      Breath sounds: Normal breath sounds. No stridor. No decreased breath sounds, wheezing or rales.   Chest:      Chest wall: No tenderness.   Lymphadenopathy:      Head:      Right side of head: No submental, submandibular, tonsillar, preauricular, posterior auricular or occipital adenopathy.      Left side of head: No submental, submandibular, tonsillar, preauricular, posterior auricular or occipital adenopathy.      Cervical: No cervical adenopathy.      Right cervical: No superficial, deep or posterior cervical adenopathy.     Left cervical: No superficial, deep or posterior cervical adenopathy.   Skin:     General: Skin is warm and dry.      Capillary Refill: Capillary refill takes less than 2 seconds.      Coloration: Skin is not pale.      Findings: No erythema or rash.      Nails: There is no clubbing.     Neurological:      Mental Status: She is alert and oriented to person, place, and time. She is not disoriented.      Deep Tendon Reflexes: Reflexes are normal and symmetric.   Psychiatric:         Behavior: Behavior normal.        Result Review :                 Assessment and Plan    Problem List Items Addressed This Visit     Essential hypertension - Primary    Overview     August 29, 2019: She states that her blood pressure is usually high.  Today her blood pressure is 90/60 when taken by the MA.  I repeated the blood pressure measurement manually and her blood pressure was 96/68.  She is going to keep a blood pressure journal at home, checking it 3 times a day and follow-up here next week for us to review  the journal and see if we need to make any changes to her medications.    September 5, 2019: We stopped clonidine at her last visit since her blood pressure was down to 90/60.  Her blood pressure today is 100/66.  Today we are stopping the amlodipine 5 mg daily.  She had also been having some feet swelling and working to see if this was a contributor to her edema in her lower extremities.  We will recheck her blood pressure in 2 weeks and if her blood pressure is 120 or less we are going to consider cutting back on her metoprolol from 200 to 100 mg daily.    September 19, 2019: her blood pressures been running between 110-120/60-70 at home.  She believes since she is gotten away from stress at work that has helped her stress level and blood pressure a lot.  Currently controlled on metoprolol 200 mg daily.  No medication side effects.  Continue current treatment.    1/10/20: Controlled with metoprolol  mg daily.  No medication side effects.  Continue current treatment.    3/10/2020: Blood pressure today is 110/60.  She was recently hospitalized with severe metabolic acidosis and acute renal failure.  Lisinopril was discontinued along with meloxicam.  Her blood pressures currently stable.  She is taking amlodipine 5 mg daily and metoprolol  mg daily.  Continue current treatment.  No medication side effects.    4/29/2020:  148/72 today.  Discontinue amlodipine as a possible contributor or cause of her lower extremity edema as well as the swelling she is experiencing in her hands and the upper inner eyelid edema.  Start lisinopril 10 mg daily.  We are also referring to nephrology for her chronic kidney disease.  She had severe kidney failure during an episode of hypovolemic shock due to severe dehydration from a viral gastroenteritis in March 2020.  She has a home electronic blood pressure monitoring device and is going to check and record her blood pressure at home, which we will review by video visit in 1  week.    5/6/2020: Pt states her feet, legs, and face are still swollen.  She stopped taking Amlodipine and it did not seem to make any difference with her swelling.  She has not started on the furosemide yet.  Uncontrolled. Increase Lisinopril from 10 to 20 mg daily and follow-up in 1 week.    10/29/2020: Controlled with metoprolol succinate  mg daily.  No medication side effects.  Continue current treatment.    1/27/2021:  Pt's BP is high today. She believes her BP is high from running around doing things in town before her office visit. She is going to keep a home BP journal and follow-up for review in 2 weeks.         Current Assessment & Plan     She is going to keep a home BP journal and follow-up for review in 2 weeks.           Leukocytosis    Overview     2/12/2020:  WBC - 14.56;    11/16/2018 - 13.48.      1/27/2021:  Resolved.         Current Assessment & Plan     Resolved               Follow Up   Return in about 2 weeks (around 2/10/2021) for HTN-U.  Patient was given instructions and counseling regarding her condition or for health maintenance advice. Please see specific information pulled into the AVS if appropriate.

## 2021-02-09 ENCOUNTER — TELEPHONE (OUTPATIENT)
Dept: FAMILY MEDICINE CLINIC | Facility: CLINIC | Age: 66
End: 2021-02-09

## 2021-02-09 DIAGNOSIS — E11.9 TYPE 2 DIABETES MELLITUS WITHOUT COMPLICATION, WITHOUT LONG-TERM CURRENT USE OF INSULIN (HCC): ICD-10-CM

## 2021-02-09 RX ORDER — ONDANSETRON 4 MG/1
4 TABLET, FILM COATED ORAL DAILY PRN
Qty: 30 TABLET | Refills: 1 | Status: CANCELLED | OUTPATIENT
Start: 2021-02-09 | End: 2022-02-09

## 2021-02-09 RX ORDER — INSULIN ASPART 100 [IU]/ML
5 INJECTION, SOLUTION INTRAVENOUS; SUBCUTANEOUS
Qty: 5 PEN | Refills: 5 | Status: SHIPPED | OUTPATIENT
Start: 2021-02-09 | End: 2021-02-15 | Stop reason: SDUPTHER

## 2021-02-09 NOTE — TELEPHONE ENCOUNTER
SHAWNEE FROM [facility] IS FAXING OVER A FORM FOR JARDIANCE AND LYRICA SO THE PATIENT CAN GET A DISCOUNT. SHE JUST WANTED YOU TO BE ON THE LOOKOUT FOR IT.

## 2021-02-15 DIAGNOSIS — E11.9 TYPE 2 DIABETES MELLITUS WITHOUT COMPLICATION, WITHOUT LONG-TERM CURRENT USE OF INSULIN (HCC): ICD-10-CM

## 2021-02-15 NOTE — TELEPHONE ENCOUNTER
PATIENT'S  CALLING BACK TO LET US KNOW THAT PATIENT NEEDS THIS SENT AS SOON AS POSSIBLE.    ROUTED WITH HIGH PRIORITY.

## 2021-02-15 NOTE — TELEPHONE ENCOUNTER
Caller: Golden Beltran    Relationship: Emergency Contact    Best call back number: [phone]    Medication needed:   Requested Prescriptions     Pending Prescriptions Disp Refills   • insulin aspart (NovoLOG FlexPen) 100 UNIT/ML solution pen-injector sc pen 5 pen 5     Sig: Inject 5 Units under the skin into the appropriate area as directed 3 (Three) Times a Day With Meals. SSI-DEPENDS PM BLOOD GLUCOSE       When do you need the refill by: 02/19/2021    What details did the patient provide when requesting the medication: PATIENT'S  STATES ARIELLA HAS TRIED HAVING PRESCRIPTION REFILLED 3 TIMES AND HAVE BEEN UNSUCCESSFUL.     Does the patient have less than a 3 day supply:  [x] Yes  [] No    What is the patient's preferred pharmacy: [facility] - [facility] [address] [facility] - [phone]  - [phone] FX

## 2021-02-17 RX ORDER — INSULIN ASPART 100 [IU]/ML
5 INJECTION, SOLUTION INTRAVENOUS; SUBCUTANEOUS
Qty: 5 PEN | Refills: 5 | Status: SHIPPED | OUTPATIENT
Start: 2021-02-17

## 2021-03-22 DIAGNOSIS — I10 ESSENTIAL HYPERTENSION: ICD-10-CM

## 2021-03-22 RX ORDER — LISINOPRIL 10 MG/1
10 TABLET ORAL DAILY
Qty: 90 TABLET | Refills: 1 | Status: CANCELLED | OUTPATIENT
Start: 2021-03-22

## 2021-04-02 DIAGNOSIS — F41.9 ANXIETY: ICD-10-CM

## 2021-04-02 NOTE — TELEPHONE ENCOUNTER
PATIENT IS NEEDING A 10 DAY SUPPLY OF HER citalopram (CeleXA) 10 MG tablet AS SHE IS TOTALLY OUT AND WILL TAKE TIME FOR MAILORDER TO GET TO HER.    [facility] [address] [place] [address] - [address] [facility] [address] - [phone]  - [phone]

## 2021-04-02 NOTE — TELEPHONE ENCOUNTER
Caller: Golden Beltran    Relationship: Emergency Contact    Best call back number: [phone]    Medication needed:   Requested Prescriptions     Pending Prescriptions Disp Refills   • citalopram (CeleXA) 10 MG tablet 90 tablet 1     Sig: Take 1 tablet by mouth Daily.       When do you need the refill by: 4/3/2021    What additional details did the patient provide when requesting the medication: PATIENT IS OUT OF MEDICATION. PATIENT'S  INDICATED THEIR NORMAL PHARMACY [facility] IS OUT OF THE TYPE OF MEDICATION. THEY HAVE ENOUGH FOR A 10 DAY SUPPLY BUT IT WILL NOT REACH THE PATIENT FOR ANOTHER 10 DAYS. PLEASE REFILL. THIS IS THE SECOND CALL TODAY.     Does the patient have less than a 3 day supply:  [x] Yes  [] No    What is the patient's preferred pharmacy: [facility] [address] [facility] [address] - [phone]  - [phone]

## 2021-04-04 DIAGNOSIS — F41.9 ANXIETY: ICD-10-CM

## 2021-04-04 RX ORDER — CITALOPRAM 10 MG/1
10 TABLET ORAL DAILY
Qty: 90 TABLET | Refills: 1 | Status: SHIPPED | OUTPATIENT
Start: 2021-04-04 | End: 2021-04-04 | Stop reason: SDUPTHER

## 2021-04-04 RX ORDER — CITALOPRAM 10 MG/1
10 TABLET ORAL DAILY
Qty: 90 TABLET | Refills: 1 | Status: SHIPPED | OUTPATIENT
Start: 2021-04-04 | End: 2021-10-25

## 2021-06-11 ENCOUNTER — ANESTHESIA EVENT (OUTPATIENT)
Dept: GASTROENTEROLOGY | Facility: HOSPITAL | Age: 66
End: 2021-06-11

## 2021-06-11 ENCOUNTER — HOSPITAL ENCOUNTER (INPATIENT)
Facility: HOSPITAL | Age: 66
LOS: 7 days | Discharge: HOME | End: 2021-06-18
Attending: EMERGENCY MEDICINE | Admitting: INTERNAL MEDICINE

## 2021-06-11 ENCOUNTER — APPOINTMENT (OUTPATIENT)
Dept: GENERAL RADIOLOGY | Facility: HOSPITAL | Age: 66
End: 2021-06-11

## 2021-06-11 ENCOUNTER — APPOINTMENT (OUTPATIENT)
Dept: CT IMAGING | Facility: HOSPITAL | Age: 66
End: 2021-06-11

## 2021-06-11 ENCOUNTER — ANESTHESIA (OUTPATIENT)
Dept: GASTROENTEROLOGY | Facility: HOSPITAL | Age: 66
End: 2021-06-11

## 2021-06-11 ENCOUNTER — TELEPHONE (OUTPATIENT)
Dept: GASTROENTEROLOGY | Facility: CLINIC | Age: 66
End: 2021-06-11

## 2021-06-11 ENCOUNTER — TELEPHONE (OUTPATIENT)
Dept: FAMILY MEDICINE CLINIC | Facility: CLINIC | Age: 66
End: 2021-06-11

## 2021-06-11 DIAGNOSIS — D50.8 OTHER IRON DEFICIENCY ANEMIA: ICD-10-CM

## 2021-06-11 DIAGNOSIS — I73.9 INTERMITTENT CLAUDICATION (HCC): ICD-10-CM

## 2021-06-11 DIAGNOSIS — C43.9 MALIGNANT MELANOMA, UNSPECIFIED SITE (HCC): ICD-10-CM

## 2021-06-11 DIAGNOSIS — J96.91 RESPIRATORY FAILURE WITH HYPOXIA, UNSPECIFIED CHRONICITY (HCC): ICD-10-CM

## 2021-06-11 DIAGNOSIS — K92.1 MELENA: ICD-10-CM

## 2021-06-11 DIAGNOSIS — K92.2 GASTROINTESTINAL HEMORRHAGE, UNSPECIFIED GASTROINTESTINAL HEMORRHAGE TYPE: ICD-10-CM

## 2021-06-11 DIAGNOSIS — Z99.2 DEPENDENCE ON RENAL DIALYSIS (HCC): ICD-10-CM

## 2021-06-11 DIAGNOSIS — E87.0 HYPEROSMOLALITY: Primary | ICD-10-CM

## 2021-06-11 DIAGNOSIS — D68.8 OTHER SPECIFIED COAGULATION DEFECTS (HCC): ICD-10-CM

## 2021-06-11 DIAGNOSIS — G93.41 METABOLIC ENCEPHALOPATHY: ICD-10-CM

## 2021-06-11 DIAGNOSIS — E11.49 TYPE 2 DIABETES MELLITUS WITH OTHER DIABETIC NEUROLOGICAL COMPLICATION (HCC): ICD-10-CM

## 2021-06-11 DIAGNOSIS — R73.9 HYPERGLYCEMIA: ICD-10-CM

## 2021-06-11 DIAGNOSIS — J44.1 CHRONIC OBSTRUCTIVE PULMONARY DISEASE WITH (ACUTE) EXACERBATION (HCC): ICD-10-CM

## 2021-06-11 PROBLEM — D64.9 ABSOLUTE ANEMIA: Status: ACTIVE | Noted: 2021-06-11

## 2021-06-11 LAB
ABO GROUP BLD: NORMAL
ALBUMIN SERPL-MCNC: 3.1 G/DL (ref 3.5–5.2)
ALBUMIN SERPL-MCNC: 3.3 G/DL (ref 3.5–5.2)
ALBUMIN/GLOB SERPL: 1.4 G/DL
ALBUMIN/GLOB SERPL: 1.9 G/DL
ALP SERPL-CCNC: 78 U/L (ref 39–117)
ALP SERPL-CCNC: 93 U/L (ref 39–117)
ALT SERPL W P-5'-P-CCNC: 17 U/L (ref 1–33)
ALT SERPL W P-5'-P-CCNC: 20 U/L (ref 1–33)
ANION GAP SERPL CALCULATED.3IONS-SCNC: 11.6 MMOL/L (ref 5–15)
ANION GAP SERPL CALCULATED.3IONS-SCNC: 12.1 MMOL/L (ref 5–15)
ANION GAP SERPL CALCULATED.3IONS-SCNC: 13.8 MMOL/L (ref 5–15)
ANION GAP SERPL CALCULATED.3IONS-SCNC: 20.6 MMOL/L (ref 5–15)
APTT PPP: 25.2 SECONDS (ref 22.7–35.4)
ARTERIAL PATENCY WRIST A: POSITIVE
ARTERIAL PATENCY WRIST A: POSITIVE
AST SERPL-CCNC: 11 U/L (ref 1–32)
AST SERPL-CCNC: 14 U/L (ref 1–32)
ATMOSPHERIC PRESS: 742.7 MMHG
ATMOSPHERIC PRESS: 746.6 MMHG
BACTERIA UR QL AUTO: ABNORMAL /HPF
BASE EXCESS BLDA CALC-SCNC: -3.9 MMOL/L (ref 0–2)
BASE EXCESS BLDA CALC-SCNC: -4.7 MMOL/L (ref 0–2)
BASOPHILS # BLD AUTO: 0.04 10*3/MM3 (ref 0–0.2)
BASOPHILS # BLD AUTO: 0.13 10*3/MM3 (ref 0–0.2)
BASOPHILS NFR BLD AUTO: 0.2 % (ref 0–1.5)
BASOPHILS NFR BLD AUTO: 0.5 % (ref 0–1.5)
BDY SITE: ABNORMAL
BDY SITE: ABNORMAL
BILIRUB SERPL-MCNC: 0.3 MG/DL (ref 0–1.2)
BILIRUB SERPL-MCNC: 0.6 MG/DL (ref 0–1.2)
BILIRUB UR QL STRIP: NEGATIVE
BLD GP AB SCN SERPL QL: NEGATIVE
BUN SERPL-MCNC: 47 MG/DL (ref 8–23)
BUN SERPL-MCNC: 48 MG/DL (ref 8–23)
BUN SERPL-MCNC: 50 MG/DL (ref 8–23)
BUN SERPL-MCNC: 54 MG/DL (ref 8–23)
BUN/CREAT SERPL: 27.8 (ref 7–25)
BUN/CREAT SERPL: 32.7 (ref 7–25)
BUN/CREAT SERPL: 36 (ref 7–25)
BUN/CREAT SERPL: 41.5 (ref 7–25)
CALCIUM SPEC-SCNC: 7.9 MG/DL (ref 8.6–10.5)
CALCIUM SPEC-SCNC: 8.1 MG/DL (ref 8.6–10.5)
CALCIUM SPEC-SCNC: 8.7 MG/DL (ref 8.6–10.5)
CALCIUM SPEC-SCNC: 8.9 MG/DL (ref 8.6–10.5)
CHLORIDE SERPL-SCNC: 106 MMOL/L (ref 98–107)
CHLORIDE SERPL-SCNC: 111 MMOL/L (ref 98–107)
CHLORIDE SERPL-SCNC: 112 MMOL/L (ref 98–107)
CHLORIDE SERPL-SCNC: 97 MMOL/L (ref 98–107)
CLARITY UR: CLEAR
CO2 SERPL-SCNC: 20.2 MMOL/L (ref 22–29)
CO2 SERPL-SCNC: 20.9 MMOL/L (ref 22–29)
CO2 SERPL-SCNC: 21.4 MMOL/L (ref 22–29)
CO2 SERPL-SCNC: 23.4 MMOL/L (ref 22–29)
COLOR UR: YELLOW
CREAT SERPL-MCNC: 1.3 MG/DL (ref 0.57–1)
CREAT SERPL-MCNC: 1.39 MG/DL (ref 0.57–1)
CREAT SERPL-MCNC: 1.47 MG/DL (ref 0.57–1)
CREAT SERPL-MCNC: 1.69 MG/DL (ref 0.57–1)
D-LACTATE SERPL-SCNC: 3.4 MMOL/L (ref 0.5–2)
D-LACTATE SERPL-SCNC: 3.7 MMOL/L (ref 0.5–2)
D-LACTATE SERPL-SCNC: 9 MMOL/L (ref 0.5–2)
DEPRECATED RDW RBC AUTO: 49.1 FL (ref 37–54)
DEPRECATED RDW RBC AUTO: 49.4 FL (ref 37–54)
DEPRECATED RDW RBC AUTO: 49.8 FL (ref 37–54)
EOSINOPHIL # BLD AUTO: 0 10*3/MM3 (ref 0–0.4)
EOSINOPHIL # BLD AUTO: 0.09 10*3/MM3 (ref 0–0.4)
EOSINOPHIL NFR BLD AUTO: 0 % (ref 0.3–6.2)
EOSINOPHIL NFR BLD AUTO: 0.3 % (ref 0.3–6.2)
ERYTHROCYTE [DISTWIDTH] IN BLOOD BY AUTOMATED COUNT: 15.1 % (ref 12.3–15.4)
ERYTHROCYTE [DISTWIDTH] IN BLOOD BY AUTOMATED COUNT: 15.8 % (ref 12.3–15.4)
ERYTHROCYTE [DISTWIDTH] IN BLOOD BY AUTOMATED COUNT: 16.2 % (ref 12.3–15.4)
ETHANOL BLD-MCNC: <10 MG/DL (ref 0–10)
ETHANOL BLD-MCNC: <10 MG/DL (ref 0–10)
ETHANOL UR QL: <0.01 %
ETHANOL UR QL: <0.01 %
FOLATE SERPL-MCNC: 4.48 NG/ML (ref 4.78–24.2)
GFR SERPL CREATININE-BSD FRML MDRD: 30 ML/MIN/1.73
GFR SERPL CREATININE-BSD FRML MDRD: 36 ML/MIN/1.73
GFR SERPL CREATININE-BSD FRML MDRD: 38 ML/MIN/1.73
GFR SERPL CREATININE-BSD FRML MDRD: 41 ML/MIN/1.73
GLOBULIN UR ELPH-MCNC: 1.6 GM/DL
GLOBULIN UR ELPH-MCNC: 2.3 GM/DL
GLUCOSE BLDC GLUCOMTR-MCNC: 130 MG/DL (ref 70–130)
GLUCOSE BLDC GLUCOMTR-MCNC: 131 MG/DL (ref 70–130)
GLUCOSE BLDC GLUCOMTR-MCNC: 134 MG/DL (ref 70–130)
GLUCOSE BLDC GLUCOMTR-MCNC: 144 MG/DL (ref 70–130)
GLUCOSE BLDC GLUCOMTR-MCNC: 148 MG/DL (ref 70–130)
GLUCOSE BLDC GLUCOMTR-MCNC: 171 MG/DL (ref 70–130)
GLUCOSE BLDC GLUCOMTR-MCNC: 172 MG/DL (ref 70–130)
GLUCOSE BLDC GLUCOMTR-MCNC: 174 MG/DL (ref 70–130)
GLUCOSE BLDC GLUCOMTR-MCNC: 192 MG/DL (ref 70–130)
GLUCOSE BLDC GLUCOMTR-MCNC: 200 MG/DL (ref 70–130)
GLUCOSE BLDC GLUCOMTR-MCNC: 210 MG/DL (ref 70–130)
GLUCOSE BLDC GLUCOMTR-MCNC: 234 MG/DL (ref 70–130)
GLUCOSE BLDC GLUCOMTR-MCNC: 306 MG/DL (ref 70–130)
GLUCOSE BLDC GLUCOMTR-MCNC: 330 MG/DL (ref 70–130)
GLUCOSE BLDC GLUCOMTR-MCNC: 378 MG/DL (ref 70–130)
GLUCOSE BLDC GLUCOMTR-MCNC: 394 MG/DL (ref 70–130)
GLUCOSE BLDC GLUCOMTR-MCNC: 452 MG/DL (ref 70–130)
GLUCOSE BLDC GLUCOMTR-MCNC: 458 MG/DL (ref 70–130)
GLUCOSE BLDC GLUCOMTR-MCNC: >599 MG/DL (ref 70–130)
GLUCOSE SERPL-MCNC: 122 MG/DL (ref 65–99)
GLUCOSE SERPL-MCNC: 286 MG/DL (ref 65–99)
GLUCOSE SERPL-MCNC: 500 MG/DL (ref 65–99)
GLUCOSE SERPL-MCNC: 622 MG/DL (ref 65–99)
GLUCOSE UR STRIP-MCNC: ABNORMAL MG/DL
HBA1C MFR BLD: 8.4 % (ref 4.8–5.6)
HCO3 BLDA-SCNC: 18 MMOL/L (ref 22–28)
HCO3 BLDA-SCNC: 19.9 MMOL/L (ref 22–28)
HCT VFR BLD AUTO: 21.1 % (ref 34–46.6)
HCT VFR BLD AUTO: 22.9 % (ref 34–46.6)
HCT VFR BLD AUTO: 29.1 % (ref 34–46.6)
HGB BLD-MCNC: 6.8 G/DL (ref 12–15.9)
HGB BLD-MCNC: 7.5 G/DL (ref 12–15.9)
HGB BLD-MCNC: 9 G/DL (ref 12–15.9)
HGB UR QL STRIP.AUTO: NEGATIVE
HYALINE CASTS UR QL AUTO: ABNORMAL /LPF
IMM GRANULOCYTES # BLD AUTO: 0.2 10*3/MM3 (ref 0–0.05)
IMM GRANULOCYTES # BLD AUTO: 0.54 10*3/MM3 (ref 0–0.05)
IMM GRANULOCYTES NFR BLD AUTO: 1.2 % (ref 0–0.5)
IMM GRANULOCYTES NFR BLD AUTO: 2.1 % (ref 0–0.5)
INR PPP: 1.13 (ref 0.9–1.1)
IRON 24H UR-MRATE: 56 MCG/DL (ref 37–145)
IRON SATN MFR SERPL: 18 % (ref 20–50)
KETONES UR QL STRIP: ABNORMAL
LEUKOCYTE ESTERASE UR QL STRIP.AUTO: NEGATIVE
LYMPHOCYTES # BLD AUTO: 1.43 10*3/MM3 (ref 0.7–3.1)
LYMPHOCYTES # BLD AUTO: 4.31 10*3/MM3 (ref 0.7–3.1)
LYMPHOCYTES NFR BLD AUTO: 16.7 % (ref 19.6–45.3)
LYMPHOCYTES NFR BLD AUTO: 8.6 % (ref 19.6–45.3)
MAGNESIUM SERPL-MCNC: 1.5 MG/DL (ref 1.6–2.4)
MAGNESIUM SERPL-MCNC: 1.6 MG/DL (ref 1.6–2.4)
MAGNESIUM SERPL-MCNC: 2.4 MG/DL (ref 1.6–2.4)
MCH RBC QN AUTO: 27.5 PG (ref 26.6–33)
MCH RBC QN AUTO: 27.8 PG (ref 26.6–33)
MCH RBC QN AUTO: 27.8 PG (ref 26.6–33)
MCHC RBC AUTO-ENTMCNC: 30.9 G/DL (ref 31.5–35.7)
MCHC RBC AUTO-ENTMCNC: 32.2 G/DL (ref 31.5–35.7)
MCHC RBC AUTO-ENTMCNC: 32.8 G/DL (ref 31.5–35.7)
MCV RBC AUTO: 83.9 FL (ref 79–97)
MCV RBC AUTO: 86.1 FL (ref 79–97)
MCV RBC AUTO: 89.8 FL (ref 79–97)
MODALITY: ABNORMAL
MODALITY: ABNORMAL
MONOCYTES # BLD AUTO: 0.82 10*3/MM3 (ref 0.1–0.9)
MONOCYTES # BLD AUTO: 0.83 10*3/MM3 (ref 0.1–0.9)
MONOCYTES NFR BLD AUTO: 3.2 % (ref 5–12)
MONOCYTES NFR BLD AUTO: 5 % (ref 5–12)
NEUTROPHILS NFR BLD AUTO: 14.2 10*3/MM3 (ref 1.7–7)
NEUTROPHILS NFR BLD AUTO: 19.94 10*3/MM3 (ref 1.7–7)
NEUTROPHILS NFR BLD AUTO: 77.2 % (ref 42.7–76)
NEUTROPHILS NFR BLD AUTO: 85 % (ref 42.7–76)
NITRITE UR QL STRIP: NEGATIVE
NRBC BLD AUTO-RTO: 0 /100 WBC (ref 0–0.2)
NRBC BLD AUTO-RTO: 0 /100 WBC (ref 0–0.2)
OSMOLALITY SERPL: 325 MOSM/KG (ref 280–301)
OSMOLALITY SERPL: 326 MOSM/KG (ref 280–301)
OSMOLALITY SERPL: 334 MOSM/KG (ref 280–301)
PCO2 BLDA: 21.4 MM HG (ref 35–45)
PCO2 BLDA: 33.8 MM HG (ref 35–45)
PH BLDA: 7.38 PH UNITS (ref 7.35–7.45)
PH BLDA: 7.53 PH UNITS (ref 7.35–7.45)
PH UR STRIP.AUTO: 5.5 [PH] (ref 5–8)
PHOSPHATE SERPL-MCNC: 1.5 MG/DL (ref 2.5–4.5)
PHOSPHATE SERPL-MCNC: 1.7 MG/DL (ref 2.5–4.5)
PHOSPHATE SERPL-MCNC: 3.2 MG/DL (ref 2.5–4.5)
PLATELET # BLD AUTO: 285 10*3/MM3 (ref 140–450)
PLATELET # BLD AUTO: 290 10*3/MM3 (ref 140–450)
PLATELET # BLD AUTO: 411 10*3/MM3 (ref 140–450)
PMV BLD AUTO: 10.7 FL (ref 6–12)
PMV BLD AUTO: 10.8 FL (ref 6–12)
PMV BLD AUTO: 11.1 FL (ref 6–12)
PO2 BLDA: 65.7 MM HG (ref 80–100)
PO2 BLDA: 66.2 MM HG (ref 80–100)
POTASSIUM SERPL-SCNC: 3.8 MMOL/L (ref 3.5–5.2)
POTASSIUM SERPL-SCNC: 3.9 MMOL/L (ref 3.5–5.2)
POTASSIUM SERPL-SCNC: 4.2 MMOL/L (ref 3.5–5.2)
POTASSIUM SERPL-SCNC: 4.6 MMOL/L (ref 3.5–5.2)
PROCALCITONIN SERPL-MCNC: 0.22 NG/ML (ref 0–0.25)
PROT SERPL-MCNC: 4.7 G/DL (ref 6–8.5)
PROT SERPL-MCNC: 5.6 G/DL (ref 6–8.5)
PROT UR QL STRIP: ABNORMAL
PROTHROMBIN TIME: 14.3 SECONDS (ref 11.7–14.2)
QT INTERVAL: 323 MS
RBC # BLD AUTO: 2.45 10*6/MM3 (ref 3.77–5.28)
RBC # BLD AUTO: 2.73 10*6/MM3 (ref 3.77–5.28)
RBC # BLD AUTO: 3.24 10*6/MM3 (ref 3.77–5.28)
RBC # UR: ABNORMAL /HPF
REF LAB TEST METHOD: ABNORMAL
RH BLD: POSITIVE
SAO2 % BLDCOA: 92.6 % (ref 92–99)
SAO2 % BLDCOA: 95.3 % (ref 92–99)
SARS-COV-2 RNA PNL SPEC NAA+PROBE: NOT DETECTED
SODIUM SERPL-SCNC: 139 MMOL/L (ref 136–145)
SODIUM SERPL-SCNC: 141 MMOL/L (ref 136–145)
SODIUM SERPL-SCNC: 144 MMOL/L (ref 136–145)
SODIUM SERPL-SCNC: 146 MMOL/L (ref 136–145)
SP GR UR STRIP: 1.02 (ref 1–1.03)
SQUAMOUS #/AREA URNS HPF: ABNORMAL /HPF
T&S EXPIRATION DATE: NORMAL
T4 FREE SERPL-MCNC: 1.4 NG/DL (ref 0.93–1.7)
TIBC SERPL-MCNC: 307 MCG/DL (ref 298–536)
TOTAL RATE: 16 BREATHS/MINUTE
TOTAL RATE: 32 BREATHS/MINUTE
TRANSFERRIN SERPL-MCNC: 206 MG/DL (ref 200–360)
TROPONIN T SERPL-MCNC: 0.09 NG/ML (ref 0–0.03)
TSH SERPL DL<=0.05 MIU/L-ACNC: 0.94 UIU/ML (ref 0.27–4.2)
UROBILINOGEN UR QL STRIP: ABNORMAL
VIT B12 BLD-MCNC: 297 PG/ML (ref 211–946)
WBC # BLD AUTO: 16.7 10*3/MM3 (ref 3.4–10.8)
WBC # BLD AUTO: 21.9 10*3/MM3 (ref 3.4–10.8)
WBC # BLD AUTO: 25.83 10*3/MM3 (ref 3.4–10.8)
WBC UR QL AUTO: ABNORMAL /HPF

## 2021-06-11 PROCEDURE — 25010000002 LORAZEPAM PER 2 MG: Performed by: EMERGENCY MEDICINE

## 2021-06-11 PROCEDURE — 25010000002 CEFTRIAXONE PER 250 MG: Performed by: EMERGENCY MEDICINE

## 2021-06-11 PROCEDURE — 36600 WITHDRAWAL OF ARTERIAL BLOOD: CPT

## 2021-06-11 PROCEDURE — 82607 VITAMIN B-12: CPT | Performed by: INTERNAL MEDICINE

## 2021-06-11 PROCEDURE — 87040 BLOOD CULTURE FOR BACTERIA: CPT | Performed by: EMERGENCY MEDICINE

## 2021-06-11 PROCEDURE — 82077 ASSAY SPEC XCP UR&BREATH IA: CPT | Performed by: INTERNAL MEDICINE

## 2021-06-11 PROCEDURE — 63710000001 INSULIN REGULAR HUMAN PER 5 UNITS: Performed by: EMERGENCY MEDICINE

## 2021-06-11 PROCEDURE — 83735 ASSAY OF MAGNESIUM: CPT | Performed by: INTERNAL MEDICINE

## 2021-06-11 PROCEDURE — 83930 ASSAY OF BLOOD OSMOLALITY: CPT | Performed by: EMERGENCY MEDICINE

## 2021-06-11 PROCEDURE — 83605 ASSAY OF LACTIC ACID: CPT | Performed by: EMERGENCY MEDICINE

## 2021-06-11 PROCEDURE — 99285 EMERGENCY DEPT VISIT HI MDM: CPT

## 2021-06-11 PROCEDURE — P9612 CATHETERIZE FOR URINE SPEC: HCPCS

## 2021-06-11 PROCEDURE — 36430 TRANSFUSION BLD/BLD COMPNT: CPT

## 2021-06-11 PROCEDURE — 85025 COMPLETE CBC W/AUTO DIFF WBC: CPT | Performed by: INTERNAL MEDICINE

## 2021-06-11 PROCEDURE — 83036 HEMOGLOBIN GLYCOSYLATED A1C: CPT | Performed by: INTERNAL MEDICINE

## 2021-06-11 PROCEDURE — 84443 ASSAY THYROID STIM HORMONE: CPT | Performed by: INTERNAL MEDICINE

## 2021-06-11 PROCEDURE — 82803 BLOOD GASES ANY COMBINATION: CPT

## 2021-06-11 PROCEDURE — 86900 BLOOD TYPING SEROLOGIC ABO: CPT

## 2021-06-11 PROCEDURE — 84145 PROCALCITONIN (PCT): CPT | Performed by: INTERNAL MEDICINE

## 2021-06-11 PROCEDURE — 82962 GLUCOSE BLOOD TEST: CPT

## 2021-06-11 PROCEDURE — 86923 COMPATIBILITY TEST ELECTRIC: CPT

## 2021-06-11 PROCEDURE — 87635 SARS-COV-2 COVID-19 AMP PRB: CPT | Performed by: INTERNAL MEDICINE

## 2021-06-11 PROCEDURE — 85730 THROMBOPLASTIN TIME PARTIAL: CPT | Performed by: EMERGENCY MEDICINE

## 2021-06-11 PROCEDURE — 71045 X-RAY EXAM CHEST 1 VIEW: CPT

## 2021-06-11 PROCEDURE — 25010000002 VANCOMYCIN PER 500 MG: Performed by: INTERNAL MEDICINE

## 2021-06-11 PROCEDURE — 93005 ELECTROCARDIOGRAM TRACING: CPT | Performed by: INTERNAL MEDICINE

## 2021-06-11 PROCEDURE — 84484 ASSAY OF TROPONIN QUANT: CPT | Performed by: INTERNAL MEDICINE

## 2021-06-11 PROCEDURE — 93010 ELECTROCARDIOGRAM REPORT: CPT | Performed by: INTERNAL MEDICINE

## 2021-06-11 PROCEDURE — 86901 BLOOD TYPING SEROLOGIC RH(D): CPT

## 2021-06-11 PROCEDURE — P9016 RBC LEUKOCYTES REDUCED: HCPCS

## 2021-06-11 PROCEDURE — 94799 UNLISTED PULMONARY SVC/PX: CPT

## 2021-06-11 PROCEDURE — 83605 ASSAY OF LACTIC ACID: CPT | Performed by: INTERNAL MEDICINE

## 2021-06-11 PROCEDURE — 43235 EGD DIAGNOSTIC BRUSH WASH: CPT | Performed by: INTERNAL MEDICINE

## 2021-06-11 PROCEDURE — 81001 URINALYSIS AUTO W/SCOPE: CPT | Performed by: EMERGENCY MEDICINE

## 2021-06-11 PROCEDURE — 25010000002 HALOPERIDOL LACTATE PER 5 MG: Performed by: INTERNAL MEDICINE

## 2021-06-11 PROCEDURE — 99291 CRITICAL CARE FIRST HOUR: CPT | Performed by: PSYCHIATRY & NEUROLOGY

## 2021-06-11 PROCEDURE — 25010000002 HYDRALAZINE PER 20 MG: Performed by: INTERNAL MEDICINE

## 2021-06-11 PROCEDURE — 86850 RBC ANTIBODY SCREEN: CPT | Performed by: EMERGENCY MEDICINE

## 2021-06-11 PROCEDURE — 84100 ASSAY OF PHOSPHORUS: CPT | Performed by: INTERNAL MEDICINE

## 2021-06-11 PROCEDURE — 85027 COMPLETE CBC AUTOMATED: CPT | Performed by: INTERNAL MEDICINE

## 2021-06-11 PROCEDURE — 70450 CT HEAD/BRAIN W/O DYE: CPT

## 2021-06-11 PROCEDURE — 84439 ASSAY OF FREE THYROXINE: CPT | Performed by: INTERNAL MEDICINE

## 2021-06-11 PROCEDURE — 86900 BLOOD TYPING SEROLOGIC ABO: CPT | Performed by: EMERGENCY MEDICINE

## 2021-06-11 PROCEDURE — 25010000003 POTASSIUM CHLORIDE PER 2 MEQ: Performed by: INTERNAL MEDICINE

## 2021-06-11 PROCEDURE — 83540 ASSAY OF IRON: CPT | Performed by: INTERNAL MEDICINE

## 2021-06-11 PROCEDURE — 25010000002 MAGNESIUM SULFATE IN D5W 1G/100ML (PREMIX) 1-5 GM/100ML-% SOLUTION: Performed by: INTERNAL MEDICINE

## 2021-06-11 PROCEDURE — 25010000002 LORAZEPAM PER 2 MG: Performed by: INTERNAL MEDICINE

## 2021-06-11 PROCEDURE — 25010000002 PROPOFOL 10 MG/ML EMULSION: Performed by: ANESTHESIOLOGY

## 2021-06-11 PROCEDURE — 86901 BLOOD TYPING SEROLOGIC RH(D): CPT | Performed by: EMERGENCY MEDICINE

## 2021-06-11 PROCEDURE — 82077 ASSAY SPEC XCP UR&BREATH IA: CPT | Performed by: EMERGENCY MEDICINE

## 2021-06-11 PROCEDURE — 25010000003 INSULIN REGULAR HUMAN PER 5 UNITS: Performed by: INTERNAL MEDICINE

## 2021-06-11 PROCEDURE — 83930 ASSAY OF BLOOD OSMOLALITY: CPT | Performed by: INTERNAL MEDICINE

## 2021-06-11 PROCEDURE — 82746 ASSAY OF FOLIC ACID SERUM: CPT | Performed by: INTERNAL MEDICINE

## 2021-06-11 PROCEDURE — 99221 1ST HOSP IP/OBS SF/LOW 40: CPT | Performed by: INTERNAL MEDICINE

## 2021-06-11 PROCEDURE — 84466 ASSAY OF TRANSFERRIN: CPT | Performed by: INTERNAL MEDICINE

## 2021-06-11 PROCEDURE — 85610 PROTHROMBIN TIME: CPT | Performed by: EMERGENCY MEDICINE

## 2021-06-11 PROCEDURE — 80053 COMPREHEN METABOLIC PANEL: CPT | Performed by: INTERNAL MEDICINE

## 2021-06-11 PROCEDURE — 80053 COMPREHEN METABOLIC PANEL: CPT | Performed by: EMERGENCY MEDICINE

## 2021-06-11 PROCEDURE — 85025 COMPLETE CBC W/AUTO DIFF WBC: CPT | Performed by: EMERGENCY MEDICINE

## 2021-06-11 PROCEDURE — 25010000002 ACYCLOVIR PER 5 MG: Performed by: PSYCHIATRY & NEUROLOGY

## 2021-06-11 PROCEDURE — 0DJ08ZZ INSPECTION OF UPPER INTESTINAL TRACT, VIA NATURAL OR ARTIFICIAL OPENING ENDOSCOPIC: ICD-10-PCS | Performed by: INTERNAL MEDICINE

## 2021-06-11 RX ORDER — CEFTRIAXONE SODIUM 1 G/50ML
1 INJECTION, SOLUTION INTRAVENOUS EVERY 24 HOURS
Status: DISCONTINUED | OUTPATIENT
Start: 2021-06-12 | End: 2021-06-11

## 2021-06-11 RX ORDER — SODIUM CHLORIDE 0.9 % (FLUSH) 0.9 %
10 SYRINGE (ML) INJECTION AS NEEDED
Status: DISCONTINUED | OUTPATIENT
Start: 2021-06-11 | End: 2021-06-18 | Stop reason: HOSPADM

## 2021-06-11 RX ORDER — LORAZEPAM 2 MG/ML
2 INJECTION INTRAMUSCULAR
Status: DISCONTINUED | OUTPATIENT
Start: 2021-06-11 | End: 2021-06-18 | Stop reason: HOSPADM

## 2021-06-11 RX ORDER — HALOPERIDOL 5 MG/ML
4 INJECTION INTRAMUSCULAR EVERY 4 HOURS PRN
Status: DISCONTINUED | OUTPATIENT
Start: 2021-06-11 | End: 2021-06-18 | Stop reason: HOSPADM

## 2021-06-11 RX ORDER — MAGNESIUM SULFATE HEPTAHYDRATE 40 MG/ML
2 INJECTION, SOLUTION INTRAVENOUS AS NEEDED
Status: DISCONTINUED | OUTPATIENT
Start: 2021-06-11 | End: 2021-06-18 | Stop reason: HOSPADM

## 2021-06-11 RX ORDER — LABETALOL HYDROCHLORIDE 5 MG/ML
20 INJECTION, SOLUTION INTRAVENOUS
Status: DISCONTINUED | OUTPATIENT
Start: 2021-06-11 | End: 2021-06-18 | Stop reason: HOSPADM

## 2021-06-11 RX ORDER — MAGNESIUM SULFATE HEPTAHYDRATE 40 MG/ML
4 INJECTION, SOLUTION INTRAVENOUS AS NEEDED
Status: DISCONTINUED | OUTPATIENT
Start: 2021-06-11 | End: 2021-06-18 | Stop reason: HOSPADM

## 2021-06-11 RX ORDER — SODIUM CHLORIDE 0.9 % (FLUSH) 0.9 %
10 SYRINGE (ML) INJECTION ONCE AS NEEDED
Status: DISCONTINUED | OUTPATIENT
Start: 2021-06-11 | End: 2021-06-18 | Stop reason: HOSPADM

## 2021-06-11 RX ORDER — DEXTROSE, SODIUM CHLORIDE, AND POTASSIUM CHLORIDE 5; .45; .15 G/100ML; G/100ML; G/100ML
150 INJECTION INTRAVENOUS CONTINUOUS PRN
Status: DISCONTINUED | OUTPATIENT
Start: 2021-06-11 | End: 2021-06-12

## 2021-06-11 RX ORDER — IPRATROPIUM BROMIDE AND ALBUTEROL SULFATE 2.5; .5 MG/3ML; MG/3ML
3 SOLUTION RESPIRATORY (INHALATION) EVERY 6 HOURS PRN
Status: DISCONTINUED | OUTPATIENT
Start: 2021-06-11 | End: 2021-06-18 | Stop reason: HOSPADM

## 2021-06-11 RX ORDER — SODIUM CHLORIDE 0.9 % (FLUSH) 0.9 %
10 SYRINGE (ML) INJECTION EVERY 12 HOURS SCHEDULED
Status: DISCONTINUED | OUTPATIENT
Start: 2021-06-11 | End: 2021-06-17

## 2021-06-11 RX ORDER — LIDOCAINE HYDROCHLORIDE 20 MG/ML
INJECTION, SOLUTION INFILTRATION; PERINEURAL AS NEEDED
Status: DISCONTINUED | OUTPATIENT
Start: 2021-06-11 | End: 2021-06-11 | Stop reason: SURG

## 2021-06-11 RX ORDER — DEXTROSE, SODIUM CHLORIDE, AND POTASSIUM CHLORIDE 5; .45; .3 G/100ML; G/100ML; G/100ML
150 INJECTION INTRAVENOUS CONTINUOUS PRN
Status: DISCONTINUED | OUTPATIENT
Start: 2021-06-11 | End: 2021-06-12

## 2021-06-11 RX ORDER — POTASSIUM CHLORIDE, DEXTROSE MONOHYDRATE AND SODIUM CHLORIDE 300; 5; 900 MG/100ML; G/100ML; MG/100ML
150 INJECTION, SOLUTION INTRAVENOUS CONTINUOUS PRN
Status: DISCONTINUED | OUTPATIENT
Start: 2021-06-11 | End: 2021-06-12

## 2021-06-11 RX ORDER — VANCOMYCIN HYDROCHLORIDE 1 G/200ML
1000 INJECTION, SOLUTION INTRAVENOUS ONCE
Status: COMPLETED | OUTPATIENT
Start: 2021-06-11 | End: 2021-06-11

## 2021-06-11 RX ORDER — OLANZAPINE 10 MG/1
10 INJECTION, POWDER, LYOPHILIZED, FOR SOLUTION INTRAMUSCULAR EVERY 8 HOURS PRN
Status: COMPLETED | OUTPATIENT
Start: 2021-06-11 | End: 2021-06-11

## 2021-06-11 RX ORDER — PROPOFOL 10 MG/ML
VIAL (ML) INTRAVENOUS AS NEEDED
Status: DISCONTINUED | OUTPATIENT
Start: 2021-06-11 | End: 2021-06-11 | Stop reason: SURG

## 2021-06-11 RX ORDER — SODIUM CHLORIDE, SODIUM LACTATE, POTASSIUM CHLORIDE, CALCIUM CHLORIDE 600; 310; 30; 20 MG/100ML; MG/100ML; MG/100ML; MG/100ML
30 INJECTION, SOLUTION INTRAVENOUS CONTINUOUS
Status: CANCELLED | OUTPATIENT
Start: 2021-06-12

## 2021-06-11 RX ORDER — SODIUM CHLORIDE 0.9 % (FLUSH) 0.9 %
3 SYRINGE (ML) INJECTION EVERY 12 HOURS SCHEDULED
Status: DISCONTINUED | OUTPATIENT
Start: 2021-06-11 | End: 2021-06-17

## 2021-06-11 RX ORDER — DEXTROSE AND SODIUM CHLORIDE 5; .45 G/100ML; G/100ML
150 INJECTION, SOLUTION INTRAVENOUS CONTINUOUS PRN
Status: DISCONTINUED | OUTPATIENT
Start: 2021-06-11 | End: 2021-06-12

## 2021-06-11 RX ORDER — ONDANSETRON 2 MG/ML
4 INJECTION INTRAMUSCULAR; INTRAVENOUS EVERY 6 HOURS PRN
Status: DISCONTINUED | OUTPATIENT
Start: 2021-06-11 | End: 2021-06-18 | Stop reason: HOSPADM

## 2021-06-11 RX ORDER — POTASSIUM CHLORIDE 1.5 G/1.77G
40 POWDER, FOR SOLUTION ORAL AS NEEDED
Status: DISCONTINUED | OUTPATIENT
Start: 2021-06-11 | End: 2021-06-18 | Stop reason: HOSPADM

## 2021-06-11 RX ORDER — DEXTROSE MONOHYDRATE 25 G/50ML
12.5 INJECTION, SOLUTION INTRAVENOUS AS NEEDED
Status: DISCONTINUED | OUTPATIENT
Start: 2021-06-11 | End: 2021-06-12

## 2021-06-11 RX ORDER — PANTOPRAZOLE SODIUM 40 MG/10ML
80 INJECTION, POWDER, LYOPHILIZED, FOR SOLUTION INTRAVENOUS ONCE
Status: COMPLETED | OUTPATIENT
Start: 2021-06-11 | End: 2021-06-11

## 2021-06-11 RX ORDER — SODIUM CHLORIDE AND POTASSIUM CHLORIDE 150; 900 MG/100ML; MG/100ML
250 INJECTION, SOLUTION INTRAVENOUS CONTINUOUS PRN
Status: DISCONTINUED | OUTPATIENT
Start: 2021-06-11 | End: 2021-06-12

## 2021-06-11 RX ORDER — LORAZEPAM 2 MG/ML
1 INJECTION INTRAMUSCULAR ONCE
Status: COMPLETED | OUTPATIENT
Start: 2021-06-11 | End: 2021-06-11

## 2021-06-11 RX ORDER — POTASSIUM CHLORIDE 750 MG/1
40 TABLET, FILM COATED, EXTENDED RELEASE ORAL AS NEEDED
Status: DISCONTINUED | OUTPATIENT
Start: 2021-06-11 | End: 2021-06-18 | Stop reason: HOSPADM

## 2021-06-11 RX ORDER — SODIUM CHLORIDE, SODIUM LACTATE, POTASSIUM CHLORIDE, CALCIUM CHLORIDE 600; 310; 30; 20 MG/100ML; MG/100ML; MG/100ML; MG/100ML
30 INJECTION, SOLUTION INTRAVENOUS CONTINUOUS
Status: CANCELLED | OUTPATIENT
Start: 2021-06-11

## 2021-06-11 RX ORDER — MAGNESIUM SULFATE 1 G/100ML
1 INJECTION INTRAVENOUS AS NEEDED
Status: DISCONTINUED | OUTPATIENT
Start: 2021-06-11 | End: 2021-06-18 | Stop reason: HOSPADM

## 2021-06-11 RX ORDER — CEFTRIAXONE SODIUM 2 G/50ML
2 INJECTION, SOLUTION INTRAVENOUS EVERY 24 HOURS
Status: DISCONTINUED | OUTPATIENT
Start: 2021-06-12 | End: 2021-06-11

## 2021-06-11 RX ORDER — ACETAMINOPHEN 650 MG/1
650 SUPPOSITORY RECTAL EVERY 4 HOURS PRN
Status: DISCONTINUED | OUTPATIENT
Start: 2021-06-11 | End: 2021-06-18 | Stop reason: HOSPADM

## 2021-06-11 RX ORDER — HYDRALAZINE HYDROCHLORIDE 20 MG/ML
20 INJECTION INTRAMUSCULAR; INTRAVENOUS EVERY 4 HOURS PRN
Status: DISCONTINUED | OUTPATIENT
Start: 2021-06-11 | End: 2021-06-18 | Stop reason: HOSPADM

## 2021-06-11 RX ORDER — SODIUM CHLORIDE AND POTASSIUM CHLORIDE 300; 900 MG/100ML; MG/100ML
250 INJECTION, SOLUTION INTRAVENOUS CONTINUOUS PRN
Status: DISCONTINUED | OUTPATIENT
Start: 2021-06-11 | End: 2021-06-12

## 2021-06-11 RX ORDER — ACETAMINOPHEN 325 MG/1
650 TABLET ORAL EVERY 4 HOURS PRN
Status: DISCONTINUED | OUTPATIENT
Start: 2021-06-11 | End: 2021-06-18 | Stop reason: HOSPADM

## 2021-06-11 RX ORDER — DEXTROSE, SODIUM CHLORIDE, AND POTASSIUM CHLORIDE 5; .9; .15 G/100ML; G/100ML; G/100ML
150 INJECTION INTRAVENOUS CONTINUOUS PRN
Status: DISCONTINUED | OUTPATIENT
Start: 2021-06-11 | End: 2021-06-12

## 2021-06-11 RX ORDER — CALCIUM GLUCONATE 20 MG/ML
1 INJECTION, SOLUTION INTRAVENOUS AS NEEDED
Status: DISCONTINUED | OUTPATIENT
Start: 2021-06-11 | End: 2021-06-18 | Stop reason: HOSPADM

## 2021-06-11 RX ORDER — CEFTRIAXONE SODIUM 1 G/50ML
1 INJECTION, SOLUTION INTRAVENOUS ONCE
Status: COMPLETED | OUTPATIENT
Start: 2021-06-11 | End: 2021-06-11

## 2021-06-11 RX ORDER — CEFTRIAXONE SODIUM 1 G/50ML
1 INJECTION, SOLUTION INTRAVENOUS EVERY 12 HOURS
Status: DISCONTINUED | OUTPATIENT
Start: 2021-06-12 | End: 2021-06-12

## 2021-06-11 RX ORDER — DEXTROSE AND SODIUM CHLORIDE 5; .9 G/100ML; G/100ML
150 INJECTION, SOLUTION INTRAVENOUS CONTINUOUS PRN
Status: DISCONTINUED | OUTPATIENT
Start: 2021-06-11 | End: 2021-06-12

## 2021-06-11 RX ORDER — PROPOFOL 10 MG/ML
VIAL (ML) INTRAVENOUS CONTINUOUS PRN
Status: DISCONTINUED | OUTPATIENT
Start: 2021-06-11 | End: 2021-06-11 | Stop reason: SURG

## 2021-06-11 RX ORDER — SODIUM CHLORIDE 450 MG/100ML
250 INJECTION, SOLUTION INTRAVENOUS CONTINUOUS PRN
Status: DISCONTINUED | OUTPATIENT
Start: 2021-06-11 | End: 2021-06-12

## 2021-06-11 RX ORDER — SODIUM CHLORIDE AND POTASSIUM CHLORIDE 150; 450 MG/100ML; MG/100ML
250 INJECTION, SOLUTION INTRAVENOUS CONTINUOUS PRN
Status: DISCONTINUED | OUTPATIENT
Start: 2021-06-11 | End: 2021-06-12

## 2021-06-11 RX ORDER — SODIUM CHLORIDE 9 MG/ML
250 INJECTION, SOLUTION INTRAVENOUS CONTINUOUS PRN
Status: DISCONTINUED | OUTPATIENT
Start: 2021-06-11 | End: 2021-06-14

## 2021-06-11 RX ORDER — SODIUM CHLORIDE 9 MG/ML
10 INJECTION, SOLUTION INTRAVENOUS CONTINUOUS PRN
Status: DISCONTINUED | OUTPATIENT
Start: 2021-06-11 | End: 2021-06-12

## 2021-06-11 RX ORDER — HALOPERIDOL 5 MG/ML
2 INJECTION INTRAMUSCULAR EVERY 6 HOURS PRN
Status: DISCONTINUED | OUTPATIENT
Start: 2021-06-11 | End: 2021-06-11

## 2021-06-11 RX ORDER — POTASSIUM CHLORIDE 7.45 MG/ML
10 INJECTION INTRAVENOUS
Status: DISCONTINUED | OUTPATIENT
Start: 2021-06-11 | End: 2021-06-18 | Stop reason: HOSPADM

## 2021-06-11 RX ADMIN — LABETALOL HYDROCHLORIDE 20 MG: 5 INJECTION, SOLUTION INTRAVENOUS at 16:57

## 2021-06-11 RX ADMIN — LABETALOL HYDROCHLORIDE 20 MG: 5 INJECTION, SOLUTION INTRAVENOUS at 13:12

## 2021-06-11 RX ADMIN — MAGNESIUM SULFATE HEPTAHYDRATE 1 G: 1 INJECTION, SOLUTION INTRAVENOUS at 13:12

## 2021-06-11 RX ADMIN — LABETALOL HYDROCHLORIDE 20 MG: 5 INJECTION, SOLUTION INTRAVENOUS at 06:55

## 2021-06-11 RX ADMIN — POTASSIUM CHLORIDE AND SODIUM CHLORIDE 250 ML/HR: 450; 150 INJECTION, SOLUTION INTRAVENOUS at 20:14

## 2021-06-11 RX ADMIN — LORAZEPAM 2 MG: 2 INJECTION INTRAMUSCULAR; INTRAVENOUS at 22:36

## 2021-06-11 RX ADMIN — CEFTRIAXONE SODIUM 1 G: 1 INJECTION, SOLUTION INTRAVENOUS at 03:01

## 2021-06-11 RX ADMIN — MAGNESIUM SULFATE HEPTAHYDRATE 1 G: 1 INJECTION, SOLUTION INTRAVENOUS at 14:39

## 2021-06-11 RX ADMIN — PROPOFOL 50 MG: 10 INJECTION, EMULSION INTRAVENOUS at 11:13

## 2021-06-11 RX ADMIN — SODIUM CHLORIDE 8 MG/HR: 900 INJECTION INTRAVENOUS at 10:09

## 2021-06-11 RX ADMIN — SODIUM CHLORIDE, PRESERVATIVE FREE 3 ML: 5 INJECTION INTRAVENOUS at 21:05

## 2021-06-11 RX ADMIN — ACETAMINOPHEN 650 MG: 650 SUPPOSITORY RECTAL at 12:20

## 2021-06-11 RX ADMIN — DEXMEDETOMIDINE HYDROCHLORIDE 0.2 MCG/KG/HR: 100 INJECTION, SOLUTION, CONCENTRATE INTRAVENOUS at 22:46

## 2021-06-11 RX ADMIN — SODIUM CHLORIDE 2 UNITS/HR: 9 INJECTION, SOLUTION INTRAVENOUS at 20:13

## 2021-06-11 RX ADMIN — LABETALOL HYDROCHLORIDE 20 MG: 5 INJECTION, SOLUTION INTRAVENOUS at 10:09

## 2021-06-11 RX ADMIN — SODIUM CHLORIDE 8 MG/HR: 900 INJECTION INTRAVENOUS at 16:06

## 2021-06-11 RX ADMIN — SODIUM CHLORIDE, PRESERVATIVE FREE 10 ML: 5 INJECTION INTRAVENOUS at 21:04

## 2021-06-11 RX ADMIN — SODIUM CHLORIDE 2232 ML: 9 INJECTION, SOLUTION INTRAVENOUS at 02:36

## 2021-06-11 RX ADMIN — SODIUM CHLORIDE 2000 ML: 9 INJECTION, SOLUTION INTRAVENOUS at 05:34

## 2021-06-11 RX ADMIN — PANTOPRAZOLE SODIUM 80 MG: 40 INJECTION, POWDER, FOR SOLUTION INTRAVENOUS at 02:22

## 2021-06-11 RX ADMIN — POTASSIUM CHLORIDE, DEXTROSE MONOHYDRATE AND SODIUM CHLORIDE 150 ML/HR: 150; 5; 450 INJECTION, SOLUTION INTRAVENOUS at 11:58

## 2021-06-11 RX ADMIN — LORAZEPAM 1 MG: 2 INJECTION INTRAMUSCULAR; INTRAVENOUS at 05:05

## 2021-06-11 RX ADMIN — SODIUM CHLORIDE 8 MG/HR: 900 INJECTION INTRAVENOUS at 05:35

## 2021-06-11 RX ADMIN — MAGNESIUM SULFATE HEPTAHYDRATE 1 G: 1 INJECTION, SOLUTION INTRAVENOUS at 11:58

## 2021-06-11 RX ADMIN — VANCOMYCIN HYDROCHLORIDE 1000 MG: 1 INJECTION, SOLUTION INTRAVENOUS at 19:04

## 2021-06-11 RX ADMIN — LIDOCAINE HYDROCHLORIDE 60 MG: 20 INJECTION, SOLUTION INFILTRATION; PERINEURAL at 11:07

## 2021-06-11 RX ADMIN — PROPOFOL 50 MG: 10 INJECTION, EMULSION INTRAVENOUS at 11:08

## 2021-06-11 RX ADMIN — POTASSIUM PHOSPHATE, MONOBASIC AND POTASSIUM PHOSPHATE, DIBASIC 30 MMOL: 224; 236 INJECTION, SOLUTION, CONCENTRATE INTRAVENOUS at 15:48

## 2021-06-11 RX ADMIN — HALOPERIDOL LACTATE 2 MG: 5 INJECTION, SOLUTION INTRAMUSCULAR at 12:33

## 2021-06-11 RX ADMIN — PANTOPRAZOLE SODIUM 80 MG: 40 INJECTION, POWDER, FOR SOLUTION INTRAVENOUS at 05:35

## 2021-06-11 RX ADMIN — OLANZAPINE 10 MG: 10 INJECTION, POWDER, FOR SOLUTION INTRAMUSCULAR at 15:48

## 2021-06-11 RX ADMIN — SODIUM CHLORIDE 8 MG/HR: 900 INJECTION INTRAVENOUS at 22:37

## 2021-06-11 RX ADMIN — INSULIN HUMAN 12 UNITS: 100 INJECTION, SOLUTION PARENTERAL at 03:59

## 2021-06-11 RX ADMIN — LORAZEPAM 2 MG: 2 INJECTION INTRAMUSCULAR; INTRAVENOUS at 17:02

## 2021-06-11 RX ADMIN — HALOPERIDOL LACTATE 4 MG: 5 INJECTION, SOLUTION INTRAMUSCULAR at 14:38

## 2021-06-11 RX ADMIN — ACYCLOVIR SODIUM 570 MG: 50 INJECTION, SOLUTION INTRAVENOUS at 20:00

## 2021-06-11 RX ADMIN — SODIUM CHLORIDE 1000 ML: 9 INJECTION, SOLUTION INTRAVENOUS at 02:21

## 2021-06-11 RX ADMIN — PROPOFOL 150 MCG/KG/MIN: 10 INJECTION, EMULSION INTRAVENOUS at 11:08

## 2021-06-11 RX ADMIN — HYDRALAZINE HYDROCHLORIDE 20 MG: 20 INJECTION, SOLUTION INTRAMUSCULAR; INTRAVENOUS at 14:38

## 2021-06-11 RX ADMIN — SODIUM CHLORIDE 7 UNITS/HR: 9 INJECTION, SOLUTION INTRAVENOUS at 05:49

## 2021-06-11 RX ADMIN — ACETAMINOPHEN 650 MG: 650 SUPPOSITORY RECTAL at 19:04

## 2021-06-11 NOTE — ANESTHESIA POSTPROCEDURE EVALUATION
"Patient: Jayne Beltran    Procedure Summary     Date: 06/11/21 Room / Location:  [facility] /  [facility]    Anesthesia Start: 1056 Anesthesia Stop: 1119    Procedure: ESOPHAGOGASTRODUODENOSCOPY AT BEDSIDE (N/A Esophagus) Diagnosis:       Gastrointestinal hemorrhage, unspecified gastrointestinal hemorrhage type      Other iron deficiency anemia      Melena      (Gastrointestinal hemorrhage, unspecified gastrointestinal hemorrhage type [K92.2])      (Other iron deficiency anemia [D50.8])      (Melena [K92.1])    Surgeons: Donovan Banks MD Provider: Kike Vick MD    Anesthesia Type: MAC ASA Status: 3          Anesthesia Type: MAC    Vitals  Vitals Value Taken Time   /101 06/11/21 1200   Temp     Pulse 122 06/11/21 1202   Resp     SpO2 99 % 06/11/21 1202   Vitals shown include unvalidated device data.        Post Anesthesia Care and Evaluation    Patient location during evaluation: bedside  Patient participation: complete - patient participated  Level of consciousness: awake and alert  Pain management: adequate  Airway patency: patent  Anesthetic complications: No anesthetic complications    Cardiovascular status: acceptable  Respiratory status: acceptable  Hydration status: acceptable    Comments: BP (!) 181/91   Pulse 118   Temp 36.6 °C (97.8 °F)   Resp 17   Ht 165.1 cm (65\")   Wt 71.6 kg (157 lb 13.6 oz)   LMP  (LMP Unknown)   SpO2 100%   BMI 26.27 kg/m²       "

## 2021-06-11 NOTE — NURSING NOTE
RN spoke with Dr Burger patient combative, restless unable to console order for restraints no sedation due to alter mental status

## 2021-06-11 NOTE — CONSULTS
[facility]  Initial Inpatient Consult Note    Referring Provider: Dr. Michael Arriaza    Reason for Consultation: Melena    Subjective     History of present illness:    65 y.o. female with history of melenic stools.  Patient is poor historian and currently confused and all information is obtained from chart records and RN.  Some question of anticoagulant therapy in the past secondary to previous subdural and subarachnoid hemorrhage, none recorded on her med sheet.  There is a history of chronic anemia.  No records of endoscopic evaluation at this institution.    Past Medical History:  Past Medical History:   Diagnosis Date   • COPD (chronic obstructive pulmonary disease) (CMS/HCC)    • Diabetes mellitus (CMS/HCC)     type 2   • Fibroid    • Hypertension    • Leukocytosis    • Neuromuscular disorder (CMS/HCC)    • Panic attack    • Skin cancer     nose   • TIA (transient ischemic attack)      Past Surgical History:  Past Surgical History:   Procedure Laterality Date   • CHOLECYSTECTOMY     • COLONOSCOPY     • INSERTION HEMODIALYSIS CATHETER N/A 9/3/2020    Procedure: HEMODIALYSIS CATHETER INSERTION;  Surgeon: Sergio Durant MD;  Location: [facility];  Service: Vascular;  Laterality: N/A;   • KIDNEY STONE SURGERY     • URETEROSCOPY LASER LITHOTRIPSY WITH STENT INSERTION Right 12/21/2020    Procedure: CYSTOSCOPY, RIGHT URETEROSCOPY, RIGHT RETROGRADE PYELOGRAM, LASER LITHOTRIPSY WITH RIGHT URETERAL STENT EXCHANGE;  Surgeon: Mikie Mejia MD;  Location: [facility];  Service: Urology;  Laterality: Right;      Social History:   Social History     Tobacco Use   • Smoking status: Current Every Day Smoker     Packs/day: 0.25     Years: 45.00     Pack years: 11.25     Types: Cigarettes   • Smokeless tobacco: Never Used   • Tobacco comment: uses vape and smokes cigarettes   Substance Use Topics   • Alcohol use: No      Family History:  Family History   Problem Relation Age of Onset   •  Heart disease Mother          at age 63   • Diabetes Mother    • Hypertension Mother    • Diabetes Father    • Deep vein thrombosis Father    • Depression Father    • Liver disease Father    • Lung cancer Father 58         at age 68   • Breast cancer Maternal Grandmother         ? age dx   • Dementia Maternal Grandmother    • Hypertension Maternal Grandmother    • Ovarian cancer Daughter 23   • Diabetes Daughter    • Depression Daughter    • Diabetes Sister    • Diabetes Brother    • Heart disease Brother    • Cancer Brother          at age 43   • Heart disease Maternal Uncle    • Cancer Paternal Uncle    • Clotting disorder Paternal Grandmother    • Colon cancer Neg Hx    • Colon polyps Neg Hx    • Malig Hyperthermia Neg Hx        Home Meds:  Medications Prior to Admission   Medication Sig Dispense Refill Last Dose   • Alcohol Swabs (B-D SINGLE USE SWABS REGULAR) pads 1 each 3 (Three) Times a Day Before Meals. 200 each 11    • Blood Glucose Calibration (ACCU-CHEK LUIS ALBERTO) solution 1 each by In Vitro route 3 (Three) Times a Day Before Meals. 5 each 5    • Blood Glucose Monitoring Suppl (ACCU-CHEK LUIS ALBERTO PLUS) w/Device kit 1 each 3 (Three) Times a Day Before Meals. 1 kit 2    • citalopram (CeleXA) 10 MG tablet Take 1 tablet by mouth Daily. 90 tablet 1    • colestipol (COLESTID) 1 g tablet Take 1 tablet by mouth 2 (Two) Times a Day. 60 tablet 5    • Cranberry-Vit C-Lactobacillus (CRANBERRY/PROBIOTIC/VIT C PO) Take 1 tablet by mouth Daily.      • Cyanocobalamin (VITAMIN B 12) 500 MCG tablet Take 500 mcg by mouth Daily.      • docusate sodium (Colace) 100 MG capsule Take 1 capsule by mouth Daily As Needed for Constipation. 30 capsule 1    • Empagliflozin (Jardiance) 10 MG tablet Take 10 mg by mouth Daily. 30 tablet 5    • folic acid (FOLVITE) 1 MG tablet Take 4 tablets by mouth Daily.      • furosemide (LASIX) 80 MG tablet Take 80 mg by mouth 2 (Two) Times a Day.      • glucose blood (Accu-Chek  Ilana Plus) test strip 1 each by Other route 3 (Three) Times a Day Before Meals. Use as instructed 200 each 11    • HYDROcodone-acetaminophen (NORCO) 5-325 MG per tablet Take 1 tablet by mouth Every 6 (Six) Hours As Needed for Moderate Pain . 30 tablet 0    • insulin aspart (NovoLOG FlexPen) 100 UNIT/ML solution pen-injector sc pen Inject 5 Units under the skin into the appropriate area as directed 3 (Three) Times a Day With Meals. SSI-DEPENDS PM BLOOD GLUCOSE 5 pen 5    • Insulin Pen Needle (RELION PEN NEEDLE 31G/8MM) 31G X 8 MM misc 1 each 3 (Three) Times a Day. 90 each 11    • Lancets (ACCU-CHEK MULTICLIX) lancets 1 each by Other route 3 (Three) Times a Day Before Meals. Use as instructed 200 each 11    • Lancets Misc. (ACCU-CHEK MULTICLIX LANCET DEV) kit 1 each 3 (Three) Times a Day Before Meals. 200 each 11    • lisinopril (PRINIVIL,ZESTRIL) 10 MG tablet Take 1 tablet by mouth Daily. 30 tablet 3    • metoprolol succinate XL (TOPROL-XL) 200 MG 24 hr tablet Take 1 tablet by mouth Daily. 90 tablet 1    • omeprazole (priLOSEC) 20 MG capsule Take 1 capsule by mouth Daily. 90 capsule 1    • ondansetron (Zofran) 4 MG tablet Take 1 tablet by mouth Daily As Needed for Nausea or Vomiting. 30 tablet 1    • phenazopyridine (Pyridium) 100 MG tablet Take 1 tablet by mouth 3 (Three) Times a Day As Needed for Bladder Spasms. 9 tablet 0    • pregabalin (LYRICA) 150 MG capsule Take 150 mg by mouth 3 (Three) Times a Day.      • rOPINIRole (REQUIP) 1 MG tablet Take 3 mg by mouth Every Night.      • senna (senna) 8.6 MG tablet Take 8.6 mg by mouth Daily.        Current Meds:   sodium chloride, 10 mL, Intravenous, Q12H  sodium chloride, 3 mL, Intravenous, Q12H      Allergies:  Allergies   Allergen Reactions   • Metformin Other (See Comments)     Kidney failure     Review of Systems  Review of systems could not be obtained due to   patient confusion.     Objective     Vital Signs  Temp:  [96.5 °F (35.8 °C)] 96.5 °F (35.8 °C)  Heart  Rate:  [113-172] 113  Resp:  [17-18] 17  BP: (110-192)/() 166/89  Physical Exam:  General Appearance:    Alert, cooperative, in no acute distress   Head:    Normocephalic, without obvious abnormality, atraumatic   Eyes:          conjunctivae and sclerae normal, no   icterus   Throat:   no thrush, oral mucosa moist   Neck:   Supple, no adenopathy   Lungs:     Clear to auscultation bilaterally    Heart:    Regular rhythm and normal rate    Chest Wall:    No abnormalities observed   Abdomen:     Soft, nondistended, nontender; normal bowel sounds   Extremities:   no edema, no redness   Skin:   No bruising or rash   Psychiatric:  normal mood and insight     Results Review:   I reviewed the patient's new clinical results.    Results from last 7 days   Lab Units 06/11/21  0223   WBC 10*3/mm3 25.83*   HEMOGLOBIN g/dL 9.0*   HEMATOCRIT % 29.1*   PLATELETS 10*3/mm3 411     Results from last 7 days   Lab Units 06/11/21  0403 06/11/21  0223   SODIUM mmol/L 141 139   POTASSIUM mmol/L 4.2 4.6   CHLORIDE mmol/L 106 97*   CO2 mmol/L 23.4 21.4*   BUN mg/dL 48* 47*   CREATININE mg/dL 1.47* 1.69*   CALCIUM mg/dL 7.9* 8.9   BILIRUBIN mg/dL 0.3 0.6   ALK PHOS U/L 78 93   ALT (SGPT) U/L 17 20   AST (SGOT) U/L 11 14   GLUCOSE mg/dL 500* 622*     Results from last 7 days   Lab Units 06/11/21  0223   INR  1.13*     No results found for: LIPASE    Radiology:  XR Chest 1 View   Final Result         Electronically signed by Glynn Henry M.D. on 06-11-21 at 0613      CT Head Without Contrast   Final Result         Electronically signed by Fredy Cervantes MD on 06-11-21 at 0411          Assessment/Plan   Patient Active Problem List   Diagnosis   • Essential hypertension   • Snoring   • TIA (transient ischemic attack)   • GERD without esophagitis   • Inflamed seborrheic keratosis   • Type 2 diabetes mellitus without complication, without long-term current use of insulin (CMS/HCC)   • Peripheral neuropathy   • Restless legs syndrome   •  Benign paroxysmal positional vertigo   • Bone lesion   • Vitamin D deficiency   • Dyslipidemia   • Muscle spasm of both lower legs   • Tobacco use disorder   • Abnormal lung sounds   • Intermittent claudication (CMS/HCC)   • Chronic respiratory failure with hypoxia (CMS/HCC)   • Leg mass, right   • Rib pain on left side   • Diabetic autonomic neuropathy associated with type 2 diabetes mellitus (CMS/HCC)   • Leukocytosis   • Primary insomnia   • PMB (postmenopausal bleeding)   • Family history of ovarian cancer   • Acute renal failure (ARF) (CMS/HCC)   • Duplicated renal collecting system   • Atherosclerotic vascular disease   • Elevated platelet count   • Low hemoglobin   • Other anomalies of uterus   • Multiple kidney stones   • Multiple kidney stones   • Bilateral lower extremity edema   • Diarrhea   • Sore on leg   • Syncope and collapse   • Acute renal failure (CMS/HCC)   • Unspecified malignant neoplasm of skin of nose   • Shortness of breath   • Pain, unspecified   • Other specified coagulation defects (CMS/HCC)   • Encounter for change or removal of nonsurgical wound dressing   • Chronic obstructive pulmonary disease, unspecified (CMS/HCC)   • Iron deficiency anemia   • Anxiety   • History of subdural hematoma   • Back pain   • Diabetes mellitus (CMS/HCC)   • Malignant melanoma (CMS/HCC)   • Panic attack   • Acute renal failure (CMS/HCC)   • Calculus of kidney   • Restless legs syndrome   • Transient ischemic attack   • Right shoulder injury, initial encounter   • Nonspecific abnormal findings on imaging examination of skull and head   • Hyperosmolality       Assessment:  1. Acute blood loss anemia  2. Melena  3. Diabetes  4. Kidney stones  5. Confusion/combativeness    Plan:  · We will schedule EGD  · Continue PPI  · Monitor H&H      I discussed the patients findings and my recommendations with nursing staff and primary care team.    Donovan Banks MD

## 2021-06-11 NOTE — BRIEF OP NOTE
ESOPHAGOGASTRODUODENOSCOPY AT BEDSIDE  Progress Note    Jayne Beltran  6/11/2021    Pre-op Diagnosis:   Gastrointestinal hemorrhage, unspecified gastrointestinal hemorrhage type [K92.2]  Other iron deficiency anemia [D50.8]  Melena [K92.1]       Post-Op Diagnosis Codes:     * Gastrointestinal hemorrhage, unspecified gastrointestinal hemorrhage type [K92.2]     * Other iron deficiency anemia [D50.8]     * Melena [K92.1]    Procedure/CPT® Codes:        Procedure(s):  ESOPHAGOGASTRODUODENOSCOPY AT BEDSIDE    Surgeon(s):  Donovan Banks MD    Anesthesia: Monitored Anesthesia Care    Staff:   * No surgical staff found *         Estimated Blood Loss: none    Urine Voided: * No values recorded between 6/11/2021 12:00 AM and 6/11/2021 11:26 AM *    Specimens:                None          Drains:   Ureteral Drain/Stent Right ureter 4.8 Fr. (Active)       Ureteral Drain/Stent Right ureter 4.8 Fr. (Active)       Findings: Esophagus: GE junction irregular, otherwise normal in appearance.                    Stomach: Large bezoar of retained food as well as old heme obscuring the fundus, body, and greater curvature of the stomach                    Duodenum: Normal appearance to bulb and descending duodenum    Complications: None    Impression: Upper GI bleed with large volume of retained food and heme precluding evaluation of the gastric mucosa    Recommendation: Initiate prokinetic agent in the form of Reglan 10 mg IV every 6 hours  Monitor H&H, transfuse as needed  Continue PPI  Repeat EGD tomorrow when gastric lumen cleared      Donoavn Banks MD     Date: 6/11/2021  Time: 11:26 EDT

## 2021-06-11 NOTE — PROGRESS NOTES
Nursing asked me to see patient stat at bedside due to more aggressive behavior.  Very confused.  Nonpurposeful movement.  Has been yelling out.  Taking several people to hold her down in order to get the blood draw for labs.  Moving all extremities.  Received packed red blood cells for anemia.  Now febrile to 102 but she was febrile prior to blood as well.  She received a dose of Rocephin in the emergency room.  Unclear source of infection.  No meningismus signs.  CT head shows no acute.  Received Haldol and Zyprexa without effect.  She looks more like someone who is withdrawing from alcohol or drugs but family is adamant that she has never been an abuser of drugs or alcohol.  Discussed with nursing at bedside.  Chart reviewed.    Vitals:    06/11/21 1423 06/11/21 1519 06/11/21 1530 06/11/21 1601   BP:  (!) 189/83 178/78    BP Location:       Patient Position:       Pulse:  (!) 154 (!) 156    Resp:  (!) 44     Temp: (!) 102.9 °F (39.4 °C) (!) 101.4 °F (38.6 °C)  (!) 100.9 °F (38.3 °C)   TempSrc: Oral   Oral   SpO2:  97% 97%    Weight:       Height:         Very restless and agitated.  Moving all extremities.  No nuchal rigidity  Pupils equal round reactive light, anicteric sclera  No JVD, no thyromegaly  Diminished but clear breath sounds.  No wheezing or rhonchi  Tachycardic without murmur rub  Abdomen soft, no hepatosplenomegaly, positive bowel sounds  No edema, cyanosis or clubbing  Normal skin turgor and capillary refill      Labs/films reviewed     Impression/plan:  Acute blood loss anemia with GI bleed and melanotic stools/coffee-ground emesis: No significant abnormality found on scope  Lactic acidosis  Hyperosmolar nonketotic syndrome  Leukocytosis  Tachycardia  Hypertension  Encephalopathy  COPD without exacerbation  History of tobacco use      May all simply be related to her hyperosmolar state with worsening confusion and encephalopathy.  We will give some Ativan and have Precedex available.  Not terribly  suspicious for meningitis but will consult infectious disease and give her a dose of vancomycin just to cover.  Continue insulin protocol.  Consult neurology and infectious disease.    CCT: 45 min      Electronically signed by Olaf Espinoza MD, 06/11/21, 4:30 PM EDT.

## 2021-06-11 NOTE — ANESTHESIA PREPROCEDURE EVALUATION
Anesthesia Evaluation     Patient summary reviewed                Airway   No difficulty expected  Dental      Pulmonary    (+) COPD,   Cardiovascular     ECG reviewed  Rhythm: regular  Rate: abnormal    (+) hypertension,       Neuro/Psych  GI/Hepatic/Renal/Endo    (+)  GI bleeding , diabetes mellitus,     Musculoskeletal     Abdominal    Substance History      OB/GYN          Other                        Anesthesia Plan    ASA 3     MAC       Anesthetic plan, all risks, benefits, and alternatives have been provided, discussed and informed consent has been obtained with: patient.

## 2021-06-11 NOTE — CASE MANAGEMENT/SOCIAL WORK
Discharge Planning Assessment  [facility]     Patient Name: Jayne Beltran  MRN: [id number]  Today's Date: 6/11/2021    Admit Date: 6/11/2021    Discharge Needs Assessment     Row Name 06/11/21 1504       Living Environment    Lives With  spouse    Current Living Arrangements  home/apartment/condo    Potentially Unsafe Housing Conditions  unable to assess    Primary Care Provided by  self    Provides Primary Care For  no one    Family Caregiver if Needed  none    Quality of Family Relationships  supportive    Able to Return to Prior Arrangements  yes       Resource/Environmental Concerns    Resource/Environmental Concerns  none    Transportation Concerns  car, none       Transition Planning    Patient/Family Anticipates Transition to  home with family    Patient/Family Anticipated Services at Transition  none    Transportation Anticipated  family or friend will provide       Discharge Needs Assessment    Equipment Currently Used at Home  none    Concerns to be Addressed  discharge planning    Anticipated Changes Related to Illness  none    Equipment Needed After Discharge  other (see comments)    Discharge Facility/Level of Care Needs  other (see comments)        Discharge Plan     Row Name 06/11/21 7459       Plan    Plan Comments  IMM noted  CCP spoke with patient’s daughter at [facility].  CCP role explained and discharge planning discussed. Face sheet verified.   Pt emergency contact is her  Golden, [phone].  Pt lives in a house with her .  She uses no DME to ambulate.  Patient is normally independent with ADL’s.  Pt has no past history of Home Health.  Pt has no previous rehab stays.   Pt obtains her  medications from [facility]’s pharmacy in [place].   Plan is undetermined  Pt is confused and unable to participate in DC planning.   CCP following for needs    Row Name 06/11/21 2466       Plan    Plan  Undetermined  Discharge plans pending clinical course.        Continued Care and Services -  Admitted Since 6/11/2021    Coordination has not been started for this encounter.         Demographic Summary    No documentation.       Functional Status    No documentation.       Psychosocial    No documentation.       Abuse/Neglect    No documentation.       Legal    No documentation.       Substance Abuse    No documentation.       Patient Forms    No documentation.           Diane Eaton RN

## 2021-06-11 NOTE — CONSULTS
"Adult Nutrition  Assessment/PES    Patient Name:  Jayne Beltran  YOB: 1955  MRN: [id number]  Admit Date:  6/11/2021    Assessment Date:  6/11/2021    Comments:  Nutrition Consult. Pt here for Anemia,GI bleed, Encephalopathy, COPD, lactic acidosis.  Also with significant hyperglycemia/hyperosmolar and started on insulin drip.  Currently NPO and on Protonix drip for GI bleed. GI consult is pending.  Pt confused, combative last night.  Will follow clinical course, nutritional needs. Offer diet education if mental status clears up.    Reason for Assessment     Row Name 06/11/21 0820          Reason for Assessment    Reason For Assessment  physician consult     Diagnosis  gastrointestinal disease;neurologic conditions;diabetes diagnosis/complications GI Bleed, Hyperglycemia - Hyperosmolar, AMS, Encephalopathy Hx SAH, DM, COPD         Nutrition/Diet History     Row Name 06/11/21 0821          Nutrition/Diet History    Factors Affecting Nutritional Intake  impaired cognitive status/motor control         Anthropometrics     Row Name 06/11/21 0822 06/11/21 0610       Anthropometrics    Height  165.1 cm (65\")  --    Weight  --  71.6 kg (157 lb 13.6 oz)       Admit Weight    Admit Weight  71.6 kg (157 lb 13.6 oz)  --       Ideal Body Weight (IBW)    Ideal Body Weight (IBW) (kg)  57.29  --    % of Ideal Body Weight Assessment  -- 130% IBW  --       Body Mass Index (BMI)    BMI Assessment  BMI 25-29.9: overweight BMI 26.3  --    Row Name 06/11/21 0220          Anthropometrics    Height  165.1 cm (65\")     Weight  74.4 kg (164 lb)        Ideal Body Weight (IBW)    Ideal Body Weight (IBW) (kg)  57.29     % Ideal Body Weight  129.85        Body Mass Index (BMI)    BMI (kg/m2)  27.35         Labs/Tests/Procedures/Meds     Row Name 06/11/21 0822          Labs/Procedures/Meds    Lab Results Reviewed  reviewed, pertinent     Lab Results Comments  Glu, BUN, Cr, Hgba1c, h/h, wbc, lactate        Diagnostic " Tests/Procedures    Diagnostic Test/Procedure Reviewed  reviewed, pertinent     Diagnostic Test/Procedures Comments  CT head        Medications    Pertinent Medications Reviewed  reviewed, pertinent     Pertinent Medications Comments  insulin drip, protonix drip         Physical Findings     Row Name 06/11/21 0824          Physical Findings    Overall Physical Appearance  overweight;on oxygen therapy     Gastrointestinal  other (see comments) dark red stools     Oral/Mouth Cavity  poor dentition     Skin  other (see comments)         Nutrition Prescription Ordered     Row Name 06/11/21 0825          Nutrition Prescription PO    Current PO Diet  NPO         Evaluation of Received Nutrient/Fluid Intake     Row Name 06/11/21 0826          Fluid Intake Evaluation    IV Fluid (mL)  3600         Problem/Interventions:  Problem 1     Row Name 06/11/21 0826          Nutrition Diagnoses Problem 1    Problem 1  Altered GI Function     Etiology (related to)  Medical Diagnosis     Gastrointestinal  Gastrointestinal bleed           Intervention Goal     Row Name 06/11/21 0826          Intervention Goal    General  Maintain nutrition;Improved nutrition related lab(s);Reduce/improve symptoms;Meet nutritional needs for age/condition;Disease management/therapy     PO  Initiate feeding;Tolerate PO     Weight  No significant weight loss         Nutrition Intervention     Row Name 06/11/21 0826          Nutrition Intervention    RD/Tech Action  Follow Tx progress;Care plan reviewd;Await begin PO           Education/Evaluation     Row Name 06/11/21 0826          Education    Education  Education not appropriate at this time;Will Instruct as appropriate     Please explain  Patient confusion        Monitor/Evaluation    Monitor  Per protocol;Skin status;I&O;Symptoms;Pertinent labs;Weight           Electronically signed by:  Melanie Tracy RD  06/11/21 08:27 EDT

## 2021-06-11 NOTE — ED TRIAGE NOTES
Patient presents to ED from home via ems.  Patient has had bloody stool that started this evening, notes coffee ground emesis this am.  Patient also exhibits left upper extremity weakness that started yesterday.  Hx of CVA.  Per EMS, patient was yelling at crew and speech is clear, no facial droop noted.  No blood thinners noted. This nurse went up to patient to ask remainder of triage questions, patient would look at staff when they were speaking to her but when asked a question patient would not respond to said questions.  Patient looked to her right, this nurse sternal rubbed patient.  Still would not answer questions but would look at staff. Patient wearing mask, nurse wearing mask and protective eyewear during care and assessment.  Hand hygiene performed prior to and post care.

## 2021-06-11 NOTE — NURSING NOTE
"This RN completed admission database with patient's spouse, Golden Beltran. Per , patient is a [employer]. This RN explained patient's status and concern that she is bleeding. Per , \"she can have blood products. She hasn't been baptized yet so I'm okay with her having blood.\" Second RN, Bren Roper verified Golden Beltran statement and consent obtained. Patient unable to consent at this time.   "

## 2021-06-11 NOTE — TELEPHONE ENCOUNTER
I called the patients . He said she vomited last night and said it looked like chocolate. He said she cheats a lot on her diet. She became dizzy last night and then fell in the floor.  She would not respond verbally. She became sweaty and clammy.      She had a bloody stool. She went to [facility]. He states she is having an EGD today.    He states her blood sugar was 683 and she was put on an insulin drip. She eats candy all day long and drinks Mountain Dew a lot.    He states she is still not talking and he thinks she may have had a stroke.

## 2021-06-11 NOTE — ED PROVIDER NOTES
EMERGENCY DEPARTMENT ENCOUNTER    Room Number:  N324/1  Date of encounter:  6/15/2021  PCP: Michael Arriaza Jr., DO  Historian: EMS      HPI:  Chief Complaint: Altered mental status  A complete HPI/ROS/PMH/PSH/SH/FH are unobtainable due to: Altered mental status    Context: Jayne Beltran is a 65 y.o. female who presents to the ED c/o dark black rectal bleeding along with left-sided weakness and confusion.  The sequence of events and timing is unclear, but it appears that most of this began shortly prior to calling EMS.  We do know from reviewing the records that the patient has history of left-sided CVA with chronic weakness.  Not much else is known about her history.      PAST MEDICAL HISTORY  Active Ambulatory Problems     Diagnosis Date Noted   • Essential hypertension 03/24/2016   • Snoring 03/24/2016   • TIA (transient ischemic attack) 03/24/2016   • GERD without esophagitis 06/15/2017   • Inflamed seborrheic keratosis 06/15/2017   • Type 2 diabetes mellitus without complication, without long-term current use of insulin (CMS/[facility]) 06/15/2017   • Peripheral neuropathy 06/15/2017   • Restless legs syndrome 06/15/2017   • Benign paroxysmal positional vertigo 03/22/2016   • Bone lesion 10/13/2017   • Vitamin D deficiency 11/01/2017   • Dyslipidemia 11/01/2017   • Muscle spasm of both lower legs 08/29/2019   • Tobacco use disorder 08/29/2019   • Abnormal lung sounds 08/29/2019   • Intermittent claudication (CMS/[facility]) 09/05/2019   • Chronic respiratory failure with hypoxia (CMS/[facility]) 12/13/2019   • Leg mass, right 12/30/2019   • Rib pain on left side 12/30/2019   • Diabetic autonomic neuropathy associated with type 2 diabetes mellitus (CMS/[facility]) 01/10/2020   • Leukocytosis 02/12/2020   • Primary insomnia 02/18/2020   • PMB (postmenopausal bleeding) 02/25/2020   • Family history of ovarian cancer 02/25/2020   • Acute renal failure (ARF) (CMS/[facility]) 03/03/2020   • Duplicated renal collecting system 03/10/2020   •  Atherosclerotic vascular disease 03/10/2020   • Elevated platelet count 03/12/2020   • Low hemoglobin 03/12/2020   • Other anomalies of uterus 03/12/2020   • Multiple kidney stones 03/12/2020   • Multiple kidney stones 03/12/2020   • Bilateral lower extremity edema 04/29/2020   • Diarrhea 06/25/2020   • Sore on leg 06/26/2020   • Syncope and collapse 08/24/2020   • Acute renal failure (CMS/[facility]) 08/24/2020   • Unspecified malignant neoplasm of skin of nose 09/04/2020   • Shortness of breath 09/04/2020   • Pain, unspecified 09/04/2020   • Other specified coagulation defects (CMS/[facility]) 09/04/2020   • Encounter for change or removal of nonsurgical wound dressing 09/04/2020   • Chronic obstructive pulmonary disease, unspecified (CMS/[facility]) 09/04/2020   • Iron deficiency anemia 09/28/2020   • Anxiety 09/30/2020   • History of subdural hematoma 10/29/2020   • Back pain 11/25/2020   • Diabetes mellitus (CMS/[facility]) 11/25/2020   • Malignant melanoma (CMS/[facility]) 11/25/2020   • Panic attack 11/25/2020   • Acute renal failure (CMS/[facility]) 11/25/2020   • Calculus of kidney 11/25/2020   • Restless legs syndrome 11/25/2020   • Transient ischemic attack 11/25/2020   • Right shoulder injury, initial encounter 11/25/2020   • Nonspecific abnormal findings on imaging examination of skull and head 12/07/2020   • Dependence on renal dialysis (CMS/[facility]) 06/11/2021   • Aortic valve calcification 06/14/2021   • Impaired left ventricular relaxation 06/14/2021   • Concentric left ventricular hypertrophy 06/14/2021   • Left atrial dilatation 06/14/2021   • Nonrheumatic mitral valve regurgitation 06/14/2021     Resolved Ambulatory Problems     Diagnosis Date Noted   • Dizzy 03/24/2016   • Hypersomnolence    • Chronic bilateral low back pain without sciatica 02/28/2017   • Cervical radiculopathy, acute 01/14/2019   • Ingrown toenail 08/29/2019   • Generalized edema 04/29/2020   • Eyelid edema 04/29/2020   • Traumatic subarachnoid hemorrhage with loss of  consciousness of 30 minutes or less (CMS/[facility]) 2020     Past Medical History:   Diagnosis Date   • COPD (chronic obstructive pulmonary disease) (CMS/[facility])    • Fibroid    • Hypertension    • Neuromuscular disorder (CMS/[facility])    • Skin cancer          PAST SURGICAL HISTORY  Past Surgical History:   Procedure Laterality Date   • CHOLECYSTECTOMY     • COLONOSCOPY     • ENDOSCOPY N/A 2021    Procedure: ESOPHAGOGASTRODUODENOSCOPY AT BEDSIDE;  Surgeon: Donovan Banks MD;  Location: [facility] ENDOSCOPY;  Service: Gastroenterology;  Laterality: N/A;  pre: melena, GI bleed, anemia   post: retained food    • ENDOSCOPY N/A 2021    Procedure: ESOPHAGOGASTRODUODENOSCOPY WITH GOLD PROBE CAUTERY 7FR TO BLEEDING GASTRIC ANTRUM ULCER;  Surgeon: Calvin Barraza MD;  Location: [facility] ENDOSCOPY;  Service: Gastroenterology;  Laterality: N/A;  PRE- GI BLEED  POST- BLEEDING GASTRIC ANTRUM ULCER   • INSERTION HEMODIALYSIS CATHETER N/A 9/3/2020    Procedure: HEMODIALYSIS CATHETER INSERTION;  Surgeon: Sergio Durant MD;  Location: [facility] OR;  Service: Vascular;  Laterality: N/A;   • KIDNEY STONE SURGERY     • URETEROSCOPY LASER LITHOTRIPSY WITH STENT INSERTION Right 2020    Procedure: CYSTOSCOPY, RIGHT URETEROSCOPY, RIGHT RETROGRADE PYELOGRAM, LASER LITHOTRIPSY WITH RIGHT URETERAL STENT EXCHANGE;  Surgeon: Mikie Mejia MD;  Location: [facility] OR;  Service: Urology;  Laterality: Right;         FAMILY HISTORY  Family History   Problem Relation Age of Onset   • Heart disease Mother          at age 63   • Diabetes Mother    • Hypertension Mother    • Diabetes Father    • Deep vein thrombosis Father    • Depression Father    • Liver disease Father    • Lung cancer Father 58         at age 68   • Breast cancer Maternal Grandmother         ? age dx   • Dementia Maternal Grandmother    • Hypertension Maternal Grandmother    • Ovarian cancer Daughter 23   • Diabetes Daughter    • Depression  Daughter    • Diabetes Sister    • Diabetes Brother    • Heart disease Brother    • Cancer Brother          at age 43   • Heart disease Maternal Uncle    • Cancer Paternal Uncle    • Clotting disorder Paternal Grandmother    • Colon cancer Neg Hx    • Colon polyps Neg Hx    • Malig Hyperthermia Neg Hx          SOCIAL HISTORY  Social History     Socioeconomic History   • Marital status:      Spouse name: Golden   • Number of children: 2   • Years of education: 12   • Highest education level: High school graduate   Tobacco Use   • Smoking status: Current Every Day Smoker     Packs/day: 0.25     Years: 45.00     Pack years: 11.25     Types: Cigarettes   • Smokeless tobacco: Never Used   • Tobacco comment: uses vape and smokes cigarettes   Vaping Use   • Vaping Use: Never used   Substance and Sexual Activity   • Alcohol use: No   • Drug use: No   • Sexual activity: Yes     Partners: Male     Birth control/protection: Post-menopausal         ALLERGIES  Metformin        REVIEW OF SYSTEMS  Review of Systems   Limited due to poor historian      PHYSICAL EXAM    I have reviewed the triage vital signs and nursing notes.    ED Triage Vitals [21 0157]   Temp Heart Rate Resp BP SpO2   96.5 °F (35.8 °C) (!) 140 18 110/72 98 %      Temp src Heart Rate Source Patient Position BP Location FiO2 (%)   -- -- -- -- --       Physical Exam  GENERAL: Chronically ill-appearing, disoriented and agitated  HENT: nares patent  EYES: no scleral icterus  CV: Tachycardic  RESPIRATORY: normal effort  ABDOMEN: soft  MUSCULOSKELETAL: no deformity  NEURO: Disoriented and agitated.  Patient has pre-existing left-sided weakness, and the exam is very limited because of her altered mental status with respect to new deficits.  Family member at bedside thought he saw some changes at home, but currently thinks this is normal for her except for the confusion  SKIN: warm, dry        LAB RESULTS  Recent Results (from the past 24 hour(s))    POC Glucose Once    Collection Time: 06/14/21 11:44 PM    Specimen: Blood   Result Value Ref Range    Glucose 118 70 - 130 mg/dL   Uric Acid    Collection Time: 06/15/21  4:50 AM    Specimen: Blood   Result Value Ref Range    Uric Acid 5.3 2.4 - 5.7 mg/dL   Phosphorus    Collection Time: 06/15/21  4:50 AM    Specimen: Blood   Result Value Ref Range    Phosphorus 2.8 2.5 - 4.5 mg/dL   Magnesium    Collection Time: 06/15/21  4:50 AM    Specimen: Blood   Result Value Ref Range    Magnesium 2.0 1.6 - 2.4 mg/dL   Comprehensive Metabolic Panel    Collection Time: 06/15/21  4:50 AM    Specimen: Blood   Result Value Ref Range    Glucose 122 (H) 65 - 99 mg/dL    BUN 12 8 - 23 mg/dL    Creatinine 1.08 (H) 0.57 - 1.00 mg/dL    Sodium 142 136 - 145 mmol/L    Potassium 3.3 (L) 3.5 - 5.2 mmol/L    Chloride 109 (H) 98 - 107 mmol/L    CO2 22.1 22.0 - 29.0 mmol/L    Calcium 7.9 (L) 8.6 - 10.5 mg/dL    Total Protein 5.2 (L) 6.0 - 8.5 g/dL    Albumin 2.90 (L) 3.50 - 5.20 g/dL    ALT (SGPT) 38 (H) 1 - 33 U/L    AST (SGOT) 42 (H) 1 - 32 U/L    Alkaline Phosphatase 58 39 - 117 U/L    Total Bilirubin 0.4 0.0 - 1.2 mg/dL    eGFR Non African Amer 51 (L) >60 mL/min/1.73    Globulin 2.3 gm/dL    A/G Ratio 1.3 g/dL    BUN/Creatinine Ratio 11.1 7.0 - 25.0    Anion Gap 10.9 5.0 - 15.0 mmol/L   CBC Auto Differential    Collection Time: 06/15/21  4:50 AM    Specimen: Blood   Result Value Ref Range    WBC 10.33 3.40 - 10.80 10*3/mm3    RBC 2.79 (L) 3.77 - 5.28 10*6/mm3    Hemoglobin 8.2 (L) 12.0 - 15.9 g/dL    Hematocrit 25.1 (L) 34.0 - 46.6 %    MCV 90.0 79.0 - 97.0 fL    MCH 29.4 26.6 - 33.0 pg    MCHC 32.7 31.5 - 35.7 g/dL    RDW 15.3 12.3 - 15.4 %    RDW-SD 49.4 37.0 - 54.0 fl    MPV 10.9 6.0 - 12.0 fL    Platelets 232 140 - 450 10*3/mm3    Neutrophil % 74.1 42.7 - 76.0 %    Lymphocyte % 14.1 (L) 19.6 - 45.3 %    Monocyte % 7.4 5.0 - 12.0 %    Eosinophil % 2.4 0.3 - 6.2 %    Basophil % 0.5 0.0 - 1.5 %    Immature Grans % 1.5 (H) 0.0 - 0.5 %     Neutrophils, Absolute 7.65 (H) 1.70 - 7.00 10*3/mm3    Lymphocytes, Absolute 1.46 0.70 - 3.10 10*3/mm3    Monocytes, Absolute 0.76 0.10 - 0.90 10*3/mm3    Eosinophils, Absolute 0.25 0.00 - 0.40 10*3/mm3    Basophils, Absolute 0.05 0.00 - 0.20 10*3/mm3    Immature Grans, Absolute 0.16 (H) 0.00 - 0.05 10*3/mm3    nRBC 0.1 0.0 - 0.2 /100 WBC   POC Glucose Once    Collection Time: 06/15/21  6:42 AM    Specimen: Blood   Result Value Ref Range    Glucose 136 (H) 70 - 130 mg/dL   POC Glucose Once    Collection Time: 06/15/21 11:01 AM    Specimen: Blood   Result Value Ref Range    Glucose 181 (H) 70 - 130 mg/dL   Duplex Carotid Ultrasound CAR    Collection Time: 06/15/21  3:43 PM   Result Value Ref Range    Prox CCA PSV -88.1 cm/sec    Prox CCA PSV -92.0 cm/sec    Prox CCA EDV -21.3 cm/sec    Prox CCA EDV -26.1 cm/sec    Dist CCA PSV -87.5 cm/sec    Dist CCA PSV 73.6 cm/sec    Dist CCA EDV -26.9 cm/sec    Dist CCA EDV 17.5 cm/sec    Prox ECA PSV -93.6 cm/sec    Prox ECA PSV -78.3 cm/sec    Prox ECA EDV -17.3 cm/sec    Prox ECA EDV -11.3 cm/sec    Prox ICA PSV 77.8 cm/sec    Prox ICA PSV -65.2 cm/sec    Prox ICA EDV 17.5 cm/sec    Prox ICA EDV -25.9 cm/sec    Mid ICA PSV -97.2 cm/sec    Mid ICA PSV -105.6 cm/sec    Mid ICA EDV -29.1 cm/sec    Mid ICA EDV -27.7 cm/sec    Dist ICA PSV -80.7 cm/sec    Dist ICA PSV -85.7 cm/sec    Dist ICA EDV -28.0 cm/sec    Dist ICA EDV -29.4 cm/sec    Vertebral A PSV -86.2 cm/sec    Vertebral A PSV -61.1 cm/sec    Vertebral A EDV -25.9 cm/sec    Vertebral A EDV -16.5 cm/sec    Prox SCLA .2 cm/sec    Prox SCLA PSV -95.9 cm/sec   POC Glucose Once    Collection Time: 06/15/21  5:51 PM    Specimen: Blood   Result Value Ref Range    Glucose 245 (H) 70 - 130 mg/dL       Ordered the above labs and independently reviewed the results.        RADIOLOGY  No Radiology Exams Resulted Within Past 24 Hours    I ordered the above noted radiological studies. Reviewed by me and discussed with  radiologist.  See dictation for official radiology interpretation.      PROCEDURES    Critical Care  Performed by: Joon Mayen MD  Authorized by: Joon Mayen MD     Critical care provider statement:     Critical care time was exclusive of:  Separately billable procedures and treating other patients    Critical care was necessary to treat or prevent imminent or life-threatening deterioration of the following conditions:  Metabolic crisis (Massive GI bleed)    Critical care was time spent personally by me on the following activities:  Discussions with consultants, evaluation of patient's response to treatment, development of treatment plan with patient or surrogate, examination of patient, ordering and performing treatments and interventions, ordering and review of laboratory studies, ordering and review of radiographic studies, pulse oximetry, re-evaluation of patient's condition and review of old charts    I assumed direction of critical care for this patient from another provider in my specialty: no            MEDICATIONS GIVEN IN ER    Medications   sodium chloride 0.9 % flush 10 mL (has no administration in time range)   sodium chloride 0.9 % flush 3 mL (3 mL Intravenous Given 6/15/21 0855)   sodium chloride 0.9 % flush 10 mL (has no administration in time range)   sodium chloride 0.9 % flush 10 mL (has no administration in time range)   sodium chloride 0.9 % flush 10 mL (10 mL Intravenous Given 6/15/21 0855)   sodium chloride 0.9 % flush 10 mL (has no administration in time range)   acetaminophen (TYLENOL) tablet 650 mg (650 mg Oral Given 6/15/21 1145)     Or   acetaminophen (TYLENOL) suppository 650 mg ( Rectal Not Given:  See Alt 6/15/21 1145)   potassium chloride (K-DUR,KLOR-CON) ER tablet 40 mEq (40 mEq Oral Given 6/15/21 1759)     Or   potassium chloride (KLOR-CON) packet 40 mEq ( Oral Not Given:  See Alt 6/15/21 1759)     Or   potassium chloride 10 mEq in 100 mL IVPB ( Intravenous Not Given:  See  Alt 6/15/21 1759)   magnesium sulfate 4 gram infusion - Mg less than or equal to 1mg/dL ( Intravenous Not Given:  See Alt 6/11/21 1439)     Or   magnesium sulfate 3 gram infusion (1gm x 3) - Mg 1.1 - 1.5 mg/dL (1 g Intravenous New Bag 6/11/21 1439)     Or   Magnesium Sulfate 2 gram infusion- Mg 1.6 - 1.9 mg/dL ( Intravenous Not Given:  See Alt 6/11/21 1439)   potassium phosphate 45 mmol in sodium chloride 0.9 % 500 mL infusion ( Intravenous Not Given:  See Alt 6/11/21 1548)     Or   potassium phosphate 30 mmol in sodium chloride 0.9 % 250 mL infusion (30 mmol Intravenous New Bag 6/11/21 1548)     Or   potassium phosphate 15 mmol in sodium chloride 0.9 % 100 mL infusion ( Intravenous Not Given:  See Alt 6/11/21 1548)     Or   sodium phosphates 40 mmol in sodium chloride 0.9 % 500 mL IVPB ( Intravenous Not Given:  See Alt 6/11/21 1548)     Or   sodium phosphates 20 mmol in sodium chloride 0.9 % 250 mL IVPB ( Intravenous Not Given:  See Alt 6/11/21 1548)   calcium gluconate 1g/50ml 0.675% NaCl IV SOLN (has no administration in time range)     And   calcium gluconate 6 g in sodium chloride 0.9 % 500 mL IVPB (has no administration in time range)   labetalol (NORMODYNE,TRANDATE) injection 20 mg (20 mg Intravenous Given 6/15/21 1758)   ipratropium-albuterol (DUO-NEB) nebulizer solution 3 mL (has no administration in time range)   ondansetron (ZOFRAN) injection 4 mg (has no administration in time range)   haloperidol lactate (HALDOL) injection 4 mg (4 mg Intravenous Given 6/11/21 1438)   hydrALAZINE (APRESOLINE) injection 20 mg (20 mg Intravenous Given 6/14/21 2350)   LORazepam (ATIVAN) injection 2 mg (2 mg Intravenous Given 6/13/21 1245)   dexmedetomidine (PRECEDEX) 400 mcg in 100 mL NS infusion kit (0 mcg/kg/hr × 71.6 kg Intravenous Stopped 6/12/21 1350)   propofol (DIPRIVAN) infusion 10 mg/mL 100 mL (0 mcg/kg/min × 71.6 kg Intravenous Stopped 6/14/21 1220)   dextrose (GLUTOSE) oral gel 15 g (has no administration in time  range)   glucagon (human recombinant) (GLUCAGEN DIAGNOSTIC) injection 1 mg (has no administration in time range)   insulin regular (humuLIN R,novoLIN R) injection 0-14 Units (5 Units Subcutaneous Given 6/15/21 1758)   fentaNYL citrate (PF) (SUBLIMAZE) injection 25 mcg (25 mcg Intravenous Given 6/14/21 1105)   Hold medication (has no administration in time range)   insulin glargine (LANTUS, SEMGLEE) injection 10 Units (10 Units Subcutaneous Given 6/15/21 0721)   sodium chloride 0.9 % flush 10 mL (10 mL Intravenous Given 6/15/21 0855)   sodium chloride 0.9 % flush 10 mL (10 mL Intravenous Given 6/15/21 0855)   sodium chloride 0.9 % flush 10 mL (10 mL Intravenous Given 6/15/21 0855)   sodium chloride 0.9 % flush 10 mL (has no administration in time range)   sodium chloride 0.9 % flush 20 mL (has no administration in time range)   cefTRIAXone (ROCEPHIN) IVPB 2 g (2 g Intravenous New Bag 6/15/21 1553)   pantoprazole (PROTONIX) EC tablet 40 mg (40 mg Oral Given 6/15/21 1759)   citalopram (CeleXA) tablet 10 mg (10 mg Oral Given 6/15/21 1255)   rOPINIRole (REQUIP) tablet 3 mg (has no administration in time range)   metoprolol succinate XL (TOPROL-XL) 24 hr tablet 200 mg (100 mg Oral Given 6/15/21 1255)   melatonin tablet 10 mg (has no administration in time range)   sodium chloride 0.9 % bolus 1,000 mL (0 mL Intravenous Stopped 6/11/21 0252)   pantoprazole (PROTONIX) injection 80 mg (80 mg Intravenous Given 6/11/21 0222)   sodium chloride 0.9 % bolus 2,232 mL (0 mL Intravenous Stopped 6/11/21 0400)   cefTRIAXone (ROCEPHIN) IVPB 1 g (0 g Intravenous Stopped 6/11/21 0400)   insulin regular (humuLIN R,novoLIN R) injection 12 Units (12 Units Intravenous Given 6/11/21 0359)   LORazepam (ATIVAN) injection 1 mg (1 mg Intravenous Given 6/11/21 0505)   sodium chloride 0.9 % bolus 2,000 mL (2,000 mL Intravenous New Bag 6/11/21 3846)   pantoprazole (PROTONIX) injection 80 mg (80 mg Intravenous Given 6/11/21 4861)   OLANZapine  (zyPREXA) injection 10 mg (10 mg Intramuscular Given 6/11/21 1548)   vancomycin (VANCOCIN) in iso-osmotic dextrose IVPB 1 g (premix) 200 mL (1,000 mg Intravenous New Bag 6/11/21 1904)   vancomycin 1500 mg/500 mL 0.9% NS IVPB ([facility]) (1,500 mg Intravenous New Bag 6/12/21 1034)   sodium chloride 0.9 % bolus 500 mL (500 mL Intravenous New Bag 6/12/21 1402)   gadobenate dimeglumine (MULTIHANCE) injection 14 mL (14 mL Intravenous Given 6/13/21 1251)         PROGRESS, DATA ANALYSIS, CONSULTS, AND MEDICAL DECISION MAKING    All labs have been independently reviewed by me.  All radiology studies have been reviewed by me and discussed with radiologist dictating the report.   EKG's independently viewed and interpreted by me.  Discussion below represents my analysis of pertinent findings related to patient's condition, differential diagnosis, treatment plan and final disposition.        ED Course as of Kt 15 1850   Fri Jun 11, 2021   0702 There is no sign of acute stroke at this point, and she does have history of left-sided weakness from previous stroke.  She appears to be encephalopathic from hyperosmolar nonketotic state.  She is not acidotic.    [DP]   0702 I spoke with Dr. Burger from the ICU who agrees to admit the patient.  I have given her IV fluids, Protonix, Rocephin and IV insulin.    [DP]   0702 Her lactate is quite elevated, but I see no source of sepsis.  I suspect this may be related to her acute GI bleed.    [DP]   Tue Kt 15, 2021   1848 Patient has significant leukocytosis, elevated lactic acid and GERI.  I do not however think she is septic, and I think the etiology of this is GI bleed.    [DP]   1848 tPA was not indicated in the setting of an acute GI bleed    [DP]      ED Course User Index  [DP] Joon Mayen MD           PPE: Both the patient and I wore a surgical mask throughout the entire patient encounter. I wore protective goggles.     AS OF 18:50 EDT VITALS:    BP - (!) 187/96  HR - 88  TEMP - 98.1  °F (36.7 °C)  O2 SATS - 99%        DIAGNOSIS  Final diagnoses:   Hyperosmolality   Metabolic encephalopathy   Hyperglycemia   Gastrointestinal hemorrhage, unspecified gastrointestinal hemorrhage type         DISPOSITION  Admit to ICU           Joon Mayen MD  06/15/21 1849       Joon Mayen MD  06/15/21 8047

## 2021-06-11 NOTE — TELEPHONE ENCOUNTER
Caller: Golden Beltran    Relationship to patient: Emergency Contact    Best call back number:[phone]    Patient is needing: PATIENT'S CALLED IN AND WANTED TO LET THE DR KNOW THAT THE PATIENT IS IN THE HOSPITAL. HE TOOK HER LAST NIGHT AFTER SHE FELL IN THE FLOOR. SHE SAID SHE DID NOT FEEL RIGHT AND THEN COLLAPSED. THEY HAVE HER IN CORONARY CARE FOR BEING COMBATIVE. THE PATIENT'S  SAID SHE WAS FINE YESTERDAY AND THEN THAT NIGHT EVERYTHING CHANGED QUICKLY. HE SAID HER BLOOD SUGAR , BUT THEY HAVEN'T STATED IF THEY ARE DOING TESTING YET. SHE IS ON AN INSULIN DRIP. PATIENT'S  IS VERY CONCERNED AND WOULD LIKE TO SPEAK WITH THE DR. PLEASE CALL PATIENT'S  AND ADVISE.

## 2021-06-11 NOTE — PROGRESS NOTES
Clinical Pharmacy Services: Medication History    Jayne Beltran is a 65 y.o. female presenting to [facility] for Metabolic encephalopathy [G93.41]  Hyperosmolality [E87.0]  Hyperglycemia [R73.9]  Gastrointestinal hemorrhage, unspecified gastrointestinal hemorrhage type [K92.2]    She  has a past medical history of COPD (chronic obstructive pulmonary disease) (CMS/[facility]), Diabetes mellitus (CMS/[facility]), Fibroid, Hypertension, Leukocytosis, Neuromuscular disorder (CMS/[facility]), Panic attack, Skin cancer, and TIA (transient ischemic attack).    Allergies as of 06/11/2021 - Reviewed 06/11/2021   Allergen Reaction Noted   • Metformin Other (See Comments) 10/29/2020       Medication information was obtained from: Pharmacy  Pharmacy and Phone Number:   [facility], [facility] [place], [place]    Prior to Admission Medications     Prescriptions Last Dose Informant Patient Reported? Taking?    citalopram (CeleXA) 10 MG tablet   No Yes    Take 1 tablet by mouth Daily.    insulin aspart (NovoLOG FlexPen) 100 UNIT/ML solution pen-injector sc pen   No Yes    Inject 5 Units under the skin into the appropriate area as directed 3 (Three) Times a Day With Meals. SSI-DEPENDS PM BLOOD GLUCOSE    metoprolol succinate XL (TOPROL-XL) 200 MG 24 hr tablet   No Yes    Take 1 tablet by mouth Daily.    rOPINIRole (REQUIP) 3 MG tablet  Pharmacy Yes Yes    Take 3 mg by mouth Every Night. Take 1-3 hr prior to bedtime    Alcohol Swabs (B-D SINGLE USE SWABS REGULAR) pads   No No    1 each 3 (Three) Times a Day Before Meals.    Blood Glucose Calibration (ACCU-CHEK LUIS ALBERTO) solution   No No    1 each by In Vitro route 3 (Three) Times a Day Before Meals.    Blood Glucose Monitoring Suppl (ACCU-CHEK LUIS ALBERTO PLUS) w/Device kit   No No    1 each 3 (Three) Times a Day Before Meals.    Cranberry-Vit C-Lactobacillus (CRANBERRY/PROBIOTIC/VIT C PO) Unknown  Yes No    Take 1 tablet by mouth Daily.    Cyanocobalamin (VITAMIN B 12) 500 MCG  "tablet Unknown  Yes No    Take 500 mcg by mouth Daily.    Empagliflozin (Jardiance) 10 MG tablet Unknown  No No    Take 10 mg by mouth Daily.    folic acid (FOLVITE) 1 MG tablet Unknown  Yes No    Take 4 tablets by mouth Daily.    furosemide (LASIX) 80 MG tablet Unknown  Yes No    Take 80 mg by mouth 2 (Two) Times a Day.    glucose blood (Accu-Chek Ilana Plus) test strip   No No    1 each by Other route 3 (Three) Times a Day Before Meals. Use as instructed    Insulin Pen Needle (RELION PEN NEEDLE 31G/8MM) 31G X 8 MM misc   No No    1 each 3 (Three) Times a Day.    Lancets (ACCU-CHEK MULTICLIX) lancets   No No    1 each by Other route 3 (Three) Times a Day Before Meals. Use as instructed    Lancets Misc. (ACCU-CHEK MULTICLIX LANCET DEV) kit   No No    1 each 3 (Three) Times a Day Before Meals.    lisinopril (PRINIVIL,ZESTRIL) 10 MG tablet Unknown  No No    Take 1 tablet by mouth Daily.    pregabalin (LYRICA) 150 MG capsule Unknown  Yes No    Take 150 mg by mouth 3 (Three) Times a Day.    senna (senna) 8.6 MG tablet Unknown  Yes No    Take 8.6 mg by mouth Daily.        Medication notes:   Mic reported by spouse had not filled anything since February (furosemide). [facility] had not recently filled prescriptions. [facility] had filled ropinirole, citalopram, metoprolol, and novolog. They had received a prescription for Jardiance and lisinopril but had not filled recently. Unclear if pt uses a different pharmacy and she is unable to provide this information currently.   Medications marked as \"unknown\" on list above are unable to be verified. Will attempt to verify when pt is able to provide more information.    This medication list is complete to the best of my knowledge as of 6/11/2021    Please call if questions.    Nelia Raya, PharmD, BCPS  6/11/2021 15:51 EDT      "

## 2021-06-11 NOTE — H&P
Patient Care Team:  Michael Arriaza Jr., DO as PCP - General (Family Medicine)  Gilles Villa DPM as Consulting Physician (Podiatry)  Jen Hays MD as Consulting Physician (Obstetrics and Gynecology)  Michael Arriaza Jr., DO as Referring Physician (Family Medicine)  Glynn Melara MD as Consulting Physician (Hematology and Oncology)      Subjective     Patient is a 65 y.o. female.  Who was brought to the emergency department for a day and a half of dark tarry stools unfortunately there is no family with her we cannot contact anyone and she is not able to provide much history.  She can tell me her name she has a very strong accent it is very difficult under stand her I asked her where she is from and she said she is from [place] she does have old records here she was here back in 9 of 20 with subdural and subarachnoid hemorrhage she has had a history of chronic kidney disease and acute kidney injury type II diabetic with COPD and a smoker.  Patient is pretty hard to understand she denies any pain abdominal or other her chest no shortness of breath she has apparently had some black stools sounds like she may have been throwing up some just dark material she did not noted any bright red blood.  I am not sure how accurate her history is.  She cannot tell me where she is at is also had a dose of Ativan that may be complicating her exam some    Review of Systems:  See above limited      History  Past Medical History:   Diagnosis Date   • COPD (chronic obstructive pulmonary disease) (CMS/HCC)    • Diabetes mellitus (CMS/HCC)     type 2   • Fibroid    • Hypertension    • Leukocytosis    • Neuromuscular disorder (CMS/HCC)    • Panic attack    • Skin cancer     nose   • TIA (transient ischemic attack)      Past Surgical History:   Procedure Laterality Date   • CHOLECYSTECTOMY     • COLONOSCOPY     • INSERTION HEMODIALYSIS CATHETER N/A 9/3/2020    Procedure: HEMODIALYSIS CATHETER  INSERTION;  Surgeon: Sergio Durant MD;  Location: [facility] OR;  Service: Vascular;  Laterality: N/A;   • KIDNEY STONE SURGERY     • URETEROSCOPY LASER LITHOTRIPSY WITH STENT INSERTION Right 2020    Procedure: CYSTOSCOPY, RIGHT URETEROSCOPY, RIGHT RETROGRADE PYELOGRAM, LASER LITHOTRIPSY WITH RIGHT URETERAL STENT EXCHANGE;  Surgeon: Mikie Mejia MD;  Location: [facility] OR;  Service: Urology;  Laterality: Right;     Social History     Socioeconomic History   • Marital status:      Spouse name: Golden   • Number of children: 2   • Years of education: 12   • Highest education level: High school graduate   Tobacco Use   • Smoking status: Current Every Day Smoker     Packs/day: 0.25     Years: 45.00     Pack years: 11.25     Types: Cigarettes   • Smokeless tobacco: Never Used   • Tobacco comment: uses vape and smokes cigarettes   Vaping Use   • Vaping Use: Never used   Substance and Sexual Activity   • Alcohol use: No   • Drug use: No   • Sexual activity: Yes     Partners: Male     Birth control/protection: Post-menopausal     Family History   Problem Relation Age of Onset   • Heart disease Mother          at age 63   • Diabetes Mother    • Hypertension Mother    • Diabetes Father    • Deep vein thrombosis Father    • Depression Father    • Liver disease Father    • Lung cancer Father 58         at age 68   • Breast cancer Maternal Grandmother         ? age dx   • Dementia Maternal Grandmother    • Hypertension Maternal Grandmother    • Ovarian cancer Daughter 23   • Diabetes Daughter    • Depression Daughter    • Diabetes Sister    • Diabetes Brother    • Heart disease Brother    • Cancer Brother          at age 43   • Heart disease Maternal Uncle    • Cancer Paternal Uncle    • Clotting disorder Paternal Grandmother    • Colon cancer Neg Hx    • Colon polyps Neg Hx    • Malig Hyperthermia Neg Hx          Allergies:  Metformin    Medications:  Prior to Admission  medications    Medication Sig Start Date End Date Taking? Authorizing Provider   Alcohol Swabs (B-D SINGLE USE SWABS REGULAR) pads 1 each 3 (Three) Times a Day Before Meals. 7/27/20   Michael Arriaza Jr., DO   Blood Glucose Calibration (ACCU-CHEK LUIS ALBERTO) solution 1 each by In Vitro route 3 (Three) Times a Day Before Meals. 7/27/20   Michael Arriaza Jr., DO   Blood Glucose Monitoring Suppl (ACCU-CHEK LUIS ALBERTO PLUS) w/Device kit 1 each 3 (Three) Times a Day Before Meals. 7/27/20   Michael Arriaza Jr., DO   citalopram (CeleXA) 10 MG tablet Take 1 tablet by mouth Daily. 4/4/21   Michael Arriaza Jr., DO   colestipol (COLESTID) 1 g tablet Take 1 tablet by mouth 2 (Two) Times a Day. 8/5/20   Will, Jake Lai MD   Cranberry-Vit C-Lactobacillus (CRANBERRY/PROBIOTIC/VIT C PO) Take 1 tablet by mouth Daily. 9/28/20   Darian Morales MD   Cyanocobalamin (VITAMIN B 12) 500 MCG tablet Take 500 mcg by mouth Daily.    Darian Morales MD   docusate sodium (Colace) 100 MG capsule Take 1 capsule by mouth Daily As Needed for Constipation. 12/21/20 12/21/21  Mikie Mejia MD   Empagliflozin (Jardiance) 10 MG tablet Take 10 mg by mouth Daily. 10/29/20   Michael Arriaza Jr., DO   folic acid (FOLVITE) 1 MG tablet Take 4 tablets by mouth Daily. 9/4/20   Darian Morales MD   furosemide (LASIX) 80 MG tablet Take 80 mg by mouth 2 (Two) Times a Day.    Darian Morales MD   glucose blood (Accu-Chek Luis Alberto Plus) test strip 1 each by Other route 3 (Three) Times a Day Before Meals. Use as instructed 7/27/20   Michael Arriaza Jr., DO   HYDROcodone-acetaminophen (NORCO) 5-325 MG per tablet Take 1 tablet by mouth Every 6 (Six) Hours As Needed for Moderate Pain . 12/21/20   Mikie Mejia MD   insulin aspart (NovoLOG FlexPen) 100 UNIT/ML solution pen-injector sc pen Inject 5 Units under the skin into the appropriate area as directed 3 (Three) Times a Day With Meals. SSI-DEPENDS PM BLOOD  GLUCOSE 2/17/21   Michael Arriaza Jr., DO   Insulin Pen Needle (RELION PEN NEEDLE 31G/8MM) 31G X 8 MM misc 1 each 3 (Three) Times a Day. 10/12/20   Michael Arriaza Jr., DO   Lancets (ACCU-CHEK MULTICLIX) lancets 1 each by Other route 3 (Three) Times a Day Before Meals. Use as instructed 7/27/20   Michael Arriaza Jr., DO   Lancets Misc. (ACCU-CHEK MULTICLIX LANCET DEV) kit 1 each 3 (Three) Times a Day Before Meals. 7/27/20   Michael Arriaza Jr., DO   lisinopril (PRINIVIL,ZESTRIL) 10 MG tablet Take 1 tablet by mouth Daily. 1/14/21   Michael Arriaza Jr., DO   metoprolol succinate XL (TOPROL-XL) 200 MG 24 hr tablet Take 1 tablet by mouth Daily. 1/15/21   Michael Arriaza Jr., DO   omeprazole (priLOSEC) 20 MG capsule Take 1 capsule by mouth Daily. 7/22/20   Michael Arriaza Jr., DO   ondansetron (Zofran) 4 MG tablet Take 1 tablet by mouth Daily As Needed for Nausea or Vomiting. 12/21/20 12/21/21  Mikie Mejia MD   phenazopyridine (Pyridium) 100 MG tablet Take 1 tablet by mouth 3 (Three) Times a Day As Needed for Bladder Spasms. 12/21/20   Mikie Mejia MD   pregabalin (LYRICA) 150 MG capsule Take 150 mg by mouth 3 (Three) Times a Day.    Darian Morales MD   rOPINIRole (REQUIP) 1 MG tablet Take 3 mg by mouth Every Night.    Darian Morales MD   senna (senna) 8.6 MG tablet Take 8.6 mg by mouth Daily. 9/29/20   Darian Morales MD             Objective     Vital Signs  Vital Sign Min/Max for last 24 hours  Temp  Min: 96.5 °F (35.8 °C)  Max: 96.5 °F (35.8 °C)   BP  Min: 110/72  Max: 164/88   Pulse  Min: 130  Max: 140   Resp  Min: 18  Max: 18   SpO2  Min: 95 %  Max: 98 %   No data recorded   Weight  Min: 74.4 kg (164 lb)  Max: 74.4 kg (164 lb)     No intake or output data in the 24 hours ending 06/11/21 0424  No intake/output data recorded.  Last Weight and Admission Weight        06/11/21  0220   Weight: 74.4 kg (164 lb)     Flowsheet Rows      First Filed Value    "  Admission Height  165.1 cm (65\") Documented at 06/11/2021 0220   Admission Weight  74.4 kg (164 lb) Documented at 06/11/2021 0220          Body mass index is 27.29 kg/m².           Physical Exam:  General Appearance: Well-developed obese white female lying on stretcher she was sleeping on my arrival but she did awaken easily she tells me her name it is such a thick accent I had asked her several times is not slurring her words just difficult to understand what she says  Eyes: Conjunctiva are clear and anicteric pupils are equal and reactive to light extraocular muscles appear intact  ENT: Mucous membranes are very dry she has dentures.  Nasal septum midline  Neck: No adenopathy or thyromegaly no jugular venous tension trachea midline  Lungs: Clear nonlabored symmetric expansion no wheezes no rales no rhonchi  Cardiac: Regular rate rhythm no murmur no gallop  Abdomen: Soft nontender no palpable hepatosplenomegaly or masses  : Not examined  Musculoskeletal: Grossly normal  Skin: Dry no jaundice no petechiae I do not see any skin lesions  Neuro: She will follow commands is easily able to reach up and hold both arms up she is got good  bilaterally she does not understand finger-to-nose.  She is got good dorsiflexion plantarflexion and straight leg raise.  No facial asymmetry.  Again she tells me her name repeatedly she cannot tell me where she is at or give me time or date  Extremities/P Vascular: No clubbing no cyanosis no edema palpable radial and dorsalis pedis pulses bilaterally  MSE: She seems pretty euthymic rather [id number]      Labs:  Results from last 7 days   Lab Units 06/11/21 0223   GLUCOSE mg/dL 622*   SODIUM mmol/L 139   POTASSIUM mmol/L 4.6   CO2 mmol/L 21.4*   CHLORIDE mmol/L 97*   ANION GAP mmol/L 20.6*   CREATININE mg/dL 1.69*   BUN mg/dL 47*   BUN / CREAT RATIO  27.8*   CALCIUM mg/dL 8.9   EGFR IF NONAFRICN AM mL/min/1.73 30*   ALK PHOS U/L 93   TOTAL PROTEIN g/dL 5.6*   ALT (SGPT) U/L " 20   AST (SGOT) U/L 14   BILIRUBIN mg/dL 0.6   ALBUMIN g/dL 3.30*   GLOBULIN gm/dL 2.3     Estimated Creatinine Clearance: 33.1 mL/min (A) (by C-G formula based on SCr of 1.69 mg/dL (H)).      Results from last 7 days   Lab Units 06/11/21 0223   WBC 10*3/mm3 25.83*   RBC 10*6/mm3 3.24*   HEMOGLOBIN g/dL 9.0*   HEMATOCRIT % 29.1*   MCV fL 89.8   MCH pg 27.8   MCHC g/dL 30.9*   RDW % 15.1   RDW-SD fl 49.8   MPV fL 11.1   PLATELETS 10*3/mm3 411   NEUTROPHIL % % 77.2*   LYMPHOCYTE % % 16.7*   MONOCYTES % % 3.2*   EOSINOPHIL % % 0.3   BASOPHIL % % 0.5   IMM GRAN % % 2.1*   NEUTROS ABS 10*3/mm3 19.94*   LYMPHS ABS 10*3/mm3 4.31*   MONOS ABS 10*3/mm3 0.82   EOS ABS 10*3/mm3 0.09   BASOS ABS 10*3/mm3 0.13   IMMATURE GRANS (ABS) 10*3/mm3 0.54*   NRBC /100 WBC 0.0     Results from last 7 days   Lab Units 06/11/21 0228   PH, ARTERIAL pH units 7.378   PO2 ART mm Hg 66.2*   PCO2, ARTERIAL mm Hg 33.8*   HCO3 ART mmol/L 19.9*                 Results from last 7 days   Lab Units 06/11/21 0223   LACTATE mmol/L 9.0*     Results from last 7 days   Lab Units 06/11/21 0223   INR  1.13*     Microbiology Results (last 10 days)     ** No results found for the last 240 hours. **            Diagnostics:  CT Head Without Contrast    Result Date: 6/11/2021  Patient: ANDRES FERMIN  Time Out: 04:11 Exam(s): CT HEAD Without Contrast EXAM:   CT Head Without Intravenous Contrast CLINICAL HISTORY:    Reason for exam: ams. left side weakness. TECHNIQUE:   Axial computed tomography images of the head brain without intravenous contrast.  CTDI is 54.80 mGy and DLP is 1023.8 mGy-cm.  This CT exam was performed according to the principle of ALARA (As Low As Reasonably Achievable) by using one or more of the following dose reduction techniques: automated exposure control, adjustment of the mA and or kV according to patient size, and or use of iterative reconstruction technique. COMPARISON: 9 8 2020 FINDINGS:   Brain:  No hemorrhage or mass effect.   Mild cerebral volume loss.   Ventricles:  No hydrocephalus.   Bones joints:  Unremarkable.   Soft tissues:  Unremarkable.   Sinuses:  Unremarkable.   Mastoid air cells:  Clear. IMPRESSION:       No acute hemorrhage, hydrocephalus, or mass effect.     Electronically signed by Fredy Cervantes MD on 06-11-21 at 0411        CT head and report reviewed no acute process    Assessment/Plan     1. Anemia sounds like this is probably acute blood loss anemia we will follow serial H&H transfuse as necessary I will check indices just to be sure that this is all acute  2. GI bleed sounds like she has had melenic stools she may even had some coffee-ground emesis.  We will put her on an H2 blocker drip, maintain 2 large-bore IVs consult gastroenterology and follow serial hemoglobins.  3. Hyperglycemia I suspect she is hyperosmolar I am going to put her on the hyperosmolar glucose and fluid protocol  4. Lactic acidosis I do not know whether this is due to the hyperosmolar state and anemia does have a leukocytosis no obvious sources for infection, will follow a lactate and I will check a procalcitonin  5. Renal insufficiency creatinine is up significantly since January I do not know whether the January is her baseline I suspect this is acute kidney injury on chronic kidney disease stage III time will tell.  6. Leukocytosis I doubt whether this is a stress response to the above again I do not see definite infection her chest is clear urine is clear I will get a chest x-ray her abdominal exam is benign.  She got a dose of Rocephin in the emergency department I am not going to continue this unless studies or labs come back suggesting an infection.  7. Hypertension blood pressure is actually up we will have to watch may need to treat him a little hesitant if she is bleeding to get too aggressive on treatment  8. Tachycardia suspect this is a combination of all above I am going to check an EKG on her make sure it sinus it looks to be  sinus tachycardia also I am not just check a troponin given her problems  9. Encephalopathy she is what appears to be encephalopathic she is not focal on any neurologic exam probably a metabolic encephalopathy secondary to above we will resuscitate and monitor.  I am going to check drug screens alcohol levels etc.  10. COPD by history she has some mild hypoxia but is not a CO2 retainer I do not hear any wheezing she is moving air well  11. History of tobacco abuse I do not know whether she is actively smoking still she is just not able to give that much detail.      Obdulio Burger MD  06/11/21  04:24 EDT    Time: Critical care time 63 minutes

## 2021-06-11 NOTE — CONSULTS
DOS: 2021  NAME: Jayne Beltran   : 1955  PCP: Michael Arriaza Jr., DO  CC: Stroke  Referring MD: Obdulio Burger MD    Neurological Problem and Interval History:  65 y.o. right-handed white female with a Hx of diabetes mellitus and hypertension.  She was fine until last evening around 11 PM when she went to the bathroom and complained of lightheadedness.  Later on she had a bowel movement and the second 1 which was mostly black tarry in nature.  Today she was extremely confused when she woke up and was off balance and brought to the emergency room and found to be febrile.  When I walked into the intensive care unit I noticed she was thrashing around in bed.  Her blood pressure has been low and her hemoglobin and hematocrit have been falling for which reason she is going to be transfused urgently.  A rectal temperature was checked during my presence which was 101.5 °F.  Her  has not noticed any seizure activities or any evidence of shingles on her body.  Patient had a very similar episode a few years ago but it all resolved by itself.    Past Medical/Surgical Hx:  Past Medical History:   Diagnosis Date   • COPD (chronic obstructive pulmonary disease) (CMS/HCC)    • Diabetes mellitus (CMS/HCC)     type 2   • Fibroid    • Hypertension    • Leukocytosis    • Neuromuscular disorder (CMS/HCC)    • Panic attack    • Skin cancer     nose   • TIA (transient ischemic attack)      Past Surgical History:   Procedure Laterality Date   • CHOLECYSTECTOMY     • COLONOSCOPY     • INSERTION HEMODIALYSIS CATHETER N/A 9/3/2020    Procedure: HEMODIALYSIS CATHETER INSERTION;  Surgeon: Sergio Durant MD;  Location: [facility];  Service: Vascular;  Laterality: N/A;   • KIDNEY STONE SURGERY     • URETEROSCOPY LASER LITHOTRIPSY WITH STENT INSERTION Right 2020    Procedure: CYSTOSCOPY, RIGHT URETEROSCOPY, RIGHT RETROGRADE PYELOGRAM, LASER LITHOTRIPSY WITH RIGHT URETERAL STENT EXCHANGE;  Surgeon:  Mikie Mejia MD;  Location: [facility];  Service: Urology;  Laterality: Right;       Review of Systems:    Constitutional: Pleasant lady currently thrashing around in bed  Cardiovascular: The blood pressure has been low  Respiratory: No shortness of breath noted.  Gastrointestinal: Has been having black tarry stools  Genitourinary: Tejeda catheter in place  Musculoskeletal: Moving all extremities well  Dermatological: Skin breakdown noted  Neurological: Comatose  Psychiatric: Has a history of anxiety and depression  Ophthalmological: Unable to tell          Medications On Admission  Medications Prior to Admission   Medication Sig Dispense Refill Last Dose   • citalopram (CeleXA) 10 MG tablet Take 1 tablet by mouth Daily. 90 tablet 1    • insulin aspart (NovoLOG FlexPen) 100 UNIT/ML solution pen-injector sc pen Inject 5 Units under the skin into the appropriate area as directed 3 (Three) Times a Day With Meals. SSI-DEPENDS PM BLOOD GLUCOSE 5 pen 5    • metoprolol succinate XL (TOPROL-XL) 200 MG 24 hr tablet Take 1 tablet by mouth Daily. 90 tablet 1    • rOPINIRole (REQUIP) 3 MG tablet Take 3 mg by mouth Every Night. Take 1-3 hr prior to bedtime      • Alcohol Swabs (B-D SINGLE USE SWABS REGULAR) pads 1 each 3 (Three) Times a Day Before Meals. 200 each 11    • Blood Glucose Calibration (ACCU-CHEK LUIS ALBERTO) solution 1 each by In Vitro route 3 (Three) Times a Day Before Meals. 5 each 5    • Blood Glucose Monitoring Suppl (ACCU-CHEK LUIS ALBERTO PLUS) w/Device kit 1 each 3 (Three) Times a Day Before Meals. 1 kit 2    • Cranberry-Vit C-Lactobacillus (CRANBERRY/PROBIOTIC/VIT C PO) Take 1 tablet by mouth Daily.   Unknown at Unknown time   • Cyanocobalamin (VITAMIN B 12) 500 MCG tablet Take 500 mcg by mouth Daily.   Unknown at Unknown time   • Empagliflozin (Jardiance) 10 MG tablet Take 10 mg by mouth Daily. 30 tablet 5 Unknown at Unknown time   • folic acid (FOLVITE) 1 MG tablet Take 4 tablets by mouth Daily.   Unknown at  Unknown time   • furosemide (LASIX) 80 MG tablet Take 80 mg by mouth 2 (Two) Times a Day.   Unknown at Unknown time   • glucose blood (Accu-Chek Ilana Plus) test strip 1 each by Other route 3 (Three) Times a Day Before Meals. Use as instructed 200 each 11    • Insulin Pen Needle (RELION PEN NEEDLE 31G/8MM) 31G X 8 MM misc 1 each 3 (Three) Times a Day. 90 each 11    • Lancets (ACCU-CHEK MULTICLIX) lancets 1 each by Other route 3 (Three) Times a Day Before Meals. Use as instructed 200 each 11    • Lancets Misc. (ACCU-CHEK MULTICLIX LANCET DEV) kit 1 each 3 (Three) Times a Day Before Meals. 200 each 11    • lisinopril (PRINIVIL,ZESTRIL) 10 MG tablet Take 1 tablet by mouth Daily. 30 tablet 3 Unknown at Unknown time   • pregabalin (LYRICA) 150 MG capsule Take 150 mg by mouth 3 (Three) Times a Day.   Unknown at Unknown time   • senna (senna) 8.6 MG tablet Take 8.6 mg by mouth Daily.   Unknown at Unknown time       Allergies:  Allergies   Allergen Reactions   • Metformin Other (See Comments)     Kidney failure       Social Hx:  Social History     Socioeconomic History   • Marital status:      Spouse name: Golden   • Number of children: 2   • Years of education: 12   • Highest education level: High school graduate   Tobacco Use   • Smoking status: Current Every Day Smoker     Packs/day: 0.25     Years: 45.00     Pack years: 11.25     Types: Cigarettes   • Smokeless tobacco: Never Used   • Tobacco comment: uses vape and smokes cigarettes   Vaping Use   • Vaping Use: Never used   Substance and Sexual Activity   • Alcohol use: No   • Drug use: No   • Sexual activity: Yes     Partners: Male     Birth control/protection: Post-menopausal       Family Hx:  Family History   Problem Relation Age of Onset   • Heart disease Mother          at age 63   • Diabetes Mother    • Hypertension Mother    • Diabetes Father    • Deep vein thrombosis Father    • Depression Father    • Liver disease Father    • Lung cancer Father  58         at age 68   • Breast cancer Maternal Grandmother         ? age dx   • Dementia Maternal Grandmother    • Hypertension Maternal Grandmother    • Ovarian cancer Daughter 23   • Diabetes Daughter    • Depression Daughter    • Diabetes Sister    • Diabetes Brother    • Heart disease Brother    • Cancer Brother          at age 43   • Heart disease Maternal Uncle    • Cancer Paternal Uncle    • Clotting disorder Paternal Grandmother    • Colon cancer Neg Hx    • Colon polyps Neg Hx    • Malig Hyperthermia Neg Hx        Review of Imaging (Interpretation of images not reports): Reviewed the CT of the brain on the PACS which shows the following:  FINDINGS:    Brain:  No hemorrhage or mass effect.  Mild cerebral volume loss.    Ventricles:  No hydrocephalus.    Bones joints:  Unremarkable.    Soft tissues:  Unremarkable.    Sinuses:  Unremarkable.    Mastoid air cells:  Clear.     IMPRESSION:         No acute hemorrhage, hydrocephalus, or mass effect.     Laboratory Results:   Lab Results   Component Value Date    GLUCOSE 122 (H) 2021    CALCIUM 8.7 2021     (H) 2021    K 3.9 2021    CO2 20.2 (L) 2021     (H) 2021    BUN 54 (H) 2021    CREATININE 1.30 (H) 2021    EGFRIFAFRI 53 (L) 2021    EGFRIFNONA 41 (L) 2021    BCR 41.5 (H) 2021    ANIONGAP 13.8 2021     Lab Results   Component Value Date    WBC 21.90 (H) 2021    HGB 7.5 (L) 2021    HCT 22.9 (L) 2021    MCV 83.9 2021     2021     Lab Results   Component Value Date    CHOL 165 2020     Lab Results   Component Value Date    HDL 37 (L) 2021    HDL 31 (L) 10/14/2020    HDL 36 (L) 2020     Lab Results   Component Value Date    LDL 81 2021    LDL 55 10/14/2020    LDL 72 2020     Lab Results   Component Value Date    TRIG 361 (H) 2021    TRIG 513 (H) 10/14/2020    TRIG 287 (H) 2020     Lab  "Results   Component Value Date    HGBA1C 8.40 (H) 06/11/2021     Lab Results   Component Value Date    INR 1.13 (H) 06/11/2021    INR 0.86 (L) 09/05/2020    PROTIME 14.3 (H) 06/11/2021    PROTIME 11.7 09/05/2020         Physical Examination:  /55   Pulse (!) 149   Temp (!) 101.5 °F (38.6 °C)   Resp (!) 42   Ht 165.1 cm (65\")   Wt 71.6 kg (157 lb 13.6 oz)   LMP  (LMP Unknown)   SpO2 100%   BMI 26.27 kg/m²   General Appearance:   Well developed, well nourished, well groomed, alert, and cooperative.  HEENT: Normocephalic.  Normal fundoscopic exam including normal retina, discs are flat with sharp margins, normal vasculature.  Neck and Spine: Normal range of motion.  Normal alignment. No mass or tenderness. No bruits.  Cardiac: Regular rate and rhythm. No murmurs.  Peripheral Vasculature: Radial and pedal pulses are equal and symmetric. No signs of distal embolization.  Extremities:    No edema or deformities. Normal joint ROM. PARVIZ hoses and SCD's in place  Skin:    No rashes or birth marks.    Neurological examination:  Higher Integrative  Function: Patient is currently comatose.  The Angely Coma Scale is 10.  CN II: Pupils are equal, round, and reactive to light. Normal visual acuity and visual fields.    CN III IV VI: Extraocular movements are full without nystagmus.   CN V: Normal facial sensation and strength of muscles of mastication.  CN VII: Facial movements are symmetric. No weakness.  CN VIII:   Auditory acuity is normal.  CN IX & X:   Symmetric palatal movement.  CN XI: Sternocleidomastoid and trapezius are normal.  No weakness.  CN XII:   The tongue is midline.  No atrophy or fasciculations.  Motor: Normal muscle strength, bulk and tone in upper and lower extremities.  No fasciculations, rigidity, spasticity, or abnormal movements.  Reflexes: 2+ in the upper and lower extremities. Plantar responses are stents of bilateral  Sensation: Drops to painful stimuli   Station and Gait: Unable to test " coordination or gait at this time .    Discussed with her granddaughter who was at the bedside and also with her  on the telephone that she will be covered with central nervous system penetrating antibiotics for the time being.  We will also get an EEG for tomorrow morning as well as an MRI of the brain with and without contrast.  If the patient is still febrile and still continues to be confused then definitely bedside spinal tap will be performed after proper sedation and this was discussed in details with the patient's  over the phone.     I have discussed the above with the patient and family.  Time spent with patient: 30 minutes in critical care evaluation and management of the patient.  Patient will be followed up by our neurology team.  Dictated using Dragon dictation.

## 2021-06-12 ENCOUNTER — APPOINTMENT (OUTPATIENT)
Dept: GENERAL RADIOLOGY | Facility: HOSPITAL | Age: 66
End: 2021-06-12

## 2021-06-12 ENCOUNTER — ANESTHESIA EVENT (OUTPATIENT)
Dept: GASTROENTEROLOGY | Facility: HOSPITAL | Age: 66
End: 2021-06-12

## 2021-06-12 ENCOUNTER — ANESTHESIA (OUTPATIENT)
Dept: GASTROENTEROLOGY | Facility: HOSPITAL | Age: 66
End: 2021-06-12

## 2021-06-12 VITALS
OXYGEN SATURATION: 96 % | SYSTOLIC BLOOD PRESSURE: 130 MMHG | RESPIRATION RATE: 16 BRPM | DIASTOLIC BLOOD PRESSURE: 73 MMHG

## 2021-06-12 LAB
AMMONIA BLD-SCNC: 14 UMOL/L (ref 11–51)
ANION GAP SERPL CALCULATED.3IONS-SCNC: 12.4 MMOL/L (ref 5–15)
ANION GAP SERPL CALCULATED.3IONS-SCNC: 12.5 MMOL/L (ref 5–15)
APPEARANCE CSF: CLEAR
APPEARANCE CSF: CLEAR
ARTERIAL PATENCY WRIST A: POSITIVE
ATMOSPHERIC PRESS: 743.8 MMHG
BASE EXCESS BLDA CALC-SCNC: -5.7 MMOL/L (ref 0–2)
BASOPHILS # BLD AUTO: 0.06 10*3/MM3 (ref 0–0.2)
BASOPHILS NFR BLD AUTO: 0.3 % (ref 0–1.5)
BDY SITE: ABNORMAL
BH BB BLOOD EXPIRATION DATE: NORMAL
BH BB BLOOD TYPE BARCODE: 5100
BH BB BLOOD TYPE BARCODE: NORMAL
BH BB DISPENSE STATUS: NORMAL
BH BB PRODUCT CODE: NORMAL
BH BB UNIT NUMBER: NORMAL
BUN SERPL-MCNC: 57 MG/DL (ref 8–23)
BUN SERPL-MCNC: 59 MG/DL (ref 8–23)
BUN/CREAT SERPL: 34.3 (ref 7–25)
BUN/CREAT SERPL: 38.8 (ref 7–25)
CALCIUM SPEC-SCNC: 7.2 MG/DL (ref 8.6–10.5)
CALCIUM SPEC-SCNC: 7.8 MG/DL (ref 8.6–10.5)
CHLORIDE SERPL-SCNC: 118 MMOL/L (ref 98–107)
CHLORIDE SERPL-SCNC: 120 MMOL/L (ref 98–107)
CO2 SERPL-SCNC: 15.6 MMOL/L (ref 22–29)
CO2 SERPL-SCNC: 18.5 MMOL/L (ref 22–29)
COLOR CSF: COLORLESS
COLOR CSF: COLORLESS
COLOR SPUN CSF: COLORLESS
COLOR SPUN CSF: COLORLESS
CREAT SERPL-MCNC: 1.52 MG/DL (ref 0.57–1)
CREAT SERPL-MCNC: 1.66 MG/DL (ref 0.57–1)
CROSSMATCH INTERPRETATION: NORMAL
CRYPTOC AG CSF QL LA: NEGATIVE
D-LACTATE SERPL-SCNC: 1.9 MMOL/L (ref 0.5–2)
D-LACTATE SERPL-SCNC: 3.1 MMOL/L (ref 0.5–2)
D-LACTATE SERPL-SCNC: 3.8 MMOL/L (ref 0.5–2)
DEPRECATED RDW RBC AUTO: 49.4 FL (ref 37–54)
DEPRECATED RDW RBC AUTO: 51 FL (ref 37–54)
DEPRECATED RDW RBC AUTO: 53.5 FL (ref 37–54)
EOSINOPHIL # BLD AUTO: 0.02 10*3/MM3 (ref 0–0.4)
EOSINOPHIL NFR BLD AUTO: 0.1 % (ref 0.3–6.2)
ERYTHROCYTE [DISTWIDTH] IN BLOOD BY AUTOMATED COUNT: 15.7 % (ref 12.3–15.4)
ERYTHROCYTE [DISTWIDTH] IN BLOOD BY AUTOMATED COUNT: 15.8 % (ref 12.3–15.4)
ERYTHROCYTE [DISTWIDTH] IN BLOOD BY AUTOMATED COUNT: 16.1 % (ref 12.3–15.4)
ERYTHROCYTE [SEDIMENTATION RATE] IN BLOOD: 1 MM/HR (ref 0–30)
GFR SERPL CREATININE-BSD FRML MDRD: 31 ML/MIN/1.73
GFR SERPL CREATININE-BSD FRML MDRD: 34 ML/MIN/1.73
GIE STN SPEC: NORMAL
GLUCOSE BLDC GLUCOMTR-MCNC: 133 MG/DL (ref 70–130)
GLUCOSE BLDC GLUCOMTR-MCNC: 142 MG/DL (ref 70–130)
GLUCOSE BLDC GLUCOMTR-MCNC: 143 MG/DL (ref 70–130)
GLUCOSE BLDC GLUCOMTR-MCNC: 163 MG/DL (ref 70–130)
GLUCOSE BLDC GLUCOMTR-MCNC: 165 MG/DL (ref 70–130)
GLUCOSE BLDC GLUCOMTR-MCNC: 166 MG/DL (ref 70–130)
GLUCOSE BLDC GLUCOMTR-MCNC: 179 MG/DL (ref 70–130)
GLUCOSE BLDC GLUCOMTR-MCNC: 198 MG/DL (ref 70–130)
GLUCOSE BLDC GLUCOMTR-MCNC: 209 MG/DL (ref 70–130)
GLUCOSE BLDC GLUCOMTR-MCNC: 230 MG/DL (ref 70–130)
GLUCOSE BLDC GLUCOMTR-MCNC: 253 MG/DL (ref 70–130)
GLUCOSE BLDC GLUCOMTR-MCNC: 254 MG/DL (ref 70–130)
GLUCOSE CSF-MCNC: 98 MG/DL (ref 40–70)
GLUCOSE SERPL-MCNC: 148 MG/DL (ref 65–99)
GLUCOSE SERPL-MCNC: 158 MG/DL (ref 65–99)
HCO3 BLDA-SCNC: 18.3 MMOL/L (ref 22–28)
HCT VFR BLD AUTO: 20.7 % (ref 34–46.6)
HCT VFR BLD AUTO: 21.7 % (ref 34–46.6)
HCT VFR BLD AUTO: 24.1 % (ref 34–46.6)
HGB BLD-MCNC: 6.9 G/DL (ref 12–15.9)
HGB BLD-MCNC: 7.1 G/DL (ref 12–15.9)
HGB BLD-MCNC: 7.9 G/DL (ref 12–15.9)
HOLD SPECIMEN: NORMAL
IMM GRANULOCYTES # BLD AUTO: 0.21 10*3/MM3 (ref 0–0.05)
IMM GRANULOCYTES NFR BLD AUTO: 1 % (ref 0–0.5)
INHALED O2 CONCENTRATION: 50 %
INR PPP: 1.23 (ref 0.9–1.1)
LYMPHOCYTES # BLD AUTO: 4.33 10*3/MM3 (ref 0.7–3.1)
LYMPHOCYTES NFR BLD AUTO: 21.2 % (ref 19.6–45.3)
MAGNESIUM SERPL-MCNC: 2.2 MG/DL (ref 1.6–2.4)
MAGNESIUM SERPL-MCNC: 2.2 MG/DL (ref 1.6–2.4)
MCH RBC QN AUTO: 28.3 PG (ref 26.6–33)
MCH RBC QN AUTO: 29.5 PG (ref 26.6–33)
MCH RBC QN AUTO: 30.1 PG (ref 26.6–33)
MCHC RBC AUTO-ENTMCNC: 32.7 G/DL (ref 31.5–35.7)
MCHC RBC AUTO-ENTMCNC: 32.8 G/DL (ref 31.5–35.7)
MCHC RBC AUTO-ENTMCNC: 33.3 G/DL (ref 31.5–35.7)
MCV RBC AUTO: 86.5 FL (ref 79–97)
MCV RBC AUTO: 89.9 FL (ref 79–97)
MCV RBC AUTO: 90.4 FL (ref 79–97)
METHOD: ABNORMAL
METHOD: ABNORMAL
MODALITY: ABNORMAL
MONOCYTES # BLD AUTO: 1.66 10*3/MM3 (ref 0.1–0.9)
MONOCYTES NFR BLD AUTO: 8.1 % (ref 5–12)
NEUTROPHILS NFR BLD AUTO: 14.13 10*3/MM3 (ref 1.7–7)
NEUTROPHILS NFR BLD AUTO: 69.3 % (ref 42.7–76)
NRBC BLD AUTO-RTO: 0 /100 WBC (ref 0–0.2)
NUC CELL # CSF MANUAL: 2 /MM3 (ref 0–5)
NUC CELL # CSF MANUAL: 5 /MM3 (ref 0–5)
O2 A-A PPRESDIFF RESPIRATORY: 0.5 MMHG
PCO2 BLDA: 28.7 MM HG (ref 35–45)
PEEP RESPIRATORY: 5 CM[H2O]
PH BLDA: 7.41 PH UNITS (ref 7.35–7.45)
PHOSPHATE SERPL-MCNC: 5.1 MG/DL (ref 2.5–4.5)
PLATELET # BLD AUTO: 170 10*3/MM3 (ref 140–450)
PLATELET # BLD AUTO: 170 10*3/MM3 (ref 140–450)
PLATELET # BLD AUTO: 233 10*3/MM3 (ref 140–450)
PMV BLD AUTO: 10.8 FL (ref 6–12)
PMV BLD AUTO: 11 FL (ref 6–12)
PMV BLD AUTO: 11.1 FL (ref 6–12)
PO2 BLDA: 157.8 MM HG (ref 80–100)
POTASSIUM SERPL-SCNC: 4.9 MMOL/L (ref 3.5–5.2)
POTASSIUM SERPL-SCNC: 5 MMOL/L (ref 3.5–5.2)
PROT CSF-MCNC: 57 MG/DL (ref 15–45)
PROTHROMBIN TIME: 15.3 SECONDS (ref 11.7–14.2)
RBC # BLD AUTO: 2.29 10*6/MM3 (ref 3.77–5.28)
RBC # BLD AUTO: 2.51 10*6/MM3 (ref 3.77–5.28)
RBC # BLD AUTO: 2.68 10*6/MM3 (ref 3.77–5.28)
RBC # CSF MANUAL: 4 /MM3 (ref 0–0)
RBC # CSF MANUAL: 4 /MM3 (ref 0–0)
SAO2 % BLDCOA: 99.5 % (ref 92–99)
SET MECH RESP RATE: 16
SODIUM SERPL-SCNC: 148 MMOL/L (ref 136–145)
SODIUM SERPL-SCNC: 149 MMOL/L (ref 136–145)
SPECIMEN VOL CSF: 1 ML
SPECIMEN VOL CSF: 1 ML
TOTAL RATE: 31 BREATHS/MINUTE
TUBE # CSF: 1
TUBE # CSF: 4
UNIT  ABO: NORMAL
UNIT  RH: NORMAL
VENTILATOR MODE: AC
VT ON VENT VENT: 500 ML
WBC # BLD AUTO: 19.14 10*3/MM3 (ref 3.4–10.8)
WBC # BLD AUTO: 20.41 10*3/MM3 (ref 3.4–10.8)
WBC # BLD AUTO: 21.04 10*3/MM3 (ref 3.4–10.8)

## 2021-06-12 PROCEDURE — 87102 FUNGUS ISOLATION CULTURE: CPT | Performed by: PSYCHIATRY & NEUROLOGY

## 2021-06-12 PROCEDURE — 25010000002 PROPOFOL 10 MG/ML EMULSION: Performed by: ANESTHESIOLOGY

## 2021-06-12 PROCEDURE — 0BH17EZ INSERTION OF ENDOTRACHEAL AIRWAY INTO TRACHEA, VIA NATURAL OR ARTIFICIAL OPENING: ICD-10-PCS | Performed by: INTERNAL MEDICINE

## 2021-06-12 PROCEDURE — 84157 ASSAY OF PROTEIN OTHER: CPT | Performed by: PSYCHIATRY & NEUROLOGY

## 2021-06-12 PROCEDURE — 87075 CULTR BACTERIA EXCEPT BLOOD: CPT | Performed by: PSYCHIATRY & NEUROLOGY

## 2021-06-12 PROCEDURE — 87327 CRYPTOCOCCUS NEOFORM AG IA: CPT | Performed by: PSYCHIATRY & NEUROLOGY

## 2021-06-12 PROCEDURE — 009U3ZX DRAINAGE OF SPINAL CANAL, PERCUTANEOUS APPROACH, DIAGNOSTIC: ICD-10-PCS | Performed by: PSYCHIATRY & NEUROLOGY

## 2021-06-12 PROCEDURE — 99292 CRITICAL CARE ADDL 30 MIN: CPT | Performed by: PSYCHIATRY & NEUROLOGY

## 2021-06-12 PROCEDURE — 02HV33Z INSERTION OF INFUSION DEVICE INTO SUPERIOR VENA CAVA, PERCUTANEOUS APPROACH: ICD-10-PCS | Performed by: INTERNAL MEDICINE

## 2021-06-12 PROCEDURE — 25010000003 CEFTRIAXONE PER 250 MG: Performed by: INTERNAL MEDICINE

## 2021-06-12 PROCEDURE — 82962 GLUCOSE BLOOD TEST: CPT

## 2021-06-12 PROCEDURE — 87070 CULTURE OTHR SPECIMN AEROBIC: CPT | Performed by: PSYCHIATRY & NEUROLOGY

## 2021-06-12 PROCEDURE — 25010000002 PROPOFOL 10 MG/ML EMULSION

## 2021-06-12 PROCEDURE — 36430 TRANSFUSION BLD/BLD COMPNT: CPT

## 2021-06-12 PROCEDURE — 25010000002 CEFTRIAXONE PER 250 MG: Performed by: PSYCHIATRY & NEUROLOGY

## 2021-06-12 PROCEDURE — 94002 VENT MGMT INPAT INIT DAY: CPT

## 2021-06-12 PROCEDURE — 25010000003 INSULIN REGULAR HUMAN PER 5 UNITS: Performed by: INTERNAL MEDICINE

## 2021-06-12 PROCEDURE — 84100 ASSAY OF PHOSPHORUS: CPT | Performed by: INTERNAL MEDICINE

## 2021-06-12 PROCEDURE — 82803 BLOOD GASES ANY COMBINATION: CPT

## 2021-06-12 PROCEDURE — 82945 GLUCOSE OTHER FLUID: CPT | Performed by: PSYCHIATRY & NEUROLOGY

## 2021-06-12 PROCEDURE — 80048 BASIC METABOLIC PNL TOTAL CA: CPT | Performed by: INTERNAL MEDICINE

## 2021-06-12 PROCEDURE — 31500 INSERT EMERGENCY AIRWAY: CPT

## 2021-06-12 PROCEDURE — 87116 MYCOBACTERIA CULTURE: CPT | Performed by: PSYCHIATRY & NEUROLOGY

## 2021-06-12 PROCEDURE — 25010000002 FENTANYL CITRATE (PF) 50 MCG/ML SOLUTION: Performed by: INTERNAL MEDICINE

## 2021-06-12 PROCEDURE — 25010000002 ACYCLOVIR PER 5 MG: Performed by: INTERNAL MEDICINE

## 2021-06-12 PROCEDURE — C1751 CATH, INF, PER/CENT/MIDLINE: HCPCS

## 2021-06-12 PROCEDURE — 25010000002 ACYCLOVIR PER 5 MG: Performed by: PSYCHIATRY & NEUROLOGY

## 2021-06-12 PROCEDURE — 82140 ASSAY OF AMMONIA: CPT | Performed by: INTERNAL MEDICINE

## 2021-06-12 PROCEDURE — 85025 COMPLETE CBC W/AUTO DIFF WBC: CPT | Performed by: PSYCHIATRY & NEUROLOGY

## 2021-06-12 PROCEDURE — 87206 SMEAR FLUORESCENT/ACID STAI: CPT | Performed by: PSYCHIATRY & NEUROLOGY

## 2021-06-12 PROCEDURE — 89050 BODY FLUID CELL COUNT: CPT | Performed by: PSYCHIATRY & NEUROLOGY

## 2021-06-12 PROCEDURE — 99291 CRITICAL CARE FIRST HOUR: CPT | Performed by: PSYCHIATRY & NEUROLOGY

## 2021-06-12 PROCEDURE — 99223 1ST HOSP IP/OBS HIGH 75: CPT | Performed by: INTERNAL MEDICINE

## 2021-06-12 PROCEDURE — 62270 DX LMBR SPI PNXR: CPT | Performed by: PSYCHIATRY & NEUROLOGY

## 2021-06-12 PROCEDURE — 86900 BLOOD TYPING SEROLOGIC ABO: CPT

## 2021-06-12 PROCEDURE — 0W3P8ZZ CONTROL BLEEDING IN GASTROINTESTINAL TRACT, VIA NATURAL OR ARTIFICIAL OPENING ENDOSCOPIC: ICD-10-PCS | Performed by: INTERNAL MEDICINE

## 2021-06-12 PROCEDURE — 88108 CYTOPATH CONCENTRATE TECH: CPT | Performed by: PSYCHIATRY & NEUROLOGY

## 2021-06-12 PROCEDURE — 36415 COLL VENOUS BLD VENIPUNCTURE: CPT | Performed by: INTERNAL MEDICINE

## 2021-06-12 PROCEDURE — 94799 UNLISTED PULMONARY SVC/PX: CPT

## 2021-06-12 PROCEDURE — 87483 CNS DNA AMP PROBE TYPE 12-25: CPT | Performed by: INTERNAL MEDICINE

## 2021-06-12 PROCEDURE — 85652 RBC SED RATE AUTOMATED: CPT | Performed by: PSYCHIATRY & NEUROLOGY

## 2021-06-12 PROCEDURE — 71045 X-RAY EXAM CHEST 1 VIEW: CPT

## 2021-06-12 PROCEDURE — 94760 N-INVAS EAR/PLS OXIMETRY 1: CPT

## 2021-06-12 PROCEDURE — 63710000001 INSULIN GLARGINE PER 5 UNITS: Performed by: INTERNAL MEDICINE

## 2021-06-12 PROCEDURE — 36600 WITHDRAWAL OF ARTERIAL BLOOD: CPT

## 2021-06-12 PROCEDURE — 5A1945Z RESPIRATORY VENTILATION, 24-96 CONSECUTIVE HOURS: ICD-10-PCS | Performed by: INTERNAL MEDICINE

## 2021-06-12 PROCEDURE — 25010000002 VANCOMYCIN 10 G RECONSTITUTED SOLUTION: Performed by: INTERNAL MEDICINE

## 2021-06-12 PROCEDURE — 83735 ASSAY OF MAGNESIUM: CPT | Performed by: INTERNAL MEDICINE

## 2021-06-12 PROCEDURE — 85027 COMPLETE CBC AUTOMATED: CPT | Performed by: INTERNAL MEDICINE

## 2021-06-12 PROCEDURE — 83605 ASSAY OF LACTIC ACID: CPT | Performed by: INTERNAL MEDICINE

## 2021-06-12 PROCEDURE — 87015 SPECIMEN INFECT AGNT CONCNTJ: CPT | Performed by: PSYCHIATRY & NEUROLOGY

## 2021-06-12 PROCEDURE — P9016 RBC LEUKOCYTES REDUCED: HCPCS

## 2021-06-12 PROCEDURE — 87205 SMEAR GRAM STAIN: CPT | Performed by: PSYCHIATRY & NEUROLOGY

## 2021-06-12 PROCEDURE — 86592 SYPHILIS TEST NON-TREP QUAL: CPT | Performed by: PSYCHIATRY & NEUROLOGY

## 2021-06-12 PROCEDURE — 87529 HSV DNA AMP PROBE: CPT | Performed by: PSYCHIATRY & NEUROLOGY

## 2021-06-12 PROCEDURE — 25010000002 PROPOFOL 10 MG/ML EMULSION: Performed by: INTERNAL MEDICINE

## 2021-06-12 PROCEDURE — 83916 OLIGOCLONAL BANDS: CPT | Performed by: PSYCHIATRY & NEUROLOGY

## 2021-06-12 PROCEDURE — 85610 PROTHROMBIN TIME: CPT | Performed by: INTERNAL MEDICINE

## 2021-06-12 PROCEDURE — 63710000001 INSULIN REGULAR HUMAN PER 5 UNITS: Performed by: INTERNAL MEDICINE

## 2021-06-12 PROCEDURE — 43255 EGD CONTROL BLEEDING ANY: CPT | Performed by: INTERNAL MEDICINE

## 2021-06-12 RX ORDER — NICOTINE POLACRILEX 4 MG
15 LOZENGE BUCCAL
Status: DISCONTINUED | OUTPATIENT
Start: 2021-06-12 | End: 2021-06-18 | Stop reason: HOSPADM

## 2021-06-12 RX ORDER — FENTANYL CITRATE 50 UG/ML
25 INJECTION, SOLUTION INTRAMUSCULAR; INTRAVENOUS
Status: DISCONTINUED | OUTPATIENT
Start: 2021-06-12 | End: 2021-06-18 | Stop reason: HOSPADM

## 2021-06-12 RX ORDER — SODIUM CHLORIDE 450 MG/100ML
125 INJECTION, SOLUTION INTRAVENOUS CONTINUOUS
Status: DISCONTINUED | OUTPATIENT
Start: 2021-06-12 | End: 2021-06-13

## 2021-06-12 RX ORDER — SODIUM CHLORIDE 9 MG/ML
75 INJECTION, SOLUTION INTRAVENOUS CONTINUOUS
Status: DISCONTINUED | OUTPATIENT
Start: 2021-06-12 | End: 2021-06-12

## 2021-06-12 RX ORDER — PROPOFOL 10 MG/ML
VIAL (ML) INTRAVENOUS CONTINUOUS PRN
Status: DISCONTINUED | OUTPATIENT
Start: 2021-06-12 | End: 2021-06-12 | Stop reason: SURG

## 2021-06-12 RX ORDER — CEFTRIAXONE SODIUM 2 G/50ML
2 INJECTION, SOLUTION INTRAVENOUS EVERY 12 HOURS
Status: DISCONTINUED | OUTPATIENT
Start: 2021-06-12 | End: 2021-06-13

## 2021-06-12 RX ORDER — ROCURONIUM BROMIDE 10 MG/ML
INJECTION, SOLUTION INTRAVENOUS AS NEEDED
Status: DISCONTINUED | OUTPATIENT
Start: 2021-06-12 | End: 2021-06-12 | Stop reason: SURG

## 2021-06-12 RX ORDER — PROPOFOL 10 MG/ML
VIAL (ML) INTRAVENOUS
Status: COMPLETED
Start: 2021-06-12 | End: 2021-06-12

## 2021-06-12 RX ORDER — INSULIN GLARGINE 100 [IU]/ML
10 INJECTION, SOLUTION SUBCUTANEOUS EVERY MORNING
Status: DISCONTINUED | OUTPATIENT
Start: 2021-06-13 | End: 2021-06-12

## 2021-06-12 RX ORDER — INSULIN GLARGINE 100 [IU]/ML
10 INJECTION, SOLUTION SUBCUTANEOUS EVERY MORNING
Status: DISCONTINUED | OUTPATIENT
Start: 2021-06-12 | End: 2021-06-18 | Stop reason: HOSPADM

## 2021-06-12 RX ORDER — DEXTROSE MONOHYDRATE 25 G/50ML
25 INJECTION, SOLUTION INTRAVENOUS
Status: DISCONTINUED | OUTPATIENT
Start: 2021-06-12 | End: 2021-06-12

## 2021-06-12 RX ORDER — PROPOFOL 10 MG/ML
VIAL (ML) INTRAVENOUS AS NEEDED
Status: DISCONTINUED | OUTPATIENT
Start: 2021-06-12 | End: 2021-06-12 | Stop reason: SURG

## 2021-06-12 RX ADMIN — SODIUM CHLORIDE, PRESERVATIVE FREE 10 ML: 5 INJECTION INTRAVENOUS at 21:35

## 2021-06-12 RX ADMIN — PROPOFOL 25 MCG/KG/MIN: 10 INJECTION, EMULSION INTRAVENOUS at 18:32

## 2021-06-12 RX ADMIN — FENTANYL CITRATE 25 MCG: 50 INJECTION, SOLUTION INTRAMUSCULAR; INTRAVENOUS at 13:14

## 2021-06-12 RX ADMIN — SODIUM CHLORIDE 8 MG/HR: 900 INJECTION INTRAVENOUS at 17:50

## 2021-06-12 RX ADMIN — FENTANYL CITRATE 25 MCG: 50 INJECTION, SOLUTION INTRAMUSCULAR; INTRAVENOUS at 16:08

## 2021-06-12 RX ADMIN — CEFTRIAXONE SODIUM 2 G: 2 INJECTION, SOLUTION INTRAVENOUS at 23:01

## 2021-06-12 RX ADMIN — SODIUM CHLORIDE, PRESERVATIVE FREE 3 ML: 5 INJECTION INTRAVENOUS at 21:35

## 2021-06-12 RX ADMIN — INSULIN GLARGINE 10 UNITS: 100 INJECTION, SOLUTION SUBCUTANEOUS at 15:31

## 2021-06-12 RX ADMIN — PROPOFOL 40 MCG/KG/MIN: 10 INJECTION, EMULSION INTRAVENOUS at 10:15

## 2021-06-12 RX ADMIN — SODIUM CHLORIDE 8 MG/HR: 900 INJECTION INTRAVENOUS at 10:29

## 2021-06-12 RX ADMIN — PROPOFOL 15 MCG/KG/MIN: 10 INJECTION, EMULSION INTRAVENOUS at 14:27

## 2021-06-12 RX ADMIN — PROPOFOL 50 MG: 10 INJECTION, EMULSION INTRAVENOUS at 14:44

## 2021-06-12 RX ADMIN — CEFTRIAXONE SODIUM 1 G: 1 INJECTION, SOLUTION INTRAVENOUS at 02:15

## 2021-06-12 RX ADMIN — SODIUM CHLORIDE: 9 INJECTION, SOLUTION INTRAVENOUS at 14:41

## 2021-06-12 RX ADMIN — SODIUM CHLORIDE 125 ML/HR: 4.5 INJECTION, SOLUTION INTRAVENOUS at 14:20

## 2021-06-12 RX ADMIN — INSULIN HUMAN 3 UNITS: 100 INJECTION, SOLUTION PARENTERAL at 18:32

## 2021-06-12 RX ADMIN — SODIUM CHLORIDE, PRESERVATIVE FREE 10 ML: 5 INJECTION INTRAVENOUS at 10:15

## 2021-06-12 RX ADMIN — PROPOFOL 50 MCG/KG/MIN: 10 INJECTION, EMULSION INTRAVENOUS at 13:14

## 2021-06-12 RX ADMIN — ACYCLOVIR SODIUM 570 MG: 50 INJECTION, SOLUTION INTRAVENOUS at 03:47

## 2021-06-12 RX ADMIN — CEFTRIAXONE SODIUM 2 G: 2 INJECTION, SOLUTION INTRAVENOUS at 14:19

## 2021-06-12 RX ADMIN — VANCOMYCIN HYDROCHLORIDE 1500 MG: 10 INJECTION, POWDER, LYOPHILIZED, FOR SOLUTION INTRAVENOUS at 10:34

## 2021-06-12 RX ADMIN — SODIUM CHLORIDE 500 ML: 9 INJECTION, SOLUTION INTRAVENOUS at 14:02

## 2021-06-12 RX ADMIN — SODIUM CHLORIDE 1 UNITS/HR: 9 INJECTION, SOLUTION INTRAVENOUS at 03:04

## 2021-06-12 RX ADMIN — ACYCLOVIR SODIUM 570 MG: 50 INJECTION, SOLUTION INTRAVENOUS at 15:31

## 2021-06-12 RX ADMIN — SODIUM CHLORIDE 8 MG/HR: 900 INJECTION INTRAVENOUS at 04:20

## 2021-06-12 RX ADMIN — SODIUM CHLORIDE, PRESERVATIVE FREE 3 ML: 5 INJECTION INTRAVENOUS at 10:15

## 2021-06-12 RX ADMIN — ROCURONIUM BROMIDE 50 MG: 50 INJECTION INTRAVENOUS at 14:43

## 2021-06-12 NOTE — PROGRESS NOTES
"      [facility]         Dr Gonzalez Sayied   LOS: 1 day   Patient Care Team:  Michael Arriaza Jr.,  as PCP - General (Family Medicine)  Gilles Villa DPM as Consulting Physician (Podiatry)  Jen Hays MD as Consulting Physician (Obstetrics and Gynecology)  Michael Arriaza Jr., DO as Referring Physician (Family Medicine)  Glynn Melara MD as Consulting Physician (Hematology and Oncology)    Chief Complaint: Acute blood loss anemia with altered mental status lactic acidosis hyperosmolar nonketotic syndrome encephalopathy    Interval History: Events noted chart reviewed.  Called to evaluate patient confused with unstable airway.  Patient not following commands unable to give me any meaningful history.  Insulin drip currently ongoing.  Status post EGD yesterday which was suboptimal due to poor prep.  Plans for repeat EGD again today.    REVIEW OF SYSTEMS:   Confused  Ventilator/Non-Invasive Ventilation Settings (From admission, onward) Comment    None            Vital Signs  Temp:  [98.4 °F (36.9 °C)-102.9 °F (39.4 °C)] 100.9 °F (38.3 °C)  Heart Rate:  [110-163] 131  Resp:  [26-44] 36  BP: ()/() 138/86    Intake/Output Summary (Last 24 hours) at 6/12/2021 0943  Last data filed at 6/12/2021 0903  Gross per 24 hour   Intake 6616.6 ml   Output 3250 ml   Net 3366.6 ml     Flowsheet Rows      First Filed Value   Admission Height  165.1 cm (65\") Documented at 06/11/2021 0220   Admission Weight  74.4 kg (164 lb) Documented at 06/11/2021 0220          Physical Exam:  Patient is examined using the personal protective equipment as per guidelines from infection control for this particular patient as enacted.  Hand hygiene was performed before and after patient interaction.   General Appearance:   Confused and not following commands  Neck midline trachea, no thyromegaly   Lungs:    Diminished breath sounds rhonchi in the bases    Heart:    Regular rhythm and normal rate, " normal S1 and S2, no            murmur, no gallop, no rub, no click   Chest Wall:    No abnormalities observed   Abdomen:     Normal bowel sounds, no masses, no organomegaly, soft        nontender, nondistended, no guarding, no rebound                tenderness   Extremities:   Moves all extremities well, no edema, no cyanosis, no             redness  CNS confused not following commands and agitated at times  Skin no rashes no nodules  Musculoskeletal no cyanosis no clubbing moving all extremities     Results Review:        Results from last 7 days   Lab Units 06/12/21  0011 06/11/21  1625 06/11/21  0950   SODIUM mmol/L 149* 146* 144   POTASSIUM mmol/L 4.9 3.9 3.8   CHLORIDE mmol/L 118* 112* 111*   CO2 mmol/L 18.5* 20.2* 20.9*   BUN mg/dL 59* 54* 50*   CREATININE mg/dL 1.52* 1.30* 1.39*   GLUCOSE mg/dL 158* 122* 286*   CALCIUM mg/dL 7.8* 8.7 8.1*     Results from last 7 days   Lab Units 06/11/21  0403   TROPONIN T ng/mL 0.094*     Results from last 7 days   Lab Units 06/12/21  0008 06/11/21  1625 06/11/21  1001   WBC 10*3/mm3 20.41* 21.90* 16.70*   HEMOGLOBIN g/dL 7.1* 7.5* 6.8*   HEMATOCRIT % 21.7* 22.9* 21.1*   PLATELETS 10*3/mm3 233 290 285     Results from last 7 days   Lab Units 06/11/21  0223   INR  1.13*   APTT seconds 25.2         Results from last 7 days   Lab Units 06/12/21  0008   MAGNESIUM mg/dL 2.2         Results from last 7 days   Lab Units 06/11/21  1511   PH, ARTERIAL pH units 7.532*   PO2 ART mm Hg 65.7*   PCO2, ARTERIAL mm Hg 21.4*   HCO3 ART mmol/L 18.0*       I reviewed the patient's new clinical results.  I personally viewed and interpreted the patient's chest x-ray.        Medication Review:   acyclovir, 10 mg/kg (Ideal), Intravenous, Q8H  cefTRIAXone, 2 g, Intravenous, Q12H  Propofol, , ,   sodium chloride, 10 mL, Intravenous, Q12H  sodium chloride, 3 mL, Intravenous, Q12H  vancomycin, 20 mg/kg, Intravenous, Once  Vancomycin Pharmacy Intermittent Dosing, , Does not apply,  Daily        dexmedetomidine, 0.2-1.5 mcg/kg/hr, Last Rate: Stopped (06/12/21 0304)  dextrose 5 % and sodium chloride 0.45 %, 150 mL/hr  dextrose 5 % and sodium chloride 0.45 % with KCl 20 mEq/L, 150 mL/hr, Last Rate: 150 mL/hr (06/11/21 1523)  dextrose 5 % and sodium chloride 0.45 % with KCl 40 mEq/L, 150 mL/hr  dextrose 5 % and sodium chloride 0.9 %, 150 mL/hr  dextrose 5 % and sodium chloride 0.9 % with KCl 20 mEq, 150 mL/hr  dextrose 5% and sodium chloride 0.9% with KCl 40 mEq/L, 150 mL/hr  insulin, 7 Units/hr, Last Rate: 2 Units/hr (06/12/21 0910)  pantoprazole, 8 mg/hr, Last Rate: 8 mg/hr (06/12/21 0420)  Pharmacy to dose vancomycin,   propofol, 5-50 mcg/kg/min  custom IV KCl infusion builder, 250 mL/hr  sodium chloride, 250 mL/hr  sodium chloride 0.45 % with KCl 20 mEq, 250 mL/hr, Last Rate: 250 mL/hr (06/11/21 2014)  sodium chloride, 250 mL/hr  sodium chloride, 10 mL/hr  sodium chloride 0.9 % with KCl 20 mEq, 250 mL/hr  sodium chloride 0.9 % with KCl 40 mEq/L, 250 mL/hr        ASSESSMENT:   Acute respiratory failure intubated 6/12/2021  Altered mental status  Sepsis  Acute blood loss anemia with GI bleed and melanotic stools/coffee-ground emesis: No significant abnormality found on scope  Lactic acidosis  Hyperosmolar nonketotic syndrome  Leukocytosis  Tachycardia  Hypertension  Encephalopathy  COPD without exacerbation  History of tobacco use    PLAN:  Events noted chart reviewed.  We will proceed with intubation and mechanical ventilation to protect airway.  Current antibiotics per infectious diseases.  Plans for LP per neurology.  Altered mental status likely metabolic infectious.  Neurology currently following.  Plans for lumbar puncture to rule out CNS source.  Lactic acidosis improved.  Acute GI bleed status post EGD.  Upper GI bleed with large volume of retained food and heme precluding evaluation of gastric mucosa.  Continue Protonix drip.  Hemoglobin to be monitored every 6 hours.  Hemoglobin  currently stable.  Blood transfusion ordered today.  Will keep hemoglobin greater than 7.  Hyperosmolar nonketotic syndrome with blood glucose in the normal range.  I will go ahead discontinue insulin drip placed on sliding scale.  Consult nephrology for worsening creatinine.  Multiple medical problems and issues.  Patient critically ill.    Critical care time 40 minutes occluding procedures      Carlos Harmon MD  06/12/21  09:43 EDT

## 2021-06-12 NOTE — ANESTHESIA POSTPROCEDURE EVALUATION
Patient: Jayne Beltran    Procedure Summary     Date: 06/12/21 Room / Location:  [facility]    Anesthesia Start: 1441 Anesthesia Stop: 1457    Procedure: ESOPHAGOGASTRODUODENOSCOPY AT BEDSIDE (N/A Esophagus) Diagnosis:       Gastrointestinal hemorrhage, unspecified gastrointestinal hemorrhage type      Melena      (Gastrointestinal hemorrhage, unspecified gastrointestinal hemorrhage type [K92.2])      (Melena [K92.1])    Surgeons: Calvin Barraza MD Provider: Rodney Rivera MD    Anesthesia Type: MAC ASA Status: 4          Anesthesia Type: MAC    Vitals  Vitals Value Taken Time   /73 06/12/21 1450   Temp     Pulse 103 06/12/21 1444   Resp 16 06/12/21 1454   SpO2 96 % 06/12/21 1453   Vitals shown include unvalidated device data.        Post Anesthesia Care and Evaluation    Patient location during evaluation: ICU  Patient participation: waiting for patient participation  Pain management: adequate  Airway patency: patent    Cardiovascular status: acceptable  Respiratory status: acceptable, ETT, intubated and ventilator  Hydration status: acceptable

## 2021-06-12 NOTE — PLAN OF CARE
Goal Outcome Evaluation:   Patient remains in CCU. Intubated this morning in order to safely sedate patient for lumbar puncture and MRI. Remains anxious and kicking at times. Continues to not follow commands. Lumbar puncture completed at bedside. EGD completed with cauterization of gastric ulcer. Episode of hypotension after LP procedure. 500 cc bolus administered with improvement. Continues with low-grade temperature. Temp max of 100.1. Frequent liquid, loose, dark red stools this shift. PICC line placed. Unable to complete MRI d/t bedside procedures today. Plan to be completed tomorrow. Continuing to monitor.

## 2021-06-12 NOTE — PROCEDURES
Name of procedure: Lumbar puncture.    Performed by: Blas Luque MD    Consent obtained from: Her  over the telephone.    Position placed: Right lateral decubitus.    Sedation: Intravenous propofol drip along with intravenous fentanyl.  Patient currently intubated and on the ventilator.    Cleansing agent used: Hibiclens.    Local anesthesia used: 1% lidocaine supplied in the spinal tray.    Site chosen: L2-L3 interspace    Description of the procedure:    After the appropriate positioning of the patient with the help of our registered nurse, the L2-L3 interspace was cleansed aseptically with Hibiclens and a 20-gauge spinal tap needle supplied by the spinal tray was introduced aseptically after local anesthesia was used to numb up the area.  The opening pressure was 170 mm water pressure with the patient's legs flexed with the knees pulled towards the chest in a cannonball position.  Approximately 14 mL of crystal-clear spinal fluid was obtained and sent for different tests.  No complications were noted.  Following the procedure the patient was repositioned to lay flat on her back for the next 4 hours with the head end up for the endotracheal tube as well as for the ventilator.    Impression:    Successful spinal tap performed by myself at the bedside.  Preliminary results will be available over the next 6 to 10 hours.

## 2021-06-12 NOTE — PROGRESS NOTES
"DOS: 2021  NAME: Jayne Beltran   : 1955  PCP: Michael Arriaza Jr.,   Chief Complaint   Patient presents with   • Altered Mental Status   • Black or Bloody Stool       Chief complaint: Patient had a very eventful night and received 3 units of blood transfusion.  Subjective: When I came to see the patient this morning I noticed that she was still thrashing around and her daughter who was at the bedside described to me that she has severe restless leg syndrome for which she sees a local neurologist.  She has been on gabapentin and Lyrica combination for quite some time.  She does not have any other neuromuscular disease.  As the patient was extremely restless and also having severe medical issues as well as respiratory issues it was decided by the intensivist to intubate the patient for maintenance of proper airway and to perform appropriate procedures as deemed necessary.    Objective:  Vital signs: /87   Pulse (!) 126   Temp (!) 100.9 °F (38.3 °C)   Resp (!) 37   Ht 165.1 cm (65\")   Wt 71.6 kg (157 lb 13.6 oz)   LMP  (LMP Unknown)   SpO2 100%   BMI 26.27 kg/m²    Gen: NAD, vitals reviewed  MS: Unable to assess at this time  CN: Pupils are equal reacting to light.  The extraocular muscles are intact.  Doll's eye movements are present.  Motor: Patient was moving all extremities well.  Sensory: Patient withdraws to painful stimuli.  DTRs: 1+ bilaterally with upgoing toes.  Unable to test coordination or gait or Romberg at this point    ROS:  General: Well-nourished lady currently intubated and on the ventilator.  The neurological evaluation performed was prior to the intubation.  Neurological: The current neurological evaluation following intubation shows difficult to evaluate because of the effects of the intravenous propofol drip and intravenous fentanyl drip.  However the prior neurological evaluation is before the intubation.    Laboratory results:  Lab Results   Component Value " Date    GLUCOSE 148 (H) 06/12/2021    CALCIUM 7.2 (L) 06/12/2021     (H) 06/12/2021    K 5.0 06/12/2021    CO2 15.6 (L) 06/12/2021     (H) 06/12/2021    BUN 57 (H) 06/12/2021    CREATININE 1.66 (H) 06/12/2021    EGFRIFAFRI 53 (L) 01/20/2021    EGFRIFNONA 31 (L) 06/12/2021    BCR 34.3 (H) 06/12/2021    ANIONGAP 12.4 06/12/2021     Lab Results   Component Value Date    WBC 21.04 (H) 06/12/2021    HGB 7.9 (L) 06/12/2021    HCT 24.1 (L) 06/12/2021    MCV 89.9 06/12/2021     06/12/2021     Lab Results   Component Value Date    LDL 81 01/20/2021    LDL 55 10/14/2020    LDL 72 09/05/2020         Lab 06/11/21  0223   HEMOGLOBIN A1C 8.40*        Review of labs: The hemoglobin A1c is elevated at 8.4.  Following 3 units of packed red blood cell transfusion the hemoglobin is 7.9 with hematocrit of 24 and the white cell count of 21,000 and platelet count is normal at 170,000.    Review and interpretation of imaging: The initial head CT was reviewed by me on the PACS which shows the following:    FINDINGS:    Brain:  No hemorrhage or mass effect.  Mild cerebral volume loss.    Ventricles:  No hydrocephalus.    Bones joints:  Unremarkable.    Soft tissues:  Unremarkable.    Sinuses:  Unremarkable.    Mastoid air cells:  Clear.     IMPRESSION:         No acute hemorrhage, hydrocephalus, or mass effect.       Workup to date: Coagulation studies were performed which shows pro time of 15.3 with an INR of 1.23.  Spinal fluid evaluation was performed by me at the bedside with the following procedure note:    Name of procedure: Lumbar puncture.     Performed by: Blas Luque MD     Consent obtained from: Her  over the telephone.     Position placed: Right lateral decubitus.     Sedation: Intravenous propofol drip along with intravenous fentanyl.  Patient currently intubated and on the ventilator.     Cleansing agent used: Hibiclens.     Local anesthesia used: 1% lidocaine supplied in the spinal  tray.     Site chosen: L2-L3 interspace     Description of the procedure:     After the appropriate positioning of the patient with the help of our registered nurse, the L2-L3 interspace was cleansed aseptically with Hibiclens and a 20-gauge spinal tap needle supplied by the spinal tray was introduced aseptically after local anesthesia was used to numb up the area.  The opening pressure was 170 mm water pressure with the patient's legs flexed with the knees pulled towards the chest in a cannonball position.  Approximately 14 mL of crystal-clear spinal fluid was obtained and sent for different tests.  No complications were noted.  Following the procedure the patient was repositioned to lay flat on her back for the next 4 hours with the head end up for the endotracheal tube as well as for the ventilator.     Impression:     Successful spinal tap performed by myself at the bedside.  Preliminary results will be available over the next 6 to 10 hours.                Diagnoses: Patient presented with altered mental status with high fever of 101.5 °F with severe GI bleed.      Comment: Currently unable to assess further neurological evaluation because of the use of intravenous propofol drip.    Plan:  1.  We will get an MRI of the brain with and without contrast.  2.  Spinal fluid evaluation has been completed which will be sent for different testings.  3.  Endoscopy will be performed by the gastroenterologist on-call today to look for lower GI bleed.    Discussed with intensivist and the ICU team and spent total of 1.5 hours in this critical care evaluation and management of the patient.    Blas Luque MD

## 2021-06-12 NOTE — PROCEDURES
Endotracheal Intubation Procedure Note    Indication for endotracheal intubation: airway compromise.  Sedation: Propofol 10 cc rapid bolus.  Equipment: A video GlideScope was used and Yony 3 laryngoscope blade.  Number of attempts: 1.  ETT location confirmed by by auscultation, by CXR and ETCO2 monitor.  ET tube secured 23 cm at the lip.  No acute complication  Sats 95% post intubation    Carlos Harmon MD  6/12/2021

## 2021-06-12 NOTE — PROGRESS NOTES
Pharmacy consult to dose Acyclovir    Jayne Beltran is a 65 y.o. female 71.6 kg (157 lb 13.6 oz).  Pharmacy consulted to dose for CNS infection per Dr Duncan's request.    Anti-Infectives (From admission, onward)    Ordered     Dose/Rate Route Frequency Start Stop    06/12/21 1239  acyclovir (ZOVIRAX) 570 mg in sodium chloride 0.9 % 100 mL IVPB     Ordering Provider: Cem Duncan MD    10 mg/kg × 57 kg (Ideal)  over 60 Minutes Intravenous Every 12 Hours 06/12/21 1500 06/26/21 1459    06/12/21 1224  Pharmacy to Dose acyclovir (ZOVIRAX)     Ordering Provider: Cem Duncan MD     Does not apply Continuous PRN 06/12/21 1224 06/25/21 1223    06/12/21 0829  cefTRIAXone (ROCEPHIN) IVPB 2 g     Ordering Provider: Cem Duncan MD    2 g  100 mL/hr over 30 Minutes Intravenous Every 12 Hours 06/12/21 1200 06/16/21 2359    06/12/21 0845  vancomycin 1500 mg/500 mL 0.9% NS IVPB ([facility])     Ordering Provider: Cem Duncan MD    20 mg/kg × 71.6 kg  over 90 Minutes Intravenous Once 06/12/21 1000 06/12/21 1204    06/12/21 0845  Vancomycin Pharmacy Intermittent Dosing     Ordering Provider: Cem Duncan MD     Does not apply Daily 06/12/21 0945 06/19/21 0859    06/12/21 0829  Pharmacy to dose vancomycin     Ordering Provider: Cem Duncan MD     Does not apply Continuous PRN 06/12/21 0829 06/19/21 0828           Estimated Creatinine Clearance: 33 mL/min (A) (by C-G formula based on SCr of 1.66 mg/dL (H)).  Creatinine   Date Value Ref Range Status   06/12/2021 1.66 (H) 0.57 - 1.00 mg/dL Final   06/12/2021 1.52 (H) 0.57 - 1.00 mg/dL Final   06/11/2021 1.30 (H) 0.57 - 1.00 mg/dL Final   06/11/2021 1.39 (H) 0.57 - 1.00 mg/dL Final   06/11/2021 1.47 (H) 0.57 - 1.00 mg/dL Final   06/11/2021 1.69 (H) 0.57 - 1.00 mg/dL Final     WBC   Date Value Ref Range Status   06/12/2021 20.41 (H) 3.40 - 10.80 10*3/mm3 Final   06/11/2021 21.90 (H) 3.40 - 10.80  10*3/mm3 Final   06/11/2021 16.70 (H) 3.40 - 10.80 10*3/mm3 Final   06/11/2021 25.83 (H) 3.40 - 10.80 10*3/mm3 Final     Temp Readings from Last 2 Encounters:   06/12/21 (!) 100.9 °F (38.3 °C)   01/27/21 97.3 °F (36.3 °C)       Baseline cultures:  6/11 blood cx NGTD  6/12 Lumbar puncture pending    PLAN:  Pt currently receiving acyclovir 10mg/kg IV q8h. Given GERI and rising Scr will decrease dose to 10mg/kg IV q12h for now.  Will continue to follow daily and adjust as needed.    Nelia Raya, PharmD, BCPS  06/12/21 12:40 EDT

## 2021-06-12 NOTE — PLAN OF CARE
Goal Outcome Evaluation:   Patient remains in CCU. Very agitated and restless all shift. Patient began shift screaming, very confused, and not following commands. Fluctuated between agitation and somnolence. As day progressed, patient became more agitated, requiring sedatives. Haldol and zyprexa administered with no improvement. Patient became very restless, tachycardic (rate 160s), tachypneic (rate 30-40), and hypertensive. ABG checked and stable. MD made aware of status and additional sedatives ordered. Fever of 102.9 and abx resumed. EGD attempted but not able to be completed r/t blood and food in stomach. Patient continues passing dark, maroon stools. Tejeda placed for retention with large volume output. Neuro and infectious disease consulted r/t patient mental status and presentation. This evening, patient noted to be more somnolent and not responding to voice or physical stimulation. Patient continued to move restlessly but this RN concerned for patient protecting airway. MD notified and evaluated patient, no intervention at this time. Remains tachycardic and borderline hypotensive now. 2 units prbc this shift with goal of keeping hbg greater than 8. Continuing to monitor.

## 2021-06-12 NOTE — NURSING NOTE
"Dr. Espinoza notified of patient's fever (102.5), tachycardia, tachypnea and hypertension during blood transfusion (2 hours into infusion) This RN informed MD that patient had previously experienced tachycardia and hypertension when agitated. Concern for transfusion reaction was expressed given fever. MD notified of all vitals. Per MD, \"okay to continue transfusion. Don't suspect transfusion reaction.\"   "

## 2021-06-12 NOTE — ANESTHESIA PREPROCEDURE EVALUATION
Anesthesia Evaluation     Patient summary reviewed and Nursing notes reviewed   no history of anesthetic complications:  NPO Solid Status: > 8 hours  NPO Liquid Status: > 8 hours           Airway   Mallampati: III  Comment: Pt on vent  Dental      Pulmonary - normal exam   (+) a smoker Current, COPD, shortness of breath,   Cardiovascular - normal exam    (+) hypertension,       Neuro/Psych  (+) TIA, syncope, numbness, psychiatric history Anxiety,     GI/Hepatic/Renal/Endo    (+)  GERD, GI bleeding , renal disease stones and ARF, diabetes mellitus type 2,     Musculoskeletal     (+) back pain,   Abdominal    Substance History      OB/GYN          Other      history of cancer                  Anesthesia Plan    ASA 4     MAC     intravenous induction     Anesthetic plan, all risks, benefits, and alternatives have been provided, discussed and informed consent has been obtained with: patient.

## 2021-06-12 NOTE — CONSULTS
[facility] Consult Note      Patient Name: Jayne Beltran  : 1955  MRN: [id number]  Primary Care Physician:  Michael Arriaza Jr., DO  Referring Physician: Obdulio Burger MD  Date of admission: 2021    Subjective     Reason for Consult:  GERI on CKD3    HPI:   Jayne Beltran is a 65 y.o. female with CKD3 (baseline SCR 1.2-1.4) admitted yesterday through ER for further evaluation of melena.  Hemoglobin found to be 7.5, with value today 7.1.  Renal consulted due to rising SCR: 1.3 yesterday and 1.7 today.  Hospitalization: severe hyperglycemia managed with insulin; fever and leukocytosis; hypotension and tachycardia; PRBCs and EGD ; intubated early this morning due to worsening mental status and unstable airway.  ROS not possible as patient intubated and sedated.  LP planned later this morning.    Review of Systems:   14 point review of systems is otherwise negative except for mentioned above on HPI    Personal History     Past Medical History:   Diagnosis Date   • COPD (chronic obstructive pulmonary disease) (CMS/HCC)    • Diabetes mellitus (CMS/HCC)     type 2   • Fibroid    • Hypertension    • Leukocytosis    • Neuromuscular disorder (CMS/HCC)    • Panic attack    • Skin cancer     nose   • TIA (transient ischemic attack)        Past Surgical History:   Procedure Laterality Date   • CHOLECYSTECTOMY     • COLONOSCOPY     • INSERTION HEMODIALYSIS CATHETER N/A 9/3/2020    Procedure: HEMODIALYSIS CATHETER INSERTION;  Surgeon: Sergio Durant MD;  Location: [facility];  Service: Vascular;  Laterality: N/A;   • KIDNEY STONE SURGERY     • URETEROSCOPY LASER LITHOTRIPSY WITH STENT INSERTION Right 2020    Procedure: CYSTOSCOPY, RIGHT URETEROSCOPY, RIGHT RETROGRADE PYELOGRAM, LASER LITHOTRIPSY WITH RIGHT URETERAL STENT EXCHANGE;  Surgeon: Mikie Mejia MD;  Location: [facility] OR;  Service: Urology;  Laterality: Right;       Family History:  family history includes Breast cancer in her maternal grandmother; Cancer in her brother and paternal uncle; Clotting disorder in her paternal grandmother; Deep vein thrombosis in her father; Dementia in her maternal grandmother; Depression in her daughter and father; Diabetes in her brother, daughter, father, mother, and sister; Heart disease in her brother, maternal uncle, and mother; Hypertension in her maternal grandmother and mother; Liver disease in her father; Lung cancer (age of onset: 58) in her father; Ovarian cancer (age of onset: 23) in her daughter.    Social History:  reports that she has been smoking cigarettes. She has a 11.25 pack-year smoking history. She has never used smokeless tobacco. She reports that she does not drink alcohol and does not use drugs.    Home Medications:  Prior to Admission medications    Medication Sig Start Date End Date Taking? Authorizing Provider   citalopram (CeleXA) 10 MG tablet Take 1 tablet by mouth Daily. 4/4/21  Yes Michael Arriaza Jr., DO   insulin aspart (NovoLOG FlexPen) 100 UNIT/ML solution pen-injector sc pen Inject 5 Units under the skin into the appropriate area as directed 3 (Three) Times a Day With Meals. SSI-DEPENDS PM BLOOD GLUCOSE 2/17/21  Yes Michael Arriaza Jr. DO   metoprolol succinate XL (TOPROL-XL) 200 MG 24 hr tablet Take 1 tablet by mouth Daily. 1/15/21  Yes Michael Arriaza Jr. DO   rOPINIRole (REQUIP) 3 MG tablet Take 3 mg by mouth Every Night. Take 1-3 hr prior to bedtime   Yes Provider, MD Darian   Alcohol Swabs (B-D SINGLE USE SWABS REGULAR) pads 1 each 3 (Three) Times a Day Before Meals. 7/27/20   Michael Arriaza Jr., DO   Blood Glucose Calibration (ACCU-CHEK LUIS ALBERTO) solution 1 each by In Vitro route 3 (Three) Times a Day Before Meals. 7/27/20   Michael Arriaza Jr., DO   Blood Glucose Monitoring Suppl (ACCU-CHEK LUIS ALBERTO PLUS) w/Device kit 1 each 3 (Three) Times a Day Before Meals. 7/27/20   Michael Arriaza Jr., DO     Cranberry-Vit C-Lactobacillus (CRANBERRY/PROBIOTIC/VIT C PO) Take 1 tablet by mouth Daily. 9/28/20   Darian Morales MD   Cyanocobalamin (VITAMIN B 12) 500 MCG tablet Take 500 mcg by mouth Daily.    Darian Morales MD   Empagliflozin (Jardiance) 10 MG tablet Take 10 mg by mouth Daily. 10/29/20   Michael Arriaza Jr., DO   folic acid (FOLVITE) 1 MG tablet Take 4 tablets by mouth Daily. 9/4/20   Darian Morales MD   furosemide (LASIX) 80 MG tablet Take 80 mg by mouth 2 (Two) Times a Day.    Darian Morales MD   glucose blood (Accu-Chek Ilana Plus) test strip 1 each by Other route 3 (Three) Times a Day Before Meals. Use as instructed 7/27/20   Michael Arriaza Jr., DO   Insulin Pen Needle (RELION PEN NEEDLE 31G/8MM) 31G X 8 MM misc 1 each 3 (Three) Times a Day. 10/12/20   Michael Arriaza Jr., DO   Lancets (ACCU-CHEK MULTICLIX) lancets 1 each by Other route 3 (Three) Times a Day Before Meals. Use as instructed 7/27/20   Michael Arriaza Jr., DO   Lancets Misc. (ACCU-CHEK MULTICLIX LANCET DEV) kit 1 each 3 (Three) Times a Day Before Meals. 7/27/20   Michael Arriaza Jr. DO   lisinopril (PRINIVIL,ZESTRIL) 10 MG tablet Take 1 tablet by mouth Daily. 1/14/21   Michael Arriaza Jr. DO   pregabalin (LYRICA) 150 MG capsule Take 150 mg by mouth 3 (Three) Times a Day.    Darian Morales MD   senna (senna) 8.6 MG tablet Take 8.6 mg by mouth Daily. 9/29/20   Darian Morales MD       Allergies:  Allergies   Allergen Reactions   • Metformin Other (See Comments)     Kidney failure       Objective     Vitals:   Temp:  [98.4 °F (36.9 °C)-102.9 °F (39.4 °C)] 100.9 °F (38.3 °C)  Heart Rate:  [110-163] 126  Resp:  [26-44] 37  BP: ()/() 169/87  Flow (L/min):  [2] 2  FiO2 (%):  [35 %-50 %] 35 %    Intake/Output Summary (Last 24 hours) at 6/12/2021 1214  Last data filed at 6/12/2021 1158  Gross per 24 hour   Intake 7188.38 ml   Output 2975 ml   Net 4213.38 ml     Wt  Readings from Last 1 Encounters:   06/11/21 0610 71.6 kg (157 lb 13.6 oz)   06/11/21 0220 74.4 kg (164 lb)     Wt Readings from Last 3 Encounters:   06/11/21 71.6 kg (157 lb 13.6 oz)   01/27/21 73 kg (161 lb)   12/29/20 71.5 kg (157 lb 11.2 oz)       Physical Exam:    General Appearance: NAD but appears restless, writhing in bed; intubated and sedated   Skin: warm and dry; febrile  HEENT: oral ETT, nonicteric sclera  Neck: supple, no JVD  Lungs: CTA; not labored on ventilator  Heart: RR, tachycardic, normal S1 and S2  Abdomen: soft, no grimacing when abdomen palpated, nondistended, BS+  : Tejeda catheter anchored  Extremities: no significant edema   Neuro: Sedated; moving all extremities    Scheduled Meds:     acyclovir, 10 mg/kg (Ideal), Intravenous, Q8H  cefTRIAXone, 2 g, Intravenous, Q12H  insulin regular, 0-14 Units, Subcutaneous, Q6H  sodium chloride, 10 mL, Intravenous, Q12H  sodium chloride, 3 mL, Intravenous, Q12H  Vancomycin Pharmacy Intermittent Dosing, , Does not apply, Daily      IV Meds:   dexmedetomidine, 0.2-1.5 mcg/kg/hr, Last Rate: Stopped (06/12/21 0304)  dextrose 5 % and sodium chloride 0.45 %, 150 mL/hr  dextrose 5 % and sodium chloride 0.45 % with KCl 20 mEq/L, 150 mL/hr, Last Rate: 150 mL/hr (06/11/21 1523)  dextrose 5 % and sodium chloride 0.45 % with KCl 40 mEq/L, 150 mL/hr  dextrose 5 % and sodium chloride 0.9 %, 150 mL/hr  dextrose 5 % and sodium chloride 0.9 % with KCl 20 mEq, 150 mL/hr  dextrose 5% and sodium chloride 0.9% with KCl 40 mEq/L, 150 mL/hr  pantoprazole, 8 mg/hr, Last Rate: 8 mg/hr (06/12/21 1029)  Pharmacy to dose vancomycin,   propofol, 5-50 mcg/kg/min, Last Rate: 50 mcg/kg/min (06/12/21 1136)  custom IV KCl infusion builder, 250 mL/hr  sodium chloride, 250 mL/hr  sodium chloride 0.45 % with KCl 20 mEq, 250 mL/hr, Last Rate: 250 mL/hr (06/11/21 2014)  sodium chloride, 250 mL/hr  sodium chloride, 10 mL/hr  sodium chloride 0.9 % with KCl 20 mEq, 250 mL/hr  sodium chloride 0.9  % with KCl 40 mEq/L, 250 mL/hr        Results Reviewed:   I have personally reviewed the results from the time of this admission to 6/12/2021 12:14 EDT     Lab Results   Component Value Date    GLUCOSE 148 (H) 06/12/2021    CALCIUM 7.2 (L) 06/12/2021     (H) 06/12/2021    K 5.0 06/12/2021    CO2 15.6 (L) 06/12/2021     (H) 06/12/2021    BUN 57 (H) 06/12/2021    CREATININE 1.66 (H) 06/12/2021    EGFRIFAFRI 53 (L) 01/20/2021    EGFRIFNONA 31 (L) 06/12/2021    BCR 34.3 (H) 06/12/2021    ANIONGAP 12.4 06/12/2021      Lab Results   Component Value Date    MG 2.2 06/12/2021    PHOS 5.1 (H) 06/12/2021    ALBUMIN 3.10 (L) 06/11/2021           Assessment / Plan     ASSESSMENT:  1.  GERI on CKD3, nonoliguric, prerenal due to hypovolemia, ABLA, and sepsis syndrome with recent use of lisinopril.  Hypovolemic by exam; no CHF by chest x-ray.  Worsening hypernatremia on saline.  Normal potassium; respiratory alkalosis.  Urinalysis with only 100 mg/dL protein and few cellular elements  2.  Melena and ABLA; EGD 6/11  3.  Respiratory failure and underlying COPD  4.  Fever  5.  Encephalopathy  6.  Uncontrolled DM2 with severe hyperglycemia and hyperosmolar state    PLAN:  1.  Change IVF to half-normal saline given hypernatremia, and increase rate to 125 mL/h  2.  Lumbar puncture today  3.  PICC line ok from renal view  4.  Discussed with daughter at bedside    Thank you for involving us in the care of Jayne Beltran.  Please feel free to call with any questions.    Huan Beaulieu MD  06/12/21  12:14 EDT    [facility]  [phone]      Much of this encounter note is an electronic transcription/translation of spoken language to printed text. The electronic translation of spoken language may permit erroneous, or at times, nonsensical words or phrases to be inadvertently transcribed.  Although I have reviewed the note for such errors, some may still exist.

## 2021-06-12 NOTE — PROGRESS NOTES
"[facility]  Clinical Pharmacy Department     Vancomycin Pharmacokinetic Note    Jayne Beltran is a 65 y.o. female who is on day 1 of pharmacy to dose vancomycin for possible CNS infection.    Target level: AUC24 400-600; Random level 15-20 mcg/ml  Consulting Provider:  Dr. Duncan  Current Vancomycin Dose:   intermittent  Planned Duration of Therapy: TBD  Other Antimicrobials: acyclovir, ceftriaxone    Allergies  Allergies as of 06/11/2021 - Reviewed 06/11/2021   Allergen Reaction Noted   • Metformin Other (See Comments) 10/29/2020       Microbiology:  Microbiology Results (last 10 days)     Procedure Component Value - Date/Time    Blood Culture - Blood, Blood, Venous Line [[id number]] Collected: 06/11/21 0257    Lab Status: Preliminary result Specimen: Blood, Venous Line Updated: 06/12/21 0331     Blood Culture No growth at 24 hours    Blood Culture - Blood, Blood, Venous Line [[id number]] Collected: 06/11/21 0257    Lab Status: Preliminary result Specimen: Blood, Venous Line Updated: 06/12/21 0316     Blood Culture No growth at 24 hours    COVID-19,[facility] IN-HOUSE CEPHEID/SHAMIR NP SWAB IN TRANSPORT MEDIA 8-12 HR TAT - Swab, Nasopharynx [[id number]]  (Normal) Collected: 06/11/21 0248    Lab Status: Final result Specimen: Swab from Nasopharynx Updated: 06/11/21 0923     COVID19 Not Detected    Narrative:      Fact sheet for providers: https://www.fda.gov/media/855338/download    Fact sheet for patients: https://www.fda.gov/media/163232/download    Test performed by PCR.          Radiology/Imaging:      Vitals/Labs/I&O  165.1 cm (65\")  71.6 kg (157 lb 13.6 oz)  Body mass index is 26.27 kg/m².   Temp:  [98.4 °F (36.9 °C)-102.9 °F (39.4 °C)] 99 °F (37.2 °C)  Heart Rate:  [110-163] 134  Resp:  [26-44] 26  BP: ()/() 176/94    Results from last 7 days   Lab Units 06/12/21  0008 06/11/21  1625 06/11/21  1001   WBC 10*3/mm3 20.41* 21.90* 16.70*     Results from last 7 days   Lab Units 06/12/21  0008 " 06/11/21  1626 06/11/21  0950 06/11/21  0223   LACTATE mmol/L 3.1* 3.7* 3.4* 9.0*      Results from last 7 days   Lab Units 06/11/21  0403   PROCALCITONIN ng/mL 0.22           Results from last 7 days   Lab Units 06/12/21  0008   SED RATE mm/hr 1        Estimated Creatinine Clearance: 36.1 mL/min (A) (by C-G formula based on SCr of 1.52 mg/dL (H)).  Results from last 7 days   Lab Units 06/12/21  0011 06/11/21  1625 06/11/21  0950   BUN mg/dL 59* 54* 50*   CREATININE mg/dL 1.52* 1.30* 1.39*     Intake & Output (last 3 days)       06/09 0701 - 06/10 0700 06/10 0701 - 06/11 0700 06/11 0701 - 06/12 0700 06/12 0701 - 06/13 0700    I.V. (mL/kg)   5248 (73.3)     Blood   850     IV Piggyback   218.6     Total Intake(mL/kg)   6316.6 (88.2)     Urine (mL/kg/hr)   3250 (1.9)     Stool   0     Total Output   3250     Net   +3066.6             Urine Unmeasured Occurrence   1 x     Stool Unmeasured Occurrence   8 x           Vancomycin Dosing and Level History:  6/11 1904: 1000mg x1                  Assessment/Plan:    Scr similar to levels seen on previous lab results. Good UOP overnight. Pt was given vancomycin 1000mg x1 yesterday evening (~15mg/kg). Expect level is likely below therapeutic range at this time (estimated level ~7mcg/ml with [employer]). Given severity of illness, will re-load vancomycin this AM, and with unpredictable renal function will dose intermittently by levels for now.    Recommendations/Plan:   - Give vancomycin 1500mg IV x1 dose now.  - Check random level with AM labs tomorrow and will evaluate for re-dosing, target level in 15-20 mcg/ml range.  -Continue to monitor SCr    Thank you for involving pharmacy in this patient's care. Please contact pharmacy with any questions or concerns.                           Nelia Raya, PharmD  Clinical Pharmacist  06/12/21 08:45 EDT

## 2021-06-12 NOTE — CONSULTS
Referring Provider: Dr Espinoza    Reason for Consultation: Unclear source of infectious process    History of present illness:  Jayne Beltran is a 65 y.o. with PMH of CVA and uncontrolled DM2 who I am asked to evaluate and give opinion for Unclear source of infectious process. History is obtained from the patient's RN and chart as she is confused and cannot provide any history. She presented to the ER on 6/11/21 at 0201 with confusion, weakness, and dark tarry stools. There were no alleviating factors. No known inciting factors.     In the ER she was afebrile but tachycardic. Labs were notable for Glc 599, Crt 1.7, LA 9, WBC 25, and Hct 29. She was given IV fluids, insulin, and ceftriaxone as well as a PPI. GI consulted and did an EGD which showed a large bezoar and coffee-ground material in the stomach. Yesterday afternoon she had fever and agitation. Neurology was consulted and she was started on vancomycin, ceftriaxone, and acyclovir. LP has not yet been performed.     This morning she is afebrile but remains tachycardic and very agitated. She had a large bloody BM overnight per my d/w RN.     Past Medical History:   Diagnosis Date   • COPD (chronic obstructive pulmonary disease) (CMS/HCC)    • Diabetes mellitus (CMS/HCC)     type 2   • Fibroid    • Hypertension    • Leukocytosis    • Neuromuscular disorder (CMS/HCC)    • Panic attack    • Skin cancer     nose   • TIA (transient ischemic attack)        Past Surgical History:   Procedure Laterality Date   • CHOLECYSTECTOMY     • COLONOSCOPY     • INSERTION HEMODIALYSIS CATHETER N/A 9/3/2020    Procedure: HEMODIALYSIS CATHETER INSERTION;  Surgeon: Sergio Durant MD;  Location: [facility];  Service: Vascular;  Laterality: N/A;   • KIDNEY STONE SURGERY     • URETEROSCOPY LASER LITHOTRIPSY WITH STENT INSERTION Right 12/21/2020    Procedure: CYSTOSCOPY, RIGHT URETEROSCOPY, RIGHT RETROGRADE PYELOGRAM, LASER LITHOTRIPSY WITH RIGHT URETERAL STENT EXCHANGE;   Surgeon: Mikie Mejia MD;  [facility];  Service: Urology;  Laterality: Right;       Social History:    Tobacco use    Family History:  Mom: CAD    Antibiotic allergies and intolerances:  None    Medications:    Current Facility-Administered Medications:   •  acetaminophen (TYLENOL) tablet 650 mg, 650 mg, Oral, Q4H PRN **OR** acetaminophen (TYLENOL) suppository 650 mg, 650 mg, Rectal, Q4H PRN, Obdulio Burger MD, 650 mg at 06/11/21 1904  •  acyclovir (ZOVIRAX) 570 mg in sodium chloride 0.9 % 100 mL IVPB, 10 mg/kg (Ideal), Intravenous, Q8H, Blas Luque MD, 570 mg at 06/12/21 0347  •  calcium gluconate 1g/50ml 0.675% NaCl IV SOLN, 1 g, Intravenous, PRN **AND** calcium gluconate 6 g in sodium chloride 0.9 % 500 mL IVPB, 6 g, Intravenous, PRN **AND** Calcium, , , PRN, Obdulio Burger MD  •  cefTRIAXone (ROCEPHIN) IVPB 1 g, 1 g, Intravenous, Q12H, Blas Luque MD, Last Rate: 100 mL/hr at 06/12/21 0215, 1 g at 06/12/21 0215  •  dexmedetomidine (PRECEDEX) 400 mcg in 100 mL NS infusion kit, 0.2-1.5 mcg/kg/hr, Intravenous, Titrated, EsterOlaf oneil MD, Stopped at 06/12/21 0304  •  dextrose (D50W) 25 g/ 50mL Intravenous Solution 12.5 g, 12.5 g, Intravenous, PRN, Obdulio Burger MD  •  dextrose 5 % and sodium chloride 0.45 % infusion, 150 mL/hr, Intravenous, Continuous PRN, Obdulio Burger MD  •  dextrose 5 % and sodium chloride 0.45 % with KCl 20 mEq/L infusion, 150 mL/hr, Intravenous, Continuous PRN, Obdulio Burger MD, Last Rate: 150 mL/hr at 06/11/21 1523, 150 mL/hr at 06/11/21 1523  •  dextrose 5 % and sodium chloride 0.45 % with KCl 40 mEq/L infusion, 150 mL/hr, Intravenous, Continuous PRN, Obdulio Burger MD  •  dextrose 5 % and sodium chloride 0.9 % infusion, 150 mL/hr, Intravenous, Continuous PRN, Obdulio Burger MD  •  dextrose 5 % and sodium chloride 0.9 % with KCl 20 mEq/L infusion, 150 mL/hr, Intravenous, Continuous PRN, Jennyfer,  Obdulio Cerrato MD  •  dextrose 5 % and sodium chloride 0.9 % with KCl 40 mEq/L infusion, 150 mL/hr, Intravenous, Continuous PRN, Obdulio Burger MD  •  haloperidol lactate (HALDOL) injection 4 mg, 4 mg, Intravenous, Q4H PRN, Olaf Espinoza MD, 4 mg at 06/11/21 1438  •  hydrALAZINE (APRESOLINE) injection 20 mg, 20 mg, Intravenous, Q4H PRN, Olaf Espinoza MD, 20 mg at 06/11/21 1438  •  insulin regular (HumuLIN R,NovoLIN R) 100 Units in sodium chloride 0.9 % 100 mL (1 Units/mL) infusion, 7 Units/hr, Intravenous, Titrated, Obdulio Burger MD, Last Rate: 3 mL/hr at 06/12/21 0528, 3 Units/hr at 06/12/21 0528  •  ipratropium-albuterol (DUO-NEB) nebulizer solution 3 mL, 3 mL, Nebulization, Q6H PRN, Obdulio Burger MD  •  labetalol (NORMODYNE,TRANDATE) injection 20 mg, 20 mg, Intravenous, Q10 Min PRN, Obdulio Burger MD, 20 mg at 06/11/21 1657  •  LORazepam (ATIVAN) injection 2 mg, 2 mg, Intravenous, Q2H PRN, Olaf Espinoza MD, 2 mg at 06/11/21 2236  •  magnesium sulfate 4 gram infusion - Mg less than or equal to 1mg/dL, 4 g, Intravenous, PRN **OR** magnesium sulfate 3 gram infusion (1gm x 3) - Mg 1.1 - 1.5 mg/dL, 1 g, Intravenous, PRN, Last Rate: 100 mL/hr at 06/11/21 1439, 1 g at 06/11/21 1439 **OR** Magnesium Sulfate 2 gram infusion- Mg 1.6 - 1.9 mg/dL, 2 g, Intravenous, PRN, Obdulio Burger MD  •  ondansetron (ZOFRAN) injection 4 mg, 4 mg, Intravenous, Q6H PRN, Obdulio Burger MD  •  [COMPLETED] pantoprazole (PROTONIX) injection 80 mg, 80 mg, Intravenous, Once, 80 mg at 06/11/21 0535 **AND** pantoprazole (PROTONIX) 40 mg in 100mL NS IVPB, 8 mg/hr, Intravenous, Continuous, Obdulio Burger MD, Last Rate: 20 mL/hr at 06/12/21 0420, 8 mg/hr at 06/12/21 0420  •  potassium chloride (K-DUR,KLOR-CON) ER tablet 40 mEq, 40 mEq, Oral, PRN **OR** potassium chloride (KLOR-CON) packet 40 mEq, 40 mEq, Oral, PRN **OR** potassium chloride 10 mEq in 100 mL IVPB, 10 mEq, Intravenous, Q1H  Jennyfer GIRALDO Harold [place], MD  •  potassium phosphate 45 mmol in sodium chloride 0.9 % 500 mL infusion, 45 mmol, Intravenous, PRN **OR** potassium phosphate 30 mmol in sodium chloride 0.9 % 250 mL infusion, 30 mmol, Intravenous, PRN, Last Rate: 31.3 mL/hr at 06/11/21 1548, 30 mmol at 06/11/21 1548 **OR** potassium phosphate 15 mmol in sodium chloride 0.9 % 100 mL infusion, 15 mmol, Intravenous, PRN **OR** sodium phosphates 40 mmol in sodium chloride 0.9 % 500 mL IVPB, 40 mmol, Intravenous, PRN **OR** sodium phosphates 20 mmol in sodium chloride 0.9 % 250 mL IVPB, 20 mmol, Intravenous, PRNJennyfer Harold [place], MD  •  sodium chloride 0.45 % 1,000 mL with potassium chloride 40 mEq infusion, 250 mL/hr, Intravenous, Continuous PRJennyfer BOO Harold [place], MD  •  sodium chloride 0.45 % infusion, 250 mL/hr, Intravenous, Continuous PRNJennyfer Harold [place], MD  •  sodium chloride 0.45 % with KCl 20 mEq/L infusion, 250 mL/hr, Intravenous, Continuous PRNJennyfer Harold [place], MD, Last Rate: 250 mL/hr at 06/11/21 2014, 250 mL/hr at 06/11/21 2014  •  [COMPLETED] Insert peripheral IV, , , Once **AND** sodium chloride 0.9 % flush 10 mL, 10 mL, Intravenous, PRN, Joon Mayen MD  •  sodium chloride 0.9 % flush 10 mL, 10 mL, Intravenous, PRNJennyfer Harold [place], MD  •  sodium chloride 0.9 % flush 10 mL, 10 mL, Intravenous, Once PRNJennyfer Harold [place], MD  •  sodium chloride 0.9 % flush 10 mL, 10 mL, Intravenous, Q12H, Obdulio Burger MD, 10 mL at 06/11/21 2104  •  sodium chloride 0.9 % flush 10 mL, 10 mL, Intravenous, PRJennyfer BOO Harold [place], MD  •  sodium chloride 0.9 % flush 3 mL, 3 mL, Intravenous, Q12H, Obdulio Burger MD, 3 mL at 06/11/21 2105  •  sodium chloride 0.9 % infusion, 250 mL/hr, Intravenous, Continuous PRN, Obdulio Burger MD  •  sodium chloride 0.9 % infusion, 10 mL/hr, Intravenous, Continuous PRN, Obdulio Burger MD  •  sodium chloride 0.9 % with KCl 20 mEq/L infusion, 250 mL/hr,  Intravenous, Continuous PRN, Obdulio Burger MD  •  sodium chloride 0.9 % with KCl 40 mEq/L infusion, 250 mL/hr, Intravenous, Continuous PRN, Obdulio Burger MD    Review of Systems  Unable to obtain due to mental status    Objective   Vital Signs   Temp:  [98.4 °F (36.9 °C)-102.9 °F (39.4 °C)] 99 °F (37.2 °C)  Heart Rate:  [110-163] 134  Resp:  [26-44] 26  BP: ()/() 176/94    Physical Exam:   General: agitated, thrashing in the bed  Head: atraumatic  Eyes: Pupils equal, no scleral icterus  ENT: MM dry, no thrush  Neck: Supple  Cardiovascular: tachycardic, RR, no murmurs, no LE edema  Respiratory: Lungs w/o wheezing; on 2L NC  GI: Abdomen is soft, non-tender, non-distended, normal bowel sounds in all four quadrants  :  + urinary catheter  Musculoskeletal: no joint effusions, normal musculature  Skin: No rashes, lesions, or embolic phenomenon; no vesicles  Neurological: Alert and oriented x 0  Psychiatric: agitated  Vasc: no cyanosis; PIVs w/o erythema    Labs:     Lab Results   Component Value Date    WBC 20.41 (H) 06/12/2021    HGB 7.1 (L) 06/12/2021    HCT 21.7 (L) 06/12/2021    MCV 86.5 06/12/2021     06/12/2021       Lab Results   Component Value Date    GLUCOSE 158 (H) 06/12/2021    BUN 59 (H) 06/12/2021    CREATININE 1.52 (H) 06/12/2021    EGFRIFNONA 34 (L) 06/12/2021    EGFRIFAFRI 53 (L) 01/20/2021    BCR 38.8 (H) 06/12/2021    CO2 18.5 (L) 06/12/2021    CALCIUM 7.8 (L) 06/12/2021    PROTENTOTREF 6.6 01/20/2021    ALBUMIN 3.10 (L) 06/11/2021    LABIL2 1.5 01/20/2021    AST 11 06/11/2021    ALT 17 06/11/2021     Lab Results   Component Value Date    HGBA1C 8.40 (H) 06/11/2021     Procal 0.22  LA 9--->3.1  UA 3-5 WBCs; 7-12 squamous cells    Microbiology:  6/11 COVID: negative  6/11 BCx: NGTD    Radiology (personally reviewed images and report):  CXR no acute process    CT head no acute process    Assessment/Plan   1. Encephalopathy  2. Fever in adult  3. GI bleeding  4.  Hyperglycemia and uncontrolled DM2  5. COPD due to tobacco use  6. Acute kidney injury    With fever and confusion, I would recommend an LP. D/W neurologist this morning who plans to perform this at the bedside today. Continue empiric vancomycin, ceftriaxone, and acyclovir while awaiting LP results. MRI brain has also been ordered.     She is still having blood in the stool per my d/w RN. Lactate is improved. GI is following.     Prognosis is guarded.     ID will follow. I discussed the case with Dr Harmon.

## 2021-06-12 NOTE — SIGNIFICANT NOTE
06/12/21 1655   PICC Triple Lumen 06/12/21 Left Brachial   Placement Date/Time: 06/12/21 1640   Hand Hygiene Completed: Yes  Size (Fr): 5  Description (optional): triple lumen picc  Length (cm): 40 cm  Orientation: Left  Location: Brachial  Site Prep: Chlorhexidine  All 5 Sterile Barriers Used (Gloves, Gown, ...   Site Assessment Clean;Dry;Intact   #1 Lumen Status Blood return noted;Capped;Flushed;Normal saline locked   #2 Lumen Status Blood return noted;Capped;Flushed;Normal saline locked   #3 Lumen Status Blood return noted;Capped;Flushed;Normal saline locked   Length syed (cm) 3 cm   Dressing Type Border Dressing;Securing device;Antimicrobial dressing/disc   Dressing Status Clean;Dry;Intact   Dressing Intervention New dressing   Liquid Adhesive Applied   Dressing Change Due 06/19/21   Indication/Daily Review of Necessity blood sampling;CRRT;intravenous fluid therapy;intravenous medication therapy   LOT# [id number] EXPIRES 2022-08-30    Dr Beaulieu from nephrologist approved the picc line to be placed. Triple lumen picc placed without difficulty. Chest xray ordered to verify tip location. Reported to Keira awaiting xray results, will call with results. Do not use picc at this time.

## 2021-06-12 NOTE — NURSING NOTE
Iv team consulted to place a picc. Reviewed chart and pt has a hx of dialysis, CKD, and diabetes. Renal labs: creat 1.52, GFR 34 which will need nephrology approval to have line placed. All other labs, H&P, and chest xray are ok. Spoke with Keira to report nephrology must approve picc line placement, pt is also on multiple incompatible iv drips (Acyclovir every 8hrs, Rocephin every 12 hrs, Precedex, insulin, blood, protonix, and propofol). Woud recommend a quad lumen central line be placed due to the number of lines needed at this time. Notify iv team when nephrology approval has been approved or cancel picc placement if other central access is ordered, Picc will remain on hold until approval has been obtained.

## 2021-06-13 ENCOUNTER — APPOINTMENT (OUTPATIENT)
Dept: CARDIOLOGY | Facility: HOSPITAL | Age: 66
End: 2021-06-13

## 2021-06-13 ENCOUNTER — APPOINTMENT (OUTPATIENT)
Dept: MRI IMAGING | Facility: HOSPITAL | Age: 66
End: 2021-06-13

## 2021-06-13 LAB
ALBUMIN SERPL-MCNC: 2.3 G/DL (ref 3.5–5.2)
ALBUMIN/GLOB SERPL: 1.1 G/DL
ALP SERPL-CCNC: 44 U/L (ref 39–117)
ALT SERPL W P-5'-P-CCNC: 31 U/L (ref 1–33)
ANION GAP SERPL CALCULATED.3IONS-SCNC: 8.9 MMOL/L (ref 5–15)
ARTERIAL PATENCY WRIST A: POSITIVE
AST SERPL-CCNC: 76 U/L (ref 1–32)
ATMOSPHERIC PRESS: 745.1 MMHG
BASE EXCESS BLDA CALC-SCNC: -6.2 MMOL/L (ref 0–2)
BDY SITE: ABNORMAL
BH BB BLOOD EXPIRATION DATE: NORMAL
BH BB BLOOD TYPE BARCODE: 5100
BH BB DISPENSE STATUS: NORMAL
BH BB PRODUCT CODE: NORMAL
BH BB UNIT NUMBER: NORMAL
BH CV LOWER VASCULAR LEFT COMMON FEMORAL AUGMENT: NORMAL
BH CV LOWER VASCULAR LEFT COMMON FEMORAL COMPETENT: NORMAL
BH CV LOWER VASCULAR LEFT COMMON FEMORAL COMPRESS: NORMAL
BH CV LOWER VASCULAR LEFT COMMON FEMORAL PHASIC: NORMAL
BH CV LOWER VASCULAR LEFT COMMON FEMORAL SPONT: NORMAL
BH CV LOWER VASCULAR LEFT DISTAL FEMORAL COMPRESS: NORMAL
BH CV LOWER VASCULAR LEFT GASTRONEMIUS COMPRESS: NORMAL
BH CV LOWER VASCULAR LEFT GREATER SAPH AK COMPRESS: NORMAL
BH CV LOWER VASCULAR LEFT GREATER SAPH BK COMPRESS: NORMAL
BH CV LOWER VASCULAR LEFT LESSER SAPH COMPRESS: NORMAL
BH CV LOWER VASCULAR LEFT MID FEMORAL AUGMENT: NORMAL
BH CV LOWER VASCULAR LEFT MID FEMORAL COMPETENT: NORMAL
BH CV LOWER VASCULAR LEFT MID FEMORAL COMPRESS: NORMAL
BH CV LOWER VASCULAR LEFT MID FEMORAL PHASIC: NORMAL
BH CV LOWER VASCULAR LEFT MID FEMORAL SPONT: NORMAL
BH CV LOWER VASCULAR LEFT PERONEAL COMPRESS: NORMAL
BH CV LOWER VASCULAR LEFT POPLITEAL AUGMENT: NORMAL
BH CV LOWER VASCULAR LEFT POPLITEAL COMPETENT: NORMAL
BH CV LOWER VASCULAR LEFT POPLITEAL COMPRESS: NORMAL
BH CV LOWER VASCULAR LEFT POPLITEAL PHASIC: NORMAL
BH CV LOWER VASCULAR LEFT POPLITEAL SPONT: NORMAL
BH CV LOWER VASCULAR LEFT POSTERIOR TIBIAL COMPRESS: NORMAL
BH CV LOWER VASCULAR LEFT PROFUNDA FEMORAL COMPRESS: NORMAL
BH CV LOWER VASCULAR LEFT PROXIMAL FEMORAL COMPRESS: NORMAL
BH CV LOWER VASCULAR LEFT SAPHENOFEMORAL JUNCTION COMPRESS: NORMAL
BH CV LOWER VASCULAR RIGHT COMMON FEMORAL AUGMENT: NORMAL
BH CV LOWER VASCULAR RIGHT COMMON FEMORAL COMPETENT: NORMAL
BH CV LOWER VASCULAR RIGHT COMMON FEMORAL COMPRESS: NORMAL
BH CV LOWER VASCULAR RIGHT COMMON FEMORAL PHASIC: NORMAL
BH CV LOWER VASCULAR RIGHT COMMON FEMORAL SPONT: NORMAL
BH CV LOWER VASCULAR RIGHT DISTAL FEMORAL COMPRESS: NORMAL
BH CV LOWER VASCULAR RIGHT GASTRONEMIUS COMPRESS: NORMAL
BH CV LOWER VASCULAR RIGHT GREATER SAPH AK COMPRESS: NORMAL
BH CV LOWER VASCULAR RIGHT GREATER SAPH BK COMPRESS: NORMAL
BH CV LOWER VASCULAR RIGHT LESSER SAPH COMPRESS: NORMAL
BH CV LOWER VASCULAR RIGHT MID FEMORAL AUGMENT: NORMAL
BH CV LOWER VASCULAR RIGHT MID FEMORAL COMPETENT: NORMAL
BH CV LOWER VASCULAR RIGHT MID FEMORAL COMPRESS: NORMAL
BH CV LOWER VASCULAR RIGHT MID FEMORAL PHASIC: NORMAL
BH CV LOWER VASCULAR RIGHT MID FEMORAL SPONT: NORMAL
BH CV LOWER VASCULAR RIGHT PERONEAL COMPRESS: NORMAL
BH CV LOWER VASCULAR RIGHT POPLITEAL AUGMENT: NORMAL
BH CV LOWER VASCULAR RIGHT POPLITEAL COMPETENT: NORMAL
BH CV LOWER VASCULAR RIGHT POPLITEAL COMPRESS: NORMAL
BH CV LOWER VASCULAR RIGHT POPLITEAL PHASIC: NORMAL
BH CV LOWER VASCULAR RIGHT POPLITEAL SPONT: NORMAL
BH CV LOWER VASCULAR RIGHT POSTERIOR TIBIAL COMPRESS: NORMAL
BH CV LOWER VASCULAR RIGHT PROFUNDA FEMORAL COMPRESS: NORMAL
BH CV LOWER VASCULAR RIGHT PROXIMAL FEMORAL COMPRESS: NORMAL
BH CV LOWER VASCULAR RIGHT SAPHENOFEMORAL JUNCTION COMPRESS: NORMAL
BH CV POP FLUID COLLECT LEFT: 1
BH CV UPPER VENOUS LEFT INTERNAL JUGULAR AUGMENT: NORMAL
BH CV UPPER VENOUS LEFT INTERNAL JUGULAR COMPETENT: NORMAL
BH CV UPPER VENOUS LEFT INTERNAL JUGULAR COMPRESS: NORMAL
BH CV UPPER VENOUS LEFT INTERNAL JUGULAR PHASIC: NORMAL
BH CV UPPER VENOUS LEFT INTERNAL JUGULAR SPONT: NORMAL
BH CV UPPER VENOUS LEFT SUBCLAVIAN AUGMENT: NORMAL
BH CV UPPER VENOUS LEFT SUBCLAVIAN COMPETENT: NORMAL
BH CV UPPER VENOUS LEFT SUBCLAVIAN COMPRESS: NORMAL
BH CV UPPER VENOUS LEFT SUBCLAVIAN PHASIC: NORMAL
BH CV UPPER VENOUS LEFT SUBCLAVIAN SPONT: NORMAL
BH CV UPPER VENOUS RIGHT AXILLARY AUGMENT: NORMAL
BH CV UPPER VENOUS RIGHT AXILLARY COMPETENT: NORMAL
BH CV UPPER VENOUS RIGHT AXILLARY COMPRESS: NORMAL
BH CV UPPER VENOUS RIGHT AXILLARY PHASIC: NORMAL
BH CV UPPER VENOUS RIGHT AXILLARY SPONT: NORMAL
BH CV UPPER VENOUS RIGHT BASILIC FOREARM COLOR: 1
BH CV UPPER VENOUS RIGHT BASILIC FOREARM COMPRESS: NORMAL
BH CV UPPER VENOUS RIGHT BASILIC FOREARM THROMBUS: NORMAL
BH CV UPPER VENOUS RIGHT BASILIC UPPER COMPRESS: NORMAL
BH CV UPPER VENOUS RIGHT BRACHIAL COMPRESS: NORMAL
BH CV UPPER VENOUS RIGHT CEPHALIC FOREARM COLOR: 1
BH CV UPPER VENOUS RIGHT CEPHALIC FOREARM COMPRESS: NORMAL
BH CV UPPER VENOUS RIGHT CEPHALIC FOREARM THROMBUS: NORMAL
BH CV UPPER VENOUS RIGHT CEPHALIC UPPER COMPRESS: NORMAL
BH CV UPPER VENOUS RIGHT INTERNAL JUGULAR AUGMENT: NORMAL
BH CV UPPER VENOUS RIGHT INTERNAL JUGULAR COMPETENT: NORMAL
BH CV UPPER VENOUS RIGHT INTERNAL JUGULAR COMPRESS: NORMAL
BH CV UPPER VENOUS RIGHT INTERNAL JUGULAR PHASIC: NORMAL
BH CV UPPER VENOUS RIGHT INTERNAL JUGULAR SPONT: NORMAL
BH CV UPPER VENOUS RIGHT RADIAL COMPRESS: NORMAL
BH CV UPPER VENOUS RIGHT SUBCLAVIAN AUGMENT: NORMAL
BH CV UPPER VENOUS RIGHT SUBCLAVIAN COMPETENT: NORMAL
BH CV UPPER VENOUS RIGHT SUBCLAVIAN COMPRESS: NORMAL
BH CV UPPER VENOUS RIGHT SUBCLAVIAN PHASIC: NORMAL
BH CV UPPER VENOUS RIGHT SUBCLAVIAN SPONT: NORMAL
BH CV UPPER VENOUS RIGHT ULNAR COMPRESS: NORMAL
BH CV VAS POP FLUID COLLECTED: 1
BILIRUB SERPL-MCNC: <0.2 MG/DL (ref 0–1.2)
BUN SERPL-MCNC: 40 MG/DL (ref 8–23)
BUN/CREAT SERPL: 26.1 (ref 7–25)
C GATTII+NEOFOR DNA CSF QL NAA+NON-PROBE: NOT DETECTED
CALCIUM SPEC-SCNC: 7.3 MG/DL (ref 8.6–10.5)
CHLORIDE SERPL-SCNC: 120 MMOL/L (ref 98–107)
CMV DNA CSF QL NAA+PROBE: NOT DETECTED
CO2 SERPL-SCNC: 18.1 MMOL/L (ref 22–29)
CREAT SERPL-MCNC: 1.53 MG/DL (ref 0.57–1)
CROSSMATCH INTERPRETATION: NORMAL
DEPRECATED RDW RBC AUTO: 48.7 FL (ref 37–54)
E COLI K1 DNA CSF QL NAA+NON-PROBE: NOT DETECTED
ERYTHROCYTE [DISTWIDTH] IN BLOOD BY AUTOMATED COUNT: 15.4 % (ref 12.3–15.4)
EV RNA CSF QL NAA+PROBE: NOT DETECTED
GFR SERPL CREATININE-BSD FRML MDRD: 34 ML/MIN/1.73
GLOBULIN UR ELPH-MCNC: 2.1 GM/DL
GLUCOSE BLDC GLUCOMTR-MCNC: 115 MG/DL (ref 70–130)
GLUCOSE BLDC GLUCOMTR-MCNC: 117 MG/DL (ref 70–130)
GLUCOSE BLDC GLUCOMTR-MCNC: 121 MG/DL (ref 70–130)
GLUCOSE SERPL-MCNC: 110 MG/DL (ref 65–99)
GP B STREP DNA SPEC QL NAA+PROBE: NOT DETECTED
HAEM INFLU SEROTYP DNA SPEC NAA+PROBE: NOT DETECTED
HCO3 BLDA-SCNC: 18 MMOL/L (ref 22–28)
HCT VFR BLD AUTO: 22.3 % (ref 34–46.6)
HGB BLD-MCNC: 7.5 G/DL (ref 12–15.9)
HHV6 DNA CSF QL NAA+PROBE: NOT DETECTED
HSV1 DNA CSF QL NAA+PROBE: NOT DETECTED
HSV2 DNA CSF QL NAA+PROBE: NOT DETECTED
INHALED O2 CONCENTRATION: 21 %
L MONOCYTOG RRNA SPEC QL PROBE: NOT DETECTED
MAXIMAL PREDICTED HEART RATE: 154 BPM
MCH RBC QN AUTO: 29.3 PG (ref 26.6–33)
MCHC RBC AUTO-ENTMCNC: 33.6 G/DL (ref 31.5–35.7)
MCV RBC AUTO: 87.1 FL (ref 79–97)
MODALITY: ABNORMAL
N MEN DNA SPEC QL NAA+PROBE: NOT DETECTED
O2 A-A PPRESDIFF RESPIRATORY: 0.6 MMHG
PARECHOVIRUS A RNA CSF QL NAA+NON-PROBE: NOT DETECTED
PCO2 BLDA: 28.9 MM HG (ref 35–45)
PEEP RESPIRATORY: 5 CM[H2O]
PH BLDA: 7.4 PH UNITS (ref 7.35–7.45)
PHOSPHATE SERPL-MCNC: 3.7 MG/DL (ref 2.5–4.5)
PLATELET # BLD AUTO: 160 10*3/MM3 (ref 140–450)
PMV BLD AUTO: 10.8 FL (ref 6–12)
PO2 BLDA: 72.7 MM HG (ref 80–100)
POTASSIUM SERPL-SCNC: 4.1 MMOL/L (ref 3.5–5.2)
PROT SERPL-MCNC: 4.4 G/DL (ref 6–8.5)
PSV: 7 CMH2O
RBC # BLD AUTO: 2.56 10*6/MM3 (ref 3.77–5.28)
S PNEUM DNA CSF QL NAA+NON-PROBE: NOT DETECTED
SAO2 % BLDCOA: 94.8 % (ref 92–99)
SODIUM SERPL-SCNC: 147 MMOL/L (ref 136–145)
STRESS TARGET HR: 131 BPM
TOTAL RATE: 27 BREATHS/MINUTE
UNIT  ABO: NORMAL
UNIT  RH: NORMAL
URATE SERPL-MCNC: 6.7 MG/DL (ref 2.4–5.7)
VANCOMYCIN SERPL-MCNC: 17.2 MCG/ML (ref 5–40)
VENTILATOR MODE: ABNORMAL
VZV DNA CSF QL NAA+PROBE: NOT DETECTED
WBC # BLD AUTO: 15.07 10*3/MM3 (ref 3.4–10.8)

## 2021-06-13 PROCEDURE — 82803 BLOOD GASES ANY COMBINATION: CPT

## 2021-06-13 PROCEDURE — 82962 GLUCOSE BLOOD TEST: CPT

## 2021-06-13 PROCEDURE — 94003 VENT MGMT INPAT SUBQ DAY: CPT

## 2021-06-13 PROCEDURE — 80053 COMPREHEN METABOLIC PANEL: CPT | Performed by: INTERNAL MEDICINE

## 2021-06-13 PROCEDURE — 85027 COMPLETE CBC AUTOMATED: CPT | Performed by: INTERNAL MEDICINE

## 2021-06-13 PROCEDURE — 99292 CRITICAL CARE ADDL 30 MIN: CPT | Performed by: PSYCHIATRY & NEUROLOGY

## 2021-06-13 PROCEDURE — 94799 UNLISTED PULMONARY SVC/PX: CPT

## 2021-06-13 PROCEDURE — 25010000002 LORAZEPAM PER 2 MG: Performed by: INTERNAL MEDICINE

## 2021-06-13 PROCEDURE — A9577 INJ MULTIHANCE: HCPCS | Performed by: INTERNAL MEDICINE

## 2021-06-13 PROCEDURE — 93970 EXTREMITY STUDY: CPT

## 2021-06-13 PROCEDURE — 80202 ASSAY OF VANCOMYCIN: CPT | Performed by: INTERNAL MEDICINE

## 2021-06-13 PROCEDURE — 99233 SBSQ HOSP IP/OBS HIGH 50: CPT | Performed by: INTERNAL MEDICINE

## 2021-06-13 PROCEDURE — 93971 EXTREMITY STUDY: CPT

## 2021-06-13 PROCEDURE — 25010000003 CEFTRIAXONE PER 250 MG: Performed by: INTERNAL MEDICINE

## 2021-06-13 PROCEDURE — 70553 MRI BRAIN STEM W/O & W/DYE: CPT

## 2021-06-13 PROCEDURE — 25010000002 PROPOFOL 10 MG/ML EMULSION: Performed by: INTERNAL MEDICINE

## 2021-06-13 PROCEDURE — 25010000002 ACYCLOVIR PER 5 MG: Performed by: INTERNAL MEDICINE

## 2021-06-13 PROCEDURE — 84550 ASSAY OF BLOOD/URIC ACID: CPT | Performed by: INTERNAL MEDICINE

## 2021-06-13 PROCEDURE — 99232 SBSQ HOSP IP/OBS MODERATE 35: CPT | Performed by: INTERNAL MEDICINE

## 2021-06-13 PROCEDURE — 25010000002 FENTANYL CITRATE (PF) 50 MCG/ML SOLUTION: Performed by: INTERNAL MEDICINE

## 2021-06-13 PROCEDURE — 0 GADOBENATE DIMEGLUMINE 529 MG/ML SOLUTION: Performed by: INTERNAL MEDICINE

## 2021-06-13 PROCEDURE — 99291 CRITICAL CARE FIRST HOUR: CPT | Performed by: PSYCHIATRY & NEUROLOGY

## 2021-06-13 PROCEDURE — 84100 ASSAY OF PHOSPHORUS: CPT | Performed by: INTERNAL MEDICINE

## 2021-06-13 PROCEDURE — 94760 N-INVAS EAR/PLS OXIMETRY 1: CPT

## 2021-06-13 PROCEDURE — 36600 WITHDRAWAL OF ARTERIAL BLOOD: CPT

## 2021-06-13 PROCEDURE — 63710000001 INSULIN GLARGINE PER 5 UNITS: Performed by: INTERNAL MEDICINE

## 2021-06-13 RX ORDER — CEFTRIAXONE SODIUM 2 G/50ML
2 INJECTION, SOLUTION INTRAVENOUS EVERY 24 HOURS
Status: COMPLETED | OUTPATIENT
Start: 2021-06-13 | End: 2021-06-17

## 2021-06-13 RX ORDER — SODIUM CHLORIDE 0.9 % (FLUSH) 0.9 %
10 SYRINGE (ML) INJECTION EVERY 12 HOURS SCHEDULED
Status: DISCONTINUED | OUTPATIENT
Start: 2021-06-13 | End: 2021-06-18 | Stop reason: HOSPADM

## 2021-06-13 RX ORDER — SODIUM CHLORIDE 0.9 % (FLUSH) 0.9 %
20 SYRINGE (ML) INJECTION AS NEEDED
Status: DISCONTINUED | OUTPATIENT
Start: 2021-06-13 | End: 2021-06-18 | Stop reason: HOSPADM

## 2021-06-13 RX ORDER — SODIUM CHLORIDE 0.9 % (FLUSH) 0.9 %
10 SYRINGE (ML) INJECTION AS NEEDED
Status: DISCONTINUED | OUTPATIENT
Start: 2021-06-13 | End: 2021-06-18 | Stop reason: HOSPADM

## 2021-06-13 RX ORDER — DEXTROSE MONOHYDRATE 50 MG/ML
75 INJECTION, SOLUTION INTRAVENOUS CONTINUOUS
Status: DISCONTINUED | OUTPATIENT
Start: 2021-06-13 | End: 2021-06-15

## 2021-06-13 RX ORDER — VANCOMYCIN HYDROCHLORIDE 1 G/200ML
1000 INJECTION, SOLUTION INTRAVENOUS EVERY 24 HOURS
Status: DISCONTINUED | OUTPATIENT
Start: 2021-06-13 | End: 2021-06-13

## 2021-06-13 RX ORDER — CEFTRIAXONE SODIUM 2 G/50ML
2 INJECTION, SOLUTION INTRAVENOUS EVERY 24 HOURS
Status: DISCONTINUED | OUTPATIENT
Start: 2021-06-14 | End: 2021-06-13

## 2021-06-13 RX ADMIN — DEXTROSE MONOHYDRATE 75 ML/HR: 50 INJECTION, SOLUTION INTRAVENOUS at 14:45

## 2021-06-13 RX ADMIN — SODIUM CHLORIDE 8 MG/HR: 900 INJECTION INTRAVENOUS at 07:50

## 2021-06-13 RX ADMIN — ACYCLOVIR SODIUM 570 MG: 50 INJECTION, SOLUTION INTRAVENOUS at 03:45

## 2021-06-13 RX ADMIN — PROPOFOL 10 MCG/KG/MIN: 10 INJECTION, EMULSION INTRAVENOUS at 07:50

## 2021-06-13 RX ADMIN — GADOBENATE DIMEGLUMINE 14 ML: 529 INJECTION, SOLUTION INTRAVENOUS at 12:51

## 2021-06-13 RX ADMIN — SODIUM CHLORIDE 8 MG/HR: 900 INJECTION INTRAVENOUS at 02:19

## 2021-06-13 RX ADMIN — LORAZEPAM 2 MG: 2 INJECTION INTRAMUSCULAR; INTRAVENOUS at 12:45

## 2021-06-13 RX ADMIN — SODIUM CHLORIDE 125 ML/HR: 4.5 INJECTION, SOLUTION INTRAVENOUS at 01:16

## 2021-06-13 RX ADMIN — PROPOFOL 30 MCG/KG/MIN: 10 INJECTION, EMULSION INTRAVENOUS at 00:05

## 2021-06-13 RX ADMIN — INSULIN GLARGINE 10 UNITS: 100 INJECTION, SOLUTION SUBCUTANEOUS at 09:52

## 2021-06-13 RX ADMIN — PROPOFOL 30 MCG/KG/MIN: 10 INJECTION, EMULSION INTRAVENOUS at 23:40

## 2021-06-13 RX ADMIN — PROPOFOL 15 MCG/KG/MIN: 10 INJECTION, EMULSION INTRAVENOUS at 16:03

## 2021-06-13 RX ADMIN — SODIUM CHLORIDE 125 ML/HR: 4.5 INJECTION, SOLUTION INTRAVENOUS at 09:52

## 2021-06-13 RX ADMIN — FENTANYL CITRATE 25 MCG: 50 INJECTION, SOLUTION INTRAMUSCULAR; INTRAVENOUS at 20:38

## 2021-06-13 RX ADMIN — CEFTRIAXONE SODIUM 2 G: 2 INJECTION, SOLUTION INTRAVENOUS at 16:03

## 2021-06-13 RX ADMIN — SODIUM CHLORIDE, PRESERVATIVE FREE 3 ML: 5 INJECTION INTRAVENOUS at 09:55

## 2021-06-13 RX ADMIN — SODIUM CHLORIDE, PRESERVATIVE FREE 10 ML: 5 INJECTION INTRAVENOUS at 09:54

## 2021-06-13 NOTE — PROGRESS NOTES
"[facility] PULMONARY CARE         Dr Gonzalez Sayied   LOS: 2 days   Patient Care Team:  Michael Arriaza Jr., DO as PCP - General (Family Medicine)  Gilles Villa DPM as Consulting Physician (Podiatry)  Jen Hays MD as Consulting Physician (Obstetrics and Gynecology)  Michael Arriaza Jr., DO as Referring Physician (Family Medicine)  Glynn Melara MD as Consulting Physician (Hematology and Oncology)    Chief Complaint: Acute blood loss anemia with altered mental status lactic acidosis hyperosmolar nonketotic syndrome encephalopathy    Interval History: Minimally sedated intubated on the vent.  She wakes up follow simple commands.  Currently on propofol drip only.  FiO2 21% on the vent.    REVIEW OF SYSTEMS:   Sedated intubated.  Wakes up follows commands  Ventilator/Non-Invasive Ventilation Settings (From admission, onward) Comment   SBT trial 21% FiO2 tolerating well.      Vital Signs  Temp:  [98.7 °F (37.1 °C)-100.9 °F (38.3 °C)] 98.7 °F (37.1 °C)  Heart Rate:  [] 101  Resp:  [16-37] 27  BP: ()/(39-95) 140/76  FiO2 (%):  [21 %-50 %] 21 %    Intake/Output Summary (Last 24 hours) at 6/13/2021 0828  Last data filed at 6/13/2021 0500  Gross per 24 hour   Intake 5523.38 ml   Output 2675 ml   Net 2848.38 ml     Flowsheet Rows      First Filed Value   Admission Height  165.1 cm (65\") Documented at 06/11/2021 0220   Admission Weight  74.4 kg (164 lb) Documented at 06/11/2021 0220          Physical Exam:  Patient is examined using the personal protective equipment as per guidelines from infection control for this particular patient as enacted.  Hand hygiene was performed before and after patient interaction.   General Appearance:   Sedated intubated.  ET tube good position orally.  Wakes up follow simple commands.  Neck midline trachea, no thyromegaly   Lungs:    Equal breath sounds on the vent.    Heart:    Regular rhythm and normal rate, normal S1 and S2, no            " murmur, no gallop, no rub, no click   Chest Wall:    No abnormalities observed   Abdomen:     Normal bowel sounds, no masses, no organomegaly, soft        nontender, nondistended, no guarding, no rebound                tenderness   Extremities:   Moves all extremities well, no edema, no cyanosis, no             redness  CNS following simple commands moving all extremities  Skin no rashes no nodules  Musculoskeletal no cyanosis no clubbing moving all extremities     Results Review:        Results from last 7 days   Lab Units 06/13/21  0509 06/12/21  1014 06/12/21  0011   SODIUM mmol/L 147* 148* 149*   POTASSIUM mmol/L 4.1 5.0 4.9   CHLORIDE mmol/L 120* 120* 118*   CO2 mmol/L 18.1* 15.6* 18.5*   BUN mg/dL 40* 57* 59*   CREATININE mg/dL 1.53* 1.66* 1.52*   GLUCOSE mg/dL 110* 148* 158*   CALCIUM mg/dL 7.3* 7.2* 7.8*     Results from last 7 days   Lab Units 06/11/21  0403   TROPONIN T ng/mL 0.094*     Results from last 7 days   Lab Units 06/13/21  0509 06/12/21  1828 06/12/21  1252   WBC 10*3/mm3 15.07* 19.14* 21.04*   HEMOGLOBIN g/dL 7.5* 6.9* 7.9*   HEMATOCRIT % 22.3* 20.7* 24.1*   PLATELETS 10*3/mm3 160 170 170     Results from last 7 days   Lab Units 06/12/21  1301 06/11/21  0223   INR  1.23* 1.13*   APTT seconds  --  25.2         Results from last 7 days   Lab Units 06/12/21  1252   MAGNESIUM mg/dL 2.2         Results from last 7 days   Lab Units 06/12/21  1036   PH, ARTERIAL pH units 7.411   PO2 ART mm Hg 157.8*   PCO2, ARTERIAL mm Hg 28.7*   HCO3 ART mmol/L 18.3*       I reviewed the patient's new clinical results.  I personally viewed and interpreted the patient's chest x-ray.        Medication Review:   acyclovir, 10 mg/kg (Ideal), Intravenous, Q12H  cefTRIAXone, 2 g, Intravenous, Q12H  insulin glargine, 10 Units, Subcutaneous, QAM  insulin regular, 0-14 Units, Subcutaneous, Q6H  sodium chloride, 10 mL, Intravenous, Q12H  sodium chloride, 3 mL, Intravenous, Q12H  Vancomycin Pharmacy Intermittent Dosing, , Does  not apply, Daily        dexmedetomidine, 0.2-1.5 mcg/kg/hr, Last Rate: Stopped (06/12/21 1350)  hold, 1 each  pantoprazole, 8 mg/hr, Last Rate: 8 mg/hr (06/13/21 0750)  Pharmacy to Dose acyclovir (ZOVIRAX),   Pharmacy to dose vancomycin,   propofol, 5-50 mcg/kg/min, Last Rate: 10 mcg/kg/min (06/13/21 0750)  sodium chloride, 125 mL/hr, Last Rate: 125 mL/hr (06/13/21 0116)  sodium chloride, 250 mL/hr, Last Rate: Stopped (06/12/21 1453)        ASSESSMENT:   Acute respiratory failure intubated 6/12/2021  Altered mental status  Sepsis  Acute blood loss anemia with GI bleed and melanotic stools/coffee-ground emesis: No significant abnormality found on scope  Lactic acidosis  Hyperosmolar nonketotic syndrome  Leukocytosis  Tachycardia  Hypertension  Encephalopathy  COPD without exacerbation  History of tobacco use    PLAN:  On vent with minimal settings FiO2 21%.  Tolerating SBT trial well this morning.  With improving mental status if she tolerates SBT trial well may be able to wean and extubate from the vent today.  Mental status much more improved this morning.  LP with only 5 WBCs.  ID and neurology currently following.  Plans for MRI per neurology.  Lactic acidosis improved.  Acute GI bleed status post repeat EGD.  Gastric ulcer with visible vessel noted.   Continue Protonix drip.  Continue to monitor hemoglobin every 6 hours.  Transfused if hemoglobin less than 7.  Hyperosmolar nonketotic syndrome with blood glucose in the normal range.  We will continue with Lantus and sliding scale insulin.  Appreciate input from nephrology.  IV fluids per nephrology.  Improving sodium and stable creatinine  Multiple medical problems and issues.  Patient critically ill.    Critical care time 35 minutes       Carlos Harmon MD  06/13/21  08:28 EDT

## 2021-06-13 NOTE — PROGRESS NOTES
[facility]  Inpatient Progress Note    Reason for Follow Up: GI bleed    Subjective     Interval History:   Large ulcer bleeding yesterday in the antrum, treated with cautery, no further active bleeding suspected at this juncture    Current Facility-Administered Medications:   •  acetaminophen (TYLENOL) tablet 650 mg, 650 mg, Oral, Q4H PRN **OR** acetaminophen (TYLENOL) suppository 650 mg, 650 mg, Rectal, Q4H PRN, Obdulio Burger MD, 650 mg at 06/11/21 1904  •  acyclovir (ZOVIRAX) 570 mg in sodium chloride 0.9 % 100 mL IVPB, 10 mg/kg (Ideal), Intravenous, Q12H, Cem Duncan MD, 570 mg at 06/13/21 0345  •  calcium gluconate 1g/50ml 0.675% NaCl IV SOLN, 1 g, Intravenous, PRN **AND** calcium gluconate 6 g in sodium chloride 0.9 % 500 mL IVPB, 6 g, Intravenous, PRN **AND** Calcium, , , PRN, Obdulio Burger MD  •  cefTRIAXone (ROCEPHIN) IVPB 2 g, 2 g, Intravenous, Q12H, Cem Duncan MD, Last Rate: 100 mL/hr at 06/12/21 2301, 2 g at 06/12/21 2301  •  dexmedetomidine (PRECEDEX) 400 mcg in 100 mL NS infusion kit, 0.2-1.5 mcg/kg/hr, Intravenous, Titrated, Olaf Espinoza MD, Stopped at 06/12/21 1350  •  dextrose (D5W) 5 % infusion, 75 mL/hr, Intravenous, Continuous, Huan Beaulieu MD  •  dextrose (GLUTOSE) oral gel 15 g, 15 g, Oral, Q15 Min PRN, Carlos Harmon MD  •  fentaNYL citrate (PF) (SUBLIMAZE) injection 25 mcg, 25 mcg, Intravenous, Q1H PRN, Carlos Harmon MD, 25 mcg at 06/12/21 1608  •  glucagon (human recombinant) (GLUCAGEN DIAGNOSTIC) injection 1 mg, 1 mg, Subcutaneous, Q15 Min PRN, Carlos Harmon MD  •  haloperidol lactate (HALDOL) injection 4 mg, 4 mg, Intravenous, Q4H PRN, Olaf Espinoza MD, 4 mg at 06/11/21 1438  •  Hold medication, 1 each, Does not apply, Continuous PRN, Blas Luque MD  •  hydrALAZINE (APRESOLINE) injection 20 mg, 20 mg, Intravenous, Q4H PRN, Olaf Espinoza MD, 20 mg at 06/11/21 1438  •   insulin glargine (LANTUS, SEMGLEE) injection 10 Units, 10 Units, Subcutaneous, QAM, Carlos Harmon MD, 10 Units at 06/13/21 0952  •  insulin regular (humuLIN R,novoLIN R) injection 0-14 Units, 0-14 Units, Subcutaneous, Q6H, Carlos Harmon MD, 3 Units at 06/12/21 1832  •  ipratropium-albuterol (DUO-NEB) nebulizer solution 3 mL, 3 mL, Nebulization, Q6H PRN, Obdulio Burger MD  •  labetalol (NORMODYNE,TRANDATE) injection 20 mg, 20 mg, Intravenous, Q10 Min PRN, Obdulio Burger MD, 20 mg at 06/11/21 1657  •  LORazepam (ATIVAN) injection 2 mg, 2 mg, Intravenous, Q2H PRN, Olaf Espinoza MD, 2 mg at 06/13/21 1245  •  magnesium sulfate 4 gram infusion - Mg less than or equal to 1mg/dL, 4 g, Intravenous, PRN **OR** magnesium sulfate 3 gram infusion (1gm x 3) - Mg 1.1 - 1.5 mg/dL, 1 g, Intravenous, PRN, Last Rate: 100 mL/hr at 06/11/21 1439, 1 g at 06/11/21 1439 **OR** Magnesium Sulfate 2 gram infusion- Mg 1.6 - 1.9 mg/dL, 2 g, Intravenous, PRN, Obdulio Burger MD  •  ondansetron (ZOFRAN) injection 4 mg, 4 mg, Intravenous, Q6H PRN, Obdulio Burger MD  •  [COMPLETED] pantoprazole (PROTONIX) injection 80 mg, 80 mg, Intravenous, Once, 80 mg at 06/11/21 0535 **AND** pantoprazole (PROTONIX) 40 mg in 100mL NS IVPB, 8 mg/hr, Intravenous, Continuous, Obdulio Burger MD, Last Rate: 20 mL/hr at 06/13/21 0750, 8 mg/hr at 06/13/21 0750  •  Pharmacy to Dose acyclovir (ZOVIRAX), , Does not apply, Continuous PRN, Cem Duncan MD  •  Pharmacy to dose vancomycin, , Does not apply, Continuous PRN, Cem Duncan MD  •  potassium chloride (K-DUR,KLOR-CON) ER tablet 40 mEq, 40 mEq, Oral, PRN **OR** potassium chloride (KLOR-CON) packet 40 mEq, 40 mEq, Oral, PRN **OR** potassium chloride 10 mEq in 100 mL IVPB, 10 mEq, Intravenous, Q1H PRN, Obdulio Burger MD  •  potassium phosphate 45 mmol in sodium chloride 0.9 % 500 mL infusion, 45 mmol, Intravenous, PRN **OR** potassium phosphate  30 mmol in sodium chloride 0.9 % 250 mL infusion, 30 mmol, Intravenous, PRN, Last Rate: 31.3 mL/hr at 06/11/21 1548, 30 mmol at 06/11/21 1548 **OR** potassium phosphate 15 mmol in sodium chloride 0.9 % 100 mL infusion, 15 mmol, Intravenous, PRN **OR** sodium phosphates 40 mmol in sodium chloride 0.9 % 500 mL IVPB, 40 mmol, Intravenous, PRN **OR** sodium phosphates 20 mmol in sodium chloride 0.9 % 250 mL IVPB, 20 mmol, Intravenous, PRN, Obdulio Burger MD  •  propofol (DIPRIVAN) infusion 10 mg/mL 100 mL, 5-50 mcg/kg/min, Intravenous, Titrated, Carlos Harmon MD, Last Rate: 4.3 mL/hr at 06/13/21 0750, 10 mcg/kg/min at 06/13/21 0750  •  [COMPLETED] Insert peripheral IV, , , Once **AND** sodium chloride 0.9 % flush 10 mL, 10 mL, Intravenous, PRN, Joon Mayen MD  •  sodium chloride 0.9 % flush 10 mL, 10 mL, Intravenous, PRN, Obdulio Burger MD  •  sodium chloride 0.9 % flush 10 mL, 10 mL, Intravenous, Once PRN, Obdulio Burger MD  •  sodium chloride 0.9 % flush 10 mL, 10 mL, Intravenous, Q12H, Obdulio Burger MD, 10 mL at 06/13/21 0954  •  sodium chloride 0.9 % flush 10 mL, 10 mL, Intravenous, PRN, Obdulio Burger MD  •  sodium chloride 0.9 % flush 3 mL, 3 mL, Intravenous, Q12H, Obdulio Burger MD, 3 mL at 06/13/21 0955  •  sodium chloride 0.9 % infusion, 250 mL/hr, Intravenous, Continuous PRN, Obdulio Burger MD, Stopped at 06/12/21 1453  •  vancomycin (VANCOCIN) in iso-osmotic dextrose IVPB 1 g (premix) 200 mL, 1,000 mg, Intravenous, Q24H, Cem Duncan MD  Review of Systems:    Review of systems could not be obtained due to  patient intubated.    Objective     Vital Signs  Temp:  [98.6 °F (37 °C)-100.1 °F (37.8 °C)] 98.6 °F (37 °C)  Heart Rate:  [] 120  Resp:  [16-34] 27  BP: ()/(39-94) 180/78  FiO2 (%):  [21 %-35 %] 21 %  Body mass index is 26.27 kg/m².    Intake/Output Summary (Last 24 hours) at 6/13/2021 1301  Last data filed at 6/13/2021  0500  Gross per 24 hour   Intake 4074.1 ml   Output 2050 ml   Net 2024.1 ml     No intake/output data recorded.     Physical Exam:   General: patient awake, alert and cooperative   Eyes: Normal lids and lashes, no scleral icterus   Neck: supple, normal ROM   Skin: warm and dry, not jaundiced   Cardiovascular: regular rhythm and rate, no murmurs auscultated   Pulm: clear to auscultation bilaterally, regular and unlabored   Abdomen: soft, nontender, nondistended; normal bowel sounds   Extremities: no rash or edema   Psychiatric: Normal mood and behavior; memory intact     Results Review:     I reviewed the patient's new clinical results.    Results from last 7 days   Lab Units 06/13/21  0509 06/12/21  1828 06/12/21  1252   WBC 10*3/mm3 15.07* 19.14* 21.04*   HEMOGLOBIN g/dL 7.5* 6.9* 7.9*   HEMATOCRIT % 22.3* 20.7* 24.1*   PLATELETS 10*3/mm3 160 170 170     Results from last 7 days   Lab Units 06/13/21  0509 06/12/21  1014 06/12/21  0011 06/11/21  0403 06/11/21  0223   SODIUM mmol/L 147* 148* 149* 141 139   POTASSIUM mmol/L 4.1 5.0 4.9 4.2 4.6   CHLORIDE mmol/L 120* 120* 118* 106 97*   CO2 mmol/L 18.1* 15.6* 18.5* 23.4 21.4*   BUN mg/dL 40* 57* 59* 48* 47*   CREATININE mg/dL 1.53* 1.66* 1.52* 1.47* 1.69*   CALCIUM mg/dL 7.3* 7.2* 7.8* 7.9* 8.9   BILIRUBIN mg/dL <0.2  --   --  0.3 0.6   ALK PHOS U/L 44  --   --  78 93   ALT (SGPT) U/L 31  --   --  17 20   AST (SGOT) U/L 76*  --   --  11 14   GLUCOSE mg/dL 110* 148* 158* 500* 622*     Results from last 7 days   Lab Units 06/12/21  1301 06/11/21  0223   INR  1.23* 1.13*     No results found for: LIPASE    Radiology:  XR Chest 1 View   Final Result         Electronically signed by Chasity Massey M.D. on 06-12-21 at 1809      XR Chest 1 View   Final Result      XR Chest 1 View   Final Result         Electronically signed by Glynn Henry M.D. on 06-11-21 at 0613      CT Head Without Contrast   Final Result         Electronically signed by Fredy Cervantes MD on 06-11-21 at  0411      MRI Brain With & Without Contrast    (Results Pending)       Assessment/Plan     Patient Active Problem List   Diagnosis   • Essential hypertension   • Snoring   • TIA (transient ischemic attack)   • GERD without esophagitis   • Inflamed seborrheic keratosis   • Type 2 diabetes mellitus without complication, without long-term current use of insulin (CMS/HCC)   • Peripheral neuropathy   • Restless legs syndrome   • Benign paroxysmal positional vertigo   • Bone lesion   • Vitamin D deficiency   • Dyslipidemia   • Muscle spasm of both lower legs   • Tobacco use disorder   • Abnormal lung sounds   • Intermittent claudication (CMS/HCC)   • Chronic respiratory failure with hypoxia (CMS/HCC)   • Leg mass, right   • Rib pain on left side   • Diabetic autonomic neuropathy associated with type 2 diabetes mellitus (CMS/HCC)   • Leukocytosis   • Primary insomnia   • PMB (postmenopausal bleeding)   • Family history of ovarian cancer   • Acute renal failure (ARF) (CMS/HCC)   • Duplicated renal collecting system   • Atherosclerotic vascular disease   • Elevated platelet count   • Low hemoglobin   • Other anomalies of uterus   • Multiple kidney stones   • Multiple kidney stones   • Bilateral lower extremity edema   • Diarrhea   • Sore on leg   • Syncope and collapse   • Acute renal failure (CMS/HCC)   • Unspecified malignant neoplasm of skin of nose   • Shortness of breath   • Pain, unspecified   • Other specified coagulation defects (CMS/HCC)   • Encounter for change or removal of nonsurgical wound dressing   • Chronic obstructive pulmonary disease, unspecified (CMS/HCC)   • Iron deficiency anemia   • Anxiety   • History of subdural hematoma   • Back pain   • Diabetes mellitus (CMS/HCC)   • Malignant melanoma (CMS/HCC)   • Panic attack   • Acute renal failure (CMS/HCC)   • Calculus of kidney   • Restless legs syndrome   • Transient ischemic attack   • Right shoulder injury, initial encounter   • Nonspecific abnormal  findings on imaging examination of skull and head   • Hyperosmolality   • Gastrointestinal hemorrhage   • Absolute anemia   • Melena   • Dependence on renal dialysis (CMS/[facility])       Assessment:  1. Melena  2. Acute blood loss anemia  3. Peptic ulcer disease      Plan:  · Check an H. pylori stool antigen  · Continue IV PPI  · Follow hemoglobin  · No indication for repeat EGD today  I discussed the patients findings and my recommendations with patient and nursing staff.    Calvin Barraza MD

## 2021-06-13 NOTE — PROGRESS NOTES
LOS: 2 days     Chief Complaint:  Follow-up fever    Interval History:  Fever curve improved. She is intubated but is awake and makes eye contact and follows commands. LP fairly benign. Full results still pending. EGD showed a bleeding gastric ulcer.     ROS: cannot obtain due to intubated status    Vital Signs  Temp:  [98.7 °F (37.1 °C)-100.9 °F (38.3 °C)] 98.7 °F (37.1 °C)  Heart Rate:  [] 101  Resp:  [16-37] 27  BP: ()/(39-95) 140/76  FiO2 (%):  [21 %-50 %] 21 %    Physical Exam:  General: awake, on ventilator, follows commands  Head: atraumatic  Eyes: Pupils equal, no scleral icterus  ENT: ETT in place  Neck: Supple, no meningismus  Cardiovascular: tachycardic, RR, no murmurs, no LE edema  Respiratory: Lungs w/o wheezing or rales  GI: Abdomen is soft, non-tender, non-distended  :  + urinary catheter  Musculoskeletal: normal musculature  Skin: No rashes or vesicles  Psychiatric: calm  Vasc: no cyanosis; LUE PICC w/o erythema    Antibiotics:  •  acyclovir (ZOVIRAX) 570 mg in sodium chloride 0.9 % 100 mL IVPB, 10 mg/kg (Ideal), Intravenous, Q12H  •  cefTRIAXone (ROCEPHIN) IVPB 2 g, 2 g, Intravenous, Q12H  •  Vancomycin Pharmacy Intermittent Dosing, , Does not apply, Daily, Cem Duncan MD    LABS:  CBC, CMP, VTr, micro, LP labs reviewed today  Lab Results   Component Value Date    WBC 15.07 (H) 06/13/2021    HGB 7.5 (L) 06/13/2021    HCT 22.3 (L) 06/13/2021    MCV 87.1 06/13/2021     06/13/2021     Lab Results   Component Value Date    GLUCOSE 110 (H) 06/13/2021    BUN 40 (H) 06/13/2021    CREATININE 1.53 (H) 06/13/2021    EGFRIFNONA 34 (L) 06/13/2021    EGFRIFAFRI 53 (L) 01/20/2021    BCR 26.1 (H) 06/13/2021    CO2 18.1 (L) 06/13/2021    CALCIUM 7.3 (L) 06/13/2021    PROTENTOTREF 6.6 01/20/2021    ALBUMIN 2.30 (L) 06/13/2021    LABIL2 1.5 01/20/2021    AST 76 (H) 06/13/2021    ALT 31 06/13/2021     Lab Results   Component Value Date    HGBA1C 8.40 (H) 06/11/2021     Lab Results    Component Value Date    [facility] 17.20 06/13/2021       LP results (tube 4):  5 WBCs  4 RBCs  Glc 98  Pro 57  PCR pending    Microbiology:  6/11 COVID: negative  6/11 BCx: NGTD  6/12 CSF Cx: NGTD  6/12 CSF PCR panel: pending    Radiology (personally reviewed)  MRI brain pending    CXR with ETT in place; no pneumonia on my read    Assessment/Plan   1. Encephalopathy - better  2. Fever in adult - better  3. GI bleeding due to gastric ulcer  4. Hyperglycemia and uncontrolled DM2  5. COPD due to tobacco use  6. Acute kidney injury     Fever and confusion are improved. LP with only 5 WBCs. I have asked the micro lab to add on the M/E PCR panel. If negative, I will begin to de-escalate her antibiotic regimen. For now, continue vancomycin, ceftriaxone, and acyclovir.     Thanks to Dr Barraza of GI for his intervention on the bleeding vessel in the gastric ulcer.      MRI brain still pending.       ID will follow. I discussed the case with Dr Harmon.     ADDENDUM;  No evidence of MRSA so stop vancomycin. HSV PCR negative on M/E panel. If MRI doesn't show any classic findings of HSV encephalitis then I will dc acyclovir. I think that is an unlikely diagnosis at this point given her improvement in mental status today. Change ceftriaxone to q24h.

## 2021-06-13 NOTE — PROGRESS NOTES
NEPHROLOGY PROGRESS NOTE    PATIENT IDENTIFICATION:   Name:  Jayne Beltran      MRN:  [id number]     65 y.o.  female             Reason for visit: GERI on CKD3    SUBJECTIVE:   Remains intubated and sedated; MRI later today; good urine output; melena continues    OBJECTIVE:  Vitals:    06/13/21 0730 06/13/21 0800 06/13/21 0900 06/13/21 1000   BP: 140/76 135/70 132/64 180/78   Pulse: 101 106 104 120   Resp:       Temp:       TempSrc:       SpO2: 97% 97% 94% 96%   Weight:       Height:         FiO2 (%): 21 %     Body mass index is 26.27 kg/m².    Intake/Output Summary (Last 24 hours) at 6/13/2021 1218  Last data filed at 6/13/2021 0500  Gross per 24 hour   Intake 4074.1 ml   Output 2050 ml   Net 2024.1 ml     Wt Readings from Last 1 Encounters:   06/11/21 0610 71.6 kg (157 lb 13.6 oz)   06/11/21 0220 74.4 kg (164 lb)     Wt Readings from Last 3 Encounters:   06/11/21 71.6 kg (157 lb 13.6 oz)   01/27/21 73 kg (161 lb)   12/29/20 71.5 kg (157 lb 11.2 oz)         Exam:  General Appearance: NAD but appears restless; intubated and sedated   Skin: warm and dry  HEENT: oral ETT, nonicteric sclera  Neck: supple, no JVD  Lungs: CTA; not labored on ventilator; 21% FiO2, 5 PEEP  Heart: RR, tachycardic, normal S1 and S2  Abdomen: soft, no grimacing when abdomen palpated, nondistended, BS+  : Tejeda catheter anchored  Extremities: no significant edema   Neuro: Sedated; moving all extremities    Scheduled meds:    acyclovir, 10 mg/kg (Ideal), Intravenous, Q12H  cefTRIAXone, 2 g, Intravenous, Q12H  insulin glargine, 10 Units, Subcutaneous, QAM  insulin regular, 0-14 Units, Subcutaneous, Q6H  sodium chloride, 10 mL, Intravenous, Q12H  sodium chloride, 3 mL, Intravenous, Q12H  vancomycin, 1,000 mg, Intravenous, Q24H      IV meds:                        dexmedetomidine, 0.2-1.5 mcg/kg/hr, Last Rate: Stopped (06/12/21 1350)  hold, 1 each  pantoprazole, 8 mg/hr, Last Rate: 8 mg/hr (06/13/21 0750)  Pharmacy to Dose acyclovir  (ZOVIRAX),   Pharmacy to dose vancomycin,   propofol, 5-50 mcg/kg/min, Last Rate: 10 mcg/kg/min (06/13/21 0750)  sodium chloride, 125 mL/hr, Last Rate: 125 mL/hr (06/13/21 0952)  sodium chloride, 250 mL/hr, Last Rate: Stopped (06/12/21 1453)        Data Review:    Results from last 7 days   Lab Units 06/13/21  0509 06/12/21  1014 06/12/21  0011 06/11/21  0403 06/11/21  0223   SODIUM mmol/L 147* 148* 149* 141 139   POTASSIUM mmol/L 4.1 5.0 4.9 4.2 4.6   CHLORIDE mmol/L 120* 120* 118* 106 97*   CO2 mmol/L 18.1* 15.6* 18.5* 23.4 21.4*   BUN mg/dL 40* 57* 59* 48* 47*   CREATININE mg/dL 1.53* 1.66* 1.52* 1.47* 1.69*   CALCIUM mg/dL 7.3* 7.2* 7.8* 7.9* 8.9   BILIRUBIN mg/dL <0.2  --   --  0.3 0.6   ALK PHOS U/L 44  --   --  78 93   ALT (SGPT) U/L 31  --   --  17 20   AST (SGOT) U/L 76*  --   --  11 14   GLUCOSE mg/dL 110* 148* 158* 500* 622*     Estimated Creatinine Clearance: 35.9 mL/min (A) (by C-G formula based on SCr of 1.53 mg/dL (H)).  Results from last 7 days   Lab Units 06/13/21  0509   URIC ACID mg/dL 6.7*     Results from last 7 days   Lab Units 06/13/21  0509 06/12/21  1252 06/12/21  0008 06/11/21  1626   MAGNESIUM mg/dL  --  2.2 2.2 2.4   PHOSPHORUS mg/dL 3.7  --  5.1* 1.5*       Results from last 7 days   Lab Units 06/13/21  0509 06/12/21  1828 06/12/21  1252 06/12/21  0008 06/11/21  1625   WBC 10*3/mm3 15.07* 19.14* 21.04* 20.41* 21.90*   HEMOGLOBIN g/dL 7.5* 6.9* 7.9* 7.1* 7.5*   PLATELETS 10*3/mm3 160 170 170 233 290       Results from last 7 days   Lab Units 06/12/21  1301 06/11/21  0223   INR  1.23* 1.13*             ASSESSMENT:     Hyperosmolality    Gastrointestinal hemorrhage    Absolute anemia    Melena      1.  GERI on CKD3, nonoliguric, improving:  prerenal due to hypovolemia, ABLA, and sepsis syndrome with recent use of lisinopril.  Hypovolemic by exam; no CHF by chest x-ray.    Persistent hypernatremia on half-normal saline.  Normal potassium; respiratory alkalosis by last ABG.  Urinalysis with  only 100 mg/dL protein and few cellular elements  2.  Melena and ABLA; EGD 6/11  3.  Respiratory failure and underlying COPD  4.  Fever  5.  Encephalopathy  6.  Uncontrolled DM2 with severe hyperglycemia and hyperosmolar state, with normoglycemia now      PLAN:  1.  Discontinue half-normal saline and give D5W at 75 mL/h instead  2.  Surveillance labs    Huan Beaulieu MD  6/13/2021    12:18 EDT

## 2021-06-13 NOTE — PLAN OF CARE
Goal Outcome Evaluation:   Pt free of injury at this time. Will continue to monitor. Lidia JC

## 2021-06-13 NOTE — PROGRESS NOTES
[facility]  Clinical Pharmacy Department     Vancomycin Pharmacokinetic Note    Jayne Beltran is a 65 y.o. female who is on day 2 of pharmacy to dose vancomycin for possible CNS infection.    Target level: AUC24 400-600; Random level 15-20 mcg/ml  Consulting Provider:  Dr. Duncan  Current Vancomycin Dose:   intermittent  Planned Duration of Therapy: TBD  Other Antimicrobials: acyclovir, ceftriaxone    Allergies  Allergies as of 06/11/2021 - Reviewed 06/11/2021   Allergen Reaction Noted   • Metformin Other (See Comments) 10/29/2020       Microbiology:  Microbiology Results (last 10 days)     Procedure Component Value - Date/Time    Culture, CSF - Cerebrospinal Fluid, Lumbar Puncture [[id number]] Collected: 06/12/21 1406    Lab Status: Preliminary result Specimen: Cerebrospinal Fluid from Lumbar Puncture Updated: 06/13/21 0703     CSF Culture No growth     Gram Stain No WBCs or organisms seen    Cryptococcal AG, CSF - Cerebrospinal Fluid, Lumbar Puncture [[id number]]  (Normal) Collected: 06/12/21 1406    Lab Status: Final result Specimen: Cerebrospinal Fluid from Lumbar Puncture Updated: 06/12/21 1545     Cryptococcal Antigen, CSF Negative    Fungus Smear - Cerebrospinal Fluid, CSF [place] [[id number]] Collected: 06/12/21 1405    Lab Status: Final result Specimen: Cerebrospinal Fluid from CSF [place] Updated: 06/12/21 1719     Fungal Stain No fungal elements seen    Blood Culture - Blood, Blood, Venous Line [[id number]] Collected: 06/11/21 0257    Lab Status: Preliminary result Specimen: Blood, Venous Line Updated: 06/13/21 0315     Blood Culture No growth at 2 days    Blood Culture - Blood, Blood, Venous Line [[id number]] Collected: 06/11/21 0257    Lab Status: Preliminary result Specimen: Blood, Venous Line Updated: 06/13/21 0315     Blood Culture No growth at 2 days    COVID-19,[facility] IN-HOUSE CEPHEID/SHAMIR NP SWAB IN TRANSPORT MEDIA 8-12 HR TAT - Swab, Nasopharynx [[id number]]  (Normal) Collected:  "06/11/21 0248    Lab Status: Final result Specimen: Swab from Nasopharynx Updated: 06/11/21 0923     COVID19 Not Detected    Narrative:      Fact sheet for providers: https://www.fda.gov/media/089018/download    Fact sheet for patients: https://www.fda.gov/media/546778/download    Test performed by PCR.          Radiology/Imaging:      Vitals/Labs/I&O  165.1 cm (65\")  71.6 kg (157 lb 13.6 oz)  Body mass index is 26.27 kg/m².   Temp:  [98.7 °F (37.1 °C)-100.9 °F (38.3 °C)] 98.7 °F (37.1 °C)  Heart Rate:  [] 101  Resp:  [16-37] 27  BP: ()/(39-95) 140/76  FiO2 (%):  [21 %-50 %] 21 %    Results from last 7 days   Lab Units 06/13/21  0509 06/12/21  1828 06/12/21  1252   WBC 10*3/mm3 15.07* 19.14* 21.04*     Results from last 7 days   Lab Units 06/12/21  1252 06/12/21  1014 06/12/21  0008 06/11/21  1626 06/11/21  0950 06/11/21  0223   LACTATE mmol/L 1.9 3.8* 3.1* 3.7* 3.4* 9.0*      Results from last 7 days   Lab Units 06/11/21  0403   PROCALCITONIN ng/mL 0.22           Results from last 7 days   Lab Units 06/12/21  0008   SED RATE mm/hr 1        Estimated Creatinine Clearance: 35.9 mL/min (A) (by C-G formula based on SCr of 1.53 mg/dL (H)).  Results from last 7 days   Lab Units 06/13/21  0509 06/12/21  1014 06/12/21  0011   BUN mg/dL 40* 57* 59*   CREATININE mg/dL 1.53* 1.66* 1.52*     Intake & Output (last 3 days)       06/10 0701 - 06/11 0700 06/11 0701 - 06/12 0700 06/12 0701 - 06/13 0700 06/13 0701 - 06/14 0700    I.V. (mL/kg)  5248 (73.3) 4148.5 (57.9)     Blood  850 575     IV Piggyback  218.6 799.9     Total Intake(mL/kg)  6316.6 (88.2) 5523.4 (77.1)     Urine (mL/kg/hr)  3250 (1.9) 2675 (1.6)     Stool  0 0     Total Output  3250 2675     Net  +3066.6 +2848.4             Urine Unmeasured Occurrence  1 x      Stool Unmeasured Occurrence  8 x 8 x           Vancomycin Dosing and Level History:  6/11 1904: 1000mg x1  6/12 1034: 1500mg x1   6/13 0509 random: 17.2 mcg/ml    Results from last 7 days   Lab " "Units 06/13/21  0509   VANCOMYCIN RM mcg/mL 17.20               Assessment/Plan:    Scr stable, good UOP. Random level this AM is within goal range 15-20 mcg/ml.     Bayesian analysis of the most recent level(s) using [employer] provides the following patient-specific pharmacokinetic parameters:   CL: 1.84 L/h   Vd: 56.7 L   T1/2: 23.7 h  If the predicted trough on this regimen is not within what was previously considered a \"target trough range\", the AUC24 (a stronger predictor of vancomycin efficacy) is predicted to achieve the accepted target of 400-600 mg*h/L. To best balance efficacy and toxicity, we will be targeting AUC24 in this patient rather than steady-state trough levels.     Initiating the regimen of 1000 mg (14mg/kg) IV every 24 hours gives a predicted steady-state trough of 17 mg/L and AUC24 of 535 mg*h/L based upon population pharmacokinetics and this patient's specific parameters.    Recommendations/Plan:   - Initiate vancomycin scheduled maintenance regimen at 1000mg IV q24h beginning this AM.  - Check trough level prior to 3rd dose of new regimen.  - Additional consult for acyclovir dosing: Continue acyclovir at 10mg/kg q12h for now for Crcl in 25-50ml/min range.  -Continue to monitor SCr    Thank you for involving pharmacy in this patient's care. Please contact pharmacy with any questions or concerns.                           Nelia Raya, PharmD  Clinical Pharmacist  06/13/21 08:11 EDT    "

## 2021-06-13 NOTE — PROGRESS NOTES
"DOS: 2021  NAME: Jayne Beltran   : 1955  PCP: Michael Arriaza Jr.,   Chief Complaint   Patient presents with   • Altered Mental Status   • Black or Bloody Stool       Chief complaint: She is very much awake and alert and opens her eyes and establishes good eye contact.  Subjective: She follows one-step and two-step commands well and she is indicating to me that she wants her tube out.  As per her  she is extremely claustrophobic.    Objective:  Vital signs: /76   Pulse 101   Temp 98.6 °F (37 °C)   Resp 27   Ht 165.1 cm (65\")   Wt 71.6 kg (157 lb 13.6 oz)   LMP  (LMP Unknown)   SpO2 97%   BMI 26.27 kg/m²    Gen: NAD, vitals reviewed  MS: Seems to be back to baseline.  CN: Pupils are equal reacting to light and accommodation.  Extraocular muscles are intact.  Visual fields are also intact.  Muscles of facial expression mastication uvula soft palate and tongue are all symmetrical with full range of motion.  The corneal reflexes are well-maintained.  On deep suctioning she has good gag response.  Motor: Moves all her extremities well.  No weakness noticeable.  Sensory: Good sensations everywhere.  DTRs: 2+ bilaterally with downgoing toes.  Coordination: Normal coordination.  Gait: Gait and Romberg not tested.    ROS:  General: Pleasant lady currently laying in bed wants to be extubated.  Neurological: No focal neurological deficits noted.    Laboratory results:  Lab Results   Component Value Date    GLUCOSE 110 (H) 2021    CALCIUM 7.3 (L) 2021     (H) 2021    K 4.1 2021    CO2 18.1 (L) 2021     (H) 2021    BUN 40 (H) 2021    CREATININE 1.53 (H) 2021    EGFRIFAFRI 53 (L) 2021    EGFRIFNONA 34 (L) 2021    BCR 26.1 (H) 2021    ANIONGAP 8.9 2021     Lab Results   Component Value Date    WBC 15.07 (H) 2021    HGB 7.5 (L) 2021    HCT 22.3 (L) 2021    MCV 87.1 2021     " 06/13/2021     Lab Results   Component Value Date    LDL 81 01/20/2021    LDL 55 10/14/2020    LDL 72 09/05/2020         Lab 06/11/21  0223   HEMOGLOBIN A1C 8.40*        Review of labs: Hemoglobin A1c elevated at 8400.  The hemoglobin is low at 7.5 with hematocrit of 7.5 and hematocrit of 22.3.  The white cell count is still elevated at 15,000.  The creatinine is 1.5.    Review and interpretation of imaging: Initial neuroimaging was reviewed by me on the PACS which shows the following:    FINDINGS:    Brain:  No hemorrhage or mass effect.  Mild cerebral volume loss.    Ventricles:  No hydrocephalus.    Bones joints:  Unremarkable.    Soft tissues:  Unremarkable.    Sinuses:  Unremarkable.    Mastoid air cells:  Clear.     IMPRESSION:         No acute hemorrhage, hydrocephalus, or mass effect.      MRI of the brain with and without contrast was evaluated by me on the PACS along with the neuroradiologist which shows the following:  The MR scan was performed with sagittal and axial and coronal images and  includes T1 images without and with intravenous contrast. The ventricles  are normal in size with some slight chronic small vessel ischemic change  in the deep white matter. In addition, the diffusion sequence shows a  multitude of acute infarcts scattered in both cerebral hemispheres as  well as in the cerebellum and suggesting a cardiac embolic source. The  largest infarct is located high in the left posterior parietal cortex  where it measures up to 1.3 x 2.8 cm. There is no associated abnormal  enhancement. There is no evidence of hemorrhage.     There are normal flow voids in the major vessels. There is prominent  polypoid mucosal thickening at the floor the left maxillary sinus. There  is also some fluid in the mastoid air cells bilaterally.     CONCLUSION: Multiple small acute infarcts scattered in both cerebral  hemispheres and in the cerebellum and highly suspicious for a cardiac  embolic source and further  clinical correlation is recommended. See  above discussion. There is some mild sinusitis and bilateral  mastoiditis.         Workup to date: Spinal fluid evaluation was performed by me at the bedside which shows the following:    Results for ANDRES FERMIN (MRN [id number]) as of 6/13/2021 10:02   Ref. Range 2/24/2020 12:09 6/12/2021 14:05 6/12/2021 14:06   Supernatant Color, CSF Latest Ref Range: Colorless   Colorless Colorless   Appearance, CSF Latest Ref Range: Clear   Clear Clear   Color, CSF Latest Ref Range: Colorless   Colorless Colorless   Tube Number, CSF Unknown  4 1   Volume, CSF Latest Units: mL  1.0 1.0   RBC, CSF Latest Ref Range: 0 - 0 /mm3  4 (H) 4 (H)   Total Nucleated Cells Latest Ref Range: 0 - 5 /mm3  5 2   Glucose, CSF Latest Ref Range: 40 - 70 mg/dL   98 (H)   Protein, Total (CSF) Latest Ref Range: 15.0 - 45.0 mg/dL   57.0 (H)   Method: Unknown Comment         Diagnoses: Patient with remarkable improvement of mental status.      Comment: Discussed with the patient's daughter and her  on FaceTime about her current condition and also the results of the MRI.  Reassured the patient's family members that the CNS does not show any signs of infection at this time.  However she might have bacterial endocarditis.    Plan:  1.  Discussed with intensivist that she mostly has acute bacterial endocarditis.  2.  She will be getting a duplex studies for the right upper extremity as well as bilateral lower extremities for DVT.  3.  Discussed with Dr. Cem Duncan, the infectious disease specialist about continuing the Rocephin and vancomycin as before but to discontinue the acyclovir.  4.  She will be getting a bedside transesophageal echocardiogram with cardiologist to look for acute bacterial endocarditis.    Discussed with Dr. Duncan about discontinuing the acyclovir.  Spent 1.5 hours in this critical care evaluation and management of the patient and she will be followed up by my  colleague Dr. Tim Mae from tomorrow onwards.    Blas Luque MD

## 2021-06-13 NOTE — PROGRESS NOTES
Right upper extremity venous Doppler preliminary results positive for acute SVT. Bilateral lower extremity venous Doppler preliminary results negative for DVT. Called report to Jhonathan Silva RN

## 2021-06-14 ENCOUNTER — APPOINTMENT (OUTPATIENT)
Dept: CARDIOLOGY | Facility: HOSPITAL | Age: 66
End: 2021-06-14

## 2021-06-14 ENCOUNTER — APPOINTMENT (OUTPATIENT)
Dept: NEUROLOGY | Facility: HOSPITAL | Age: 66
End: 2021-06-14

## 2021-06-14 PROBLEM — I51.7 LEFT ATRIAL DILATATION: Status: ACTIVE | Noted: 2021-06-14

## 2021-06-14 PROBLEM — I34.0 NONRHEUMATIC MITRAL VALVE REGURGITATION: Status: ACTIVE | Noted: 2021-06-14

## 2021-06-14 PROBLEM — I35.9 AORTIC VALVE CALCIFICATION: Status: ACTIVE | Noted: 2021-06-14

## 2021-06-14 PROBLEM — I51.9 IMPAIRED LEFT VENTRICULAR RELAXATION: Status: ACTIVE | Noted: 2021-06-14

## 2021-06-14 PROBLEM — I51.7 CONCENTRIC LEFT VENTRICULAR HYPERTROPHY: Status: ACTIVE | Noted: 2021-06-14

## 2021-06-14 LAB
ALBUMIN SERPL-MCNC: 2.6 G/DL (ref 3.5–5.2)
ANION GAP SERPL CALCULATED.3IONS-SCNC: 8.8 MMOL/L (ref 5–15)
AORTIC DIMENSIONLESS INDEX: 0.9 (DI)
BH CV ECHO MEAS - ACS: 1.9 CM
BH CV ECHO MEAS - AO MAX PG (FULL): 3.3 MMHG
BH CV ECHO MEAS - AO MAX PG: 12.3 MMHG
BH CV ECHO MEAS - AO MEAN PG (FULL): 3 MMHG
BH CV ECHO MEAS - AO MEAN PG: 7 MMHG
BH CV ECHO MEAS - AO ROOT AREA (BSA CORRECTED): 1.5
BH CV ECHO MEAS - AO ROOT AREA: 5.7 CM^2
BH CV ECHO MEAS - AO ROOT DIAM: 2.7 CM
BH CV ECHO MEAS - AO V2 MAX: 175 CM/SEC
BH CV ECHO MEAS - AO V2 MEAN: 125 CM/SEC
BH CV ECHO MEAS - AO V2 VTI: 31.4 CM
BH CV ECHO MEAS - ASC AORTA: 3.1 CM
BH CV ECHO MEAS - AVA(I,A): 2.5 CM^2
BH CV ECHO MEAS - AVA(I,D): 2.5 CM^2
BH CV ECHO MEAS - AVA(V,A): 2.7 CM^2
BH CV ECHO MEAS - AVA(V,D): 2.7 CM^2
BH CV ECHO MEAS - BSA(HAYCOCK): 1.9 M^2
BH CV ECHO MEAS - BSA: 1.8 M^2
BH CV ECHO MEAS - BZI_BMI: 27.1 KILOGRAMS/M^2
BH CV ECHO MEAS - BZI_METRIC_HEIGHT: 165.1 CM
BH CV ECHO MEAS - BZI_METRIC_WEIGHT: 73.9 KG
BH CV ECHO MEAS - EDV(CUBED): 91.1 ML
BH CV ECHO MEAS - EDV(MOD-SP2): 73 ML
BH CV ECHO MEAS - EDV(MOD-SP4): 89 ML
BH CV ECHO MEAS - EDV(TEICH): 92.4 ML
BH CV ECHO MEAS - EF(CUBED): 84.8 %
BH CV ECHO MEAS - EF(MOD-BP): 64 %
BH CV ECHO MEAS - EF(MOD-SP2): 60.3 %
BH CV ECHO MEAS - EF(MOD-SP4): 66.3 %
BH CV ECHO MEAS - EF(TEICH): 78.2 %
BH CV ECHO MEAS - ESV(CUBED): 13.8 ML
BH CV ECHO MEAS - ESV(MOD-SP2): 29 ML
BH CV ECHO MEAS - ESV(MOD-SP4): 30 ML
BH CV ECHO MEAS - ESV(TEICH): 20.2 ML
BH CV ECHO MEAS - FS: 46.7 %
BH CV ECHO MEAS - IVS/LVPW: 0.93
BH CV ECHO MEAS - IVSD: 1.3 CM
BH CV ECHO MEAS - LAT PEAK E' VEL: 9.5 CM/SEC
BH CV ECHO MEAS - LV DIASTOLIC VOL/BSA (35-75): 49.1 ML/M^2
BH CV ECHO MEAS - LV MASS(C)D: 235.3 GRAMS
BH CV ECHO MEAS - LV MASS(C)DI: 129.8 GRAMS/M^2
BH CV ECHO MEAS - LV MAX PG: 9 MMHG
BH CV ECHO MEAS - LV MEAN PG: 4 MMHG
BH CV ECHO MEAS - LV SYSTOLIC VOL/BSA (12-30): 16.5 ML/M^2
BH CV ECHO MEAS - LV V1 MAX: 150 CM/SEC
BH CV ECHO MEAS - LV V1 MEAN: 91 CM/SEC
BH CV ECHO MEAS - LV V1 VTI: 24.9 CM
BH CV ECHO MEAS - LVIDD: 4.5 CM
BH CV ECHO MEAS - LVIDS: 2.4 CM
BH CV ECHO MEAS - LVLD AP2: 7.8 CM
BH CV ECHO MEAS - LVLD AP4: 8 CM
BH CV ECHO MEAS - LVLS AP2: 6.6 CM
BH CV ECHO MEAS - LVLS AP4: 6.6 CM
BH CV ECHO MEAS - LVOT AREA (M): 3.1 CM^2
BH CV ECHO MEAS - LVOT AREA: 3.1 CM^2
BH CV ECHO MEAS - LVOT DIAM: 2 CM
BH CV ECHO MEAS - LVPWD: 1.4 CM
BH CV ECHO MEAS - MED PEAK E' VEL: 6 CM/SEC
BH CV ECHO MEAS - MV A DUR: 0.07 SEC
BH CV ECHO MEAS - MV A MAX VEL: 114 CM/SEC
BH CV ECHO MEAS - MV DEC SLOPE: 488 CM/SEC^2
BH CV ECHO MEAS - MV DEC TIME: 170 SEC
BH CV ECHO MEAS - MV E MAX VEL: 99 CM/SEC
BH CV ECHO MEAS - MV E/A: 0.87
BH CV ECHO MEAS - MV MAX PG: 5.3 MMHG
BH CV ECHO MEAS - MV MEAN PG: 3 MMHG
BH CV ECHO MEAS - MV P1/2T MAX VEL: 103 CM/SEC
BH CV ECHO MEAS - MV P1/2T: 61.8 MSEC
BH CV ECHO MEAS - MV V2 MAX: 115 CM/SEC
BH CV ECHO MEAS - MV V2 MEAN: 79.6 CM/SEC
BH CV ECHO MEAS - MV V2 VTI: 21.6 CM
BH CV ECHO MEAS - MVA P1/2T LCG: 2.1 CM^2
BH CV ECHO MEAS - MVA(P1/2T): 3.6 CM^2
BH CV ECHO MEAS - MVA(VTI): 3.6 CM^2
BH CV ECHO MEAS - PA ACC TIME: 0.13 SEC
BH CV ECHO MEAS - PA MAX PG (FULL): 1.3 MMHG
BH CV ECHO MEAS - PA MAX PG: 3.7 MMHG
BH CV ECHO MEAS - PA PR(ACCEL): 19.6 MMHG
BH CV ECHO MEAS - PA V2 MAX: 96.4 CM/SEC
BH CV ECHO MEAS - PULM A REVS DUR: 0.07 SEC
BH CV ECHO MEAS - PULM A REVS VEL: 32.4 CM/SEC
BH CV ECHO MEAS - PULM DIAS VEL: 59.5 CM/SEC
BH CV ECHO MEAS - PULM S/D: 1.6
BH CV ECHO MEAS - PULM SYS VEL: 93.8 CM/SEC
BH CV ECHO MEAS - PVA(V,A): 3 CM^2
BH CV ECHO MEAS - PVA(V,D): 3 CM^2
BH CV ECHO MEAS - QP/QS: 0.62
BH CV ECHO MEAS - RAP SYSTOLE: 3 MMHG
BH CV ECHO MEAS - RV MAX PG: 2.4 MMHG
BH CV ECHO MEAS - RV MEAN PG: 1 MMHG
BH CV ECHO MEAS - RV V1 MAX: 77 CM/SEC
BH CV ECHO MEAS - RV V1 MEAN: 47.3 CM/SEC
BH CV ECHO MEAS - RV V1 VTI: 12.7 CM
BH CV ECHO MEAS - RVOT AREA: 3.8 CM^2
BH CV ECHO MEAS - RVOT DIAM: 2.2 CM
BH CV ECHO MEAS - RVSP: 21 MMHG
BH CV ECHO MEAS - SI(AO): 99.1 ML/M^2
BH CV ECHO MEAS - SI(CUBED): 42.6 ML/M^2
BH CV ECHO MEAS - SI(LVOT): 43.1 ML/M^2
BH CV ECHO MEAS - SI(MOD-SP2): 24.3 ML/M^2
BH CV ECHO MEAS - SI(MOD-SP4): 32.5 ML/M^2
BH CV ECHO MEAS - SI(TEICH): 39.9 ML/M^2
BH CV ECHO MEAS - SV(AO): 179.8 ML
BH CV ECHO MEAS - SV(CUBED): 77.3 ML
BH CV ECHO MEAS - SV(LVOT): 78.2 ML
BH CV ECHO MEAS - SV(MOD-SP2): 44 ML
BH CV ECHO MEAS - SV(MOD-SP4): 59 ML
BH CV ECHO MEAS - SV(RVOT): 48.3 ML
BH CV ECHO MEAS - SV(TEICH): 72.3 ML
BH CV ECHO MEAS - TAPSE (>1.6): 2.3 CM
BH CV ECHO MEAS - TR MAX VEL: 210 CM/SEC
BH CV ECHO MEASUREMENTS AVERAGE E/E' RATIO: 12.77
BH CV VAS BP RIGHT ARM: NORMAL MMHG
BH CV XLRA - RV BASE: 3 CM
BH CV XLRA - RV LENGTH: 6.7 CM
BH CV XLRA - RV MID: 1.4 CM
BH CV XLRA - TDI S': 16.3 CM/SEC
BUN SERPL-MCNC: 23 MG/DL (ref 8–23)
BUN/CREAT SERPL: 18.4 (ref 7–25)
CALCIUM SPEC-SCNC: 7.4 MG/DL (ref 8.6–10.5)
CHLORIDE SERPL-SCNC: 112 MMOL/L (ref 98–107)
CO2 SERPL-SCNC: 20.2 MMOL/L (ref 22–29)
CREAT SERPL-MCNC: 1.25 MG/DL (ref 0.57–1)
DEPRECATED RDW RBC AUTO: 48.7 FL (ref 37–54)
ERYTHROCYTE [DISTWIDTH] IN BLOOD BY AUTOMATED COUNT: 15.3 % (ref 12.3–15.4)
GFR SERPL CREATININE-BSD FRML MDRD: 43 ML/MIN/1.73
GLUCOSE BLDC GLUCOMTR-MCNC: 118 MG/DL (ref 70–130)
GLUCOSE BLDC GLUCOMTR-MCNC: 125 MG/DL (ref 70–130)
GLUCOSE BLDC GLUCOMTR-MCNC: 130 MG/DL (ref 70–130)
GLUCOSE SERPL-MCNC: 108 MG/DL (ref 65–99)
HCT VFR BLD AUTO: 22.8 % (ref 34–46.6)
HGB BLD-MCNC: 7.6 G/DL (ref 12–15.9)
LEFT ATRIUM VOLUME INDEX: 25 ML/M2
MAXIMAL PREDICTED HEART RATE: 154 BPM
MCH RBC QN AUTO: 29.3 PG (ref 26.6–33)
MCHC RBC AUTO-ENTMCNC: 33.3 G/DL (ref 31.5–35.7)
MCV RBC AUTO: 88 FL (ref 79–97)
PHOSPHATE SERPL-MCNC: 3.4 MG/DL (ref 2.5–4.5)
PLATELET # BLD AUTO: 185 10*3/MM3 (ref 140–450)
PMV BLD AUTO: 11 FL (ref 6–12)
POTASSIUM SERPL-SCNC: 3.9 MMOL/L (ref 3.5–5.2)
RBC # BLD AUTO: 2.59 10*6/MM3 (ref 3.77–5.28)
SODIUM SERPL-SCNC: 141 MMOL/L (ref 136–145)
STRESS TARGET HR: 131 BPM
WBC # BLD AUTO: 11.64 10*3/MM3 (ref 3.4–10.8)

## 2021-06-14 PROCEDURE — 94003 VENT MGMT INPAT SUBQ DAY: CPT

## 2021-06-14 PROCEDURE — 25010000003 CEFTRIAXONE PER 250 MG: Performed by: INTERNAL MEDICINE

## 2021-06-14 PROCEDURE — 94799 UNLISTED PULMONARY SVC/PX: CPT

## 2021-06-14 PROCEDURE — 95816 EEG AWAKE AND DROWSY: CPT

## 2021-06-14 PROCEDURE — 93306 TTE W/DOPPLER COMPLETE: CPT | Performed by: INTERNAL MEDICINE

## 2021-06-14 PROCEDURE — 99232 SBSQ HOSP IP/OBS MODERATE 35: CPT | Performed by: INTERNAL MEDICINE

## 2021-06-14 PROCEDURE — 82962 GLUCOSE BLOOD TEST: CPT

## 2021-06-14 PROCEDURE — 99233 SBSQ HOSP IP/OBS HIGH 50: CPT | Performed by: INTERNAL MEDICINE

## 2021-06-14 PROCEDURE — 99222 1ST HOSP IP/OBS MODERATE 55: CPT | Performed by: INTERNAL MEDICINE

## 2021-06-14 PROCEDURE — 80069 RENAL FUNCTION PANEL: CPT | Performed by: INTERNAL MEDICINE

## 2021-06-14 PROCEDURE — 85027 COMPLETE CBC AUTOMATED: CPT | Performed by: INTERNAL MEDICINE

## 2021-06-14 PROCEDURE — 25010000002 PROPOFOL 10 MG/ML EMULSION: Performed by: INTERNAL MEDICINE

## 2021-06-14 PROCEDURE — 25010000002 FENTANYL CITRATE (PF) 50 MCG/ML SOLUTION: Performed by: INTERNAL MEDICINE

## 2021-06-14 PROCEDURE — 25010000002 HYDRALAZINE PER 20 MG: Performed by: INTERNAL MEDICINE

## 2021-06-14 PROCEDURE — 93306 TTE W/DOPPLER COMPLETE: CPT

## 2021-06-14 PROCEDURE — 95816 EEG AWAKE AND DROWSY: CPT | Performed by: PSYCHIATRY & NEUROLOGY

## 2021-06-14 PROCEDURE — 63710000001 INSULIN GLARGINE PER 5 UNITS: Performed by: INTERNAL MEDICINE

## 2021-06-14 PROCEDURE — 99233 SBSQ HOSP IP/OBS HIGH 50: CPT | Performed by: PSYCHIATRY & NEUROLOGY

## 2021-06-14 RX ADMIN — FENTANYL CITRATE 25 MCG: 50 INJECTION, SOLUTION INTRAMUSCULAR; INTRAVENOUS at 11:05

## 2021-06-14 RX ADMIN — SODIUM CHLORIDE, PRESERVATIVE FREE 10 ML: 5 INJECTION INTRAVENOUS at 08:15

## 2021-06-14 RX ADMIN — DEXTROSE MONOHYDRATE 75 ML/HR: 50 INJECTION, SOLUTION INTRAVENOUS at 16:58

## 2021-06-14 RX ADMIN — CEFTRIAXONE SODIUM 2 G: 2 INJECTION, SOLUTION INTRAVENOUS at 16:58

## 2021-06-14 RX ADMIN — PROPOFOL 20 MCG/KG/MIN: 10 INJECTION, EMULSION INTRAVENOUS at 06:47

## 2021-06-14 RX ADMIN — SODIUM CHLORIDE 8 MG/HR: 900 INJECTION INTRAVENOUS at 23:42

## 2021-06-14 RX ADMIN — FENTANYL CITRATE 25 MCG: 50 INJECTION, SOLUTION INTRAMUSCULAR; INTRAVENOUS at 06:47

## 2021-06-14 RX ADMIN — SODIUM CHLORIDE 8 MG/HR: 900 INJECTION INTRAVENOUS at 18:06

## 2021-06-14 RX ADMIN — HYDRALAZINE HYDROCHLORIDE 20 MG: 20 INJECTION, SOLUTION INTRAMUSCULAR; INTRAVENOUS at 23:50

## 2021-06-14 RX ADMIN — INSULIN GLARGINE 10 UNITS: 100 INJECTION, SOLUTION SUBCUTANEOUS at 06:38

## 2021-06-14 RX ADMIN — FENTANYL CITRATE 25 MCG: 50 INJECTION, SOLUTION INTRAMUSCULAR; INTRAVENOUS at 00:44

## 2021-06-14 RX ADMIN — SODIUM CHLORIDE, PRESERVATIVE FREE 3 ML: 5 INJECTION INTRAVENOUS at 09:04

## 2021-06-14 RX ADMIN — SODIUM CHLORIDE 8 MG/HR: 900 INJECTION INTRAVENOUS at 13:30

## 2021-06-14 RX ADMIN — SODIUM CHLORIDE, PRESERVATIVE FREE 10 ML: 5 INJECTION INTRAVENOUS at 23:42

## 2021-06-14 RX ADMIN — LABETALOL HYDROCHLORIDE 20 MG: 5 INJECTION, SOLUTION INTRAVENOUS at 13:38

## 2021-06-14 RX ADMIN — SODIUM CHLORIDE 8 MG/HR: 900 INJECTION INTRAVENOUS at 03:05

## 2021-06-14 RX ADMIN — SODIUM CHLORIDE, PRESERVATIVE FREE 10 ML: 5 INJECTION INTRAVENOUS at 09:04

## 2021-06-14 RX ADMIN — SODIUM CHLORIDE 8 MG/HR: 900 INJECTION INTRAVENOUS at 07:50

## 2021-06-14 RX ADMIN — DEXTROSE MONOHYDRATE 75 ML/HR: 50 INJECTION, SOLUTION INTRAVENOUS at 04:18

## 2021-06-14 NOTE — CONSULTS
Cardiology History & Physical / Consultation      Patient Name: Jayne Beltran  Age/Sex: 65 y.o. female  : 1955  MRN: [id number]    Date of Admission: 2021  Date of Encounter Visit: 21  Encounter Provider: Chino Richards MD  Referring Provider: Obdulio Burger MD  Place of Service: [facility] CARDIOLOGY  Patient Care Team:  Michael Arriaza Jr., DO as PCP - General (Family Medicine)  Gilles Villa DPM as Consulting Physician (Podiatry)  Jen Hays MD as Consulting Physician (Obstetrics and Gynecology)  Michael Arriaza Jr., DO as Referring Physician (Family Medicine)  Glynn Melara MD as Consulting Physician (Hematology and Oncology)          Subjective:     Chief Complaint: altered mental status    Reason for consultation: CVA, r/o endocarditis, NATHANIEL    History of Present Illness:  Jayne Beltran is a 65 y.o. female with history of hypertension, diabetes, chronic kidney disease and COPD who presented to [facility] 21 with reports of altered mental status and associated left-sided weakness. She was febrile, hypertensive and tachycardic upon arrival, as well as anemic (tranfused). She was evaluated by GI and underwent EGD day of arrival that was suboptimal due to poor prep. She was also noted to become increasingly aggressive towards staff shortly after admission, evaluated by neurology and I/D, and subsequently underwent LP  (intubated preoperatively). She was than able to have a repeat EGD noting a bleeding gastric ulcer (cauterized), as well as an MRIB that demonstrated multiple bilateral scattered infarcts, suspicious for a cardio embolic source. Her LP was fairly unremarkable, with no evidence of MRSA, with negative HSV PCR.     Past Medical History:  Past Medical History:   Diagnosis Date   • COPD (chronic obstructive pulmonary disease) (CMS/HCC)    • Diabetes mellitus (CMS/[facility])     type 2      • Fibroid    • Hypertension    • Leukocytosis    • Neuromuscular disorder (CMS/HCC)    • Panic attack    • Skin cancer     nose   • TIA (transient ischemic attack)        Past Surgical History:   Procedure Laterality Date   • CHOLECYSTECTOMY     • COLONOSCOPY     • ENDOSCOPY N/A 6/11/2021    Procedure: ESOPHAGOGASTRODUODENOSCOPY AT BEDSIDE;  Surgeon: Donovan Banks MD;  Location: [facility] ENDOSCOPY;  Service: Gastroenterology;  Laterality: N/A;  pre: melena, GI bleed, anemia   post: retained food    • ENDOSCOPY N/A 6/12/2021    Procedure: ESOPHAGOGASTRODUODENOSCOPY WITH GOLD PROBE CAUTERY 7FR TO BLEEDING GASTRIC ANTRUM ULCER;  Surgeon: Calvin Barraza MD;  [facility];  Service: Gastroenterology;  Laterality: N/A;  PRE- GI BLEED  POST- BLEEDING GASTRIC ANTRUM ULCER   • INSERTION HEMODIALYSIS CATHETER N/A 9/3/2020    Procedure: HEMODIALYSIS CATHETER INSERTION;  Surgeon: Sergio Durant MD;  Location: [facility] MAIN OR;  Service: Vascular;  Laterality: N/A;   • KIDNEY STONE SURGERY     • URETEROSCOPY LASER LITHOTRIPSY WITH STENT INSERTION Right 12/21/2020    Procedure: CYSTOSCOPY, RIGHT URETEROSCOPY, RIGHT RETROGRADE PYELOGRAM, LASER LITHOTRIPSY WITH RIGHT URETERAL STENT EXCHANGE;  Surgeon: Mikie Mejia MD;  Location: [facility] OR;  Service: Urology;  Laterality: Right;       Home Medications:   Medications Prior to Admission   Medication Sig Dispense Refill Last Dose   • citalopram (CeleXA) 10 MG tablet Take 1 tablet by mouth Daily. 90 tablet 1    • insulin aspart (NovoLOG FlexPen) 100 UNIT/ML solution pen-injector sc pen Inject 5 Units under the skin into the appropriate area as directed 3 (Three) Times a Day With Meals. SSI-DEPENDS PM BLOOD GLUCOSE 5 pen 5    • metoprolol succinate XL (TOPROL-XL) 200 MG 24 hr tablet Take 1 tablet by mouth Daily. 90 tablet 1    • rOPINIRole (REQUIP) 3 MG tablet Take 3 mg by mouth Every Night. Take 1-3 hr prior to bedtime      • Alcohol Swabs (B-D  SINGLE USE SWABS REGULAR) pads 1 each 3 (Three) Times a Day Before Meals. 200 each 11    • Blood Glucose Calibration (ACCU-CHEK LUIS ALBERTO) solution 1 each by In Vitro route 3 (Three) Times a Day Before Meals. 5 each 5    • Blood Glucose Monitoring Suppl (ACCU-CHEK LUIS ALBERTO PLUS) w/Device kit 1 each 3 (Three) Times a Day Before Meals. 1 kit 2    • Cranberry-Vit C-Lactobacillus (CRANBERRY/PROBIOTIC/VIT C PO) Take 1 tablet by mouth Daily.   Unknown at Unknown time   • Cyanocobalamin (VITAMIN B 12) 500 MCG tablet Take 500 mcg by mouth Daily.   Unknown at Unknown time   • Empagliflozin (Jardiance) 10 MG tablet Take 10 mg by mouth Daily. 30 tablet 5 Unknown at Unknown time   • folic acid (FOLVITE) 1 MG tablet Take 4 tablets by mouth Daily.   Unknown at Unknown time   • furosemide (LASIX) 80 MG tablet Take 80 mg by mouth 2 (Two) Times a Day.   Unknown at Unknown time   • glucose blood (Accu-Chek Luis Alberto Plus) test strip 1 each by Other route 3 (Three) Times a Day Before Meals. Use as instructed 200 each 11    • Insulin Pen Needle (RELION PEN NEEDLE 31G/8MM) 31G X 8 MM misc 1 each 3 (Three) Times a Day. 90 each 11    • Lancets (ACCU-CHEK MULTICLIX) lancets 1 each by Other route 3 (Three) Times a Day Before Meals. Use as instructed 200 each 11    • Lancets Misc. (ACCU-CHEK MULTICLIX LANCET DEV) kit 1 each 3 (Three) Times a Day Before Meals. 200 each 11    • lisinopril (PRINIVIL,ZESTRIL) 10 MG tablet Take 1 tablet by mouth Daily. 30 tablet 3 Unknown at Unknown time   • pregabalin (LYRICA) 150 MG capsule Take 150 mg by mouth 3 (Three) Times a Day.   Unknown at Unknown time   • senna (senna) 8.6 MG tablet Take 8.6 mg by mouth Daily.   Unknown at Unknown time       Allergies:  Allergies   Allergen Reactions   • Metformin Other (See Comments)     Kidney failure       Past Social History:  Social History     Socioeconomic History   • Marital status:      Spouse name: Golden   • Number of children: 2   • Years of education: 12   •  Highest education level: High school graduate   Tobacco Use   • Smoking status: Current Every Day Smoker     Packs/day: 0.25     Years: 45.00     Pack years: 11.25     Types: Cigarettes   • Smokeless tobacco: Never Used   • Tobacco comment: uses vape and smokes cigarettes   Vaping Use   • Vaping Use: Never used   Substance and Sexual Activity   • Alcohol use: No   • Drug use: No   • Sexual activity: Yes     Partners: Male     Birth control/protection: Post-menopausal       Past Family History: History reviewed. No pertinent family history.   Family History   Problem Relation Age of Onset   • Heart disease Mother          at age 63   • Diabetes Mother    • Hypertension Mother    • Diabetes Father    • Deep vein thrombosis Father    • Depression Father    • Liver disease Father    • Lung cancer Father 58         at age 68   • Breast cancer Maternal Grandmother         ? age dx   • Dementia Maternal Grandmother    • Hypertension Maternal Grandmother    • Ovarian cancer Daughter 23   • Diabetes Daughter    • Depression Daughter    • Diabetes Sister    • Diabetes Brother    • Heart disease Brother    • Cancer Brother          at age 43   • Heart disease Maternal Uncle    • Cancer Paternal Uncle    • Clotting disorder Paternal Grandmother    • Colon cancer Neg Hx    • Colon polyps Neg Hx    • Malig Hyperthermia Neg Hx        Review of Systems   Unable to perform ROS: Intubated           Objective:     Objective:  Temp:  [97.2 °F (36.2 °C)-99.1 °F (37.3 °C)] 97.2 °F (36.2 °C)  Heart Rate:  [] 78  Resp:  [18-39] 18  BP: (109-158)/(59-95) 133/61  FiO2 (%):  [21 %] 21 %    Intake/Output Summary (Last 24 hours) at 2021 1057  Last data filed at 2021 0900  Gross per 24 hour   Intake 2092.25 ml   Output 1400 ml   Net 692.25 ml     Body mass index is 27.18 kg/m².      21  0220 21  0610 21  0305   Weight: 74.4 kg (164 lb) 71.6 kg (157 lb 13.6 oz) 74.1 kg (163 lb 5.8 oz)            Physical Exam:   Vitals reviewed.   Pulmonary:      Breath sounds: Normal breath sounds.   Cardiovascular:      Normal rate. Regular rhythm. Normal S1. Normal S2.      Murmurs: There is no murmur.      No gallop. No click. No rub.   Pulses:     Intact distal pulses.   Edema:     Peripheral edema absent.   Abdominal:      General: Bowel sounds are normal.     Patient was intubated.  Currently sedated and resting comfortably.    Labs:   Lab Review:     Results from last 7 days   Lab Units 06/14/21  0406 06/13/21  0509 06/12/21  1014 06/12/21  0011 06/11/21  1625 06/11/21  0950 06/11/21  0403 06/11/21  0223   SODIUM mmol/L 141 147* 148* 149* 146* 144 141 139   POTASSIUM mmol/L 3.9 4.1 5.0 4.9 3.9 3.8 4.2 4.6   CHLORIDE mmol/L 112* 120* 120* 118* 112* 111* 106 97*   CO2 mmol/L 20.2* 18.1* 15.6* 18.5* 20.2* 20.9* 23.4 21.4*   BUN mg/dL 23 40* 57* 59* 54* 50* 48* 47*   CREATININE mg/dL 1.25* 1.53* 1.66* 1.52* 1.30* 1.39* 1.47* 1.69*   GLUCOSE mg/dL 108* 110* 148* 158* 122* 286* 500* 622*   CALCIUM mg/dL 7.4* 7.3* 7.2* 7.8* 8.7 8.1* 7.9* 8.9   AST (SGOT) U/L  --  76*  --   --   --   --  11 14   ALT (SGPT) U/L  --  31  --   --   --   --  17 20     Results from last 7 days   Lab Units 06/11/21  0403   TROPONIN T ng/mL 0.094*     Results from last 7 days   Lab Units 06/14/21  0406   WBC 10*3/mm3 11.64*   HEMOGLOBIN g/dL 7.6*   HEMATOCRIT % 22.8*   PLATELETS 10*3/mm3 185     Results from last 7 days   Lab Units 06/12/21  1301 06/11/21  0223   INR  1.23* 1.13*   APTT seconds  --  25.2         Results from last 7 days   Lab Units 06/12/21  1252   MAGNESIUM mg/dL 2.2                 Results from last 7 days   Lab Units 06/11/21  0403   TSH uIU/mL 0.939         PREVIOUS EKG 8/24/20        EKG 6/11/21          Assessment:       Hyperosmolality    Gastrointestinal hemorrhage    Absolute anemia    Melena        Plan:     1.  Bilateral multiple acute infarcts.  Request for NATHANIEL for possible septic emboli.  Obviously this is a  very reasonable request however in the setting of someone who has a GI bleed who just underwent a cauterization of a gastric ulcer a NATHANIEL would be contraindicated at the current time.  Cauterization was on 6/12/2021.  Would ask for GI input but I think he would need a minimum of 1 probably 2 weeks before I would proceed with a NATHANIEL.  2.  Hyperosmolality sodium has normalized today to 141.  3.  Patient is currently intubated.  Spoke with pulmonary/critical care physician.  Although it would be very convenient to do a NATHANIEL because reports patient has been combative the risk at this point exceeds benefit.  We will have to treat empirically for now.  We will continue to follow.  Plan is to extubate patient which is reasonable at this time.    Thank you for allowing me to participate in the care of Jayne Beltran. Feel free to contact me directly with any further questions or concerns.    Chino Richards MD  [facility]  06/14/21  10:57 EDT

## 2021-06-14 NOTE — PROGRESS NOTES
[facility] Progress Note      Patient Name: Jayne Beltran  : 1955  MRN: [id number]  Primary Care Physician:  Michael Arriaza Jr., DO  Date of admission: 2021    Subjective     Interval History:   The patient remains intubated, sedated    Review of Systems:   Not obtainable    Objective     Vitals:   Temp:  [97.2 °F (36.2 °C)-99.1 °F (37.3 °C)] 97.2 °F (36.2 °C)  Heart Rate:  [] 75  Resp:  [18-39] 18  BP: (109-180)/(59-95) 122/61  FiO2 (%):  [21 %] 21 %    Intake/Output Summary (Last 24 hours) at 2021 0852  Last data filed at 2021 0817  Gross per 24 hour   Intake 2092.25 ml   Output 850 ml   Net 1242.25 ml       Physical Exam:    General Appearance: On the ventilator, sedated, no acute distress  Skin: warm and dry  HEENT: Orally intubated, nonicteric sclera  Neck: supple, no JVD  Lungs: Bilateral rhonchi, unlabored breathing effort  Heart: RRR, normal S1 and S2, no S3, no  Abdomen: soft, no guarding, normoactive bowel, nondistended  : no palpable bladder  Extremities: no edema, cyanosis or clubbing  Neuro: Unable to assess    Scheduled Meds:     cefTRIAXone, 2 g, Intravenous, Q24H  insulin glargine, 10 Units, Subcutaneous, QAM  insulin regular, 0-14 Units, Subcutaneous, Q6H  sodium chloride, 10 mL, Intravenous, Q12H  sodium chloride, 10 mL, Intravenous, Q12H  sodium chloride, 10 mL, Intravenous, Q12H  sodium chloride, 10 mL, Intravenous, Q12H  sodium chloride, 3 mL, Intravenous, Q12H      IV Meds:   dexmedetomidine, 0.2-1.5 mcg/kg/hr, Last Rate: Stopped (21 1350)  dextrose, 75 mL/hr, Last Rate: 75 mL/hr (21 0418)  hold, 1 each  pantoprazole, 8 mg/hr, Last Rate: 8 mg/hr (21 0750)  propofol, 5-50 mcg/kg/min, Last Rate: 35 mcg/kg/min (21 0816)        Results Reviewed:   I have personally reviewed the results from the time of this admission to 2021 08:52 EDT     Results from last 7 days   Lab Units 21  0401  06/13/21  0509 06/12/21  1014 06/11/21  0403 06/11/21  0223   SODIUM mmol/L 141 147* 148* 141 139   POTASSIUM mmol/L 3.9 4.1 5.0 4.2 4.6   CHLORIDE mmol/L 112* 120* 120* 106 97*   CO2 mmol/L 20.2* 18.1* 15.6* 23.4 21.4*   BUN mg/dL 23 40* 57* 48* 47*   CREATININE mg/dL 1.25* 1.53* 1.66* 1.47* 1.69*   CALCIUM mg/dL 7.4* 7.3* 7.2* 7.9* 8.9   BILIRUBIN mg/dL  --  <0.2  --  0.3 0.6   ALK PHOS U/L  --  44  --  78 93   ALT (SGPT) U/L  --  31  --  17 20   AST (SGOT) U/L  --  76*  --  11 14   GLUCOSE mg/dL 108* 110* 148* 500* 622*       Estimated Creatinine Clearance: 44.6 mL/min (A) (by C-G formula based on SCr of 1.25 mg/dL (H)).    Results from last 7 days   Lab Units 06/14/21  0406 06/13/21  0509 06/12/21  1252 06/12/21  0008 06/11/21  1626   MAGNESIUM mg/dL  --   --  2.2 2.2 2.4   PHOSPHORUS mg/dL 3.4 3.7  --  5.1* 1.5*       Results from last 7 days   Lab Units 06/13/21  0509   URIC ACID mg/dL 6.7*       Results from last 7 days   Lab Units 06/14/21  0406 06/13/21  0509 06/12/21  1828 06/12/21  1252 06/12/21  0008   WBC 10*3/mm3 11.64* 15.07* 19.14* 21.04* 20.41*   HEMOGLOBIN g/dL 7.6* 7.5* 6.9* 7.9* 7.1*   PLATELETS 10*3/mm3 185 160 170 170 233       Results from last 7 days   Lab Units 06/12/21  1301 06/11/21  0223   INR  1.23* 1.13*       Assessment / Plan     ASSESSMENT:  1.  Acute kidney injury on chronic kidney disease stage III, improving, creatinine down to 1.25 associated with hypovolemia and hypotension and sepsis while the patient was on ACE inhibitor which impaired the ability to autoregulate.  2.  Hyponatremia, resolved sodium down to 141 today  3.  GI bleed with melena had upper endoscopy on 611, hemoglobin today 7.6 has been stable for the past 24 hours  4.  Respiratory failure and underlying COPD  5.  Encephalopathy  6.  Diabetes mellitus type 2 had severe hyperglycemia and hyperosmolar state which has improved  7.  Fever    PLAN:  1.  Continue the same treatment  2.  Surveillance labs    Thank you for  involving us in the care of Jayne Beltran.  Please feel free to call with any questions.    Dru Sousa MD  06/14/21  08:52 EDT    [facility]  [phone]      Much of this encounter note is an electronic transcription/translation of spoken language to printed text. The electronic translation of spoken language may permit erroneous, or at times, nonsensical words or phrases to be inadvertently transcribed; Although I have reviewed the note for such errors, some may still exist.

## 2021-06-14 NOTE — PROGRESS NOTES
[facility]  Inpatient Progress Note    Reason for Follow Up: Upper GI bleed due to gastric ulcer    Subjective     Interval History:   Intubated, opens eyes.  Denies abdominal pain    Current Facility-Administered Medications:   •  acetaminophen (TYLENOL) tablet 650 mg, 650 mg, Oral, Q4H PRN **OR** acetaminophen (TYLENOL) suppository 650 mg, 650 mg, Rectal, Q4H PRN, Obdulio Burger MD, 650 mg at 06/11/21 1904  •  calcium gluconate 1g/50ml 0.675% NaCl IV SOLN, 1 g, Intravenous, PRN **AND** calcium gluconate 6 g in sodium chloride 0.9 % 500 mL IVPB, 6 g, Intravenous, PRN **AND** Calcium, , , PRN, Obdulio Burger MD  •  cefTRIAXone (ROCEPHIN) IVPB 2 g, 2 g, Intravenous, Q24H, Cem Duncan MD, Last Rate: 100 mL/hr at 06/13/21 1603, 2 g at 06/13/21 1603  •  dexmedetomidine (PRECEDEX) 400 mcg in 100 mL NS infusion kit, 0.2-1.5 mcg/kg/hr, Intravenous, Titrated, Olaf Espinoza MD, Stopped at 06/12/21 1350  •  dextrose (D5W) 5 % infusion, 75 mL/hr, Intravenous, Continuous, Huan Beaulieu MD, Last Rate: 75 mL/hr at 06/14/21 0418, 75 mL/hr at 06/14/21 0418  •  dextrose (GLUTOSE) oral gel 15 g, 15 g, Oral, Q15 Min PRN, Carlos Harmon MD  •  fentaNYL citrate (PF) (SUBLIMAZE) injection 25 mcg, 25 mcg, Intravenous, Q1H PRN, Carlos Harmon MD, 25 mcg at 06/14/21 0647  •  glucagon (human recombinant) (GLUCAGEN DIAGNOSTIC) injection 1 mg, 1 mg, Subcutaneous, Q15 Min PRN, Carlos Harmon MD  •  haloperidol lactate (HALDOL) injection 4 mg, 4 mg, Intravenous, Q4H PRN, Olaf Espinoza MD, 4 mg at 06/11/21 1438  •  Hold medication, 1 each, Does not apply, Continuous PRN, Blas Luque MD  •  hydrALAZINE (APRESOLINE) injection 20 mg, 20 mg, Intravenous, Q4H PRN, Olaf Espinoza MD, 20 mg at 06/11/21 1438  •  insulin glargine (LANTUS, SEMGLEE) injection 10 Units, 10 Units, Subcutaneous, QAMEden Tausif, MD, 10 Units at 06/14/21 0638  •  insulin regular  (humuLIN R,novoLIN R) injection 0-14 Units, 0-14 Units, Subcutaneous, Q6H, Carlos Harmon MD, 3 Units at 06/12/21 1832  •  ipratropium-albuterol (DUO-NEB) nebulizer solution 3 mL, 3 mL, Nebulization, Q6H PRN, Obdulio Burger MD  •  labetalol (NORMODYNE,TRANDATE) injection 20 mg, 20 mg, Intravenous, Q10 Min PRN, Obdulio Burger MD, 20 mg at 06/11/21 1657  •  LORazepam (ATIVAN) injection 2 mg, 2 mg, Intravenous, Q2H PRN, Olaf Espinoza MD, 2 mg at 06/13/21 1245  •  magnesium sulfate 4 gram infusion - Mg less than or equal to 1mg/dL, 4 g, Intravenous, PRN **OR** magnesium sulfate 3 gram infusion (1gm x 3) - Mg 1.1 - 1.5 mg/dL, 1 g, Intravenous, PRN, Last Rate: 100 mL/hr at 06/11/21 1439, 1 g at 06/11/21 1439 **OR** Magnesium Sulfate 2 gram infusion- Mg 1.6 - 1.9 mg/dL, 2 g, Intravenous, PRN, Obdulio Burger MD  •  ondansetron (ZOFRAN) injection 4 mg, 4 mg, Intravenous, Q6H PRN, Obdulio Burger MD  •  [COMPLETED] pantoprazole (PROTONIX) injection 80 mg, 80 mg, Intravenous, Once, 80 mg at 06/11/21 0535 **AND** pantoprazole (PROTONIX) 40 mg in 100mL NS IVPB, 8 mg/hr, Intravenous, Continuous, Obdulio Burger MD, Last Rate: 20 mL/hr at 06/14/21 0305, 8 mg/hr at 06/14/21 0305  •  potassium chloride (K-DUR,KLOR-CON) ER tablet 40 mEq, 40 mEq, Oral, PRN **OR** potassium chloride (KLOR-CON) packet 40 mEq, 40 mEq, Oral, PRN **OR** potassium chloride 10 mEq in 100 mL IVPB, 10 mEq, Intravenous, Q1H PRN, Obdulio Burger MD  •  potassium phosphate 45 mmol in sodium chloride 0.9 % 500 mL infusion, 45 mmol, Intravenous, PRN **OR** potassium phosphate 30 mmol in sodium chloride 0.9 % 250 mL infusion, 30 mmol, Intravenous, PRN, Last Rate: 31.3 mL/hr at 06/11/21 1548, 30 mmol at 06/11/21 1548 **OR** potassium phosphate 15 mmol in sodium chloride 0.9 % 100 mL infusion, 15 mmol, Intravenous, PRN **OR** sodium phosphates 40 mmol in sodium chloride 0.9 % 500 mL IVPB, 40 mmol, Intravenous, PRN **OR** sodium  phosphates 20 mmol in sodium chloride 0.9 % 250 mL IVPB, 20 mmol, Intravenous, PRN, Obdulio Burger MD  •  propofol (DIPRIVAN) infusion 10 mg/mL 100 mL, 5-50 mcg/kg/min, Intravenous, Titrated, Carlos Harmon MD, Last Rate: 8.59 mL/hr at 06/14/21 0647, 20 mcg/kg/min at 06/14/21 0647  •  [COMPLETED] Insert peripheral IV, , , Once **AND** sodium chloride 0.9 % flush 10 mL, 10 mL, Intravenous, PRN, Joon Mayen MD  •  sodium chloride 0.9 % flush 10 mL, 10 mL, Intravenous, PRN, Obdulio Burger MD  •  sodium chloride 0.9 % flush 10 mL, 10 mL, Intravenous, Once PRN, Obdulio Burger MD  •  sodium chloride 0.9 % flush 10 mL, 10 mL, Intravenous, Q12H, Obdulio Burger MD, 10 mL at 06/13/21 0954  •  sodium chloride 0.9 % flush 10 mL, 10 mL, Intravenous, PRN, Obdulio Burger MD  •  sodium chloride 0.9 % flush 10 mL, 10 mL, Intravenous, Q12H, Carlos Harmon MD  •  sodium chloride 0.9 % flush 10 mL, 10 mL, Intravenous, Q12H, Carlos Harmon MD  •  sodium chloride 0.9 % flush 10 mL, 10 mL, Intravenous, Q12H, Carlos Harmon MD  •  sodium chloride 0.9 % flush 10 mL, 10 mL, Intravenous, PRN, Carlos Harmon MD  •  sodium chloride 0.9 % flush 20 mL, 20 mL, Intravenous, PRNEden Tausif, MD  •  sodium chloride 0.9 % flush 3 mL, 3 mL, Intravenous, Q12H, Obdulio Burger MD, 3 mL at 06/13/21 0955  Review of Systems:    The following systems were reviewed and negative;  constitution and gastrointestinal    Objective     Vital Signs  Temp:  [97.2 °F (36.2 °C)-99.1 °F (37.3 °C)] 97.2 °F (36.2 °C)  Heart Rate:  [] 86  Resp:  [18-39] 18  BP: (109-180)/(59-95) 123/67  FiO2 (%):  [21 %] 21 %  Body mass index is 27.18 kg/m².    Intake/Output Summary (Last 24 hours) at 6/14/2021 0651  Last data filed at 6/14/2021 0418  Gross per 24 hour   Intake 1630.09 ml   Output 850 ml   Net 780.09 ml     I/O this shift:  In: 1630.1 [I.V.:1343.1; IV Piggyback:287]  Out: 850 [Urine:850]     Physical Exam:   General:  patient awake on vent, follows simple commands   Eyes: Normal lids and lashes, no scleral icterus   Neck: supple, normal ROM   Skin: warm and dry, not jaundiced   Cardiovascular: regular rhythm and rate, no murmurs auscultated   Pulm: clear to auscultation bilaterally, regular and unlabored   Abdomen: soft, nontender, nondistended; normal bowel sounds       Results Review:     I reviewed the patient's new clinical results.    Results from last 7 days   Lab Units 06/14/21  0406 06/13/21  0509 06/12/21  1828   WBC 10*3/mm3 11.64* 15.07* 19.14*   HEMOGLOBIN g/dL 7.6* 7.5* 6.9*   HEMATOCRIT % 22.8* 22.3* 20.7*   PLATELETS 10*3/mm3 185 160 170     Results from last 7 days   Lab Units 06/14/21  0406 06/13/21  0509 06/12/21  1014 06/11/21  0403 06/11/21  0223   SODIUM mmol/L 141 147* 148* 141 139   POTASSIUM mmol/L 3.9 4.1 5.0 4.2 4.6   CHLORIDE mmol/L 112* 120* 120* 106 97*   CO2 mmol/L 20.2* 18.1* 15.6* 23.4 21.4*   BUN mg/dL 23 40* 57* 48* 47*   CREATININE mg/dL 1.25* 1.53* 1.66* 1.47* 1.69*   CALCIUM mg/dL 7.4* 7.3* 7.2* 7.9* 8.9   BILIRUBIN mg/dL  --  <0.2  --  0.3 0.6   ALK PHOS U/L  --  44  --  78 93   ALT (SGPT) U/L  --  31  --  17 20   AST (SGOT) U/L  --  76*  --  11 14   GLUCOSE mg/dL 108* 110* 148* 500* 622*     Results from last 7 days   Lab Units 06/12/21  1301 06/11/21  0223   INR  1.23* 1.13*     No results found for: LIPASE    Radiology:  MRI Brain With & Without Contrast   Final Result      XR Chest 1 View   Final Result         Electronically signed by Chasity Massey M.D. on 06-12-21 at 1809      XR Chest 1 View   Final Result      XR Chest 1 View   Final Result         Electronically signed by Glynn Henry M.D. on 06-11-21 at 0613      CT Head Without Contrast   Final Result         Electronically signed by Fredy Cervantes MD on 06-11-21 at 0411          Assessment/Plan     Patient Active Problem List   Diagnosis   • Essential hypertension   • Snoring   • TIA (transient ischemic attack)   • GERD  without esophagitis   • Inflamed seborrheic keratosis   • Type 2 diabetes mellitus without complication, without long-term current use of insulin (CMS/HCC)   • Peripheral neuropathy   • Restless legs syndrome   • Benign paroxysmal positional vertigo   • Bone lesion   • Vitamin D deficiency   • Dyslipidemia   • Muscle spasm of both lower legs   • Tobacco use disorder   • Abnormal lung sounds   • Intermittent claudication (CMS/HCC)   • Chronic respiratory failure with hypoxia (CMS/HCC)   • Leg mass, right   • Rib pain on left side   • Diabetic autonomic neuropathy associated with type 2 diabetes mellitus (CMS/HCC)   • Leukocytosis   • Primary insomnia   • PMB (postmenopausal bleeding)   • Family history of ovarian cancer   • Acute renal failure (ARF) (CMS/HCC)   • Duplicated renal collecting system   • Atherosclerotic vascular disease   • Elevated platelet count   • Low hemoglobin   • Other anomalies of uterus   • Multiple kidney stones   • Multiple kidney stones   • Bilateral lower extremity edema   • Diarrhea   • Sore on leg   • Syncope and collapse   • Acute renal failure (CMS/HCC)   • Unspecified malignant neoplasm of skin of nose   • Shortness of breath   • Pain, unspecified   • Other specified coagulation defects (CMS/HCC)   • Encounter for change or removal of nonsurgical wound dressing   • Chronic obstructive pulmonary disease, unspecified (CMS/HCC)   • Iron deficiency anemia   • Anxiety   • History of subdural hematoma   • Back pain   • Diabetes mellitus (CMS/HCC)   • Malignant melanoma (CMS/HCC)   • Panic attack   • Acute renal failure (CMS/HCC)   • Calculus of kidney   • Restless legs syndrome   • Transient ischemic attack   • Right shoulder injury, initial encounter   • Nonspecific abnormal findings on imaging examination of skull and head   • Hyperosmolality   • Gastrointestinal hemorrhage   • Absolute anemia   • Melena   • Dependence on renal dialysis (CMS/HCC)     All problems are new to me  today  Assessment:  1. Melena due to UGIB, gastric ulcer - cauterized 6/12  2. Acute blood loss anemia  3. Peptic ulcer disease  4. GERI  5. Encephalopathy  6. fever      Plan:  · Hemoglobin stable-continue to follow  · Check H. pylori stool antigen  · Continue Protonix drip-transition to twice daily tomorrow  · Possible weaning trial today-if extubated will get speech to evaluate for oral diet    I discussed the patients findings and my recommendations with patient, family and nursing staff.    Ann Alvarado MD

## 2021-06-14 NOTE — PROGRESS NOTES
Adult Nutrition  Assessment/PES    Patient Name:  Jayne Beltran  YOB: 1955  MRN: [id number]  Admit Date:  6/11/2021    Assessment Date:  6/14/2021    Comments:  Follow up. Pt on the vent and currently NPO. Discussed care in rounds. Will follow for course of nutrition pending medical conditions.     Reason for Assessment     Row Name 06/14/21 1349          Reason for Assessment    Reason For Assessment  follow-up protocol     Diagnosis  -- on vent         Nutrition/Diet History     Row Name 06/14/21 1349          Nutrition/Diet History    Typical Food/Fluid Intake  NPO, possible extubation today           Labs/Tests/Procedures/Meds     Row Name 06/14/21 1350          Labs/Procedures/Meds    Lab Results Reviewed  reviewed        Diagnostic Tests/Procedures    Diagnostic Test/Procedure Reviewed  reviewed        Medications    Pertinent Medications Reviewed  reviewed         Physical Findings     Row Name 06/14/21 1350          Physical Findings    Overall Physical Appearance  on ventilator support           Nutrition Prescription Ordered     Row Name 06/14/21 1351          Nutrition Prescription PO    Current PO Diet  NPO                 Problem/Interventions:          Intervention Goal     Row Name 06/14/21 1351          Intervention Goal    General  Maintain nutrition;Disease management/therapy     Weight  No significant weight loss         Nutrition Intervention     Row Name 06/14/21 1351          Nutrition Intervention    RD/Tech Action  Follow Tx progress;Care plan reviewd           Education/Evaluation     Row Name 06/14/21 1351          Monitor/Evaluation    Monitor  Per protocol           Electronically signed by:  Waleska Henderson RD  06/14/21 13:53 EDT

## 2021-06-14 NOTE — PROGRESS NOTES
Neurology Note    Patient:  Jayne Beltran    YOB: 1955    REFERRING PHYSICIAN:  Obdulio Burger MD    CHIEF COMPLAINT:    AMS    HISTORY OF PRESENT ILLNESS:   The patient is a 65 y.o. female admitted on 6/11 GIB, AMS, developed a fever, CTH, EEG, LP negative, BCs negative. MRI brain with numerous bilateral small acute embolic strokes. She is doing better, acyclovir and vanc stopped. Can not have NATHANIEL right now 22 GIB. No PAF noted per RN.    Past Medical History:  Past Medical History:   Diagnosis Date   • COPD (chronic obstructive pulmonary disease) (CMS/HCC)    • Diabetes mellitus (CMS/HCC)     type 2   • Fibroid    • Hypertension    • Leukocytosis    • Neuromuscular disorder (CMS/HCC)    • Panic attack    • Skin cancer     nose   • TIA (transient ischemic attack)        Past Surgical History:  Past Surgical History:   Procedure Laterality Date   • CHOLECYSTECTOMY     • COLONOSCOPY     • ENDOSCOPY N/A 6/11/2021    Procedure: ESOPHAGOGASTRODUODENOSCOPY AT BEDSIDE;  Surgeon: Donovan Banks MD;  Location: [facility] ENDOSCOPY;  Service: Gastroenterology;  Laterality: N/A;  pre: melena, GI bleed, anemia   post: retained food    • ENDOSCOPY N/A 6/12/2021    Procedure: ESOPHAGOGASTRODUODENOSCOPY WITH GOLD PROBE CAUTERY 7FR TO BLEEDING GASTRIC ANTRUM ULCER;  Surgeon: Calvin Barraza MD;  Location: [facility] ENDOSCOPY;  Service: Gastroenterology;  Laterality: N/A;  PRE- GI BLEED  POST- BLEEDING GASTRIC ANTRUM ULCER   • INSERTION HEMODIALYSIS CATHETER N/A 9/3/2020    Procedure: HEMODIALYSIS CATHETER INSERTION;  Surgeon: Sergio Durant MD;  Location: [facility] OR;  Service: Vascular;  Laterality: N/A;   • KIDNEY STONE SURGERY     • URETEROSCOPY LASER LITHOTRIPSY WITH STENT INSERTION Right 12/21/2020    Procedure: CYSTOSCOPY, RIGHT URETEROSCOPY, RIGHT RETROGRADE PYELOGRAM, LASER LITHOTRIPSY WITH RIGHT URETERAL STENT EXCHANGE;  Surgeon: Mikie Mejia MD;  Location: [facility] OR;   Service: Urology;  Laterality: Right;       Social History:   Social History     Socioeconomic History   • Marital status:      Spouse name: Golden   • Number of children: 2   • Years of education: 12   • Highest education level: High school graduate   Tobacco Use   • Smoking status: Current Every Day Smoker     Packs/day: 0.25     Years: 45.00     Pack years: 11.25     Types: Cigarettes   • Smokeless tobacco: Never Used   • Tobacco comment: uses vape and smokes cigarettes   Vaping Use   • Vaping Use: Never used   Substance and Sexual Activity   • Alcohol use: No   • Drug use: No   • Sexual activity: Yes     Partners: Male     Birth control/protection: Post-menopausal        Family History:   Family History   Problem Relation Age of Onset   • Heart disease Mother          at age 63   • Diabetes Mother    • Hypertension Mother    • Diabetes Father    • Deep vein thrombosis Father    • Depression Father    • Liver disease Father    • Lung cancer Father 58         at age 68   • Breast cancer Maternal Grandmother         ? age dx   • Dementia Maternal Grandmother    • Hypertension Maternal Grandmother    • Ovarian cancer Daughter 23   • Diabetes Daughter    • Depression Daughter    • Diabetes Sister    • Diabetes Brother    • Heart disease Brother    • Cancer Brother          at age 43   • Heart disease Maternal Uncle    • Cancer Paternal Uncle    • Clotting disorder Paternal Grandmother    • Colon cancer Neg Hx    • Colon polyps Neg Hx    • Malig Hyperthermia Neg Hx        Medications Prior to Admission:    Prior to Admission medications    Medication Sig Start Date End Date Taking? Authorizing Provider   citalopram (CeleXA) 10 MG tablet Take 1 tablet by mouth Daily. 21  Yes Michael Arriaza Jr., DO   insulin aspart (NovoLOG FlexPen) 100 UNIT/ML solution pen-injector sc pen Inject 5 Units under the skin into the appropriate area as directed 3 (Three) Times a Day With Meals.  SSI-DEPENDS PM BLOOD GLUCOSE 2/17/21  Yes Michael Arriaza Jr., DO   metoprolol succinate XL (TOPROL-XL) 200 MG 24 hr tablet Take 1 tablet by mouth Daily. 1/15/21  Yes Michael Arriaza Jr., DO   rOPINIRole (REQUIP) 3 MG tablet Take 3 mg by mouth Every Night. Take 1-3 hr prior to bedtime   Yes Darian Morales MD   Alcohol Swabs (B-D SINGLE USE SWABS REGULAR) pads 1 each 3 (Three) Times a Day Before Meals. 7/27/20   Michael Arriaza Jr., DO   Blood Glucose Calibration (ACCU-CHEK ILANA) solution 1 each by In Vitro route 3 (Three) Times a Day Before Meals. 7/27/20   Michael Arriaza Jr., DO   Blood Glucose Monitoring Suppl (ACCU-CHEK ILANA PLUS) w/Device kit 1 each 3 (Three) Times a Day Before Meals. 7/27/20   Michael Arriaza Jr., DO   Cranberry-Vit C-Lactobacillus (CRANBERRY/PROBIOTIC/VIT C PO) Take 1 tablet by mouth Daily. 9/28/20   Darian Morales MD   Cyanocobalamin (VITAMIN B 12) 500 MCG tablet Take 500 mcg by mouth Daily.    Darian Morales MD   Empagliflozin (Jardiance) 10 MG tablet Take 10 mg by mouth Daily. 10/29/20   Michael Arriaza Jr., DO   folic acid (FOLVITE) 1 MG tablet Take 4 tablets by mouth Daily. 9/4/20   Darian Morales MD   furosemide (LASIX) 80 MG tablet Take 80 mg by mouth 2 (Two) Times a Day.    Darian Morales MD   glucose blood (Accu-Chek Ilana Plus) test strip 1 each by Other route 3 (Three) Times a Day Before Meals. Use as instructed 7/27/20   Michael Arriaza Jr., DO   Insulin Pen Needle (RELION PEN NEEDLE 31G/8MM) 31G X 8 MM misc 1 each 3 (Three) Times a Day. 10/12/20   Michael Arriaza Jr., DO   Lancets (ACCU-CHEK MULTICLIX) lancets 1 each by Other route 3 (Three) Times a Day Before Meals. Use as instructed 7/27/20   Michael Arriaza Jr., DO   Lancets Misc. (ACCU-CHEK MULTICLIX LANCET DEV) kit 1 each 3 (Three) Times a Day Before Meals. 7/27/20   Michael Arriaza Jr., DO   lisinopril (PRINIVIL,ZESTRIL) 10 MG tablet Take 1 tablet  by mouth Daily. 1/14/21   Michael Arriaza Jr., DO   pregabalin (LYRICA) 150 MG capsule Take 150 mg by mouth 3 (Three) Times a Day.    Provider, Darian, MD   senna (senna) 8.6 MG tablet Take 8.6 mg by mouth Daily. 9/29/20   Provider, Darian, MD       Allergies:  Metformin      Review of system  Review of Systems   Constitutional: Negative for fever.   Neurological: Negative for speech difficulty, weakness and numbness.   All other systems reviewed and are negative.      Vitals:    06/14/21 1338   BP: (!) 181/70   Pulse: 101   Resp:    Temp:    SpO2:        Physical exam  Physical Exam  Constitutional:       Appearance: She is well-developed.   HENT:      Head: Normocephalic and atraumatic.   Eyes:      Extraocular Movements: Extraocular movements intact.      Pupils: Pupils are equal, round, and reactive to light.   Cardiovascular:      Rate and Rhythm: Normal rate and regular rhythm.   Pulmonary:      Effort: Pulmonary effort is normal.   Musculoskeletal:      Cervical back: Neck supple.   Neurological:      Mental Status: She is alert and oriented to person, place, and time.      Deep Tendon Reflexes: Reflexes are normal and symmetric.      Comments: Speech clear, VFF, no facial droop, no limb drift.   Psychiatric:         Behavior: Behavior normal.         Thought Content: Thought content normal.           Lab Results   Component Value Date    WBC 11.64 (H) 06/14/2021    HGB 7.6 (L) 06/14/2021    HCT 22.8 (L) 06/14/2021    MCV 88.0 06/14/2021     06/14/2021     Lab Results   Component Value Date    GLUCOSE 108 (H) 06/14/2021    BUN 23 06/14/2021    CREATININE 1.25 (H) 06/14/2021    EGFRIFNONA 43 (L) 06/14/2021    EGFRIFAFRI 53 (L) 01/20/2021    BCR 18.4 06/14/2021    CO2 20.2 (L) 06/14/2021    CALCIUM 7.4 (L) 06/14/2021    PROTENTOTREF 6.6 01/20/2021    ALBUMIN 2.60 (L) 06/14/2021    LABIL2 1.5 01/20/2021    AST 76 (H) 06/13/2021    ALT 31 06/13/2021     Meningitis / Encephalitis Panel, PCR -  Cerebrospinal Fluid, Lumbar Puncture  Order: [id number]  Status:  Final result   Visible to patient:  Yes (MyChart) Next appt:  None  Specimen Information: Lumbar Puncture; Cerebrospinal Fluid        Component   Ref Range & Units    ESCHERICHIA COLI K1, PCR   Not Detected Not Detected    HAEMOPHILUS INFLUENZAE, PCR   Not Detected Not Detected    LISTERIA MONOCYTOGENES, PCR   Not Detected Not Detected    NEISSERIA MENINGITIDIS, PCR   Not Detected Not Detected    STREPTOCOCCUS AGALACTIAE, PCR   Not Detected Not Detected    STREPTOCOCCUS PNEUMONIAE, PCR   Not Detected Not Detected    CYTOMEGALOVIRUS (CMV), PCR   Not Detected Not Detected    ENTEROVIRUS, PCR   Not Detected Not Detected    HERPES SIMPLEX VIRUS 1 (HSV-1), PCR   Not Detected Not Detected    HERPES SIMPLEX VIRUS 2 (HSV-2), PCR   Not Detected Not Detected    HUMAN PARECHOVIRUS, PCR   Not Detected Not Detected    VARICELLA ZOSTER VIRUS (VZV), PCR   Not Detected Not Detected    CRYPTOCOCCUS NEOFORMANS / GATTII, PCR   Not Detected Not Detected    HUMAN HERPES VIRUS 6 PCR   Not Detected Not Detected    Resulting Agency [facility]         Specimen Collected: 06/12/21 14:06 Last Resulted: 06/13/21 08:55           Cell Count, CSF - Cerebrospinal Fluid, Lumbar Puncture  Order: [id number] - Part of Panel Order [id number]  Status:  Final result   Visible to patient:  Yes (MyChart)  Specimen Information: Lumbar Puncture; Cerebrospinal Fluid        Component   Ref Range & Units 2 d ago   (6/12/21) 2 d ago   (6/12/21)   Color, CSF   Colorless Colorless  Colorless    Supernatant Color, CSF   Colorless Colorless  Colorless    Appearance, CSF   Clear Clear  Clear    RBC, CSF   0 - 0 /mm3 4High   4High     Nucleated Cells, CSF   0 - 5 /mm3 2  5    Volume, CSF   mL 1.0  1.0    Tube Number, CSF  1  4    Method:  Hemacytometer Method  UF 1000i Automated Method                Radiological Studies:  Duplex scan of extremity veins including responses to compression and other  maneuvers; bilateral  Order# [id number]  Reading physician: Brenden Hood MD Ordering physician: Blas Luque MD Study date: 21   Patient Information    Patient Name   Jayne Beltran MRN   [id number] Legal Sex   Female  (Age)   1955 (65 y.o.)   Clinical Indication    deep vein thrombophlebitis; acute; bilateral   Comments: Patient with bilateral cardiac infarcts   Admission Information    Admission Date/Time Discharge Date/Time Room/Bed   21  02:01 AM  N324/1   Interpretation Summary    · Right and left popliteal fossa fluid collection.       EEG    Result Date: 2021  Date of onset: 2021 at 8:41 AM Date of offset: 2021 at 9:08 AM Indication: Encephalitis Technical description:  This is a 21 channel digital EEG recording that began on 2021 at 8:41 AM and ended on 2021 at 9:08 AM.  Daniel and seizure detection software programs were used. Background:  The EEG recording demonstrates mild to moderate diffuse background slowing with an admixture of alpha, theta, and delta frequencies.  There is intermittent focal right temporoparietal occipital slowing.  The patient enters the drowsy state, and sleeps during the record.  Sleep activities are symmetric and 10 K complexes and sleep spindles..  Hyperventilation and photic stimulation were not performed.   No interictal activity is present. The EKG monitor shows a heart rate that varies between 80 and 85 beats per minute. Clinical interpretation:  This routine EEG is abnormal due to the presence of mild to moderate diffuse background slowing.  The results of this study are nonspecific, but may indicate a mild to moderate encephalopathy.  There is also intermittent focal right temporoparietal occipital slowing.  This may indicate an underlying area of cerebral dysfunction.  No potentially epileptogenic activity or seizure activity is present.  Careful clinical and radiographic correlation is  advised.      CT Head Without Contrast    Result Date: 6/11/2021  Patient: ANDRES FERMIN  Time Out: 04:11 Exam(s): CT HEAD Without Contrast EXAM:   CT Head Without Intravenous Contrast CLINICAL HISTORY:    Reason for exam: ams. left side weakness. TECHNIQUE:   Axial computed tomography images of the head brain without intravenous contrast.  CTDI is 54.80 mGy and DLP is 1023.8 mGy-cm.  This CT exam was performed according to the principle of ALARA (As Low As Reasonably Achievable) by using one or more of the following dose reduction techniques: automated exposure control, adjustment of the mA and or kV according to patient size, and or use of iterative reconstruction technique. COMPARISON: 9 8 2020 FINDINGS:   Brain:  No hemorrhage or mass effect.  Mild cerebral volume loss.   Ventricles:  No hydrocephalus.   Bones joints:  Unremarkable.   Soft tissues:  Unremarkable.   Sinuses:  Unremarkable.   Mastoid air cells:  Clear. IMPRESSION:       No acute hemorrhage, hydrocephalus, or mass effect.     Electronically signed by Fredy Cervantes MD on 06-11-21 at 0411    MRI Brain With & Without Contrast    Result Date: 6/13/2021  MR SCAN OF THE BRAIN WITHOUT AND WITH INTRAVENOUS CONTRAST  HISTORY: Upper extremity weakness. Confusion. Possible herpes encephalitis.  The MR scan was performed with sagittal and axial and coronal images and includes T1 images without and with intravenous contrast. The ventricles are normal in size with some slight chronic small vessel ischemic change in the deep white matter. In addition, the diffusion sequence shows a multitude of acute infarcts scattered in both cerebral hemispheres as well as in the cerebellum and suggesting a cardiac embolic source. The largest infarct is located high in the left posterior parietal cortex where it measures up to 1.3 x 2.8 cm. There is no associated abnormal enhancement. There is no evidence of hemorrhage.  There are normal flow voids in the major vessels.  There is prominent polypoid mucosal thickening at the floor the left maxillary sinus. There is also some fluid in the mastoid air cells bilaterally.  CONCLUSION: Multiple small acute infarcts scattered in both cerebral hemispheres and in the cerebellum and highly suspicious for a cardiac embolic source and further clinical correlation is recommended. See above discussion. There is some mild sinusitis and bilateral mastoiditis.  This report was finalized on 6/13/2021 3:17 PM by Dr. Ricco Silva M.D.      XR Chest 1 View    Result Date: 6/12/2021  Patient: ANDRES FERMIN  Time Out: 18:09 Exam(s): FILM CXR 1 VIEW EXAM:   XR Chest, 1 View CLINICAL HISTORY:    Reason for exam: PICC line placement. TECHNIQUE:   Frontal view of the chest. COMPARISON:   Chest radiograph on 6 12 2021 at 939 hours FINDINGS:   Hardware: Endotracheal tube terminates in the region of the lower thoracic trachea, approximately 2.6 cm above the syed.  Left-sided PICC line terminates in the region of the lower SVC.   Lungs pleura: Interstitial opacities throughout the lungs. No pleural effusion or pneumothorax.   Heart mediastinum: Stable mild enlargement of the cardiac silhouette.  Atherosclerotic calcification of the aorta.   Soft tissues: Unremarkable.   Bones: No acute fracture.  Degenerative changes of the acromioclavicular joints.   Upper abdomen: Normal. IMPRESSION:   1.  Endotracheal tube terminates in the region of the lower thoracic trachea, approximately 2.6 cm above the syed.  Left-sided PICC line terminates in the region of the lower SVC. 2.  Interstitial opacities throughout the lungs which may be secondary to technique versus pulmonary vasculature congestion versus infectious inflammatory process.     Electronically signed by Chasity Massey M.D. on 06-12-21 at 1809    XR Chest 1 View    Result Date: 6/12/2021  One view portable chest at 9:39 AM  HISTORY: Recent intubation.  FINDINGS: There is been recent ET tube placement  which appears to and in good position approximately 5 cm above the syed. The lungs are clear and the heart is top normal in size without change from yesterday's exam.  This report was finalized on 6/12/2021 10:00 AM by Dr. Ricco Silva M.D.      XR Chest 1 View    Result Date: 6/11/2021  Patient: ANDRES FERMIN  Time Out: 06:13 Exam(s): FILM CXR 1 VIEW EXAM:   XR Chest, 1 View CLINICAL HISTORY:    Reason for exam: Hyperosmolar nonketotic state with leukocytosis. TECHNIQUE:   Frontal view of the chest. COMPARISON: 9 3 2020 FINDINGS:   Lungs:  Unremarkable.  No consolidation.   Pleural space:  Unremarkable.  No pneumothorax.   Heart:  Unremarkable.  No cardiomegaly.   Mediastinum: Calcified aortic arch.   Bones joints:  Unremarkable. IMPRESSION:     No acute pulmonary process.    Electronically signed by Glynn Henry M.D. on 06-11-21 at 0613    Duplex Venous Upper Extremity - Right CAR    Result Date: 6/13/2021  · Acute right upper extremity superficial thrombophlebitis noted in the basilic (forearm) and cephalic (forearm). · All other right sided vessels appear normal.      Duplex Venous Lower Extremity - Bilateral CAR    Result Date: 6/13/2021  · Right and left popliteal fossa fluid collection.          During this visit the following were done:  Labs Reviewed [x]    Labs Ordered []    Radiology Reports Reviewed []    Radiology Ordered []    EKG, echo, and/or stress test reviewed [x]    EEG results reviewed  []    EEG reviewed and interpreted per myself   []    Discussed case with neurointerventionalist or neuroradiologist []    Referring Provider Records Reviewed []    ER Records Reviewed []    Hospital Records Reviewed []    History Obtained From Family []    Radiological images view and Interpreted per myself [x]    Case Discussed with referring provider []     Decision to obtain and request outside records  []        Assessment and Plan     1. TME and fever resolved.  2. Multiple bilateral embolic  strokes, SBE still a consideration, BCs negative. Can not have NATHANIEL now 22 GIB.   - Stroke order set.   - TTE.   - Carotid duplex.   - Neurologically stable for telemetry.   - Continue Rocephin.   - Start aspirin when okay with GI.                Electronically signed by Albaro Santiago MD on 6/14/2021 at 13:56 EDT

## 2021-06-14 NOTE — PROGRESS NOTES
LOS: 3 days     Chief Complaint:  Follow-up fever    Interval History:  Fever resolved. Doppler with RUE SVT. MRI showed B cerebral infarcts w/ possible cardioembolic source. WBC improving. D/W neurologist.    ROS: cannot obtain due to intubated status    Vital Signs  Temp:  [97.2 °F (36.2 °C)-99.1 °F (37.3 °C)] 97.2 °F (36.2 °C)  Heart Rate:  [] 75  Resp:  [18-39] 18  BP: (109-180)/(59-95) 122/61  FiO2 (%):  [21 %] 21 %    Physical Exam:  General: sedated, on ventilator  Eyes:  no scleral icterus  ENT: ETT in place  Neck: Supple, no meningismus  Cardiovascular: NR RR, no murmurs, + RUE edema  Respiratory: Lungs w/o wheezing or rales; 21% FiO2 on ventilator  GI: Abdomen is soft, non-distended  :  + urinary catheter  Musculoskeletal: normal musculature  Skin: No vesicles  Psychiatric: sedated  Vasc: LUE PICC w/o erythema    Antibiotics:  •  cefTRIAXone (ROCEPHIN) IVPB 2 g, 2 g, Intravenous, Q24H      LABS:  CBC,  BMP, micro reviewed today  Lab Results   Component Value Date    WBC 11.64 (H) 06/14/2021    HGB 7.6 (L) 06/14/2021    HCT 22.8 (L) 06/14/2021    MCV 88.0 06/14/2021     06/14/2021     Lab Results   Component Value Date    GLUCOSE 108 (H) 06/14/2021    BUN 23 06/14/2021    CREATININE 1.25 (H) 06/14/2021    EGFRIFNONA 43 (L) 06/14/2021    EGFRIFAFRI 53 (L) 01/20/2021    BCR 18.4 06/14/2021    CO2 20.2 (L) 06/14/2021    CALCIUM 7.4 (L) 06/14/2021    PROTENTOTREF 6.6 01/20/2021    ALBUMIN 2.60 (L) 06/14/2021    LABIL2 1.5 01/20/2021    AST 76 (H) 06/13/2021    ALT 31 06/13/2021     Lab Results   Component Value Date    HGBA1C 8.40 (H) 06/11/2021     Lab Results   Component Value Date    VANCORANDOM 17.20 06/13/2021       LP results (tube 4):  5 WBCs  4 RBCs  Glc 98  Pro 57  PCR negative    Microbiology:  6/11 COVID: negative  6/11 BCx: NGTD  6/12 CSF Cx: NGTD  6/12 CSF PCR panel: negative    Radiology (personally reviewed reports)  MRI brain multiple small acute infarcts in both cerebral  hemispheres suspicious for a cardiac embolic source    Doppler with RUE SVT    Assessment/Plan   1. Encephalopathy - better  2. Fever in adult - resolved  3. GI bleeding due to gastric ulcer  4. Hyperglycemia and uncontrolled DM2  5. COPD due to tobacco use  6. Acute kidney injury  7. Acute bilateral cerebral infarcts - new problem  8. RUE superficial thrombus     Fever resolved. WBC improving. MRI brain with concern for cardiac source of emboli given bilateral small infarcts. Blood cultures are negative. Cardiology consult and NATHANIEL have been requested. I will follow-up those results before making any final decisions re: the diagnosis of bacterial endocarditis. For now, continue ceftriaxone 2 g IV q24h.     If febrile again (T >100.4 F), will repeat blood cultures.     Acyclovir stopped yesterday as HSV encephalitis workup negative with negative PCR testing and clinically felt to be less likely based on improved mental status and lack of typical findings on MRI.    ID will follow. D/W neurologist.

## 2021-06-14 NOTE — PROGRESS NOTES
"[facility]         Dr Gonzalez Sayied   LOS: 3 days   Patient Care Team:  Michael Arriaza Jr., DO as PCP - General (Family Medicine)  Gilles Villa DPM as Consulting Physician (Podiatry)  Jen Hays MD as Consulting Physician (Obstetrics and Gynecology)  Michael Arriaza Jr., DO as Referring Physician (Family Medicine)  Glynn Melara MD as Consulting Physician (Hematology and Oncology)    Chief Complaint: Acute blood loss anemia with altered mental status lactic acidosis hyperosmolar nonketotic syndrome encephalopathy and possibility of bacterial endocarditis    Interval History: Sedated intubated.  Events noted chart reviewed.  Wakes up follow simple commands.  Possibility    REVIEW OF SYSTEMS:   Sedated intubated.  Wakes up follows commands  Ventilator/Non-Invasive Ventilation Settings (From admission, onward) Comment   SBT trial 21% FiO2 tolerating well.      Vital Signs  Temp:  [97.2 °F (36.2 °C)-99.1 °F (37.3 °C)] 97.2 °F (36.2 °C)  Heart Rate:  [] 75  Resp:  [18-39] 18  BP: (109-180)/(59-95) 122/61  FiO2 (%):  [21 %] 21 %    Intake/Output Summary (Last 24 hours) at 6/14/2021 0848  Last data filed at 6/14/2021 0817  Gross per 24 hour   Intake 2092.25 ml   Output 850 ml   Net 1242.25 ml     Flowsheet Rows      First Filed Value   Admission Height  165.1 cm (65\") Documented at 06/11/2021 0220   Admission Weight  74.4 kg (164 lb) Documented at 06/11/2021 0220          Physical Exam:  Patient is examined using the personal protective equipment as per guidelines from infection control for this particular patient as enacted.  Hand hygiene was performed before and after patient interaction.   General Appearance:   Sedated intubated.  ET tube good position orally.  Wakes up follow simple commands.  Neck midline trachea, no thyromegaly   Lungs:    Equal breath sounds on the vent.    Heart:    Regular rhythm and normal rate, normal S1 and S2, no            murmur, " no gallop, no rub, no click   Chest Wall:    No abnormalities observed   Abdomen:     Normal bowel sounds, no masses, no organomegaly, soft        nontender, nondistended, no guarding, no rebound                tenderness   Extremities:   Moves all extremities well, no edema, no cyanosis, no             redness  CNS following simple commands moving all extremities  Skin no rashes no nodules  Musculoskeletal no cyanosis no clubbing moving all extremities     Results Review:        Results from last 7 days   Lab Units 06/14/21  0406 06/13/21  0509 06/12/21  1014   SODIUM mmol/L 141 147* 148*   POTASSIUM mmol/L 3.9 4.1 5.0   CHLORIDE mmol/L 112* 120* 120*   CO2 mmol/L 20.2* 18.1* 15.6*   BUN mg/dL 23 40* 57*   CREATININE mg/dL 1.25* 1.53* 1.66*   GLUCOSE mg/dL 108* 110* 148*   CALCIUM mg/dL 7.4* 7.3* 7.2*     Results from last 7 days   Lab Units 06/11/21  0403   TROPONIN T ng/mL 0.094*     Results from last 7 days   Lab Units 06/14/21  0406 06/13/21  0509 06/12/21  1828   WBC 10*3/mm3 11.64* 15.07* 19.14*   HEMOGLOBIN g/dL 7.6* 7.5* 6.9*   HEMATOCRIT % 22.8* 22.3* 20.7*   PLATELETS 10*3/mm3 185 160 170     Results from last 7 days   Lab Units 06/12/21  1301 06/11/21  0223   INR  1.23* 1.13*   APTT seconds  --  25.2         Results from last 7 days   Lab Units 06/12/21  1252   MAGNESIUM mg/dL 2.2         Results from last 7 days   Lab Units 06/13/21  0923   PH, ARTERIAL pH units 7.402   PO2 ART mm Hg 72.7*   PCO2, ARTERIAL mm Hg 28.9*   HCO3 ART mmol/L 18.0*       I reviewed the patient's new clinical results.  I personally viewed and interpreted the patient's chest x-ray.        Medication Review:   cefTRIAXone, 2 g, Intravenous, Q24H  insulin glargine, 10 Units, Subcutaneous, QAM  insulin regular, 0-14 Units, Subcutaneous, Q6H  sodium chloride, 10 mL, Intravenous, Q12H  sodium chloride, 10 mL, Intravenous, Q12H  sodium chloride, 10 mL, Intravenous, Q12H  sodium chloride, 10 mL, Intravenous, Q12H  sodium chloride, 3  mL, Intravenous, Q12H        dexmedetomidine, 0.2-1.5 mcg/kg/hr, Last Rate: Stopped (06/12/21 1350)  dextrose, 75 mL/hr, Last Rate: 75 mL/hr (06/14/21 0418)  hold, 1 each  pantoprazole, 8 mg/hr, Last Rate: 8 mg/hr (06/14/21 0750)  propofol, 5-50 mcg/kg/min, Last Rate: 35 mcg/kg/min (06/14/21 0816)        ASSESSMENT:   Acute respiratory failure intubated 6/12/2021  Altered mental status  Sepsis  Rule out endocarditis  Acute blood loss anemia with GI bleed and melanotic stools/coffee-ground emesis: No significant abnormality found on scope  Lactic acidosis  Hyperosmolar nonketotic syndrome  Leukocytosis  Tachycardia  Hypertension  Encephalopathy  COPD without exacerbation  History of tobacco use    PLAN:  Events noted chart reviewed.  Noted MRI result and concerns for possible bacterial endocarditis.  Plans for echo and cardiac evaluation possible NATHANIEL.  Remains on vent with minimal settings FiO2 21%.  If no plans for any invasive procedure we will try to wean and possibly extubate from the vent.  Mental status much more improved .  LP with only 5 WBCs.  ID and neurology currently following.  Likely etiology metabolic not improving.  Lactic acidosis improved.  Acute GI bleed status post repeat EGD.  Gastric ulcer with visible vessel noted.   Continue Protonix drip to transition to twice daily tomorrow.  Continue to monitor hemoglobin .  Transfused if hemoglobin less than 7.  Hyperosmolar nonketotic syndrome with blood glucose in the normal range.  We will continue with Lantus and sliding scale insulin.  Appreciate input from nephrology.  IV fluids per nephrology.  Improving sodium and stable creatinine  Multiple medical problems and issues.  Patient critically ill.    Critical care time 35 minutes       Carlos Harmon MD  06/14/21  08:48 EDT

## 2021-06-14 NOTE — NURSING NOTE
Patient purposeful all night- attempted to self extubate at 2030. Restrained and family updated.  Plans for cardiology to see today for a possible NATHANIEL followed by extubation.

## 2021-06-14 NOTE — PLAN OF CARE
Goal Outcome Evaluation:      Extubated. Passed swallow evaluation. Alert and oriented. No NATHANIEL per cardiology. Echo order results pending.

## 2021-06-15 ENCOUNTER — APPOINTMENT (OUTPATIENT)
Dept: CARDIOLOGY | Facility: HOSPITAL | Age: 66
End: 2021-06-15

## 2021-06-15 LAB
ALBUMIN SERPL-MCNC: 2.9 G/DL (ref 3.5–5.2)
ALBUMIN/GLOB SERPL: 1.3 G/DL
ALP SERPL-CCNC: 58 U/L (ref 39–117)
ALT SERPL W P-5'-P-CCNC: 38 U/L (ref 1–33)
ANION GAP SERPL CALCULATED.3IONS-SCNC: 10.9 MMOL/L (ref 5–15)
AST SERPL-CCNC: 42 U/L (ref 1–32)
BACTERIA SPEC AEROBE CULT: NORMAL
BASOPHILS # BLD AUTO: 0.05 10*3/MM3 (ref 0–0.2)
BASOPHILS NFR BLD AUTO: 0.5 % (ref 0–1.5)
BH CV XLRA MEAS LEFT DIST CCA EDV: -26.9 CM/SEC
BH CV XLRA MEAS LEFT DIST CCA PSV: -87.5 CM/SEC
BH CV XLRA MEAS LEFT DIST ICA EDV: -28 CM/SEC
BH CV XLRA MEAS LEFT DIST ICA PSV: -80.7 CM/SEC
BH CV XLRA MEAS LEFT MID ICA EDV: -29.1 CM/SEC
BH CV XLRA MEAS LEFT MID ICA PSV: -97.2 CM/SEC
BH CV XLRA MEAS LEFT PROX CCA EDV: -21.3 CM/SEC
BH CV XLRA MEAS LEFT PROX CCA PSV: -88.1 CM/SEC
BH CV XLRA MEAS LEFT PROX ECA EDV: -17.3 CM/SEC
BH CV XLRA MEAS LEFT PROX ECA PSV: -93.6 CM/SEC
BH CV XLRA MEAS LEFT PROX ICA EDV: 17.5 CM/SEC
BH CV XLRA MEAS LEFT PROX ICA PSV: 77.8 CM/SEC
BH CV XLRA MEAS LEFT PROX SCLA PSV: 117.2 CM/SEC
BH CV XLRA MEAS LEFT VERTEBRAL A EDV: -25.9 CM/SEC
BH CV XLRA MEAS LEFT VERTEBRAL A PSV: -86.2 CM/SEC
BH CV XLRA MEAS RIGHT DIST CCA EDV: 17.5 CM/SEC
BH CV XLRA MEAS RIGHT DIST CCA PSV: 73.6 CM/SEC
BH CV XLRA MEAS RIGHT DIST ICA EDV: -29.4 CM/SEC
BH CV XLRA MEAS RIGHT DIST ICA PSV: -85.7 CM/SEC
BH CV XLRA MEAS RIGHT MID ICA EDV: -27.7 CM/SEC
BH CV XLRA MEAS RIGHT MID ICA PSV: -105.6 CM/SEC
BH CV XLRA MEAS RIGHT PROX CCA EDV: -26.1 CM/SEC
BH CV XLRA MEAS RIGHT PROX CCA PSV: -92 CM/SEC
BH CV XLRA MEAS RIGHT PROX ECA EDV: -11.3 CM/SEC
BH CV XLRA MEAS RIGHT PROX ECA PSV: -78.3 CM/SEC
BH CV XLRA MEAS RIGHT PROX ICA EDV: -25.9 CM/SEC
BH CV XLRA MEAS RIGHT PROX ICA PSV: -65.2 CM/SEC
BH CV XLRA MEAS RIGHT PROX SCLA PSV: -95.9 CM/SEC
BH CV XLRA MEAS RIGHT VERTEBRAL A EDV: -16.5 CM/SEC
BH CV XLRA MEAS RIGHT VERTEBRAL A PSV: -61.1 CM/SEC
BILIRUB SERPL-MCNC: 0.4 MG/DL (ref 0–1.2)
BUN SERPL-MCNC: 12 MG/DL (ref 8–23)
BUN/CREAT SERPL: 11.1 (ref 7–25)
CALCIUM SPEC-SCNC: 7.9 MG/DL (ref 8.6–10.5)
CHLORIDE SERPL-SCNC: 109 MMOL/L (ref 98–107)
CO2 SERPL-SCNC: 22.1 MMOL/L (ref 22–29)
CREAT SERPL-MCNC: 1.08 MG/DL (ref 0.57–1)
CYTO UR: NORMAL
DEPRECATED RDW RBC AUTO: 49.4 FL (ref 37–54)
EOSINOPHIL # BLD AUTO: 0.25 10*3/MM3 (ref 0–0.4)
EOSINOPHIL NFR BLD AUTO: 2.4 % (ref 0.3–6.2)
ERYTHROCYTE [DISTWIDTH] IN BLOOD BY AUTOMATED COUNT: 15.3 % (ref 12.3–15.4)
GFR SERPL CREATININE-BSD FRML MDRD: 51 ML/MIN/1.73
GLOBULIN UR ELPH-MCNC: 2.3 GM/DL
GLUCOSE BLDC GLUCOMTR-MCNC: 136 MG/DL (ref 70–130)
GLUCOSE BLDC GLUCOMTR-MCNC: 181 MG/DL (ref 70–130)
GLUCOSE BLDC GLUCOMTR-MCNC: 245 MG/DL (ref 70–130)
GLUCOSE BLDC GLUCOMTR-MCNC: 95 MG/DL (ref 70–130)
GLUCOSE SERPL-MCNC: 122 MG/DL (ref 65–99)
GRAM STN SPEC: NORMAL
HCT VFR BLD AUTO: 25.1 % (ref 34–46.6)
HGB BLD-MCNC: 8.2 G/DL (ref 12–15.9)
IMM GRANULOCYTES # BLD AUTO: 0.16 10*3/MM3 (ref 0–0.05)
IMM GRANULOCYTES NFR BLD AUTO: 1.5 % (ref 0–0.5)
LAB AP CASE REPORT: NORMAL
LYMPHOCYTES # BLD AUTO: 1.46 10*3/MM3 (ref 0.7–3.1)
LYMPHOCYTES NFR BLD AUTO: 14.1 % (ref 19.6–45.3)
MAGNESIUM SERPL-MCNC: 2 MG/DL (ref 1.6–2.4)
MCH RBC QN AUTO: 29.4 PG (ref 26.6–33)
MCHC RBC AUTO-ENTMCNC: 32.7 G/DL (ref 31.5–35.7)
MCV RBC AUTO: 90 FL (ref 79–97)
MONOCYTES # BLD AUTO: 0.76 10*3/MM3 (ref 0.1–0.9)
MONOCYTES NFR BLD AUTO: 7.4 % (ref 5–12)
NEUTROPHILS NFR BLD AUTO: 7.65 10*3/MM3 (ref 1.7–7)
NEUTROPHILS NFR BLD AUTO: 74.1 % (ref 42.7–76)
NRBC BLD AUTO-RTO: 0.1 /100 WBC (ref 0–0.2)
PATH REPORT.FINAL DX SPEC: NORMAL
PATH REPORT.GROSS SPEC: NORMAL
PHOSPHATE SERPL-MCNC: 2.8 MG/DL (ref 2.5–4.5)
PLATELET # BLD AUTO: 232 10*3/MM3 (ref 140–450)
PMV BLD AUTO: 10.9 FL (ref 6–12)
POTASSIUM SERPL-SCNC: 3.3 MMOL/L (ref 3.5–5.2)
PROT SERPL-MCNC: 5.2 G/DL (ref 6–8.5)
RBC # BLD AUTO: 2.79 10*6/MM3 (ref 3.77–5.28)
SODIUM SERPL-SCNC: 142 MMOL/L (ref 136–145)
URATE SERPL-MCNC: 5.3 MG/DL (ref 2.4–5.7)
WBC # BLD AUTO: 10.33 10*3/MM3 (ref 3.4–10.8)

## 2021-06-15 PROCEDURE — 84100 ASSAY OF PHOSPHORUS: CPT | Performed by: INTERNAL MEDICINE

## 2021-06-15 PROCEDURE — 80053 COMPREHEN METABOLIC PANEL: CPT | Performed by: INTERNAL MEDICINE

## 2021-06-15 PROCEDURE — 82962 GLUCOSE BLOOD TEST: CPT

## 2021-06-15 PROCEDURE — 99232 SBSQ HOSP IP/OBS MODERATE 35: CPT | Performed by: PSYCHIATRY & NEUROLOGY

## 2021-06-15 PROCEDURE — 93880 EXTRACRANIAL BILAT STUDY: CPT

## 2021-06-15 PROCEDURE — 99232 SBSQ HOSP IP/OBS MODERATE 35: CPT | Performed by: INTERNAL MEDICINE

## 2021-06-15 PROCEDURE — 85025 COMPLETE CBC W/AUTO DIFF WBC: CPT | Performed by: INTERNAL MEDICINE

## 2021-06-15 PROCEDURE — 63710000001 INSULIN GLARGINE PER 5 UNITS: Performed by: INTERNAL MEDICINE

## 2021-06-15 PROCEDURE — 84550 ASSAY OF BLOOD/URIC ACID: CPT | Performed by: INTERNAL MEDICINE

## 2021-06-15 PROCEDURE — 83735 ASSAY OF MAGNESIUM: CPT | Performed by: INTERNAL MEDICINE

## 2021-06-15 PROCEDURE — 25010000003 CEFTRIAXONE PER 250 MG: Performed by: INTERNAL MEDICINE

## 2021-06-15 PROCEDURE — 63710000001 INSULIN REGULAR HUMAN PER 5 UNITS: Performed by: INTERNAL MEDICINE

## 2021-06-15 RX ORDER — PHENOL 1.4 %
1 AEROSOL, SPRAY (ML) MUCOUS MEMBRANE
COMMUNITY

## 2021-06-15 RX ORDER — CHOLECALCIFEROL (VITAMIN D3) 125 MCG
10 CAPSULE ORAL NIGHTLY
Status: DISCONTINUED | OUTPATIENT
Start: 2021-06-15 | End: 2021-06-18 | Stop reason: HOSPADM

## 2021-06-15 RX ORDER — VITAMIN E 268 MG
400 CAPSULE ORAL DAILY
COMMUNITY
End: 2021-08-18

## 2021-06-15 RX ORDER — METOPROLOL SUCCINATE 100 MG/1
200 TABLET, EXTENDED RELEASE ORAL DAILY
Status: DISCONTINUED | OUTPATIENT
Start: 2021-06-15 | End: 2021-06-18 | Stop reason: HOSPADM

## 2021-06-15 RX ORDER — PANTOPRAZOLE SODIUM 40 MG/1
40 TABLET, DELAYED RELEASE ORAL
Status: DISCONTINUED | OUTPATIENT
Start: 2021-06-15 | End: 2021-06-18 | Stop reason: HOSPADM

## 2021-06-15 RX ORDER — CITALOPRAM 10 MG/1
10 TABLET ORAL DAILY
Status: DISCONTINUED | OUTPATIENT
Start: 2021-06-15 | End: 2021-06-18 | Stop reason: HOSPADM

## 2021-06-15 RX ADMIN — SODIUM CHLORIDE, PRESERVATIVE FREE 10 ML: 5 INJECTION INTRAVENOUS at 08:55

## 2021-06-15 RX ADMIN — SODIUM CHLORIDE, PRESERVATIVE FREE 3 ML: 5 INJECTION INTRAVENOUS at 21:13

## 2021-06-15 RX ADMIN — SODIUM CHLORIDE, PRESERVATIVE FREE 10 ML: 5 INJECTION INTRAVENOUS at 21:13

## 2021-06-15 RX ADMIN — ROPINIROLE 3 MG: 2 TABLET, FILM COATED ORAL at 20:53

## 2021-06-15 RX ADMIN — LABETALOL HYDROCHLORIDE 20 MG: 5 INJECTION, SOLUTION INTRAVENOUS at 11:55

## 2021-06-15 RX ADMIN — Medication 10 MG: at 20:53

## 2021-06-15 RX ADMIN — CITALOPRAM 10 MG: 10 TABLET, FILM COATED ORAL at 12:55

## 2021-06-15 RX ADMIN — METOPROLOL SUCCINATE 100 MG: 100 TABLET, EXTENDED RELEASE ORAL at 12:55

## 2021-06-15 RX ADMIN — DEXTROSE MONOHYDRATE 75 ML/HR: 50 INJECTION, SOLUTION INTRAVENOUS at 07:56

## 2021-06-15 RX ADMIN — LABETALOL HYDROCHLORIDE 20 MG: 5 INJECTION, SOLUTION INTRAVENOUS at 17:58

## 2021-06-15 RX ADMIN — INSULIN HUMAN 3 UNITS: 100 INJECTION, SOLUTION PARENTERAL at 11:44

## 2021-06-15 RX ADMIN — SODIUM CHLORIDE 8 MG/HR: 900 INJECTION INTRAVENOUS at 07:40

## 2021-06-15 RX ADMIN — INSULIN GLARGINE 10 UNITS: 100 INJECTION, SOLUTION SUBCUTANEOUS at 07:21

## 2021-06-15 RX ADMIN — ACETAMINOPHEN 650 MG: 325 TABLET, FILM COATED ORAL at 11:45

## 2021-06-15 RX ADMIN — CEFTRIAXONE SODIUM 2 G: 2 INJECTION, SOLUTION INTRAVENOUS at 15:53

## 2021-06-15 RX ADMIN — INSULIN HUMAN 5 UNITS: 100 INJECTION, SOLUTION PARENTERAL at 17:58

## 2021-06-15 RX ADMIN — PANTOPRAZOLE SODIUM 40 MG: 40 TABLET, DELAYED RELEASE ORAL at 17:59

## 2021-06-15 RX ADMIN — POTASSIUM CHLORIDE 40 MEQ: 750 TABLET, EXTENDED RELEASE ORAL at 17:59

## 2021-06-15 RX ADMIN — POTASSIUM CHLORIDE 40 MEQ: 750 TABLET, EXTENDED RELEASE ORAL at 11:44

## 2021-06-15 RX ADMIN — SODIUM CHLORIDE, PRESERVATIVE FREE 3 ML: 5 INJECTION INTRAVENOUS at 08:55

## 2021-06-15 NOTE — PLAN OF CARE
Goal Outcome Evaluation:  Plan of Care Reviewed With: patient        Progress: improving   Doing well. Room air. PRN hydralazine given per order. Oriented x4. Very pleasant. 2 incontinent episodes.

## 2021-06-15 NOTE — PROGRESS NOTES
Hospital Follow Up    LOS:  LOS: 4 days   Patient Name: Jayne Beltran  Age/Sex: 65 y.o. female  : 1955  MRN: [id number]    Day of Service: 06/15/21   Length of Stay: 4  Encounter Provider: Chino Richards MD  Place of Service: [facility] CARDIOLOGY  Patient Care Team:  Michael Arriaza Jr., DO as PCP - General (Family Medicine)  Gilles Villa DPM as Consulting Physician (Podiatry)  Jen Hays MD as Consulting Physician (Obstetrics and Gynecology)  Michael Arriaza Jr., DO as Referring Physician (Family Medicine)  Glynn Melara MD as Consulting Physician (Hematology and Oncology)    Subjective:     Chief Complaint: Multiple bilateral strokes.    Interval History: Patient extubated doing well today really has no complaints.  She looks good.  GI input was that ulcer was extremely far down in the stomach and more than likely we would stay completely away from it.  NATHANIEL would be very low risk from that aspect.  Letter that explained the procedure to the patient she was agreeable.  Risk and benefits of the NATHANIEL were reviewed.    Objective:     Objective:  Temp:  [98.1 °F (36.7 °C)-99.1 °F (37.3 °C)] 99.1 °F (37.3 °C)  Heart Rate:  [] 112  Resp:  [12] 12  BP: (147-191)/() 158/95  FiO2 (%):  [21 %] 21 %     Intake/Output Summary (Last 24 hours) at 6/15/2021 1019  Last data filed at 6/15/2021 0941  Gross per 24 hour   Intake 1673.93 ml   Output 3450 ml   Net -1776.07 ml     Body mass index is 26.63 kg/m².      21  0305 21  1731 06/15/21  0520   Weight: 74.1 kg (163 lb 5.8 oz) 73.9 kg (163 lb) 72.6 kg (160 lb 0.9 oz)     Weight change: -0.164 kg (-5.8 oz)      Physical Exam:   General : Alert, cooperative, in no acute distress.  Neuro: Alert,cooperative and oriented.  Lungs: CTAB. Normal respiratory effort and rate.  CV: Regular rate and rhythm, normal S1 and S2, no murmurs, gallops or rubs.  ABD: Soft, nontender,  nondistended. Positive bowel sounds.  Extr: No edema or cyanosis, moves all extremities.    Lab Review:   Results from last 7 days   Lab Units 06/15/21  0450 06/14/21  0406 06/13/21  0509   SODIUM mmol/L 142 141 147*   POTASSIUM mmol/L 3.3* 3.9 4.1   CHLORIDE mmol/L 109* 112* 120*   CO2 mmol/L 22.1 20.2* 18.1*   BUN mg/dL 12 23 40*   CREATININE mg/dL 1.08* 1.25* 1.53*   GLUCOSE mg/dL 122* 108* 110*   CALCIUM mg/dL 7.9* 7.4* 7.3*   AST (SGOT) U/L 42*  --  76*   ALT (SGPT) U/L 38*  --  31     Results from last 7 days   Lab Units 06/11/21  0403   TROPONIN T ng/mL 0.094*     Results from last 7 days   Lab Units 06/15/21  0450 06/14/21  0406   WBC 10*3/mm3 10.33 11.64*   HEMOGLOBIN g/dL 8.2* 7.6*   HEMATOCRIT % 25.1* 22.8*   PLATELETS 10*3/mm3 232 185     Results from last 7 days   Lab Units 06/12/21  1301 06/11/21  0223   INR  1.23* 1.13*   APTT seconds  --  25.2     Results from last 7 days   Lab Units 06/15/21  0450 06/12/21  1252   MAGNESIUM mg/dL 2.0 2.2           Invalid input(s): LDLCALC      Results from last 7 days   Lab Units 06/11/21  0403   TSH uIU/mL 0.939       Current Medications:   Scheduled Meds:cefTRIAXone, 2 g, Intravenous, Q24H  insulin glargine, 10 Units, Subcutaneous, QAM  insulin regular, 0-14 Units, Subcutaneous, Q6H  sodium chloride, 10 mL, Intravenous, Q12H  sodium chloride, 10 mL, Intravenous, Q12H  sodium chloride, 10 mL, Intravenous, Q12H  sodium chloride, 10 mL, Intravenous, Q12H  sodium chloride, 3 mL, Intravenous, Q12H      Continuous Infusions:dexmedetomidine, 0.2-1.5 mcg/kg/hr, Last Rate: Stopped (06/12/21 1350)  dextrose, 75 mL/hr, Last Rate: 75 mL/hr (06/15/21 1436)  hold, 1 each  pantoprazole, 8 mg/hr, Last Rate: 8 mg/hr (06/15/21 0740)  propofol, 5-50 mcg/kg/min, Last Rate: Stopped (06/14/21 1220)        Allergies:  Allergies   Allergen Reactions   • Metformin Other (See Comments)     Kidney failure       Assessment:       Hyperosmolality    Gastrointestinal hemorrhage    Absolute  anemia    Melena        Plan:      1.  Multiple bilateral acute infarcts.  Plan is to do a NATHANIEL tomorrow.  Again appreciate GIs input say that gastric ulcer is about 55 to 60 cm.  We will follow appropriate precautions.  From my aspect okay to move out of the unit.  Also spoke with Dr. Eden Richards MD  06/15/21  10:19 EDT

## 2021-06-15 NOTE — PROGRESS NOTES
Neurology Note    Patient:  Jayne Beltran    YOB: 1955    REFERRING PHYSICIAN:  Obdulio Burger MD    CHIEF COMPLAINT:    AMS, stroke    HISTORY OF PRESENT ILLNESS:   The patient remains alert and oriented, no fever or PAF reported, cleared for po diet. NATHANIEL planned for am.    Past Medical History:  Past Medical History:   Diagnosis Date   • COPD (chronic obstructive pulmonary disease) (CMS/HCC)    • Diabetes mellitus (CMS/HCC)     type 2   • Fibroid    • Hypertension    • Leukocytosis    • Neuromuscular disorder (CMS/HCC)    • Panic attack    • Skin cancer     nose   • TIA (transient ischemic attack)        Past Surgical History:  Past Surgical History:   Procedure Laterality Date   • CHOLECYSTECTOMY     • COLONOSCOPY     • ENDOSCOPY N/A 6/11/2021    Procedure: ESOPHAGOGASTRODUODENOSCOPY AT BEDSIDE;  Surgeon: Donovan Banks MD;  Location: [facility] ENDOSCOPY;  Service: Gastroenterology;  Laterality: N/A;  pre: melena, GI bleed, anemia   post: retained food    • ENDOSCOPY N/A 6/12/2021    Procedure: ESOPHAGOGASTRODUODENOSCOPY WITH GOLD PROBE CAUTERY 7FR TO BLEEDING GASTRIC ANTRUM ULCER;  Surgeon: Calvin Barraza MD;  Location: [facility] ENDOSCOPY;  Service: Gastroenterology;  Laterality: N/A;  PRE- GI BLEED  POST- BLEEDING GASTRIC ANTRUM ULCER   • INSERTION HEMODIALYSIS CATHETER N/A 9/3/2020    Procedure: HEMODIALYSIS CATHETER INSERTION;  Surgeon: Sergio Durant MD;  Location: [facility] OR;  Service: Vascular;  Laterality: N/A;   • KIDNEY STONE SURGERY     • URETEROSCOPY LASER LITHOTRIPSY WITH STENT INSERTION Right 12/21/2020    Procedure: CYSTOSCOPY, RIGHT URETEROSCOPY, RIGHT RETROGRADE PYELOGRAM, LASER LITHOTRIPSY WITH RIGHT URETERAL STENT EXCHANGE;  Surgeon: Mikie Mejia MD;  Location: [facility] OR;  Service: Urology;  Laterality: Right;       Social History:   Social History     Socioeconomic History   • Marital status:      Spouse name: Golden   • Number of  children: 2   • Years of education: 12   • Highest education level: High school graduate   Tobacco Use   • Smoking status: Current Every Day Smoker     Packs/day: 0.25     Years: 45.00     Pack years: 11.25     Types: Cigarettes   • Smokeless tobacco: Never Used   • Tobacco comment: uses vape and smokes cigarettes   Vaping Use   • Vaping Use: Never used   Substance and Sexual Activity   • Alcohol use: No   • Drug use: No   • Sexual activity: Yes     Partners: Male     Birth control/protection: Post-menopausal        Family History:   Family History   Problem Relation Age of Onset   • Heart disease Mother          at age 63   • Diabetes Mother    • Hypertension Mother    • Diabetes Father    • Deep vein thrombosis Father    • Depression Father    • Liver disease Father    • Lung cancer Father 58         at age 68   • Breast cancer Maternal Grandmother         ? age dx   • Dementia Maternal Grandmother    • Hypertension Maternal Grandmother    • Ovarian cancer Daughter 23   • Diabetes Daughter    • Depression Daughter    • Diabetes Sister    • Diabetes Brother    • Heart disease Brother    • Cancer Brother          at age 43   • Heart disease Maternal Uncle    • Cancer Paternal Uncle    • Clotting disorder Paternal Grandmother    • Colon cancer Neg Hx    • Colon polyps Neg Hx    • Malig Hyperthermia Neg Hx        Medications Prior to Admission:    Prior to Admission medications    Medication Sig Start Date End Date Taking? Authorizing Provider   Alcohol Swabs (B-D SINGLE USE SWABS REGULAR) pads 1 each 3 (Three) Times a Day Before Meals. 20  Yes Michael Arriaza Jr., DO   Ascorbic Acid (VITAMIN C PO) Take 1 tablet by mouth Daily.   Yes Provider, MD Darian   Blood Glucose Monitoring Suppl (ACCU-CHEK LUIS ALBERTO PLUS) w/Device kit 1 each 3 (Three) Times a Day Before Meals. 20  Yes Michael Arriaza Jr., DO   citalopram (CeleXA) 10 MG tablet Take 1 tablet by mouth Daily. 21  Yes  Michael Arriaza Jr., DO   Ferrous Sulfate (Iron) 28 MG tablet Take 1 tablet by mouth Daily.   Yes Darian Morales MD   glucose blood (Accu-Chek Ilana Plus) test strip 1 each by Other route 3 (Three) Times a Day Before Meals. Use as instructed 7/27/20  Yes Michael Arriaza Jr. DO   insulin aspart (NovoLOG FlexPen) 100 UNIT/ML solution pen-injector sc pen Inject 5 Units under the skin into the appropriate area as directed 3 (Three) Times a Day With Meals. SSI-DEPENDS PM BLOOD GLUCOSE 2/17/21  Yes Michael Arriaza Jr., DO   Insulin Pen Needle (RELION PEN NEEDLE 31G/8MM) 31G X 8 MM misc 1 each 3 (Three) Times a Day. 10/12/20  Yes Michael Arriaza Jr., DO   Lancets (ACCU-CHEK MULTICLIX) lancets 1 each by Other route 3 (Three) Times a Day Before Meals. Use as instructed 7/27/20  Yes Michael Arriaza Jr., DO   Lancets Misc. (ACCU-CHEK MULTICLIX LANCET DEV) kit 1 each 3 (Three) Times a Day Before Meals. 7/27/20  Yes Michael Arriaza Jr., DO   Melatonin 10 MG tablet Take 1 tablet by mouth every night at bedtime.   Yes Darian Morales MD   metoprolol succinate XL (TOPROL-XL) 200 MG 24 hr tablet Take 1 tablet by mouth Daily. 1/15/21  Yes Michael Arriaza Jr., DO   rOPINIRole (REQUIP) 3 MG tablet Take 3 mg by mouth Every Night. Take 1-3 hr prior to bedtime   Yes Darian Morales MD   vitamin E 400 UNIT capsule Take 400 Units by mouth Daily.   Yes Darian Morales MD   Blood Glucose Calibration (ACCU-CHEK ILANA) solution 1 each by In Vitro route 3 (Three) Times a Day Before Meals. 7/27/20   Michael Arriaza Jr., DO   Empagliflozin (Jardiance) 10 MG tablet Take 10 mg by mouth Daily. 10/29/20   Michael Arriaza Jr. DO   furosemide (LASIX) 80 MG tablet Take 80 mg by mouth 2 (Two) Times a Day.    Provider, MD Darian   lisinopril (PRINIVIL,ZESTRIL) 10 MG tablet Take 1 tablet by mouth Daily. 1/14/21   Michael Arriaza Jr.,    pregabalin (LYRICA) 150 MG capsule Take 150 mg  by mouth 3 (Three) Times a Day.    Darian Morales MD   senna (senna) 8.6 MG tablet Take 8.6 mg by mouth Daily. 9/29/20   Darian Morales MD   Cranberry-Vit C-Lactobacillus (CRANBERRY/PROBIOTIC/VIT C PO) Take 1 tablet by mouth Daily. 9/28/20 6/15/21  Darian Morales MD   Cyanocobalamin (VITAMIN B 12) 500 MCG tablet Take 500 mcg by mouth Daily.  6/15/21  Darian Morales MD   folic acid (FOLVITE) 1 MG tablet Take 4 tablets by mouth Daily. 9/4/20 6/15/21  Darian Morales MD       Allergies:  Metformin      Review of system  Review of Systems   HENT: Negative for trouble swallowing.    Neurological: Negative for weakness and numbness.   All other systems reviewed and are negative.      Vitals:    06/15/21 1151   BP: (!) 202/107   Pulse: 107   Resp:    Temp:    SpO2: 100%       Physical exam  Physical Exam  Constitutional:       Appearance: She is well-developed.   HENT:      Head: Normocephalic and atraumatic.   Eyes:      Extraocular Movements: Extraocular movements intact.      Pupils: Pupils are equal, round, and reactive to light.   Cardiovascular:      Rate and Rhythm: Normal rate and regular rhythm.   Pulmonary:      Effort: Pulmonary effort is normal.   Neurological:      General: No focal deficit present.      Mental Status: She is alert and oriented to person, place, and time.      Deep Tendon Reflexes: Reflexes are normal and symmetric.      Comments: Speech clear, VFF, no facial droop, no limb drift.   Psychiatric:         Behavior: Behavior normal.         Thought Content: Thought content normal.           Lab Results   Component Value Date    WBC 10.33 06/15/2021    HGB 8.2 (L) 06/15/2021    HCT 25.1 (L) 06/15/2021    MCV 90.0 06/15/2021     06/15/2021     Lab Results   Component Value Date    GLUCOSE 122 (H) 06/15/2021    BUN 12 06/15/2021    CREATININE 1.08 (H) 06/15/2021    EGFRIFNONA 51 (L) 06/15/2021    EGFRIFAFRI 53 (L) 01/20/2021    BCR 11.1 06/15/2021    CO2 22.1  06/15/2021    CALCIUM 7.9 (L) 06/15/2021    PROTENTOTREF 6.6 2021    ALBUMIN 2.90 (L) 06/15/2021    LABIL2 1.5 2021    AST 42 (H) 06/15/2021    ALT 38 (H) 06/15/2021     Echo Complete w/Doppler and Color Flow  Order# [id number]  Reading physician: Alfie Arroyo MD Ordering physician: Albaro Santiago MD Study date: 21   Patient Information    Patient Name   Jayne Beltran MRN   [id number] Legal Sex   Female  (Age)   1955 (65 y.o.)   Admission Information    Admission Date/Time Discharge Date/Time Room/Bed   21  02:01 AM  N324/1   PACS Images     Show images for Adult Transthoracic Echo Complete W/ Cont if Necessary Per Protocol    Sedation Narrator Report    Sedation Narrator Report      Interpretation Summary    · There is moderate nodular calcification of the noncoronary aortic valve leaflet. There is a small mobile echodensity attached to the noncoronary aortic valve leaflet. Vegetation cannot be excluded. Recommend NATHANIEL if clinically indicated.  · Left ventricular ejection fraction appears to be 61 - 65%  · Left ventricular wall thickness is consistent with mild to moderate concentric hypertrophy.  · Left ventricular diastolic function is consistent with (grade I) impaired relaxation.  · Normal right ventricular cavity size and systolic function noted.  · The left atrial cavity is mild to moderately dilated.  · Mild mitral valve regurgitation is present.  · Calculated right ventricular systolic pressure from tricuspid regurgitation is 21 mmHg.  · There is no evidence of pericardial effusion     Echocardiogram Findings    Left Ventricle Calculated left ventricular EF = 64% Estimated left ventricular EF was in agreement with the calculated left ventricular EF. Left ventricular ejection fraction appears to be 61 - 65%. Left ventricular systolic function is normal.   Normal left ventricular cavity size noted. Left ventricular wall thickness is consistent with mild to  moderate concentric hypertrophy. All left ventricular wall segments contract normally. Left ventricular diastolic function is consistent with (grade I) impaired relaxation. No evidence of a left ventricular thrombus present.   Right Ventricle Normal right ventricular cavity size and systolic function noted.   Left Atrium The left atrial cavity is mild to moderately dilated.   Right Atrium Normal right atrial cavity size noted.   Aortic Valve The aortic valve is abnormal in structure. Trace to mild aortic valve regurgitation is present. No aortic valve stenosis is present. There is moderate nodular calcification of the noncoronary aortic valve leaflet. There is a small mobile echodensity attached to the noncoronary aortic valve leaflet. Vegetation cannot be excluded. Recommend NATHANIEL if clinically indicated.   Mitral Valve The mitral valve is structurally normal with no significant stenosis present. Mild mitral valve regurgitation is present.   Tricuspid Valve The tricuspid valve is structurally normal with no significant stenosis present. Trace to mild tricuspid valve regurgitation is present. Estimated right ventricular systolic pressure from tricuspid regurgitation is normal (<35 mmHg). Calculated right ventricular systolic pressure from tricuspid regurgitation is 21 mmHg.   Pulmonic Valve The pulmonic valve is grossly normal in structure. There is trace pulmonic valve regurgitation present. There is no pulmonic valve stenosis present.   Greater Vessels No dilation of the aortic root is present.   Pericardium There is no evidence of pericardial effusion. .         Radiological Studies:   MR SCAN OF THE BRAIN WITHOUT AND WITH INTRAVENOUS CONTRAST     HISTORY: Upper extremity weakness. Confusion. Possible herpes  encephalitis.     The MR scan was performed with sagittal and axial and coronal images and  includes T1 images without and with intravenous contrast. The ventricles  are normal in size with some slight chronic  small vessel ischemic change  in the deep white matter. In addition, the diffusion sequence shows a  multitude of acute infarcts scattered in both cerebral hemispheres as  well as in the cerebellum and suggesting a cardiac embolic source. The  largest infarct is located high in the left posterior parietal cortex  where it measures up to 1.3 x 2.8 cm. There is no associated abnormal  enhancement. There is no evidence of hemorrhage.     There are normal flow voids in the major vessels. There is prominent  polypoid mucosal thickening at the floor the left maxillary sinus. There  is also some fluid in the mastoid air cells bilaterally.     CONCLUSION: Multiple small acute infarcts scattered in both cerebral  hemispheres and in the cerebellum and highly suspicious for a cardiac  embolic source and further clinical correlation is recommended. See  above discussion. There is some mild sinusitis and bilateral  mastoiditis.     This report was finalized on 6/13/2021 3:17 PM by Dr. Ricco Silva M.D.         During this visit the following were done:  Labs Reviewed [x]    Labs Ordered []    Radiology Reports Reviewed [x]    Radiology Ordered [x]    EKG, echo, and/or stress test reviewed [x]    EEG results reviewed  []    EEG reviewed and interpreted per myself   []    Discussed case with neurointerventionalist or neuroradiologist []    Referring Provider Records Reviewed []    ER Records Reviewed []    Hospital Records Reviewed []    History Obtained From Family []    Radiological images view and Interpreted per myself [x]    Case Discussed with referring provider []     Decision to obtain and request outside records  []        Assessment and Plan     1. TME and fever resolved.  2. Multiple bilateral embolic strokes, SBE still a consideration, BCs negative. TTE with question of AV vegetation, LAE.   - Stroke order set.   - NATHANIEL planned   - Carotid duplex.   - Neurologically stable for telemetry.   - Continue Rocephin.   -  Start aspirin when okay with GI.                    Electronically signed by Albaro Santiago MD on 6/15/2021 at 12:52 EDT

## 2021-06-15 NOTE — PROGRESS NOTES
[facility]  Inpatient Progress Note    Reason for Follow Up:  Upper GI bleed due to gastric ulcer    Subjective     Interval History:   No further evidence of bleeding.  She was extubated yesterday and is awake alert today.  She has no complaints at this time    Current Facility-Administered Medications:   •  acetaminophen (TYLENOL) tablet 650 mg, 650 mg, Oral, Q4H PRN **OR** acetaminophen (TYLENOL) suppository 650 mg, 650 mg, Rectal, Q4H PRN, Obdulio Burger MD, 650 mg at 06/11/21 1904  •  calcium gluconate 1g/50ml 0.675% NaCl IV SOLN, 1 g, Intravenous, PRN **AND** calcium gluconate 6 g in sodium chloride 0.9 % 500 mL IVPB, 6 g, Intravenous, PRN **AND** Calcium, , , PRN, Obdulio Burger MD  •  cefTRIAXone (ROCEPHIN) IVPB 2 g, 2 g, Intravenous, Q24H, Cem Duncan MD, Last Rate: 100 mL/hr at 06/14/21 1658, 2 g at 06/14/21 1658  •  dexmedetomidine (PRECEDEX) 400 mcg in 100 mL NS infusion kit, 0.2-1.5 mcg/kg/hr, Intravenous, Titrated, Olaf Espinoza MD, Stopped at 06/12/21 1350  •  dextrose (D5W) 5 % infusion, 75 mL/hr, Intravenous, Continuous, Huan Beaulieu MD, Last Rate: 75 mL/hr at 06/15/21 0756, 75 mL/hr at 06/15/21 0756  •  dextrose (GLUTOSE) oral gel 15 g, 15 g, Oral, Q15 Min PRN, Carlos Harmon MD  •  fentaNYL citrate (PF) (SUBLIMAZE) injection 25 mcg, 25 mcg, Intravenous, Q1H PRN, Carlos Harmon MD, 25 mcg at 06/14/21 1105  •  glucagon (human recombinant) (GLUCAGEN DIAGNOSTIC) injection 1 mg, 1 mg, Subcutaneous, Q15 Min PRN, Carlos Harmon MD  •  haloperidol lactate (HALDOL) injection 4 mg, 4 mg, Intravenous, Q4H PRN, Olaf Espinoza MD, 4 mg at 06/11/21 1438  •  Hold medication, 1 each, Does not apply, Continuous PRN, Blas Luque MD  •  hydrALAZINE (APRESOLINE) injection 20 mg, 20 mg, Intravenous, Q4H PRN, Olaf Espinoza MD, 20 mg at 06/14/21 0163  •  insulin glargine (LANTUS, SEMGLEE) injection 10 Units, 10 Units,  Subcutaneous, QAM, Carlos Harmon MD, 10 Units at 06/15/21 0721  •  insulin regular (humuLIN R,novoLIN R) injection 0-14 Units, 0-14 Units, Subcutaneous, Q6H, Carlos Harmon MD, 3 Units at 06/12/21 1832  •  ipratropium-albuterol (DUO-NEB) nebulizer solution 3 mL, 3 mL, Nebulization, Q6H PRN, Obdulio Burger MD  •  labetalol (NORMODYNE,TRANDATE) injection 20 mg, 20 mg, Intravenous, Q10 Min PRN, Obdulio Burger MD, 20 mg at 06/14/21 1338  •  LORazepam (ATIVAN) injection 2 mg, 2 mg, Intravenous, Q2H PRN, Olaf Espinoza MD, 2 mg at 06/13/21 1245  •  magnesium sulfate 4 gram infusion - Mg less than or equal to 1mg/dL, 4 g, Intravenous, PRN **OR** magnesium sulfate 3 gram infusion (1gm x 3) - Mg 1.1 - 1.5 mg/dL, 1 g, Intravenous, PRN, Last Rate: 100 mL/hr at 06/11/21 1439, 1 g at 06/11/21 1439 **OR** Magnesium Sulfate 2 gram infusion- Mg 1.6 - 1.9 mg/dL, 2 g, Intravenous, PRN, Obdulio Burger MD  •  ondansetron (ZOFRAN) injection 4 mg, 4 mg, Intravenous, Q6H PRN, Obdulio Burger MD  •  [COMPLETED] pantoprazole (PROTONIX) injection 80 mg, 80 mg, Intravenous, Once, 80 mg at 06/11/21 0535 **AND** pantoprazole (PROTONIX) 40 mg in 100mL NS IVPB, 8 mg/hr, Intravenous, Continuous, Obdulio Burger MD, Last Rate: 20 mL/hr at 06/15/21 0740, 8 mg/hr at 06/15/21 0740  •  potassium chloride (K-DUR,KLOR-CON) ER tablet 40 mEq, 40 mEq, Oral, PRN **OR** potassium chloride (KLOR-CON) packet 40 mEq, 40 mEq, Oral, PRN **OR** potassium chloride 10 mEq in 100 mL IVPB, 10 mEq, Intravenous, Q1H PRN, Obdulio Burger MD  •  potassium phosphate 45 mmol in sodium chloride 0.9 % 500 mL infusion, 45 mmol, Intravenous, PRN **OR** potassium phosphate 30 mmol in sodium chloride 0.9 % 250 mL infusion, 30 mmol, Intravenous, PRN, Last Rate: 31.3 mL/hr at 06/11/21 1548, 30 mmol at 06/11/21 1548 **OR** potassium phosphate 15 mmol in sodium chloride 0.9 % 100 mL infusion, 15 mmol, Intravenous, PRN **OR** sodium phosphates  40 mmol in sodium chloride 0.9 % 500 mL IVPB, 40 mmol, Intravenous, PRN **OR** sodium phosphates 20 mmol in sodium chloride 0.9 % 250 mL IVPB, 20 mmol, Intravenous, PRN, Obdulio Burger MD  •  propofol (DIPRIVAN) infusion 10 mg/mL 100 mL, 5-50 mcg/kg/min, Intravenous, Titrated, Carlos Harmon MD, Stopped at 06/14/21 1220  •  [COMPLETED] Insert peripheral IV, , , Once **AND** sodium chloride 0.9 % flush 10 mL, 10 mL, Intravenous, PRN, Joon Mayen MD  •  sodium chloride 0.9 % flush 10 mL, 10 mL, Intravenous, PRN, Obdulio Burger MD  •  sodium chloride 0.9 % flush 10 mL, 10 mL, Intravenous, Once PRN, Obdulio Burger MD  •  sodium chloride 0.9 % flush 10 mL, 10 mL, Intravenous, Q12H, Obdulio Burger MD, 10 mL at 06/14/21 2342  •  sodium chloride 0.9 % flush 10 mL, 10 mL, Intravenous, PRN, Obdulio Burger MD  •  sodium chloride 0.9 % flush 10 mL, 10 mL, Intravenous, Q12H, Carlos Harmon MD, 10 mL at 06/14/21 2342  •  sodium chloride 0.9 % flush 10 mL, 10 mL, Intravenous, Q12H, Carlos Harmon MD, 10 mL at 06/14/21 2342  •  sodium chloride 0.9 % flush 10 mL, 10 mL, Intravenous, Q12H, Carlos Harmon MD, 10 mL at 06/14/21 0815  •  sodium chloride 0.9 % flush 10 mL, 10 mL, Intravenous, Eden GIRALDO Tausif, MD  •  sodium chloride 0.9 % flush 20 mL, 20 mL, Intravenous, Eden GIRALDO Tausif, MD  •  sodium chloride 0.9 % flush 3 mL, 3 mL, Intravenous, Q12H, Obdulio Burger MD, 3 mL at 06/14/21 0904  Review of Systems:    The following systems were reviewed and negative;  constitution and gastrointestinal    Objective     Vital Signs  Temp:  [98.1 °F (36.7 °C)-98.7 °F (37.1 °C)] 98.7 °F (37.1 °C)  Heart Rate:  [] 108  Resp:  [12] 12  BP: (133-191)/() 161/88  FiO2 (%):  [21 %] 21 %  Body mass index is 26.63 kg/m².    Intake/Output Summary (Last 24 hours) at 6/15/2021 0807  Last data filed at 6/15/2021 0400  Gross per 24 hour   Intake 1631.09 ml   Output 3500 ml   Net -1868.91 ml      No intake/output data recorded.     Physical Exam:   General: patient awake, alert and cooperative   Eyes: Normal lids and lashes, no scleral icterus   Neck: supple, normal ROM   Skin: warm and dry, not jaundiced   Cardiovascular: regular rhythm and rate   Pulm:  regular and unlabored   Abdomen: soft, nontender, nondistended    Extremities: no rash or edema   Psychiatric: Normal mood and behavior; memory intact     Results Review:     I reviewed the patient's new clinical results.    Results from last 7 days   Lab Units 06/15/21  0450 06/14/21  0406 06/13/21  0509   WBC 10*3/mm3 10.33 11.64* 15.07*   HEMOGLOBIN g/dL 8.2* 7.6* 7.5*   HEMATOCRIT % 25.1* 22.8* 22.3*   PLATELETS 10*3/mm3 232 185 160     Results from last 7 days   Lab Units 06/15/21  0450 06/14/21  0406 06/13/21  0509 06/11/21  0403   SODIUM mmol/L 142 141 147* 141   POTASSIUM mmol/L 3.3* 3.9 4.1 4.2   CHLORIDE mmol/L 109* 112* 120* 106   CO2 mmol/L 22.1 20.2* 18.1* 23.4   BUN mg/dL 12 23 40* 48*   CREATININE mg/dL 1.08* 1.25* 1.53* 1.47*   CALCIUM mg/dL 7.9* 7.4* 7.3* 7.9*   BILIRUBIN mg/dL 0.4  --  <0.2 0.3   ALK PHOS U/L 58  --  44 78   ALT (SGPT) U/L 38*  --  31 17   AST (SGOT) U/L 42*  --  76* 11   GLUCOSE mg/dL 122* 108* 110* 500*     Results from last 7 days   Lab Units 06/12/21  1301 06/11/21  0223   INR  1.23* 1.13*     No results found for: LIPASE    Radiology:  MRI Brain With & Without Contrast   Final Result      XR Chest 1 View   Final Result         Electronically signed by Chasity Massey M.D. on 06-12-21 at 1809      XR Chest 1 View   Final Result      XR Chest 1 View   Final Result         Electronically signed by Glynn Henry M.D. on 06-11-21 at 0613      CT Head Without Contrast   Final Result         Electronically signed by Fredy Cervantes MD on 06-11-21 at 0411          Assessment/Plan     Patient Active Problem List   Diagnosis   • Essential hypertension   • Snoring   • TIA (transient ischemic attack)   • GERD without  esophagitis   • Inflamed seborrheic keratosis   • Type 2 diabetes mellitus without complication, without long-term current use of insulin (CMS/HCC)   • Peripheral neuropathy   • Restless legs syndrome   • Benign paroxysmal positional vertigo   • Bone lesion   • Vitamin D deficiency   • Dyslipidemia   • Muscle spasm of both lower legs   • Tobacco use disorder   • Abnormal lung sounds   • Intermittent claudication (CMS/HCC)   • Chronic respiratory failure with hypoxia (CMS/HCC)   • Leg mass, right   • Rib pain on left side   • Diabetic autonomic neuropathy associated with type 2 diabetes mellitus (CMS/HCC)   • Leukocytosis   • Primary insomnia   • PMB (postmenopausal bleeding)   • Family history of ovarian cancer   • Acute renal failure (ARF) (CMS/HCC)   • Duplicated renal collecting system   • Atherosclerotic vascular disease   • Elevated platelet count   • Low hemoglobin   • Other anomalies of uterus   • Multiple kidney stones   • Multiple kidney stones   • Bilateral lower extremity edema   • Diarrhea   • Sore on leg   • Syncope and collapse   • Acute renal failure (CMS/HCC)   • Unspecified malignant neoplasm of skin of nose   • Shortness of breath   • Pain, unspecified   • Other specified coagulation defects (CMS/HCC)   • Encounter for change or removal of nonsurgical wound dressing   • Chronic obstructive pulmonary disease, unspecified (CMS/HCC)   • Iron deficiency anemia   • Anxiety   • History of subdural hematoma   • Back pain   • Diabetes mellitus (CMS/HCC)   • Malignant melanoma (CMS/HCC)   • Panic attack   • Acute renal failure (CMS/HCC)   • Calculus of kidney   • Restless legs syndrome   • Transient ischemic attack   • Right shoulder injury, initial encounter   • Nonspecific abnormal findings on imaging examination of skull and head   • Hyperosmolality   • Gastrointestinal hemorrhage   • Absolute anemia   • Melena   • Dependence on renal dialysis (CMS/HCC)   • Aortic valve calcification   • Impaired left  ventricular relaxation   • Concentric left ventricular hypertrophy   • Left atrial dilatation   • Nonrheumatic mitral valve regurgitation       Assessment:  1. Melena due to UGIB, gastric ulcer - cauterized 6/12  2. Acute blood loss anemia  3. Peptic ulcer disease  4. GERI  5. Encephalopathy  6. fever      Plan:  · Hemoglobin stable-continue to follow  · Follow-up on H. pylori stool antigen  · DC Protonix drip-start p.o. Protonix twice daily x 8 weeks  · Will need repeat EGD in 8 weeks to ensure healing of gastric ulcer  · Discussed with Dr. Richards-due to the location of the ulcer, NATHANIEL should be feasible without increased risk of bleeding    I discussed the patients findings and my recommendations with patient.    Ann Alvarado MD

## 2021-06-15 NOTE — PROGRESS NOTES
[facility] Progress Note      Patient Name: Jayne Beltran  : 1955  MRN: [id number]  Primary Care Physician:  Michael Arriaza Jr., DO  Date of admission: 2021    Subjective     Interval History:   The patient is awake and alert she was extubated and she is feeling much better denies chest pain or shortness of air, no orthopnea or PND, no nausea or vomiting.  No dysuria or gross hematuria    Review of Systems:   Not obtainable    Objective     Vitals:   Temp:  [98.1 °F (36.7 °C)-99.1 °F (37.3 °C)] 99.1 °F (37.3 °C)  Heart Rate:  [] 112  Resp:  [12] 12  BP: (147-191)/() 158/95  Flow (L/min):  [2] 2  FiO2 (%):  [21 %] 21 %    Intake/Output Summary (Last 24 hours) at 6/15/2021 1011  Last data filed at 6/15/2021 0941  Gross per 24 hour   Intake 1673.93 ml   Output 3450 ml   Net -1776.07 ml       Physical Exam:    General Appearance: Awake and alert, appears to be chronically ill, no acute distress  Skin: warm and dry  HEENT: Oral mucosa is moist, nonicteric sclera  Neck: supple, no JVD  Lungs: Bilateral rhonchi, unlabored breathing effort  Heart: RRR, normal S1 and S2, no S3, no  Abdomen: soft, nontender, normoactive bowel, nondistended  : no palpable bladder  Extremities: no edema, cyanosis or clubbing  Neuro: Normal speech and mental status, moving all extremities    Scheduled Meds:     cefTRIAXone, 2 g, Intravenous, Q24H  insulin glargine, 10 Units, Subcutaneous, QAM  insulin regular, 0-14 Units, Subcutaneous, Q6H  sodium chloride, 10 mL, Intravenous, Q12H  sodium chloride, 10 mL, Intravenous, Q12H  sodium chloride, 10 mL, Intravenous, Q12H  sodium chloride, 10 mL, Intravenous, Q12H  sodium chloride, 3 mL, Intravenous, Q12H      IV Meds:   dexmedetomidine, 0.2-1.5 mcg/kg/hr, Last Rate: Stopped (21 1350)  dextrose, 75 mL/hr, Last Rate: 75 mL/hr (06/15/21 1073)  hold, 1 each  pantoprazole, 8 mg/hr, Last Rate: 8 mg/hr (06/15/21 8786)  propofol, 5-50  mcg/kg/min, Last Rate: Stopped (06/14/21 1220)        Results Reviewed:   I have personally reviewed the results from the time of this admission to 6/15/2021 10:11 EDT     Results from last 7 days   Lab Units 06/15/21  0450 06/14/21  0406 06/13/21  0509 06/11/21  0403   SODIUM mmol/L 142 141 147* 141   POTASSIUM mmol/L 3.3* 3.9 4.1 4.2   CHLORIDE mmol/L 109* 112* 120* 106   CO2 mmol/L 22.1 20.2* 18.1* 23.4   BUN mg/dL 12 23 40* 48*   CREATININE mg/dL 1.08* 1.25* 1.53* 1.47*   CALCIUM mg/dL 7.9* 7.4* 7.3* 7.9*   BILIRUBIN mg/dL 0.4  --  <0.2 0.3   ALK PHOS U/L 58  --  44 78   ALT (SGPT) U/L 38*  --  31 17   AST (SGOT) U/L 42*  --  76* 11   GLUCOSE mg/dL 122* 108* 110* 500*       Estimated Creatinine Clearance: 51.1 mL/min (A) (by C-G formula based on SCr of 1.08 mg/dL (H)).    Results from last 7 days   Lab Units 06/15/21  0450 06/14/21  0406 06/13/21  0509 06/12/21  1252 06/12/21  0008   MAGNESIUM mg/dL 2.0  --   --  2.2 2.2   PHOSPHORUS mg/dL 2.8 3.4 3.7  --  5.1*       Results from last 7 days   Lab Units 06/15/21  0450 06/13/21  0509   URIC ACID mg/dL 5.3 6.7*       Results from last 7 days   Lab Units 06/15/21  0450 06/14/21  0406 06/13/21  0509 06/12/21  1828 06/12/21  1252   WBC 10*3/mm3 10.33 11.64* 15.07* 19.14* 21.04*   HEMOGLOBIN g/dL 8.2* 7.6* 7.5* 6.9* 7.9*   PLATELETS 10*3/mm3 232 185 160 170 170       Results from last 7 days   Lab Units 06/12/21  1301 06/11/21  0223   INR  1.23* 1.13*       Assessment / Plan     ASSESSMENT:  1.  Acute kidney injury on chronic kidney disease stage III, improving, creatinine down to 1.08 associated with hypovolemia and hypotension and sepsis while the patient was on ACE inhibitor which impaired the ability to autoregulate.  2.  Hyponatremia, resolved, sodium today 142  3.  GI bleed with melena had upper endoscopy on 6/11/2021, hemoglobin today 8.2, slowly improving   4.  Respiratory failure and underlying COPD  5.  Encephalopathy, resolved  6.  Diabetes mellitus type 2  had severe hyperglycemia and hyperosmolar state which has improved  7.  Hypokalemia, potassium three-point    PLAN:  1.  Continue the same treatment potassium is replete with the the electrolyte replacement protocol  2.  Surveillance labs    Thank you for involving us in the care of Jayne Beltran.  Please feel free to call with any questions.    Dru Sousa MD  06/15/21  10:11 EDT    [facility]  [phone]      Much of this encounter note is an electronic transcription/translation of spoken language to printed text. The electronic translation of spoken language may permit erroneous, or at times, nonsensical words or phrases to be inadvertently transcribed; Although I have reviewed the note for such errors, some may still exist.

## 2021-06-15 NOTE — PROGRESS NOTES
LOS: 4 days     Chief Complaint:  Follow-up fever    Interval History:  Extubated, in chair, on room air, no symptoms to report. Says she feels great all things considered. Tolerating ceftriaxone. NATHANIEL planned for tomorrow.     ROS: no n/v/d    Vital Signs  Temp:  [98.1 °F (36.7 °C)-99.1 °F (37.3 °C)] 99.1 °F (37.3 °C)  Heart Rate:  [] 112  Resp:  [12] 12  BP: (147-191)/() 158/95  FiO2 (%):  [21 %] 21 %    Physical Exam:  General: awake, alert, in chair  Eyes:  no scleral icterus  ENT: MMM  Neck:   Cardiovascular: tachycardic  Respiratory: Lungs clear on RA  GI: Abdomen is soft, non-distended  Musculoskeletal: normal musculature  Skin: No vesicles; some bruising  Psychiatric: calm and pleasant  Vasc: LUE PICC w/o erythema    Antibiotics:  •  cefTRIAXone (ROCEPHIN) IVPB 2 g, 2 g, Intravenous, Q24H    LABS:  CBC,  CMP, micro reviewed today  Lab Results   Component Value Date    WBC 10.33 06/15/2021    HGB 8.2 (L) 06/15/2021    HCT 25.1 (L) 06/15/2021    MCV 90.0 06/15/2021     06/15/2021     Lab Results   Component Value Date    GLUCOSE 122 (H) 06/15/2021    BUN 12 06/15/2021    CREATININE 1.08 (H) 06/15/2021    EGFRIFNONA 51 (L) 06/15/2021    EGFRIFAFRI 53 (L) 01/20/2021    BCR 11.1 06/15/2021    CO2 22.1 06/15/2021    CALCIUM 7.9 (L) 06/15/2021    PROTENTOTREF 6.6 01/20/2021    ALBUMIN 2.90 (L) 06/15/2021    LABIL2 1.5 01/20/2021    AST 42 (H) 06/15/2021    ALT 38 (H) 06/15/2021     Lab Results   Component Value Date    HGBA1C 8.40 (H) 06/11/2021     LP results (tube 4):  5 WBCs  4 RBCs  Glc 98  Pro 57  PCR negative    Microbiology:  6/11 COVID: negative  6/11 BCx: NGTD  6/12 CSF Cx: negative  6/12 CSF PCR panel: negative    Radiology (prior):  MRI brain multiple small acute infarcts in both cerebral hemispheres suspicious for a cardiac embolic source    Doppler with RUE SVT    Assessment/Plan   1. Encephalopathy - resolved  2. Fever in adult - resolved  3. GI bleeding due to gastric ulcer -  treated  4. Hyperglycemia and uncontrolled DM2  5. COPD due to tobacco use  6. Acute kidney injury - better  7. Acute bilateral cerebral infarcts  8. RUE superficial thrombus     Fever resolved. WBC normal.  MRI brain with concern for cardiac source of emboli given bilateral small infarcts. Blood cultures are negative. Patient confirms she was not on any antibiotics prior to admission. Cardiology planning NATHANIEL tomorrow.For now, continue ceftriaxone 2 g IV q24h.     If febrile again (T >100.4 F), will repeat blood cultures.     ID will follow. D/W Dona Harmon and Maurice.

## 2021-06-15 NOTE — PROGRESS NOTES
"      [facility]         Dr Gonzalez Sayied   LOS: 4 days   Patient Care Team:  Michael Arriaza Jr., DO as PCP - General (Family Medicine)  Gilles Villa DPM as Consulting Physician (Podiatry)  Jen Hays MD as Consulting Physician (Obstetrics and Gynecology)  Michael Arriaza Jr., DO as Referring Physician (Family Medicine)  Glynn Melara MD as Consulting Physician (Hematology and Oncology)    Chief Complaint: Acute blood loss anemia with altered mental status lactic acidosis hyperosmolar nonketotic syndrome encephalopathy and possibility of bacterial endocarditis    Interval History: Awake alert sitting up in a chair on room air. Much improved.    REVIEW OF SYSTEMS:   No chest pain or shortness of breath no headache.    Vital Signs  Temp:  [98.1 °F (36.7 °C)-98.7 °F (37.1 °C)] 98.7 °F (37.1 °C)  Heart Rate:  [] 108  Resp:  [12] 12  BP: (133-191)/() 161/88  FiO2 (%):  [21 %] 21 %    Intake/Output Summary (Last 24 hours) at 6/15/2021 0938  Last data filed at 6/15/2021 0800  Gross per 24 hour   Intake 1288.93 ml   Output 2950 ml   Net -1661.07 ml     Flowsheet Rows      First Filed Value   Admission Height  165.1 cm (65\") Documented at 06/11/2021 0220   Admission Weight  74.4 kg (164 lb) Documented at 06/11/2021 0220          Physical Exam:  Patient is examined using the personal protective equipment as per guidelines from infection control for this particular patient as enacted.  Hand hygiene was performed before and after patient interaction.   General Appearance:   Awake resting comfortably sitting up in a chair. Following commands.  Neck midline trachea, no thyromegaly   Lungs:    Diminished breath sounds on the basis    Heart:    Regular rhythm and normal rate, normal S1 and S2, no            murmur, no gallop, no rub, no click   Chest Wall:    No abnormalities observed   Abdomen:     Normal bowel sounds, no masses, no organomegaly, soft        nontender, " nondistended, no guarding, no rebound                tenderness   Extremities:   Moves all extremities well, no edema, no cyanosis, no             redness  CNS no focal deficit  Skin no rashes no nodules  Musculoskeletal no cyanosis no clubbing moving all extremities     Results Review:        Results from last 7 days   Lab Units 06/15/21  0450 06/14/21  0406 06/13/21  0509   SODIUM mmol/L 142 141 147*   POTASSIUM mmol/L 3.3* 3.9 4.1   CHLORIDE mmol/L 109* 112* 120*   CO2 mmol/L 22.1 20.2* 18.1*   BUN mg/dL 12 23 40*   CREATININE mg/dL 1.08* 1.25* 1.53*   GLUCOSE mg/dL 122* 108* 110*   CALCIUM mg/dL 7.9* 7.4* 7.3*     Results from last 7 days   Lab Units 06/11/21  0403   TROPONIN T ng/mL 0.094*     Results from last 7 days   Lab Units 06/15/21  0450 06/14/21  0406 06/13/21  0509   WBC 10*3/mm3 10.33 11.64* 15.07*   HEMOGLOBIN g/dL 8.2* 7.6* 7.5*   HEMATOCRIT % 25.1* 22.8* 22.3*   PLATELETS 10*3/mm3 232 185 160     Results from last 7 days   Lab Units 06/12/21  1301 06/11/21  0223   INR  1.23* 1.13*   APTT seconds  --  25.2         Results from last 7 days   Lab Units 06/15/21  0450   MAGNESIUM mg/dL 2.0         Results from last 7 days   Lab Units 06/13/21  0923   PH, ARTERIAL pH units 7.402   PO2 ART mm Hg 72.7*   PCO2, ARTERIAL mm Hg 28.9*   HCO3 ART mmol/L 18.0*       I reviewed the patient's new clinical results.  I personally viewed and interpreted the patient's chest x-ray.        Medication Review:   cefTRIAXone, 2 g, Intravenous, Q24H  insulin glargine, 10 Units, Subcutaneous, QAM  insulin regular, 0-14 Units, Subcutaneous, Q6H  sodium chloride, 10 mL, Intravenous, Q12H  sodium chloride, 10 mL, Intravenous, Q12H  sodium chloride, 10 mL, Intravenous, Q12H  sodium chloride, 10 mL, Intravenous, Q12H  sodium chloride, 3 mL, Intravenous, Q12H        dexmedetomidine, 0.2-1.5 mcg/kg/hr, Last Rate: Stopped (06/12/21 1350)  dextrose, 75 mL/hr, Last Rate: 75 mL/hr (06/15/21 0756)  hold, 1 each  pantoprazole, 8 mg/hr,  Last Rate: 8 mg/hr (06/15/21 0740)  propofol, 5-50 mcg/kg/min, Last Rate: Stopped (06/14/21 1220)        ASSESSMENT:   Acute respiratory failure intubated 6/12/2021 extubated 6 1421  Altered mental status  Sepsis  Rule out endocarditis  Acute blood loss anemia with GI bleed and melanotic stools/coffee-ground emesis: No significant abnormality found on scope  Lactic acidosis  Hyperosmolar nonketotic syndrome  Leukocytosis  Tachycardia  Hypertension  Encephalopathy  COPD without exacerbation  History of tobacco use    PLAN:  Patient extubated yesterday and tolerating extubation well. Currently on room air.  Mental status with dramatic improvement. Etiology TME multiple bilateral embolic stroke cannot have NATHANIEL due to GI bleed. Aspirin once okay with GI. LP essentially negative for any infectious etiology.  Antibiotics per infectious diseases  Lactic acidosis improved.  Acute GI bleed status post repeat EGD.  Gastric ulcer with visible vessel noted.   Hemoglobin stable. Will transition to Protonix twice a day.  Hyperosmolar nonketotic syndrome with blood glucose in the normal range.  We will continue with Lantus and sliding scale insulin.  Appreciate input from nephrology.  IV fluids per nephrology.  Improving sodium and stable creatinine  Multiple medical problems and issues.  Transfer patient out of the ICU      Carlos Harmon MD  06/15/21  09:38 EDT

## 2021-06-15 NOTE — PLAN OF CARE
Goal Outcome Evaluation:               VSS. Tele overflow. Awaiting bed. Up to chair most of the day. Plan for NATHANIEL tomorrow.

## 2021-06-16 ENCOUNTER — APPOINTMENT (OUTPATIENT)
Dept: CARDIOLOGY | Facility: HOSPITAL | Age: 66
End: 2021-06-16

## 2021-06-16 PROBLEM — I65.21 INTERNAL CAROTID ARTERY STENOSIS, RIGHT: Status: ACTIVE | Noted: 2021-06-16

## 2021-06-16 LAB
ALBUMIN SERPL-MCNC: 3.1 G/DL (ref 3.5–5.2)
ANION GAP SERPL CALCULATED.3IONS-SCNC: 9.4 MMOL/L (ref 5–15)
BACTERIA SPEC AEROBE CULT: NORMAL
BACTERIA SPEC AEROBE CULT: NORMAL
BASOPHILS # BLD AUTO: 0.05 10*3/MM3 (ref 0–0.2)
BASOPHILS NFR BLD AUTO: 0.5 % (ref 0–1.5)
BUN SERPL-MCNC: 10 MG/DL (ref 8–23)
BUN/CREAT SERPL: 10.4 (ref 7–25)
CALCIUM SPEC-SCNC: 7.9 MG/DL (ref 8.6–10.5)
CHLORIDE SERPL-SCNC: 109 MMOL/L (ref 98–107)
CO2 SERPL-SCNC: 21.6 MMOL/L (ref 22–29)
CREAT SERPL-MCNC: 0.96 MG/DL (ref 0.57–1)
DEPRECATED RDW RBC AUTO: 48.7 FL (ref 37–54)
EOSINOPHIL # BLD AUTO: 0.23 10*3/MM3 (ref 0–0.4)
EOSINOPHIL NFR BLD AUTO: 2.5 % (ref 0.3–6.2)
ERYTHROCYTE [DISTWIDTH] IN BLOOD BY AUTOMATED COUNT: 15.4 % (ref 12.3–15.4)
GFR SERPL CREATININE-BSD FRML MDRD: 58 ML/MIN/1.73
GLUCOSE BLDC GLUCOMTR-MCNC: 104 MG/DL (ref 70–130)
GLUCOSE BLDC GLUCOMTR-MCNC: 109 MG/DL (ref 70–130)
GLUCOSE BLDC GLUCOMTR-MCNC: 157 MG/DL (ref 70–130)
GLUCOSE BLDC GLUCOMTR-MCNC: 162 MG/DL (ref 70–130)
GLUCOSE BLDC GLUCOMTR-MCNC: 94 MG/DL (ref 70–130)
GLUCOSE SERPL-MCNC: 88 MG/DL (ref 65–99)
HCT VFR BLD AUTO: 23.9 % (ref 34–46.6)
HGB BLD-MCNC: 7.9 G/DL (ref 12–15.9)
HSV1 DNA SPEC QL NAA+PROBE: NEGATIVE
HSV2 DNA SPEC QL NAA+PROBE: NEGATIVE
IMM GRANULOCYTES # BLD AUTO: 0.17 10*3/MM3 (ref 0–0.05)
IMM GRANULOCYTES NFR BLD AUTO: 1.8 % (ref 0–0.5)
LYMPHOCYTES # BLD AUTO: 1.8 10*3/MM3 (ref 0.7–3.1)
LYMPHOCYTES NFR BLD AUTO: 19.3 % (ref 19.6–45.3)
MAGNESIUM SERPL-MCNC: 1.9 MG/DL (ref 1.6–2.4)
MAXIMAL PREDICTED HEART RATE: 154 BPM
MCH RBC QN AUTO: 29.7 PG (ref 26.6–33)
MCHC RBC AUTO-ENTMCNC: 33.1 G/DL (ref 31.5–35.7)
MCV RBC AUTO: 89.8 FL (ref 79–97)
MONOCYTES # BLD AUTO: 0.82 10*3/MM3 (ref 0.1–0.9)
MONOCYTES NFR BLD AUTO: 8.8 % (ref 5–12)
NEUTROPHILS NFR BLD AUTO: 6.25 10*3/MM3 (ref 1.7–7)
NEUTROPHILS NFR BLD AUTO: 67.1 % (ref 42.7–76)
NRBC BLD AUTO-RTO: 0 /100 WBC (ref 0–0.2)
OLIGOCLONAL BANDS.IT SER+CSF QL: NORMAL
PHOSPHATE SERPL-MCNC: 3.4 MG/DL (ref 2.5–4.5)
PLATELET # BLD AUTO: 235 10*3/MM3 (ref 140–450)
PMV BLD AUTO: 10.9 FL (ref 6–12)
POTASSIUM SERPL-SCNC: 3.7 MMOL/L (ref 3.5–5.2)
RBC # BLD AUTO: 2.66 10*6/MM3 (ref 3.77–5.28)
REAGIN AB CSF QL: NON REACTIVE
SARS-COV-2 N GENE NPH QL NAA+PROBE: NOT DETECTED
SODIUM SERPL-SCNC: 140 MMOL/L (ref 136–145)
STRESS TARGET HR: 131 BPM
URATE SERPL-MCNC: 5.3 MG/DL (ref 2.4–5.7)
WBC # BLD AUTO: 9.32 10*3/MM3 (ref 3.4–10.8)

## 2021-06-16 PROCEDURE — 93325 DOPPLER ECHO COLOR FLOW MAPG: CPT | Performed by: INTERNAL MEDICINE

## 2021-06-16 PROCEDURE — 99152 MOD SED SAME PHYS/QHP 5/>YRS: CPT

## 2021-06-16 PROCEDURE — 83735 ASSAY OF MAGNESIUM: CPT | Performed by: INTERNAL MEDICINE

## 2021-06-16 PROCEDURE — 99232 SBSQ HOSP IP/OBS MODERATE 35: CPT | Performed by: INTERNAL MEDICINE

## 2021-06-16 PROCEDURE — 25010000002 FENTANYL CITRATE (PF) 50 MCG/ML SOLUTION: Performed by: INTERNAL MEDICINE

## 2021-06-16 PROCEDURE — 93325 DOPPLER ECHO COLOR FLOW MAPG: CPT

## 2021-06-16 PROCEDURE — 82962 GLUCOSE BLOOD TEST: CPT

## 2021-06-16 PROCEDURE — 63710000001 INSULIN GLARGINE PER 5 UNITS: Performed by: INTERNAL MEDICINE

## 2021-06-16 PROCEDURE — 25010000002 MIDAZOLAM PER 1 MG: Performed by: INTERNAL MEDICINE

## 2021-06-16 PROCEDURE — 93320 DOPPLER ECHO COMPLETE: CPT

## 2021-06-16 PROCEDURE — 99232 SBSQ HOSP IP/OBS MODERATE 35: CPT | Performed by: PSYCHIATRY & NEUROLOGY

## 2021-06-16 PROCEDURE — 93312 ECHO TRANSESOPHAGEAL: CPT | Performed by: INTERNAL MEDICINE

## 2021-06-16 PROCEDURE — 84550 ASSAY OF BLOOD/URIC ACID: CPT | Performed by: INTERNAL MEDICINE

## 2021-06-16 PROCEDURE — 93320 DOPPLER ECHO COMPLETE: CPT | Performed by: INTERNAL MEDICINE

## 2021-06-16 PROCEDURE — U0003 INFECTIOUS AGENT DETECTION BY NUCLEIC ACID (DNA OR RNA); SEVERE ACUTE RESPIRATORY SYNDROME CORONAVIRUS 2 (SARS-COV-2) (CORONAVIRUS DISEASE [COVID-19]), AMPLIFIED PROBE TECHNIQUE, MAKING USE OF HIGH THROUGHPUT TECHNOLOGIES AS DESCRIBED BY CMS-2020-01-R: HCPCS | Performed by: INTERNAL MEDICINE

## 2021-06-16 PROCEDURE — 25010000003 CEFTRIAXONE PER 250 MG: Performed by: INTERNAL MEDICINE

## 2021-06-16 PROCEDURE — 87338 HPYLORI STOOL AG IA: CPT | Performed by: INTERNAL MEDICINE

## 2021-06-16 PROCEDURE — 80069 RENAL FUNCTION PANEL: CPT | Performed by: INTERNAL MEDICINE

## 2021-06-16 PROCEDURE — 63710000001 INSULIN REGULAR HUMAN PER 5 UNITS: Performed by: INTERNAL MEDICINE

## 2021-06-16 PROCEDURE — 85025 COMPLETE CBC W/AUTO DIFF WBC: CPT | Performed by: INTERNAL MEDICINE

## 2021-06-16 PROCEDURE — 93312 ECHO TRANSESOPHAGEAL: CPT

## 2021-06-16 RX ORDER — LIDOCAINE HYDROCHLORIDE 20 MG/ML
SOLUTION OROPHARYNGEAL
Status: COMPLETED | OUTPATIENT
Start: 2021-06-16 | End: 2021-06-16

## 2021-06-16 RX ORDER — FENTANYL CITRATE 50 UG/ML
INJECTION, SOLUTION INTRAMUSCULAR; INTRAVENOUS
Status: COMPLETED | OUTPATIENT
Start: 2021-06-16 | End: 2021-06-16

## 2021-06-16 RX ORDER — SODIUM CHLORIDE 9 MG/ML
INJECTION, SOLUTION INTRAVENOUS
Status: COMPLETED | OUTPATIENT
Start: 2021-06-16 | End: 2021-06-16

## 2021-06-16 RX ORDER — MIDAZOLAM HYDROCHLORIDE 1 MG/ML
INJECTION INTRAMUSCULAR; INTRAVENOUS
Status: COMPLETED | OUTPATIENT
Start: 2021-06-16 | End: 2021-06-16

## 2021-06-16 RX ORDER — SUCRALFATE 1 G/1
1 TABLET ORAL
Status: DISCONTINUED | OUTPATIENT
Start: 2021-06-16 | End: 2021-06-18 | Stop reason: HOSPADM

## 2021-06-16 RX ADMIN — SODIUM CHLORIDE 50 ML/HR: 9 INJECTION, SOLUTION INTRAVENOUS at 10:45

## 2021-06-16 RX ADMIN — SODIUM CHLORIDE, PRESERVATIVE FREE 10 ML: 5 INJECTION INTRAVENOUS at 20:49

## 2021-06-16 RX ADMIN — FENTANYL CITRATE 25 MCG: 50 INJECTION INTRAMUSCULAR; INTRAVENOUS at 10:59

## 2021-06-16 RX ADMIN — SODIUM CHLORIDE, PRESERVATIVE FREE 3 ML: 5 INJECTION INTRAVENOUS at 20:49

## 2021-06-16 RX ADMIN — Medication 10 MG: at 20:41

## 2021-06-16 RX ADMIN — MIDAZOLAM 2 MG: 1 INJECTION INTRAMUSCULAR; INTRAVENOUS at 10:59

## 2021-06-16 RX ADMIN — ROPINIROLE 3 MG: 2 TABLET, FILM COATED ORAL at 20:41

## 2021-06-16 RX ADMIN — SODIUM CHLORIDE, PRESERVATIVE FREE 10 ML: 5 INJECTION INTRAVENOUS at 08:25

## 2021-06-16 RX ADMIN — INSULIN HUMAN 3 UNITS: 100 INJECTION, SOLUTION PARENTERAL at 17:20

## 2021-06-16 RX ADMIN — PANTOPRAZOLE SODIUM 40 MG: 40 TABLET, DELAYED RELEASE ORAL at 17:20

## 2021-06-16 RX ADMIN — SUCRALFATE 1 G: 1 TABLET ORAL at 12:17

## 2021-06-16 RX ADMIN — FENTANYL CITRATE 25 MCG: 50 INJECTION INTRAMUSCULAR; INTRAVENOUS at 11:02

## 2021-06-16 RX ADMIN — CEFTRIAXONE SODIUM 2 G: 2 INJECTION, SOLUTION INTRAVENOUS at 17:20

## 2021-06-16 RX ADMIN — SODIUM CHLORIDE, PRESERVATIVE FREE 3 ML: 5 INJECTION INTRAVENOUS at 08:25

## 2021-06-16 RX ADMIN — SUCRALFATE 1 G: 1 TABLET ORAL at 20:41

## 2021-06-16 RX ADMIN — INSULIN GLARGINE 10 UNITS: 100 INJECTION, SOLUTION SUBCUTANEOUS at 06:40

## 2021-06-16 RX ADMIN — SUCRALFATE 1 G: 1 TABLET ORAL at 17:20

## 2021-06-16 RX ADMIN — PANTOPRAZOLE SODIUM 40 MG: 40 TABLET, DELAYED RELEASE ORAL at 06:40

## 2021-06-16 RX ADMIN — LIDOCAINE HYDROCHLORIDE 10 ML: 20 SOLUTION ORAL; TOPICAL at 10:45

## 2021-06-16 RX ADMIN — MIDAZOLAM 2 MG: 1 INJECTION INTRAMUSCULAR; INTRAVENOUS at 11:02

## 2021-06-16 RX ADMIN — ACETAMINOPHEN 650 MG: 325 TABLET, FILM COATED ORAL at 20:43

## 2021-06-16 NOTE — PROGRESS NOTES
"      [facility]         Dr Gonzalez Sayied   LOS: 5 days   Patient Care Team:  Michael Arriaza Jr.,  as PCP - General (Family Medicine)  Gilles Villa DPM as Consulting Physician (Podiatry)  Jen Hays MD as Consulting Physician (Obstetrics and Gynecology)  Michael Arriaza Jr., DO as Referring Physician (Family Medicine)  Glynn Melara MD as Consulting Physician (Hematology and Oncology)    Chief Complaint: Acute blood loss anemia with altered mental status lactic acidosis hyperosmolar nonketotic syndrome encephalopathy and possibility of bacterial endocarditis    Interval History: Awake alert sitting up in a chair on room air. Much improved.  No overnight issues.  Plans for NATHANIEL today.    REVIEW OF SYSTEMS:   No chest pain or shortness of breath no headache.    Vital Signs  Temp:  [98.1 °F (36.7 °C)-99.2 °F (37.3 °C)] 98.2 °F (36.8 °C)  Heart Rate:  [] 81  Resp:  [19] 19  BP: (137-202)/() 164/81    Intake/Output Summary (Last 24 hours) at 6/16/2021 0937  Last data filed at 6/16/2021 0825  Gross per 24 hour   Intake 1496 ml   Output 2200 ml   Net -704 ml     Flowsheet Rows      First Filed Value   Admission Height  165.1 cm (65\") Documented at 06/11/2021 0220   Admission Weight  74.4 kg (164 lb) Documented at 06/11/2021 0220          Physical Exam:  Patient is examined using the personal protective equipment as per guidelines from infection control for this particular patient as enacted.  Hand hygiene was performed before and after patient interaction.   General Appearance:   Awake resting comfortably sitting up in a chair. Following commands.  Neck midline trachea, no thyromegaly   Lungs:    Diminished breath sounds on the basis    Heart:    Regular rhythm and normal rate, normal S1 and S2, no            murmur, no gallop, no rub, no click   Chest Wall:    No abnormalities observed   Abdomen:     Normal bowel sounds, no masses, no organomegaly, soft        " nontender, nondistended, no guarding, no rebound                tenderness   Extremities:   Moves all extremities well, no edema, no cyanosis, no             redness  CNS no focal deficit  Skin no rashes no nodules  Musculoskeletal no cyanosis no clubbing moving all extremities     Results Review:        Results from last 7 days   Lab Units 06/16/21  0431 06/15/21  0450 06/14/21  0406   SODIUM mmol/L 140 142 141   POTASSIUM mmol/L 3.7 3.3* 3.9   CHLORIDE mmol/L 109* 109* 112*   CO2 mmol/L 21.6* 22.1 20.2*   BUN mg/dL 10 12 23   CREATININE mg/dL 0.96 1.08* 1.25*   GLUCOSE mg/dL 88 122* 108*   CALCIUM mg/dL 7.9* 7.9* 7.4*     Results from last 7 days   Lab Units 06/11/21  0403   TROPONIN T ng/mL 0.094*     Results from last 7 days   Lab Units 06/16/21  0431 06/15/21  0450 06/14/21  0406   WBC 10*3/mm3 9.32 10.33 11.64*   HEMOGLOBIN g/dL 7.9* 8.2* 7.6*   HEMATOCRIT % 23.9* 25.1* 22.8*   PLATELETS 10*3/mm3 235 232 185     Results from last 7 days   Lab Units 06/12/21  1301 06/11/21  0223   INR  1.23* 1.13*   APTT seconds  --  25.2         Results from last 7 days   Lab Units 06/16/21  0431   MAGNESIUM mg/dL 1.9         Results from last 7 days   Lab Units 06/13/21  0923   PH, ARTERIAL pH units 7.402   PO2 ART mm Hg 72.7*   PCO2, ARTERIAL mm Hg 28.9*   HCO3 ART mmol/L 18.0*       I reviewed the patient's new clinical results.  I personally viewed and interpreted the patient's chest x-ray.        Medication Review:   cefTRIAXone, 2 g, Intravenous, Q24H  citalopram, 10 mg, Oral, Daily  insulin glargine, 10 Units, Subcutaneous, QAM  insulin regular, 0-14 Units, Subcutaneous, Q6H  melatonin, 10 mg, Oral, Nightly  metoprolol succinate XL, 200 mg, Oral, Daily  pantoprazole, 40 mg, Oral, BID AC  rOPINIRole, 3 mg, Oral, Nightly  sodium chloride, 10 mL, Intravenous, Q12H  sodium chloride, 10 mL, Intravenous, Q12H  sodium chloride, 10 mL, Intravenous, Q12H  sodium chloride, 10 mL, Intravenous, Q12H  sodium chloride, 3 mL,  Intravenous, Q12H  sucralfate, 1 g, Oral, 4x Daily AC & at Bedtime        dexmedetomidine, 0.2-1.5 mcg/kg/hr, Last Rate: Stopped (06/12/21 1350)  hold, 1 each  propofol, 5-50 mcg/kg/min, Last Rate: Stopped (06/14/21 1220)        ASSESSMENT:   Acute respiratory failure intubated 6/12/2021 extubated 6 1421  Altered mental status resolved  Sepsis resolved  Rule out endocarditis  Acute blood loss anemia with GI bleed and melanotic stools/coffee-ground emesis: Repeat EGD with gastric ulcer cauterized 612  Lactic acidosis resolved  Hyperosmolar nonketotic syndrome resolved  Leukocytosis improved  Tachycardia improved  Hypertension  Encephalopathy  COPD without exacerbation  History of tobacco use    PLAN:  Respite status stable post extubation.  Mental status with dramatic improvement.  Plans for NATHANIEL rule out endocarditis.  Aspirin once okay with GI. LP essentially negative for any infectious etiology.  Antibiotics per infectious diseases  Lactic acidosis improved.  Acute GI bleed status post repeat EGD.  Gastric ulcer with visible vessel noted.   Hemoglobin stable.  Protonix per GI  Hyperosmolar nonketotic syndrome with blood glucose in the normal range.  We will continue with Lantus and sliding scale insulin.  Appreciate input from nephrology.  IV fluids per nephrology.  Improving sodium and stable creatinine  Multiple medical problems and issues.  Awaiting bed to transfer out of the ICU      Carlos Harmon MD  06/16/21  09:37 EDT

## 2021-06-16 NOTE — PROGRESS NOTES
LOS: 5 days     Chief Complaint:  Follow-up fever    Interval History:  No fever. Tolerating ceftriaxone w/o rash. Awaiting NATHANIEL. No new symptoms to report today.     ROS: no n/v/d    Vital Signs  Temp:  [98.1 °F (36.7 °C)-99.2 °F (37.3 °C)] 98.2 °F (36.8 °C)  Heart Rate:  [] 81  Resp:  [19] 19  BP: (137-202)/() 164/81    Physical Exam:  General: awake, alert, in chair; talking on the phone w/ her family  Eyes:  no scleral icterus  ENT: MMM  Cardiovascular: NR  Respiratory: no wheezing; on room air  GI: Abdomen is soft, non-distended  Musculoskeletal: normal musculature  Skin: No rashes  Psychiatric: calm and pleasant  Vasc: LUE PICC w/o erythema    Antibiotics:  •  cefTRIAXone (ROCEPHIN) IVPB 2 g, 2 g, Intravenous, Q24H    LABS:  CBC, BMP, micro reviewed today  Lab Results   Component Value Date    WBC 9.32 06/16/2021    HGB 7.9 (L) 06/16/2021    HCT 23.9 (L) 06/16/2021    MCV 89.8 06/16/2021     06/16/2021     Lab Results   Component Value Date    GLUCOSE 88 06/16/2021    BUN 10 06/16/2021    CREATININE 0.96 06/16/2021    EGFRIFNONA 58 (L) 06/16/2021    EGFRIFAFRI 53 (L) 01/20/2021    BCR 10.4 06/16/2021    CO2 21.6 (L) 06/16/2021    CALCIUM 7.9 (L) 06/16/2021    PROTENTOTREF 6.6 01/20/2021    ALBUMIN 3.10 (L) 06/16/2021    LABIL2 1.5 01/20/2021    AST 42 (H) 06/15/2021    ALT 38 (H) 06/15/2021     Lab Results   Component Value Date    HGBA1C 8.40 (H) 06/11/2021     LP results (tube 4):  5 WBCs  4 RBCs  Glc 98  Pro 57  PCR negative    Microbiology:  6/11 COVID: negative  6/11 BCx: negative  6/12 CSF Cx: negative  6/12 CSF PCR panel: negative    Radiology (prior):  MRI brain multiple small acute infarcts in both cerebral hemispheres suspicious for a cardiac embolic source    TTE with possible AV vegetation    Assessment/Plan   1. Encephalopathy - resolved  2. Fever in adult - resolved  3. GI bleeding due to gastric ulcer - treated  4. Hyperglycemia and uncontrolled DM2  5. COPD due to tobacco  use  6. Acute kidney injury - resolved  7. Acute bilateral cerebral infarcts  8. RUE superficial thrombus  9. Abnormal TTE      She is afebrile with a normal WBC. Her blood cultures are negative. However, she had a TTE with possible AV vegetation, and MRI brain with cerebral infarcts in multiple vascular distributions so there is concern for bacterial endocarditis versus multi-embolic stroke due to clot. NATHANIEL today to try to help answer this question. For now, continue ceftriaxone 2 g IV q24h. I would not add back vancomycin since no MRSA or Enterococcus identified. She has been afebrile with normal WBC on ceftriaxone alone.    ID will follow. D/W Dr Harmon.

## 2021-06-16 NOTE — PROGRESS NOTES
[facility] Progress Note      Patient Name: Jayne Beltran  : 1955  MRN: [id number]  Primary Care Physician:  Michael Arriaza Jr., DO  Date of admission: 2021    Subjective     Interval History:   The patient is feeling much better, denies any chest pain or shortness of air, no orthopnea or PND, no nausea or vomiting, no dysuria or gross hematuria    Review of Systems:   Not obtainable    Objective     Vitals:   Temp:  [98.1 °F (36.7 °C)-99.2 °F (37.3 °C)] 98.2 °F (36.8 °C)  Heart Rate:  [] 81  Resp:  [19] 19  BP: (137-202)/() 164/81    Intake/Output Summary (Last 24 hours) at 2021 0934  Last data filed at 2021 0825  Gross per 24 hour   Intake 1496 ml   Output 2200 ml   Net -704 ml       Physical Exam:    General Appearance: Awake and alert, appears to be chronically ill, no acute distress  Skin: warm and dry  HEENT: Oral mucosa is moist, nonicteric sclera  Neck: supple, no JVD  Lungs: Bilateral rhonchi, unlabored breathing effort  Heart: RRR, normal S1 and S2, no S3, no  Abdomen: soft, nontender, normoactive bowel, nondistended  : Tejeda catheter anchored in place  Extremities: no edema, cyanosis or clubbing  Neuro: Normal speech and mental status, moving all extremities    Scheduled Meds:     cefTRIAXone, 2 g, Intravenous, Q24H  citalopram, 10 mg, Oral, Daily  insulin glargine, 10 Units, Subcutaneous, QAM  insulin regular, 0-14 Units, Subcutaneous, Q6H  melatonin, 10 mg, Oral, Nightly  metoprolol succinate XL, 200 mg, Oral, Daily  pantoprazole, 40 mg, Oral, BID AC  rOPINIRole, 3 mg, Oral, Nightly  sodium chloride, 10 mL, Intravenous, Q12H  sodium chloride, 10 mL, Intravenous, Q12H  sodium chloride, 10 mL, Intravenous, Q12H  sodium chloride, 10 mL, Intravenous, Q12H  sodium chloride, 3 mL, Intravenous, Q12H  sucralfate, 1 g, Oral, 4x Daily AC & at Bedtime      IV Meds:   dexmedetomidine, 0.2-1.5 mcg/kg/hr, Last Rate: Stopped (21  1350)  hold, 1 each  propofol, 5-50 mcg/kg/min, Last Rate: Stopped (06/14/21 1220)        Results Reviewed:   I have personally reviewed the results from the time of this admission to 6/16/2021 09:34 EDT     Results from last 7 days   Lab Units 06/16/21  0431 06/15/21  0450 06/14/21  0406 06/13/21  0509 06/11/21  0403   SODIUM mmol/L 140 142 141 147* 141   POTASSIUM mmol/L 3.7 3.3* 3.9 4.1 4.2   CHLORIDE mmol/L 109* 109* 112* 120* 106   CO2 mmol/L 21.6* 22.1 20.2* 18.1* 23.4   BUN mg/dL 10 12 23 40* 48*   CREATININE mg/dL 0.96 1.08* 1.25* 1.53* 1.47*   CALCIUM mg/dL 7.9* 7.9* 7.4* 7.3* 7.9*   BILIRUBIN mg/dL  --  0.4  --  <0.2 0.3   ALK PHOS U/L  --  58  --  44 78   ALT (SGPT) U/L  --  38*  --  31 17   AST (SGOT) U/L  --  42*  --  76* 11   GLUCOSE mg/dL 88 122* 108* 110* 500*       Estimated Creatinine Clearance: 56.6 mL/min (by C-G formula based on SCr of 0.96 mg/dL).    Results from last 7 days   Lab Units 06/16/21  0431 06/15/21  0450 06/14/21  0406 06/12/21  1252   MAGNESIUM mg/dL 1.9 2.0  --  2.2   PHOSPHORUS mg/dL 3.4 2.8 3.4  --        Results from last 7 days   Lab Units 06/16/21  0431 06/15/21  0450 06/13/21  0509   URIC ACID mg/dL 5.3 5.3 6.7*       Results from last 7 days   Lab Units 06/16/21  0431 06/15/21  0450 06/14/21  0406 06/13/21  0509 06/12/21  1828   WBC 10*3/mm3 9.32 10.33 11.64* 15.07* 19.14*   HEMOGLOBIN g/dL 7.9* 8.2* 7.6* 7.5* 6.9*   PLATELETS 10*3/mm3 235 232 185 160 170       Results from last 7 days   Lab Units 06/12/21  1301 06/11/21  0223   INR  1.23* 1.13*       Assessment / Plan     ASSESSMENT:  1.  Acute kidney injury on chronic kidney disease stage III, improving, creatinine down to 1.08 associated with hypovolemia and hypotension and sepsis while the patient was on ACE inhibitor which impaired the ability to autoregulate.  Creatinine today 0.96  2.  Hyponatremia, resolved, sodium today 140  3.  GI bleed with melena had upper endoscopy on 6/11/2021, hemoglobin today 7.9, slowly  improving   4.  Respiratory failure and underlying COPD  5.  Encephalopathy, resolved  6.  Diabetes mellitus type 2 had severe hyperglycemia and hyperosmolar state which has improved  7.  Hypokalemia, resolved, potassium three-point    PLAN:  Patient's renal function is very stable there is nothing else to add to the current treatment since she had chronic kidney disease I would like to see her in the office in about 4 weeks for follow-up visit.  I will sign off and I will be happy to see her again if needed    Thank you for involving us in the care of Jayne Beltran.  Please feel free to call with any questions.    Dru Sousa MD  06/16/21  09:34 EDT    [facility]  [phone]      Much of this encounter note is an electronic transcription/translation of spoken language to printed text. The electronic translation of spoken language may permit erroneous, or at times, nonsensical words or phrases to be inadvertently transcribed; Although I have reviewed the note for such errors, some may still exist.

## 2021-06-16 NOTE — PROGRESS NOTES
[facility]  Inpatient Progress Note    Reason for Follow Up:  Upper GI bleed due to gastric ulcer    Subjective     Interval History:   No abdominal pain nausea or vomiting.  Tolerating clear liquids.  Bowel movements are regular.  No blood in stool or melena    Current Facility-Administered Medications:   •  acetaminophen (TYLENOL) tablet 650 mg, 650 mg, Oral, Q4H PRN, 650 mg at 06/15/21 1145 **OR** acetaminophen (TYLENOL) suppository 650 mg, 650 mg, Rectal, Q4H PRN, Obdulio Burger MD, 650 mg at 06/11/21 1904  •  calcium gluconate 1g/50ml 0.675% NaCl IV SOLN, 1 g, Intravenous, PRN **AND** calcium gluconate 6 g in sodium chloride 0.9 % 500 mL IVPB, 6 g, Intravenous, PRN **AND** Calcium, , , PRN, Obdulio Burger MD  •  cefTRIAXone (ROCEPHIN) IVPB 2 g, 2 g, Intravenous, Q24H, Cem Duncan MD, Last Rate: 100 mL/hr at 06/15/21 1553, 2 g at 06/15/21 1553  •  citalopram (CeleXA) tablet 10 mg, 10 mg, Oral, Daily, Carlos Harmon MD, 10 mg at 06/15/21 1255  •  dexmedetomidine (PRECEDEX) 400 mcg in 100 mL NS infusion kit, 0.2-1.5 mcg/kg/hr, Intravenous, Titrated, Olaf Espinoza MD, Stopped at 06/12/21 1350  •  dextrose (GLUTOSE) oral gel 15 g, 15 g, Oral, Q15 Min PRN, Carlos Harmon MD  •  fentaNYL citrate (PF) (SUBLIMAZE) injection 25 mcg, 25 mcg, Intravenous, Q1H PRN, Carlos Harmon MD, 25 mcg at 06/14/21 1105  •  glucagon (human recombinant) (GLUCAGEN DIAGNOSTIC) injection 1 mg, 1 mg, Subcutaneous, Q15 Min PRN, Carlos Harmon MD  •  haloperidol lactate (HALDOL) injection 4 mg, 4 mg, Intravenous, Q4H PRN, Olaf Espinoza MD, 4 mg at 06/11/21 1438  •  Hold medication, 1 each, Does not apply, Continuous PRN, Blas Luque MD  •  hydrALAZINE (APRESOLINE) injection 20 mg, 20 mg, Intravenous, Q4H PRN, Olaf Espinoza MD, 20 mg at 06/14/21 0350  •  insulin glargine (LANTUS, SEMGLEE) injection 10 Units, 10 Units, Subcutaneous, QAM, Carlos Harmon MD, 10  Units at 06/16/21 0640  •  insulin regular (humuLIN R,novoLIN R) injection 0-14 Units, 0-14 Units, Subcutaneous, Q6H, Carlos Harmon MD, 5 Units at 06/15/21 1758  •  ipratropium-albuterol (DUO-NEB) nebulizer solution 3 mL, 3 mL, Nebulization, Q6H PRN, Obdulio Burger MD  •  labetalol (NORMODYNE,TRANDATE) injection 20 mg, 20 mg, Intravenous, Q10 Min PRN, Obdulio Burger MD, 20 mg at 06/15/21 1758  •  LORazepam (ATIVAN) injection 2 mg, 2 mg, Intravenous, Q2H PRN, Olaf Espinoza MD, 2 mg at 06/13/21 1245  •  magnesium sulfate 4 gram infusion - Mg less than or equal to 1mg/dL, 4 g, Intravenous, PRN **OR** magnesium sulfate 3 gram infusion (1gm x 3) - Mg 1.1 - 1.5 mg/dL, 1 g, Intravenous, PRN, Last Rate: 100 mL/hr at 06/11/21 1439, 1 g at 06/11/21 1439 **OR** Magnesium Sulfate 2 gram infusion- Mg 1.6 - 1.9 mg/dL, 2 g, Intravenous, PRN, Obdulio Burger MD  •  melatonin tablet 10 mg, 10 mg, Oral, Nightly, Carlos Harmon MD, 10 mg at 06/15/21 2053  •  metoprolol succinate XL (TOPROL-XL) 24 hr tablet 200 mg, 200 mg, Oral, Daily, Carlos Harmon MD, 100 mg at 06/15/21 1255  •  ondansetron (ZOFRAN) injection 4 mg, 4 mg, Intravenous, Q6H PRN, Obdulio Burger MD  •  pantoprazole (PROTONIX) EC tablet 40 mg, 40 mg, Oral, BID AC, Ann Alvarado MD, 40 mg at 06/16/21 0640  •  potassium chloride (K-DUR,KLOR-CON) ER tablet 40 mEq, 40 mEq, Oral, PRN, 40 mEq at 06/15/21 1759 **OR** potassium chloride (KLOR-CON) packet 40 mEq, 40 mEq, Oral, PRN **OR** potassium chloride 10 mEq in 100 mL IVPB, 10 mEq, Intravenous, Q1H PRN, Obdulio Burger MD  •  potassium phosphate 45 mmol in sodium chloride 0.9 % 500 mL infusion, 45 mmol, Intravenous, PRN **OR** potassium phosphate 30 mmol in sodium chloride 0.9 % 250 mL infusion, 30 mmol, Intravenous, PRN, Last Rate: 31.3 mL/hr at 06/11/21 1548, 30 mmol at 06/11/21 1548 **OR** potassium phosphate 15 mmol in sodium chloride 0.9 % 100 mL infusion, 15 mmol, Intravenous,  PRN **OR** sodium phosphates 40 mmol in sodium chloride 0.9 % 500 mL IVPB, 40 mmol, Intravenous, PRN **OR** sodium phosphates 20 mmol in sodium chloride 0.9 % 250 mL IVPB, 20 mmol, Intravenous, PRN, Obdulio Burger MD  •  propofol (DIPRIVAN) infusion 10 mg/mL 100 mL, 5-50 mcg/kg/min, Intravenous, Titrated, Carlos Harmon MD, Stopped at 06/14/21 1220  •  rOPINIRole (REQUIP) tablet 3 mg, 3 mg, Oral, Nightly, Carlos Harmon MD, 3 mg at 06/15/21 2053  •  [COMPLETED] Insert peripheral IV, , , Once **AND** sodium chloride 0.9 % flush 10 mL, 10 mL, Intravenous, PRN, Joon Mayen MD  •  sodium chloride 0.9 % flush 10 mL, 10 mL, Intravenous, PRN, Obdulio Burger MD  •  sodium chloride 0.9 % flush 10 mL, 10 mL, Intravenous, Once PRN, Obdulio Burger MD  •  sodium chloride 0.9 % flush 10 mL, 10 mL, Intravenous, Q12H, Obdulio Burger MD, 10 mL at 06/15/21 2113  •  sodium chloride 0.9 % flush 10 mL, 10 mL, Intravenous, PRN, Obdulio Burger MD  •  sodium chloride 0.9 % flush 10 mL, 10 mL, Intravenous, Q12H, Carlos Harmon MD, 10 mL at 06/15/21 2113  •  sodium chloride 0.9 % flush 10 mL, 10 mL, Intravenous, Q12H, Carlos Harmon MD, 10 mL at 06/15/21 2113  •  sodium chloride 0.9 % flush 10 mL, 10 mL, Intravenous, Q12H, Carlos Harmon MD, 10 mL at 06/15/21 2113  •  sodium chloride 0.9 % flush 10 mL, 10 mL, Intravenous, PRN, Carlos Harmon MD  •  sodium chloride 0.9 % flush 20 mL, 20 mL, Intravenous, PREden BOO Tausif, MD  •  sodium chloride 0.9 % flush 3 mL, 3 mL, Intravenous, Q12H, Obdulio Burger MD, 3 mL at 06/15/21 2113  Review of Systems:    The following systems were reviewed and negative;  constitution and gastrointestinal    Objective     Vital Signs  Temp:  [98.1 °F (36.7 °C)-99.2 °F (37.3 °C)] 98.6 °F (37 °C)  Heart Rate:  [] 93  BP: (137-202)/() 137/74  Body mass index is 25.72 kg/m².    Intake/Output Summary (Last 24 hours) at 6/16/2021 0720  Last data filed at  6/16/2021 0400  Gross per 24 hour   Intake 1616 ml   Output 1950 ml   Net -334 ml     No intake/output data recorded.     Physical Exam:   General: patient awake, alert and cooperative   Eyes: Normal lids and lashes, no scleral icterus   Neck: supple, normal ROM   Skin: warm and dry, not jaundiced   Cardiovascular: regular rhythm and rate    Pulm:  regular and unlabored   Abdomen: soft, nontender, nondistended    Extremities: no rash or edema   Psychiatric: Normal mood and behavior; memory intact     Results Review:     I reviewed the patient's new clinical results.    Results from last 7 days   Lab Units 06/16/21  0431 06/15/21  0450 06/14/21  0406   WBC 10*3/mm3 9.32 10.33 11.64*   HEMOGLOBIN g/dL 7.9* 8.2* 7.6*   HEMATOCRIT % 23.9* 25.1* 22.8*   PLATELETS 10*3/mm3 235 232 185     Results from last 7 days   Lab Units 06/16/21  0431 06/15/21  0450 06/14/21  0406 06/13/21  0509 06/11/21  0403   SODIUM mmol/L 140 142 141 147* 141   POTASSIUM mmol/L 3.7 3.3* 3.9 4.1 4.2   CHLORIDE mmol/L 109* 109* 112* 120* 106   CO2 mmol/L 21.6* 22.1 20.2* 18.1* 23.4   BUN mg/dL 10 12 23 40* 48*   CREATININE mg/dL 0.96 1.08* 1.25* 1.53* 1.47*   CALCIUM mg/dL 7.9* 7.9* 7.4* 7.3* 7.9*   BILIRUBIN mg/dL  --  0.4  --  <0.2 0.3   ALK PHOS U/L  --  58  --  44 78   ALT (SGPT) U/L  --  38*  --  31 17   AST (SGOT) U/L  --  42*  --  76* 11   GLUCOSE mg/dL 88 122* 108* 110* 500*     Results from last 7 days   Lab Units 06/12/21  1301 06/11/21  0223   INR  1.23* 1.13*     No results found for: LIPASE    Radiology:  MRI Brain With & Without Contrast   Final Result      XR Chest 1 View   Final Result         Electronically signed by Chasity Massey M.D. on 06-12-21 at 1809      XR Chest 1 View   Final Result      XR Chest 1 View   Final Result         Electronically signed by Glynn Henry M.D. on 06-11-21 at 0613      CT Head Without Contrast   Final Result         Electronically signed by Fredy Cervantes MD on 06-11-21 at 0419           Assessment/Plan     Patient Active Problem List   Diagnosis   • Essential hypertension   • Snoring   • TIA (transient ischemic attack)   • GERD without esophagitis   • Inflamed seborrheic keratosis   • Type 2 diabetes mellitus without complication, without long-term current use of insulin (CMS/HCC)   • Peripheral neuropathy   • Restless legs syndrome   • Benign paroxysmal positional vertigo   • Bone lesion   • Vitamin D deficiency   • Dyslipidemia   • Muscle spasm of both lower legs   • Tobacco use disorder   • Abnormal lung sounds   • Intermittent claudication (CMS/HCC)   • Chronic respiratory failure with hypoxia (CMS/HCC)   • Leg mass, right   • Rib pain on left side   • Diabetic autonomic neuropathy associated with type 2 diabetes mellitus (CMS/HCC)   • Leukocytosis   • Primary insomnia   • PMB (postmenopausal bleeding)   • Family history of ovarian cancer   • Acute renal failure (ARF) (CMS/HCC)   • Duplicated renal collecting system   • Atherosclerotic vascular disease   • Elevated platelet count   • Low hemoglobin   • Other anomalies of uterus   • Multiple kidney stones   • Multiple kidney stones   • Bilateral lower extremity edema   • Diarrhea   • Sore on leg   • Syncope and collapse   • Acute renal failure (CMS/HCC)   • Unspecified malignant neoplasm of skin of nose   • Shortness of breath   • Pain, unspecified   • Other specified coagulation defects (CMS/HCC)   • Encounter for change or removal of nonsurgical wound dressing   • Chronic obstructive pulmonary disease, unspecified (CMS/HCC)   • Iron deficiency anemia   • Anxiety   • History of subdural hematoma   • Back pain   • Diabetes mellitus (CMS/HCC)   • Malignant melanoma (CMS/HCC)   • Panic attack   • Acute renal failure (CMS/HCC)   • Calculus of kidney   • Restless legs syndrome   • Transient ischemic attack   • Right shoulder injury, initial encounter   • Nonspecific abnormal findings on imaging examination of skull and head   • Hyperosmolality    • Gastrointestinal hemorrhage   • Absolute anemia   • Melena   • Dependence on renal dialysis (CMS/HCC)   • Aortic valve calcification   • Impaired left ventricular relaxation   • Concentric left ventricular hypertrophy   • Left atrial dilatation   • Nonrheumatic mitral valve regurgitation       Assessment:  1. Melena due to UGIB, gastric ulcer - cauterized 6/12  2. Acute blood loss anemia  3. Peptic ulcer disease  4. GERI-resolved  5. Encephalopathy-better  6. fever         Plan:  · NATHANIEL planned for today  · Hemoglobin stable-continue to follow  · Follow-up on H. pylori stool antigen  · Continue p.o. Protonix twice daily x 8 weeks, add Carafate  · Will need repeat EGD in 8 weeks to ensure healing of gastric ulcer  · Would hold on aspirin if possible to give ulcer some time to heal    I discussed the patients findings and my recommendations with patient and nursing staff.    Ann Alvarado MD

## 2021-06-16 NOTE — PROGRESS NOTES
Hospital Follow Up    LOS:  LOS: 5 days   Patient Name: Jayne Beltran  Age/Sex: 65 y.o. female  : 1955  MRN: [id number]    Day of Service: 21   Length of Stay: 5  Encounter Provider: Chino Richards MD  Place of Service: [facility]  Patient Care Team:  Michael Arriaza Jr., DO as PCP - General (Family Medicine)  Gilles Villa DPM as Consulting Physician (Podiatry)  Jen Hays MD as Consulting Physician (Obstetrics and Gynecology)  Michael Arriaza Jr., DO as Referring Physician (Family Medicine)  Glynn Melara MD as Consulting Physician (Hematology and Oncology)    Subjective:     Chief Complaint: Multiple strokes    Interval History: Patient doing well no significant change from yesterday was a good mood.  Cooperative.    Objective:     Objective:  Temp:  [97.9 °F (36.6 °C)-99.2 °F (37.3 °C)] 97.9 °F (36.6 °C)  Heart Rate:  [] 92  Resp:  [8-19] 19  BP: (118-187)/(51-96) 118/51     Intake/Output Summary (Last 24 hours) at 2021 1311  Last data filed at 2021 0825  Gross per 24 hour   Intake 600 ml   Output 1300 ml   Net -700 ml     Body mass index is 25.63 kg/m².      06/15/21  0520 21  0400 21  1149   Weight: 72.6 kg (160 lb 0.9 oz) 70.1 kg (154 lb 8.7 oz) 69.9 kg (154 lb)     Weight change: -3.836 kg (-8 lb 7.3 oz)      Physical Exam:   General : Alert, cooperative, in no acute distress.  Neuro: Alert,cooperative and oriented.  Lungs: CTAB. Normal respiratory effort and rate.  CV: Regular rate and rhythm, normal S1 and S2, no murmurs, gallops or rubs.  ABD: Soft, nontender, nondistended. Positive bowel sounds.  Extr: No edema or cyanosis, moves all extremities.    Lab Review:   Results from last 7 days   Lab Units 21  0431 06/15/21  0450 21  0509   SODIUM mmol/L 140 142 147*   POTASSIUM mmol/L 3.7 3.3* 4.1   CHLORIDE mmol/L 109* 109* 120*   CO2 mmol/L 21.6* 22.1 18.1*   BUN mg/dL  10 12 40*   CREATININE mg/dL 0.96 1.08* 1.53*   GLUCOSE mg/dL 88 122* 110*   CALCIUM mg/dL 7.9* 7.9* 7.3*   AST (SGOT) U/L  --  42* 76*   ALT (SGPT) U/L  --  38* 31     Results from last 7 days   Lab Units 06/11/21  0403   TROPONIN T ng/mL 0.094*     Results from last 7 days   Lab Units 06/16/21  0431 06/15/21  0450   WBC 10*3/mm3 9.32 10.33   HEMOGLOBIN g/dL 7.9* 8.2*   HEMATOCRIT % 23.9* 25.1*   PLATELETS 10*3/mm3 235 232     Results from last 7 days   Lab Units 06/12/21  1301 06/11/21  0223   INR  1.23* 1.13*   APTT seconds  --  25.2     Results from last 7 days   Lab Units 06/16/21  0431 06/15/21  0450   MAGNESIUM mg/dL 1.9 2.0           Invalid input(s): LDLCALC      Results from last 7 days   Lab Units 06/11/21  0403   TSH uIU/mL 0.939       Current Medications:   Scheduled Meds:cefTRIAXone, 2 g, Intravenous, Q24H  citalopram, 10 mg, Oral, Daily  insulin glargine, 10 Units, Subcutaneous, QAM  insulin regular, 0-14 Units, Subcutaneous, Q6H  melatonin, 10 mg, Oral, Nightly  metoprolol succinate XL, 200 mg, Oral, Daily  pantoprazole, 40 mg, Oral, BID AC  rOPINIRole, 3 mg, Oral, Nightly  sodium chloride, 10 mL, Intravenous, Q12H  sodium chloride, 10 mL, Intravenous, Q12H  sodium chloride, 10 mL, Intravenous, Q12H  sodium chloride, 10 mL, Intravenous, Q12H  sodium chloride, 3 mL, Intravenous, Q12H  sucralfate, 1 g, Oral, 4x Daily AC & at Bedtime      Continuous Infusions:dexmedetomidine, 0.2-1.5 mcg/kg/hr, Last Rate: Stopped (06/12/21 1350)  hold, 1 each  propofol, 5-50 mcg/kg/min, Last Rate: Stopped (06/14/21 1220)        Allergies:  Allergies   Allergen Reactions   • Metformin Other (See Comments)     Kidney failure       Assessment:       Hyperosmolality    Gastrointestinal hemorrhage    Absolute anemia    Melena        Plan:     1.  Multiple strokes.  Patient had a NATHANIEL that was unremarkable.  There was some sclerosis of the aortic valve but no signs of any type of endocarditis vegetations.  Patient did have  mild to moderate mitral regurgitation which ultimately will follow up with her in 6 to 12 weeks for long-term treatment but nothing that would explain short-term.  There is also no clot noted in left atrial appendage or any definitive sign source of embolism.   Will sign off have patient follow-up.    Chino Richards MD  06/16/21  13:11 EDT

## 2021-06-16 NOTE — PLAN OF CARE
Goal Outcome Evaluation:               Still awaiting tele bed. Alert and oriented. NATHANIEL done today with no vegetation. Up to bsc. In the chair most of the day. Stool sample sent down. Educated family multiple times on not getting patient up without staff assistance.

## 2021-06-16 NOTE — PLAN OF CARE
Goal Outcome Evaluation:  Plan of Care Reviewed With: patient        Progress: improving     Remains overflow- no change overnight. NATHANIEL scheduled 0830. VSS.

## 2021-06-16 NOTE — PROGRESS NOTES
Continued Stay Note  [facility]     Patient Name: Jayne Beltran  MRN: [id number]  Today's Date: 6/16/2021    Admit Date: 6/11/2021    Discharge Plan     Row Name 06/16/21 1030       Plan    Plan  Undetermined    Plan Comments  Pt extubated.  CCP following for PT and OT to determined discharge needs        Discharge Codes    No documentation.             Diane Eaton RN

## 2021-06-16 NOTE — PROGRESS NOTES
"Adult Nutrition  Assessment/PES    Patient Name:  Jayne Beltran  YOB: 1955  MRN: [id number]  Admit Date:  6/11/2021    Assessment Date:  6/16/2021    Comments:  Follow up noted. Pt now off the vent and diet advanced to Gi Soft this am. Pt ready for some solid food. Will honor food pref's.    Reason for Assessment     Row Name 06/16/21 1147          Reason for Assessment    Reason For Assessment  follow-up protocol     Diagnosis  -- extubated         Nutrition/Diet History     Row Name 06/16/21 1147          Nutrition/Diet History    Typical Food/Fluid Intake  diet advanced today         Anthropometrics     Row Name 06/16/21 1149 06/16/21 0400       Anthropometrics    Height  165.1 cm (65\")  --    Weight  69.9 kg (154 lb)  70.1 kg (154 lb 8.7 oz)       Ideal Body Weight (IBW)    Ideal Body Weight (IBW) (kg)  57.29  --    % Ideal Body Weight  121.93  --       Body Mass Index (BMI)    BMI (kg/m2)  25.68  --        Labs/Tests/Procedures/Meds     Row Name 06/16/21 1148          Labs/Procedures/Meds    Lab Results Reviewed  reviewed     Lab Results Comments  h/h        Diagnostic Tests/Procedures    Diagnostic Test/Procedure Reviewed  reviewed        Medications    Pertinent Medications Reviewed  reviewed         Physical Findings     Row Name 06/16/21 1148          Physical Findings    Overall Physical Appearance  on oxygen therapy         Estimated/Assessed Needs     Row Name 06/16/21 1149          Calculation Measurements    Height  165.1 cm (65\")         Nutrition Prescription Ordered     Row Name 06/16/21 1148          Nutrition Prescription PO    Current PO Diet  Regular     Common Modifiers  GI Soft/[place]                 Problem/Interventions:          Intervention Goal     Row Name 06/16/21 1149          Intervention Goal    General  Maintain nutrition     PO  Tolerate PO;PO intake (%)     PO Intake %  75 %     Weight  No significant weight loss         Nutrition Intervention     Row Name " 06/16/21 1149          Nutrition Intervention    RD/Tech Action  Follow Tx progress;Care plan reviewd           Education/Evaluation     Row Name 06/16/21 1149          Monitor/Evaluation    Monitor  Per protocol           Electronically signed by:  Waleska Henderson RD  06/16/21 11:50 EDT

## 2021-06-16 NOTE — PROGRESS NOTES
Neurology Note    Patient:  Jayne Beltran    YOB: 1955    REFERRING PHYSICIAN:  Obdulio Burger MD    CHIEF COMPLAINT:    AMS    HISTORY OF PRESENT ILLNESS:   The patient remains improved, alert and oriented, able to eat and get up w/o help, NIHSS 0. NATHANIEL negative for vegetations, PFO or thrombus this am. Remains afebrile.    Past Medical History:  Past Medical History:   Diagnosis Date   • COPD (chronic obstructive pulmonary disease) (CMS/HCC)    • Diabetes mellitus (CMS/HCC)     type 2   • Fibroid    • Hypertension    • Leukocytosis    • Neuromuscular disorder (CMS/HCC)    • Panic attack    • Skin cancer     nose   • TIA (transient ischemic attack)        Past Surgical History:  Past Surgical History:   Procedure Laterality Date   • CHOLECYSTECTOMY     • COLONOSCOPY     • ENDOSCOPY N/A 6/11/2021    Procedure: ESOPHAGOGASTRODUODENOSCOPY AT BEDSIDE;  Surgeon: Donovan Banks MD;  Location: [facility] ENDOSCOPY;  Service: Gastroenterology;  Laterality: N/A;  pre: melena, GI bleed, anemia   post: retained food    • ENDOSCOPY N/A 6/12/2021    Procedure: ESOPHAGOGASTRODUODENOSCOPY WITH GOLD PROBE CAUTERY 7FR TO BLEEDING GASTRIC ANTRUM ULCER;  Surgeon: Calvin Barraza MD;  Location: [facility] ENDOSCOPY;  Service: Gastroenterology;  Laterality: N/A;  PRE- GI BLEED  POST- BLEEDING GASTRIC ANTRUM ULCER   • INSERTION HEMODIALYSIS CATHETER N/A 9/3/2020    Procedure: HEMODIALYSIS CATHETER INSERTION;  Surgeon: Sergio Durant MD;  Location: [facility] OR;  Service: Vascular;  Laterality: N/A;   • KIDNEY STONE SURGERY     • URETEROSCOPY LASER LITHOTRIPSY WITH STENT INSERTION Right 12/21/2020    Procedure: CYSTOSCOPY, RIGHT URETEROSCOPY, RIGHT RETROGRADE PYELOGRAM, LASER LITHOTRIPSY WITH RIGHT URETERAL STENT EXCHANGE;  Surgeon: Mikie Mejia MD;  Location: [facility] OR;  Service: Urology;  Laterality: Right;       Social History:   Social History     Socioeconomic History   • Marital status:       Spouse name: Golden   • Number of children: 2   • Years of education: 12   • Highest education level: High school graduate   Tobacco Use   • Smoking status: Current Every Day Smoker     Packs/day: 0.25     Years: 45.00     Pack years: 11.25     Types: Cigarettes   • Smokeless tobacco: Never Used   • Tobacco comment: uses vape and smokes cigarettes   Vaping Use   • Vaping Use: Never used   Substance and Sexual Activity   • Alcohol use: No   • Drug use: No   • Sexual activity: Yes     Partners: Male     Birth control/protection: Post-menopausal        Family History:   Family History   Problem Relation Age of Onset   • Heart disease Mother          at age 63   • Diabetes Mother    • Hypertension Mother    • Diabetes Father    • Deep vein thrombosis Father    • Depression Father    • Liver disease Father    • Lung cancer Father 58         at age 68   • Breast cancer Maternal Grandmother         ? age dx   • Dementia Maternal Grandmother    • Hypertension Maternal Grandmother    • Ovarian cancer Daughter 23   • Diabetes Daughter    • Depression Daughter    • Diabetes Sister    • Diabetes Brother    • Heart disease Brother    • Cancer Brother          at age 43   • Heart disease Maternal Uncle    • Cancer Paternal Uncle    • Clotting disorder Paternal Grandmother    • Colon cancer Neg Hx    • Colon polyps Neg Hx    • Malig Hyperthermia Neg Hx        Medications Prior to Admission:    Prior to Admission medications    Medication Sig Start Date End Date Taking? Authorizing Provider   Alcohol Swabs (B-D SINGLE USE SWABS REGULAR) pads 1 each 3 (Three) Times a Day Before Meals. 20  Yes Michael Arriaza Jr., DO   Ascorbic Acid (VITAMIN C PO) Take 1 tablet by mouth Daily.   Yes Provider, MD Darian   Blood Glucose Monitoring Suppl (ACCU-CHEK LUIS ALBERTO PLUS) w/Device kit 1 each 3 (Three) Times a Day Before Meals. 20  Yes Michael Arriaza Jr., DO   citalopram (CeleXA) 10 MG  tablet Take 1 tablet by mouth Daily. 4/4/21  Yes Michael Arriaza Jr., DO   Ferrous Sulfate (Iron) 28 MG tablet Take 1 tablet by mouth Daily.   Yes Darian Morales MD   glucose blood (Accu-Chek Ilana Plus) test strip 1 each by Other route 3 (Three) Times a Day Before Meals. Use as instructed 7/27/20  Yes Michael Arriaza Jr., DO   insulin aspart (NovoLOG FlexPen) 100 UNIT/ML solution pen-injector sc pen Inject 5 Units under the skin into the appropriate area as directed 3 (Three) Times a Day With Meals. SSI-DEPENDS PM BLOOD GLUCOSE 2/17/21  Yes Michael Arriaza Jr., DO   Insulin Pen Needle (RELION PEN NEEDLE 31G/8MM) 31G X 8 MM misc 1 each 3 (Three) Times a Day. 10/12/20  Yes Michael Arriaza Jr. DO   Lancets (ACCU-CHEK MULTICLIX) lancets 1 each by Other route 3 (Three) Times a Day Before Meals. Use as instructed 7/27/20  Yes Michael Arriaza Jr., DO   Lancets Misc. (ACCU-CHEK MULTICLIX LANCET DEV) kit 1 each 3 (Three) Times a Day Before Meals. 7/27/20  Yes Michael Arriaza Jr., DO   Melatonin 10 MG tablet Take 1 tablet by mouth every night at bedtime.   Yes Darian Morales MD   metoprolol succinate XL (TOPROL-XL) 200 MG 24 hr tablet Take 1 tablet by mouth Daily. 1/15/21  Yes Michael Arriaza Jr.,    rOPINIRole (REQUIP) 3 MG tablet Take 3 mg by mouth Every Night. Take 1-3 hr prior to bedtime   Yes Darian Morales MD   vitamin E 400 UNIT capsule Take 400 Units by mouth Daily.   Yes Darian Morales MD   Blood Glucose Calibration (ACCU-CHEK ILANA) solution 1 each by In Vitro route 3 (Three) Times a Day Before Meals. 7/27/20   Michael Arriaza Jr., DO   Empagliflozin (Jardiance) 10 MG tablet Take 10 mg by mouth Daily. 10/29/20   Sosnin, Michael Calvin Jr., DO   furosemide (LASIX) 80 MG tablet Take 80 mg by mouth 2 (Two) Times a Day.    Provider, MD Darian   lisinopril (PRINIVIL,ZESTRIL) 10 MG tablet Take 1 tablet by mouth Daily. 1/14/21   Michael Arriaza Jr., DO    pregabalin (LYRICA) 150 MG capsule Take 150 mg by mouth 3 (Three) Times a Day.    Provider, MD Darian   senna (senna) 8.6 MG tablet Take 8.6 mg by mouth Daily. 9/29/20   Provider, MD Darian       Allergies:  Metformin      Review of system  Review of Systems   Constitutional: Negative for fever.   HENT: Negative for trouble swallowing.    Musculoskeletal: Negative for gait problem.   Neurological: Negative for weakness and numbness.   Psychiatric/Behavioral: Negative for confusion.   All other systems reviewed and are negative.      Vitals:    06/16/21 1200   BP: 118/51   Pulse: 92   Resp:    Temp: 97.9 °F (36.6 °C)   SpO2: 90%       Physical exam  Physical Exam  Constitutional:       Appearance: She is well-developed.   HENT:      Head: Normocephalic and atraumatic.   Eyes:      Extraocular Movements: Extraocular movements intact.      Pupils: Pupils are equal, round, and reactive to light.   Cardiovascular:      Rate and Rhythm: Normal rate and regular rhythm.   Pulmonary:      Effort: Pulmonary effort is normal.   Neurological:      Mental Status: She is alert and oriented to person, place, and time.      Deep Tendon Reflexes: Reflexes are normal and symmetric.      Comments: Speech clear, VFF, no facial droop, moves limbs well against gravity.   Psychiatric:         Behavior: Behavior normal.         Thought Content: Thought content normal.           Lab Results   Component Value Date    WBC 9.32 06/16/2021    HGB 7.9 (L) 06/16/2021    HCT 23.9 (L) 06/16/2021    MCV 89.8 06/16/2021     06/16/2021     Lab Results   Component Value Date    GLUCOSE 88 06/16/2021    BUN 10 06/16/2021    CREATININE 0.96 06/16/2021    EGFRIFNONA 58 (L) 06/16/2021    EGFRIFAFRI 53 (L) 01/20/2021    BCR 10.4 06/16/2021    CO2 21.6 (L) 06/16/2021    CALCIUM 7.9 (L) 06/16/2021    PROTENTOTREF 6.6 01/20/2021    ALBUMIN 3.10 (L) 06/16/2021    LABIL2 1.5 01/20/2021    AST 42 (H) 06/15/2021    ALT 38 (H) 06/15/2021          Radiological Studies:  MR SCAN OF THE BRAIN WITHOUT AND WITH INTRAVENOUS CONTRAST     HISTORY: Upper extremity weakness. Confusion. Possible herpes  encephalitis.     The MR scan was performed with sagittal and axial and coronal images and  includes T1 images without and with intravenous contrast. The ventricles  are normal in size with some slight chronic small vessel ischemic change  in the deep white matter. In addition, the diffusion sequence shows a  multitude of acute infarcts scattered in both cerebral hemispheres as  well as in the cerebellum and suggesting a cardiac embolic source. The  largest infarct is located high in the left posterior parietal cortex  where it measures up to 1.3 x 2.8 cm. There is no associated abnormal  enhancement. There is no evidence of hemorrhage.     There are normal flow voids in the major vessels. There is prominent  polypoid mucosal thickening at the floor the left maxillary sinus. There  is also some fluid in the mastoid air cells bilaterally.     CONCLUSION: Multiple small acute infarcts scattered in both cerebral  hemispheres and in the cerebellum and highly suspicious for a cardiac  embolic source and further clinical correlation is recommended. See  above discussion. There is some mild sinusitis and bilateral  mastoiditis.     This report was finalized on 2021 3:17 PM by Dr. Ricco Silva M.D.     Duplex Carotid Ultrasound CAR  Order# [id number]  Reading physician: Driss Fowler MD Ordering physician: Albaro Santiago MD Study date: 6/15/21   Patient Information    Patient Name   Jayne Beltran MRN   [id number] Legal Sex   Female  (Age)   1955 (65 y.o.)   Clinical Indication    CVA (stroke); Carotid; bilateral; embolism   Admission Information    Admission Date/Time Discharge Date/Time Room/Bed   21  02:01 AM  N324/1   Interpretation Summary    · Mid right internal carotid artery mild stenosis.  · Mid left internal carotid artery  is normal, plaque without significant stenosis.            During this visit the following were done:  Labs Reviewed [x]    Labs Ordered []    Radiology Reports Reviewed [x]    Radiology Ordered []    EKG, echo, and/or stress test reviewed []    EEG results reviewed  []    EEG reviewed and interpreted per myself   []    Discussed case with neurointerventionalist or neuroradiologist []    Referring Provider Records Reviewed []    ER Records Reviewed []    Hospital Records Reviewed []    History Obtained From Family []    Radiological images view and Interpreted per myself []    Case Discussed with referring provider []     Decision to obtain and request outside records  []        Assessment and Plan     1. TME and fever resolved.  2. Multiple bilateral embolic strokes, SBE ruled out. She may have PAF. Recent GIB.   - Start aspirin when okay with GI.   - ZIO patch.   - Outpatient neurology F/U in 3 months.    Call for questions, will see prn. Thanks.                 Electronically signed by Albaro Santiago MD on 6/16/2021 at 13:19 EDT

## 2021-06-17 LAB
BACTERIA SPEC ANAEROBE CULT: NORMAL
BASOPHILS # BLD AUTO: 0.1 10*3/MM3 (ref 0–0.2)
BASOPHILS NFR BLD AUTO: 1 % (ref 0–1.5)
DEPRECATED RDW RBC AUTO: 51 FL (ref 37–54)
EOSINOPHIL # BLD AUTO: 0.25 10*3/MM3 (ref 0–0.4)
EOSINOPHIL NFR BLD AUTO: 2.4 % (ref 0.3–6.2)
ERYTHROCYTE [DISTWIDTH] IN BLOOD BY AUTOMATED COUNT: 15.4 % (ref 12.3–15.4)
GLUCOSE BLDC GLUCOMTR-MCNC: 125 MG/DL (ref 70–130)
GLUCOSE BLDC GLUCOMTR-MCNC: 132 MG/DL (ref 70–130)
GLUCOSE BLDC GLUCOMTR-MCNC: 175 MG/DL (ref 70–130)
GLUCOSE BLDC GLUCOMTR-MCNC: 181 MG/DL (ref 70–130)
GLUCOSE BLDC GLUCOMTR-MCNC: 338 MG/DL (ref 70–130)
HCT VFR BLD AUTO: 27.4 % (ref 34–46.6)
HGB BLD-MCNC: 8.7 G/DL (ref 12–15.9)
IMM GRANULOCYTES # BLD AUTO: 0.16 10*3/MM3 (ref 0–0.05)
IMM GRANULOCYTES NFR BLD AUTO: 1.6 % (ref 0–0.5)
LYMPHOCYTES # BLD AUTO: 1.83 10*3/MM3 (ref 0.7–3.1)
LYMPHOCYTES NFR BLD AUTO: 17.9 % (ref 19.6–45.3)
MCH RBC QN AUTO: 29.5 PG (ref 26.6–33)
MCHC RBC AUTO-ENTMCNC: 31.8 G/DL (ref 31.5–35.7)
MCV RBC AUTO: 92.9 FL (ref 79–97)
MONOCYTES # BLD AUTO: 0.8 10*3/MM3 (ref 0.1–0.9)
MONOCYTES NFR BLD AUTO: 7.8 % (ref 5–12)
NEUTROPHILS NFR BLD AUTO: 69.3 % (ref 42.7–76)
NEUTROPHILS NFR BLD AUTO: 7.1 10*3/MM3 (ref 1.7–7)
NRBC BLD AUTO-RTO: 0 /100 WBC (ref 0–0.2)
PLATELET # BLD AUTO: 264 10*3/MM3 (ref 140–450)
PMV BLD AUTO: 10.9 FL (ref 6–12)
RBC # BLD AUTO: 2.95 10*6/MM3 (ref 3.77–5.28)
WBC # BLD AUTO: 10.24 10*3/MM3 (ref 3.4–10.8)

## 2021-06-17 PROCEDURE — 25010000003 CEFTRIAXONE PER 250 MG: Performed by: INTERNAL MEDICINE

## 2021-06-17 PROCEDURE — 63710000001 INSULIN REGULAR HUMAN PER 5 UNITS: Performed by: INTERNAL MEDICINE

## 2021-06-17 PROCEDURE — 63710000001 INSULIN GLARGINE PER 5 UNITS: Performed by: INTERNAL MEDICINE

## 2021-06-17 PROCEDURE — 82962 GLUCOSE BLOOD TEST: CPT

## 2021-06-17 PROCEDURE — 99232 SBSQ HOSP IP/OBS MODERATE 35: CPT | Performed by: INTERNAL MEDICINE

## 2021-06-17 PROCEDURE — 85025 COMPLETE CBC W/AUTO DIFF WBC: CPT | Performed by: INTERNAL MEDICINE

## 2021-06-17 RX ORDER — LISINOPRIL 10 MG/1
10 TABLET ORAL
Status: DISCONTINUED | OUTPATIENT
Start: 2021-06-17 | End: 2021-06-18 | Stop reason: HOSPADM

## 2021-06-17 RX ADMIN — SODIUM CHLORIDE, PRESERVATIVE FREE 10 ML: 5 INJECTION INTRAVENOUS at 21:23

## 2021-06-17 RX ADMIN — SODIUM CHLORIDE, PRESERVATIVE FREE 10 ML: 5 INJECTION INTRAVENOUS at 12:15

## 2021-06-17 RX ADMIN — METOPROLOL SUCCINATE 200 MG: 100 TABLET, EXTENDED RELEASE ORAL at 09:10

## 2021-06-17 RX ADMIN — INSULIN GLARGINE 10 UNITS: 100 INJECTION, SOLUTION SUBCUTANEOUS at 06:36

## 2021-06-17 RX ADMIN — CEFTRIAXONE SODIUM 2 G: 2 INJECTION, SOLUTION INTRAVENOUS at 17:34

## 2021-06-17 RX ADMIN — LABETALOL HYDROCHLORIDE 20 MG: 5 INJECTION, SOLUTION INTRAVENOUS at 12:14

## 2021-06-17 RX ADMIN — Medication 10 MG: at 21:03

## 2021-06-17 RX ADMIN — INSULIN HUMAN 3 UNITS: 100 INJECTION, SOLUTION PARENTERAL at 06:55

## 2021-06-17 RX ADMIN — SODIUM CHLORIDE, PRESERVATIVE FREE 10 ML: 5 INJECTION INTRAVENOUS at 09:12

## 2021-06-17 RX ADMIN — LISINOPRIL 10 MG: 10 TABLET ORAL at 14:42

## 2021-06-17 RX ADMIN — SUCRALFATE 1 G: 1 TABLET ORAL at 17:34

## 2021-06-17 RX ADMIN — CITALOPRAM 10 MG: 10 TABLET, FILM COATED ORAL at 09:10

## 2021-06-17 RX ADMIN — SODIUM CHLORIDE, PRESERVATIVE FREE 10 ML: 5 INJECTION INTRAVENOUS at 21:03

## 2021-06-17 RX ADMIN — PANTOPRAZOLE SODIUM 40 MG: 40 TABLET, DELAYED RELEASE ORAL at 17:34

## 2021-06-17 RX ADMIN — INSULIN HUMAN 3 UNITS: 100 INJECTION, SOLUTION PARENTERAL at 17:34

## 2021-06-17 RX ADMIN — PANTOPRAZOLE SODIUM 40 MG: 40 TABLET, DELAYED RELEASE ORAL at 06:36

## 2021-06-17 RX ADMIN — SUCRALFATE 1 G: 1 TABLET ORAL at 06:36

## 2021-06-17 RX ADMIN — SUCRALFATE 1 G: 1 TABLET ORAL at 11:40

## 2021-06-17 RX ADMIN — SUCRALFATE 1 G: 1 TABLET ORAL at 21:03

## 2021-06-17 RX ADMIN — INSULIN HUMAN 10 UNITS: 100 INJECTION, SOLUTION PARENTERAL at 11:40

## 2021-06-17 RX ADMIN — ROPINIROLE 3 MG: 2 TABLET, FILM COATED ORAL at 21:02

## 2021-06-17 NOTE — PROGRESS NOTES
" LOS: 6 days     Chief Complaint:  Follow-up fever    Interval History:  No fever. NATHANIEL negative for vegetations. Ate a \"Big Boy\" sandwich from [employer] yesterday and enjoyed it. Eager for discharge.     ROS: no n/v/d    Vital Signs  Temp:  [97.9 °F (36.6 °C)-98.4 °F (36.9 °C)] 98.4 °F (36.9 °C)  Heart Rate:  [70-99] 96  Resp:  [8-19] 19  BP: (118-155)/() 144/81    Physical Exam:  General: awake, alert, in chair  Eyes:  no scleral icterus  ENT: MMM  Cardiovascular: NR  Respiratory: no wheezing; on room air  GI: Abdomen is soft, non-distended  Musculoskeletal: normal musculature  Psychiatric: calm and pleasant    Antibiotics:  •  cefTRIAXone (ROCEPHIN) IVPB 2 g, 2 g, Intravenous, Q24H    LABS:  micro reviewed today  Lab Results   Component Value Date    WBC 9.32 06/16/2021    HGB 7.9 (L) 06/16/2021    HCT 23.9 (L) 06/16/2021    MCV 89.8 06/16/2021     06/16/2021     Lab Results   Component Value Date    GLUCOSE 88 06/16/2021    BUN 10 06/16/2021    CREATININE 0.96 06/16/2021    EGFRIFNONA 58 (L) 06/16/2021    EGFRIFAFRI 53 (L) 01/20/2021    BCR 10.4 06/16/2021    CO2 21.6 (L) 06/16/2021    CALCIUM 7.9 (L) 06/16/2021    PROTENTOTREF 6.6 01/20/2021    ALBUMIN 3.10 (L) 06/16/2021    LABIL2 1.5 01/20/2021    AST 42 (H) 06/15/2021    ALT 38 (H) 06/15/2021     Lab Results   Component Value Date    HGBA1C 8.40 (H) 06/11/2021     LP results (tube 4):  5 WBCs  4 RBCs  Glc 98  Pro 57  PCR negative    Microbiology:  6/11 COVID: negative  6/11 BCx: negative  6/12 CSF Cx: negative  6/12 CSF PCR panel: negative  6/12 CSF HSV PCR: negative    Radiology (report reviewed and d/w performing cardiologist):  NATHANIEL negative for vegetations    Assessment/Plan   1. Encephalopathy - resolved  2. Fever in adult - resolved  3. GI bleeding due to gastric ulcer - treated  4. Hyperglycemia and uncontrolled DM2  5. COPD due to tobacco use  6. Acute kidney injury - resolved  7. Acute bilateral cerebral infarcts  8. RUE superficial " thrombus  9. Abnormal TTE      She remains afebrile with a normal WBC. Her blood cultures are negative. NATHANIEL was negative for vegetations. Unclear if she ever had an infection but leukocytosis and fever were certainly suggestive of it. MRI did show strokes and she had a GI bleed which could have been culprits.     At this point, she looks very good and appears close to discharge. I'll have her ceftriaxone stop after today's dose to finish an empiric 7-day course.     Thank you for allowing me to be involved in the care of this patient. Infectious diseases will sign off at this time with antibiotics plan in place, but please call me at [phone] if any further ID questions or new ID concerns.

## 2021-06-17 NOTE — PROGRESS NOTES
"      [facility]         Dr Gonzalez Sayied   LOS: 6 days   Patient Care Team:  Michael Arriaza Jr., DO as PCP - General (Family Medicine)  Gilles Villa DPM as Consulting Physician (Podiatry)  Jen Hays MD as Consulting Physician (Obstetrics and Gynecology)  Michael Arriaza Jr., DO as Referring Physician (Family Medicine)  Glynn Melara MD as Consulting Physician (Hematology and Oncology)    Chief Complaint: Acute blood loss anemia with altered mental status lactic acidosis hyperosmolar nonketotic syndrome encephalopathy and possibility of bacterial endocarditis    Interval History: No overnight issues reported.  Sitting up in a chair.  Remains unsteady on her feet.  NATHANIEL yesterday    REVIEW OF SYSTEMS:   No chest pain or shortness of breath no headache.    Vital Signs  Temp:  [97.9 °F (36.6 °C)-98.4 °F (36.9 °C)] 98.4 °F (36.9 °C)  Heart Rate:  [70-99] 73  Resp:  [8-19] 19  BP: (118-155)/() 144/81    Intake/Output Summary (Last 24 hours) at 6/17/2021 1015  Last data filed at 6/17/2021 0600  Gross per 24 hour   Intake 840 ml   Output 1100 ml   Net -260 ml     Flowsheet Rows      First Filed Value   Admission Height  165.1 cm (65\") Documented at 06/11/2021 0220   Admission Weight  74.4 kg (164 lb) Documented at 06/11/2021 0220          Physical Exam:  Patient is examined using the personal protective equipment as per guidelines from infection control for this particular patient as enacted.  Hand hygiene was performed before and after patient interaction.   General Appearance:   Awake resting comfortably sitting up in a chair. Following commands.  Neck midline trachea, no thyromegaly   Lungs:    Diminished breath sounds on the basis    Heart:    Regular rhythm and normal rate, normal S1 and S2, no            murmur, no gallop, no rub, no click   Chest Wall:    No abnormalities observed   Abdomen:     Normal bowel sounds, no masses, no organomegaly, soft        " nontender, nondistended, no guarding, no rebound                tenderness   Extremities:   Moves all extremities well, no edema, no cyanosis, no             redness  CNS no focal deficit  Skin no rashes no nodules  Musculoskeletal no cyanosis no clubbing moving all extremities     Results Review:        Results from last 7 days   Lab Units 06/16/21  0431 06/15/21  0450 06/14/21  0406   SODIUM mmol/L 140 142 141   POTASSIUM mmol/L 3.7 3.3* 3.9   CHLORIDE mmol/L 109* 109* 112*   CO2 mmol/L 21.6* 22.1 20.2*   BUN mg/dL 10 12 23   CREATININE mg/dL 0.96 1.08* 1.25*   GLUCOSE mg/dL 88 122* 108*   CALCIUM mg/dL 7.9* 7.9* 7.4*     Results from last 7 days   Lab Units 06/11/21  0403   TROPONIN T ng/mL 0.094*     Results from last 7 days   Lab Units 06/17/21  0916 06/16/21  0431 06/15/21  0450   WBC 10*3/mm3 10.24 9.32 10.33   HEMOGLOBIN g/dL 8.7* 7.9* 8.2*   HEMATOCRIT % 27.4* 23.9* 25.1*   PLATELETS 10*3/mm3 264 235 232     Results from last 7 days   Lab Units 06/12/21  1301 06/11/21  0223   INR  1.23* 1.13*   APTT seconds  --  25.2         Results from last 7 days   Lab Units 06/16/21  0431   MAGNESIUM mg/dL 1.9         Results from last 7 days   Lab Units 06/13/21  0923   PH, ARTERIAL pH units 7.402   PO2 ART mm Hg 72.7*   PCO2, ARTERIAL mm Hg 28.9*   HCO3 ART mmol/L 18.0*       I reviewed the patient's new clinical results.  I personally viewed and interpreted the patient's chest x-ray.        Medication Review:   cefTRIAXone, 2 g, Intravenous, Q24H  citalopram, 10 mg, Oral, Daily  insulin glargine, 10 Units, Subcutaneous, QAM  insulin regular, 0-14 Units, Subcutaneous, Q6H  melatonin, 10 mg, Oral, Nightly  metoprolol succinate XL, 200 mg, Oral, Daily  pantoprazole, 40 mg, Oral, BID AC  rOPINIRole, 3 mg, Oral, Nightly  sodium chloride, 10 mL, Intravenous, Q12H  sodium chloride, 10 mL, Intravenous, Q12H  sodium chloride, 10 mL, Intravenous, Q12H  sucralfate, 1 g, Oral, 4x Daily AC & at Bedtime        dexmedetomidine,  0.2-1.5 mcg/kg/hr, Last Rate: Stopped (06/12/21 1350)  hold, 1 each  propofol, 5-50 mcg/kg/min, Last Rate: Stopped (06/14/21 1220)        ASSESSMENT:   Acute respiratory failure intubated 6/12/2021 extubated 6 1421  Altered mental status resolved  Sepsis resolved  Rule out endocarditis  Acute blood loss anemia with GI bleed and melanotic stools/coffee-ground emesis: Repeat EGD with gastric ulcer cauterized 612  Lactic acidosis resolved  Hyperosmolar nonketotic syndrome resolved  Leukocytosis improved  Tachycardia improved  Hypertension  Encephalopathy  COPD without exacerbation  History of tobacco use    PLAN:  Respiratory status stable post extubation.  Off oxygen currently.  Mental status with dramatic improvement.  NATHANIEL negative for endocarditis.  Aspirin once okay with GI. LP essentially negative for any infectious etiology.  Antibiotics per infectious diseases  Acute GI bleed status post repeat EGD.  Gastric ulcer with visible vessel noted.   Hemoglobin stable.  Protonix per GI  Hyperosmolar nonketotic syndrome with blood glucose in the normal range.  We will continue with Lantus and sliding scale insulin.  Kidney functions back to normal  Multiple medical problems and issues.  I will initiate PT today.  She may be able to go home if steady on her feet.  Awaiting bed to transfer out of the ICU.      Carlos Haromn MD  06/17/21  10:15 EDT

## 2021-06-17 NOTE — PLAN OF CARE
Goal Outcome Evaluation:      Pt remains in ccu on room air. A&Ox4. VSS. Headache at beginning of shift, PRN tylenol given 1x. Midnight hgb stable, waiting on 6am hgb. BG 130s-180s, 3u insulin given. Good UOP. D/c parr per order, voided since removal of parr on bsc. No BM overnight. Continue to monitor.

## 2021-06-17 NOTE — PROGRESS NOTES
Gastroenterology   Inpatient Progress Note    Reason for Follow Up:  GI bleed secondary to gastric ulcer    Subjective     Interval History:   Patient out of bed sitting in chair feels great.  Having no trouble eating feels good no blood in her stool tolerating p.o.    Current Facility-Administered Medications:   •  acetaminophen (TYLENOL) tablet 650 mg, 650 mg, Oral, Q4H PRN, 650 mg at 06/16/21 2043 **OR** acetaminophen (TYLENOL) suppository 650 mg, 650 mg, Rectal, Q4H PRN, Obdulio Burger MD, 650 mg at 06/11/21 1904  •  calcium gluconate 1g/50ml 0.675% NaCl IV SOLN, 1 g, Intravenous, PRN **AND** calcium gluconate 6 g in sodium chloride 0.9 % 500 mL IVPB, 6 g, Intravenous, PRN **AND** Calcium, , , PRN, Obdulio Burger MD  •  cefTRIAXone (ROCEPHIN) IVPB 2 g, 2 g, Intravenous, Q24H, Cem Duncan MD, Last Rate: 100 mL/hr at 06/16/21 1720, 2 g at 06/16/21 1720  •  citalopram (CeleXA) tablet 10 mg, 10 mg, Oral, Daily, Carlos Harmon MD, Stopped at 06/16/21 0822  •  dexmedetomidine (PRECEDEX) 400 mcg in 100 mL NS infusion kit, 0.2-1.5 mcg/kg/hr, Intravenous, Titrated, Olaf Espinoza MD, Stopped at 06/12/21 1350  •  dextrose (GLUTOSE) oral gel 15 g, 15 g, Oral, Q15 Min PRN, Carlos Harmon MD  •  fentaNYL citrate (PF) (SUBLIMAZE) injection 25 mcg, 25 mcg, Intravenous, Q1H PRN, Carlos Harmon MD, 25 mcg at 06/14/21 1105  •  glucagon (human recombinant) (GLUCAGEN DIAGNOSTIC) injection 1 mg, 1 mg, Subcutaneous, Q15 Min PRN, Carlos Harmon MD  •  haloperidol lactate (HALDOL) injection 4 mg, 4 mg, Intravenous, Q4H PRN, Olaf Espinoza MD, 4 mg at 06/11/21 1438  •  Hold medication, 1 each, Does not apply, Continuous PRN, Blas Luque MD  •  hydrALAZINE (APRESOLINE) injection 20 mg, 20 mg, Intravenous, Q4H PRN, Olaf Espinoza MD, 20 mg at 06/14/21 6600  •  insulin glargine (LANTUS, SEMGLEE) injection 10 Units, 10 Units, Subcutaneous, QAMEden Tausif, MD, 10 Units at  06/17/21 0636  •  insulin regular (humuLIN R,novoLIN R) injection 0-14 Units, 0-14 Units, Subcutaneous, Q6H, Carlos Harmon MD, 3 Units at 06/17/21 0655  •  ipratropium-albuterol (DUO-NEB) nebulizer solution 3 mL, 3 mL, Nebulization, Q6H PRN, Obdulio Burger MD  •  labetalol (NORMODYNE,TRANDATE) injection 20 mg, 20 mg, Intravenous, Q10 Min PRN, Obdulio Burger MD, 20 mg at 06/15/21 1758  •  LORazepam (ATIVAN) injection 2 mg, 2 mg, Intravenous, Q2H PRN, Olaf Espinoza MD, 2 mg at 06/13/21 1245  •  magnesium sulfate 4 gram infusion - Mg less than or equal to 1mg/dL, 4 g, Intravenous, PRN **OR** magnesium sulfate 3 gram infusion (1gm x 3) - Mg 1.1 - 1.5 mg/dL, 1 g, Intravenous, PRN, Last Rate: 100 mL/hr at 06/11/21 1439, 1 g at 06/11/21 1439 **OR** Magnesium Sulfate 2 gram infusion- Mg 1.6 - 1.9 mg/dL, 2 g, Intravenous, PRN, Obdulio Burger MD  •  melatonin tablet 10 mg, 10 mg, Oral, Nightly, Carlos Harmon MD, 10 mg at 06/16/21 2041  •  metoprolol succinate XL (TOPROL-XL) 24 hr tablet 200 mg, 200 mg, Oral, Daily, Carlos Harmon MD, Stopped at 06/16/21 0823  •  ondansetron (ZOFRAN) injection 4 mg, 4 mg, Intravenous, Q6H PRN, Obdulio Burger MD  •  pantoprazole (PROTONIX) EC tablet 40 mg, 40 mg, Oral, BID AC, Ann Alvarado MD, 40 mg at 06/17/21 0636  •  potassium chloride (K-DUR,KLOR-CON) ER tablet 40 mEq, 40 mEq, Oral, PRN, 40 mEq at 06/15/21 1759 **OR** potassium chloride (KLOR-CON) packet 40 mEq, 40 mEq, Oral, PRN **OR** potassium chloride 10 mEq in 100 mL IVPB, 10 mEq, Intravenous, Q1H PRN, Obdulio Burger MD  •  potassium phosphate 45 mmol in sodium chloride 0.9 % 500 mL infusion, 45 mmol, Intravenous, PRN **OR** potassium phosphate 30 mmol in sodium chloride 0.9 % 250 mL infusion, 30 mmol, Intravenous, PRN, Last Rate: 31.3 mL/hr at 06/11/21 1548, 30 mmol at 06/11/21 1548 **OR** potassium phosphate 15 mmol in sodium chloride 0.9 % 100 mL infusion, 15 mmol, Intravenous, PRN  **OR** sodium phosphates 40 mmol in sodium chloride 0.9 % 500 mL IVPB, 40 mmol, Intravenous, PRN **OR** sodium phosphates 20 mmol in sodium chloride 0.9 % 250 mL IVPB, 20 mmol, Intravenous, PRN, Obdulio Burger MD  •  propofol (DIPRIVAN) infusion 10 mg/mL 100 mL, 5-50 mcg/kg/min, Intravenous, Titrated, Carlos Harmon MD, Stopped at 06/14/21 1220  •  rOPINIRole (REQUIP) tablet 3 mg, 3 mg, Oral, Nightly, Carlos Harmon MD, 3 mg at 06/16/21 2041  •  [COMPLETED] Insert peripheral IV, , , Once **AND** sodium chloride 0.9 % flush 10 mL, 10 mL, Intravenous, PRN, Joon Mayen MD  •  sodium chloride 0.9 % flush 10 mL, 10 mL, Intravenous, PRN, Obdulio Burger MD  •  sodium chloride 0.9 % flush 10 mL, 10 mL, Intravenous, Once PRN, Obdulio Burger MD  •  sodium chloride 0.9 % flush 10 mL, 10 mL, Intravenous, Q12H, Obdulio Burger MD, 10 mL at 06/16/21 2049  •  sodium chloride 0.9 % flush 10 mL, 10 mL, Intravenous, PRN, Obdulio Burger MD  •  sodium chloride 0.9 % flush 10 mL, 10 mL, Intravenous, Q12H, Carlos Harmon MD, 10 mL at 06/16/21 2049  •  sodium chloride 0.9 % flush 10 mL, 10 mL, Intravenous, Q12H, Carlos Harmon MD, 10 mL at 06/16/21 2049  •  sodium chloride 0.9 % flush 10 mL, 10 mL, Intravenous, Q12H, Carlos Harmon MD, 10 mL at 06/16/21 2049  •  sodium chloride 0.9 % flush 10 mL, 10 mL, Intravenous, PRN, Carlos Harmon MD  •  sodium chloride 0.9 % flush 20 mL, 20 mL, Intravenous, PREden BOO Tausif, MD  •  sodium chloride 0.9 % flush 3 mL, 3 mL, Intravenous, Q12H, Obdulio Burger MD, 3 mL at 06/16/21 2049  •  sucralfate (CARAFATE) tablet 1 g, 1 g, Oral, 4x Daily AC & at Bedtime, Ann Alvarado MD, 1 g at 06/17/21 0636  Review of Systems:               The following systems were reviewed and negative;  gastrointestinal    Objective     Vital Signs  Temp:  [97.9 °F (36.6 °C)-98.4 °F (36.9 °C)] 98.1 °F (36.7 °C)  Heart Rate:  [70-99] 96  Resp:  [8-19] 19  BP:  (118-155)/() 144/81  Body mass index is 25.83 kg/m².    Intake/Output Summary (Last 24 hours) at 6/17/2021 0736  Last data filed at 6/17/2021 0600  Gross per 24 hour   Intake 840 ml   Output 1350 ml   Net -510 ml     No intake/output data recorded.                Physical Exam:              General: patient awake, alert and cooperative              Eyes: no scleral icterus              Skin: warm and dry, not jaundiced              Abdomen: soft, nontender, nondistended; normal bowel sounds, no masses palpated, no periumbical lymphadenopathy              Psychiatric: Appropriate affect and behavior                Results Review:                I reviewed the patient's new clinical results.    Results from last 7 days   Lab Units 06/16/21  0431 06/15/21  0450 06/14/21  0406   WBC 10*3/mm3 9.32 10.33 11.64*   HEMOGLOBIN g/dL 7.9* 8.2* 7.6*   HEMATOCRIT % 23.9* 25.1* 22.8*   PLATELETS 10*3/mm3 235 232 185     Results from last 7 days   Lab Units 06/16/21  0431 06/15/21  0450 06/14/21  0406 06/13/21  0509 06/11/21  0403   SODIUM mmol/L 140 142 141 147* 141   POTASSIUM mmol/L 3.7 3.3* 3.9 4.1 4.2   CHLORIDE mmol/L 109* 109* 112* 120* 106   CO2 mmol/L 21.6* 22.1 20.2* 18.1* 23.4   BUN mg/dL 10 12 23 40* 48*   CREATININE mg/dL 0.96 1.08* 1.25* 1.53* 1.47*   CALCIUM mg/dL 7.9* 7.9* 7.4* 7.3* 7.9*   BILIRUBIN mg/dL  --  0.4  --  <0.2 0.3   ALK PHOS U/L  --  58  --  44 78   ALT (SGPT) U/L  --  38*  --  31 17   AST (SGOT) U/L  --  42*  --  76* 11   GLUCOSE mg/dL 88 122* 108* 110* 500*     Results from last 7 days   Lab Units 06/12/21  1301 06/11/21  0223   INR  1.23* 1.13*     No results found for: LIPASE    Radiology:  MRI Brain With & Without Contrast   Final Result      XR Chest 1 View   Final Result         Electronically signed by Chasity Massey M.D. on 06-12-21 at 1808      XR Chest 1 View   Final Result      XR Chest 1 View   Final Result         Electronically signed by Glynn Henry M.D. on 06-11-21 at 0649        CT Head Without Contrast   Final Result         Electronically signed by Fredy Cervantes MD on 06-11-21 at 0411          Assessment/Plan     Patient Active Problem List   Diagnosis   • Essential hypertension   • Snoring   • TIA (transient ischemic attack)   • GERD without esophagitis   • Inflamed seborrheic keratosis   • Type 2 diabetes mellitus without complication, without long-term current use of insulin (CMS/HCC)   • Peripheral neuropathy   • Restless legs syndrome   • Benign paroxysmal positional vertigo   • Bone lesion   • Vitamin D deficiency   • Dyslipidemia   • Muscle spasm of both lower legs   • Tobacco use disorder   • Abnormal lung sounds   • Intermittent claudication (CMS/HCC)   • Chronic respiratory failure with hypoxia (CMS/HCC)   • Leg mass, right   • Rib pain on left side   • Diabetic autonomic neuropathy associated with type 2 diabetes mellitus (CMS/HCC)   • Leukocytosis   • Primary insomnia   • PMB (postmenopausal bleeding)   • Family history of ovarian cancer   • Acute renal failure (ARF) (CMS/HCC)   • Duplicated renal collecting system   • Atherosclerotic vascular disease   • Elevated platelet count   • Low hemoglobin   • Other anomalies of uterus   • Multiple kidney stones   • Multiple kidney stones   • Bilateral lower extremity edema   • Diarrhea   • Sore on leg   • Syncope and collapse   • Acute renal failure (CMS/HCC)   • Unspecified malignant neoplasm of skin of nose   • Shortness of breath   • Pain, unspecified   • Other specified coagulation defects (CMS/HCC)   • Encounter for change or removal of nonsurgical wound dressing   • Chronic obstructive pulmonary disease, unspecified (CMS/HCC)   • Iron deficiency anemia   • Anxiety   • History of subdural hematoma   • Back pain   • Diabetes mellitus (CMS/HCC)   • Malignant melanoma (CMS/HCC)   • Panic attack   • Acute renal failure (CMS/HCC)   • Calculus of kidney   • Restless legs syndrome   • Transient ischemic attack   • Right shoulder  injury, initial encounter   • Nonspecific abnormal findings on imaging examination of skull and head   • Hyperosmolality   • Gastrointestinal hemorrhage   • Absolute anemia   • Melena   • Dependence on renal dialysis (CMS/HCC)   • Aortic valve calcification   • Impaired left ventricular relaxation   • Concentric left ventricular hypertrophy   • Left atrial dilatation   • Nonrheumatic mitral valve regurgitation   • Internal carotid artery stenosis, right       Assessment:  1. Patient presented with an upper GI bleed.  This was found to be due to a gastric ulcer with active bleeding treated endoscopically with cauterization on June 12 patient has done well since procedure  2. Acute blood loss anemia secondary to gastric ulcer at least in part.  Hemoglobin has been low but stable without evidence of active bleeding  3. Currently on p.o. medications with PPI and Carafate  4. Peptic ulcer disease H. pylori antigen pending  5. Acute kidney injury is resolving  6. Cephalopathy improving  7. Fever, currently afebrile    These problems are new to me.  Plan:  · Patient seems to be improving from a GI standpoint.  She is on Protonix twice a day and Carafate.  Arrangements can be made for her to follow-up with [facility] after discharge both to follow-up on the H. pylori antigen which is still pending and the repeat EGD in 8 weeks to assess the healing of the gastric ulcer.  · Patient is to alert team or call GI if bleeding recurs  · Ideally would hold the aspirin to allow for some healing but this needs to be balanced against her other cardiovascular and neurologic risks  · GI will sign off and be available as needed please have the patient call [facility] gastroenterology after discharge to arrange for follow-up and repeat endoscopy  I discussed the patients findings and my recommendations with patient.             David Campos M.D.  [facility] [address]  [address]  Office: [phone]

## 2021-06-18 ENCOUNTER — APPOINTMENT (OUTPATIENT)
Dept: CARDIOLOGY | Facility: HOSPITAL | Age: 66
End: 2021-06-18

## 2021-06-18 ENCOUNTER — READMISSION MANAGEMENT (OUTPATIENT)
Dept: CALL CENTER | Facility: HOSPITAL | Age: 66
End: 2021-06-18

## 2021-06-18 VITALS
OXYGEN SATURATION: 95 % | DIASTOLIC BLOOD PRESSURE: 88 MMHG | TEMPERATURE: 98.5 F | WEIGHT: 156.7 LBS | SYSTOLIC BLOOD PRESSURE: 146 MMHG | HEART RATE: 71 BPM | BODY MASS INDEX: 26.11 KG/M2 | RESPIRATION RATE: 18 BRPM | HEIGHT: 65 IN

## 2021-06-18 LAB
ANION GAP SERPL CALCULATED.3IONS-SCNC: 9.5 MMOL/L (ref 5–15)
BUN SERPL-MCNC: 15 MG/DL (ref 8–23)
BUN/CREAT SERPL: 13 (ref 7–25)
CALCIUM SPEC-SCNC: 8 MG/DL (ref 8.6–10.5)
CHLORIDE SERPL-SCNC: 111 MMOL/L (ref 98–107)
CO2 SERPL-SCNC: 21.5 MMOL/L (ref 22–29)
CREAT SERPL-MCNC: 1.15 MG/DL (ref 0.57–1)
GFR SERPL CREATININE-BSD FRML MDRD: 47 ML/MIN/1.73
GLUCOSE BLDC GLUCOMTR-MCNC: 123 MG/DL (ref 70–130)
GLUCOSE BLDC GLUCOMTR-MCNC: 161 MG/DL (ref 70–130)
GLUCOSE SERPL-MCNC: 99 MG/DL (ref 65–99)
H PYLORI AG STL QL IA: NEGATIVE
MAGNESIUM SERPL-MCNC: 1.9 MG/DL (ref 1.6–2.4)
PHOSPHATE SERPL-MCNC: 3.7 MG/DL (ref 2.5–4.5)
POTASSIUM SERPL-SCNC: 3.8 MMOL/L (ref 3.5–5.2)
SODIUM SERPL-SCNC: 142 MMOL/L (ref 136–145)

## 2021-06-18 PROCEDURE — 83735 ASSAY OF MAGNESIUM: CPT | Performed by: INTERNAL MEDICINE

## 2021-06-18 PROCEDURE — 84100 ASSAY OF PHOSPHORUS: CPT | Performed by: INTERNAL MEDICINE

## 2021-06-18 PROCEDURE — 82962 GLUCOSE BLOOD TEST: CPT

## 2021-06-18 PROCEDURE — 97116 GAIT TRAINING THERAPY: CPT

## 2021-06-18 PROCEDURE — 63710000001 INSULIN GLARGINE PER 5 UNITS: Performed by: INTERNAL MEDICINE

## 2021-06-18 PROCEDURE — 80048 BASIC METABOLIC PNL TOTAL CA: CPT | Performed by: INTERNAL MEDICINE

## 2021-06-18 PROCEDURE — 63710000001 INSULIN REGULAR HUMAN PER 5 UNITS: Performed by: INTERNAL MEDICINE

## 2021-06-18 PROCEDURE — 97162 PT EVAL MOD COMPLEX 30 MIN: CPT

## 2021-06-18 PROCEDURE — 93246 EXT ECG>7D<15D RECORDING: CPT

## 2021-06-18 RX ORDER — PANTOPRAZOLE SODIUM 40 MG/1
40 TABLET, DELAYED RELEASE ORAL
Qty: 60 TABLET | Refills: 3 | Status: SHIPPED | OUTPATIENT
Start: 2021-06-18

## 2021-06-18 RX ORDER — SUCRALFATE 1 G/1
1 TABLET ORAL
Qty: 120 TABLET | Refills: 3 | Status: SHIPPED | OUTPATIENT
Start: 2021-06-18

## 2021-06-18 RX ADMIN — PANTOPRAZOLE SODIUM 40 MG: 40 TABLET, DELAYED RELEASE ORAL at 06:00

## 2021-06-18 RX ADMIN — METOPROLOL SUCCINATE 200 MG: 100 TABLET, EXTENDED RELEASE ORAL at 08:04

## 2021-06-18 RX ADMIN — INSULIN GLARGINE 10 UNITS: 100 INJECTION, SOLUTION SUBCUTANEOUS at 06:00

## 2021-06-18 RX ADMIN — SUCRALFATE 1 G: 1 TABLET ORAL at 11:29

## 2021-06-18 RX ADMIN — INSULIN HUMAN 3 UNITS: 100 INJECTION, SOLUTION PARENTERAL at 11:29

## 2021-06-18 RX ADMIN — LISINOPRIL 10 MG: 10 TABLET ORAL at 08:04

## 2021-06-18 RX ADMIN — SODIUM CHLORIDE, PRESERVATIVE FREE 10 ML: 5 INJECTION INTRAVENOUS at 08:06

## 2021-06-18 RX ADMIN — SODIUM CHLORIDE, PRESERVATIVE FREE 10 ML: 5 INJECTION INTRAVENOUS at 08:07

## 2021-06-18 RX ADMIN — SODIUM CHLORIDE, PRESERVATIVE FREE 10 ML: 5 INJECTION INTRAVENOUS at 08:05

## 2021-06-18 RX ADMIN — SUCRALFATE 1 G: 1 TABLET ORAL at 06:01

## 2021-06-18 RX ADMIN — CITALOPRAM 10 MG: 10 TABLET, FILM COATED ORAL at 08:04

## 2021-06-18 NOTE — PLAN OF CARE
Goal Outcome Evaluation:  Plan of Care Reviewed With: patient        Progress: improving  Outcome Summary: VSS, on RA. No complaints. up ad chetna. plan to d/c home with holter monitor. will continue to monitor

## 2021-06-18 NOTE — CONSULTS
"Diabetes Education  Assessment/Teaching    Patient Name:  Jayne Beltran  YOB: 1955  MRN: [id number]  Admit Date:  6/11/2021      Assessment Date:  6/18/2021    Most Recent Value   General Information    Referral From:  MD chavez   Height  165.1 cm (65\")   Height Method  Stated   Weight  71.1 kg (156 lb 11.2 oz)   Weight Method  Standing scale   Diabetes History   What type of diabetes do you have?  Type 2   Length of Diabetes Diagnosis  -- [Pt states \"years\".]   Do you test your blood sugar at home?  yes   Frequency of checks  TID   Who performs the test?  self   Typical readings  150s   Education Preferences   Barriers to Learning  other (comment) [none noted]   Assessment Topics   Problem Solving - Assessment  Needs education   DM Goals   Reducing Risk - Goal  30-90 days from discharge   Contact Plan  Follow-up medical care            Most Recent Value   DM Education Needs   Meter  Has own   Medication  Insulin [Discussed pt currently on Lantus.  Pt was on Novolog AC TID via pen device at home.]   Problem Solving  Sick days, Treatment   Reducing Risks  A1C testing [a1c 8.4%]   Healthy Coping  Appropriate   Discharge Plan  Home   Motivation  Engaged   Teaching Method  Discussion   Patient Response  Verbalized understanding          Electronically signed by:  Dina Salcido RN  06/18/21 12:44 EDT  "

## 2021-06-18 NOTE — CASE MANAGEMENT/SOCIAL WORK
Continued Stay Note  [facility]     Patient Name: Jayne Beltran  MRN: [id number]  Today's Date: 6/18/2021    Admit Date: 6/11/2021    Discharge Plan     Row Name 06/18/21 6409       Plan    Plan  Home with spouse    Plan Comments  Patient reports to be close to baseline and will be going home with spouse and denies any discharge needs at this time.        Discharge Codes    No documentation.       Expected Discharge Date and Time     Expected Discharge Date Expected Discharge Time    Jun 18, 2021             Shannon Epley, RN

## 2021-06-18 NOTE — DISCHARGE SUMMARY
PHYSICIAN DISCHARGE SUMMARY                                                                        [facility]    Patient Identification:  Name: Jayne Beltran  Age: 65 y.o.  Sex: female  :  1955  MRN: [id number]  Primary Care Physician: Michael Arriaza Jr., DO    Admit date: 2021  Discharge date and time: No discharge date for patient encounter.   Discharged Condition: stable    Discharge Diagnoses:  Hyperosmolality    Gastrointestinal hemorrhage    Absolute anemia    Melena     Acute respiratory failure intubated 2021 extubated  1421  Altered mental status resolved  Sepsis resolved  Rule out endocarditis  Acute blood loss anemia with GI bleed and melanotic stools/coffee-ground emesis: Repeat EGD with gastric ulcer cauterized 612  Lactic acidosis resolved  Hyperosmolar nonketotic syndrome resolved  Leukocytosis improved  Tachycardia improved  Hypertension  Encephalopathy  COPD without exacerbation  History of tobacco use  Hospital Course: Jayne Beltran presented to [facility]    Patient admitted to the ICU altered mental status worsening respiratory status.  Intubated on the vent and MRI of the brain showed multiple bilateral embolic stroke blood cultures were negative.  NATHANIEL was negative.  Rocephin was continued per ID they then discontinued it.  Aspirin started.  Zio patch per neurology outpatient follow-up with neurology in 3 months.  At this time patient's issues have all resolved.  We started her back on her blood pressure medications and she will follow up with nephrology cardiology and neurology as per their recommendations.  Consults:   IP CONSULT TO PULMONOLOGY  IP CONSULT TO NUTRITION SERVICES  IP CONSULT TO DIABETES EDUCATOR  IP CONSULT TO GASTROENTEROLOGY  IP CONSULT TO NEUROLOGY  IP CONSULT TO INFECTIOUS DISEASES  IP CONSULT TO IV TEAM  IP CONSULT TO NEPHROLOGY  IP CONSULT TO CARDIOLOGY    Significant Diagnostic Studies:   Imaging  Results (All)     Procedure Component Value Units Date/Time    MRI Brain With & Without Contrast [[id number]] Collected: 06/13/21 1345     Updated: 06/13/21 1520    Narrative:      MR SCAN OF THE BRAIN WITHOUT AND WITH INTRAVENOUS CONTRAST     HISTORY: Upper extremity weakness. Confusion. Possible herpes  encephalitis.     The MR scan was performed with sagittal and axial and coronal images and  includes T1 images without and with intravenous contrast. The ventricles  are normal in size with some slight chronic small vessel ischemic change  in the deep white matter. In addition, the diffusion sequence shows a  multitude of acute infarcts scattered in both cerebral hemispheres as  well as in the cerebellum and suggesting a cardiac embolic source. The  largest infarct is located high in the left posterior parietal cortex  where it measures up to 1.3 x 2.8 cm. There is no associated abnormal  enhancement. There is no evidence of hemorrhage.     There are normal flow voids in the major vessels. There is prominent  polypoid mucosal thickening at the floor the left maxillary sinus. There  is also some fluid in the mastoid air cells bilaterally.     CONCLUSION: Multiple small acute infarcts scattered in both cerebral  hemispheres and in the cerebellum and highly suspicious for a cardiac  embolic source and further clinical correlation is recommended. See  above discussion. There is some mild sinusitis and bilateral  mastoiditis.     This report was finalized on 6/13/2021 3:17 PM by Dr. Ricco Silva M.D.       XR Chest 1 View [[id number]] Collected: 06/12/21 1810     Updated: 06/12/21 1810    Narrative:        Patient: ANDRES FERMIN  Time Out: 18:09  Exam(s): FILM CXR 1 VIEW     EXAM:    XR Chest, 1 View    CLINICAL HISTORY:     Reason for exam: PICC line placement.    TECHNIQUE:    Frontal view of the chest.    COMPARISON:    Chest radiograph on 6 12 2021 at 939 hours    FINDINGS:    Hardware: Endotracheal tube  terminates in the region of the lower   thoracic trachea, approximately 2.6 cm above the syed.  Left-sided PICC   line terminates in the region of the lower SVC.      Lungs pleura: Interstitial opacities throughout the lungs. No pleural   effusion or pneumothorax.      Heart mediastinum: Stable mild enlargement of the cardiac silhouette.    Atherosclerotic calcification of the aorta.      Soft tissues: Unremarkable.      Bones: No acute fracture.  Degenerative changes of the   acromioclavicular joints.      Upper abdomen: Normal.    IMPRESSION:     1.  Endotracheal tube terminates in the region of the lower thoracic   trachea, approximately 2.6 cm above the syed.  Left-sided PICC line   terminates in the region of the lower SVC.  2.  Interstitial opacities throughout the lungs which may be secondary to   technique versus pulmonary vasculature congestion versus   infectious inflammatory process.      Impression:          Electronically signed by Chasity Massey M.D. on 06-12-21 at 1809    XR Chest 1 View [[id number]] Collected: 06/12/21 0959     Updated: 06/12/21 1003    Narrative:      One view portable chest at 9:39 AM     HISTORY: Recent intubation.     FINDINGS: There is been recent ET tube placement which appears to and in  good position approximately 5 cm above the syed. The lungs are clear  and the heart is top normal in size without change from yesterday's  exam.     This report was finalized on 6/12/2021 10:00 AM by Dr. Ricco Silva M.D.       XR Chest 1 View [[id number]] Collected: 06/11/21 0613     Updated: 06/11/21 0613    Narrative:        Patient: ANDRES FERMIN  Time Out: 06:13  Exam(s): FILM CXR 1 VIEW     EXAM:    XR Chest, 1 View    CLINICAL HISTORY:     Reason for exam: Hyperosmolar nonketotic state with leukocytosis.    TECHNIQUE:    Frontal view of the chest.    COMPARISON:  9 3 2020     FINDINGS:    Lungs:  Unremarkable.  No consolidation.    Pleural space:  Unremarkable.  No  pneumothorax.    Heart:  Unremarkable.  No cardiomegaly.    Mediastinum: Calcified aortic arch.    Bones joints:  Unremarkable.    IMPRESSION:       No acute pulmonary process.    Impression:          Electronically signed by Glynn Henry M.D. on 06-11-21 at 0613    CT Head Without Contrast [[id number]] Collected: 06/11/21 0412     Updated: 06/11/21 0412    Narrative:        Patient: ANDRES BELTRAN  Time Out: 04:11  Exam(s): CT HEAD Without Contrast     EXAM:    CT Head Without Intravenous Contrast    CLINICAL HISTORY:     Reason for exam: ams. left side weakness.    TECHNIQUE:    Axial computed tomography images of the head brain without intravenous   contrast.  CTDI is 54.80 mGy and DLP is 1023.8 mGy-cm.  This CT exam was   performed according to the principle of ALARA (As Low As Reasonably   Achievable) by using one or more of the following dose reduction   techniques: automated exposure control, adjustment of the mA and or kV   according to patient size, and or use of iterative reconstruction   technique.    COMPARISON:    9 8 2020    FINDINGS:    Brain:  No hemorrhage or mass effect.  Mild cerebral volume loss.    Ventricles:  No hydrocephalus.    Bones joints:  Unremarkable.    Soft tissues:  Unremarkable.    Sinuses:  Unremarkable.    Mastoid air cells:  Clear.    IMPRESSION:         No acute hemorrhage, hydrocephalus, or mass effect.      Impression:          Electronically signed by Fredy Cervantes MD on 06-11-21 at 0411          Discharge Exam:  Alert and oriented x 4, in NAD  Supple neck, midline trach  RRR, no m/r/g, no edema  Clear bilaterally, no wheezing, nonlabored  No clubbing or cyanosis     Disposition:  Home    Patient Instructions:   Follow-up Information     Michael Arriaza Jr., DO .    Specialties: Family Medicine, Emergency Medicine, Urgent Care  Contact information:  [address] [place] [address] [address]  [place] [id number] [phone]                 Discharge Orders  Andres Beltran (MRN  [id number])    pantoprazole (PROTONIX) EC tablet [[id number]] (Medications)     Order Class Sig Dispense Dispense As Written Refill    Normal Take 1 tablet by mouth 2 (Two) Times a Day Before Meals. 60 tablet No 3    Exception code Route Start Date End Date Order Date     Oral 06/18/21 06/18/21 1316          sucralfate (CARAFATE) tablet 1 gram [[id number]] (Medications)     Order Class Sig Dispense Dispense As Written Refill    Normal Take 1 tablet by mouth 4 (Four) Times a Day Before Meals & at Bedtime. 120 tablet No 3    Exception code Route Start Date End Date Order Date     Oral 06/18/21 06/18/21 1316                    Discharge Medications      New Medications      Instructions Start Date   pantoprazole 40 MG EC tablet  Commonly known as: PROTONIX   40 mg, Oral, 2 Times Daily Before Meals      sucralfate 1 g tablet  Commonly known as: CARAFATE   1 g, Oral, 4 Times Daily Before Meals & Nightly         Continue These Medications      Instructions Start Date   Accu-Chek Ilana Plus w/Device kit   1 each, Does not apply, 3 Times Daily Before Meals      Accu-Chek Ilana solution   1 each, In Vitro, 3 Times Daily Before Meals      Accu-Chek Multiclix Lancet Dev kit   1 each, Does not apply, 3 Times Daily Before Meals      accu-chek multiclix lancets   1 each, Other, 3 Times Daily Before Meals, Use as instructed      B-D SINGLE USE SWABS REGULAR pads   1 each, Does not apply, 3 Times Daily Before Meals      citalopram 10 MG tablet  Commonly known as: CeleXA   10 mg, Oral, Daily      glucose blood test strip  Commonly known as: Accu-Chek Ilana Plus   1 each, Other, 3 Times Daily Before Meals, Use as instructed      Iron 28 MG tablet   1 tablet, Oral, Daily      Jardiance 10 MG tablet  Generic drug: Empagliflozin   10 mg, Oral, Daily      lisinopril 10 MG tablet  Commonly known as: PRINIVIL,ZESTRIL   10 mg, Oral, Daily      Melatonin 10 MG tablet   1 tablet, Oral, Every Night at Bedtime      metoprolol succinate  MG 24 hr  tablet  Commonly known as: TOPROL-XL   200 mg, Oral, Daily      NovoLOG FlexPen 100 UNIT/ML solution pen-injector sc pen  Generic drug: insulin aspart   5 Units, Subcutaneous, 3 Times Daily With Meals, SSI-DEPENDS PM BLOOD GLUCOSE      pregabalin 150 MG capsule  Commonly known as: LYRICA   150 mg, Oral, 3 Times Daily      RELION PEN NEEDLE 31G/8MM 31G X 8 MM misc  Generic drug: Insulin Pen Needle   1 each, Does not apply, 3 Times Daily      rOPINIRole 3 MG tablet  Commonly known as: REQUIP   3 mg, Oral, Nightly, Take 1-3 hr prior to bedtime      senna 8.6 MG tablet  Commonly known as: SENOKOT   8.6 mg, Oral, Daily      VITAMIN C PO   1 tablet, Oral, Daily      vitamin E 400 UNIT capsule   400 Units, Oral, Daily         Stop These Medications    furosemide 80 MG tablet  Commonly known as: LASIX            Total time spent discharging patient including evaluation, medication reconciliation, arranging follow up, and post hospitalization instructions and education total time exceeds 30 minutes.    Signed:  Carlos Harmon MD  6/18/2021  13:16 EDT

## 2021-06-18 NOTE — THERAPY EVALUATION
Patient Name: Jayne Beltran  : 1955    MRN: [id number]                              Today's Date: 2021       Admit Date: 2021    Visit Dx:     ICD-10-CM ICD-9-CM   1. Hyperosmolality  E87.0 276.0   2. Metabolic encephalopathy  G93.41 348.31   3. Hyperglycemia  R73.9 790.29   4. Gastrointestinal hemorrhage, unspecified gastrointestinal hemorrhage type  K92.2 578.9   5. Other iron deficiency anemia  D50.8 280.8   6. Melena  K92.1 578.1     Patient Active Problem List   Diagnosis   • Essential hypertension   • Snoring   • TIA (transient ischemic attack)   • GERD without esophagitis   • Inflamed seborrheic keratosis   • Type 2 diabetes mellitus without complication, without long-term current use of insulin (CMS/HCC)   • Peripheral neuropathy   • Restless legs syndrome   • Benign paroxysmal positional vertigo   • Bone lesion   • Vitamin D deficiency   • Dyslipidemia   • Muscle spasm of both lower legs   • Tobacco use disorder   • Abnormal lung sounds   • Intermittent claudication (CMS/HCC)   • Chronic respiratory failure with hypoxia (CMS/HCC)   • Leg mass, right   • Rib pain on left side   • Diabetic autonomic neuropathy associated with type 2 diabetes mellitus (CMS/HCC)   • Leukocytosis   • Primary insomnia   • PMB (postmenopausal bleeding)   • Family history of ovarian cancer   • Acute renal failure (ARF) (CMS/HCC)   • Duplicated renal collecting system   • Atherosclerotic vascular disease   • Elevated platelet count   • Low hemoglobin   • Other anomalies of uterus   • Multiple kidney stones   • Multiple kidney stones   • Bilateral lower extremity edema   • Diarrhea   • Sore on leg   • Syncope and collapse   • Acute renal failure (CMS/HCC)   • Unspecified malignant neoplasm of skin of nose   • Shortness of breath   • Pain, unspecified   • Other specified coagulation defects (CMS/HCC)   • Encounter for change or removal of nonsurgical wound dressing   • Chronic obstructive pulmonary disease,  unspecified (CMS/HCC)   • Iron deficiency anemia   • Anxiety   • History of subdural hematoma   • Back pain   • Diabetes mellitus (CMS/HCC)   • Malignant melanoma (CMS/HCC)   • Panic attack   • Acute renal failure (CMS/HCC)   • Calculus of kidney   • Restless legs syndrome   • Transient ischemic attack   • Right shoulder injury, initial encounter   • Nonspecific abnormal findings on imaging examination of skull and head   • Hyperosmolality   • Gastrointestinal hemorrhage   • Absolute anemia   • Melena   • Dependence on renal dialysis (CMS/HCC)   • Aortic valve calcification   • Impaired left ventricular relaxation   • Concentric left ventricular hypertrophy   • Left atrial dilatation   • Nonrheumatic mitral valve regurgitation   • Internal carotid artery stenosis, right     Past Medical History:   Diagnosis Date   • COPD (chronic obstructive pulmonary disease) (CMS/HCC)    • Diabetes mellitus (CMS/HCC)     type 2   • Fibroid    • Hypertension    • Leukocytosis    • Neuromuscular disorder (CMS/HCC)    • Panic attack    • Skin cancer     nose   • TIA (transient ischemic attack)      Past Surgical History:   Procedure Laterality Date   • CHOLECYSTECTOMY     • COLONOSCOPY     • ENDOSCOPY N/A 6/11/2021    Procedure: ESOPHAGOGASTRODUODENOSCOPY AT BEDSIDE;  Surgeon: Donovan Banks MD;  Location: [facility] ENDOSCOPY;  Service: Gastroenterology;  Laterality: N/A;  pre: melena, GI bleed, anemia   post: retained food    • ENDOSCOPY N/A 6/12/2021    Procedure: ESOPHAGOGASTRODUODENOSCOPY WITH GOLD PROBE CAUTERY 7FR TO BLEEDING GASTRIC ANTRUM ULCER;  Surgeon: Calvin Barraza MD;  Location: [facility] ENDOSCOPY;  Service: Gastroenterology;  Laterality: N/A;  PRE- GI BLEED  POST- BLEEDING GASTRIC ANTRUM ULCER   • INSERTION HEMODIALYSIS CATHETER N/A 9/3/2020    Procedure: HEMODIALYSIS CATHETER INSERTION;  Surgeon: Sergio Durant MD;  Location: [facility] MAIN OR;  Service: Vascular;  Laterality: N/A;   • KIDNEY STONE SURGERY     •  URETEROSCOPY LASER LITHOTRIPSY WITH STENT INSERTION Right 12/21/2020    Procedure: CYSTOSCOPY, RIGHT URETEROSCOPY, RIGHT RETROGRADE PYELOGRAM, LASER LITHOTRIPSY WITH RIGHT URETERAL STENT EXCHANGE;  Surgeon: Mikie Mejia MD;  Location: [facility];  Service: Urology;  Laterality: Right;     General Information     Row Name 06/18/21 1124          Physical Therapy Time and Intention    Document Type  evaluation  -AE (r) DF (t) AE (c)     Mode of Treatment  individual therapy;physical therapy  -AE (r) DF (t) AE (c)     Row Name 06/18/21 1124          General Information    Prior Level of Function  independent:  -AE (r) DF (t) AE (c)     Row Name 06/18/21 1124          Living Environment    Lives With  spouse  -AE (r) DF (t) AE (c)     Row Name 06/18/21 1124          Home Main Entrance    Number of Stairs, Main Entrance  none ramp  -AE (r) DF (t) AE (c)     Row Name 06/18/21 1124          Stairs Within Home, Primary    Number of Stairs, Within Home, Primary  none  -AE (r) DF (t) AE (c)     Row Name 06/18/21 1124          Cognition    Orientation Status (Cognition)  oriented x 4  -AE (r) DF (t) AE (c)     Row Name 06/18/21 1124          Safety Issues, Functional Mobility    Safety Issues Affecting Function (Mobility)  impulsivity  -AE (r) DF (t) AE (c)       User Key  (r) = Recorded By, (t) = Taken By, (c) = Cosigned By    Initials Name Provider Type    AE Radha Knowles, PT Physical Therapist    Rodney Wilkes PT Student PT Student        Mobility     Row Name 06/18/21 1125          Bed Mobility    Bed Mobility  supine-sit  -AE (r) DF (t) AE (c)     Supine-Sit [place] (Bed Mobility)  independent  -AE (r) DF (t) AE (c)     Assistive Device (Bed Mobility)  head of bed elevated  -AE (r) DF (t) AE (c)     Row Name 06/18/21 1125          Bed-Chair Transfer    Bed-Chair [place] (Transfers)  standby assist  -AE (r) DF (t) AE (c)     Row Name 06/18/21 1125          Sit-Stand Transfer    Sit-Stand  [place] (Transfers)  supervision  -AE (r) DF (t) AE (c)     Row Name 06/18/21 1125          Gait/Stairs (Locomotion)    [place]are Level (Gait)  supervision  -AE (r) DF (t) AE (c)     Distance in Feet (Gait)  20ft  -AE (r) DF (t) AE (c)     Deviations/Abnormal Patterns (Gait)  gait speed decreased;jorge decreased;stride length decreased  -AE (r) DF (t) AE (c)       User Key  (r) = Recorded By, (t) = Taken By, (c) = Cosigned By    Initials Name Provider Type    AE Radha Knowles, PT Physical Therapist    Rodney Wilkes, PT Student PT Student        Obj/Interventions     Row Name 06/18/21 1126          Range of Motion Comprehensive    Comment, General Range of Motion  shoulder flexion ROM deficits  (Pended)   -DF     Row Name 06/18/21 1126          Strength Comprehensive (MMT)    Comment, General Manual Muscle Testing (MMT) Assessment  impairments in shoulder abduction and flexion  (Pended)   -DF     Row Name 06/18/21 1126          Balance    Balance Assessment  sitting static balance;standing static balance;standing dynamic balance  (Pended)   -DF     Static Sitting Balance  WFL  (Pended)   -DF     Static Standing Balance  WFL  (Pended)   -DF     Dynamic Standing Balance  WFL  (Pended)   -DF       User Key  (r) = Recorded By, (t) = Taken By, (c) = Cosigned By    Initials Name Provider Type    Rodney Wilkes, PT Student PT Student        Goals/Plan    No documentation.       Clinical Impression     Row Name 06/18/21 1127          Pain    Additional Documentation  Pain Scale: Numbers Pre/Post-Treatment (Group)  (Pended)   -DF     Row Name 06/18/21 1127          Pain Scale: Numbers Pre/Post-Treatment    Pretreatment Pain Rating  0/10 - no pain  (Pended)   -DF     Posttreatment Pain Rating  0/10 - no pain  (Pended)   -DF     Row Name 06/18/21 1127          Plan of Care Review    Plan of Care Reviewed With  patient  (Pended)   -DF     Progress  improving  (Pended)   -DF     Row Name 06/18/21 1127           Therapy Assessment/Plan (PT)    Criteria for Skilled Interventions Met (PT)  does not meet criteria for skilled intervention  (Pended)   -DF     Row Name 06/18/21 1127          Positioning and Restraints    Pre-Treatment Position  in bed  (Pended)   -DF     Post Treatment Position  chair  (Pended)   -DF     In Chair  sitting;call light within reach;encouraged to call for assist  (Pended)   -DF       User Key  (r) = Recorded By, (t) = Taken By, (c) = Cosigned By    Initials Name Provider Type    Rodney Wilkes, PT Student PT Student        Outcome Measures     Row Name 06/18/21 1127          How much help from another person do you currently need...    Turning from your back to your side while in flat bed without using bedrails?  4  (Pended)   -DF     Moving from lying on back to sitting on the side of a flat bed without bedrails?  4  (Pended)   -DF     Moving to and from a bed to a chair (including a wheelchair)?  4  (Pended)   -DF     Standing up from a chair using your arms (e.g., wheelchair, bedside chair)?  4  (Pended)   -DF     Climbing 3-5 steps with a railing?  3  (Pended)   -DF     To walk in hospital room?  4  (Pended)   -DF     AM-PAC 6 Clicks Score (PT)  23  (Pended)   -DF     Row Name 06/18/21 1127          Functional Assessment    Outcome Measure Options  AM-PAC 6 Clicks Basic Mobility (PT)  (Pended)   -DF       User Key  (r) = Recorded By, (t) = Taken By, (c) = Cosigned By    Initials Name Provider Type    Rodney Wilkes, PT Student PT Student        Physical Therapy Education                 Title: PT OT SLP Therapies (Not Started)     Topic: Physical Therapy (Not Started)     Point: Mobility training (Not Started)     Learner Progress:  Not documented in this visit.          Point: Home exercise program (Not Started)     Learner Progress:  Not documented in this visit.          Point: Body mechanics (Not Started)     Learner Progress:  Not documented in this visit.          Point:  Precautions (Not Started)     Learner Progress:  Not documented in this visit.                          PT Recommendation and Plan     Plan of Care Reviewed With: (P) patient  Progress: (P) improving     Time Calculation:   PT Charges     Row Name 06/18/21 1128             Time Calculation    Start Time  1032  (Pended)   -DF      Stop Time  1052  (Pended)   -DF      Time Calculation (min)  20 min  (Pended)   -DF      PT Received On  06/18/21  (Pended)   -DF         Time Calculation- PT    Total Timed Code Minutes- PT  10 minute(s)  (Pended)   -DF        User Key  (r) = Recorded By, (t) = Taken By, (c) = Cosigned By    Initials Name Provider Type    DF Rodney Perkins, PT Student PT Student        Therapy Charges for Today     Code Description Service Date Service Provider Modifiers Qty    [id number] HC GAIT TRAINING EA 15 MIN 6/18/2021 Rodney Perkins, PT Student GP 1          PT G-Codes  Outcome Measure Options: (P) AM-PAC 6 Clicks Basic Mobility (PT)  AM-PAC 6 Clicks Score (PT): (P) 23    Rodney Perkins PT Student  6/18/2021

## 2021-06-18 NOTE — PLAN OF CARE
This 64 y/o female was admitted for GI bleed due to ulcer, metabolic encephalopathy, hyperosmolality, and multiple bilateral embolic strokes. Prior to admission, Pt reports being completely IND living with spouse. Ramp to enter home through garage. Pt denies need for acute care P.T. at this time. No strength or sensation deficits identified. Chronic shoulder pain reported by patient. Ambulated 20ft in room SBA w/o AD. Pt is currently safe to DC home. No DME needed at this time. Educated Pt to cont ambulating with nursing staff 2-3x per day while admitted to prevent deconditioning. Will DC from P.T. caseload.     Goal Outcome Evaluation:  Plan of Care Reviewed With: (P) patient        Progress: (P) improving

## 2021-06-18 NOTE — PLAN OF CARE
Goal Outcome Evaluation:              Outcome Summary: Pt tolerating care. no complaints during shift. resstarted home meds. Planning to DC PICC and go home today. will continue care.

## 2021-06-18 NOTE — OUTREACH NOTE
Prep Survey      Responses   [facility] patient discharged from?  [place]   Is LACE score < 7 ?  No   Emergency Room discharge w/ pulse ox?  No   Eligibility  [facility]   Date of Admission  06/11/21   Date of Discharge  06/18/21   Discharge Disposition  Home or Self Care   Discharge diagnosis  hyperosmolality, GI Hemorrhage  Stroke   Does the patient have one of the following disease processes/diagnoses(primary or secondary)?  Stroke (TIA)   Does the patient have Home health ordered?  No   Is there a DME ordered?  No   Prep survey completed?  Yes          Prerna Palomino RN

## 2021-06-21 ENCOUNTER — TRANSITIONAL CARE MANAGEMENT TELEPHONE ENCOUNTER (OUTPATIENT)
Dept: CALL CENTER | Facility: HOSPITAL | Age: 66
End: 2021-06-21

## 2021-06-21 NOTE — PROGRESS NOTES
Discharge Planning Assessment  [facility]     Patient Name: Jyane Beltran  MRN: [id number]  Today's Date: 6/21/2021    Admit Date: 6/11/2021    Discharge Needs Assessment    No documentation.       Discharge Plan     Row Name 06/21/21 1310       Plan    Plan  Home    Final Discharge Disposition Code  01 - home or self-care    Final Note  Home        Continued Care and Services - Discharged on 6/18/2021 Admission date: 6/11/2021 - Discharge disposition: Home or Self Care   Coordination has not been started for this encounter.       Expected Discharge Date and Time     Expected Discharge Date Expected Discharge Time    Jun 18, 2021         Demographic Summary    No documentation.       Functional Status    No documentation.       Psychosocial    No documentation.       Abuse/Neglect    No documentation.       Legal    No documentation.       Substance Abuse    No documentation.       Patient Forms    No documentation.           Karyn Hernandez RN

## 2021-06-21 NOTE — OUTREACH NOTE
Call Center TCM Note      Responses   [facility] patient discharged from?  [place]   Does the patient have one of the following disease processes/diagnoses(primary or secondary)?  Stroke (TIA)   TCM attempt successful?  Yes   Call start time  1352   Call end time  1357   Discharge diagnosis  hyperosmolality, GI Hemorrhage  Stroke   Person spoke with today (if not patient) and relationship  Golden-spouse    Meds reviewed with patient/caregiver?  Yes   Is the patient having any side effects they believe may be caused by any medication additions or changes?  No   Does the patient have all medications ordered at discharge?  Yes   Is the patient taking all medications as directed (includes completed medication regime)?  Yes   Does the patient have a primary care provider?   Yes   Does the patient have an appointment with their PCP within 7 days of discharge?  Yes   Comments regarding PCP  Hospital d/c f/u appt is on 6/22/21 at 1:30 pm    Has the patient kept scheduled appointments due by today?  N/A   Psychosocial issues?  No   Does the patient require any assistance with activities of daily living such as eating, bathing, dressing, walking, etc.?  No   Does the patient have any residual symptoms from stroke/TIA?  Yes   Does the patient understand the diet ordered at discharge?  No   Did the patient receive a copy of their discharge instructions?  Yes   Nursing interventions  Reviewed instructions with patient   What is the patient's perception of their health status since discharge?  Improving   Nursing interventions  Nurse provided patient education   Is the patient able to teach back FAST for Stroke?  No [provided education]   Is the patient/caregiver able to teach back the risk factors for a stroke?  High blood pressure-goal below 120/80, Smoking, Diabetes, High Cholesterol   Is the patient/caregiver able to teach back signs and symptoms related to disease process for when to call PCP?  Yes   Is the  patient/caregiver able to teach back signs and symptoms related to disease process for when to call 911?  Yes   If the patient is a current smoker, are they able to teach back resources for cessation?  Smoking cessation medications   Is the patient/caregiver able to teach back the hierarchy of who to call/visit for symptoms/problems? PCP, Specialist, Home health nurse, Urgent Care, ED, 911  Yes   TCM call completed?  Yes          Silvia Conway RN    6/21/2021, 13:57 EDT

## 2021-06-22 ENCOUNTER — OFFICE VISIT (OUTPATIENT)
Dept: FAMILY MEDICINE CLINIC | Facility: CLINIC | Age: 66
End: 2021-06-22

## 2021-06-22 VITALS
WEIGHT: 156 LBS | BODY MASS INDEX: 25.99 KG/M2 | TEMPERATURE: 96.9 F | OXYGEN SATURATION: 97 % | HEART RATE: 83 BPM | SYSTOLIC BLOOD PRESSURE: 138 MMHG | DIASTOLIC BLOOD PRESSURE: 78 MMHG | HEIGHT: 65 IN

## 2021-06-22 DIAGNOSIS — I63.10 CEREBROVASCULAR ACCIDENT (CVA) DUE TO EMBOLISM OF PRECEREBRAL ARTERY (HCC): Primary | ICD-10-CM

## 2021-06-22 DIAGNOSIS — Z79.899 HIGH RISK MEDICATION USE: ICD-10-CM

## 2021-06-22 DIAGNOSIS — E11.9 TYPE 2 DIABETES MELLITUS WITHOUT COMPLICATION, WITHOUT LONG-TERM CURRENT USE OF INSULIN (HCC): ICD-10-CM

## 2021-06-22 DIAGNOSIS — E78.5 DYSLIPIDEMIA: ICD-10-CM

## 2021-06-22 DIAGNOSIS — I10 ESSENTIAL HYPERTENSION: ICD-10-CM

## 2021-06-22 PROBLEM — N17.9 ACUTE RENAL FAILURE (HCC): Status: RESOLVED | Noted: 2020-08-24 | Resolved: 2021-06-22

## 2021-06-22 PROCEDURE — 99496 TRANSJ CARE MGMT HIGH F2F 7D: CPT | Performed by: FAMILY MEDICINE

## 2021-06-22 PROCEDURE — 1111F DSCHRG MED/CURRENT MED MERGE: CPT | Performed by: FAMILY MEDICINE

## 2021-06-22 RX ORDER — LISINOPRIL 10 MG/1
10 TABLET ORAL DAILY
Qty: 90 TABLET | Refills: 1 | Status: SHIPPED | OUTPATIENT
Start: 2021-06-22 | End: 2021-10-25

## 2021-06-22 RX ORDER — EMPAGLIFLOZIN 25 MG/1
25 TABLET, FILM COATED ORAL DAILY
Qty: 90 TABLET | Refills: 1 | Status: SHIPPED | OUTPATIENT
Start: 2021-06-22

## 2021-06-22 NOTE — ASSESSMENT & PLAN NOTE
Increase Jardiance to 25 mg daily.  She is also taking NovoLog 2 units 3 times daily AC plus sliding scale 1 unit for every 10 greater than 140.

## 2021-06-22 NOTE — PROGRESS NOTES
Transitional Care Follow Up Visit  Subjective     Jayne Beltran is a 65 y.o. female who presents for a transitional care management visit.    Within 48 business hours after discharge our office contacted her via telephone to coordinate her care and needs.      I reviewed and discussed the details of that call along with the discharge summary, hospital problems, inpatient lab results, inpatient diagnostic studies, and consultation reports with Jayne.     Current outpatient and discharge medications have been reconciled for the patient.  Reviewed by: Michael Arriaza Jr., DO      2 weeks ago the patient woke up and felt nauseated.  She lost consciousness and was taken to the hospital where she was admitted and found to have a blood sugar over 600, 2 mini strokes, and a bleeding ulcer.  She was hospitalized over a week.  The MRI of her brain showed multiple bilateral embolic strokes.  Her blood cultures were negative.  NATHANIEL was negative.  Infectious disease saw her and said that there was no infection that was found.  No cause was identified.  She has had both her Covid shots.    She has diabetes.  She ran out of her Jardiance. She states she was told the Jardiance will cost her $400.00 which she can not afford to pay.    Date of TCM Phone Call 6/18/2021   [facility]   Date of Admission 6/11/2021   Date of Discharge 6/18/2021   Discharge Disposition Home or Self Care     Risk for Readmission (LACE) Score: 14 (6/18/2021  6:02 AM)      History of Present Illness   Course During Hospital Stay:  ED to Hosp-Admission  Discharged   6/11/2021 - 6/18/2021 (7 days)  [facility]   Obdulio Burger MD  Last attending • Treatment team   Discharge Summary  Carlos Harmon MD (Physician) • • Pulmonology       PHYSICIAN DISCHARGE SUMMARY                                                                        [facility]     Patient Identification:  Name: Jayne ALMONTE  Ruby  Age: 65 y.o.  Sex: female  :  1955  MRN: [id number]  Primary Care Physician: Michael Arriaza Jr.,      Admit date: 2021  Discharge date and time: No discharge date for patient encounter.   Discharged Condition: stable     Discharge Diagnoses:  Hyperosmolality    Gastrointestinal hemorrhage    Absolute anemia    Melena     Acute respiratory failure intubated 2021 extubated 6 1421  Altered mental status resolved  Sepsis resolved  Rule out endocarditis  Acute blood loss anemia with GI bleed and melanotic stools/coffee-ground emesis: Repeat EGD with gastric ulcer cauterized 612  Lactic acidosis resolved  Hyperosmolar nonketotic syndrome resolved  Leukocytosis improved  Tachycardia improved  Hypertension  Encephalopathy  COPD without exacerbation  History of tobacco use  Hospital Course: Jayne Beltran presented to [facility]    Patient admitted to the ICU altered mental status worsening respiratory status.  Intubated on the vent and MRI of the brain showed multiple bilateral embolic stroke blood cultures were negative.  NATHANIEL was negative.  Rocephin was continued per ID they then discontinued it.  Aspirin started.  Zio patch per neurology outpatient follow-up with neurology in 3 months.  At this time patient's issues have all resolved.  We started her back on her blood pressure medications and she will follow up with nephrology cardiology and neurology as per their recommendations.             The following portions of the patient's history were reviewed and updated as appropriate: allergies, current medications, past family history, past medical history, past social history, past surgical history and problem list.    Review of Systems    Objective   Physical Exam  Vitals and nursing note reviewed.   Constitutional:       General: She is not in acute distress.     Appearance: Normal appearance. She is well-developed. She is not diaphoretic.   HENT:      Head:  Normocephalic and atraumatic.      Right Ear: External ear normal.      Left Ear: External ear normal.      Nose: Nose normal.      Mouth/Throat:      Pharynx: No oropharyngeal exudate.   Eyes:      General: Lids are normal. No scleral icterus.        Right eye: No discharge.         Left eye: No discharge.   Neck:      Thyroid: No thyromegaly.      Trachea: Trachea normal. No tracheal deviation.   Cardiovascular:      Rate and Rhythm: Normal rate and regular rhythm.      Heart sounds: Normal heart sounds. No murmur heard.   No friction rub. No gallop.    Pulmonary:      Effort: Pulmonary effort is normal. No tachypnea, bradypnea or respiratory distress.      Breath sounds: Normal breath sounds. No stridor. No decreased breath sounds, wheezing or rales.   Chest:      Chest wall: No tenderness.   Musculoskeletal:      Cervical back: Full passive range of motion without pain, normal range of motion and neck supple. No edema.   Lymphadenopathy:      Head:      Right side of head: No submental, submandibular, tonsillar, preauricular, posterior auricular or occipital adenopathy.      Left side of head: No submental, submandibular, tonsillar, preauricular, posterior auricular or occipital adenopathy.      Cervical: No cervical adenopathy.      Right cervical: No superficial, deep or posterior cervical adenopathy.     Left cervical: No superficial, deep or posterior cervical adenopathy.   Skin:     General: Skin is warm and dry.      Capillary Refill: Capillary refill takes less than 2 seconds.      Coloration: Skin is not pale.      Findings: No erythema or rash.      Nails: There is no clubbing.   Neurological:      Mental Status: She is alert and oriented to person, place, and time. She is not disoriented.      Deep Tendon Reflexes: Reflexes are normal and symmetric.   Psychiatric:         Behavior: Behavior normal.         Assessment/Plan   Diagnoses and all orders for this visit:    1. Cerebrovascular accident (CVA)  due to embolism of precerebral artery (CMS/[facility]) (Primary)  Assessment & Plan:  Continue aspirin 81 mg daily.      2. Type 2 diabetes mellitus without complication, without long-term current use of insulin (CMS/[facility])  Assessment & Plan:   Increase Jardiance to 25 mg daily.  She is also taking NovoLog 2 units 3 times daily AC plus sliding scale 1 unit for every 10 greater than 140.      Orders:  -     Empagliflozin (Jardiance) 25 MG tablet; Take 1 tablet by mouth Daily.  Dispense: 90 tablet; Refill: 1  -     Hemoglobin A1c; Future    3. Essential hypertension  -     lisinopril (PRINIVIL,ZESTRIL) 10 MG tablet; Take 1 tablet by mouth Daily.  Dispense: 90 tablet; Refill: 1    4. High risk medication use  -     Comprehensive Metabolic Panel; Future  -     CBC & Differential; Future    5. Dyslipidemia  -     Lipid Panel With / Chol / HDL Ratio; Future

## 2021-06-25 ENCOUNTER — READMISSION MANAGEMENT (OUTPATIENT)
Dept: CALL CENTER | Facility: HOSPITAL | Age: 66
End: 2021-06-25

## 2021-06-25 NOTE — OUTREACH NOTE
Stroke Week 2 Survey      Responses   [facility] patient discharged from?  [place]   Does the patient have one of the following disease processes/diagnoses(primary or secondary)?  Stroke (TIA)   Week 2 attempt successful?  No   Unsuccessful attempts  Attempt 1          Christine Armas RN

## 2021-06-29 ENCOUNTER — READMISSION MANAGEMENT (OUTPATIENT)
Dept: CALL CENTER | Facility: HOSPITAL | Age: 66
End: 2021-06-29

## 2021-06-29 NOTE — OUTREACH NOTE
"Stroke Week 2 Survey      Responses   [facility] patient discharged from?  [place]   Does the patient have one of the following disease processes/diagnoses(primary or secondary)?  Stroke (TIA)   Week 2 attempt successful?  Yes   Call start time  1142   Call end time  1146   Discharge diagnosis  hyperosmolality, GI Hemorrhage,  Stroke   Person spoke with today (if not patient) and relationship  Golden-spouse and patient    Meds reviewed with patient/caregiver?  Yes   Is the patient having any side effects they believe may be caused by any medication additions or changes?  No   Does the patient have all medications ordered at discharge?  Yes   Is the patient taking all medications as directed (includes completed medication regime)?  Yes   Comments regarding appointments  Pt has an appt on 9/20/21 with neurology,  Appt with Dr. Beaulieu the first week of Aug,  Encouraged cards and GI appt   Does the patient have a primary care provider?   Yes   Comments regarding PCP  Pt has followed up with PCP    Has the patient kept scheduled appointments due by today?  Yes   Psychosocial issues?  No   Does the patient require any assistance with activities of daily living such as eating, bathing, dressing, walking, etc.?  No   Does the patient have any residual symptoms from stroke/TIA?  No   Does the patient understand the diet ordered at discharge?  Yes [ reports, \"she eats whatever she wants\"]   Did the patient receive a copy of their discharge instructions?  Yes   Nursing interventions  Reviewed instructions with patient   What is the patient's perception of their health status since discharge?  Improving   Nursing interventions  Nurse provided patient education   Is the patient able to teach back FAST for Stroke?  No [Provided education]   Is the patient/caregiver able to teach back the risk factors for a stroke?  High blood pressure-goal below 120/80, Smoking, Diabetes, High Cholesterol, Sleep apnea   If the " patient is a current smoker, are they able to teach back resources for cessation?  Smoking cessation medications   Week 2 call completed?  Yes          Silvia Conway RN

## 2021-07-06 ENCOUNTER — READMISSION MANAGEMENT (OUTPATIENT)
Dept: CALL CENTER | Facility: HOSPITAL | Age: 66
End: 2021-07-06

## 2021-07-06 NOTE — OUTREACH NOTE
Stroke Week 3 Survey      Responses   [facility] patient discharged from?  [place]   Does the patient have one of the following disease processes/diagnoses(primary or secondary)?  Stroke (TIA)   Week 3 attempt successful?  Yes   Call start time  1724   Call end time  1727   Discharge diagnosis  hyperosmolality, GI Hemorrhage,  Stroke   Meds reviewed with patient/caregiver?  Yes   Is the patient having any side effects they believe may be caused by any medication additions or changes?  No   Does the patient have all medications ordered at discharge?  Yes   Prescription comments  had slight increase in insulin dose   Is the patient taking all medications as directed (includes completed medication regime)?  Yes   Does the patient have a primary care provider?   Yes   Does the patient have an appointment with their PCP within 7 days of discharge?  Yes   Has the patient kept scheduled appointments due by today?  Yes   Has home health visited the patient within 72 hours of discharge?  N/A   Psychosocial issues?  No   Does the patient require any assistance with activities of daily living such as eating, bathing, dressing, walking, etc.?  No   Does the patient have any residual symptoms from stroke/TIA?  No   Does the patient understand the diet ordered at discharge?  Yes   Did the patient receive a copy of their discharge instructions?  Yes   What is the patient's perception of their health status since discharge?  Improving   Nursing interventions  Nurse provided patient education   Is the patient able to teach back FAST for Stroke?  Yes   Is the patient/caregiver able to teach back the risk factors for a stroke?  High blood pressure-goal below 120/80, Diabetes, High Cholesterol, Carotid or other artery disease, History of Afib   Is the patient/caregiver able to teach back signs and symptoms related to disease process for when to call PCP?  Yes   Is the patient/caregiver able to teach back signs and symptoms  related to disease process for when to call 911?  Yes   Is the patient/caregiver able to teach back the hierarchy of who to call/visit for symptoms/problems? PCP, Specialist, Home health nurse, Urgent Care, ED, 911  Yes   Additional teach back comments  states has pains in feet that keep pt awake   Week 3 call completed?  Yes          Amy Mcgowan RN

## 2021-07-10 LAB — FUNGUS WND CULT: NORMAL

## 2021-07-15 ENCOUNTER — READMISSION MANAGEMENT (OUTPATIENT)
Dept: CALL CENTER | Facility: HOSPITAL | Age: 66
End: 2021-07-15

## 2021-07-15 NOTE — OUTREACH NOTE
Stroke Week 4 Survey      Responses   [facility] patient discharged from?  [place]   Does the patient have one of the following disease processes/diagnoses(primary or secondary)?  Stroke (TIA)   Week 4 attempt successful?  Yes   Call start time  0928   Call end time  0934   Discharge diagnosis  hyperosmolality, GI Hemorrhage,  Stroke   Person spoke with today (if not patient) and relationship  Golden-spouse   Meds reviewed with patient/caregiver?  Yes   Is the patient taking all medications as directed (includes completed medication regime)?  Yes   Has the patient kept scheduled appointments due by today?  Yes   Is the patient still receiving Home Health Services?  N/A   Does the patient require any assistance with activities of daily living such as eating, bathing, dressing, walking, etc.?  No   Does the patient have any residual symptoms from stroke/TIA?  No   Does the patient understand the diet ordered at discharge?  Yes   What is the patient's perception of their health status since discharge?  Improving   Is the patient able to teach back FAST for Stroke?  Yes   Is the patient/caregiver able to teach back the risk factors for a stroke?  Sleep apnea, High blood pressure-goal below 120/80   Week 4 Call Completed?  Yes   Would the patient like one additional call?  No   Graduated  Yes   Is the patient interested in additional calls from an ambulatory ?  NOTE:  applies to high risk patients requiring additional follow-up.  No   Did the patient feel the follow up calls were helpful during their recovery period?  Yes   Was the number of calls appropriate?  Yes          Silvia Conway RN

## 2021-07-21 ENCOUNTER — TELEPHONE (OUTPATIENT)
Dept: FAMILY MEDICINE CLINIC | Facility: CLINIC | Age: 66
End: 2021-07-21

## 2021-07-21 ENCOUNTER — TELEPHONE (OUTPATIENT)
Dept: CARDIOLOGY | Facility: CLINIC | Age: 66
End: 2021-07-21

## 2021-07-21 DIAGNOSIS — E11.49 TYPE 2 DIABETES MELLITUS WITH OTHER DIABETIC NEUROLOGICAL COMPLICATION (HCC): ICD-10-CM

## 2021-07-21 DIAGNOSIS — J44.1 CHRONIC OBSTRUCTIVE PULMONARY DISEASE WITH (ACUTE) EXACERBATION (HCC): ICD-10-CM

## 2021-07-21 DIAGNOSIS — I47.1 SVT (SUPRAVENTRICULAR TACHYCARDIA) (HCC): ICD-10-CM

## 2021-07-21 DIAGNOSIS — I73.9 INTERMITTENT CLAUDICATION (HCC): ICD-10-CM

## 2021-07-21 DIAGNOSIS — D68.8 OTHER SPECIFIED COAGULATION DEFECTS (HCC): ICD-10-CM

## 2021-07-21 DIAGNOSIS — I48.20 CHRONIC ATRIAL FIBRILLATION (HCC): Primary | ICD-10-CM

## 2021-07-21 PROBLEM — Z99.2 DEPENDENCE ON RENAL DIALYSIS: Status: RESOLVED | Noted: 2021-06-11 | Resolved: 2021-07-21

## 2021-07-21 PROBLEM — J96.11 CHRONIC RESPIRATORY FAILURE WITH HYPOXIA: Status: RESOLVED | Noted: 2019-12-13 | Resolved: 2021-07-21

## 2021-07-21 LAB
MAXIMAL PREDICTED HEART RATE: 154 BPM
STRESS TARGET HR: 131 BPM

## 2021-07-21 PROCEDURE — 93248 EXT ECG>7D<15D REV&INTERPJ: CPT | Performed by: INTERNAL MEDICINE

## 2021-07-21 NOTE — TELEPHONE ENCOUNTER
----- Message from Charles Jackson MD sent at 7/21/2021  4:45 PM EDT -----  Michael, this patient was seen by my partner Chino Richards while she was in the hospital.  Merle, will you make sure she gets scheduled with Dr SEWELL or with Faith?    Diego rabago

## 2021-07-24 LAB
MYCOBACTERIUM SPEC CULT: NORMAL
NIGHT BLUE STAIN TISS: NORMAL

## 2021-07-29 NOTE — TELEPHONE ENCOUNTER
Spoke with pt  and he states pt is out of town in [place] and will have her call when she gets back. Thanks

## 2021-08-17 ENCOUNTER — TELEPHONE (OUTPATIENT)
Dept: FAMILY MEDICINE CLINIC | Facility: CLINIC | Age: 66
End: 2021-08-17

## 2021-08-17 DIAGNOSIS — I73.9 INTERMITTENT CLAUDICATION (HCC): ICD-10-CM

## 2021-08-17 DIAGNOSIS — E11.49 TYPE 2 DIABETES MELLITUS WITH OTHER DIABETIC NEUROLOGICAL COMPLICATION (HCC): ICD-10-CM

## 2021-08-17 DIAGNOSIS — D68.8 OTHER SPECIFIED COAGULATION DEFECTS (HCC): ICD-10-CM

## 2021-08-17 PROBLEM — M25.572 CHRONIC PAIN OF LEFT ANKLE: Status: ACTIVE | Noted: 2021-08-17

## 2021-08-17 PROBLEM — G89.29 CHRONIC PAIN OF RIGHT ANKLE: Status: ACTIVE | Noted: 2021-08-17

## 2021-08-17 PROBLEM — M25.571 CHRONIC PAIN OF RIGHT ANKLE: Status: ACTIVE | Noted: 2021-08-17

## 2021-08-17 PROBLEM — M54.42 CHRONIC BILATERAL LOW BACK PAIN WITH BILATERAL SCIATICA: Status: ACTIVE | Noted: 2017-02-28

## 2021-08-17 PROBLEM — M54.42 ACUTE BACK PAIN WITH SCIATICA, LEFT: Status: ACTIVE | Noted: 2020-11-25

## 2021-08-17 PROBLEM — M54.41 CHRONIC BILATERAL LOW BACK PAIN WITH BILATERAL SCIATICA: Status: ACTIVE | Noted: 2017-02-28

## 2021-08-17 PROBLEM — C43.9 MALIGNANT MELANOMA (HCC): Status: RESOLVED | Noted: 2020-11-25 | Resolved: 2021-08-17

## 2021-08-17 PROBLEM — M54.41 ACUTE BACK PAIN WITH SCIATICA, RIGHT: Status: ACTIVE | Noted: 2021-08-17

## 2021-08-17 PROBLEM — I48.20 CHRONIC ATRIAL FIBRILLATION: Status: RESOLVED | Noted: 2021-07-21 | Resolved: 2021-08-17

## 2021-08-17 PROBLEM — J44.9 CHRONIC OBSTRUCTIVE PULMONARY DISEASE, UNSPECIFIED (HCC): Status: RESOLVED | Noted: 2020-09-04 | Resolved: 2021-08-17

## 2021-08-17 PROBLEM — G89.29 CHRONIC PAIN OF LEFT ANKLE: Status: ACTIVE | Noted: 2021-08-17

## 2021-08-17 NOTE — TELEPHONE ENCOUNTER
"We received a fax from [facility], Janet Killian DC. [phone]. \"Reason for Request: Bilateral lower extremity pain and numbness. Dx codes: M54.5, M54.42, M54.41, M25.571, M25.572. MRI, [facility] - EMG Need for Lower Extremity Rt and Left and Lumbar Spine. We can't order Because we are Chiropractor.\"     Please call patient and let her know we received this fax and ask her to schedule an appointment to discuss her chiropractors recommendations and concerns regarding her back and lower extremity issues.  "

## 2021-08-18 ENCOUNTER — OFFICE VISIT (OUTPATIENT)
Dept: FAMILY MEDICINE CLINIC | Facility: CLINIC | Age: 66
End: 2021-08-18

## 2021-08-18 VITALS
BODY MASS INDEX: 25.36 KG/M2 | DIASTOLIC BLOOD PRESSURE: 70 MMHG | OXYGEN SATURATION: 97 % | HEART RATE: 83 BPM | HEIGHT: 65 IN | WEIGHT: 152.2 LBS | SYSTOLIC BLOOD PRESSURE: 140 MMHG | TEMPERATURE: 97.1 F

## 2021-08-18 DIAGNOSIS — M54.41 CHRONIC BILATERAL LOW BACK PAIN WITH BILATERAL SCIATICA: ICD-10-CM

## 2021-08-18 DIAGNOSIS — M54.42 CHRONIC BILATERAL LOW BACK PAIN WITH BILATERAL SCIATICA: ICD-10-CM

## 2021-08-18 DIAGNOSIS — G89.29 CHRONIC BILATERAL LOW BACK PAIN WITH BILATERAL SCIATICA: ICD-10-CM

## 2021-08-18 DIAGNOSIS — M54.41 ACUTE BACK PAIN WITH SCIATICA, RIGHT: ICD-10-CM

## 2021-08-18 DIAGNOSIS — Z79.4 TYPE 2 DIABETES MELLITUS WITH OTHER CIRCULATORY COMPLICATION, WITH LONG-TERM CURRENT USE OF INSULIN (HCC): Primary | ICD-10-CM

## 2021-08-18 DIAGNOSIS — E11.59 TYPE 2 DIABETES MELLITUS WITH OTHER CIRCULATORY COMPLICATION, WITH LONG-TERM CURRENT USE OF INSULIN (HCC): Primary | ICD-10-CM

## 2021-08-18 PROCEDURE — 99214 OFFICE O/P EST MOD 30 MIN: CPT | Performed by: FAMILY MEDICINE

## 2021-08-18 RX ORDER — DULAGLUTIDE 0.75 MG/.5ML
0.75 INJECTION, SOLUTION SUBCUTANEOUS WEEKLY
Qty: 5 PEN | Refills: 5 | Status: SHIPPED | OUTPATIENT
Start: 2021-08-18 | End: 2021-08-18

## 2021-08-18 RX ORDER — DULAGLUTIDE 0.75 MG/.5ML
0.75 INJECTION, SOLUTION SUBCUTANEOUS WEEKLY
Qty: 5 PEN | Refills: 5 | Status: SHIPPED | OUTPATIENT
Start: 2021-08-18

## 2021-08-18 NOTE — PROGRESS NOTES
Chief Complaint  Cerebrovascular accident (CVA) due to embolism of precerebra; Type 2 diabetes mellitus without complication, without long- (Controlled); Essential hypertension (Controlled); and Dyslipidemia (Controlled)    Subjective          Jayne Beltran presents to [facility] for     History of Present Illness    Pt has controlled HTN.    She has DM. She is taking Novalog 3 units TID AC + SSI. She has not been on Trulicity before. She is not taking any basal insulin.    She has chronic low back pain that goes down both of her legs.  She has been experiencing worsening pain and now numbness in both of her legs that is not getting any better.  It is affecting her activities of daily living including doing household chores.    Lab Results   Component Value Date    HGBA1C 7.80 (H) 08/10/2021         Health Maintenance Due   Topic Date Due   • URINE MICROALBUMIN  06/18/2020   • ANNUAL WELLNESS VISIT  07/14/2021        reports that she has been smoking cigarettes. She has a 11.25 pack-year smoking history. She has never used smokeless tobacco. She reports that she does not drink alcohol and does not use drugs.    PHQ-9 Depression Screening  Little interest or pleasure in doing things?     Feeling down, depressed, or hopeless?     Trouble falling or staying asleep, or sleeping too much?     Feeling tired or having little energy?     Poor appetite or overeating?     Feeling bad about yourself - or that you are a failure or have let yourself or your family down?     Trouble concentrating on things, such as reading the newspaper or watching television?     Moving or speaking so slowly that other people could have noticed? Or the opposite - being so fidgety or restless that you have been moving around a lot more than usual?     Thoughts that you would be better off dead, or of hurting yourself in some way?     PHQ-9 Total Score     If you checked off any problems, how difficult have  "these problems made it for you to do your work, take care of things at home, or get along with other people?       Lab Results   Component Value Date    WBC 11.10 (H) 08/10/2021    HGB 8.7 (L) 08/10/2021    HCT 29.6 (L) 08/10/2021    MCV 81.1 08/10/2021     (H) 08/10/2021     Lab Results   Component Value Date    GLUCOSE 99 06/18/2021    BUN 14 08/10/2021    CREATININE 1.28 (H) 08/10/2021    EGFRIFNONA 42 (L) 08/10/2021    EGFRIFAFRI 50 (L) 08/10/2021    BCR 10.9 08/10/2021    K 4.7 08/10/2021    CO2 25.7 08/10/2021    CALCIUM 9.2 08/10/2021    PROTENTOTREF 6.5 08/10/2021    ALBUMIN 4.00 08/10/2021    LABIL2 1.6 08/10/2021    AST 17 08/10/2021    ALT 15 08/10/2021     Lab Results   Component Value Date    TSH 0.939 06/11/2021     Lab Results   Component Value Date    HGBA1C 7.80 (H) 08/10/2021     Brief Urine Lab Results  (Last result in the past 365 days)      Color   Clarity   Blood   Leuk Est   Nitrite   Protein   CREAT   Urine HCG        06/11/21 0233 Yellow Clear Negative Negative Negative 100 mg/dL (2+)               BP Readings from Last 12 Encounters:   08/18/21 140/70   06/22/21 138/78   06/18/21 146/88   06/12/21 130/73   01/27/21 176/84   12/29/20 164/80   12/21/20 156/97   12/18/20 179/96   11/25/20 130/72   11/11/20 160/82   10/29/20 122/72   10/06/20 124/76       Wt Readings from Last 12 Encounters:   08/18/21 69 kg (152 lb 3.2 oz)   06/22/21 70.8 kg (156 lb)   06/18/21 71.1 kg (156 lb 11.2 oz)   01/27/21 73 kg (161 lb)   12/29/20 71.5 kg (157 lb 11.2 oz)   12/21/20 72.1 kg (159 lb)   12/18/20 72.8 kg (160 lb 8 oz)   11/25/20 71.7 kg (158 lb)   11/11/20 68.9 kg (152 lb)   10/29/20 73 kg (161 lb)   10/06/20 70.4 kg (155 lb 3.2 oz)   09/29/20 70.6 kg (155 lb 11.2 oz)       Objective   Vital Signs:   /70 (BP Location: Left arm, Patient Position: Sitting, Cuff Size: Large Adult)   Pulse 83   Temp 97.1 °F (36.2 °C) (Temporal)   Ht 165.1 cm (65\")   Wt 69 kg (152 lb 3.2 oz)   SpO2 97%   BMI " 25.33 kg/m²     Physical Exam  Vitals and nursing note reviewed.   Constitutional:       General: She is not in acute distress.     Appearance: Normal appearance. She is well-developed. She is not diaphoretic.   HENT:      Head: Normocephalic and atraumatic.      Right Ear: External ear normal.      Left Ear: External ear normal.      Nose: Nose normal.      Mouth/Throat:      Pharynx: No oropharyngeal exudate.   Eyes:      General: Lids are normal. No scleral icterus.        Right eye: No discharge.         Left eye: No discharge.   Neck:      Thyroid: No thyromegaly.      Trachea: Trachea normal. No tracheal deviation.   Cardiovascular:      Rate and Rhythm: Normal rate and regular rhythm.      Heart sounds: Normal heart sounds. No murmur heard.   No friction rub. No gallop.    Pulmonary:      Effort: Pulmonary effort is normal. No tachypnea, bradypnea or respiratory distress.      Breath sounds: Normal breath sounds. No stridor. No decreased breath sounds, wheezing or rales.   Chest:      Chest wall: No tenderness.   Musculoskeletal:      Cervical back: Full passive range of motion without pain, normal range of motion and neck supple. No edema.      Comments: + SLR B/L   Lymphadenopathy:      Head:      Right side of head: No submental, submandibular, tonsillar, preauricular, posterior auricular or occipital adenopathy.      Left side of head: No submental, submandibular, tonsillar, preauricular, posterior auricular or occipital adenopathy.      Cervical: No cervical adenopathy.      Right cervical: No superficial, deep or posterior cervical adenopathy.     Left cervical: No superficial, deep or posterior cervical adenopathy.   Skin:     General: Skin is warm and dry.      Capillary Refill: Capillary refill takes less than 2 seconds.      Coloration: Skin is not pale.      Findings: No erythema or rash.      Nails: There is no clubbing.   Neurological:      Mental Status: She is alert and oriented to person,  place, and time. She is not disoriented.      Deep Tendon Reflexes: Reflexes are normal and symmetric.   Psychiatric:         Behavior: Behavior normal.        Result Review :                 Assessment and Plan    Diagnoses and all orders for this visit:    1. Type 2 diabetes mellitus with other circulatory complication, with long-term current use of insulin (CMS/[facility]) (Primary)  -     Discontinue: Dulaglutide (Trulicity) 0.75 MG/0.5ML solution pen-injector; Inject 0.75 mg under the skin into the appropriate area as directed 1 (One) Time Per Week.  Dispense: 5 pen; Refill: 5  -     Discontinue: Insulin Glargine (LANTUS SOLOSTAR) 100 UNIT/ML injection pen; Inject 10 Units under the skin into the appropriate area as directed Daily.  Dispense: 5 pen; Refill: 5  -     Dulaglutide (Trulicity) 0.75 MG/0.5ML solution pen-injector; Inject 0.75 mg under the skin into the appropriate area as directed 1 (One) Time Per Week.  Dispense: 5 pen; Refill: 5  -     Insulin Glargine (LANTUS SOLOSTAR) 100 UNIT/ML injection pen; Inject 10 Units under the skin into the appropriate area as directed Daily.  Dispense: 5 pen; Refill: 5    2. Acute back pain with sciatica, right  -     XR Spine Lumbar Complete 4+VW; Future  -     MRI Lumbar Spine Without Contrast; Future  -     Ambulatory Referral to Physical Therapy Evaluate and treat  -     Ambulatory Referral to Pain Management    3. Chronic bilateral low back pain with bilateral sciatica  -     XR Spine Lumbar Complete 4+VW; Future  -     MRI Lumbar Spine Without Contrast; Future  -     Ambulatory Referral to Physical Therapy Evaluate and treat  -     Ambulatory Referral to Pain Management         Follow Up   Return in about 4 weeks (around 9/15/2021) for DM2-U Discontinued Novolog, Started Trulicity 0.75 weekly Lantus 10).  Patient was given instructions and counseling regarding her condition or for health maintenance advice. Please see specific information pulled into the AVS if  appropriate.

## 2021-08-18 NOTE — PATIENT INSTRUCTIONS
"Diabetes Mellitus and Exercise  Exercising regularly is important for overall health, especially for people who have diabetes mellitus. Exercising is not only about losing weight. It has many other health benefits, such as increasing muscle strength and bone density and reducing body fat and stress. This leads to improved fitness, flexibility, and endurance, all of which result in better overall health.  What are the benefits of exercise if I have diabetes?  Exercise has many benefits for people with diabetes. They include:  · Helping to lower and control blood sugar (glucose).  · Helping the body to respond better to the hormone insulin by improving insulin sensitivity.  · Reducing how much insulin the body needs.  · Lowering the risk for heart disease by:  ? Lowering \"bad\" cholesterol and triglyceride levels.  ? Increasing \"good\" cholesterol levels.  ? Lowering blood pressure.  ? Lowering blood glucose levels.  What is my activity plan?  Your health care provider or certified diabetes educator can help you make a plan for the type and frequency of exercise that works for you. This is called your activity plan. Be sure to:  · Get at least 150 minutes of medium-intensity or high-intensity exercise each week. Exercises may include brisk walking, biking, or water aerobics.  · Do stretching and strengthening exercises, such as yoga or weight lifting, at least 2 times a week.  · Spread out your activity over at least 3 days of the week.  · Get some form of physical activity each day.  ? Do not go more than 2 days in a row without some kind of physical activity.  ? Avoid being inactive for more than 90 minutes at a time. Take frequent breaks to walk or stretch.  · Choose exercises or activities that you enjoy. Set realistic goals.  · Start slowly and gradually increase your exercise intensity over time.  How do I manage my diabetes during exercise?    Monitor your blood glucose  · Check your blood glucose before and " after exercising. If your blood glucose is:  ? 240 mg/dL (13.3 mmol/L) or higher before you exercise, check your urine for ketones. These are chemicals created by the liver. If you have ketones in your urine, do not exercise until your blood glucose returns to normal.  ? 100 mg/dL (5.6 mmol/L) or lower, eat a snack containing 15-20 grams of carbohydrate. Check your blood glucose 15 minutes after the snack to make sure that your glucose level is above 100 mg/dL (5.6 mmol/L) before you start your exercise.  · Know the symptoms of low blood glucose (hypoglycemia) and how to treat it. Your risk for hypoglycemia increases during and after exercise.  Follow these tips and your health care provider's instructions  · Keep a carbohydrate snack that is fast-acting for use before, during, and after exercise to help prevent or treat hypoglycemia.  · Avoid injecting insulin into areas of the body that are going to be exercised. For example, avoid injecting insulin into:  ? Your arms, when you are about to play tennis.  ? Your legs, when you are about to go jogging.  · Keep records of your exercise habits. Doing this can help you and your health care provider adjust your diabetes management plan as needed. Write down:  ? Food that you eat before and after you exercise.  ? Blood glucose levels before and after you exercise.  ? The type and amount of exercise you have done.  · Work with your health care provider when you start a new exercise or activity. He or she may need to:  ? Make sure that the activity is safe for you.  ? Adjust your insulin, other medicines, and food that you eat.  · Drink plenty of water while you exercise. This prevents loss of water (dehydration) and problems caused by a lot of heat in the body (heat stroke).  Where to find more information  · American Diabetes Association: www.diabetes.org  Summary  · Exercising regularly is important for overall health, especially for people who have diabetes  mellitus.  · Exercising has many health benefits. It increases muscle strength and bone density and reduces body fat and stress. It also lowers and controls blood glucose.  · Your health care provider or certified diabetes educator can help you make an activity plan for the type and frequency of exercise that works for you.  · Work with your health care provider to make sure any new activity is safe for you. Also work with your health care provider to adjust your insulin, other medicines, and the food you eat.  This information is not intended to replace advice given to you by your health care provider. Make sure you discuss any questions you have with your health care provider.  Document Revised: 09/14/2020 Document Reviewed: 09/14/2020  Elsevier Patient Education © 2021 [employer]

## 2021-08-19 ENCOUNTER — HOSPITAL ENCOUNTER (OUTPATIENT)
Dept: GENERAL RADIOLOGY | Facility: HOSPITAL | Age: 66
Discharge: HOME | End: 2021-08-19
Admitting: FAMILY MEDICINE

## 2021-08-19 DIAGNOSIS — M54.41 ACUTE BACK PAIN WITH SCIATICA, RIGHT: ICD-10-CM

## 2021-08-19 DIAGNOSIS — G89.29 CHRONIC BILATERAL LOW BACK PAIN WITH BILATERAL SCIATICA: ICD-10-CM

## 2021-08-19 DIAGNOSIS — M54.42 CHRONIC BILATERAL LOW BACK PAIN WITH BILATERAL SCIATICA: ICD-10-CM

## 2021-08-19 DIAGNOSIS — M54.41 CHRONIC BILATERAL LOW BACK PAIN WITH BILATERAL SCIATICA: ICD-10-CM

## 2021-08-19 PROBLEM — M51.369 DDD (DEGENERATIVE DISC DISEASE), LUMBAR: Status: ACTIVE | Noted: 2021-08-19

## 2021-08-19 PROBLEM — M47.816 DEGENERATIVE ARTHRITIS OF LUMBAR SPINE: Status: ACTIVE | Noted: 2021-08-19

## 2021-08-19 PROBLEM — M51.36 DDD (DEGENERATIVE DISC DISEASE), LUMBAR: Status: ACTIVE | Noted: 2021-08-19

## 2021-08-19 PROCEDURE — 72110 X-RAY EXAM L-2 SPINE 4/>VWS: CPT

## 2021-08-19 NOTE — PROGRESS NOTES
Please call the patient regarding her result(s).  Please let her know that her lumbar spine x-ray shows degenerative disc disease and arthritis.

## 2021-08-25 ENCOUNTER — TELEPHONE (OUTPATIENT)
Dept: FAMILY MEDICINE CLINIC | Facility: CLINIC | Age: 66
End: 2021-08-25

## 2021-08-25 NOTE — TELEPHONE ENCOUNTER
PATIENT CALLING IN REGARDS TO REQUEST THE NAMES OF THE NEW MEDICATION AND TO HAVE IT FILL.PLEASE ADVISE THANK YOU!    [facility] - [place], [place] - [address] [facility] [phone] [facility] [phone] FX

## 2021-08-27 RX ORDER — LANCETS
EACH MISCELLANEOUS
Qty: 200 EACH | Refills: 5 | Status: SHIPPED | OUTPATIENT
Start: 2021-08-27

## 2021-08-27 RX ORDER — BLOOD SUGAR DIAGNOSTIC
STRIP MISCELLANEOUS
Qty: 200 EACH | Refills: 5 | Status: SHIPPED | OUTPATIENT
Start: 2021-08-27

## 2021-09-01 ENCOUNTER — HOSPITAL ENCOUNTER (OUTPATIENT)
Dept: PHYSICAL THERAPY | Facility: HOSPITAL | Age: 66
Setting detail: THERAPIES SERIES
Discharge: HOME | End: 2021-09-01

## 2021-09-01 DIAGNOSIS — M54.42 ACUTE BACK PAIN WITH SCIATICA, LEFT: ICD-10-CM

## 2021-09-01 DIAGNOSIS — M54.41 ACUTE BACK PAIN WITH SCIATICA, RIGHT: Primary | ICD-10-CM

## 2021-09-01 PROCEDURE — 97161 PT EVAL LOW COMPLEX 20 MIN: CPT

## 2021-09-01 NOTE — THERAPY EVALUATION
Outpatient Physical Therapy Ortho Initial Evaluation  DONATO Ho     Patient Name: Jayne Beltran  : 1955  MRN: [id number]  Today's Date: 2021      Visit Date: 2021    Patient Active Problem List   Diagnosis   • Essential hypertension   • Snoring   • TIA (transient ischemic attack)   • Chronic bilateral low back pain with bilateral sciatica   • GERD without esophagitis   • Inflamed seborrheic keratosis   • Type 2 diabetes mellitus without complication, without long-term current use of insulin (CMS/HCC)   • Peripheral neuropathy   • Restless legs syndrome   • Benign paroxysmal positional vertigo   • Bone lesion   • Vitamin D deficiency   • Dyslipidemia   • Muscle spasm of both lower legs   • Tobacco use disorder   • Abnormal lung sounds   • Intermittent claudication (CMS/HCC)   • Leg mass, right   • Rib pain on left side   • Diabetic autonomic neuropathy associated with type 2 diabetes mellitus (CMS/HCC)   • Leukocytosis   • Primary insomnia   • PMB (postmenopausal bleeding)   • Family history of ovarian cancer   • Acute renal failure (ARF) (CMS/HCC)   • Duplicated renal collecting system   • Atherosclerotic vascular disease   • Elevated platelet count   • Low hemoglobin   • Other anomalies of uterus   • Multiple kidney stones   • Multiple kidney stones   • Bilateral lower extremity edema   • Diarrhea   • Sore on leg   • Syncope and collapse   • Unspecified malignant neoplasm of skin of nose   • Shortness of breath   • Pain, unspecified   • Other specified coagulation defects (CMS/HCC)   • Encounter for change or removal of nonsurgical wound dressing   • Iron deficiency anemia   • Anxiety   • History of subdural hematoma   • Acute back pain with sciatica, left   • Diabetes mellitus (CMS/HCC)   • Panic attack   • Acute renal failure (CMS/HCC)   • Calculus of kidney   • Restless legs syndrome   • Transient ischemic attack   • Right shoulder injury, initial encounter   • Nonspecific abnormal  findings on imaging examination of skull and head   • Hyperosmolality   • Gastrointestinal hemorrhage   • Absolute anemia   • Melena   • Aortic valve calcification   • Impaired left ventricular relaxation   • Concentric left ventricular hypertrophy   • Left atrial dilatation   • Nonrheumatic mitral valve regurgitation   • Internal carotid artery stenosis, right   • Cerebrovascular accident (CVA) due to embolism of precerebral artery (CMS/HCC)   • Acute back pain with sciatica, right   • Chronic pain of right ankle   • Chronic pain of left ankle   • DDD (degenerative disc disease), lumbar   • Degenerative arthritis of lumbar spine        Past Medical History:   Diagnosis Date   • COPD (chronic obstructive pulmonary disease) (CMS/HCC)    • Diabetes mellitus (CMS/HCC)     type 2   • Fibroid    • Hypertension    • Leukocytosis    • Neuromuscular disorder (CMS/HCC)    • Panic attack    • Skin cancer     nose   • TIA (transient ischemic attack)         Past Surgical History:   Procedure Laterality Date   • CHOLECYSTECTOMY     • COLONOSCOPY     • ENDOSCOPY N/A 6/11/2021    Procedure: ESOPHAGOGASTRODUODENOSCOPY AT BEDSIDE;  Surgeon: Donovan Banks MD;  Location: [facility] ENDOSCOPY;  Service: Gastroenterology;  Laterality: N/A;  pre: melena, GI bleed, anemia   post: retained food    • ENDOSCOPY N/A 6/12/2021    Procedure: ESOPHAGOGASTRODUODENOSCOPY WITH GOLD PROBE CAUTERY 7FR TO BLEEDING GASTRIC ANTRUM ULCER;  Surgeon: Calvin Barraza MD;  Location: [facility] ENDOSCOPY;  Service: Gastroenterology;  Laterality: N/A;  PRE- GI BLEED  POST- BLEEDING GASTRIC ANTRUM ULCER   • INSERTION HEMODIALYSIS CATHETER N/A 9/3/2020    Procedure: HEMODIALYSIS CATHETER INSERTION;  Surgeon: Sergio Durant MD;  Location: [facility] MAIN OR;  Service: Vascular;  Laterality: N/A;   • KIDNEY STONE SURGERY     • URETEROSCOPY LASER LITHOTRIPSY WITH STENT INSERTION Right 12/21/2020    Procedure: CYSTOSCOPY, RIGHT URETEROSCOPY, RIGHT RETROGRADE  PYELOGRAM, LASER LITHOTRIPSY WITH RIGHT URETERAL STENT EXCHANGE;  Surgeon: Mikie Mejia MD;  Location: [facility] OR;  Service: Urology;  Laterality: Right;       Visit Dx:     ICD-10-CM ICD-9-CM   1. Acute back pain with sciatica, right  M54.41 724.3   2. Acute back pain with sciatica, left  M54.42 724.3         Patient History     Row Name 09/01/21 0800             History    Chief Complaint  Difficulty Walking;Difficulty with daily activities;Muscle tenderness;Muscle weakness;Numbness;Pain;Tinglings  -AS      Type of Pain  Back pain;Lower Extremity / Leg  -AS      Brief Description of Current Complaint  Jayne Beltran presents to outpatient PT with reports of chronic low back pain that goes down both of her legs.  She has been experiencing worsening pain and now numbness in both of her legs that is not getting any better.  It is affecting her activities of daily living including doing household chores. Patient has had lumbar spine x-rays which show DDD and arthritis. Patient is scheduled to have a lumbar MRI this Friday.  -AS      Previous treatment for THIS PROBLEM  Medication  -AS      Patient/Caregiver Goals  Relieve pain  -AS      Patient's Rating of General Health  Fair  -AS      Patient seeing anyone else for problem(s)?  Dr. Arriaza  -AS      How has patient tried to help current problem?  rest, medication  -AS      What clinical tests have you had for this problem?  X-ray  -AS      Results of Clinical Tests  DDD, Arthritis  -AS         Pain     Pain Location  Back;Leg  -AS      Pain at Present  5  -AS      Pain at Best  2  -AS      Pain at Worst  10  -AS      Pain Frequency  Constant/continuous  -AS      Pain Description  Aching;Burning;Numbness;Radiating;Tingling  -AS      What Performance Factors Make the Current Problem(s) WORSE?  walking, standing, bending forward  -AS      What Performance Factors Make the Current Problem(s) BETTER?  rest  -AS         Daily Activities    Primary Language   English  -AS      Are you able to read  Yes  -AS      Are you able to write  Yes  -AS      How does patient learn best?  Listening;Reading;Demonstration  -AS      Teaching needs identified  Home Exercise Program;Management of Condition  -AS      Patient is concerned about/has problems with  Flexibility;Performing home management (household chores, shopping, care of dependents);Performing job responsibilities/community activities (work, school,;Performing sports, recreation, and play activities;Walking  -AS      Does patient have problems with the following?  Panic Attack  -AS      Barriers to learning  None  -AS      Pt Participated in POC and Goals  Yes  -AS         Safety    Are you being hurt, hit, or frightened by anyone at home or in your life?  No  -AS      Are you being neglected by a caregiver  No  -AS        User Key  (r) = Recorded By, (t) = Taken By, (c) = Cosigned By    Initials Name Provider Type    AS Shemar Rosa, PT Physical Therapist          PT Ortho     Row Name 09/01/21 0800       Precautions and Contraindications    Precautions/Limitations  no known precautions/limitations  -AS       Posture/Observations    Alignment Options  Lumbar lordosis  -AS    Lumbar lordosis  Bilateral:;Moderate;Decreased  -AS       Lumbar/SI Special Tests    Standing Flexion Test (SI Dysfunction)  Bilateral:;Negative  -AS    Stork Test (SI Dysfunction)  Bilateral:;Negative  -AS    SLR (Neural Tension)  Bilateral:;Positive  -AS       Head/Neck/Trunk    Trunk Extension AROM  50% limited  -AS    Trunk Flexion AROM  WNL  -AS    Trunk Lt Lateral Flexion AROM  25% limited  -AS    Trunk Rt Lateral Flexion AROM  25% limited  -AS    Trunk Lt Rotation AROM  25% limited  -AS    Trunk Rt Rotation AROM  25% limited  -AS       MMT Neck/Trunk    Trunk Flexion MMT, Gross Movement  (3+/5) fair plus  -AS    Trunk Extension MMT, Gross Movement  (3+/5) fair plus  -AS       MMT Right Lower Ext    Rt Hip Flexion MMT, Gross Movement   (4-/5) good minus  -AS    Rt Hip Extension MMT, Gross Movement  (4-/5) good minus  -AS    Rt Hip ABduction MMT, Gross Movement  (4-/5) good minus  -AS       MMT Left Lower Ext    Lt Hip Flexion MMT, Gross Movement  (4-/5) good minus  -AS    Lt Hip Extension MMT, Gross Movement  (4-/5) good minus  -AS    Lt Hip ABduction MMT, Gross Movement  (4-/5) good minus  -AS       Sensation    Sensation WNL?  WNL  -AS    Light Touch  No apparent deficits  -AS       Lower Extremity Flexibility    Hamstrings  Bilateral:;Moderately limited  -AS    ITB  Bilateral:;Moderately limited  -AS    Hip External Rotators  Bilateral:;Moderately limited  -AS      User Key  (r) = Recorded By, (t) = Taken By, (c) = Cosigned By    Initials Name Provider Type    AS Shemar Rosa, PT Physical Therapist                      Therapy Education  Given: HEP, Symptoms/condition management, Pain management  Program: New  How Provided: Verbal, Demonstration, Written  Provided to: Patient  Level of Understanding: Teach back education performed, Verbalized, Demonstrated     PT OP Goals     Row Name 09/01/21 0800          PT Short Term Goals    STG Date to Achieve  09/15/21  -AS     STG 1  Patient to demonstrate compliance with her initial HEP for flexibility, ROM and strengthening.  -AS     STG 2  Patient to report improved radicular symptoms in her Mihai. LE's by 25-50%.  -AS     STG 3  Patient to report low back and leg pain on VAS of 4-5/10 at worst with activity.  -AS     STG 4  Patient to demonstrate improved trunk and LE strength to 4-/5 in all planes.  -AS        Long Term Goals    LTG Date to Achieve  09/29/21  -AS     LTG 1  Patient to demonstrate compliance with her advanced HEP for flexibility, ROM and strengthening.  -AS     LTG 2  Patient to report improved radicular symptoms in her Mihai. LE's by 50-75%.  -AS     LTG 3  Patient to report low back and leg pain on VAS of 1-2/10 at worst with activity.  -AS     LTG 4  Patient to demonstrate  improved trunk and LE strength to 4/5 in all planes.  -AS     LTG 5  Patient to demonstrate trunk ROM to WNL in all planes.  -AS     LTG 6  Patient to report improved function on Back Index by 10-15 points.  -AS        Time Calculation    PT Goal Re-Cert Due Date  09/29/21  -AS       User Key  (r) = Recorded By, (t) = Taken By, (c) = Cosigned By    Initials Name Provider Type    AS Shemar Rosa, PT Physical Therapist          PT Assessment/Plan     Row Name 09/01/21 0800          PT Assessment    Functional Limitations  Limitation in home management;Limitations in community activities;Performance in leisure activities;Performance in sport activities;Performance in work activities  -AS     Impairments  Impaired flexibility;Muscle strength;Pain;Range of motion  -AS     Assessment Comments  Patient presents to outpatient PT with reports of right sided low back pain with radicular symptoms. Patient has limited trunk ROM, limited trunk and hip strength, and increased pain with activities. Patient has had x-rays which show DDD and arthritis. Patient is scheduled for an MRI this Friday. Patient has limited function at this time secondary to the above.   -AS     Please refer to paper survey for additional self-reported information  Yes  -AS     Rehab Potential  Good  -AS     Patient/caregiver participated in establishment of treatment plan and goals  Yes  -AS     Patient would benefit from skilled therapy intervention  Yes  -AS        PT Plan    PT Frequency  1x/week;2x/week  -AS     Predicted Duration of Therapy Intervention (PT)  3-4 weeks  -AS     Planned CPT's?  PT RE-EVAL: 51655;PT THER PROC EA 15 MIN: 22034;PT THER ACT EA 15 MIN: 81406;PT MANUAL THERAPY EA 15 MIN: 83427;PT NEUROMUSC RE-EDUCATION EA 15 MIN: 64794  -AS       User Key  (r) = Recorded By, (t) = Taken By, (c) = Cosigned By    Initials Name Provider Type    AS Shemar Rosa, PT Physical Therapist          Modalities     Row Name 09/01/21  0800             Traction 76142    Traction Type  Lumbar  -AS      PT Traction Rx Minutes  15  -AS      Duration  Intermittent  -AS      Position  Supine  -AS      Weight  75  -AS      Hold  60  -AS      Relax  20  -AS        User Key  (r) = Recorded By, (t) = Taken By, (c) = Cosigned By    Initials Name Provider Type    AS Shemar Rosa, PT Physical Therapist        OP Exercises     Row Name 09/01/21 0800             Subjective Pain    Able to rate subjective pain?  yes  -AS      Pre-Treatment Pain Level  5  -AS      Post-Treatment Pain Level  3  -AS         Exercise 1    Exercise Name 1  Mihai. HS Stretch with ADD  -AS      Reps 1  10  -AS      Time 1  10 sec hold each  -AS         Exercise 2    Exercise Name 2  Mihai. Piriformis Stretch  -AS      Reps 2  10  -AS      Time 2  10 sec hold each  -AS         Exercise 3    Exercise Name 3  Lumbar Rotations  -AS      Reps 3  25  -AS         Exercise 4    Exercise Name 4  SKTC  -AS      Reps 4  10  -AS      Time 4  10 sec hold each  -AS        User Key  (r) = Recorded By, (t) = Taken By, (c) = Cosigned By    Initials Name Provider Type    AS Shemar Rosa, PT Physical Therapist                        Outcome Measure Options: Other Outcome Measure  Other Outcome Measure Tool Used  Other Outcome Measure Tool Comments: Back Index - 33      Time Calculation:     Start Time: 0752  Stop Time: 0847  Time Calculation (min): 55 min  Untimed Charges  PT Traction Rx Minutes: 15  Total Minutes  Untimed Charges Total Minutes: 15   Total Minutes: 15     Therapy Charges for Today     Code Description Service Date Service Provider Modifiers Qty    05535743577 HC PT EVAL LOW COMPLEXITY 4 9/1/2021 Shemar Rosa, PT GP 1          PT G-Codes  Outcome Measure Options: Other Outcome Measure         Shemar Rosa, INNA  9/1/2021

## 2021-09-02 DIAGNOSIS — G25.81 RESTLESS LEGS SYNDROME: Primary | ICD-10-CM

## 2021-09-02 RX ORDER — ROPINIROLE 3 MG/1
3 TABLET, FILM COATED ORAL NIGHTLY
Status: CANCELLED | OUTPATIENT
Start: 2021-09-02

## 2021-09-02 RX ORDER — ROPINIROLE 3 MG/1
3 TABLET, FILM COATED ORAL NIGHTLY
Qty: 90 TABLET | Refills: 1 | Status: SHIPPED | OUTPATIENT
Start: 2021-09-02

## 2021-09-02 NOTE — TELEPHONE ENCOUNTER
Caller: [email or web address] - [facility] [address] - [phone] [facility] [phone] FX    Relationship: Pharmacy    Best call back number: [phone]    Medication needed:   Requested Prescriptions     Pending Prescriptions Disp Refills   • rOPINIRole (REQUIP) 3 MG tablet       Sig: Take 1 tablet by mouth Every Night. Take 1-3 hr prior to bedtime       When do you need the refill by: 09/02/21    What additional details did the patient provide when requesting the medication: PHARMACY CALLING TO VERIFY REFILL REQUEST THAT WAS FAXED ON 08/26/21    Does the patient have less than a 3 day supply:  [x] Yes  [] No    What is the patient's preferred pharmacy: [facility] - [place], [place] - [address] - [phone] [facility] [phone] FX<br>[facility] [address] [facility] [address] - [phone] [facility] [phone] FX

## 2021-09-03 ENCOUNTER — HOSPITAL ENCOUNTER (OUTPATIENT)
Dept: MRI IMAGING | Facility: HOSPITAL | Age: 66
Discharge: HOME | End: 2021-09-03
Admitting: FAMILY MEDICINE

## 2021-09-03 DIAGNOSIS — G89.29 CHRONIC BILATERAL LOW BACK PAIN WITH BILATERAL SCIATICA: ICD-10-CM

## 2021-09-03 DIAGNOSIS — M54.41 CHRONIC BILATERAL LOW BACK PAIN WITH BILATERAL SCIATICA: ICD-10-CM

## 2021-09-03 DIAGNOSIS — M54.42 CHRONIC BILATERAL LOW BACK PAIN WITH BILATERAL SCIATICA: ICD-10-CM

## 2021-09-03 DIAGNOSIS — M54.41 ACUTE BACK PAIN WITH SCIATICA, RIGHT: ICD-10-CM

## 2021-09-03 PROCEDURE — 72148 MRI LUMBAR SPINE W/O DYE: CPT

## 2021-09-07 NOTE — PROGRESS NOTES
Please call the patient regarding her result(s).  Please let her know that her lumbar spine MRI shows significant disc bulging, degenerative disc disease, and arthritis of the lumbar spine.

## 2021-09-09 ENCOUNTER — HOSPITAL ENCOUNTER (OUTPATIENT)
Dept: PHYSICAL THERAPY | Facility: HOSPITAL | Age: 66
Setting detail: THERAPIES SERIES
Discharge: HOME | End: 2021-09-09

## 2021-09-09 ENCOUNTER — TELEPHONE (OUTPATIENT)
Dept: FAMILY MEDICINE CLINIC | Facility: CLINIC | Age: 66
End: 2021-09-09

## 2021-09-09 DIAGNOSIS — M54.41 ACUTE BACK PAIN WITH SCIATICA, RIGHT: Primary | ICD-10-CM

## 2021-09-09 DIAGNOSIS — M54.42 ACUTE BACK PAIN WITH SCIATICA, LEFT: ICD-10-CM

## 2021-09-09 PROCEDURE — 97012 MECHANICAL TRACTION THERAPY: CPT

## 2021-09-09 PROCEDURE — 97110 THERAPEUTIC EXERCISES: CPT

## 2021-09-09 NOTE — TELEPHONE ENCOUNTER
Caller: Jayne Beltran    Relationship: Self    Best call back number:[phone]    Caller requesting test results: SELF    What test was performed: MRI    When was the test performed: 9/3/21    Additional notes: PATIENT IS SEEKING RESULTS OF HER RECENT MRI. ALSO, PLEASE SEND A COPY OF RESULTS TO HER PAIN MANAGEMENT CLINIC.

## 2021-09-13 ENCOUNTER — APPOINTMENT (OUTPATIENT)
Dept: PHYSICAL THERAPY | Facility: HOSPITAL | Age: 66
End: 2021-09-13

## 2021-09-15 ENCOUNTER — OFFICE VISIT (OUTPATIENT)
Dept: FAMILY MEDICINE CLINIC | Facility: CLINIC | Age: 66
End: 2021-09-15

## 2021-09-15 VITALS
OXYGEN SATURATION: 99 % | HEIGHT: 65 IN | WEIGHT: 149.2 LBS | BODY MASS INDEX: 24.86 KG/M2 | HEART RATE: 86 BPM | DIASTOLIC BLOOD PRESSURE: 82 MMHG | SYSTOLIC BLOOD PRESSURE: 136 MMHG | TEMPERATURE: 96.9 F

## 2021-09-15 DIAGNOSIS — M72.2 PLANTAR FASCIITIS, BILATERAL: Primary | ICD-10-CM

## 2021-09-15 DIAGNOSIS — E11.9 TYPE 2 DIABETES MELLITUS WITHOUT COMPLICATION, WITHOUT LONG-TERM CURRENT USE OF INSULIN (HCC): ICD-10-CM

## 2021-09-15 DIAGNOSIS — Z79.899 HIGH RISK MEDICATION USE: ICD-10-CM

## 2021-09-15 DIAGNOSIS — G25.81 RESTLESS LEGS SYNDROME: Chronic | ICD-10-CM

## 2021-09-15 PROCEDURE — 99214 OFFICE O/P EST MOD 30 MIN: CPT | Performed by: FAMILY MEDICINE

## 2021-09-15 NOTE — ASSESSMENT & PLAN NOTE
She has an appointment with Dr. Rosangela Garcia, neurology, this coming Monday, September 20, 2021. She also has an appointment with pain management tomorrow.

## 2021-09-15 NOTE — ASSESSMENT & PLAN NOTE
Pt advised to continue using Novolog 3 units TID AC, Lantus 10 units daily, and Jardiance. Refer to endocrinology.

## 2021-09-15 NOTE — PROGRESS NOTES
Chief Complaint  Type 2 diabetes mellitus with other circulatory complication (Uncontrolled) and F/U MRI RESULTS    Subjective          Jayne Betlran presents to [facility] for     History of Present Illness    Patient is here with her daughter Saritha.  Saritha is concerned about her rapid leg movement which she had when she was hospitalized a couple of months ago.  She states that the neurologist that saw her in the hospital said it was one of the worst cases of rapid leg movement he has seen.  She was on gabapentin for this in the past which Jayne states that helped her.  She has an appointment with Dr. Rosangela Garcia, neurology, this coming Monday, September 20, 2021. She also follows with pain management.    She sleeps for an hour and then wakes up with burning, tingling, and pain in her feet.  Her daughter states that she also has the same symptoms and takes Gabapentin for the same thing.  The patient states she cries sometimes the pain is so bad. Saritha states she does not know what diagnosis she was given for the gabapentin.    She wears flip flops, sandals, and goes bare foot a lot. She has foot pain when she wakes up and puts pressure on her feet in the morning.    She has DM.  Her insurance company did not cover the Trulicity.    Health Maintenance Due   Topic Date Due   • URINE MICROALBUMIN  06/18/2020   • ANNUAL WELLNESS VISIT  07/14/2021   • MAMMOGRAM  09/11/2021        reports that she has been smoking cigarettes. She has a 11.25 pack-year smoking history. She has never used smokeless tobacco. She reports that she does not drink alcohol and does not use drugs.    PHQ-9 Depression Screening  Little interest or pleasure in doing things?     Feeling down, depressed, or hopeless?     Trouble falling or staying asleep, or sleeping too much?     Feeling tired or having little energy?     Poor appetite or overeating?     Feeling bad about yourself - or that you are a  failure or have let yourself or your family down?     Trouble concentrating on things, such as reading the newspaper or watching television?     Moving or speaking so slowly that other people could have noticed? Or the opposite - being so fidgety or restless that you have been moving around a lot more than usual?     Thoughts that you would be better off dead, or of hurting yourself in some way?     PHQ-9 Total Score     If you checked off any problems, how difficult have these problems made it for you to do your work, take care of things at home, or get along with other people?       Lab Results   Component Value Date    WBC 11.10 (H) 08/10/2021    HGB 8.7 (L) 08/10/2021    HCT 29.6 (L) 08/10/2021    MCV 81.1 08/10/2021     (H) 08/10/2021     Lab Results   Component Value Date    GLUCOSE 99 06/18/2021    BUN 14 08/10/2021    CREATININE 1.28 (H) 08/10/2021    EGFRIFNONA 42 (L) 08/10/2021    EGFRIFAFRI 50 (L) 08/10/2021    BCR 10.9 08/10/2021    K 4.7 08/10/2021    CO2 25.7 08/10/2021    CALCIUM 9.2 08/10/2021    PROTENTOTREF 6.5 08/10/2021    ALBUMIN 4.00 08/10/2021    LABIL2 1.6 08/10/2021    AST 17 08/10/2021    ALT 15 08/10/2021     Lab Results   Component Value Date    TSH 0.939 06/11/2021     Lab Results   Component Value Date    HGBA1C 7.80 (H) 08/10/2021     Brief Urine Lab Results  (Last result in the past 365 days)      Color   Clarity   Blood   Leuk Est   Nitrite   Protein   CREAT   Urine HCG        06/11/21 0233 Yellow Clear Negative Negative Negative 100 mg/dL (2+)               BP Readings from Last 12 Encounters:   09/15/21 136/82   08/18/21 140/70   06/22/21 138/78   06/18/21 146/88   06/12/21 130/73   01/27/21 176/84   12/29/20 164/80   12/21/20 156/97   12/18/20 179/96   11/25/20 130/72   11/11/20 160/82   10/29/20 122/72       Wt Readings from Last 12 Encounters:   09/15/21 67.7 kg (149 lb 3.2 oz)   09/03/21 68 kg (150 lb)   08/18/21 69 kg (152 lb 3.2 oz)   06/22/21 70.8 kg (156 lb)  "  06/18/21 71.1 kg (156 lb 11.2 oz)   01/27/21 73 kg (161 lb)   12/29/20 71.5 kg (157 lb 11.2 oz)   12/21/20 72.1 kg (159 lb)   12/18/20 72.8 kg (160 lb 8 oz)   11/25/20 71.7 kg (158 lb)   11/11/20 68.9 kg (152 lb)   10/29/20 73 kg (161 lb)       Objective   Vital Signs:   /82 (BP Location: Left arm, Patient Position: Sitting, Cuff Size: Large Adult)   Pulse 86   Temp 96.9 °F (36.1 °C) (Temporal)   Ht 165.1 cm (65\")   Wt 67.7 kg (149 lb 3.2 oz)   SpO2 99%   BMI 24.83 kg/m²     Physical Exam  Vitals and nursing note reviewed.   Constitutional:       General: She is not in acute distress.     Appearance: Normal appearance. She is well-developed. She is not diaphoretic.   HENT:      Head: Normocephalic and atraumatic.      Right Ear: External ear normal.      Left Ear: External ear normal.      Nose: Nose normal.      Mouth/Throat:      Pharynx: No oropharyngeal exudate.   Eyes:      General: Lids are normal. No scleral icterus.        Right eye: No discharge.         Left eye: No discharge.   Neck:      Thyroid: No thyromegaly.      Trachea: Trachea normal. No tracheal deviation.   Cardiovascular:      Rate and Rhythm: Normal rate and regular rhythm.      Heart sounds: Normal heart sounds. No murmur heard.   No friction rub. No gallop.    Pulmonary:      Effort: Pulmonary effort is normal. No tachypnea, bradypnea or respiratory distress.      Breath sounds: Normal breath sounds. No stridor. No decreased breath sounds, wheezing or rales.   Chest:      Chest wall: No tenderness.   Musculoskeletal:      Cervical back: Full passive range of motion without pain, normal range of motion and neck supple. No edema.   Lymphadenopathy:      Head:      Right side of head: No submental, submandibular, tonsillar, preauricular, posterior auricular or occipital adenopathy.      Left side of head: No submental, submandibular, tonsillar, preauricular, posterior auricular or occipital adenopathy.      Cervical: No cervical " adenopathy.      Right cervical: No superficial, deep or posterior cervical adenopathy.     Left cervical: No superficial, deep or posterior cervical adenopathy.   Skin:     General: Skin is warm and dry.      Capillary Refill: Capillary refill takes less than 2 seconds.      Coloration: Skin is not pale.      Findings: No erythema or rash.      Nails: There is no clubbing.   Neurological:      Mental Status: She is alert and oriented to person, place, and time. She is not disoriented.      Deep Tendon Reflexes: Reflexes are normal and symmetric.   Psychiatric:         Behavior: Behavior normal.        Result Review :                 Assessment and Plan    Diagnoses and all orders for this visit:    1. Plantar fasciitis, bilateral (Primary)  Assessment & Plan:  Pt advised and agrees to wear tennis shoes all day as much as possible.        2. Restless legs syndrome  Assessment & Plan:  She has an appointment with Dr. Rosangela Garcia, neurology, this coming Monday, September 20, 2021. She also has an appointment with pain management tomorrow.        3. Type 2 diabetes mellitus without complication, without long-term current use of insulin (CMS/[facility])  Assessment & Plan:  Pt advised to continue using Novolog 3 units TID AC, Lantus 10 units daily, and Jardiance. Refer to endocrinology.           Follow Up   No follow-ups on file.  Patient was given instructions and counseling regarding her condition or for health maintenance advice. Please see specific information pulled into the AVS if appropriate.

## 2021-09-15 NOTE — PROGRESS NOTES
The patient has a pain history of the following:  Chronic low back pain  Restless leg syndrome    Previous interventions that the patient has received include:   None    Pain medications include:  IcyHot with lidocaine     Previously: Lyrica (affected mood), hydrocodone  **Hx GI Hemorrhage     Other conservative modalities which the patient reports using include:  Physical Therapy: yes - not helping   Chiropractor: yes  Massage Therapy: no  TENS: no  Neck or back surgery: no  Past pain management: no  Ice  Heat    Past Significant Surgical History:  None    HPI:       CHIEF COMPLAINT: Back Pain    Jayne Beltran is a 66 y.o. female referred here by Michael Arriaza DO. Jayne Beltran presents to the office for evaluation and treatment of Back Pain      Onset:  Many years   Inciting Event:  Nothing in particular  Location:  Bilateral legs below the knees and hands  Pain: Pain described as sharp, shooting and stabbing. She also has numbness, burning and tingling in bilateral feet.  Located bilateral legs and does not radiate.  Severity:  Pain rated as a 8 /10.  Symptoms have been constant.  Exacerbation:  Being still.   Alleviation:  Back pain improves with sitting.  Associated Symptoms:   She denies any new onset of bowel or bladder weakness, significant leg weakness or saddle anesthesia. Denies balance problems or lower extremity incoordination.  Ambulates: Without assistive device     Daughter is with her today to help obtain history.  States that nighttime is the worst of her pain, although she has symptoms present all day.  She describes numbness burning and tingling in bilateral feet that goes up almost to her knees.  She is also starting to have the symptoms in her bilateral hands.  In the past she was prescribed gabapentin for the symptoms as well as her restless leg, and that improved her pain and sleep tremendously.  She denied any side effects from this medication.  In the past year she was  prescribed Lyrica, but she states this medication did not help and she also had side effects of irritable mood.  She states that she has been prescribed new medications to help manage her diabetes, and is hoping that her diabetes becomes better controlled with these.    She does have aching in her low back, however her legs bother her more than her back does at this point.       PEG Assessment   What number best describes your pain on average in the past week?10  What number best describes how, during the past week, pain has interfered with your enjoyment of life?9  What number best describes how, during the past week, pain has interfered with your general activity?  9        Current Outpatient Medications:   •  Accu-Chek Ilana Plus test strip, TEST THREE TIMES DAILY BEFORE MEALS AS DIRECTED, Disp: 200 each, Rfl: 5  •  Accu-Chek Softclix Lancets lancets, TEST BLOOD SUGAR THREE TIMES DAILY BEFORE MEALS AS DIRECTED, Disp: 200 each, Rfl: 5  •  Alcohol Swabs (B-D SINGLE USE SWABS REGULAR) pads, 1 each 3 (Three) Times a Day Before Meals., Disp: 200 each, Rfl: 11  •  Blood Glucose Calibration (ACCU-CHEK ILANA) solution, 1 each by In Vitro route 3 (Three) Times a Day Before Meals., Disp: 5 each, Rfl: 5  •  Blood Glucose Monitoring Suppl (ACCU-CHEK ILANA PLUS) w/Device kit, 1 each 3 (Three) Times a Day Before Meals., Disp: 1 kit, Rfl: 2  •  citalopram (CeleXA) 10 MG tablet, Take 1 tablet by mouth Daily., Disp: 90 tablet, Rfl: 1  •  Dulaglutide (Trulicity) 0.75 MG/0.5ML solution pen-injector, Inject 0.75 mg under the skin into the appropriate area as directed 1 (One) Time Per Week., Disp: 5 pen, Rfl: 5  •  Empagliflozin (Jardiance) 25 MG tablet, Take 1 tablet by mouth Daily., Disp: 90 tablet, Rfl: 1  •  insulin aspart (NovoLOG FlexPen) 100 UNIT/ML solution pen-injector sc pen, Inject 5 Units under the skin into the appropriate area as directed 3 (Three) Times a Day With Meals. SSI-DEPENDS PM BLOOD GLUCOSE, Disp: 5 pen, Rfl:  5  •  Insulin Glargine (LANTUS SOLOSTAR) 100 UNIT/ML injection pen, Inject 10 Units under the skin into the appropriate area as directed Daily., Disp: 5 pen, Rfl: 5  •  Insulin Pen Needle (RELION PEN NEEDLE 31G/8MM) 31G X 8 MM misc, 1 each 3 (Three) Times a Day., Disp: 90 each, Rfl: 11  •  Lancets Misc. (ACCU-CHEK MULTICLIX LANCET DEV) kit, 1 each 3 (Three) Times a Day Before Meals., Disp: 200 each, Rfl: 11  •  lisinopril (PRINIVIL,ZESTRIL) 10 MG tablet, Take 1 tablet by mouth Daily., Disp: 90 tablet, Rfl: 1  •  Melatonin 10 MG tablet, Take 1 tablet by mouth every night at bedtime., Disp: , Rfl:   •  metoprolol succinate XL (TOPROL-XL) 200 MG 24 hr tablet, Take 1 tablet by mouth Daily., Disp: 90 tablet, Rfl: 1  •  pantoprazole (PROTONIX) 40 MG EC tablet, Take 1 tablet by mouth 2 (Two) Times a Day Before Meals., Disp: 60 tablet, Rfl: 3  •  rOPINIRole (REQUIP) 3 MG tablet, Take 1 tablet by mouth Every Night. Take 1-3 hr prior to bedtime, Disp: 90 tablet, Rfl: 1  •  sucralfate (CARAFATE) 1 g tablet, Take 1 tablet by mouth 4 (Four) Times a Day Before Meals & at Bedtime., Disp: 120 tablet, Rfl: 3  •  Ascorbic Acid (VITAMIN C PO), Take 1 tablet by mouth Daily., Disp: , Rfl:     The following portions of the patient's history were reviewed and updated as appropriate: allergies, current medications, past family history, past medical history, past social history, past surgical history and problem list.      REVIEW OF PERTINENT MEDICAL DATA    9/3/21 INDICATION:    Acute back pain with sciatica right-sided. Superimposed upon chronic low back pain. Low back pain for 20 years and worse in the last 6 years. Bilateral leg and foot numbness.      TECHNIQUE:   MRI of the lumbar spine without contrast.     COMPARISON:    Plain films from 8/19/2021.     FINDINGS:  Patient has involuntary leg spasms resulting in motion artifact despite repeats. There is intervertebral disc desiccation in general with loss of intervertebral disc height  most prominent at L3-4 through L5-S1. There is minimal degenerative  retrolisthesis of L5 on S1 secondary to loss of disc height. The conus medullaris terminates at L1 and is normal. There is multiple level predominantly type II marrow endplate degenerative change. There is levoconvexed scoliosis on the coronal  view     At L1-2, there is mild concentric disc bulge with a superimposed broad posterior protrusion. There is no canal or foraminal impingement.     At L2-3, there is moderate facet degenerative change and ligamentum flavum thickening bilaterally. There is a mild concentric disc bulge with superimposed broad posterior 3 mm protrusion. Findings result in mild canal stenosis. There is a component of  posterior epidural lipomatosis contributing to the canal stenosis. There is mild foraminal narrowing bilaterally.     At L3-4, there is moderate facet arthritis bilaterally. There is concentric desiccated disc bulge with a superimposed broad posterior protrusion/extrusion that measures 4 to 5 mm AP dimension by 7 to 8 mm SI dimension, and extending into the left greater  the right foramina. There is epidural lipomatosis posteriorly. The combination of findings result in moderate canal stenosis with some impingement on the bilateral lateral recesses. There is moderate right and mild left foraminal narrowing.     At L4-5, there is moderate-to-severe left-sided facet degenerative change and moderate right-sided facet degenerative change. There is mild to moderate ligamentum flavum thickening. There is concentric desiccated disc bulge with a superimposed broad  posterior protrusion/extrusion that measures about 5 to 6 mm SI dimension and 4 mm AP dimension. Its broad in the ML dimension extending into foramina. There is some prominence of posterior epidural fat pad and the combination of findings result in  approximately moderate canal stenosis. There is moderate right and moderate to severe left-sided foraminal  narrowing.     At L5-S1, there is moderate to severe left and mild right facet arthritis. There is broad posterior protrusion remaining contiguous with the inferior endplate of L5. There is mild canal stenosis with more focal mass effect on the left lateral recess.  There is mild to moderate right and moderate left foraminal impingement.     IMPRESSION:     1. Study is limited by involuntary patient motion artifact.  2. There is approximately moderate canal stenosis at L3-4 and L4-5 and mild canal stenosis at L2-3 and L5-S1. Please refer to the level by level description of lateral recess and foraminal narrowing.     Signer Name: Rebeka Katz MD   Signed: 9/7/2021 10:23 AM   Workstation Name: [email or web address]    [facility]    8/19/21  XR SPINE LUMBAR COMPLETE 4+VW-: 8/19/2021 8:44 AM     INDICATION:   Pain going down both legs for years.     COMPARISON:   Abdomen and pelvis CT 03/02/2020.     FINDINGS:  5 views of the lumbar spine. Lumbar levoscoliosis and secondary  degenerative change. The bones are osteopenic. There is advanced  atherosclerotic change of the aorta. No pars defect. Discogenic changes  present throughout the lumbar spine and most severe at L5-S1 and L4-5.  Marginal osteophyte formation. There is mild to moderate facet  arthropathy throughout the lumbar spine most severe at L5-S1. No  compression fracture. No malalignment.     IMPRESSION:  Degenerative changes of the lumbar spine. No acute findings.     This report was finalized on 8/19/2021 8:59 AM by Dr. Maikol Castro MD.    8/10/21 Creatinine 1.28, Platelets 496 (10*3), A1c 7.80    Review of Systems   Constitutional: Positive for activity change (decreased) and fatigue. Negative for chills and fever.   HENT: Negative for congestion.    Eyes: Negative for visual disturbance.   Respiratory: Negative for chest tightness and shortness of breath.    Cardiovascular: Negative for chest pain.   Gastrointestinal: Negative for abdominal  "pain, constipation and diarrhea.   Genitourinary: Negative for difficulty urinating, dyspareunia and dysuria.   Musculoskeletal: Positive for back pain.   Neurological: Positive for dizziness, weakness, light-headedness, numbness and headaches.   Psychiatric/Behavioral: Positive for agitation and sleep disturbance. Negative for self-injury and suicidal ideas. The patient is nervous/anxious.      I have reviewed and confirmed the accuracy of the ROS as documented by the MA/LPN/RN Ronit Harrison MD      Vitals:    09/16/21 0826   BP: 137/77   Pulse: 87   Resp: 16   Temp: 96.8 °F (36 °C)   SpO2: 96%   Weight: 68 kg (150 lb)   Height: 165.1 cm (65\")   PainSc:   8   PainLoc: Back         Objective   Physical Exam  Vitals reviewed.   Constitutional:       General: She is not in acute distress.  Pulmonary:      Effort: Pulmonary effort is normal. No respiratory distress.   Musculoskeletal:      Comments: Ambulation: Without assistive device  Lumbar Exam:  Appearance: Scoliotic curve absent and scarring absent  Palpated over lumbosacral paravertebral regions and transverse processes with positive tenderness appreciated, Bilateral.   Sacroiliac joints are tender, Bilateral.  Trochanteric bursa are not tender, Bilateral.  Straight leg raise is negative radiculopathy, Bilateral.  Slump test is negative  radiculopathy, Bilateral.  Facet loading is positive for pain, Bilateral.    Skin:     General: Skin is warm and dry.   Neurological:      General: No focal deficit present.      Mental Status: She is alert.      Sensory: Sensory deficit ( below the knees) present.      Deep Tendon Reflexes:      Reflex Scores:       Bicep reflexes are 2+ on the right side and 2+ on the left side.       Brachioradialis reflexes are 2+ on the right side and 2+ on the left side.       Patellar reflexes are 2+ on the right side and 2+ on the left side.     Comments: Negative clonus bilaterally.     Psychiatric:         Mood and Affect: Mood " normal.         Thought Content: Thought content normal.         Assessment/Plan   Diagnoses and all orders for this visit:    1. Neuropathy (Primary)  -     gabapentin (NEURONTIN) 300 MG capsule; Take 1 capsule PO nightly for 3-4 nights.  If no side effects increase to 1 capsule 2x/day.  Continue increasing until taking 2 capsules 2x/day.  Dispense: 120 capsule; Refill: 0        - Pertinent labs reviewed.   - Pertinent imaging reviewed.   - Creatinine clearance 46.   - Will start gabapentin 300mg qhs with slow increase up to 600mg BID.  Discussed medication with the patient.  Included in this discussion was the potential for side effects and adverse events.  Patient verbalized understanding and wished to proceed.  Prescription will be sent to pharmacy.   - Explained that I do not treat restless leg syndrome or sleep issues.  She should follow-up with her neurologist/PCP for these as needed.  Gabapentin may help with these, however my treatment goal is her neuropathy symptoms.   - Once her neuropathy is better controlled, we may consider interventions for her back pain.   - Jayne ALMONTE Arcadia reports a pain score of 8.  Given her pain assessment as noted, treatment options were discussed and the following options were decided upon as a follow-up plan to address the patient's pain: prescription for non-opiod analgesics.    --- Follow-up 1 month office visit            SANDOVAL CROSS    As part of the patient's treatment plan, I am prescribing controlled substances. The patient has been made aware of appropriate use of such medications, including potential risk of somnolence, limited ability to drive and/or work safely, and the potential for dependence or overdose. It has also bee made clear that these medications are for use by this patient only, without concomitant use of alcohol or other substances unless prescribed.     Patient has completed prescribing agreement detailing terms of continued prescribing of  controlled substances, including monitoring SANDOVAL reports, urine drug screening, and pill counts if necessary. The patient is aware that inappropriate use will results in cessation of prescribing such medications.    As the clinician, I personally reviewed the SANDOVAL from 9/16/21 while the patient was in the office today.    History and physical exam exhibit continued safe and appropriate use of controlled substances.         While examining this patient, I wore protective equipment including a mask, eye shield and gloves.  I washed my hands before and after this patient encounter.  The patient wore a mask throughout the visit as well.     Ronit Harrison MD  Pain Management

## 2021-09-16 ENCOUNTER — OFFICE VISIT (OUTPATIENT)
Dept: PAIN MEDICINE | Facility: CLINIC | Age: 66
End: 2021-09-16

## 2021-09-16 VITALS
SYSTOLIC BLOOD PRESSURE: 137 MMHG | RESPIRATION RATE: 16 BRPM | TEMPERATURE: 96.8 F | WEIGHT: 150 LBS | DIASTOLIC BLOOD PRESSURE: 77 MMHG | HEART RATE: 87 BPM | HEIGHT: 65 IN | BODY MASS INDEX: 24.99 KG/M2 | OXYGEN SATURATION: 96 %

## 2021-09-16 DIAGNOSIS — G62.9 NEUROPATHY: Primary | ICD-10-CM

## 2021-09-16 PROCEDURE — 99204 OFFICE O/P NEW MOD 45 MIN: CPT | Performed by: ANESTHESIOLOGY

## 2021-09-16 RX ORDER — GABAPENTIN 300 MG/1
CAPSULE ORAL
Qty: 120 CAPSULE | Refills: 0 | Status: SHIPPED | OUTPATIENT
Start: 2021-09-16 | End: 2021-10-13 | Stop reason: DRUGHIGH

## 2021-09-16 NOTE — PATIENT INSTRUCTIONS
Some common side effects of gabapentin and/or pregabalin include sleepiness, swelling in hands or feet, depression, blurred vision, and drowsiness.  Please contact the clinic immediately with any significant side effects so that the treatment plan may be adjusted in a safe manner.    It is best to start taking your gabapentin prescription on a night when you have nothing planned the next morning.  It is best to not make important decisions or to drive until you know how this medication will affect you.  If you have been instructed to slowly increase the dose, it is best to do this (again) on a day where you have nothing planned.    - If you have severe side effects, stop the medication.   - If you are experiencing strong side effects, do not increase the dose until those side effects resolve.    - If you have pain relief at any dose, stay at that dose and do not increase it.

## 2021-09-20 ENCOUNTER — OFFICE VISIT (OUTPATIENT)
Dept: NEUROLOGY | Facility: CLINIC | Age: 66
End: 2021-09-20

## 2021-09-20 ENCOUNTER — APPOINTMENT (OUTPATIENT)
Dept: PHYSICAL THERAPY | Facility: HOSPITAL | Age: 66
End: 2021-09-20

## 2021-09-20 VITALS
WEIGHT: 149 LBS | SYSTOLIC BLOOD PRESSURE: 160 MMHG | HEART RATE: 102 BPM | DIASTOLIC BLOOD PRESSURE: 84 MMHG | BODY MASS INDEX: 24.83 KG/M2 | OXYGEN SATURATION: 96 % | HEIGHT: 65 IN

## 2021-09-20 DIAGNOSIS — I63.10 CEREBROVASCULAR ACCIDENT (CVA) DUE TO EMBOLISM OF PRECEREBRAL ARTERY (HCC): Primary | ICD-10-CM

## 2021-09-20 PROCEDURE — 99215 OFFICE O/P EST HI 40 MIN: CPT | Performed by: PSYCHIATRY & NEUROLOGY

## 2021-09-20 NOTE — PROGRESS NOTES
Chief Complaint  Stroke and Numbness (numbness and tingilng all extremieties)    Subjective     {CC  Problem List  Visit Diagnosis   Encounters  Notes  Medications  Labs  Result Review Imaging  Media :23}     Jayne Beltran presents to [facility] NEUROLOGY for   HISTORY OF PRESENT ILLNESS:    Jayne Beltran is a 66 year old right handed woman who presents to neurology clinic for follow up of hospitalization for strokes.  She presents with her daughter, Saritha, to neurology clinic who also provides history.  She was evaluated by my neurology colleagues and I reviewed their notes on her visit today.  She went to bed on 6/11/2021 she woke up with her stomach being upset and she felt like she had to run to the bathroom.  When she went back to her bed she collapsed and fell and hit her head on the ground.  She reportedly had a dark stool.  She was taken to [facility] where she was found to be febrile and had high blood sugar and concerns for GI bleeding and infection.  She was evaluated by my neurology colleagues and had a brain MRI scan done which demonstrated multifocal infarcts in bilateral hemispheres which I reviewed the images independently with patient and her daughter on their visit today.  She had a NATHANIEL which did not show any vegetation and normal left ventricular EF. She had carotid doppler study which demonstrated 16-49% stenosis of right ICA and patent left ICA.  Vertebral arteries demonstrated anterograde flow bilaterally.  She also had an EEG which did not demonstrate any epileptiform discharges but some right temporal-parietal-occipital slowing.  She was evaluated by GI and had procedure done for the GI bleed.  She was started on baby aspirin 81 mg daily following her hospitalization.  She denies any new stroke like symptoms since being discharged from the hospital.  She is smoking 1/2 pack per day.  Her most recent HgbA1c is 7.8%.  She is not exercising much.   She denies any blood in stool or urine since starting the aspirin.      Past Medical History:   Diagnosis Date   • COPD (chronic obstructive pulmonary disease) (CMS/HCC)    • Diabetes mellitus (CMS/HCC)     type 2   • Fibroid    • Hypertension    • Leukocytosis    • Neuromuscular disorder (CMS/HCC)    • Panic attack    • Skin cancer     nose   • TIA (transient ischemic attack)         Family History   Problem Relation Age of Onset   • Heart disease Mother          at age 63   • Diabetes Mother    • Hypertension Mother    • Diabetes Father    • Deep vein thrombosis Father    • Depression Father    • Liver disease Father    • Lung cancer Father 58         at age 68   • Breast cancer Maternal Grandmother         ? age dx   • Dementia Maternal Grandmother    • Hypertension Maternal Grandmother    • Ovarian cancer Daughter 23   • Diabetes Daughter    • Depression Daughter    • Diabetes Sister    • Diabetes Brother    • Heart disease Brother    • Cancer Brother          at age 43   • Heart disease Maternal Uncle    • Cancer Paternal Uncle    • Clotting disorder Paternal Grandmother    • Colon cancer Neg Hx    • Colon polyps Neg Hx    • Malig Hyperthermia Neg Hx         Social History     Socioeconomic History   • Marital status:      Spouse name: Golden   • Number of children: 2   • Years of education: 12   • Highest education level: High school graduate   Tobacco Use   • Smoking status: Current Every Day Smoker     Packs/day: 0.25     Years: 45.00     Pack years: 11.25     Types: Cigarettes   • Smokeless tobacco: Never Used   • Tobacco comment: uses vape and smokes cigarettes   Vaping Use   • Vaping Use: Never used   Substance and Sexual Activity   • Alcohol use: No   • Drug use: No   • Sexual activity: Yes     Partners: Male     Birth control/protection: Post-menopausal        I have personally reviewed the ROS as stated below.     Review of Systems   Constitutional: Positive for fatigue.  "Negative for activity change and appetite change.   HENT: Negative for hearing loss, trouble swallowing and voice change.    Eyes: Negative for blurred vision, double vision, pain and discharge.   Respiratory: Negative for shortness of breath and wheezing.    Cardiovascular: Positive for leg swelling. Negative for palpitations.   Gastrointestinal: Negative for nausea and vomiting.   Genitourinary: Negative for difficulty urinating, dyspareunia and flank pain.   Musculoskeletal: Positive for back pain and gait problem. Negative for neck pain.   Allergic/Immunologic: Negative for environmental allergies and food allergies.   Neurological: Positive for dizziness, weakness and numbness. Negative for tremors, seizures, syncope, facial asymmetry, speech difficulty, light-headedness, headache, memory problem and confusion.   Psychiatric/Behavioral: Positive for sleep disturbance. Negative for agitation, behavioral problems, decreased concentration, dysphoric mood, hallucinations, self-injury, suicidal ideas, negative for hyperactivity, depressed mood and stress. The patient is not nervous/anxious.         Objective   Vital Signs:   /84 (BP Location: Right arm, Patient Position: Sitting)   Pulse 102   Ht 165.1 cm (65\")   Wt 67.6 kg (149 lb)   SpO2 96%   BMI 24.79 kg/m²       PHYSICAL EXAM:    General   Mental Status - Alert. General Appearance - Well developed, Well groomed, Oriented and Cooperative. Orientation - Oriented X3.       Head and Neck  Head - normocephalic, atraumatic with no lesions or palpable masses.  Neck    Global Assessment - supple.       Eye   Sclera/Conjunctiva - Bilateral - Normal.    ENMT  Mouth and Throat   Oral Cavity/Oropharynx: Oropharynx - the soft palate,uvula and tongue are normal in appearance.    Chest and Lung Exam   Chest - lung clear to auscultation bilaterally.    Cardiovascular   Cardiovascular examination reveals  - normal heart sounds, regular rate and rhythm.    Neurologic "   Mental Status: Speech - Normal. Cognitive function - appropriate fund of knowledge. No impairment of attention, Impairment of concentration, impairment of long term memory or impairment of short term memory.  Cranial Nerves:   II Optic: Visual acuity - Left - Normal. Right - Normal. Visual fields - Normal (to confrontation).  III Oculomotor: Pupillary constriction - Left - Normal. Right - Normal.  VII Facial: - Normal Bilaterally.  VIII Acoustic - Bilateral - Hearing normal and (Hearing tested by finger rub).   IX Glossopharyngeal / X Vagus - Normal.  XI Accessory: Trapezius - Bilateral - Normal. Sternocleidomastoid - Bilateral - Normal.  XII Hypoglossal - Bilateral - Normal.  Eye Movements: - Normal Bilaterally.  Sensory:   Light Touch: Intact - Globally, reduced distally in bilateral upper and lower extremities distally>proximally.    Motor:   Bulk and Contour: - Normal.  Tone: - Normal.  Tremor: Not present.  Strength: 5/5 normal muscle strength - All Muscles.   General Assessment of Reflexes: - deep tendon reflexes are normal. Coordination - No Impairment of finger-to-nose or Impairment of rapid alternating movements. Gait - Normal.       Result Review :                 Assessment and Plan    Problem List Items Addressed This Visit        Neuro    Cerebrovascular accident (CVA) due to embolism of precerebral artery (CMS/HCC) - Primary    Overview     2021: ED to Hosp-Admission  Discharged   2021 - 2021 (7 days)  [facility]   Obdulio Burger MD  Last attending • Treatment team   Discharge Summary  Carlos Harmon MD (Physician) • • Pulmonology       PHYSICIAN DISCHARGE SUMMARY                                                                        [facility]     Patient Identification:  Name: Jayne Beltran  Age: 65 y.o.  Sex: female  :  1955  MRN: [id number]  Primary Care Physician: Michael Arriaza Jr., DO     Admit date: 2021  Discharge date  and time: No discharge date for patient encounter.   Discharged Condition: stable     Discharge Diagnoses:  Hyperosmolality    Gastrointestinal hemorrhage    Absolute anemia    Melena     Acute respiratory failure intubated 6/12/2021 extubated 6 1421  Altered mental status resolved  Sepsis resolved  Rule out endocarditis  Acute blood loss anemia with GI bleed and melanotic stools/coffee-ground emesis: Repeat EGD with gastric ulcer cauterized 612  Lactic acidosis resolved  Hyperosmolar nonketotic syndrome resolved  Leukocytosis improved  Tachycardia improved  Hypertension  Encephalopathy  COPD without exacerbation  History of tobacco use  Hospital Course: Jayne Beltran presented to [facility]    Patient admitted to the ICU altered mental status worsening respiratory status.  Intubated on the vent and MRI of the brain showed multiple bilateral embolic stroke blood cultures were negative.  NATHANIEL was negative.  Rocephin was continued per ID they then discontinued it.  Aspirin started.  Zio patch per neurology outpatient follow-up with neurology in 3 months.  At this time patient's issues have all resolved.  We started her back on her blood pressure medications and she will follow up with nephrology cardiology and neurology as per their recommendations.        Continue aspirin 81 mg daily.         Current Assessment & Plan     66 year old right handed woman who presents to neurology clinic for follow up of hospitalization for strokes.  She was evaluated by my neurology colleagues and I reviewed their notes on her visit today.  She went to bed on 6/11/2021 she woke up with her stomach being upset and she felt like she had to run to the bathroom.  When she went back to her bed she collapsed and fell and hit her head on the ground.  She reportedly had a dark stool.  She was taken to [facility] where she was found to be febrile and had high blood sugar and concerns for GI bleeding and  infection.  She was evaluated by my neurology colleagues and had a brain MRI scan done which demonstrated multifocal infarcts in bilateral hemispheres which I reviewed the images independently with patient and her daughter on their visit today.  She had a NATHANIEL which did not show any vegetation and normal left ventricular EF. She had carotid doppler study which demonstrated 16-49% stenosis of right ICA and patent left ICA.  Vertebral arteries demonstrated anterograde flow bilaterally.  She also had an EEG which did not demonstrate any epileptiform discharges but some right temporal-parietal-occipital slowing.  She was evaluated by GI and had procedure done for the GI bleed.  She was started on baby aspirin 81 mg daily following her hospitalization.  She denies any new stroke like symptoms since being discharged from the hospital.  She is smoking 1/2 pack per day.  Her most recent HgbA1c is 7.8%.  She is not exercising much.  I advised her to quit smoking and stop drinking soda and exercise more.  At this time we don't have a definitive etiology of her bi-hemispheric infarcts and I am concerned that she may have paroxysmal Afib.  I will refer her to cardiology for further evaluation, she may need more prolonged cardiac monitoring to evaluate for possible paroxysmal Afib.  She is currently on aspirin, I am not sure it is safe for her to be on anticoagulation so she will be following up with GI for further evaluation from her history of GI bleeding to be sure it is ok for her to be on anticoagulation if needed in the future.  Provided patient education information on stroke today.   Discussed stroke symptoms and advised her to be taken to the nearest ED with any new stroke symptoms.                 I spent 44 minutes caring for Jayne on this date of service. This time includes time spent by me in the following activities:preparing for the visit, reviewing tests, obtaining and/or reviewing a separately obtained history,  performing a medically appropriate examination and/or evaluation , counseling and educating the patient/family/caregiver, documenting information in the medical record, independently interpreting results and communicating that information with the patient/family/caregiver and care coordination    Follow Up   No follow-ups on file.  Patient was given instructions and counseling regarding her condition or for health maintenance advice. Please see specific information pulled into the AVS if appropriate.

## 2021-09-20 NOTE — ASSESSMENT & PLAN NOTE
66 year old right handed woman who presents to neurology clinic for follow up of hospitalization for strokes.  She was evaluated by my neurology colleagues and I reviewed their notes on her visit today.  She went to bed on 6/11/2021 she woke up with her stomach being upset and she felt like she had to run to the bathroom.  When she went back to her bed she collapsed and fell and hit her head on the ground.  She reportedly had a dark stool.  She was taken to [facility] where she was found to be febrile and had high blood sugar and concerns for GI bleeding and infection.  She was evaluated by my neurology colleagues and had a brain MRI scan done which demonstrated multifocal infarcts in bilateral hemispheres which I reviewed the images independently with patient and her daughter on their visit today.  She had a NATHANIEL which did not show any vegetation and normal left ventricular EF. She had carotid doppler study which demonstrated 16-49% stenosis of right ICA and patent left ICA.  Vertebral arteries demonstrated anterograde flow bilaterally.  She also had an EEG which did not demonstrate any epileptiform discharges but some right temporal-parietal-occipital slowing.  She was evaluated by GI and had procedure done for the GI bleed.  She was started on baby aspirin 81 mg daily following her hospitalization.  She denies any new stroke like symptoms since being discharged from the hospital.  She is smoking 1/2 pack per day.  Her most recent HgbA1c is 7.8%.  She is not exercising much.  I advised her to quit smoking and stop drinking soda and exercise more.  At this time we don't have a definitive etiology of her bi-hemispheric infarcts and I am concerned that she may have paroxysmal Afib.  I will refer her to cardiology for further evaluation, she may need more prolonged cardiac monitoring to evaluate for possible paroxysmal Afib.  She is currently on aspirin, I am not sure it is safe for her to be on  anticoagulation so she will be following up with GI for further evaluation from her history of GI bleeding to be sure it is ok for her to be on anticoagulation if needed in the future.  Provided patient education information on stroke today.   Discussed stroke symptoms and advised her to be taken to the nearest ED with any new stroke symptoms.

## 2021-09-20 NOTE — PATIENT INSTRUCTIONS
Lower blood pressure by restricting salt in diet (less than 2400 mg/day), weight loss, increase fruits, vegetables, whole grains, and low-fat dairy products.    Consider the DASH diet or Mediterranean diet.  Increase fish and poultry intake.    Avoid sodas, sweets, and red meat.    Limit alcohol consumption.    Monitor and keep blood pressure, cholesterol and blood sugar at goal.    Avoid the use of tobacco and avoid secondhand smoke.    Engage in moderate to vigorous intensity aerobic exercise for about 40 minutes 3-4 times per week.  Consider lower intensity candido chi or yoga if necessary.    Take antiplatelet medication regularly.    If you snore, wake up unrefreshed or feel tired during the day, talk to your doctor as these may be signs of sleep apnea and a sleep study may be indicated.

## 2021-09-21 ENCOUNTER — TELEPHONE (OUTPATIENT)
Dept: CARDIOLOGY | Facility: CLINIC | Age: 66
End: 2021-09-21

## 2021-09-21 NOTE — TELEPHONE ENCOUNTER
Dr. Jackson and Dr. Richards,      Pt wishes to switch to [facility] so she can be seen in [place]. Is this ok?    Thanks  Kerrie O

## 2021-09-22 ENCOUNTER — HOSPITAL ENCOUNTER (OUTPATIENT)
Dept: PHYSICAL THERAPY | Facility: HOSPITAL | Age: 66
Setting detail: THERAPIES SERIES
Discharge: HOME | End: 2021-09-22

## 2021-09-22 DIAGNOSIS — M54.42 ACUTE BACK PAIN WITH SCIATICA, LEFT: ICD-10-CM

## 2021-09-22 DIAGNOSIS — M54.41 ACUTE BACK PAIN WITH SCIATICA, RIGHT: Primary | ICD-10-CM

## 2021-09-22 PROCEDURE — 97110 THERAPEUTIC EXERCISES: CPT

## 2021-09-22 PROCEDURE — 97012 MECHANICAL TRACTION THERAPY: CPT

## 2021-09-22 NOTE — THERAPY TREATMENT NOTE
Outpatient Physical Therapy Ortho Treatment Note  DONATO Ho     Patient Name: Jayne Beltran  : 1955  MRN: [id number]  Today's Date: 2021      Visit Date: 2021    Visit Dx:    ICD-10-CM ICD-9-CM   1. Acute back pain with sciatica, right  M54.41 724.3   2. Acute back pain with sciatica, left  M54.42 724.3       Patient Active Problem List   Diagnosis   • Essential hypertension   • Snoring   • TIA (transient ischemic attack)   • Chronic bilateral low back pain with bilateral sciatica   • GERD without esophagitis   • Inflamed seborrheic keratosis   • Type 2 diabetes mellitus without complication, without long-term current use of insulin (CMS/HCC)   • Peripheral neuropathy   • Restless legs syndrome   • Benign paroxysmal positional vertigo   • Bone lesion   • Vitamin D deficiency   • Dyslipidemia   • Muscle spasm of both lower legs   • Tobacco use disorder   • Abnormal lung sounds   • Intermittent claudication (CMS/HCC)   • Leg mass, right   • Rib pain on left side   • Diabetic autonomic neuropathy associated with type 2 diabetes mellitus (CMS/HCC)   • Leukocytosis   • Primary insomnia   • PMB (postmenopausal bleeding)   • Family history of ovarian cancer   • Acute renal failure (ARF) (CMS/HCC)   • Duplicated renal collecting system   • Atherosclerotic vascular disease   • Elevated platelet count   • Low hemoglobin   • Other anomalies of uterus   • Multiple kidney stones   • Multiple kidney stones   • Bilateral lower extremity edema   • Diarrhea   • Sore on leg   • Syncope and collapse   • Unspecified malignant neoplasm of skin of nose   • Shortness of breath   • Pain, unspecified   • Other specified coagulation defects (CMS/HCC)   • Encounter for change or removal of nonsurgical wound dressing   • Iron deficiency anemia   • Anxiety   • History of subdural hematoma   • Acute back pain with sciatica, left   • Diabetes mellitus (CMS/HCC)   • Panic attack   • Acute renal failure (CMS/HCC)   •  Calculus of kidney   • Restless legs syndrome   • Transient ischemic attack   • Right shoulder injury, initial encounter   • Nonspecific abnormal findings on imaging examination of skull and head   • Hyperosmolality   • Gastrointestinal hemorrhage   • Absolute anemia   • Melena   • Aortic valve calcification   • Impaired left ventricular relaxation   • Concentric left ventricular hypertrophy   • Left atrial dilatation   • Nonrheumatic mitral valve regurgitation   • Internal carotid artery stenosis, right   • Cerebrovascular accident (CVA) due to embolism of precerebral artery (CMS/HCC)   • Acute back pain with sciatica, right   • Chronic pain of right ankle   • Chronic pain of left ankle   • DDD (degenerative disc disease), lumbar   • Degenerative arthritis of lumbar spine   • Plantar fasciitis, bilateral        Past Medical History:   Diagnosis Date   • COPD (chronic obstructive pulmonary disease) (CMS/HCC)    • Diabetes mellitus (CMS/HCC)     type 2   • Fibroid    • Hypertension    • Leukocytosis    • Neuromuscular disorder (CMS/HCC)    • Panic attack    • Skin cancer     nose   • TIA (transient ischemic attack)         Past Surgical History:   Procedure Laterality Date   • CHOLECYSTECTOMY     • COLONOSCOPY     • ENDOSCOPY N/A 6/11/2021    Procedure: ESOPHAGOGASTRODUODENOSCOPY AT BEDSIDE;  Surgeon: Donovan Banks MD;  Location: [facility] ENDOSCOPY;  Service: Gastroenterology;  Laterality: N/A;  pre: melena, GI bleed, anemia   post: retained food    • ENDOSCOPY N/A 6/12/2021    Procedure: ESOPHAGOGASTRODUODENOSCOPY WITH GOLD PROBE CAUTERY 7FR TO BLEEDING GASTRIC ANTRUM ULCER;  Surgeon: Calvin Barraza MD;  Location: [facility] ENDOSCOPY;  Service: Gastroenterology;  Laterality: N/A;  PRE- GI BLEED  POST- BLEEDING GASTRIC ANTRUM ULCER   • INSERTION HEMODIALYSIS CATHETER N/A 9/3/2020    Procedure: HEMODIALYSIS CATHETER INSERTION;  Surgeon: Sergio Durant MD;  Location: [facility] MAIN OR;  Service: Vascular;   Laterality: N/A;   • KIDNEY STONE SURGERY     • URETEROSCOPY LASER LITHOTRIPSY WITH STENT INSERTION Right 12/21/2020    Procedure: CYSTOSCOPY, RIGHT URETEROSCOPY, RIGHT RETROGRADE PYELOGRAM, LASER LITHOTRIPSY WITH RIGHT URETERAL STENT EXCHANGE;  Surgeon: Mikie Mejia MD;  Location: [facility];  Service: Urology;  Laterality: Right;                       PT Assessment/Plan     Row Name 09/22/21 1000          PT Assessment    Assessment Comments  Continued with exercises as well as lumbar traction today. Patient continues to report relief in her symptoms with the exercises and lumbar traction.  -AS        PT Plan    PT Plan Comments  Continue with current treatment plan.  -AS       User Key  (r) = Recorded By, (t) = Taken By, (c) = Cosigned By    Initials Name Provider Type    AS Shemar Rosa, PT Physical Therapist          Modalities     Row Name 09/22/21 1000             Traction 44929    Traction Type  Lumbar  -AS      PT Traction Rx Minutes  20  -AS      Duration  Intermittent  -AS      Position  Supine  -AS      Weight  80  -AS      Hold  60  -AS      Relax  10  -AS         Functional Testing    Outcome Measure Options  Other Outcome Measure  -AS        User Key  (r) = Recorded By, (t) = Taken By, (c) = Cosigned By    Initials Name Provider Type    AS Shemar Rosa, PT Physical Therapist        OP Exercises     Row Name 09/22/21 1000             Subjective Comments    Subjective Comments  Patient states she had an MRI of her lumbar spine which showed multi level disc bulging as well as arthritis. Patient states the lumbar traction and exercises have helped.   -AS         Exercise 1    Exercise Name 1  Mihai. HS Stretch with ADD  -AS      Reps 1  10  -AS      Time 1  10 sec hold each  -AS         Exercise 2    Exercise Name 2  Mihai. Piriformis Stretch  -AS      Reps 2  10  -AS      Time 2  10 sec hold each  -AS         Exercise 3    Exercise Name 3  Lumbar Rotations  -AS      Reps 3  25   -AS         Exercise 4    Exercise Name 4  SKTC  -AS      Reps 4  10  -AS      Time 4  10 sec hold each  -AS        User Key  (r) = Recorded By, (t) = Taken By, (c) = Cosigned By    Initials Name Provider Type    AS Shemar Rosa, PT Physical Therapist                                Outcome Measure Options: Other Outcome Measure         Time Calculation:   Start Time: 1055  Stop Time: 1145  Time Calculation (min): 50 min  Untimed Charges  PT Traction Rx Minutes: 20  Total Minutes  Untimed Charges Total Minutes: 20   Total Minutes: 20  Therapy Charges for Today     Code Description Service Date Service Provider Modifiers Qty    [id number]  PT TRACTION LUMBAR 9/22/2021 Shemar Rosa, PT GP 1    [id number] HC PT THER PROC EA 15 MIN 9/22/2021 Shemar Rosa, PT GP 1          PT G-Codes  Outcome Measure Options: Other Outcome Measure         Shemar Rosa, PT  9/22/2021

## 2021-09-23 ENCOUNTER — OFFICE VISIT (OUTPATIENT)
Dept: CARDIOLOGY | Facility: CLINIC | Age: 66
End: 2021-09-23

## 2021-09-23 DIAGNOSIS — Z87.19 HISTORY OF GI BLEED: ICD-10-CM

## 2021-09-23 DIAGNOSIS — I35.9 AORTIC VALVE CALCIFICATION: ICD-10-CM

## 2021-09-23 DIAGNOSIS — I63.10 CEREBROVASCULAR ACCIDENT (CVA) DUE TO EMBOLISM OF PRECEREBRAL ARTERY (HCC): Primary | ICD-10-CM

## 2021-09-23 DIAGNOSIS — I34.0 NONRHEUMATIC MITRAL VALVE REGURGITATION: ICD-10-CM

## 2021-09-23 PROCEDURE — 99214 OFFICE O/P EST MOD 30 MIN: CPT | Performed by: NURSE PRACTITIONER

## 2021-09-28 ENCOUNTER — HOSPITAL ENCOUNTER (OUTPATIENT)
Dept: PHYSICAL THERAPY | Facility: HOSPITAL | Age: 66
Setting detail: THERAPIES SERIES
Discharge: HOME | End: 2021-09-28

## 2021-09-28 ENCOUNTER — TELEPHONE (OUTPATIENT)
Dept: CARDIOLOGY | Facility: CLINIC | Age: 66
End: 2021-09-28

## 2021-09-28 DIAGNOSIS — M54.42 ACUTE BACK PAIN WITH SCIATICA, LEFT: ICD-10-CM

## 2021-09-28 DIAGNOSIS — M54.41 ACUTE BACK PAIN WITH SCIATICA, RIGHT: Primary | ICD-10-CM

## 2021-09-28 PROCEDURE — 97110 THERAPEUTIC EXERCISES: CPT

## 2021-09-28 PROCEDURE — 97012 MECHANICAL TRACTION THERAPY: CPT

## 2021-09-28 NOTE — THERAPY TREATMENT NOTE
Outpatient Physical Therapy Ortho Treatment Note  DONATO Ho     Patient Name: Jayne Beltran  : 1955  MRN: [id number]  Today's Date: 2021      Visit Date: 2021    Visit Dx:    ICD-10-CM ICD-9-CM   1. Acute back pain with sciatica, right  M54.41 724.3   2. Acute back pain with sciatica, left  M54.42 724.3       Patient Active Problem List   Diagnosis   • Essential hypertension   • Snoring   • TIA (transient ischemic attack)   • Chronic bilateral low back pain with bilateral sciatica   • GERD without esophagitis   • Inflamed seborrheic keratosis   • Type 2 diabetes mellitus without complication, without long-term current use of insulin (CMS/HCC)   • Peripheral neuropathy   • Restless legs syndrome   • Benign paroxysmal positional vertigo   • Bone lesion   • Vitamin D deficiency   • Dyslipidemia   • Muscle spasm of both lower legs   • Tobacco use disorder   • Abnormal lung sounds   • Intermittent claudication (CMS/HCC)   • Leg mass, right   • Rib pain on left side   • Diabetic autonomic neuropathy associated with type 2 diabetes mellitus (CMS/HCC)   • Leukocytosis   • Primary insomnia   • PMB (postmenopausal bleeding)   • Family history of ovarian cancer   • Acute renal failure (ARF) (CMS/HCC)   • Duplicated renal collecting system   • Atherosclerotic vascular disease   • Elevated platelet count   • Low hemoglobin   • Other anomalies of uterus   • Multiple kidney stones   • Multiple kidney stones   • Bilateral lower extremity edema   • Diarrhea   • Sore on leg   • Syncope and collapse   • Unspecified malignant neoplasm of skin of nose   • Shortness of breath   • Pain, unspecified   • Other specified coagulation defects (CMS/HCC)   • Encounter for change or removal of nonsurgical wound dressing   • Iron deficiency anemia   • Anxiety   • History of subdural hematoma   • Acute back pain with sciatica, left   • Diabetes mellitus (CMS/HCC)   • Panic attack   • Acute renal failure (CMS/HCC)   •  Calculus of kidney   • Restless legs syndrome   • Transient ischemic attack   • Right shoulder injury, initial encounter   • Nonspecific abnormal findings on imaging examination of skull and head   • Hyperosmolality   • Gastrointestinal hemorrhage   • Absolute anemia   • Melena   • Aortic valve calcification   • Impaired left ventricular relaxation   • Concentric left ventricular hypertrophy   • Left atrial dilatation   • Nonrheumatic mitral valve regurgitation   • Internal carotid artery stenosis, right   • Cerebrovascular accident (CVA) due to embolism of precerebral artery (CMS/HCC)   • Acute back pain with sciatica, right   • Chronic pain of right ankle   • Chronic pain of left ankle   • DDD (degenerative disc disease), lumbar   • Degenerative arthritis of lumbar spine   • Plantar fasciitis, bilateral        Past Medical History:   Diagnosis Date   • COPD (chronic obstructive pulmonary disease) (CMS/HCC)    • Diabetes mellitus (CMS/HCC)     type 2   • Fibroid    • Hypertension    • Leukocytosis    • Neuromuscular disorder (CMS/HCC)    • Panic attack    • Skin cancer     nose   • TIA (transient ischemic attack)         Past Surgical History:   Procedure Laterality Date   • CHOLECYSTECTOMY     • COLONOSCOPY     • ENDOSCOPY N/A 6/11/2021    Procedure: ESOPHAGOGASTRODUODENOSCOPY AT BEDSIDE;  Surgeon: Donovan Banks MD;  Location: [facility] ENDOSCOPY;  Service: Gastroenterology;  Laterality: N/A;  pre: melena, GI bleed, anemia   post: retained food    • ENDOSCOPY N/A 6/12/2021    Procedure: ESOPHAGOGASTRODUODENOSCOPY WITH GOLD PROBE CAUTERY 7FR TO BLEEDING GASTRIC ANTRUM ULCER;  Surgeon: Calvin Barraza MD;  Location: [facility] ENDOSCOPY;  Service: Gastroenterology;  Laterality: N/A;  PRE- GI BLEED  POST- BLEEDING GASTRIC ANTRUM ULCER   • INSERTION HEMODIALYSIS CATHETER N/A 9/3/2020    Procedure: HEMODIALYSIS CATHETER INSERTION;  Surgeon: Sergio Durant MD;  Location: [facility] MAIN OR;  Service: Vascular;   "Laterality: N/A;   • KIDNEY STONE SURGERY     • URETEROSCOPY LASER LITHOTRIPSY WITH STENT INSERTION Right 12/21/2020    Procedure: CYSTOSCOPY, RIGHT URETEROSCOPY, RIGHT RETROGRADE PYELOGRAM, LASER LITHOTRIPSY WITH RIGHT URETERAL STENT EXCHANGE;  Surgeon: Mikie Mejia MD;  Location: [facility];  Service: Urology;  Laterality: Right;                       PT Assessment/Plan     Row Name 09/28/21 1000          PT Assessment    Assessment Comments  Continued with exercises as well as lumbar traction today. Patient continues to report relief in her symptoms with the exercises and lumbar traction.  -AS        PT Plan    PT Plan Comments  Continue with current treatment plan.  -AS       User Key  (r) = Recorded By, (t) = Taken By, (c) = Cosigned By    Initials Name Provider Type    AS Shemar Rosa, PT Physical Therapist          Modalities     Row Name 09/28/21 1000             Traction 22713    Traction Type  Lumbar  -AS      PT Traction Rx Minutes  20  -AS      Duration  Intermittent  -AS      Position  Supine  -AS      Weight  80  -AS      Hold  60  -AS      Relax  10  -AS         Functional Testing    Outcome Measure Options  Other Outcome Measure  -AS        User Key  (r) = Recorded By, (t) = Taken By, (c) = Cosigned By    Initials Name Provider Type    AS Shemar Rosa, PT Physical Therapist        OP Exercises     Row Name 09/28/21 1000             Subjective Comments    Subjective Comments  Patient states her back hurts today. She states she did \"a lot of weed eating this weekend\". She states she does get symptom relief with the lumbar traction.  -AS         Exercise 1    Exercise Name 1  Mihai. HS Stretch with ADD  -AS      Reps 1  10  -AS      Time 1  10 sec hold each  -AS         Exercise 2    Exercise Name 2  Mihai. Piriformis Stretch  -AS      Reps 2  10  -AS      Time 2  10 sec hold each  -AS         Exercise 3    Exercise Name 3  Lumbar Rotations  -AS      Reps 3  25  -AS         " Exercise 4    Exercise Name 4  SKTC  -AS      Reps 4  10  -AS      Time 4  10 sec hold each  -AS        User Key  (r) = Recorded By, (t) = Taken By, (c) = Cosigned By    Initials Name Provider Type    AS Shemar Rosa, PT Physical Therapist                                Outcome Measure Options: Other Outcome Measure         Time Calculation:   Start Time: 1040  Stop Time: 1126  Time Calculation (min): 46 min  Untimed Charges  PT Traction Rx Minutes: 20  Total Minutes  Untimed Charges Total Minutes: 20   Total Minutes: 20  Therapy Charges for Today     Code Description Service Date Service Provider Modifiers Qty    [id number]  PT TRACTION LUMBAR 9/28/2021 Shemar Rosa, PT GP 1    [id number] HC PT THER PROC EA 15 MIN 9/28/2021 Shemar Rosa, PT GP 1          PT G-Codes  Outcome Measure Options: Other Outcome Measure         Shemar Rosa, PT  9/28/2021

## 2021-09-28 NOTE — TELEPHONE ENCOUNTER
Please see below:    Good Afternoon,    I received a call from [facility] about a patient that needed to have a [id number] completed, to have her Loop Recorder approved.    Ordering Provider: Ann Davila  Rendering Provider: Eduard Castellon  Date Scheduled: 10/05/2021  Procedure and CPT: 07755 (Loop Recorder)  Patient Name: Jayne Beltran   Patient : 1955  Patient MRN: [id number]  Insurance Carrier: [employer]  Insurance ID: [id number]  Case number if applicable: [id number]    Insurance Phone for P2P: [phone]      Thank you,    Donna MARCELINO

## 2021-09-29 ENCOUNTER — TRANSCRIBE ORDERS (OUTPATIENT)
Dept: OBSTETRICS AND GYNECOLOGY | Facility: CLINIC | Age: 66
End: 2021-09-29

## 2021-09-29 DIAGNOSIS — Z01.818 OTHER SPECIFIED PRE-OPERATIVE EXAMINATION: Primary | ICD-10-CM

## 2021-09-30 NOTE — TELEPHONE ENCOUNTER
Good morning,     Just following up to see if the P2P has been done so I can notify the revenue team for authorization.    Thank you,    Donna MARCELINO

## 2021-10-02 ENCOUNTER — LAB (OUTPATIENT)
Dept: LAB | Facility: HOSPITAL | Age: 66
End: 2021-10-02

## 2021-10-02 DIAGNOSIS — Z01.818 OTHER SPECIFIED PRE-OPERATIVE EXAMINATION: ICD-10-CM

## 2021-10-02 LAB — SARS-COV-2 RNA PNL SPEC NAA+PROBE: NOT DETECTED

## 2021-10-02 PROCEDURE — 87635 SARS-COV-2 COVID-19 AMP PRB: CPT | Performed by: OBSTETRICS & GYNECOLOGY

## 2021-10-02 PROCEDURE — C9803 HOPD COVID-19 SPEC COLLECT: HCPCS | Performed by: OBSTETRICS & GYNECOLOGY

## 2021-10-05 ENCOUNTER — HOSPITAL ENCOUNTER (OUTPATIENT)
Facility: HOSPITAL | Age: 66
Setting detail: HOSPITAL OUTPATIENT SURGERY
Discharge: HOME | End: 2021-10-05
Attending: INTERNAL MEDICINE | Admitting: INTERNAL MEDICINE

## 2021-10-05 VITALS
OXYGEN SATURATION: 93 % | SYSTOLIC BLOOD PRESSURE: 140 MMHG | TEMPERATURE: 98 F | DIASTOLIC BLOOD PRESSURE: 71 MMHG | BODY MASS INDEX: 28.16 KG/M2 | HEIGHT: 62 IN | WEIGHT: 153 LBS | HEART RATE: 79 BPM | RESPIRATION RATE: 16 BRPM

## 2021-10-05 DIAGNOSIS — I63.10 CEREBROVASCULAR ACCIDENT (CVA) DUE TO EMBOLISM OF PRECEREBRAL ARTERY (HCC): ICD-10-CM

## 2021-10-05 LAB
GLUCOSE BLDC GLUCOMTR-MCNC: 259 MG/DL (ref 70–130)
GLUCOSE BLDC GLUCOMTR-MCNC: 332 MG/DL (ref 70–130)

## 2021-10-05 PROCEDURE — 82962 GLUCOSE BLOOD TEST: CPT

## 2021-10-05 PROCEDURE — 33285 INSJ SUBQ CAR RHYTHM MNTR: CPT | Performed by: INTERNAL MEDICINE

## 2021-10-05 PROCEDURE — C1764 EVENT RECORDER, CARDIAC: HCPCS | Performed by: INTERNAL MEDICINE

## 2021-10-05 PROCEDURE — 25010000002 MIDAZOLAM PER 1 MG: Performed by: INTERNAL MEDICINE

## 2021-10-05 PROCEDURE — 25010000002 FENTANYL CITRATE (PF) 50 MCG/ML SOLUTION: Performed by: INTERNAL MEDICINE

## 2021-10-05 DEVICE — ICM LP/RECRD REVEAL LINQ MEDTRONIC: Type: IMPLANTABLE DEVICE | Status: FUNCTIONAL

## 2021-10-05 RX ORDER — LIDOCAINE HYDROCHLORIDE AND EPINEPHRINE 10; 10 MG/ML; UG/ML
INJECTION, SOLUTION INFILTRATION; PERINEURAL AS NEEDED
Status: DISCONTINUED | OUTPATIENT
Start: 2021-10-05 | End: 2021-10-05 | Stop reason: HOSPADM

## 2021-10-05 RX ORDER — MIDAZOLAM HYDROCHLORIDE 1 MG/ML
INJECTION INTRAMUSCULAR; INTRAVENOUS AS NEEDED
Status: DISCONTINUED | OUTPATIENT
Start: 2021-10-05 | End: 2021-10-05 | Stop reason: HOSPADM

## 2021-10-05 RX ORDER — SODIUM CHLORIDE 0.9 % (FLUSH) 0.9 %
3 SYRINGE (ML) INJECTION EVERY 12 HOURS SCHEDULED
Status: DISCONTINUED | OUTPATIENT
Start: 2021-10-05 | End: 2021-10-05 | Stop reason: HOSPADM

## 2021-10-05 RX ORDER — FENTANYL CITRATE 50 UG/ML
INJECTION, SOLUTION INTRAMUSCULAR; INTRAVENOUS AS NEEDED
Status: DISCONTINUED | OUTPATIENT
Start: 2021-10-05 | End: 2021-10-05 | Stop reason: HOSPADM

## 2021-10-05 RX ORDER — SODIUM CHLORIDE 9 MG/ML
75 INJECTION, SOLUTION INTRAVENOUS CONTINUOUS
Status: DISCONTINUED | OUTPATIENT
Start: 2021-10-05 | End: 2021-10-05 | Stop reason: HOSPADM

## 2021-10-05 RX ORDER — SODIUM CHLORIDE 0.9 % (FLUSH) 0.9 %
10 SYRINGE (ML) INJECTION AS NEEDED
Status: DISCONTINUED | OUTPATIENT
Start: 2021-10-05 | End: 2021-10-05 | Stop reason: HOSPADM

## 2021-10-05 RX ORDER — LIDOCAINE HYDROCHLORIDE 10 MG/ML
0.1 INJECTION, SOLUTION EPIDURAL; INFILTRATION; INTRACAUDAL; PERINEURAL ONCE AS NEEDED
Status: DISCONTINUED | OUTPATIENT
Start: 2021-10-05 | End: 2021-10-05 | Stop reason: HOSPADM

## 2021-10-05 RX ADMIN — SODIUM CHLORIDE 75 ML/HR: 9 INJECTION, SOLUTION INTRAVENOUS at 07:36

## 2021-10-13 ENCOUNTER — OFFICE VISIT (OUTPATIENT)
Dept: PAIN MEDICINE | Facility: CLINIC | Age: 66
End: 2021-10-13

## 2021-10-13 ENCOUNTER — CLINICAL SUPPORT NO REQUIREMENTS (OUTPATIENT)
Dept: CARDIOLOGY | Facility: CLINIC | Age: 66
End: 2021-10-13

## 2021-10-13 VITALS
SYSTOLIC BLOOD PRESSURE: 185 MMHG | BODY MASS INDEX: 28.87 KG/M2 | OXYGEN SATURATION: 99 % | RESPIRATION RATE: 18 BRPM | TEMPERATURE: 96.9 F | DIASTOLIC BLOOD PRESSURE: 88 MMHG | HEART RATE: 76 BPM | WEIGHT: 156.9 LBS | HEIGHT: 62 IN

## 2021-10-13 DIAGNOSIS — M54.50 CHRONIC LOW BACK PAIN, UNSPECIFIED BACK PAIN LATERALITY, UNSPECIFIED WHETHER SCIATICA PRESENT: ICD-10-CM

## 2021-10-13 DIAGNOSIS — G89.29 OTHER CHRONIC PAIN: Primary | ICD-10-CM

## 2021-10-13 DIAGNOSIS — G89.29 CHRONIC LOW BACK PAIN, UNSPECIFIED BACK PAIN LATERALITY, UNSPECIFIED WHETHER SCIATICA PRESENT: ICD-10-CM

## 2021-10-13 DIAGNOSIS — I63.10 CEREBROVASCULAR ACCIDENT (CVA) DUE TO EMBOLISM OF PRECEREBRAL ARTERY (HCC): Primary | ICD-10-CM

## 2021-10-13 DIAGNOSIS — G62.9 NEUROPATHY: ICD-10-CM

## 2021-10-13 LAB
POC AMPHETAMINES: NEGATIVE
POC BARBITURATES: NEGATIVE
POC BENZODIAZEPHINES: NEGATIVE
POC COCAINE: NEGATIVE
POC METHADONE: NEGATIVE
POC METHAMPHETAMINE SCREEN URINE: NEGATIVE
POC OPIATES: NEGATIVE
POC OXYCODONE: NEGATIVE
POC PHENCYCLIDINE: NEGATIVE
POC PROPOXYPHENE: NEGATIVE
POC THC: NEGATIVE
POC TRICYCLIC ANTIDEPRESSANTS: NEGATIVE

## 2021-10-13 PROCEDURE — 93291 INTERROG DEV EVAL SCRMS IP: CPT | Performed by: INTERNAL MEDICINE

## 2021-10-13 PROCEDURE — 80305 DRUG TEST PRSMV DIR OPT OBS: CPT | Performed by: NURSE PRACTITIONER

## 2021-10-13 PROCEDURE — 99214 OFFICE O/P EST MOD 30 MIN: CPT | Performed by: NURSE PRACTITIONER

## 2021-10-13 RX ORDER — GABAPENTIN 800 MG/1
800 TABLET ORAL 3 TIMES DAILY
COMMUNITY
End: 2021-10-13 | Stop reason: SDUPTHER

## 2021-10-13 RX ORDER — GABAPENTIN 800 MG/1
800 TABLET ORAL 3 TIMES DAILY
Qty: 90 TABLET | Refills: 0 | Status: SHIPPED | OUTPATIENT
Start: 2021-10-13 | End: 2021-10-25 | Stop reason: SDUPTHER

## 2021-10-13 NOTE — PROGRESS NOTES
CHIEF COMPLAINT  Follow-up for back and foot pain.    Subjective   Jayne Beltran is a 66 y.o. female  who presents for follow-up.  She has a history of chronic back pain as well as bilateral foot/LE pain related to DPN.    Patient was seen 9/16/2021 by Dr. Ronit Harrison is a new patient to this clinic.  She was referred to our practice by her primary care provider Dr. Michael Arriaza for management and treatment of her back pain as well as her peripheral neuropathy.  At the previous office visit Dr. Harrison started gabapentin.  Primary complaint was the neuropathy pain.  Goal is to get this better managed and then consider interventional options for back pain as needed in the future.    C/o bilateral foot pain. Pain today 7/10 in severity.  Taking Gabapentin 600 mg, taking bid.  She has noticed some improvement but still having a lot of pain, tingling, sensation of walking on rocks, restless legs.  She reports that the pain is present around the clock.  She denies any advers reactions since starting the Gabapentin.      PCP manages diabetes.  Making medication changes currently.  Issues with cost of medications.      Patient remained masked during entire encounter. No cough present. I donned a mask and eye protection throughout entire visit. Prior to donning mask and eye protection, hand hygiene was performed, as well as when it was doffed.  I was closer than 6 feet, but not for an extended period of time. No obvious exposure to any bodily fluids.    Foot Pain  This is a chronic problem. The problem occurs daily. The problem has been gradually worsening. Associated symptoms include fatigue. Pertinent negatives include no chest pain, congestion, numbness or weakness. She has tried acetaminophen, NSAIDs, position changes, rest, sleep, walking and lying down for the symptoms. The treatment provided mild relief.      PEG Assessment   What number best describes your pain on average in the past week?7  What number best  "describes how, during the past week, pain has interfered with your enjoyment of life?10  What number best describes how, during the past week, pain has interfered with your general activity?  8    The following portions of the patient's history were reviewed and updated as appropriate: allergies, current medications, past family history, past medical history, past social history, past surgical history and problem list.    Review of Systems   Constitutional: Positive for fatigue.   HENT: Negative for congestion.    Eyes: Negative for visual disturbance.   Respiratory: Negative for shortness of breath.    Cardiovascular: Negative for chest pain.   Gastrointestinal: Negative for constipation.   Genitourinary: Negative for difficulty urinating.   Musculoskeletal: Positive for back pain.   Neurological: Negative for weakness and numbness.   Psychiatric/Behavioral: Positive for sleep disturbance. Negative for suicidal ideas. The patient is not nervous/anxious.      I have reviewed and confirmed the accuracy of the ROS as documented by the MA/LPN/RN TRISH Benson    Vitals:    10/13/21 0851   BP: (!) 185/88   Pulse: 76   Resp: 18   Temp: 96.9 °F (36.1 °C)   SpO2: 99%   Weight: 71.2 kg (156 lb 14.4 oz)   Height: 157.5 cm (62\")   PainSc:   9   PainLoc: Foot     Objective   Physical Exam  Vitals and nursing note reviewed.   Constitutional:       General: She is not in acute distress.     Appearance: Normal appearance. She is not ill-appearing.   Cardiovascular:      Pulses:           Dorsalis pedis pulses are 1+ on the right side and 1+ on the left side.        Posterior tibial pulses are 1+ on the right side and 1+ on the left side.   Pulmonary:      Effort: Pulmonary effort is normal. No respiratory distress.   Musculoskeletal:      Lumbar back: Tenderness present.   Skin:     General: Skin is warm and dry.   Neurological:      Mental Status: She is alert and oriented to person, place, and time.      Motor: Motor " function is intact.      Gait: Gait is intact. Gait normal.   Psychiatric:         Mood and Affect: Mood normal.         Behavior: Behavior normal.       Assessment/Plan   Diagnoses and all orders for this visit:    1. Other chronic pain (Primary)    2. Neuropathy    3. Chronic low back pain, unspecified back pain laterality, unspecified whether sciatica present    Other orders  -     gabapentin (NEURONTIN) 800 MG tablet; Take 1 tablet by mouth 3 (Three) Times a Day.  Dispense: 90 tablet; Refill: 0      --- Increase Gabapentin to 800 mg TID. She will start with taking BID for 5-7 days, then increase to tid as tolerated thereafter  --- Routine UDS in office today as part of monitoring requirements for controlled substances.  The specimen was viewed and the immunoassay result reviewed and is NEG.  This specimen will be sent to [facility] for confirmation.     --- Follow-up 1 month          SANDOVAL REPORT  As the clinician, I personally reviewed the SANDOVAL from 10/13/2021 while the patient was in the office today.    Dictated utilizing Dragon dictation.

## 2021-10-20 ENCOUNTER — TELEPHONE (OUTPATIENT)
Dept: GASTROENTEROLOGY | Facility: CLINIC | Age: 66
End: 2021-10-20

## 2021-10-20 ENCOUNTER — OFFICE VISIT (OUTPATIENT)
Dept: GASTROENTEROLOGY | Facility: CLINIC | Age: 66
End: 2021-10-20

## 2021-10-20 VITALS — WEIGHT: 165.2 LBS | HEIGHT: 62 IN | TEMPERATURE: 96.9 F | BODY MASS INDEX: 30.4 KG/M2

## 2021-10-20 DIAGNOSIS — K21.9 GASTROESOPHAGEAL REFLUX DISEASE, UNSPECIFIED WHETHER ESOPHAGITIS PRESENT: ICD-10-CM

## 2021-10-20 DIAGNOSIS — Z01.818 PRE-OP TESTING: ICD-10-CM

## 2021-10-20 DIAGNOSIS — Z83.71 FAMILY HISTORY OF POLYPS IN THE COLON: ICD-10-CM

## 2021-10-20 DIAGNOSIS — K25.0 ACUTE GASTRIC ULCER WITH HEMORRHAGE: Primary | ICD-10-CM

## 2021-10-20 DIAGNOSIS — R19.7 DIARRHEA, UNSPECIFIED TYPE: Primary | ICD-10-CM

## 2021-10-20 DIAGNOSIS — D50.9 IRON DEFICIENCY ANEMIA, UNSPECIFIED IRON DEFICIENCY ANEMIA TYPE: ICD-10-CM

## 2021-10-20 PROCEDURE — 99213 OFFICE O/P EST LOW 20 MIN: CPT | Performed by: INTERNAL MEDICINE

## 2021-10-20 RX ORDER — SODIUM CHLORIDE, SODIUM LACTATE, POTASSIUM CHLORIDE, CALCIUM CHLORIDE 600; 310; 30; 20 MG/100ML; MG/100ML; MG/100ML; MG/100ML
30 INJECTION, SOLUTION INTRAVENOUS CONTINUOUS
Status: CANCELLED | OUTPATIENT
Start: 2021-12-09

## 2021-10-20 NOTE — TELEPHONE ENCOUNTER
spoke with pt scheduled at [facility] on dec 9 arrive at 0900 am kartik cordero handed pt egd instructions in office

## 2021-10-20 NOTE — TELEPHONE ENCOUNTER
Can notify patient records reviewed colonoscopy by Dr. Lo Pace performed in 2014 with suboptimal bowel prep.  If she has not had examination since that time, would offer colonoscopy with attenuated 2-day bowel prep to facilitate clearance. Per Dr Banks.    Called pt and advised of the above.  Pt verb understanding and is agreeable to have c/s.  Advised someone from scheduling would be calling her to arrange.    Case request for c/s and pre covid test ordered and message sent to Dr Banks to cosign.

## 2021-10-20 NOTE — TELEPHONE ENCOUNTER
----- Message from Donovan MURPHY MD sent at 10/20/2021 10:00 AM EDT -----  Request records of previous colonoscopy from her primary care tenders office

## 2021-10-20 NOTE — PROGRESS NOTES
Chief Complaint   Patient presents with   • Hospital Follow Up Visit   • GI Bleed-ulcer        Jayne Beltran is a  66 y.o. female here for a follow up visit for gastric ulcer with melena    HPI this 66-year-old female was seen in consultation in June of this year for melenic stools.  Upper endoscopy revealed antral ulcer with visible vessel which was treated with coagulation therapy.  She states symptoms have abated but she has not had any blood work done since August of this year.  I would like to repeat her CBC and also schedule follow-up endoscopy to verify ulcer healing.  She is currently on pantoprazole and Carafate.  She states her last colonoscopy was performed in 2018 but those records are not available.  She did make some reference to need for follow-up because of poor bowel prep.  We will request that report from her primary care tender.    Past Medical History:   Diagnosis Date   • COPD (chronic obstructive pulmonary disease) (HCC)    • Diabetes mellitus (HCC)     type 2   • Fibroid    • Hypertension    • Leukocytosis    • Neuromuscular disorder (HCC)    • Panic attack    • Skin cancer     nose   • TIA (transient ischemic attack)        Current Outpatient Medications   Medication Sig Dispense Refill   • Accu-Chek Ilana Plus test strip TEST THREE TIMES DAILY BEFORE MEALS AS DIRECTED 200 each 5   • Accu-Chek Softclix Lancets lancets TEST BLOOD SUGAR THREE TIMES DAILY BEFORE MEALS AS DIRECTED 200 each 5   • Alcohol Swabs (B-D SINGLE USE SWABS REGULAR) pads 1 each 3 (Three) Times a Day Before Meals. 200 each 11   • Blood Glucose Calibration (ACCU-CHEK ILANA) solution 1 each by In Vitro route 3 (Three) Times a Day Before Meals. 5 each 5   • Blood Glucose Monitoring Suppl (ACCU-CHEK ILANA PLUS) w/Device kit 1 each 3 (Three) Times a Day Before Meals. 1 kit 2   • citalopram (CeleXA) 10 MG tablet Take 1 tablet by mouth Daily. 90 tablet 1   • Empagliflozin (Jardiance) 25 MG tablet Take 1 tablet by mouth Daily.  90 tablet 1   • gabapentin (NEURONTIN) 800 MG tablet Take 1 tablet by mouth 3 (Three) Times a Day. 90 tablet 0   • insulin aspart (NovoLOG FlexPen) 100 UNIT/ML solution pen-injector sc pen Inject 5 Units under the skin into the appropriate area as directed 3 (Three) Times a Day With Meals. SSI-DEPENDS PM BLOOD GLUCOSE 5 pen 5   • Insulin Glargine (LANTUS SOLOSTAR) 100 UNIT/ML injection pen Inject 10 Units under the skin into the appropriate area as directed Daily. 5 pen 5   • Insulin Pen Needle (RELION PEN NEEDLE 31G/8MM) 31G X 8 MM misc 1 each 3 (Three) Times a Day. 90 each 11   • Lancets Misc. (ACCU-CHEK MULTICLIX LANCET DEV) kit 1 each 3 (Three) Times a Day Before Meals. 200 each 11   • lisinopril (PRINIVIL,ZESTRIL) 10 MG tablet Take 1 tablet by mouth Daily. 90 tablet 1   • Melatonin 10 MG tablet Take 1 tablet by mouth every night at bedtime.     • metoprolol succinate XL (TOPROL-XL) 200 MG 24 hr tablet Take 1 tablet by mouth Daily. 90 tablet 1   • pantoprazole (PROTONIX) 40 MG EC tablet Take 1 tablet by mouth 2 (Two) Times a Day Before Meals. 60 tablet 3   • rOPINIRole (REQUIP) 3 MG tablet Take 1 tablet by mouth Every Night. Take 1-3 hr prior to bedtime 90 tablet 1   • Ascorbic Acid (VITAMIN C PO) Take 1 tablet by mouth Daily.     • Dulaglutide (Trulicity) 0.75 MG/0.5ML solution pen-injector Inject 0.75 mg under the skin into the appropriate area as directed 1 (One) Time Per Week. 5 pen 5   • sucralfate (CARAFATE) 1 g tablet Take 1 tablet by mouth 4 (Four) Times a Day Before Meals & at Bedtime. 120 tablet 3     No current facility-administered medications for this visit.       PRN Meds:.    Allergies   Allergen Reactions   • Metformin Other (See Comments)     Kidney failure       Social History     Socioeconomic History   • Marital status:      Spouse name: Golden   • Number of children: 2   • Years of education: 12   • Highest education level: High school graduate   Tobacco Use   • Smoking status:  Current Every Day Smoker     Packs/day: 0.25     Years: 45.00     Pack years: 11.25     Types: Cigarettes     Start date:    • Smokeless tobacco: Never Used   • Tobacco comment: uses vape and smokes cigarettes   Vaping Use   • Vaping Use: Never used   Substance and Sexual Activity   • Alcohol use: No   • Drug use: No   • Sexual activity: Yes     Partners: Male     Birth control/protection: Post-menopausal       Family History   Problem Relation Age of Onset   • Heart disease Mother          at age 63   • Diabetes Mother    • Hypertension Mother    • Diabetes Father    • Deep vein thrombosis Father    • Depression Father    • Liver disease Father    • Lung cancer Father 58         at age 68   • Breast cancer Maternal Grandmother         ? age dx   • Dementia Maternal Grandmother    • Hypertension Maternal Grandmother    • Ovarian cancer Daughter 23   • Diabetes Daughter    • Depression Daughter    • Diabetes Sister    • Diabetes Brother    • Heart disease Brother    • Cancer Brother          at age 43   • Heart disease Maternal Uncle    • Cancer Paternal Uncle    • Clotting disorder Paternal Grandmother    • Colon cancer Neg Hx    • Colon polyps Neg Hx    • Malig Hyperthermia Neg Hx        Review of Systems   Constitutional: Negative for activity change, appetite change, fatigue and unexpected weight change.   HENT: Negative for congestion, facial swelling, sore throat, trouble swallowing and voice change.    Eyes: Negative for photophobia and visual disturbance.   Respiratory: Negative for cough and choking.    Cardiovascular: Negative for chest pain.   Gastrointestinal: Negative for abdominal distention, abdominal pain, anal bleeding, blood in stool, constipation, diarrhea, nausea, rectal pain and vomiting.   Endocrine: Negative for polyphagia.   Musculoskeletal: Negative for arthralgias, gait problem and joint swelling.   Skin: Negative for color change, pallor and rash.    Allergic/Immunologic: Negative for food allergies.   Neurological: Negative for speech difficulty and headaches.   Hematological: Does not bruise/bleed easily.   Psychiatric/Behavioral: Negative for agitation, confusion and sleep disturbance.       Vitals:    10/20/21 0932   Temp: 96.9 °F (36.1 °C)       Physical Exam  Constitutional:       Appearance: She is well-developed.   HENT:      Head: Normocephalic.   Eyes:      Conjunctiva/sclera: Conjunctivae normal.   Cardiovascular:      Rate and Rhythm: Normal rate and regular rhythm.   Pulmonary:      Breath sounds: Normal breath sounds.   Abdominal:      General: Bowel sounds are normal.      Palpations: Abdomen is soft.   Musculoskeletal:         General: Normal range of motion.      Cervical back: Normal range of motion.   Skin:     General: Skin is warm and dry.   Neurological:      Mental Status: She is alert and oriented to person, place, and time.   Psychiatric:         Behavior: Behavior normal.         ASSESSMENT   #1 gastric ulcer: On PPI and mucosal defense agent  #2 GERD  #3 history of melena      PLAN  CBC  Schedule EGD  Request records of previous colonoscopy from primary care tender      ICD-10-CM ICD-9-CM   1. Acute gastric ulcer with hemorrhage  K25.0 531.00   2. Gastroesophageal reflux disease, unspecified whether esophagitis present  K21.9 530.81   3. Iron deficiency anemia, unspecified iron deficiency anemia type  D50.9 280.9

## 2021-10-20 NOTE — TELEPHONE ENCOUNTER
Called Dr Arriaza's office at [phone] and spoke with Ashley and she reports that they do not have any c/s records for this pt.     Called medical records at [facility] at [phone] c/s and path report obtained and scanned under media tab. Update sent to Dr Banks.

## 2021-10-21 LAB
ERYTHROCYTE [DISTWIDTH] IN BLOOD BY AUTOMATED COUNT: 19.9 % (ref 11.7–15.4)
HCT VFR BLD AUTO: 34 % (ref 34–46.6)
HGB BLD-MCNC: 10 G/DL (ref 11.1–15.9)
MCH RBC QN AUTO: 21.9 PG (ref 26.6–33)
MCHC RBC AUTO-ENTMCNC: 29.4 G/DL (ref 31.5–35.7)
MCV RBC AUTO: 75 FL (ref 79–97)
PLATELET # BLD AUTO: 475 X10E3/UL (ref 150–450)
RBC # BLD AUTO: 4.56 X10E6/UL (ref 3.77–5.28)
WBC # BLD AUTO: 10.4 X10E3/UL (ref 3.4–10.8)

## 2021-10-22 ENCOUNTER — TELEPHONE (OUTPATIENT)
Dept: GASTROENTEROLOGY | Facility: CLINIC | Age: 66
End: 2021-10-22

## 2021-10-22 NOTE — TELEPHONE ENCOUNTER
----- Message from Donovan MURPHY MD sent at 10/22/2021 12:41 PM EDT -----  Regarding: CBC results  Okay to call results, showing improvement.  Proceed with endoscopy as planned.  ----- Message -----  From: Mario Reflab Results In  Sent: 10/21/2021   3:07 AM EDT  To: Donovan MURPHY MD

## 2021-10-25 ENCOUNTER — TELEPHONE (OUTPATIENT)
Dept: PAIN MEDICINE | Facility: CLINIC | Age: 66
End: 2021-10-25

## 2021-10-25 DIAGNOSIS — I10 ESSENTIAL HYPERTENSION: ICD-10-CM

## 2021-10-25 DIAGNOSIS — F41.9 ANXIETY: ICD-10-CM

## 2021-10-25 RX ORDER — CITALOPRAM 10 MG/1
TABLET ORAL
Qty: 90 TABLET | Refills: 1 | Status: SHIPPED | OUTPATIENT
Start: 2021-10-25

## 2021-10-25 RX ORDER — GABAPENTIN 800 MG/1
800 TABLET ORAL 3 TIMES DAILY
Qty: 90 TABLET | Refills: 1 | Status: SHIPPED | OUTPATIENT
Start: 2021-11-12 | End: 2021-11-12 | Stop reason: SDUPTHER

## 2021-10-25 RX ORDER — LISINOPRIL 10 MG/1
TABLET ORAL
Qty: 90 TABLET | Refills: 1 | Status: SHIPPED | OUTPATIENT
Start: 2021-10-25 | End: 2022-01-04 | Stop reason: ALTCHOICE

## 2021-10-25 NOTE — TELEPHONE ENCOUNTER
Ms. Beltran needs a refill of her Gabapentin for Nov to be sent to [facility] mail order pharmacy. She states that she has a $0 copay with them.

## 2021-10-25 NOTE — TELEPHONE ENCOUNTER
Provider: MAGDA  Caller: ANDRES FERMIN  Relationship to Patient: PATIENT  Pharmacy: N/A  Phone Number: [phone]  Reason for Call: PATIENT CALLED REQUESTING THAT HER 800M GABAPENTIN SCRIPT BE FAXD TO [facility]  PATIENT ONLY HAS [facility] PHONE: [phone]  When was the patient last seen: 10.13.21

## 2021-10-25 NOTE — TELEPHONE ENCOUNTER
Can you let her know that typically I recommend against a mail order pharmacy for controlled substances?  If the medication is lost, I cannot replace it.

## 2021-10-25 NOTE — TELEPHONE ENCOUNTER
Thank you.  Can you please run a SANDOVAL for her?  I will send it to the mail order pharmacy once I review the SANDOVAL

## 2021-10-25 NOTE — TELEPHONE ENCOUNTER
I spoke with Ms. Ruby and relayed the message. She states that her [employer] insurance is telling her she has to switch to the mail order pharmacy.

## 2021-10-28 RX ORDER — ISOPROPYL ALCOHOL 0.75 G/1
SWAB TOPICAL
Qty: 300 EACH | Refills: 5 | Status: SHIPPED | OUTPATIENT
Start: 2021-10-28

## 2021-11-10 RX ORDER — GABAPENTIN 800 MG/1
TABLET ORAL
Qty: 90 TABLET | OUTPATIENT
Start: 2021-11-10

## 2021-11-12 ENCOUNTER — OFFICE VISIT (OUTPATIENT)
Dept: PAIN MEDICINE | Facility: CLINIC | Age: 66
End: 2021-11-12

## 2021-11-12 DIAGNOSIS — G62.9 NEUROPATHY: ICD-10-CM

## 2021-11-12 DIAGNOSIS — G89.29 OTHER CHRONIC PAIN: Primary | ICD-10-CM

## 2021-11-12 DIAGNOSIS — G89.29 CHRONIC LOW BACK PAIN, UNSPECIFIED BACK PAIN LATERALITY, UNSPECIFIED WHETHER SCIATICA PRESENT: ICD-10-CM

## 2021-11-12 DIAGNOSIS — M54.50 CHRONIC LOW BACK PAIN, UNSPECIFIED BACK PAIN LATERALITY, UNSPECIFIED WHETHER SCIATICA PRESENT: ICD-10-CM

## 2021-11-12 PROCEDURE — 99442 PR PHYS/QHP TELEPHONE EVALUATION 11-20 MIN: CPT | Performed by: NURSE PRACTITIONER

## 2021-11-12 RX ORDER — GABAPENTIN 800 MG/1
800 TABLET ORAL 3 TIMES DAILY
Qty: 90 TABLET | Refills: 1 | Status: SHIPPED | OUTPATIENT
Start: 2021-11-12 | End: 2022-04-27 | Stop reason: ALTCHOICE

## 2021-11-12 NOTE — PROGRESS NOTES
TELEPHONE VISIT    You have chosen to receive care through a telephone visit. Do you consent to use a telephone visit for your medical care today? Yes    Identity verified via  and address  Location of patient: private residence  Location of Provider: Home office  Anyone else present: Yes, if yes- who- , permission obtained to conduct visit with  present.   Type of Technology used: Telephone    CHIEF COMPLAINT  F/u back and bilat foot pain. Pt sts back pain is the same, however bilat foot pain has worsened since last ov.     Subjective   Jayne Beltran is a 66 y.o. female  who presents for a telephonic follow-up.She has a history of bilateral foot pain due to DPN and back pain.    Today her foot pain is 8/10VAS in severity, her back pain is 6/10VAS in severity.     At her last office visit her Gabapentin was increased to 800 mg TID, she has seen a little improvement in her foot pain with this ~10% improvement.  She also reports improvement in her back pain with this medication.  She denies any side effects including somnolence.     Patient states that she will be establishing care with Dr. Calderon on 2021 for her primary care.     Patient's  states during this visit that he has seen improvement in her pain and that she does not kick her feet as frequently during the night due to pain.     Patient has failed compounded pain cream and Lyrica.     Foot Pain  This is a chronic (8/10VAS in severity) problem. The problem occurs daily. The problem has been gradually worsening. Associated symptoms include arthralgias (bilat feet), fatigue (occ) and numbness (bilat foot). Pertinent negatives include no chest pain, congestion, coughing, fever, headaches, joint swelling or weakness. She has tried acetaminophen, NSAIDs, position changes, rest, sleep, walking and lying down (capsaicin cream) for the symptoms. The treatment provided mild relief.      PEG Assessment   What number best describes  your pain on average in the past week?7  What number best describes how, during the past week, pain has interfered with your enjoyment of life?7  What number best describes how, during the past week, pain has interfered with your general activity?  7    The following portions of the patient's history were reviewed and updated as appropriate: allergies, current medications, past family history, past medical history, past social history, past surgical history and problem list.    Review of Systems   Constitutional: Positive for activity change (dec) and fatigue (occ). Negative for fever.   HENT: Negative for congestion.    Eyes: Negative for visual disturbance.   Respiratory: Negative for cough and shortness of breath.    Cardiovascular: Negative for chest pain.   Gastrointestinal: Negative for constipation and diarrhea.   Genitourinary: Negative for difficulty urinating.   Musculoskeletal: Positive for arthralgias (bilat feet) and back pain. Negative for joint swelling.   Allergic/Immunologic: Negative for immunocompromised state.   Neurological: Positive for numbness (bilat foot). Negative for dizziness, weakness, light-headedness and headaches.   Psychiatric/Behavioral: Positive for agitation (occ). Negative for sleep disturbance and suicidal ideas. The patient is not nervous/anxious.      --  The aforementioned information the Chief Complaint section and above subjective data including any HPI data, and also the Review of Systems data, has been personally reviewed and affirmed.  --    Vitals:    11/12/21 1312   PainSc:   8   PainLoc: Foot  Comment: bilat     Objective   Physical Exam  As this is a telephone check-in, the ability to perform a routine physical exam is extremely limited. The patient seems alert and is oriented appropriately.   On this phone call there is not any evidence of respiratory distress.   The patient seems of normal mood.   The remainder of a routine physical exam is  deferred.    Assessment/Plan   Diagnoses and all orders for this visit:    1. Other chronic pain (Primary)    2. Neuropathy    3. Chronic low back pain, unspecified back pain laterality, unspecified whether sciatica present    Other orders  -     gabapentin (NEURONTIN) 800 MG tablet; Take 1 tablet by mouth 3 (Three) Times a Day.  Dispense: 90 tablet; Refill: 1      ----------------    Our practice is offering alternative &/or electronic methods to continue to follow our patients while at the same time further the efforts toward social distancing, in accordance with our organizational policies, professional societies' guidance, and [facility] mandates.  I support the Healthy at Home campaign and in this visit I have counseled the patient on our needs to limit in-person office visits and physical encounters with medical facilities whenever possible.  I have also educated the patient on the medical necessities of maintaining social distancing while we continue to function during this crisis period.      The patient had obstacles which preclude consideration for a Video Visit. The patient agreed to a Telephone Encounter.    This visit has been rescheduled as a phone visit to comply with patient safety concerns in accordance with CDC recommendations. Total time of discussion was 12 minutes.    ----------------    --- Offered to increase Gabapentin to 900 mg TID.  Patient would like to wait on this at this time and stay at her current dose.   --- Continue Gabapentin 800mg TID.   --- Follow-up 2 months or sooner if needed.      SANDOVAL REPORT  As part of the patient's treatment plan, I am prescribing controlled substances. The patient has been made aware of appropriate use of such medications, including potential risk of somnolence, limited ability to drive and/or work safely, and the potential for dependence or overdose. It has also bee made clear that these medications are for use by this patient only, without  concomitant use of alcohol or other substances unless prescribed.     As the clinician, I personally reviewed the SANDOVAL from 11/12/2021 while the patient was in the office today.    History and physical exam exhibit continued safe and appropriate use of controlled substances.    -------

## 2021-11-20 PROCEDURE — G2066 INTER DEVC REMOTE 30D: HCPCS | Performed by: INTERNAL MEDICINE

## 2021-11-20 PROCEDURE — 93298 REM INTERROG DEV EVAL SCRMS: CPT | Performed by: INTERNAL MEDICINE

## 2021-12-03 ENCOUNTER — TRANSCRIBE ORDERS (OUTPATIENT)
Dept: GASTROENTEROLOGY | Facility: CLINIC | Age: 66
End: 2021-12-03

## 2021-12-03 DIAGNOSIS — Z01.818 OTHER SPECIFIED PRE-OPERATIVE EXAMINATION: Primary | ICD-10-CM

## 2021-12-06 DIAGNOSIS — K25.3 ACUTE GASTRIC ULCER WITHOUT HEMORRHAGE OR PERFORATION: Primary | ICD-10-CM

## 2021-12-10 DIAGNOSIS — E11.9 TYPE 2 DIABETES MELLITUS WITHOUT COMPLICATION, WITHOUT LONG-TERM CURRENT USE OF INSULIN (HCC): ICD-10-CM

## 2021-12-13 RX ORDER — PEN NEEDLE, DIABETIC 31 GX5/16"
NEEDLE, DISPOSABLE MISCELLANEOUS
Qty: 300 EACH | OUTPATIENT
Start: 2021-12-13

## 2021-12-21 PROCEDURE — G2066 INTER DEVC REMOTE 30D: HCPCS | Performed by: INTERNAL MEDICINE

## 2021-12-21 PROCEDURE — 93298 REM INTERROG DEV EVAL SCRMS: CPT | Performed by: INTERNAL MEDICINE

## 2022-01-04 ENCOUNTER — OFFICE VISIT (OUTPATIENT)
Dept: CARDIOLOGY | Facility: CLINIC | Age: 67
End: 2022-01-04

## 2022-01-04 VITALS
DIASTOLIC BLOOD PRESSURE: 80 MMHG | WEIGHT: 161.8 LBS | HEART RATE: 78 BPM | HEIGHT: 62 IN | BODY MASS INDEX: 29.77 KG/M2 | SYSTOLIC BLOOD PRESSURE: 141 MMHG

## 2022-01-04 DIAGNOSIS — Z86.73 HISTORY OF STROKE: Primary | ICD-10-CM

## 2022-01-04 DIAGNOSIS — D50.8 OTHER IRON DEFICIENCY ANEMIA: ICD-10-CM

## 2022-01-04 DIAGNOSIS — I10 ESSENTIAL HYPERTENSION: ICD-10-CM

## 2022-01-04 DIAGNOSIS — Z87.19 HISTORY OF GI BLEED: ICD-10-CM

## 2022-01-04 DIAGNOSIS — K27.9 PUD (PEPTIC ULCER DISEASE): ICD-10-CM

## 2022-01-04 PROBLEM — D72.829 LEUKOCYTOSIS: Status: RESOLVED | Noted: 2020-02-12 | Resolved: 2022-01-04

## 2022-01-04 PROBLEM — Z79.4 TYPE 2 DIABETES MELLITUS WITH DIABETIC POLYNEUROPATHY, WITH LONG-TERM CURRENT USE OF INSULIN: Status: ACTIVE | Noted: 2017-06-15

## 2022-01-04 PROBLEM — K92.1 MELENA: Status: RESOLVED | Noted: 2021-06-11 | Resolved: 2022-01-04

## 2022-01-04 PROBLEM — E87.0 HYPEROSMOLALITY: Status: RESOLVED | Noted: 2021-06-11 | Resolved: 2022-01-04

## 2022-01-04 PROBLEM — E11.42 TYPE 2 DIABETES MELLITUS WITH DIABETIC POLYNEUROPATHY, WITHOUT LONG-TERM CURRENT USE OF INSULIN: Status: ACTIVE | Noted: 2017-06-15

## 2022-01-04 PROBLEM — M54.42 ACUTE BACK PAIN WITH SCIATICA, LEFT: Status: RESOLVED | Noted: 2020-11-25 | Resolved: 2022-01-04

## 2022-01-04 PROBLEM — S49.91XA RIGHT SHOULDER INJURY, INITIAL ENCOUNTER: Status: RESOLVED | Noted: 2020-11-25 | Resolved: 2022-01-04

## 2022-01-04 PROBLEM — L82.0 INFLAMED SEBORRHEIC KERATOSIS: Status: RESOLVED | Noted: 2017-06-15 | Resolved: 2022-01-04

## 2022-01-04 PROBLEM — K25.0 ACUTE GASTRIC ULCER WITH HEMORRHAGE: Status: RESOLVED | Noted: 2021-10-20 | Resolved: 2022-01-04

## 2022-01-04 PROBLEM — K92.2 GASTROINTESTINAL HEMORRHAGE: Status: RESOLVED | Noted: 2021-06-11 | Resolved: 2022-01-04

## 2022-01-04 PROBLEM — R19.7 DIARRHEA: Status: RESOLVED | Noted: 2020-06-25 | Resolved: 2022-01-04

## 2022-01-04 PROBLEM — N17.9 ACUTE RENAL FAILURE (HCC): Status: RESOLVED | Noted: 2020-11-25 | Resolved: 2022-01-04

## 2022-01-04 PROBLEM — N17.9 ACUTE RENAL FAILURE (ARF) (HCC): Status: RESOLVED | Noted: 2020-03-03 | Resolved: 2022-01-04

## 2022-01-04 PROBLEM — M54.41 ACUTE BACK PAIN WITH SCIATICA, RIGHT: Status: RESOLVED | Noted: 2021-08-17 | Resolved: 2022-01-04

## 2022-01-04 PROBLEM — R55 SYNCOPE AND COLLAPSE: Status: RESOLVED | Noted: 2020-08-24 | Resolved: 2022-01-04

## 2022-01-04 PROBLEM — D68.8 OTHER SPECIFIED COAGULATION DEFECTS (HCC): Status: RESOLVED | Noted: 2020-09-04 | Resolved: 2022-01-04

## 2022-01-04 PROBLEM — R07.81 RIB PAIN ON LEFT SIDE: Status: RESOLVED | Noted: 2019-12-30 | Resolved: 2022-01-04

## 2022-01-04 PROBLEM — M62.838 MUSCLE SPASM OF BOTH LOWER LEGS: Status: RESOLVED | Noted: 2019-08-29 | Resolved: 2022-01-04

## 2022-01-04 PROBLEM — L98.9 SORE ON LEG: Status: RESOLVED | Noted: 2020-06-26 | Resolved: 2022-01-04

## 2022-01-04 PROBLEM — F41.0 PANIC ATTACK: Status: RESOLVED | Noted: 2020-11-25 | Resolved: 2022-01-04

## 2022-01-04 PROCEDURE — 99214 OFFICE O/P EST MOD 30 MIN: CPT | Performed by: INTERNAL MEDICINE

## 2022-01-04 PROCEDURE — 93000 ELECTROCARDIOGRAM COMPLETE: CPT | Performed by: INTERNAL MEDICINE

## 2022-01-04 RX ORDER — ASPIRIN 81 MG/1
81 TABLET, CHEWABLE ORAL DAILY
COMMUNITY

## 2022-01-04 RX ORDER — LISINOPRIL 20 MG/1
20 TABLET ORAL DAILY
COMMUNITY
Start: 2021-12-28

## 2022-01-04 RX ORDER — AMLODIPINE BESYLATE 2.5 MG/1
2.5 TABLET ORAL DAILY
Qty: 90 TABLET | Refills: 1 | Status: SHIPPED | OUTPATIENT
Start: 2022-01-04 | End: 2022-03-04

## 2022-01-04 RX ORDER — ROSUVASTATIN CALCIUM 5 MG/1
5 TABLET, COATED ORAL DAILY
COMMUNITY
Start: 2021-12-28 | End: 2022-12-28

## 2022-01-04 RX ORDER — ERGOCALCIFEROL 1.25 MG/1
CAPSULE ORAL WEEKLY
COMMUNITY
Start: 2021-12-08

## 2022-01-04 NOTE — PROGRESS NOTES
Date of Office Visit: 22  Encounter Provider: Charles Jackson MD  Place of Service: [facility] CARDIOLOGY  Patient Name: Jayne Beltran  :1955    Chief Complaint   Patient presents with   • Stroke   :     HPI:     Ms. Beltran is 66 y.o. and presents today to follow up.  She is new to me, and has been previously followed by one of my partners. She has hypertension, hyperlipidemia, DM2 (with neuropathy), ARLETH, and COPD.     She presented in 2021 with respiratory failure and was found to have suffered multiple strokes. She had mild carotid disease but no major blockages. A NATHANIEL was unremarkable. She has a LINQ implanted, and no atrial fibrillation has been noted. Of note, during that hospitalization, she suffered a major upper GI bleed and was found to have a gastric ulcer, which was cauterized.     She has no cardiac planes of chest pain or shortness of breath. She has a smoker's cough. She does not have orthopnea or leg swelling. She has very rare, brief, sporadic palpitations, but nothing that is sustained. Her lisinopril was recently increased from 10 mg to 20 mg daily, and well this has helped bring her blood pressure down, it remains high. She checks her blood pressure at home and gets values similar to that which we got in the office today.    Past Medical History:   Diagnosis Date   • Acute renal failure (ARF) (HCC) 3/3/2020    March 10, 2020: She had severe diarrhea with viral gastroenteritis started on  and she went to the hospital on March 3, 2020.  She was found to be in severe metabolic acidosis and acute renal failure.  She was hospitalized for 5 days.  Her creatinine was initially over 8 and upon discharge was down to 1.41.  She is doing much better no longer has diarrhea.  Will get a check her kidney funct   • Anxiety 2020:  Pt states she has anxiety and panic attacks and requests a medication.  She states she has had panic  attacks at dialysis and at night. She has stayed up at night cleaning house.  11/11/2020: Her  states she has been taking more of her pain medicines than prescribed. He states she doubles up inappropriately on er pain meds. He states she has been escalating her doses of Lyrica.     • Benign paroxysmal positional vertigo 3/22/2016    August 29, 2019: Currently asymptomatic.  Continue to monitor.   • Calculus of kidney 11/25/2020   • Chronic bilateral low back pain with bilateral sciatica 2/28/2017 August 18, 2021: She has chronic low back pain that goes down both of her legs.  She has been experiencing worsening pain and now numbness in both of her legs that is not getting any better.  It is affecting her activities of daily living including doing household chores.  X-ray and MRI of the lumbar spine.  Refer to pain management.  Refer to physical therapy.    • Chronic pain of left ankle 8/17/2021   • Chronic pain of right ankle 8/17/2021   • COPD (chronic obstructive pulmonary disease) (Formerly [facility])    • Diabetes mellitus (Formerly [facility])     type 2   • Fibroid    • History of GI bleed    • History of stroke    • History of subdural hematoma 10/29/2020    10/29/2020: She is following with neurology for history of subdural hematoma. She had only been home floor four hours after getting out of the hospital for renal failure and fell and hit her head on the concrete and slipped down the ramp and hit her head.  She was hospitalized again and is following with neurology. She went to [facility].  Following with neurology.    • Hypertension    • Internal carotid artery stenosis, right     mild by duplex 6/2021   • Leukocytosis    • Neuromuscular disorder (Former[facility])    • Panic attack    • Plantar fasciitis, bilateral 9/15/2021    9/15/2021: She wears flip flops, sandals, and goes bare foot a lot. She has foot pain when she wakes up and puts pressure on her feet in the morning. Pt advised and agrees to wear tennis shoes all day as much  as possible.   • Restless legs syndrome 11/25/2020   • Skin cancer     nose   • TIA (transient ischemic attack)    • Vitamin D deficiency 11/1/2017       Past Surgical History:   Procedure Laterality Date   • CARDIAC ELECTROPHYSIOLOGY PROCEDURE N/A 10/5/2021    Procedure: Loop insertion LINQ;  Surgeon: Eduard Castellon MD;  Location: [facility] CATH INVASIVE LOCATION;  Service: Cardiovascular;  Laterality: N/A;   • CHOLECYSTECTOMY     • COLONOSCOPY  approx 2018   • ENDOSCOPY N/A 6/11/2021    Procedure: ESOPHAGOGASTRODUODENOSCOPY AT BEDSIDE;  Surgeon: Donovan Banks MD;  Location: [facility] ENDOSCOPY;  Service: Gastroenterology;  Laterality: N/A;  pre: melena, GI bleed, anemia   post: retained food    • ENDOSCOPY N/A 6/12/2021    Procedure: ESOPHAGOGASTRODUODENOSCOPY WITH GOLD PROBE CAUTERY 7FR TO BLEEDING GASTRIC ANTRUM ULCER;  Surgeon: Calvin Barraza MD;  Location: [facility] ENDOSCOPY;  Service: Gastroenterology;  Laterality: N/A;  PRE- GI BLEED  POST- BLEEDING GASTRIC ANTRUM ULCER   • INSERTION HEMODIALYSIS CATHETER N/A 9/3/2020    Procedure: HEMODIALYSIS CATHETER INSERTION;  Surgeon: Sergio Durant MD;  Location: [facility] OR;  Service: Vascular;  Laterality: N/A;   • KIDNEY STONE SURGERY     • URETEROSCOPY LASER LITHOTRIPSY WITH STENT INSERTION Right 12/21/2020    Procedure: CYSTOSCOPY, RIGHT URETEROSCOPY, RIGHT RETROGRADE PYELOGRAM, LASER LITHOTRIPSY WITH RIGHT URETERAL STENT EXCHANGE;  Surgeon: Mikie Mejia MD;  Location: [facility] OR;  Service: Urology;  Laterality: Right;       Social History     Socioeconomic History   • Marital status:      Spouse name: Golden   • Number of children: 2   • Years of education: 12   • Highest education level: High school graduate   Tobacco Use   • Smoking status: Current Every Day Smoker     Packs/day: 0.25     Years: 45.00     Pack years: 11.25     Types: Cigarettes     Start date: 1970   • Smokeless tobacco: Never Used   • Tobacco comment: uses  vape and smokes cigarettes   Vaping Use   • Vaping Use: Every day   • Substances: Flavoring   Substance and Sexual Activity   • Alcohol use: No   • Drug use: No   • Sexual activity: Yes     Partners: Male     Birth control/protection: Post-menopausal       Family History   Problem Relation Age of Onset   • Heart disease Mother          at age 63   • Diabetes Mother    • Hypertension Mother    • Diabetes Father    • Deep vein thrombosis Father    • Depression Father    • Liver disease Father    • Lung cancer Father 58         at age 68   • Breast cancer Maternal Grandmother         ? age dx   • Dementia Maternal Grandmother    • Hypertension Maternal Grandmother    • Ovarian cancer Daughter 23   • Diabetes Daughter    • Depression Daughter    • Diabetes Sister    • Diabetes Brother    • Heart disease Brother    • Cancer Brother          at age 43   • Heart disease Maternal Uncle    • Cancer Paternal Uncle    • Clotting disorder Paternal Grandmother    • Colon cancer Neg Hx    • Colon polyps Neg Hx    • Malig Hyperthermia Neg Hx        Review of Systems   Constitutional: Positive for malaise/fatigue.   Musculoskeletal: Positive for back pain.   Neurological: Positive for sensory change.   All other systems reviewed and are negative.      Allergies   Allergen Reactions   • Metformin Other (See Comments)     Kidney failure         Current Outpatient Medications:   •  Accu-Chek Ilana Plus test strip, TEST THREE TIMES DAILY BEFORE MEALS AS DIRECTED, Disp: 200 each, Rfl: 5  •  Accu-Chek Softclix Lancets lancets, TEST BLOOD SUGAR THREE TIMES DAILY BEFORE MEALS AS DIRECTED, Disp: 200 each, Rfl: 5  •  Alcohol Swabs (B-D SINGLE USE SWABS REGULAR) pads, USE THREE TIMES DAILY BEFORE MEALS, Disp: 300 each, Rfl: 5  •  Ascorbic Acid (VITAMIN C PO), Take 1 tablet by mouth Daily., Disp: , Rfl:   •  aspirin 81 MG chewable tablet, Chew 81 mg Daily., Disp: , Rfl:   •  Blood Glucose Calibration (ACCU-CHEK ILANA)  solution, 1 each by In Vitro route 3 (Three) Times a Day Before Meals., Disp: 5 each, Rfl: 5  •  Blood Glucose Monitoring Suppl (ACCU-CHEK LUIS ALBERTO PLUS) w/Device kit, 1 each 3 (Three) Times a Day Before Meals., Disp: 1 kit, Rfl: 2  •  citalopram (CeleXA) 10 MG tablet, TAKE 1 TABLET EVERY DAY, Disp: 90 tablet, Rfl: 1  •  Dulaglutide (Trulicity) 0.75 MG/0.5ML solution pen-injector, Inject 0.75 mg under the skin into the appropriate area as directed 1 (One) Time Per Week., Disp: 5 pen, Rfl: 5  •  Empagliflozin (Jardiance) 25 MG tablet, Take 1 tablet by mouth Daily., Disp: 90 tablet, Rfl: 1  •  gabapentin (NEURONTIN) 800 MG tablet, Take 1 tablet by mouth 3 (Three) Times a Day., Disp: 90 tablet, Rfl: 1  •  insulin aspart (NovoLOG FlexPen) 100 UNIT/ML solution pen-injector sc pen, Inject 5 Units under the skin into the appropriate area as directed 3 (Three) Times a Day With Meals. SSI-DEPENDS PM BLOOD GLUCOSE, Disp: 5 pen, Rfl: 5  •  Insulin Glargine (LANTUS SOLOSTAR) 100 UNIT/ML injection pen, Inject 10 Units under the skin into the appropriate area as directed Daily., Disp: 5 pen, Rfl: 5  •  Insulin Pen Needle (RELION PEN NEEDLE 31G/8MM) 31G X 8 MM misc, 1 each 3 (Three) Times a Day., Disp: 90 each, Rfl: 11  •  Lancets Misc. (ACCU-CHEK MULTICLIX LANCET DEV) kit, 1 each 3 (Three) Times a Day Before Meals., Disp: 200 each, Rfl: 11  •  lisinopril (PRINIVIL,ZESTRIL) 20 MG tablet, Take 20 mg by mouth Daily., Disp: , Rfl:   •  Melatonin 10 MG tablet, Take 1 tablet by mouth every night at bedtime., Disp: , Rfl:   •  metoprolol succinate XL (TOPROL-XL) 200 MG 24 hr tablet, Take 1 tablet by mouth Daily., Disp: 90 tablet, Rfl: 1  •  pantoprazole (PROTONIX) 40 MG EC tablet, Take 1 tablet by mouth 2 (Two) Times a Day Before Meals., Disp: 60 tablet, Rfl: 3  •  rOPINIRole (REQUIP) 3 MG tablet, Take 1 tablet by mouth Every Night. Take 1-3 hr prior to bedtime, Disp: 90 tablet, Rfl: 1  •  rosuvastatin (CRESTOR) 5 MG tablet, Take 5 mg by  "mouth Daily., Disp: , Rfl:   •  sucralfate (CARAFATE) 1 g tablet, Take 1 tablet by mouth 4 (Four) Times a Day Before Meals & at Bedtime., Disp: 120 tablet, Rfl: 3  •  vitamin D (ERGOCALCIFEROL) 1.25 MG (45471 UT) capsule capsule, 1 (One) Time Per Week., Disp: , Rfl:       Objective:     Vitals:    01/04/22 0953   BP: 141/80   BP Location: Right arm   Pulse: 78   Weight: 73.4 kg (161 lb 12.8 oz)   Height: 157.5 cm (62\")     Body mass index is 29.59 kg/m².    Vitals reviewed.   Constitutional:       Appearance: Healthy appearance. Well-developed and not in distress.   Eyes:      Conjunctiva/sclera: Conjunctivae normal.   HENT:      Head: Normocephalic.      Nose: Nose normal.         Comments: Masked  Neck:      Vascular: No JVD. JVD normal.      Lymphadenopathy: No cervical adenopathy.   Pulmonary:      Effort: Pulmonary effort is normal.      Breath sounds: Wheezing present.   Cardiovascular:      Normal rate. Regular rhythm.      Murmurs: There is no murmur.   Pulses:     Intact distal pulses.   Edema:     Peripheral edema absent.   Abdominal:      Palpations: Abdomen is soft.      Tenderness: There is no abdominal tenderness.   Musculoskeletal: Normal range of motion.      Cervical back: Normal range of motion. Skin:     General: Skin is warm and dry.      Findings: No rash.   Neurological:      General: No focal deficit present.      Mental Status: Alert, oriented to person, place, and time and oriented to person, place and time.      Cranial Nerves: No cranial nerve deficit.   Psychiatric:         Behavior: Behavior normal.         Thought Content: Thought content normal.         Judgment: Judgment normal.           ECG 12 Lead    Date/Time: 1/4/2022 10:24 AM  Performed by: Charles Jackson MD  Authorized by: Charles Jackson MD   Comparison: compared with previous ECG   Similar to previous ECG  Rhythm: sinus rhythm  Conduction: conduction normal  ST Segments: ST segments normal  T Waves: T waves normal  QRS axis: " normal  Other: no other findings    Clinical impression: normal ECG              Assessment:       Diagnosis Plan   1. History of stroke     2. Essential hypertension     3. PUD (peptic ulcer disease)     4. History of GI bleed     5. Other iron deficiency anemia            Plan:       1. She has a history of multiple bilateral small strokes, but had an unremarkable NATHANIEL. She has an implanted loop recorder and we have not seen any evidence of atrial fibrillation. She is on low-dose aspirin. If she is noted to have atrial arrhythmia, she would need a repeat EGD prior to initiating anticoagulation. She was scheduled for one in December, but had to reschedule. I encouraged her to reach out to her gastroenterologist to get this rescheduled.    2. Her blood pressure remains elevated. I am reticent to add a thiazide as she is already on empagliflozin and lisinopril and has some degree of chronic renal insufficiency. I have started amlodipine, 2.5 mg daily.    Sincerely,       Charles Jackson MD

## 2022-01-10 ENCOUNTER — TELEPHONE (OUTPATIENT)
Dept: PAIN MEDICINE | Facility: CLINIC | Age: 67
End: 2022-01-10

## 2022-01-10 NOTE — TELEPHONE ENCOUNTER
Caller: PATIENT    Relationship to patient: SELF     Best call back number: [phone] - PATIENT STATED SHE WILL BE AT WORK AND TO DISCUSS THIS WITH HER , ZINA.    Type of visit: FOLLOW UP    Requested date: ASAP      Additional notes: PATIENT WAS CALLING TO RESCHEDULE HER FOLLOW UP APPT WITH [facility] THAT WAS CANCELLED ON 01.07.22 DUE TO THE WEATHER. I TRIED TO WARM TRANSFER CALL TO THE FRONT OFFICE DUE TO NO AVAILABILITY WITHIN TIMEFRAME NEEDED WITH [facility]. I WAS GOING TO SEE IF IT WAS OKAY TO SCHEDULE WITH ANOTHER PROVIDER BUT I WAS UNABLE TO GET THRU. I TOLD PATIENT SOMEONE WOULD GIVE HER A  CALL BACK TO RESCHEDULE HER APPOINTMENT. ALSO, PATIENT STATED SHE HAS A ONE WEEK SUPPLY LEFT OF HER GABAPENTIN 800 MG AND NEEDS A REFILL CALLED IN. PATIENT USES THE [facility]. THANK YOU!

## 2022-01-12 ENCOUNTER — TRANSCRIBE ORDERS (OUTPATIENT)
Dept: SURGERY | Facility: SURGERY CENTER | Age: 67
End: 2022-01-12

## 2022-01-12 ENCOUNTER — OFFICE VISIT (OUTPATIENT)
Dept: PAIN MEDICINE | Facility: CLINIC | Age: 67
End: 2022-01-12

## 2022-01-12 ENCOUNTER — PREP FOR SURGERY (OUTPATIENT)
Dept: SURGERY | Facility: SURGERY CENTER | Age: 67
End: 2022-01-12

## 2022-01-12 VITALS
RESPIRATION RATE: 18 BRPM | DIASTOLIC BLOOD PRESSURE: 87 MMHG | TEMPERATURE: 96.6 F | SYSTOLIC BLOOD PRESSURE: 164 MMHG | HEART RATE: 88 BPM | WEIGHT: 159 LBS | BODY MASS INDEX: 29.26 KG/M2 | OXYGEN SATURATION: 96 % | HEIGHT: 62 IN

## 2022-01-12 DIAGNOSIS — M47.816 LUMBAR FACET ARTHROPATHY: ICD-10-CM

## 2022-01-12 DIAGNOSIS — M47.816 LUMBAR FACET ARTHROPATHY: Primary | ICD-10-CM

## 2022-01-12 DIAGNOSIS — M54.50 CHRONIC LOW BACK PAIN, UNSPECIFIED BACK PAIN LATERALITY, UNSPECIFIED WHETHER SCIATICA PRESENT: ICD-10-CM

## 2022-01-12 DIAGNOSIS — G89.29 CHRONIC LOW BACK PAIN, UNSPECIFIED BACK PAIN LATERALITY, UNSPECIFIED WHETHER SCIATICA PRESENT: ICD-10-CM

## 2022-01-12 DIAGNOSIS — Z41.9 SURGERY, ELECTIVE: Primary | ICD-10-CM

## 2022-01-12 DIAGNOSIS — G89.29 OTHER CHRONIC PAIN: Primary | ICD-10-CM

## 2022-01-12 DIAGNOSIS — G62.9 NEUROPATHY: ICD-10-CM

## 2022-01-12 PROCEDURE — 99214 OFFICE O/P EST MOD 30 MIN: CPT | Performed by: NURSE PRACTITIONER

## 2022-01-12 RX ORDER — SODIUM CHLORIDE 0.9 % (FLUSH) 0.9 %
10 SYRINGE (ML) INJECTION EVERY 12 HOURS SCHEDULED
Status: CANCELLED | OUTPATIENT
Start: 2022-01-12

## 2022-01-12 RX ORDER — PREGABALIN 75 MG/1
75 CAPSULE ORAL 2 TIMES DAILY
Qty: 60 CAPSULE | Refills: 0 | Status: SHIPPED | OUTPATIENT
Start: 2022-01-12 | End: 2022-02-11 | Stop reason: DRUGHIGH

## 2022-01-12 RX ORDER — SODIUM CHLORIDE 0.9 % (FLUSH) 0.9 %
10 SYRINGE (ML) INJECTION AS NEEDED
Status: CANCELLED | OUTPATIENT
Start: 2022-01-12

## 2022-01-12 NOTE — PROGRESS NOTES
CHIEF COMPLAINT  Follow-up for back and foot pain.    Subjective   Jayne Beltran is a 66 y.o. female  who presents for follow-up.  She has a history of chronic back pain, and foot pain (DPN).    C/o bilateral foot pain. Pain today 8/10 in severity.  Taking Gabapentin 800 mg, taking tid.  She has noticed minimal improvement.  Still reports a lot of low back pain as well as pain of both feet.  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  Denies adverse reactions.      Consideration for interventional options for low back pain has been previously discussed. She wished to focus on her neuropathy symptoms first. She has had epidural injections in the past, says it was a very long time ago, only recalls relief for an hour.      Patient remained masked during entire encounter. No cough present. I donned a mask and eye protection throughout entire visit. Prior to donning mask and eye protection, hand hygiene was performed, as well as when it was doffed.  I was closer than 6 feet, but not for an extended period of time. No obvious exposure to any bodily fluids.    Foot Pain  This is a chronic problem. The problem occurs daily. The problem has been waxing and waning. Associated symptoms include numbness and weakness. Pertinent negatives include no chest pain, congestion or fatigue. She has tried acetaminophen, NSAIDs, position changes, rest, sleep, walking and lying down for the symptoms. The treatment provided mild relief.   Back Pain  This is a chronic problem. The current episode started more than 1 year ago. The problem occurs daily. The problem has been gradually worsening since onset. The pain is present in the lumbar spine. The quality of the pain is described as aching and burning. The pain radiates to the right thigh and right knee. The pain is at a severity of 8/10. Exacerbated by: walking. Associated symptoms include numbness and weakness. Pertinent negatives include no chest  "pain. She has tried analgesics, muscle relaxant, heat and ice for the symptoms. The treatment provided mild relief.      PEG Assessment   What number best describes your pain on average in the past week?8  What number best describes how, during the past week, pain has interfered with your enjoyment of life?8  What number best describes how, during the past week, pain has interfered with your general activity?  7    The following portions of the patient's history were reviewed and updated as appropriate: allergies, current medications, past family history, past medical history, past social history, past surgical history and problem list.    Review of Systems   Constitutional: Negative for fatigue.   HENT: Negative for congestion.    Eyes: Negative for visual disturbance.   Respiratory: Negative for shortness of breath.    Cardiovascular: Negative for chest pain.   Gastrointestinal: Negative for constipation and diarrhea.   Genitourinary: Negative for difficulty urinating.   Musculoskeletal: Positive for back pain.   Neurological: Positive for weakness and numbness.   Psychiatric/Behavioral: Positive for sleep disturbance. Negative for suicidal ideas. The patient is not nervous/anxious.      I have reviewed and confirmed the accuracy of the ROS as documented by the MA/LPN/RN TRISH Benson    Vitals:    01/12/22 0915   BP: 164/87   Pulse: 88   Resp: 18   Temp: 96.6 °F (35.9 °C)   SpO2: 96%   Weight: 72.1 kg (159 lb)   Height: 157.5 cm (62\")   PainSc:   8   PainLoc: Back         Objective   Physical Exam  Vitals and nursing note reviewed.   Constitutional:       General: She is not in acute distress.     Appearance: Normal appearance. She is not ill-appearing.   Pulmonary:      Effort: Pulmonary effort is normal. No respiratory distress.   Musculoskeletal:      Lumbar back: Tenderness and bony tenderness present. Negative right straight leg raise test and negative left straight leg raise test.      Comments: " "+lumbar facet tenderness/loading    Skin:     General: Skin is warm and dry.   Neurological:      Mental Status: She is alert and oriented to person, place, and time.      Motor: Motor function is intact.      Gait: Gait is intact. Gait normal.   Psychiatric:         Mood and Affect: Mood normal.         Behavior: Behavior normal.         Assessment/Plan   Diagnoses and all orders for this visit:    1. Other chronic pain (Primary)    2. Chronic low back pain, unspecified back pain laterality, unspecified whether sciatica present    3. Neuropathy    4. Lumbar facet arthropathy    Other orders  -     pregabalin (LYRICA) 75 MG capsule; Take 1 capsule by mouth 2 (Two) Times a Day.  Dispense: 60 capsule; Refill: 0      --- Bilateral L3-L5 MBB   -------  Education about Medial Branch Blockade and RF Therapy:    This medial branch blockade (MBB) suggested is intended for diagnostic purposes, with the intent of offering the patient Radiofrequency thermal rhizotomy (RF) if the MBB is diagnostically effective.  The diagnostic blockade is necessary to determine the likelihood that RF therapy could be efficacious in providing long term relief to the patient.    Medial branches are sensory nerve branches that connect to a facet joint and transmit sensations & pain signals from that joint.  Facet is a term for the type of joints found in the spine.  Medial branches are the nerves that go to a facet, and therefore are also sometimes called \"facet joint nerves\" (FJNs).      In a medial branch blockade procedure, xray fluoroscopy is used to verify the locations of the outside of the joint lines which are being targeted.  Under xray guidance, needles are placed to these areas.  Contrast dye is injected to confirm proper placement, with dye flowing over the joint area, and to ensure that the dye does not flow into unintended areas such as a vein.  When this is confirmed, local anesthetic is injected to block the medial branch at that " joint level.      If MBBs are diagnostically successful in blocking pain, then the patient is most likely a great candidate for Radiofrequency of those facet joint nerves.  In the RF procedure, needles are placed to the joint lines in the same fashion, and after testing, the needle tips are heated to thermally treat the nerves, blocking the nerves by in essence damaging the nerves with the heat treatment(non-pulsed).       Medically, a successful RF procedure should provide a patient with 50% pain relief or more for at least 6 months.  Clinical experience suggests that successful patients receive relief more in the range of 12 months on average.  We also discussed that a fortunate minority of patients receive therapeutic success from the MBB, and may not require RF ablation.  If a patient receives more than 8 weeks of relief from MBB, then occasional repeat MBB for therapeutic purposes is a very reasonable alternative therapy.  This course of therapy is consistent with our LCDs according to our CMS  in the area, and therefore other insurance providers should follow accordingly.  We will monitor our patients to screen for these therapeutic responders and will offer RF therapy only when necessary.        We discussed that MBB & RF are not without risks.  Guidelines regarding anticoagulant use & neuraxial procedures will be respected.  Patients that are ill or otherwise may be at risk for sepsis will not have their spines accessed by neuraxial injections of any type.  This patient will not be offered these therapies if there is an increased risk.   We discussed that there is a risk of postprocedural pain and also a risk of worsening of clinical picture with these procedures as with any neuraxial procedure.    -------  --- Could consider LESI versus LTFESI in the future, however primary complaint today is axial low back pain   --- D/c Gabapentin  --- Trial Lyrica 75 mg bid. Discussed medication with the  patient.  Included in this discussion was the potential for side effects and adverse events.  Patient verbalized understanding and wished to proceed.  Prescription will be sent to pharmacy.  --- Follow-up for procedure          SANDOVAL REPORT    As the clinician, I personally reviewed the SANDOVAL from 1/12/2022 while the patient was in the office today.     Dictated utilizing Dragon dictation.     This document is intended for medical expert use only. Reading of this document by patients and/or patient's family without participating medical staff guidance may result in misinterpretation and unintended morbidity.   Any interpretation of such data is the responsibility of the patient and/or family member responsible for the patient in concert with their primary or specialist providers, not to be left for sources of online searches such as light, [employer] or similar queries. Relying on these approaches to knowledge may result in misinterpretation, misguided goals of care and even death should patients or family members try recommendations outside of the realm of professional medical care in a supervised way.

## 2022-01-21 PROCEDURE — 93298 REM INTERROG DEV EVAL SCRMS: CPT | Performed by: INTERNAL MEDICINE

## 2022-01-21 PROCEDURE — G2066 INTER DEVC REMOTE 30D: HCPCS | Performed by: INTERNAL MEDICINE

## 2022-01-28 ENCOUNTER — HOSPITAL ENCOUNTER (OUTPATIENT)
Dept: GENERAL RADIOLOGY | Facility: SURGERY CENTER | Age: 67
Setting detail: HOSPITAL OUTPATIENT SURGERY
End: 2022-01-28

## 2022-01-28 ENCOUNTER — HOSPITAL ENCOUNTER (OUTPATIENT)
Facility: SURGERY CENTER | Age: 67
Setting detail: HOSPITAL OUTPATIENT SURGERY
Discharge: HOME | End: 2022-01-28
Attending: ANESTHESIOLOGY | Admitting: ANESTHESIOLOGY

## 2022-01-28 VITALS
SYSTOLIC BLOOD PRESSURE: 166 MMHG | TEMPERATURE: 97 F | OXYGEN SATURATION: 98 % | HEART RATE: 91 BPM | RESPIRATION RATE: 16 BRPM | DIASTOLIC BLOOD PRESSURE: 90 MMHG

## 2022-01-28 DIAGNOSIS — Z41.9 SURGERY, ELECTIVE: ICD-10-CM

## 2022-01-28 DIAGNOSIS — M47.816 LUMBAR FACET ARTHROPATHY: ICD-10-CM

## 2022-01-28 PROCEDURE — 64493 INJ PARAVERT F JNT L/S 1 LEV: CPT | Performed by: ANESTHESIOLOGY

## 2022-01-28 PROCEDURE — 25010000002 MIDAZOLAM PER 1 MG: Performed by: ANESTHESIOLOGY

## 2022-01-28 PROCEDURE — 64494 INJ PARAVERT F JNT L/S 2 LEV: CPT | Performed by: ANESTHESIOLOGY

## 2022-01-28 PROCEDURE — 3E0T3BZ INTRODUCTION OF ANESTHETIC AGENT INTO PERIPHERAL NERVES AND PLEXI, PERCUTANEOUS APPROACH: ICD-10-PCS | Performed by: ANESTHESIOLOGY

## 2022-01-28 PROCEDURE — 77002 NEEDLE LOCALIZATION BY XRAY: CPT

## 2022-01-28 PROCEDURE — 76000 FLUOROSCOPY <1 HR PHYS/QHP: CPT

## 2022-01-28 RX ORDER — SODIUM CHLORIDE 0.9 % (FLUSH) 0.9 %
10 SYRINGE (ML) INJECTION EVERY 12 HOURS SCHEDULED
Status: DISCONTINUED | OUTPATIENT
Start: 2022-01-28 | End: 2022-01-28 | Stop reason: HOSPADM

## 2022-01-28 RX ORDER — SODIUM CHLORIDE 0.9 % (FLUSH) 0.9 %
10 SYRINGE (ML) INJECTION AS NEEDED
Status: DISCONTINUED | OUTPATIENT
Start: 2022-01-28 | End: 2022-01-28 | Stop reason: HOSPADM

## 2022-01-28 RX ORDER — MIDAZOLAM HYDROCHLORIDE 1 MG/ML
INJECTION INTRAMUSCULAR; INTRAVENOUS AS NEEDED
Status: DISCONTINUED | OUTPATIENT
Start: 2022-01-28 | End: 2022-01-28 | Stop reason: HOSPADM

## 2022-02-11 ENCOUNTER — OFFICE VISIT (OUTPATIENT)
Dept: PAIN MEDICINE | Facility: CLINIC | Age: 67
End: 2022-02-11

## 2022-02-11 ENCOUNTER — TRANSCRIBE ORDERS (OUTPATIENT)
Dept: SURGERY | Facility: SURGERY CENTER | Age: 67
End: 2022-02-11

## 2022-02-11 ENCOUNTER — PREP FOR SURGERY (OUTPATIENT)
Dept: SURGERY | Facility: SURGERY CENTER | Age: 67
End: 2022-02-11

## 2022-02-11 VITALS
BODY MASS INDEX: 28.89 KG/M2 | OXYGEN SATURATION: 95 % | RESPIRATION RATE: 18 BRPM | HEART RATE: 92 BPM | SYSTOLIC BLOOD PRESSURE: 195 MMHG | DIASTOLIC BLOOD PRESSURE: 88 MMHG | TEMPERATURE: 96.9 F | WEIGHT: 157 LBS | HEIGHT: 62 IN

## 2022-02-11 DIAGNOSIS — G89.29 OTHER CHRONIC PAIN: Primary | ICD-10-CM

## 2022-02-11 DIAGNOSIS — Z41.9 SURGERY, ELECTIVE: Primary | ICD-10-CM

## 2022-02-11 DIAGNOSIS — G62.9 NEUROPATHY: ICD-10-CM

## 2022-02-11 DIAGNOSIS — M54.50 CHRONIC LOW BACK PAIN, UNSPECIFIED BACK PAIN LATERALITY, UNSPECIFIED WHETHER SCIATICA PRESENT: ICD-10-CM

## 2022-02-11 DIAGNOSIS — M47.816 LUMBAR FACET ARTHROPATHY: Primary | ICD-10-CM

## 2022-02-11 DIAGNOSIS — M47.816 LUMBAR FACET ARTHROPATHY: ICD-10-CM

## 2022-02-11 DIAGNOSIS — G89.29 CHRONIC LOW BACK PAIN, UNSPECIFIED BACK PAIN LATERALITY, UNSPECIFIED WHETHER SCIATICA PRESENT: ICD-10-CM

## 2022-02-11 PROCEDURE — 99214 OFFICE O/P EST MOD 30 MIN: CPT | Performed by: NURSE PRACTITIONER

## 2022-02-11 RX ORDER — SODIUM CHLORIDE 0.9 % (FLUSH) 0.9 %
10 SYRINGE (ML) INJECTION AS NEEDED
Status: CANCELLED | OUTPATIENT
Start: 2022-02-11

## 2022-02-11 RX ORDER — SODIUM CHLORIDE 0.9 % (FLUSH) 0.9 %
10 SYRINGE (ML) INJECTION EVERY 12 HOURS SCHEDULED
Status: CANCELLED | OUTPATIENT
Start: 2022-02-11

## 2022-02-11 RX ORDER — PREGABALIN 150 MG/1
150 CAPSULE ORAL 2 TIMES DAILY
Qty: 60 CAPSULE | Refills: 1 | Status: SHIPPED | OUTPATIENT
Start: 2022-02-11 | End: 2022-03-04 | Stop reason: SDUPTHER

## 2022-02-11 NOTE — PROGRESS NOTES
CHIEF COMPLAINT  Follow-up for back and foot pain.    Subjective   Jayne Beltran is a 66 y.o. female  who presents to the office for follow-up of procedure.  She completed a Bilateral L4-S1 (anatomic) Lumbar Medial Branch Blockade  on  1/28/2022 performed by Dr. Harrison for management of back pain. Patient reports 100% relief from the procedure for the day of the procedure.     C/o bilateral foot pain. Pain today 8/10 in severity.  No relief with Gabapentin 800 mg tid. Switched to Lyrica 75 mg bid last visit, not helping but no side effects.  Still reports a lot of low back pain as well as pain of both feet.  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  Denies adverse reactions.      Minimal relief recalled with previous lumbar SHERWIN    Patient remained masked during entire encounter. No cough present. I donned a mask and eye protection throughout entire visit. Prior to donning mask and eye protection, hand hygiene was performed, as well as when it was doffed.  I was closer than 6 feet, but not for an extended period of time. No obvious exposure to any bodily fluids.    Foot Pain  This is a chronic problem. The problem occurs daily. The problem has been waxing and waning. Associated symptoms include fatigue and numbness (bilateral hands). Pertinent negatives include no chest pain, congestion or weakness. She has tried acetaminophen, NSAIDs, position changes, rest, sleep, walking and lying down for the symptoms. The treatment provided mild relief.   Back Pain  This is a chronic problem. The current episode started more than 1 year ago. The problem occurs daily. The problem has been gradually worsening since onset. The pain is present in the lumbar spine. The quality of the pain is described as aching and burning. The pain radiates to the right thigh and right knee. The pain is at a severity of 8/10. The pain is severe. Exacerbated by: walking. Associated symptoms include numbness  "(bilateral hands). Pertinent negatives include no chest pain or weakness. She has tried analgesics, muscle relaxant, heat and ice for the symptoms. The treatment provided mild relief.      PEG Assessment   What number best describes your pain on average in the past week?8  What number best describes how, during the past week, pain has interfered with your enjoyment of life?5  What number best describes how, during the past week, pain has interfered with your general activity?  6    Review of Pertinent Medical Data ---      The following portions of the patient's history were reviewed and updated as appropriate: allergies, current medications, past family history, past medical history, past social history, past surgical history and problem list.    Review of Systems   Constitutional: Positive for fatigue.   HENT: Negative for congestion.    Eyes: Negative for visual disturbance.   Respiratory: Negative for shortness of breath.    Cardiovascular: Negative for chest pain.   Gastrointestinal: Negative for constipation and diarrhea.   Genitourinary: Negative for difficulty urinating.   Musculoskeletal: Positive for back pain.   Neurological: Positive for numbness (bilateral hands). Negative for weakness.   Psychiatric/Behavioral: Negative for sleep disturbance and suicidal ideas. The patient is not nervous/anxious.      I have reviewed and confirmed the accuracy of the ROS as documented by the MA/LPN/RN TRISH Benson     Vitals:    02/11/22 0924   BP: (!) 195/88   Pulse: 92   Resp: 18   Temp: 96.9 °F (36.1 °C)   SpO2: 95%   Weight: 71.2 kg (157 lb)   Height: 157.5 cm (62\")   PainSc:   8   PainLoc: Foot     Objective   Physical Exam  Vitals and nursing note reviewed.   Constitutional:       General: She is not in acute distress.     Appearance: Normal appearance. She is not ill-appearing.   Pulmonary:      Effort: Pulmonary effort is normal. No respiratory distress.   Musculoskeletal:      Lumbar back: " "Tenderness and bony tenderness present. Negative right straight leg raise test and negative left straight leg raise test.      Comments: +lumbar facet tenderness/loading    Skin:     General: Skin is warm and dry.   Neurological:      Mental Status: She is alert and oriented to person, place, and time.      Motor: Motor function is intact.      Gait: Gait is intact. Gait normal.   Psychiatric:         Mood and Affect: Mood normal.         Behavior: Behavior normal.       Assessment/Plan   Diagnoses and all orders for this visit:    1. Other chronic pain (Primary)    2. Chronic low back pain, unspecified back pain laterality, unspecified whether sciatica present    3. Lumbar facet arthropathy    4. Neuropathy    Other orders  -     pregabalin (LYRICA) 150 MG capsule; Take 1 capsule by mouth 2 (Two) Times a Day.  Dispense: 60 capsule; Refill: 1        --- Repeat bilateral L4-S1 MBB    -------  Education about Medial Branch Blockade and RF Therapy:    This medial branch blockade (MBB) suggested is intended for diagnostic purposes, with the intent of offering the patient Radiofrequency thermal rhizotomy (RF) if the MBB is diagnostically effective.  The diagnostic blockade is necessary to determine the likelihood that RF therapy could be efficacious in providing long term relief to the patient.    Medial branches are sensory nerve branches that connect to a facet joint and transmit sensations & pain signals from that joint.  Facet is a term for the type of joints found in the spine.  Medial branches are the nerves that go to a facet, and therefore are also sometimes called \"facet joint nerves\" (FJNs).      In a medial branch blockade procedure, xray fluoroscopy is used to verify the locations of the outside of the joint lines which are being targeted.  Under xray guidance, needles are placed to these areas.  Contrast dye is injected to confirm proper placement, with dye flowing over the joint area, and to ensure that the " dye does not flow into unintended areas such as a vein.  When this is confirmed, local anesthetic is injected to block the medial branch at that joint level.      If MBBs are diagnostically successful in blocking pain, then the patient is most likely a great candidate for Radiofrequency of those facet joint nerves.  In the RF procedure, needles are placed to the joint lines in the same fashion, and after testing, the needle tips are heated to thermally treat the nerves, blocking the nerves by in essence damaging the nerves with the heat treatment(non-pulsed).       Medically, a successful RF procedure should provide a patient with 50% pain relief or more for at least 6 months.  Clinical experience suggests that successful patients receive relief more in the range of 12 months on average.  We also discussed that a fortunate minority of patients receive therapeutic success from the MBB, and may not require RF ablation.  If a patient receives more than 8 weeks of relief from MBB, then occasional repeat MBB for therapeutic purposes is a very reasonable alternative therapy.  This course of therapy is consistent with our LCDs according to our CMS  in the area, and therefore other insurance providers should follow accordingly.  We will monitor our patients to screen for these therapeutic responders and will offer RF therapy only when necessary.        We discussed that MBB & RF are not without risks.  Guidelines regarding anticoagulant use & neuraxial procedures will be respected.  Patients that are ill or otherwise may be at risk for sepsis will not have their spines accessed by neuraxial injections of any type.  This patient will not be offered these therapies if there is an increased risk.   We discussed that there is a risk of postprocedural pain and also a risk of worsening of clinical picture with these procedures as with any neuraxial procedure.    -------    --- Increase Lyrica to 150 mg bid   ---    --- Follow-up for procedure        As the clinician, I personally reviewed the SANDOVAL from 2/11/2022 while the patient was in the office today.    Dictated utilizing Dragon dictation.      This document is intended for medical expert use only. Reading of this document by patients and/or patient's family without participating medical staff guidance may result in misinterpretation and unintended morbidity.   Any interpretation of such data is the responsibility of the patient and/or family member responsible for the patient in concert with their primary or specialist providers, not to be left for sources of online searches such as [employer], [employer] or similar queries. Relying on these approaches to knowledge may result in misinterpretation, misguided goals of care and even death should patients or family members try recommendations outside of the realm of professional medical care in a supervised way.

## 2022-02-11 NOTE — PATIENT INSTRUCTIONS
"-------  Education about Medial Branch Blockade and RF Therapy:    This medial branch blockade (MBB) suggested is intended for diagnostic purposes, with the intent of offering the patient Radiofrequency thermal rhizotomy (RF) if the MBB is diagnostically effective.  The diagnostic blockade is necessary to determine the likelihood that RF therapy could be efficacious in providing long term relief to the patient.    Medial branches are sensory nerve branches that connect to a facet joint and transmit sensations & pain signals from that joint.  Facet is a term for the type of joints found in the spine.  Medial branches are the nerves that go to a facet, and therefore are also sometimes called \"facet joint nerves\" (FJNs).      In a medial branch blockade procedure, xray fluoroscopy is used to verify the locations of the outside of the joint lines which are being targeted.  Under xray guidance, needles are placed to these areas.  Contrast dye is injected to confirm proper placement, with dye flowing over the joint area, and to ensure that the dye does not flow into unintended areas such as a vein.  When this is confirmed, local anesthetic is injected to block the medial branch at that joint level.      If MBBs are diagnostically successful in blocking pain, then the patient is most likely a great candidate for Radiofrequency of those facet joint nerves.  In the RF procedure, needles are placed to the joint lines in the same fashion, and after testing, the needle tips are heated to thermally treat the nerves, blocking the nerves by in essence damaging the nerves with the heat treatment(non-pulsed).       Medically, a successful RF procedure should provide a patient with 50% pain relief or more for at least 6 months.  Clinical experience suggests that successful patients receive relief more in the range of 12 months on average.  We also discussed that a fortunate minority of patients receive therapeutic success from the " MBB, and may not require RF ablation.  If a patient receives more than 8 weeks of relief from MBB, then occasional repeat MBB for therapeutic purposes is a very reasonable alternative therapy.  This course of therapy is consistent with our LCDs according to our CMS  in the area, and therefore other insurance providers should follow accordingly.  We will monitor our patients to screen for these therapeutic responders and will offer RF therapy only when necessary.        We discussed that MBB & RF are not without risks.  Guidelines regarding anticoagulant use & neuraxial procedures will be respected.  Patients that are ill or otherwise may be at risk for sepsis will not have their spines accessed by neuraxial injections of any type.  This patient will not be offered these therapies if there is an increased risk.   We discussed that there is a risk of postprocedural pain and also a risk of worsening of clinical picture with these procedures as with any neuraxial procedure.    -------      --------  Education about Radiofrequency Lesioning of Medial Branches:    The medial branch blockade was intended for diagnostic purposes, with the intent of offering the patient Radiofrequency thermal rhizotomy if the MBB is diagnostically effective.  The diagnostic blockade is necessary to determine the likelihood that RF therapy could be efficacious in providing long term relief to the patient.  As indicated above, diagnostic efficacy was established.      In the RF procedure, needles are placed to the joint lines in the same fashion, and after testing, the needle tips are heated to thermally treat the nerves, blocking the nerves by in essence damaging the nerves with the heat treatment.      Medically, a successful RF procedure should provide a patient with 50% pain relief or more for at least 6 months.  We estimate a likelihood of about an 80% chance that medical success will be realized.  We discussed & educated  once again that not all patients have a medically successful result, and the patient voices understanding.  However, our clinical experience suggests that successful patients receive relief more in the range of 12 months on average.  (We also discussed that a fortunate minority of patients receive therapeutic success from the MBB, and may not require RF ablation.  If a patient receives more than 8 weeks of relief from MBB, then occasional repeat MBB for therapeutic purposes is a very reasonable alternative therapy.  This course of therapy is consistent with our LCDs according to our CMS  in the area, and therefore other insurance providers should follow accordingly.  We will monitor our patients to screen for these therapeutic responders and will offer RF therapy only when necessary.  However, in this clinical scenario, this therapeutic result was not realized, and therefore Radiofrequency Lesioning is medically necessary.)      We discussed that MBB & RF are not without risks.  Guidelines regarding anticoagulant use & neuraxial procedures will be respected.  Patients that are ill or otherwise may be at risk for sepsis will not have their spines accessed by neuraxial injections of any type.  This patient will not be offered these therapies if there is an increased risk.   We discussed that there is a risk of postprocedural pain and also a risk of worsening of clinical picture with these procedures as with any neuraxial procedure.  All invasive procedures have risks including but not limited to bleeding, infection, injury, nerve injury, paralysis, coma, death, lack of pain relief, and worsening of clinical picture.      In this education session, all of these topics were covered and the patient voiced understanding.    ---------

## 2022-02-11 NOTE — H&P (VIEW-ONLY)
CHIEF COMPLAINT  Follow-up for back and foot pain.    Subjective   Jayne Beltran is a 66 y.o. female  who presents to the office for follow-up of procedure.  She completed a Bilateral L4-S1 (anatomic) Lumbar Medial Branch Blockade  on  1/28/2022 performed by Dr. Harrison for management of back pain. Patient reports 100% relief from the procedure for the day of the procedure.     C/o bilateral foot pain. Pain today 8/10 in severity.  No relief with Gabapentin 800 mg tid. Switched to Lyrica 75 mg bid last visit, not helping but no side effects.  Still reports a lot of low back pain as well as pain of both feet.  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  Denies adverse reactions.      Minimal relief recalled with previous lumbar SHERWIN    Patient remained masked during entire encounter. No cough present. I donned a mask and eye protection throughout entire visit. Prior to donning mask and eye protection, hand hygiene was performed, as well as when it was doffed.  I was closer than 6 feet, but not for an extended period of time. No obvious exposure to any bodily fluids.    Foot Pain  This is a chronic problem. The problem occurs daily. The problem has been waxing and waning. Associated symptoms include fatigue and numbness (bilateral hands). Pertinent negatives include no chest pain, congestion or weakness. She has tried acetaminophen, NSAIDs, position changes, rest, sleep, walking and lying down for the symptoms. The treatment provided mild relief.   Back Pain  This is a chronic problem. The current episode started more than 1 year ago. The problem occurs daily. The problem has been gradually worsening since onset. The pain is present in the lumbar spine. The quality of the pain is described as aching and burning. The pain radiates to the right thigh and right knee. The pain is at a severity of 8/10. The pain is severe. Exacerbated by: walking. Associated symptoms include numbness  "(bilateral hands). Pertinent negatives include no chest pain or weakness. She has tried analgesics, muscle relaxant, heat and ice for the symptoms. The treatment provided mild relief.      PEG Assessment   What number best describes your pain on average in the past week?8  What number best describes how, during the past week, pain has interfered with your enjoyment of life?5  What number best describes how, during the past week, pain has interfered with your general activity?  6    Review of Pertinent Medical Data ---      The following portions of the patient's history were reviewed and updated as appropriate: allergies, current medications, past family history, past medical history, past social history, past surgical history and problem list.    Review of Systems   Constitutional: Positive for fatigue.   HENT: Negative for congestion.    Eyes: Negative for visual disturbance.   Respiratory: Negative for shortness of breath.    Cardiovascular: Negative for chest pain.   Gastrointestinal: Negative for constipation and diarrhea.   Genitourinary: Negative for difficulty urinating.   Musculoskeletal: Positive for back pain.   Neurological: Positive for numbness (bilateral hands). Negative for weakness.   Psychiatric/Behavioral: Negative for sleep disturbance and suicidal ideas. The patient is not nervous/anxious.      I have reviewed and confirmed the accuracy of the ROS as documented by the MA/LPN/RN TRISH Benson     Vitals:    02/11/22 0924   BP: (!) 195/88   Pulse: 92   Resp: 18   Temp: 96.9 °F (36.1 °C)   SpO2: 95%   Weight: 71.2 kg (157 lb)   Height: 157.5 cm (62\")   PainSc:   8   PainLoc: Foot     Objective   Physical Exam  Vitals and nursing note reviewed.   Constitutional:       General: She is not in acute distress.     Appearance: Normal appearance. She is not ill-appearing.   Pulmonary:      Effort: Pulmonary effort is normal. No respiratory distress.   Musculoskeletal:      Lumbar back: " "Tenderness and bony tenderness present. Negative right straight leg raise test and negative left straight leg raise test.      Comments: +lumbar facet tenderness/loading    Skin:     General: Skin is warm and dry.   Neurological:      Mental Status: She is alert and oriented to person, place, and time.      Motor: Motor function is intact.      Gait: Gait is intact. Gait normal.   Psychiatric:         Mood and Affect: Mood normal.         Behavior: Behavior normal.       Assessment/Plan   Diagnoses and all orders for this visit:    1. Other chronic pain (Primary)    2. Chronic low back pain, unspecified back pain laterality, unspecified whether sciatica present    3. Lumbar facet arthropathy    4. Neuropathy    Other orders  -     pregabalin (LYRICA) 150 MG capsule; Take 1 capsule by mouth 2 (Two) Times a Day.  Dispense: 60 capsule; Refill: 1        --- Repeat bilateral L4-S1 MBB    -------  Education about Medial Branch Blockade and RF Therapy:    This medial branch blockade (MBB) suggested is intended for diagnostic purposes, with the intent of offering the patient Radiofrequency thermal rhizotomy (RF) if the MBB is diagnostically effective.  The diagnostic blockade is necessary to determine the likelihood that RF therapy could be efficacious in providing long term relief to the patient.    Medial branches are sensory nerve branches that connect to a facet joint and transmit sensations & pain signals from that joint.  Facet is a term for the type of joints found in the spine.  Medial branches are the nerves that go to a facet, and therefore are also sometimes called \"facet joint nerves\" (FJNs).      In a medial branch blockade procedure, xray fluoroscopy is used to verify the locations of the outside of the joint lines which are being targeted.  Under xray guidance, needles are placed to these areas.  Contrast dye is injected to confirm proper placement, with dye flowing over the joint area, and to ensure that the " dye does not flow into unintended areas such as a vein.  When this is confirmed, local anesthetic is injected to block the medial branch at that joint level.      If MBBs are diagnostically successful in blocking pain, then the patient is most likely a great candidate for Radiofrequency of those facet joint nerves.  In the RF procedure, needles are placed to the joint lines in the same fashion, and after testing, the needle tips are heated to thermally treat the nerves, blocking the nerves by in essence damaging the nerves with the heat treatment(non-pulsed).       Medically, a successful RF procedure should provide a patient with 50% pain relief or more for at least 6 months.  Clinical experience suggests that successful patients receive relief more in the range of 12 months on average.  We also discussed that a fortunate minority of patients receive therapeutic success from the MBB, and may not require RF ablation.  If a patient receives more than 8 weeks of relief from MBB, then occasional repeat MBB for therapeutic purposes is a very reasonable alternative therapy.  This course of therapy is consistent with our LCDs according to our CMS  in the area, and therefore other insurance providers should follow accordingly.  We will monitor our patients to screen for these therapeutic responders and will offer RF therapy only when necessary.        We discussed that MBB & RF are not without risks.  Guidelines regarding anticoagulant use & neuraxial procedures will be respected.  Patients that are ill or otherwise may be at risk for sepsis will not have their spines accessed by neuraxial injections of any type.  This patient will not be offered these therapies if there is an increased risk.   We discussed that there is a risk of postprocedural pain and also a risk of worsening of clinical picture with these procedures as with any neuraxial procedure.    -------    --- Increase Lyrica to 150 mg bid   ---    --- Follow-up for procedure        As the clinician, I personally reviewed the SANDOVAL from 2/11/2022 while the patient was in the office today.    Dictated utilizing Dragon dictation.      This document is intended for medical expert use only. Reading of this document by patients and/or patient's family without participating medical staff guidance may result in misinterpretation and unintended morbidity.   Any interpretation of such data is the responsibility of the patient and/or family member responsible for the patient in concert with their primary or specialist providers, not to be left for sources of online searches such as [employer] [place] or similar queries. Relying on these approaches to knowledge may result in misinterpretation, misguided goals of care and even death should patients or family members try recommendations outside of the realm of professional medical care in a supervised way.

## 2022-02-16 NOTE — DISCHARGE INSTRUCTIONS
[facility] - Post-procedure Instructions          --  While there are no absolute restrictions, it is recommended that you do not perform strenuous activity today. In the morning, you may resume your level of activity as before your block.    --  If you have a band-aid at your injection site, please remove it later today. Observe the area for any redness, swelling, pus-like drainage, or a temperature over 101°. If any of these symptoms occur, please call your doctor at [phone]. If after office hours, leave a message and the on-call provider will return your call.    --  Ice may be applied to your injection site. It is recommended you avoid direct heat (heating pad; hot tub) for 1-2 days.    --  Call [facility] at [phone] if you experience persistent headache, persistent bleeding from the injection site, or severe pain not relieved by heat or oral medication.    --  Do not make important decisions today.    --  Due to the effects of the block and/or the I.V. Sedation, DO NOT drive or operate hazardous machinery for 12 hours.  Local anesthetics may cause numbness after procedure and precautions must be taken with regards to operating equipment as well as with walking, even if ambulating with assistance of another person or with an assistive device.    --  Do not drink alcohol for 12 hours.    -- You may return to work tomorrow, or as directed by your referring doctor.    --  Occasionally you may notice a slight increase in your pain after the procedure. This should start to improve within the next 24-48 hours. Radiofrequency ablation procedure pain may last 3-4 weeks.    --  It may take as long as 3-4 days before you notice a gradual improvement in your pain and/or other symptoms.    -- You may continue to take your prescribed pain medication as needed.    --  Some normal possible side effects of steroid use could include fluid retention, increased blood sugar, dull headache,  increased sweating, increased appetite, mood swings and flushing.    --  Diabetics are recommended to watch their blood glucose level closely for 24-48 hours after the injection.    --  Must stay in PACU for 20 min upon arrival and prove no leg weakness before being discharged.    --  IN THE EVENT OF A LIFE THREATENING EMERGENCY, (CHEST PAIN, BREATHING DIFFICULTIES, PARALYSIS…) YOU SHOULD GO TO YOUR NEAREST EMERGENCY ROOM.    --  You should be contacted by our office within 2-3 days to schedule follow up or next appointment date.  If not contacted within 7 days, please call the office at [phone]    If you have even 1 hour of relief, these injections are considered successful.

## 2022-02-18 ENCOUNTER — HOSPITAL ENCOUNTER (OUTPATIENT)
Dept: GENERAL RADIOLOGY | Facility: SURGERY CENTER | Age: 67
Setting detail: HOSPITAL OUTPATIENT SURGERY
End: 2022-02-18

## 2022-02-18 ENCOUNTER — HOSPITAL ENCOUNTER (OUTPATIENT)
Facility: SURGERY CENTER | Age: 67
Setting detail: HOSPITAL OUTPATIENT SURGERY
Discharge: HOME | End: 2022-02-18
Attending: ANESTHESIOLOGY | Admitting: ANESTHESIOLOGY

## 2022-02-18 VITALS
SYSTOLIC BLOOD PRESSURE: 155 MMHG | HEART RATE: 76 BPM | TEMPERATURE: 98.6 F | OXYGEN SATURATION: 92 % | RESPIRATION RATE: 16 BRPM | DIASTOLIC BLOOD PRESSURE: 90 MMHG

## 2022-02-18 DIAGNOSIS — Z41.9 SURGERY, ELECTIVE: ICD-10-CM

## 2022-02-18 DIAGNOSIS — M47.816 LUMBAR FACET ARTHROPATHY: ICD-10-CM

## 2022-02-18 PROCEDURE — 77002 NEEDLE LOCALIZATION BY XRAY: CPT

## 2022-02-18 PROCEDURE — 76000 FLUOROSCOPY <1 HR PHYS/QHP: CPT

## 2022-02-18 PROCEDURE — 25010000002 MIDAZOLAM PER 1 MG: Performed by: ANESTHESIOLOGY

## 2022-02-18 PROCEDURE — 64494 INJ PARAVERT F JNT L/S 2 LEV: CPT | Performed by: ANESTHESIOLOGY

## 2022-02-18 PROCEDURE — 64493 INJ PARAVERT F JNT L/S 1 LEV: CPT | Performed by: ANESTHESIOLOGY

## 2022-02-18 RX ORDER — MIDAZOLAM HYDROCHLORIDE 1 MG/ML
INJECTION INTRAMUSCULAR; INTRAVENOUS AS NEEDED
Status: DISCONTINUED | OUTPATIENT
Start: 2022-02-18 | End: 2022-02-18 | Stop reason: HOSPADM

## 2022-02-18 RX ORDER — SODIUM CHLORIDE 0.9 % (FLUSH) 0.9 %
10 SYRINGE (ML) INJECTION AS NEEDED
Status: DISCONTINUED | OUTPATIENT
Start: 2022-02-18 | End: 2022-02-18 | Stop reason: HOSPADM

## 2022-02-18 RX ORDER — SODIUM CHLORIDE 0.9 % (FLUSH) 0.9 %
10 SYRINGE (ML) INJECTION EVERY 12 HOURS SCHEDULED
Status: DISCONTINUED | OUTPATIENT
Start: 2022-02-18 | End: 2022-02-18 | Stop reason: HOSPADM

## 2022-02-18 NOTE — OP NOTE
Bilateral L4-S1 Lumbar Medial Branch Blockade  [facility]      PREOPERATIVE DIAGNOSIS:  Lumbar spondylosis without myelopathy    POSTOPERATIVE DIAGNOSIS:  Lumbar spondylosis without myelopathy    PROCEDURE:   Diagnostic Lumbar Medial Branch Nerve Blockades, with fluoroscopy:  at the L4, L5 transverse processes and the sacral alar groove)   1. 85151-10 -- Lumbar Facet blocks, 1st Level  2. 74423-91 -- Lumbar Facet blocks, 2nd  Level     PRE-PROCEDURE DISCUSSION WITH PATIENT:    Risks and complications were discussed with the patient prior to starting the procedure and informed consent was obtained.      SURGEON:  Ronit Harrison MD    REASON FOR PROCEDURE:    The patient complains of pain that seems to have a significant axial component    SEDATION:  Versed 2mg IV  ANESTHETIC:  0.25% bupivacaine (0.5% bupivacaine diluted with normal saline)  STEROID:  None  TOTAL VOLUME OF SOLUTION:  6ml    DESCRIPTON OF PROCEDURE:  After obtaining informed consent, IV access was obtained in the preoperative area.   The patient was taken to the operating room.  The patient was placed in the prone position with a pillow under the abdomen. All pressure points were well padded.  EKG, blood pressure, and pulse oximeter were monitored.  The patient was monitored and sedated by the RN under my direction. The lumbosacral area was prepped with Chloraprep and draped in a sterile fashion.     AP fluoroscopic image was used to visualize the junction of the right S1 superior articular process with the sacral ala.  The skin and subcutaneous tissue over the area was anesthetized with 1% lidocaine.  A 22-gauge spinal needle was then advanced percutaneously through the anesthetized skin tract under fluoroscopic guidance in a coaxial view to contact periosteum.  After negative aspiration, a volume of 1 mL of the local anesthetic was injected without resistance.  The image was then optimized to maximize visualization of the  junctions of the L4, L5 superior articular processes with the transverse processes.  The skin and subcutaneous tissue over the areas was anesthetized with 1% lidocaine.  A 22-gauge spinal needle was then advanced percutaneously through the anesthetized skin tracts under fluoroscopic guidance in a coaxial view to contact periosteum at the target sites.  After negative aspiration, a volume of 1 mL of the local anesthetic  was injected without resistance at each of the target sites.      The same procedure was then performed on the contralateral side in the exact same fashion.        Onset of analgesia was noted.  Vital signs remained stable throughout.      ESTIMATED BLOOD LOSS:  <5 mL  SPECIMENS:  none    COMPLICATIONS:   No complications were noted.    TOLERANCE & DISCHARGE CONDITION:    The patient tolerated the procedure well.  The patient was transported to the recovery area without difficulties.  The patient was discharged to home under the care of family in stable and satisfactory condition.    Pre-procedure pain score: 6/10 at rest, 8/10 with activity  Post-procedure pain score: 0/10    PLAN OF CARE:  1. The patient was given our standard instruction sheet.  2. We discussed that Lumbar Medial Branch Blockade is a diagnostic procedure in consideration for radiofrequency ablation if two diagnostic procedures prove to be positive for significant benefit.  An alternative plan could be therapeutic lumbar branch blockades.  3. The patient is asked to keep an account of pain relief after the procedure today.  4. The patient will  Return to clinic 2-3 wks.  5. The patient will resume all medications as per the medication reconciliation sheet.

## 2022-02-21 PROCEDURE — G2066 INTER DEVC REMOTE 30D: HCPCS | Performed by: INTERNAL MEDICINE

## 2022-02-21 PROCEDURE — 93298 REM INTERROG DEV EVAL SCRMS: CPT | Performed by: INTERNAL MEDICINE

## 2022-02-24 NOTE — TELEPHONE ENCOUNTER
"We received a message from Faith Martins NP  a nurse practitioner with neurology in [place]. She said \"I am covering for Dr. Adames neurology while he is out of clinic. I received a holter monitor report from this patient's recent hospital stay showing multiple episodes of SVT and an episode of AFIB. I do not see where she is seeing cardiology. I see she has a very complex history and just adding eliquis would not likely be in her best interest without cardiology consultation. Would you like me to forward those results and make a cardiology referral to [facility] in [place]?\"  I let her knw we will place the referral from here to cardiology. Please let patient know the referral has been placed.    " Received hospice referral. Communication has been started with the discharge planning team.  Liaison will assist with hospice planning.      Adriana Lang RN  Hospice and Palliative Care Liaison  [phone].

## 2022-03-04 ENCOUNTER — PREP FOR SURGERY (OUTPATIENT)
Dept: SURGERY | Facility: SURGERY CENTER | Age: 67
End: 2022-03-04

## 2022-03-04 ENCOUNTER — OFFICE VISIT (OUTPATIENT)
Dept: PAIN MEDICINE | Facility: CLINIC | Age: 67
End: 2022-03-04

## 2022-03-04 ENCOUNTER — TRANSCRIBE ORDERS (OUTPATIENT)
Dept: SURGERY | Facility: SURGERY CENTER | Age: 67
End: 2022-03-04

## 2022-03-04 VITALS
OXYGEN SATURATION: 96 % | TEMPERATURE: 97 F | RESPIRATION RATE: 18 BRPM | HEART RATE: 86 BPM | DIASTOLIC BLOOD PRESSURE: 82 MMHG | WEIGHT: 159.4 LBS | BODY MASS INDEX: 29.33 KG/M2 | SYSTOLIC BLOOD PRESSURE: 147 MMHG | HEIGHT: 62 IN

## 2022-03-04 DIAGNOSIS — E11.42 DIABETIC PERIPHERAL NEUROPATHY: ICD-10-CM

## 2022-03-04 DIAGNOSIS — M54.50 CHRONIC LOW BACK PAIN, UNSPECIFIED BACK PAIN LATERALITY, UNSPECIFIED WHETHER SCIATICA PRESENT: ICD-10-CM

## 2022-03-04 DIAGNOSIS — M47.816 LUMBAR FACET ARTHROPATHY: Primary | ICD-10-CM

## 2022-03-04 DIAGNOSIS — G89.29 OTHER CHRONIC PAIN: Primary | ICD-10-CM

## 2022-03-04 DIAGNOSIS — G89.29 CHRONIC LOW BACK PAIN, UNSPECIFIED BACK PAIN LATERALITY, UNSPECIFIED WHETHER SCIATICA PRESENT: ICD-10-CM

## 2022-03-04 DIAGNOSIS — M47.816 LUMBAR FACET ARTHROPATHY: ICD-10-CM

## 2022-03-04 PROCEDURE — 99214 OFFICE O/P EST MOD 30 MIN: CPT | Performed by: NURSE PRACTITIONER

## 2022-03-04 RX ORDER — AMLODIPINE BESYLATE 5 MG/1
5 TABLET ORAL DAILY
COMMUNITY
Start: 2022-02-15

## 2022-03-04 RX ORDER — SODIUM CHLORIDE 0.9 % (FLUSH) 0.9 %
10 SYRINGE (ML) INJECTION AS NEEDED
Status: CANCELLED | OUTPATIENT
Start: 2022-03-04

## 2022-03-04 RX ORDER — SODIUM CHLORIDE 0.9 % (FLUSH) 0.9 %
10 SYRINGE (ML) INJECTION EVERY 12 HOURS SCHEDULED
Status: CANCELLED | OUTPATIENT
Start: 2022-03-04

## 2022-03-04 RX ORDER — PREGABALIN 150 MG/1
150 CAPSULE ORAL 3 TIMES DAILY
Qty: 90 CAPSULE | Refills: 1 | Status: SHIPPED | OUTPATIENT
Start: 2022-03-04 | End: 2022-04-27 | Stop reason: SDUPTHER

## 2022-03-04 RX ORDER — ROPINIROLE 3 MG/1
TABLET, FILM COATED ORAL
COMMUNITY
Start: 2022-02-16 | End: 2022-03-04 | Stop reason: SDUPTHER

## 2022-03-04 NOTE — H&P (VIEW-ONLY)
CHIEF COMPLAINT  Follow-up for back and bilateral foot pain.    Subjective   Jayne Beltran is a 66 y.o. female  who presents to the office for follow-up of procedure.  She completed a Bilateral L4-S1 Lumbar Medial Branch Blockade on 2/18/2022 performed by Dr. Harrison for management of back pain. Patient reports 100% diagnostic relief from the procedure.     Bilateral L4-S1 (anatomic) Lumbar Medial Branch Blockade  on  1/28/2022 performed by Dr. Harrison for management of back pain. Patient reports 100% relief from the procedure for the day of the procedure.     C/o bilateral foot pain. Pain today 5/10 in severity.  No relief with Gabapentin 800 mg tid. Increased Lyrica last visit to 150 mg bid.  she reports that it is not helping at all, she denies any adverse reactions.  Still reports a lot of low back pain as well as pain of both feet and legs from the knee down.  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  Denies adverse reactions.      Minimal relief recalled with previous lumbar SHERWIN    Patient remained masked during entire encounter. No cough present. I donned a mask and eye protection throughout entire visit. Prior to donning mask and eye protection, hand hygiene was performed, as well as when it was doffed.  I was closer than 6 feet, but not for an extended period of time. No obvious exposure to any bodily fluids.    Foot Pain  This is a chronic problem. The problem occurs daily. The problem has been waxing and waning. Associated symptoms include fatigue and numbness (bilateral hands). Pertinent negatives include no chest pain, congestion or weakness. She has tried acetaminophen, NSAIDs, position changes, rest, sleep, walking and lying down for the symptoms. The treatment provided mild relief.   Back Pain  This is a chronic problem. The current episode started more than 1 year ago. The problem occurs daily. The problem has been gradually worsening since onset. The pain is  "present in the lumbar spine. The quality of the pain is described as aching and burning. The pain radiates to the right thigh and right knee. The pain is at a severity of 8/10. The pain is severe. Exacerbated by: walking. Associated symptoms include numbness (bilateral hands). Pertinent negatives include no chest pain or weakness. She has tried analgesics, muscle relaxant, heat and ice for the symptoms. The treatment provided mild relief.     PEG Assessment   What number best describes your pain on average in the past week?8  What number best describes how, during the past week, pain has interfered with your enjoyment of life?4  What number best describes how, during the past week, pain has interfered with your general activity?  5    Review of Pertinent Medical Data ---      The following portions of the patient's history were reviewed and updated as appropriate: allergies, current medications, past family history, past medical history, past social history, past surgical history and problem list.    Review of Systems   Constitutional: Positive for fatigue.   HENT: Negative for congestion.    Eyes: Positive for visual disturbance (blurry vision).   Respiratory: Negative for shortness of breath.    Cardiovascular: Negative for chest pain.   Gastrointestinal: Negative for constipation and diarrhea.   Genitourinary: Negative for difficulty urinating.   Musculoskeletal: Positive for back pain.   Neurological: Positive for numbness (bilateral hands). Negative for weakness.   Psychiatric/Behavioral: Positive for sleep disturbance. Negative for suicidal ideas. The patient is not nervous/anxious.      I have reviewed and confirmed the accuracy of the ROS as documented by the MA/GONZALO/RN TRISH Benson     Vitals:    03/04/22 0954   BP: 147/82   Pulse: 86   Resp: 18   Temp: 97 °F (36.1 °C)   SpO2: 96%   Weight: 72.3 kg (159 lb 6.4 oz)   Height: 157.5 cm (62\")   PainSc:   5   PainLoc: Foot     Objective   Physical " Exam  Vitals and nursing note reviewed.   Constitutional:       General: She is not in acute distress.     Appearance: Normal appearance. She is not ill-appearing.   Pulmonary:      Effort: Pulmonary effort is normal. No respiratory distress.   Musculoskeletal:      Lumbar back: Tenderness and bony tenderness present. Negative right straight leg raise test and negative left straight leg raise test.      Comments: +lumbar facet tenderness/loading    Skin:     General: Skin is warm and dry.   Neurological:      Mental Status: She is alert and oriented to person, place, and time.      Motor: Motor function is intact.      Gait: Gait is intact. Gait normal.   Psychiatric:         Mood and Affect: Mood normal.         Behavior: Behavior normal.       Assessment/Plan   Diagnoses and all orders for this visit:    1. Other chronic pain (Primary)    2. Chronic low back pain, unspecified back pain laterality, unspecified whether sciatica present    3. Lumbar facet arthropathy    4. Diabetic peripheral neuropathy (HCC)    Other orders  -     pregabalin (LYRICA) 150 MG capsule; Take 1 capsule by mouth 3 (Three) Times a Day.  Dispense: 90 capsule; Refill: 1      --- Discussed the importance glucose control as it relates to her DPN symptoms, last A1c was 9. Just saw PCP 2/15/2022, working on adjusting medications/insulin and patient reports that she has been working on dietary changes  --- Increase Lyrica to 150 mg tid. Discussed with patient that this is the max dose.    --- May need to consider SCS for her severe DPN pain, however would have to get A1c down before this would be an option    --- Bilateral L4-S1 RFA  --------  Education about Radiofrequency Lesioning of Medial Branches:    The medial branch blockade was intended for diagnostic purposes, with the intent of offering the patient Radiofrequency thermal rhizotomy if the MBB is diagnostically effective.  The diagnostic blockade is necessary to determine the likelihood  that RF therapy could be efficacious in providing long term relief to the patient.  As indicated above, diagnostic efficacy was established.      In the RF procedure, needles are placed to the joint lines in the same fashion, and after testing, the needle tips are heated to thermally treat the nerves, blocking the nerves by in essence damaging the nerves with the heat treatment.      Medically, a successful RF procedure should provide a patient with 50% pain relief or more for at least 6 months.  We estimate a likelihood of about an 80% chance that medical success will be realized.  We discussed & educated once again that not all patients have a medically successful result, and the patient voices understanding.  However, our clinical experience suggests that successful patients receive relief more in the range of 12 months on average.  (We also discussed that a fortunate minority of patients receive therapeutic success from the MBB, and may not require RF ablation.  If a patient receives more than 8 weeks of relief from MBB, then occasional repeat MBB for therapeutic purposes is a very reasonable alternative therapy.  This course of therapy is consistent with our LCDs according to our CMS  in the area, and therefore other insurance providers should follow accordingly.  We will monitor our patients to screen for these therapeutic responders and will offer RF therapy only when necessary.  However, in this clinical scenario, this therapeutic result was not realized, and therefore Radiofrequency Lesioning is medically necessary.)      We discussed that MBB & RF are not without risks.  Guidelines regarding anticoagulant use & neuraxial procedures will be respected.  Patients that are ill or otherwise may be at risk for sepsis will not have their spines accessed by neuraxial injections of any type.  This patient will not be offered these therapies if there is an increased risk.   We discussed that there is a  risk of postprocedural pain and also a risk of worsening of clinical picture with these procedures as with any neuraxial procedure.  All invasive procedures have risks including but not limited to bleeding, infection, injury, nerve injury, paralysis, coma, death, lack of pain relief, and worsening of clinical picture.      In this education session, all of these topics were covered and the patient voiced understanding.    ---------    --- Follow-up for procedure          As the clinician, I personally reviewed the SANDOVAL from 3/4/2022 while the patient was in the office today.    This document is intended for medical expert use only. Reading of this document by patients and/or patient's family without participating medical staff guidance may result in misinterpretation and unintended morbidity.   Any interpretation of such data is the responsibility of the patient and/or family member responsible for the patient in concert with their primary or specialist providers, not to be left for sources of online searches such as [employer], [employer] or similar queries. Relying on these approaches to knowledge may result in misinterpretation, misguided goals of care and even death should patients or family members try recommendations outside of the realm of professional medical care in a supervised way.

## 2022-03-04 NOTE — PROGRESS NOTES
CHIEF COMPLAINT  Follow-up for back and bilateral foot pain.    Subjective   Jayne Beltran is a 66 y.o. female  who presents to the office for follow-up of procedure.  She completed a Bilateral L4-S1 Lumbar Medial Branch Blockade on 2/18/2022 performed by Dr. Harrison for management of back pain. Patient reports 100% diagnostic relief from the procedure.     Bilateral L4-S1 (anatomic) Lumbar Medial Branch Blockade  on  1/28/2022 performed by Dr. Harrison for management of back pain. Patient reports 100% relief from the procedure for the day of the procedure.     C/o bilateral foot pain. Pain today 5/10 in severity.  No relief with Gabapentin 800 mg tid. Increased Lyrica last visit to 150 mg bid.  she reports that it is not helping at all, she denies any adverse reactions.  Still reports a lot of low back pain as well as pain of both feet and legs from the knee down.  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  Denies adverse reactions.      Minimal relief recalled with previous lumbar SHERWIN    Patient remained masked during entire encounter. No cough present. I donned a mask and eye protection throughout entire visit. Prior to donning mask and eye protection, hand hygiene was performed, as well as when it was doffed.  I was closer than 6 feet, but not for an extended period of time. No obvious exposure to any bodily fluids.    Foot Pain  This is a chronic problem. The problem occurs daily. The problem has been waxing and waning. Associated symptoms include fatigue and numbness (bilateral hands). Pertinent negatives include no chest pain, congestion or weakness. She has tried acetaminophen, NSAIDs, position changes, rest, sleep, walking and lying down for the symptoms. The treatment provided mild relief.   Back Pain  This is a chronic problem. The current episode started more than 1 year ago. The problem occurs daily. The problem has been gradually worsening since onset. The pain is  "present in the lumbar spine. The quality of the pain is described as aching and burning. The pain radiates to the right thigh and right knee. The pain is at a severity of 8/10. The pain is severe. Exacerbated by: walking. Associated symptoms include numbness (bilateral hands). Pertinent negatives include no chest pain or weakness. She has tried analgesics, muscle relaxant, heat and ice for the symptoms. The treatment provided mild relief.     PEG Assessment   What number best describes your pain on average in the past week?8  What number best describes how, during the past week, pain has interfered with your enjoyment of life?4  What number best describes how, during the past week, pain has interfered with your general activity?  5    Review of Pertinent Medical Data ---      The following portions of the patient's history were reviewed and updated as appropriate: allergies, current medications, past family history, past medical history, past social history, past surgical history and problem list.    Review of Systems   Constitutional: Positive for fatigue.   HENT: Negative for congestion.    Eyes: Positive for visual disturbance (blurry vision).   Respiratory: Negative for shortness of breath.    Cardiovascular: Negative for chest pain.   Gastrointestinal: Negative for constipation and diarrhea.   Genitourinary: Negative for difficulty urinating.   Musculoskeletal: Positive for back pain.   Neurological: Positive for numbness (bilateral hands). Negative for weakness.   Psychiatric/Behavioral: Positive for sleep disturbance. Negative for suicidal ideas. The patient is not nervous/anxious.      I have reviewed and confirmed the accuracy of the ROS as documented by the MA/GONZALO/RN TRISH Benson     Vitals:    03/04/22 0954   BP: 147/82   Pulse: 86   Resp: 18   Temp: 97 °F (36.1 °C)   SpO2: 96%   Weight: 72.3 kg (159 lb 6.4 oz)   Height: 157.5 cm (62\")   PainSc:   5   PainLoc: Foot     Objective   Physical " Exam  Vitals and nursing note reviewed.   Constitutional:       General: She is not in acute distress.     Appearance: Normal appearance. She is not ill-appearing.   Pulmonary:      Effort: Pulmonary effort is normal. No respiratory distress.   Musculoskeletal:      Lumbar back: Tenderness and bony tenderness present. Negative right straight leg raise test and negative left straight leg raise test.      Comments: +lumbar facet tenderness/loading    Skin:     General: Skin is warm and dry.   Neurological:      Mental Status: She is alert and oriented to person, place, and time.      Motor: Motor function is intact.      Gait: Gait is intact. Gait normal.   Psychiatric:         Mood and Affect: Mood normal.         Behavior: Behavior normal.       Assessment/Plan   Diagnoses and all orders for this visit:    1. Other chronic pain (Primary)    2. Chronic low back pain, unspecified back pain laterality, unspecified whether sciatica present    3. Lumbar facet arthropathy    4. Diabetic peripheral neuropathy (HCC)    Other orders  -     pregabalin (LYRICA) 150 MG capsule; Take 1 capsule by mouth 3 (Three) Times a Day.  Dispense: 90 capsule; Refill: 1      --- Discussed the importance glucose control as it relates to her DPN symptoms, last A1c was 9. Just saw PCP 2/15/2022, working on adjusting medications/insulin and patient reports that she has been working on dietary changes  --- Increase Lyrica to 150 mg tid. Discussed with patient that this is the max dose.    --- May need to consider SCS for her severe DPN pain, however would have to get A1c down before this would be an option    --- Bilateral L4-S1 RFA  --------  Education about Radiofrequency Lesioning of Medial Branches:    The medial branch blockade was intended for diagnostic purposes, with the intent of offering the patient Radiofrequency thermal rhizotomy if the MBB is diagnostically effective.  The diagnostic blockade is necessary to determine the likelihood  that RF therapy could be efficacious in providing long term relief to the patient.  As indicated above, diagnostic efficacy was established.      In the RF procedure, needles are placed to the joint lines in the same fashion, and after testing, the needle tips are heated to thermally treat the nerves, blocking the nerves by in essence damaging the nerves with the heat treatment.      Medically, a successful RF procedure should provide a patient with 50% pain relief or more for at least 6 months.  We estimate a likelihood of about an 80% chance that medical success will be realized.  We discussed & educated once again that not all patients have a medically successful result, and the patient voices understanding.  However, our clinical experience suggests that successful patients receive relief more in the range of 12 months on average.  (We also discussed that a fortunate minority of patients receive therapeutic success from the MBB, and may not require RF ablation.  If a patient receives more than 8 weeks of relief from MBB, then occasional repeat MBB for therapeutic purposes is a very reasonable alternative therapy.  This course of therapy is consistent with our LCDs according to our CMS  in the area, and therefore other insurance providers should follow accordingly.  We will monitor our patients to screen for these therapeutic responders and will offer RF therapy only when necessary.  However, in this clinical scenario, this therapeutic result was not realized, and therefore Radiofrequency Lesioning is medically necessary.)      We discussed that MBB & RF are not without risks.  Guidelines regarding anticoagulant use & neuraxial procedures will be respected.  Patients that are ill or otherwise may be at risk for sepsis will not have their spines accessed by neuraxial injections of any type.  This patient will not be offered these therapies if there is an increased risk.   We discussed that there is a  risk of postprocedural pain and also a risk of worsening of clinical picture with these procedures as with any neuraxial procedure.  All invasive procedures have risks including but not limited to bleeding, infection, injury, nerve injury, paralysis, coma, death, lack of pain relief, and worsening of clinical picture.      In this education session, all of these topics were covered and the patient voiced understanding.    ---------    --- Follow-up for procedure          As the clinician, I personally reviewed the SANDOVAL from 3/4/2022 while the patient was in the office today.    This document is intended for medical expert use only. Reading of this document by patients and/or patient's family without participating medical staff guidance may result in misinterpretation and unintended morbidity.   Any interpretation of such data is the responsibility of the patient and/or family member responsible for the patient in concert with their primary or specialist providers, not to be left for sources of online searches such as [employer], [employer] or similar queries. Relying on these approaches to knowledge may result in misinterpretation, misguided goals of care and even death should patients or family members try recommendations outside of the realm of professional medical care in a supervised way.

## 2022-03-15 NOTE — DISCHARGE INSTRUCTIONS
[facility] - Post-procedure Instructions          --  While there are no absolute restrictions, it is recommended that you do not perform strenuous activity today. In the morning, you may resume your level of activity as before your block.    --  If you have a band-aid at your injection site, please remove it later today. Observe the area for any redness, swelling, pus-like drainage, or a temperature over 101°. If any of these symptoms occur, please call your doctor at [phone]. If after office hours, leave a message and the on-call provider will return your call.    --  Ice may be applied to your injection site. It is recommended you avoid direct heat (heating pad; hot tub) for 1-2 days.    --  Call [facility] at [phone] if you experience persistent headache, persistent bleeding from the injection site, or severe pain not relieved by heat or oral medication.    --  Do not make important decisions today.    --  Due to the effects of the block and/or the I.V. Sedation, DO NOT drive or operate hazardous machinery for 12 hours.  Local anesthetics may cause numbness after procedure and precautions must be taken with regards to operating equipment as well as with walking, even if ambulating with assistance of another person or with an assistive device.    --  Do not drink alcohol for 12 hours.    -- You may return to work tomorrow, or as directed by your referring doctor.    --  Occasionally you may notice a slight increase in your pain after the procedure. This should start to improve within the next 24-48 hours. Radiofrequency ablation procedure pain may last 3-4 weeks.    --  It may take as long as 3-4 days before you notice a gradual improvement in your pain and/or other symptoms.    -- You may continue to take your prescribed pain medication as needed.    --  Some normal possible side effects of steroid use could include fluid retention, increased blood sugar, dull headache,  "increased sweating, increased appetite, mood swings and flushing.    --  Diabetics are recommended to watch their blood glucose level closely for 24-48 hours after the injection.    --  Must stay in PACU for 20 min upon arrival and prove no leg weakness before being discharged.    --  IN THE EVENT OF A LIFE THREATENING EMERGENCY, (CHEST PAIN, BREATHING DIFFICULTIES, PARALYSIS…) YOU SHOULD GO TO YOUR NEAREST EMERGENCY ROOM.    --  You should be contacted by our office within 2-3 days to schedule follow up or next appointment date.  If not contacted within 7 days, please call the office at [phone]    Radiofrequency ablation (RFA):     - Radiofrequency ablation is a term used to describe cauterization or \"burning.\"   - In pain management, we can use this technique with a special needle to target and destroy areas that are causing your pain.   - In most cases, you must have TWO successful \"test injections\" before you are a candidate for RFA.    After your RFA:   - Because heat is used in this technique, it is common to have soreness after the procedure.  Sometimes \"neuritis\" occurs, which feels like tingling, prickly, or sunburn under the skin sensations.   - Ice packs are helpful in decreasing this soreness and preventing post-procedure \"neuritis\" pain.  Use an ice pack for 20 minutes at a time at least 3 times the day of and the day after your procedure.   - It is common to have arm/leg numbness or weakness the day of your procedure, but this should wear off by the following day.   - It may take up to 6 weeks to gain full benefit from this procedure.    "

## 2022-03-16 ENCOUNTER — HOSPITAL ENCOUNTER (OUTPATIENT)
Facility: SURGERY CENTER | Age: 67
Setting detail: HOSPITAL OUTPATIENT SURGERY
Discharge: HOME | End: 2022-03-16
Attending: ANESTHESIOLOGY | Admitting: ANESTHESIOLOGY

## 2022-03-16 ENCOUNTER — HOSPITAL ENCOUNTER (OUTPATIENT)
Dept: GENERAL RADIOLOGY | Facility: SURGERY CENTER | Age: 67
Setting detail: HOSPITAL OUTPATIENT SURGERY
End: 2022-03-16

## 2022-03-16 VITALS
BODY MASS INDEX: 29.26 KG/M2 | RESPIRATION RATE: 14 BRPM | HEIGHT: 62 IN | WEIGHT: 159 LBS | DIASTOLIC BLOOD PRESSURE: 100 MMHG | HEART RATE: 93 BPM | SYSTOLIC BLOOD PRESSURE: 190 MMHG | OXYGEN SATURATION: 95 % | TEMPERATURE: 97.5 F

## 2022-03-16 DIAGNOSIS — M47.816 LUMBAR FACET ARTHROPATHY: ICD-10-CM

## 2022-03-16 PROCEDURE — 64636 DESTROY L/S FACET JNT ADDL: CPT | Performed by: ANESTHESIOLOGY

## 2022-03-16 PROCEDURE — 64635 DESTROY LUMB/SAC FACET JNT: CPT | Performed by: ANESTHESIOLOGY

## 2022-03-16 PROCEDURE — 25010000002 FENTANYL CITRATE (PF) 50 MCG/ML SOLUTION: Performed by: ANESTHESIOLOGY

## 2022-03-16 PROCEDURE — 25010000002 MIDAZOLAM PER 1 MG: Performed by: ANESTHESIOLOGY

## 2022-03-16 PROCEDURE — 76000 FLUOROSCOPY <1 HR PHYS/QHP: CPT

## 2022-03-16 RX ORDER — MIDAZOLAM HYDROCHLORIDE 1 MG/ML
INJECTION INTRAMUSCULAR; INTRAVENOUS AS NEEDED
Status: DISCONTINUED | OUTPATIENT
Start: 2022-03-16 | End: 2022-03-16 | Stop reason: HOSPADM

## 2022-03-16 RX ORDER — SODIUM CHLORIDE 0.9 % (FLUSH) 0.9 %
10 SYRINGE (ML) INJECTION AS NEEDED
Status: DISCONTINUED | OUTPATIENT
Start: 2022-03-16 | End: 2022-03-16 | Stop reason: HOSPADM

## 2022-03-16 RX ORDER — FENTANYL CITRATE 50 UG/ML
INJECTION, SOLUTION INTRAMUSCULAR; INTRAVENOUS AS NEEDED
Status: DISCONTINUED | OUTPATIENT
Start: 2022-03-16 | End: 2022-03-16 | Stop reason: HOSPADM

## 2022-03-16 RX ORDER — SODIUM CHLORIDE 0.9 % (FLUSH) 0.9 %
10 SYRINGE (ML) INJECTION EVERY 12 HOURS SCHEDULED
Status: DISCONTINUED | OUTPATIENT
Start: 2022-03-16 | End: 2022-03-16 | Stop reason: HOSPADM

## 2022-03-16 NOTE — INTERVAL H&P NOTE
H&P reviewed. The patient was examined and there are no changes to the H&P.  
Quality 111:Pneumonia Vaccination Status For Older Adults: Pneumococcal Vaccination Previously Received
Detail Level: Detailed
Quality 110: Preventive Care And Screening: Influenza Immunization: Influenza Immunization previously received during influenza season

## 2022-03-16 NOTE — OP NOTE
Radiofrequency ablation of bilateral L4-S1  [facility]    PREOPERATIVE DIAGNOSIS:  Lumbar spondylosis without myelopathy   POSTOPERATIVE DIAGNOSIS:  Lumbar spondylosis without myelopathy     PROCEDURES PERFORMED:   Bilateral  lumbar radiofrequency ablation of the medial branches at the transverse processes of L4, L5, and the sacral alar groove to thermally treat these facet joints.  1.  71249 -50 Lumbar radiofrequency ablation 1st level.  2.  66602 -50 Lumbar radiofrequency ablation 2nd level.    INFORMED CONSENT:  In preprocedure discussion with the patient, the risks and complications were discussed prior to starting the procedure and informed consent was obtained.     SURGEON:   Ronit Harrison MD    INDICATIONS:  The patient presents with chronic lower back pain. The patient underwent 2 lumbar medial branch blocks with diagnostically positive relief. Given the patient’s significant pain relief, it is diagnostic that we have likely found the source of the patient’s pain; therefore, lumbar radiofrequency ablation has been indicated.     SEDATION:  Versed 1mg & Fentanyl 50 mcg IV.    TIME OF PROCEDURE:  The Interoperative procedure time, after administration of the IV sedative, was 20 minutes.    ANESTHETIC:  Lidocaine 1% for skin infiltration, 2% lidocaine and 0.5% bupivacaine for injection.    STEROID:  None.    DESCRIPTION OF PROCEDURE:  After obtaining informed consent an IV was  started in the preoperative area. The patient was taken to the operating room. The patient was placed in prone position with a pillow under the abdomen. All pressure points were padded. EKG, blood pressure, and pulse oximetry were monitored. The patient was  sedated. The lumbosacral area was prepped with ChloraPrep and draped in a sterile fashion.     The junction of the right S1 superior articular process and sacral ala was identified in a AP fluoroscopic view. The skin and subcutaneous tissue inferior to the  junction was anesthetized with 1% lidocaine. A 20-gauge 100mm RF Hyde needle was then advanced percutaneously through the anesthetized skin tract under fluoroscopic guidance until the non-insulated portion of the needle lie at the junction.  The image was then obliqued towards the right side to maximize visualization of the junctions of the L4, L5 superior articular processes with the transverse processes.  Needle placement was performed as described above until the non-insulated portion of the needles lie at the targeted junctions.  All needle tips were confirmed to be posterior to the neural foramen in the lateral fluoroscopic view. Sensory stimulation was then performed with good stimulation of the back at 0.5V or less at each level. Motor stimulation was performed up to 1.5V at each level producing stimulation of the multifidus muscles of the back and no stimulation of the lower extremity at any level. Each level was then anesthetized with 2% lidocaine prior to treatment with pulsed radiofrequency thermocoagulation at 42 degrees Celsius. Each level was then treated with thermal radiofrequency thermocoagulation at 80 degrees Celsius for 60 seconds in two separate cycles.  Prior to the removal of each needle, a volume of 1 mL of injectate was administered at each site.  The total volume consisted of 1mL of 2% lidocaine and 1mL of 0.5% bupivacaine.    The same procedure was then performed on the contralateral side in the exact same fashion.      ESTIMATED BLOOD LOSS:  Minimal.    SPECIMENS:  None.    COMPLICATIONS:  None.    TOLERANCE AND DISCHARGE:  The patient tolerated the procedure well. The patient was transported to the recovery area without difficulties. The patient was discharged home under the care of family in stable and satisfactory condition.    PLAN:  1.  The patient was given our standard instruction sheet.  2.  The patient will resume all medications per the medication reconciliation sheet.  3.   The patient will return to the [facility] for reevaluation in approximately 6 weeks.

## 2022-03-21 ENCOUNTER — TRANSCRIBE ORDERS (OUTPATIENT)
Dept: ADMINISTRATIVE | Facility: HOSPITAL | Age: 67
End: 2022-03-21

## 2022-03-22 ENCOUNTER — TRANSCRIBE ORDERS (OUTPATIENT)
Dept: ADMINISTRATIVE | Facility: HOSPITAL | Age: 67
End: 2022-03-22

## 2022-03-22 DIAGNOSIS — R93.89 ABNORMAL RADIOLOGICAL FINDINGS IN SKIN AND SUBCUTANEOUS TISSUE: Primary | ICD-10-CM

## 2022-03-22 DIAGNOSIS — R09.89 ABNORMAL CHEST SOUNDS: ICD-10-CM

## 2022-03-24 PROCEDURE — 93298 REM INTERROG DEV EVAL SCRMS: CPT | Performed by: INTERNAL MEDICINE

## 2022-03-24 PROCEDURE — G2066 INTER DEVC REMOTE 30D: HCPCS | Performed by: INTERNAL MEDICINE

## 2022-03-25 ENCOUNTER — APPOINTMENT (OUTPATIENT)
Dept: CT IMAGING | Facility: HOSPITAL | Age: 67
End: 2022-03-25

## 2022-03-25 ENCOUNTER — TRANSCRIBE ORDERS (OUTPATIENT)
Dept: ADMINISTRATIVE | Facility: HOSPITAL | Age: 67
End: 2022-03-25

## 2022-03-25 DIAGNOSIS — Z12.31 SCREENING MAMMOGRAM FOR BREAST CANCER: Primary | ICD-10-CM

## 2022-03-28 DIAGNOSIS — F41.9 ANXIETY: ICD-10-CM

## 2022-03-28 RX ORDER — CITALOPRAM 10 MG/1
TABLET ORAL
Qty: 90 TABLET | Refills: 1 | OUTPATIENT
Start: 2022-03-28

## 2022-03-29 ENCOUNTER — HOSPITAL ENCOUNTER (OUTPATIENT)
Dept: CT IMAGING | Facility: HOSPITAL | Age: 67
Discharge: HOME | End: 2022-03-29
Admitting: INTERNAL MEDICINE

## 2022-03-29 DIAGNOSIS — R09.89 ABNORMAL CHEST SOUNDS: ICD-10-CM

## 2022-03-29 DIAGNOSIS — R93.89 ABNORMAL RADIOLOGICAL FINDINGS IN SKIN AND SUBCUTANEOUS TISSUE: ICD-10-CM

## 2022-03-29 PROCEDURE — 71250 CT THORAX DX C-: CPT

## 2022-04-26 NOTE — PROGRESS NOTES
The patient has a pain history of the following:  Chronic low back pain  Lumbar spondylosis   Restless leg syndrome  Diabetic peripheral neuropathy     Previous interventions that the patient has received include:   Bilateral L4-S1 Radiofrequency ablation (thermal, non-pulsed) 3/16/22  Bilateral L4-S1 Medial branch blocks 2/18/22, 1/28/22 - 100% x1 day    Pain medications include:  IcyHot with lidocaine   Lyrica     Previously: Hydrocodone, Gabapentin (little benefit)  **Hx GI Hemorrhage      Other conservative modalities which the patient reports using include:  Physical Therapy: yes - not helping   Chiropractor: yes  Massage Therapy: no  TENS: no  Neck or back surgery: no  Past pain management: no  Ice  Heat     Past Significant Surgical History:  None    HPI:     CHIEF COMPLAINT Back Pain  F/U back pain-RADIOFREQUENCY ABLATION LUMBAR bilateral L4-S1- patient states that she has had 40% since the procedure.     Subjective   Jayne Beltran is a 66 y.o. female  who presents to the office for follow-up of procedure.  She completed a Bilateral L4-S1 Radiofrequency ablation (thermal, non-pulsed) 3/16/22 for management of chronic low back pain. Patient reports 40% relief from the procedure for 6 weeks and ongoing.     She continues to have low back pain that radiates into the hips bilaterally.  When she walks for a while, her legs feel heavy.  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     Otherwise, she states that she was just diagnosed with probable lung cancer.  She is seeing a lung specialist later today and a surgeon in the next 2 weeks.  She does not yet know her treatment plan.        PEG Assessment   What number best describes your pain on average in the past week?6  What number best describes how, during the past week, pain has interfered with your enjoyment of life?0  What number best describes how, during the past  week, pain has interfered with your general activity?  0    REVIEW OF PERTINENT MEDICAL DATA  9/3/21 INDICATION:    Acute back pain with sciatica right-sided. Superimposed upon chronic low back pain. Low back pain for 20 years and worse in the last 6 years. Bilateral leg and foot numbness.      TECHNIQUE:   MRI of the lumbar spine without contrast.     COMPARISON:    Plain films from 8/19/2021.     FINDINGS:  Patient has involuntary leg spasms resulting in motion artifact despite repeats. There is intervertebral disc desiccation in general with loss of intervertebral disc height most prominent at L3-4 through L5-S1. There is minimal degenerative  retrolisthesis of L5 on S1 secondary to loss of disc height. The conus medullaris terminates at L1 and is normal. There is multiple level predominantly type II marrow endplate degenerative change. There is levoconvexed scoliosis on the coronal  view     At L1-2, there is mild concentric disc bulge with a superimposed broad posterior protrusion. There is no canal or foraminal impingement.     At L2-3, there is moderate facet degenerative change and ligamentum flavum thickening bilaterally. There is a mild concentric disc bulge with superimposed broad posterior 3 mm protrusion. Findings result in mild canal stenosis. There is a component of  posterior epidural lipomatosis contributing to the canal stenosis. There is mild foraminal narrowing bilaterally.     At L3-4, there is moderate facet arthritis bilaterally. There is concentric desiccated disc bulge with a superimposed broad posterior protrusion/extrusion that measures 4 to 5 mm AP dimension by 7 to 8 mm SI dimension, and extending into the left greater  the right foramina. There is epidural lipomatosis posteriorly. The combination of findings result in moderate canal stenosis with some impingement on the bilateral lateral recesses. There is moderate right and mild left foraminal narrowing.     At L4-5, there is  moderate-to-severe left-sided facet degenerative change and moderate right-sided facet degenerative change. There is mild to moderate ligamentum flavum thickening. There is concentric desiccated disc bulge with a superimposed broad  posterior protrusion/extrusion that measures about 5 to 6 mm SI dimension and 4 mm AP dimension. Its broad in the ML dimension extending into foramina. There is some prominence of posterior epidural fat pad and the combination of findings result in  approximately moderate canal stenosis. There is moderate right and moderate to severe left-sided foraminal narrowing.     At L5-S1, there is moderate to severe left and mild right facet arthritis. There is broad posterior protrusion remaining contiguous with the inferior endplate of L5. There is mild canal stenosis with more focal mass effect on the left lateral recess.  There is mild to moderate right and moderate left foraminal impingement.     IMPRESSION:     1. Study is limited by involuntary patient motion artifact.  2. There is approximately moderate canal stenosis at L3-4 and L4-5 and mild canal stenosis at L2-3 and L5-S1. Please refer to the level by level description of lateral recess and foraminal narrowing.     Signer Name: Rebeka Katz MD   Signed: 9/7/2021 10:23 AM   Workstation Name: [facility]    [facility]    The following portions of the patient's history were reviewed and updated as appropriate: allergies, current medications, past family history, past medical history, past social history, past surgical history and problem list.    Review of Systems   Constitutional: Positive for fatigue. Negative for activity change, chills and fever.   HENT: Negative for congestion.    Eyes: Negative for visual disturbance.   Respiratory: Negative for chest tightness and shortness of breath.    Cardiovascular: Negative for chest pain.   Gastrointestinal: Negative for abdominal pain, constipation and diarrhea.  "  Genitourinary: Negative for difficulty urinating, dyspareunia and dysuria.   Musculoskeletal: Positive for back pain.   Neurological: Positive for headaches. Negative for dizziness, weakness, light-headedness and numbness.   Psychiatric/Behavioral: Positive for sleep disturbance. Negative for agitation. The patient is not nervous/anxious.        Vitals:    04/27/22 0858   BP: 148/81   Pulse: 87   Temp: 96 °F (35.6 °C)   SpO2: 100%   Weight: 71.8 kg (158 lb 6.4 oz)   Height: 157.5 cm (62\")   PainSc:   6   PainLoc: Foot         Objective   Physical Exam  Vitals reviewed.   Constitutional:       General: She is not in acute distress.  Pulmonary:      Effort: Pulmonary effort is normal. No respiratory distress.   Musculoskeletal:      Comments: Ambulation: Without assistive device  Lumbar Exam:  Appearance: Scoliotic curve not appreciated and scarring absent  Palpated over lumbosacral paravertebral regions and transverse processes with negative tenderness appreciated, Bilateral.   Sacroiliac joints are not tender, Bilateral.  Trochanteric bursa are not tender, Bilateral.  Straight leg raise is negative radiculopathy, Bilateral.    Bilateral hips: mild pain with internal and external rotation bilaterally, but this did not reproduce pain while walking.   Skin:     General: Skin is warm and dry.   Neurological:      General: No focal deficit present.      Mental Status: She is alert.      Deep Tendon Reflexes:      Reflex Scores:       Patellar reflexes are 1+ on the right side and 1+ on the left side.  Psychiatric:         Mood and Affect: Mood normal.         Thought Content: Thought content normal.             Assessment/Plan   Diagnoses and all orders for this visit:    1. Diabetic peripheral neuropathy (HCC) (Primary)  -     pregabalin (LYRICA) 150 MG capsule; Take 1 capsule by mouth 3 (Three) Times a Day.  Dispense: 90 capsule; Refill: 2    2. Chronic pain syndrome  -     pregabalin (LYRICA) 150 MG capsule; Take 1 " "capsule by mouth 3 (Three) Times a Day.  Dispense: 90 capsule; Refill: 2    3. Lumbar stenosis with neurogenic claudication        - Compounded pain cream prescribed to use for foot pain.   - Continue Pregabalin without change.  Refill sent.   - Will reach out to her pulmonologist to determine if an Epidural steroid injection would be safe at this time. If so, I will order an L3-4 Epidural steroid injection to help with her stenosis symptoms.  Risks include but not limited to bleeding, bruising, infection, damage to surrounding structures, headache, and rare things such as being paralyzed, seizure, stroke, heart attack and death.  The risk of steroid medications include but are not limited to immunosuppression, which can increase the risk of leslie an infectious disease as well as decrease the immune response to a vaccine.     - ADDENDUM:  Per Caren Son MA on 4/29/22 \"I spoke with Dr. Kelley and he states that it's ok for Ms. Beltran to get the epidural injection.\"  - Jayne Beltran reports a pain score of 6.  Given her pain assessment as noted, treatment options were discussed and the following options were decided upon as a follow-up plan to address the patient's pain: prescription for non-opiod analgesics and steroid injections.      --- Follow-up for L3-4 Epidural steroid injection if okayed by lung specialist, then 4-6 week office visit.                SANDOVAL REPORT    As part of the patient's treatment plan, I am prescribing controlled substances. The patient has been made aware of appropriate use of such medications, including potential risk of somnolence, limited ability to drive and/or work safely, and the potential for dependence or overdose. It has also bee made clear that these medications are for use by this patient only, without concomitant use of alcohol or other substances unless prescribed.     As the clinician, I personally reviewed the SANDOVAL from 4/27/22 while the patient " was in the office today.    History and physical exam exhibit continued safe and appropriate use of controlled substances.       While examining this patient, I wore protective equipment including a mask, eye sheild and gloves.  I washed my hands before and after this patient encounter.  The patient wore a mask throughout the visit as well.     Ronit Harrison MD  Pain Management

## 2022-04-27 ENCOUNTER — OFFICE VISIT (OUTPATIENT)
Dept: PAIN MEDICINE | Facility: CLINIC | Age: 67
End: 2022-04-27

## 2022-04-27 VITALS
BODY MASS INDEX: 29.15 KG/M2 | WEIGHT: 158.4 LBS | OXYGEN SATURATION: 100 % | TEMPERATURE: 96 F | SYSTOLIC BLOOD PRESSURE: 148 MMHG | HEART RATE: 87 BPM | DIASTOLIC BLOOD PRESSURE: 81 MMHG | HEIGHT: 62 IN

## 2022-04-27 DIAGNOSIS — E11.42 DIABETIC PERIPHERAL NEUROPATHY: Primary | ICD-10-CM

## 2022-04-27 DIAGNOSIS — G89.4 CHRONIC PAIN SYNDROME: ICD-10-CM

## 2022-04-27 DIAGNOSIS — M48.062 LUMBAR STENOSIS WITH NEUROGENIC CLAUDICATION: ICD-10-CM

## 2022-04-27 PROCEDURE — 99214 OFFICE O/P EST MOD 30 MIN: CPT | Performed by: ANESTHESIOLOGY

## 2022-04-27 RX ORDER — PREGABALIN 150 MG/1
150 CAPSULE ORAL 3 TIMES DAILY
Qty: 90 CAPSULE | Refills: 2 | Status: SHIPPED | OUTPATIENT
Start: 2022-04-27 | End: 2022-08-24

## 2022-05-02 ENCOUNTER — PREP FOR SURGERY (OUTPATIENT)
Dept: SURGERY | Facility: SURGERY CENTER | Age: 67
End: 2022-05-02

## 2022-05-02 DIAGNOSIS — M48.062 LUMBAR STENOSIS WITH NEUROGENIC CLAUDICATION: Primary | ICD-10-CM

## 2022-05-02 DIAGNOSIS — G89.29 CHRONIC LOW BACK PAIN, UNSPECIFIED BACK PAIN LATERALITY, UNSPECIFIED WHETHER SCIATICA PRESENT: ICD-10-CM

## 2022-05-02 DIAGNOSIS — M54.50 CHRONIC LOW BACK PAIN, UNSPECIFIED BACK PAIN LATERALITY, UNSPECIFIED WHETHER SCIATICA PRESENT: ICD-10-CM

## 2022-05-02 RX ORDER — SODIUM CHLORIDE 0.9 % (FLUSH) 0.9 %
10 SYRINGE (ML) INJECTION EVERY 12 HOURS SCHEDULED
Status: CANCELLED | OUTPATIENT
Start: 2022-05-02

## 2022-05-02 RX ORDER — SODIUM CHLORIDE 0.9 % (FLUSH) 0.9 %
10 SYRINGE (ML) INJECTION AS NEEDED
Status: CANCELLED | OUTPATIENT
Start: 2022-05-02

## 2022-05-25 PROCEDURE — 93298 REM INTERROG DEV EVAL SCRMS: CPT | Performed by: INTERNAL MEDICINE

## 2022-05-25 PROCEDURE — G2066 INTER DEVC REMOTE 30D: HCPCS | Performed by: INTERNAL MEDICINE

## 2022-06-08 PROBLEM — K25.0 ACUTE GASTRIC ULCER WITH HEMORRHAGE: Status: ACTIVE | Noted: 2021-10-20

## 2022-06-24 NOTE — THERAPY TREATMENT NOTE
----- Message from Balta Peña MD sent at 6/22/2022  2:38 PM CDT -----  Blood sugar testing remains essentially un changed, in the diabetic range.  Best recommendation would be to initiate diabetic/nutrition Education through the diabetic center and follow-up in 6 months as planned.  If he is unwilling to do that now than definitely needs to work aggressively on diet and exercise with a goal of significant weight loss in the meantime.    Testosterone remains low despite the current dosing.  Recommend doubling the current dose and follow-up in 6 months as planned.   Patient Name: Jayne Beltran  : 1955    MRN: [id number]                              Today's Date: 2020       Admit Date: 2020    Visit Dx:     ICD-10-CM ICD-9-CM   1. SAH (subarachnoid hemorrhage) (CMS/HCC) I60.9 430   2. SDH (subdural hematoma) (CMS/HCC) S06.5X9A 432.1   3. Fall, initial encounter W19.XXXA E888.9     Patient Active Problem List   Diagnosis   • Essential hypertension   • Snoring   • TIA (transient ischemic attack)   • GERD without esophagitis   • Inflamed seborrheic keratosis   • Type 2 diabetes mellitus without complication, without long-term current use of insulin (CMS/HCC)   • Peripheral neuropathy   • Restless legs syndrome   • Benign paroxysmal positional vertigo   • Bone lesion   • Vitamin D deficiency   • Dyslipidemia   • Muscle spasm of both lower legs   • Tobacco use disorder   • Abnormal lung sounds   • Intermittent claudication (CMS/HCC)   • Chronic respiratory failure with hypoxia (CMS/HCC)   • Leg mass, right   • Rib pain on left side   • Diabetic autonomic neuropathy associated with type 2 diabetes mellitus (CMS/HCC)   • Leukocytosis   • Primary insomnia   • PMB (postmenopausal bleeding)   • Family history of ovarian cancer   • Acute renal failure (ARF) (CMS/HCC)   • Duplicated renal collecting system   • Atherosclerotic vascular disease   • Elevated platelet count   • Low hemoglobin   • Other anomalies of uterus   • Multiple kidney stones   • Multiple kidney stones   • Generalized edema   • Bilateral lower extremity edema   • Eyelid edema   • Diarrhea   • Sore on leg   • Syncope and collapse   • Acute renal failure (CMS/HCC)   • SAH (subarachnoid hemorrhage) (CMS/HCC)     Past Medical History:   Diagnosis Date   • COPD (chronic obstructive pulmonary disease) (CMS/HCC)    • Diabetes mellitus (CMS/HCC)     type 2   • Fibroid    • Hypertension    • Leukocytosis    • Neuromuscular disorder (CMS/HCC)    • Skin cancer     nose   • TIA (transient ischemic attack)      Past  Surgical History:   Procedure Laterality Date   • CHOLECYSTECTOMY     • COLONOSCOPY     • INSERTION HEMODIALYSIS CATHETER N/A 9/3/2020    Procedure: HEMODIALYSIS CATHETER INSERTION;  Surgeon: Sergio Durant MD;  Location: [facility];  Service: Vascular;  Laterality: N/A;   • KIDNEY STONE SURGERY       General Information     Row Name 09/06/20 1445          PT Evaluation Time/Intention    Document Type  therapy note (daily note)  -CS     Mode of Treatment  physical therapy  -CS     Row Name 09/06/20 1445          General Information    Patient Profile Reviewed?  yes  -CS     Existing Precautions/Restrictions  fall  -CS     Row Name 09/06/20 1445          Cognitive Assessment/Intervention- PT/OT    Orientation Status (Cognition)  oriented x 3  -CS     Row Name 09/06/20 1445          Safety Issues, Functional Mobility    Impairments Affecting Function (Mobility)  balance;endurance/activity tolerance;visual/perceptual  -CS       User Key  (r) = Recorded By, (t) = Taken By, (c) = Cosigned By    Initials Name Provider Type    CS Ministerio Fields, PT Physical Therapist        Mobility     Row Name 09/06/20 1445          Bed Mobility Assessment/Treatment    Bed Mobility Assessment/Treatment  supine-sit;sit-supine  -CS     Supine-Sit [place] (Bed Mobility)  minimum assist (75% patient effort);contact guard;verbal cues  -CS     Sit-Supine [place] (Bed Mobility)  contact guard;minimum assist (75% patient effort);verbal cues  -CS     Assistive Device (Bed Mobility)  bed rails;head of bed elevated  -CS     Row Name 09/06/20 1445          Sit-Stand Transfer    Sit-Stand [place] (Transfers)  minimum assist (75% patient effort);verbal cues  -     Row Name 09/06/20 1445          Gait/Stairs Assessment/Training    Gait/Stairs Assessment/Training  gait/ambulation assistive device  -CS     [place] Level (Gait)  minimum assist (75% patient effort);verbal cues  -CS     Assistive Device (Gait)  walker,  front-wheeled  -CS     Distance in Feet (Gait)  20  -CS     Comment (Gait/Stairs)  dizziness limiting mobility  -CS       User Key  (r) = Recorded By, (t) = Taken By, (c) = Cosigned By    Initials Name Provider Type    Ministerio Gibbons, PT Physical Therapist        Obj/Interventions     Row Name 09/06/20 1446          Therapeutic Exercise    Comment (Therapeutic Exercise)  AP, LAQ, marches, GS x10 BLE  -CS       User Key  (r) = Recorded By, (t) = Taken By, (c) = Cosigned By    Initials Name Provider Type    Ministerio Gibbons, PT Physical Therapist        Goals/Plan    No documentation.       Clinical Impression     Row Name 09/06/20 1446          Pain Scale: Numbers Pre/Post-Treatment    Pain Location - Orientation  generalized  -CS     Pain Location  head  -CS     Pain Intervention(s)  Repositioned;Ambulation/increased activity  -CS     Row Name 09/06/20 1446          Pain Scale: FACES Pre/Post-Treatment    Pain: FACES Scale, Pretreatment  2-->hurts little bit  -CS     Pain: FACES Scale, Post-Treatment  2-->hurts little bit  -CS     Row Name 09/06/20 1446          Plan of Care Review    Plan of Care Reviewed With  patient  -CS     Row Name 09/06/20 1446          Positioning and Restraints    Pre-Treatment Position  in bed  -CS     Post Treatment Position  bed  -CS     In Bed  supine;call light within reach;encouraged to call for assist  -CS       User Key  (r) = Recorded By, (t) = Taken By, (c) = Cosigned By    Initials Name Provider Type    Ministerio Gibbons, PT Physical Therapist        Outcome Measures     Row Name 09/06/20 1447          How much help from another person do you currently need...    Turning from your back to your side while in flat bed without using bedrails?  3  -CS     Moving from lying on back to sitting on the side of a flat bed without bedrails?  3  -CS     Moving to and from a bed to a chair (including a wheelchair)?  3  -CS     Standing up from a chair using your arms (e.g.,  wheelchair, bedside chair)?  3  -CS     Climbing 3-5 steps with a railing?  2  -CS     To walk in hospital room?  3  -CS     AM-PAC 6 Clicks Score (PT)  17  -CS     Row Name 09/06/20 1447          Functional Assessment    Outcome Measure Options  AM-PAC 6 Clicks Basic Mobility (PT)  -CS       User Key  (r) = Recorded By, (t) = Taken By, (c) = Cosigned By    Initials Name Provider Type    Ministerio Gibbons, PT Physical Therapist        Physical Therapy Education                 Title: PT OT SLP Therapies (Done)     Topic: Physical Therapy (Done)     Point: Mobility training (Done)     Description:   Instruct learner(s) on safety and technique for assisting patient out of bed, chair or wheelchair.  Instruct in the proper use of assistive devices, such as walker, crutches, cane or brace.              Patient Friendly Description:   It's important to get you on your feet again, but we need to do so in a way that is safe for you. Falling has serious consequences, and your personal safety is the most important thing of all.        When it's time to get out of bed, one of us or a family member will sit next to you on the bed to give you support.     If your doctor or nurse tells you to use a walker, crutches, a cane, or a brace, be sure you use it every time you get out of bed, even if you think you don't need it.    Learning Progress Summary           Patient Acceptance, E,TB, VU,NR by CS at 9/6/2020 1447    Acceptance, E,TB, VU,NR by CS at 9/5/2020 1548                   Point: Home exercise program (Done)     Description:   Instruct learner(s) on appropriate technique for monitoring, assisting and/or progressing patient with therapeutic exercises and activities.              Learning Progress Summary           Patient Acceptance, E,TB, VU,NR by CS at 9/6/2020 1447    Acceptance, E,TB, VU,NR by CS at 9/5/2020 1548                   Point: Body mechanics (Done)     Description:   Instruct learner(s) on proper  positioning and spine alignment for patient and/or caregiver during mobility tasks and/or exercises.              Learning Progress Summary           Patient Acceptance, E,TB, VU,NR by  at 9/6/2020 1447    Acceptance, E,TB, VU,NR by  at 9/5/2020 1548                   Point: Precautions (Done)     Description:   Instruct learner(s) on prescribed precautions during mobility and gait tasks              Learning Progress Summary           Patient Acceptance, E,TB, VU,NR by  at 9/6/2020 1447    Acceptance, E,TB, VU,NR by  at 9/5/2020 1548                               User Key     Initials Effective Dates Name Provider Type Discipline     05/14/18 -  Ministerio Fields, PT Physical Therapist PT              PT Recommendation and Plan  Planned Therapy Interventions (PT Eval): balance training, bed mobility training, neuromuscular re-education, home exercise program, patient/family education, transfer training, gait training  Outcome Summary/Treatment Plan (PT)  Anticipated Discharge Disposition (PT): home with home health, home with assist, skilled nursing facility  Plan of Care Reviewed With: patient     Time Calculation:   PT Charges     Row Name 09/06/20 1448             Time Calculation    Start Time  1423  -CS      Stop Time  1438  -CS      Time Calculation (min)  15 min  -      PT Received On  09/06/20  -      PT - Next Appointment  09/07/20  -        User Key  (r) = Recorded By, (t) = Taken By, (c) = Cosigned By    Initials Name Provider Type     Ministerio Fields, PT Physical Therapist        Therapy Charges for Today     Code Description Service Date Service Provider Modifiers Qty    27479857911 HC PT EVAL MOD COMPLEXITY 2 9/5/2020 Ministerio Fields, PT GP 1    62231696068 HC PT THER PROC EA 15 MIN 9/5/2020 Ministerio Fields, PT GP 1    55049436434 HC PT THER PROC EA 15 MIN 9/6/2020 Ministerio Fields, PT GP 1          PT G-Codes  Outcome Measure Options: AM-PAC 6 Clicks Basic Mobility (PT)  AM-PAC 6  Clicks Score (PT): 17  AM-PAC 6 Clicks Score (OT): 21    Ministerio Fields, PT  9/6/2020

## 2022-06-30 RX ORDER — PREGABALIN 150 MG/1
150 CAPSULE ORAL 2 TIMES DAILY
Qty: 60 CAPSULE | Refills: 1 | Status: SHIPPED | OUTPATIENT
Start: 2022-06-30 | End: 2022-07-05 | Stop reason: SDUPTHER

## 2022-06-30 NOTE — TELEPHONE ENCOUNTER
Per pt she is taking 150 mg of the lyrica , please refuse 75 mg and refill for 150 mg . Pt also wants you to know her gabapentin is not giving her any relief states she takes it 3x DAILY .

## 2022-07-05 ENCOUNTER — OFFICE VISIT (OUTPATIENT)
Dept: CARDIOLOGY | Facility: CLINIC | Age: 67
End: 2022-07-05

## 2022-07-05 VITALS
BODY MASS INDEX: 28.32 KG/M2 | HEIGHT: 62 IN | HEART RATE: 91 BPM | DIASTOLIC BLOOD PRESSURE: 100 MMHG | OXYGEN SATURATION: 96 % | SYSTOLIC BLOOD PRESSURE: 160 MMHG | RESPIRATION RATE: 16 BRPM | WEIGHT: 153.9 LBS

## 2022-07-05 DIAGNOSIS — I65.21 INTERNAL CAROTID ARTERY STENOSIS, RIGHT: ICD-10-CM

## 2022-07-05 DIAGNOSIS — R07.2 PRECORDIAL PAIN: ICD-10-CM

## 2022-07-05 DIAGNOSIS — R06.09 DYSPNEA ON EXERTION: ICD-10-CM

## 2022-07-05 DIAGNOSIS — I10 ESSENTIAL HYPERTENSION: Primary | ICD-10-CM

## 2022-07-05 DIAGNOSIS — I35.9 AORTIC VALVE CALCIFICATION: ICD-10-CM

## 2022-07-05 DIAGNOSIS — E78.5 DYSLIPIDEMIA: ICD-10-CM

## 2022-07-05 PROCEDURE — 99214 OFFICE O/P EST MOD 30 MIN: CPT | Performed by: NURSE PRACTITIONER

## 2022-07-05 PROCEDURE — 93000 ELECTROCARDIOGRAM COMPLETE: CPT | Performed by: NURSE PRACTITIONER

## 2022-07-05 RX ORDER — ONDANSETRON HYDROCHLORIDE 8 MG/1
8 TABLET, FILM COATED ORAL
COMMUNITY
Start: 2022-05-24

## 2022-07-05 RX ORDER — DEXAMETHASONE 4 MG/1
TABLET ORAL
COMMUNITY
Start: 2022-05-24

## 2022-07-05 RX ORDER — ALBUTEROL SULFATE 90 UG/1
2 AEROSOL, METERED RESPIRATORY (INHALATION)
COMMUNITY
Start: 2022-04-27

## 2022-07-05 RX ORDER — PROMETHAZINE HYDROCHLORIDE 25 MG/1
12.5-25 TABLET ORAL
COMMUNITY
Start: 2022-05-24

## 2022-07-05 NOTE — PROGRESS NOTES
Date of Office Visit: 2022  Encounter Provider: TRISH Wright  Place of Service: [facility] CARDIOLOGY  Patient Name: Jayne Beltran  :1955  Primary Cardiologist: Dr. Jackson    CC:  6 month follow up    Dear Dr. Calderon    HPI: Jayne Beltran is a pleasant 66 y.o. female who presents 2022 for cardiac follow up. She is a new patient to me and I have reviewed her past medical records.  She is a patient f Dr. Jackson.    She has hypertension, hyperlipidemia, DM2 (with neuropathy), ARLETH, and COPD.      She presented in 2021 with respiratory failure and was found to have suffered multiple strokes. She had mild carotid disease but no major blockages. A NATHANIEL was unremarkable. She has a LINQ implanted, and no atrial fibrillation has been noted. Of note, during that hospitalization, she suffered a major upper GI bleed and was found to have a gastric ulcer, which was cauterized.      She saw Dr. Jackson initially 2022 and had previously been seen by one of our partners. She had no cardiac complaints of chest pain or shortness of breath. She had a smoker's cough.  She has very rare, brief, sporadic palpitations, but nothing that is sustained. Her lisinopril was recently increased from 10 mg to 20 mg daily, and well this has helped bring her blood pressure down, it remains high. She checks her blood pressure at home and gets values similar to that which we got in the office.    She returns today for 6-month follow-up.  She is accompanied with her daughter.  She states she has palpitations, some intermittent lower extremity edema, chest pressure, mostly with any activity but not at rest, and fatigue.  She gets short of breath at times.  She denies any dizziness or lightheadedness.  Her blood pressure is elevated.  She states she has not had any of her blood pressure morning medicines.  She is currently receiving chemo and radiation.  She has 3 more weeks of radiation and  she is on her third round of chemo.  She states she has had 8 chemo treatments.  She is taking her medications as directed except for amlodipine which is listed as 5 mg a day but she thinks she is actually taking 10 mg a day.  She is going to call us and let us know exactly what she is taking.  She states she feels the chest heaviness and pressure in her chest that she rates a 4/10 with just any activity.  She has been treated for small cell lung cancer but unfortunately she still smokes 1 to 2 packs of cigarettes a day.  She also has a very weak voice as she states that the radiation is affecting her vocal cords.    Past Medical History:   Diagnosis Date   • Acute renal failure (ARF) (Formerly [facility]) 03/03/2020    March 10, 2020: She had severe diarrhea with viral gastroenteritis started on March 2 and she went to the hospital on March 3, 2020.  She was found to be in severe metabolic acidosis and acute renal failure.  She was hospitalized for 5 days.  Her creatinine was initially over 8 and upon discharge was down to 1.41.  She is doing much better no longer has diarrhea.  Will get a check her kidney funct   • Anxiety 09/30/2020 9/30/2020:  Pt states she has anxiety and panic attacks and requests a medication.  She states she has had panic attacks at dialysis and at night. She has stayed up at night cleaning house.  11/11/2020: Her  states she has been taking more of her pain medicines than prescribed. He states she doubles up inappropriately on er pain meds. He states she has been escalating her doses of Lyrica.     • Benign paroxysmal positional vertigo 03/22/2016 August 29, 2019: Currently asymptomatic.  Continue to monitor.   • Calculus of kidney 11/25/2020   • Chronic bilateral low back pain with bilateral sciatica 02/28/2017 August 18, 2021: She has chronic low back pain that goes down both of her legs.  She has been experiencing worsening pain and now numbness in both of her legs that is not getting  any better.  It is affecting her activities of daily living including doing household chores.  X-ray and MRI of the lumbar spine.  Refer to pain management.  Refer to physical therapy.    • Chronic pain of left ankle 08/17/2021   • Chronic pain of right ankle 08/17/2021   • COPD (chronic obstructive pulmonary disease) (HCC)    • Diabetes mellitus ([facility])     type 2   • Fibroid    • History of GI bleed    • History of stroke    • History of subdural hematoma 10/29/2020    10/29/2020: She is following with neurology for history of subdural hematoma. She had only been home floor four hours after getting out of the hospital for renal failure and fell and hit her head on the concrete and slipped down the ramp and hit her head.  She was hospitalized again and is following with neurology. She went to [facility].  Following with neurology.    • Hypertension    • Internal carotid artery stenosis, right     mild by duplex 6/2021   • Leukocytosis    • Malignant neoplasm of upper lobe of left lung (HCC)    • Neuromuscular disorder ([facility])    • Panic attack    • Plantar fasciitis, bilateral 09/15/2021    9/15/2021: She wears flip flops, sandals, and goes bare foot a lot. She has foot pain when she wakes up and puts pressure on her feet in the morning. Pt advised and agrees to wear tennis shoes all day as much as possible.   • Restless legs syndrome 11/25/2020   • Skin cancer     nose   • TIA (transient ischemic attack)    • Vitamin D deficiency 11/01/2017       Past Surgical History:   Procedure Laterality Date   • CARDIAC ELECTROPHYSIOLOGY PROCEDURE N/A 10/5/2021    Procedure: Loop insertion LINQ;  Surgeon: Eduard Castellon MD;  Location: [facility] CATH INVASIVE LOCATION;  Service: Cardiovascular;  Laterality: N/A;   • CHOLECYSTECTOMY     • COLONOSCOPY  approx 2018   • ENDOSCOPY N/A 6/11/2021    Procedure: ESOPHAGOGASTRODUODENOSCOPY AT BEDSIDE;  Surgeon: Donovan Banks MD;  Location: [facility] ENDOSCOPY;  Service:  Gastroenterology;  Laterality: N/A;  pre: melena, GI bleed, anemia   post: retained food    • ENDOSCOPY N/A 6/12/2021    Procedure: ESOPHAGOGASTRODUODENOSCOPY WITH GOLD PROBE CAUTERY 7FR TO BLEEDING GASTRIC ANTRUM ULCER;  Surgeon: Calvin Barraza MD;  Location: [facility] ENDOSCOPY;  Service: Gastroenterology;  Laterality: N/A;  PRE- GI BLEED  POST- BLEEDING GASTRIC ANTRUM ULCER   • INSERTION HEMODIALYSIS CATHETER N/A 9/3/2020    Procedure: HEMODIALYSIS CATHETER INSERTION;  Surgeon: Sergio Durant MD;  Location: [facility] MAIN OR;  Service: Vascular;  Laterality: N/A;   • KIDNEY STONE SURGERY     • MEDIAL BRANCH BLOCK Bilateral 1/28/2022    Procedure: bilateral L3-L5 x 2, 1-2 weeks apart;  Surgeon: Ronit Harrison MD;  Location: [facility] MAIN OR;  Service: Pain Management;  Laterality: Bilateral;   • MEDIAL BRANCH BLOCK Bilateral 2/18/2022    Procedure: MEDIAL BRANCH BLOCK LUMBAR bilateral L4-S1;  Surgeon: Ronit Harrison MD;  Location: [facility] MAIN OR;  Service: Pain Management;  Laterality: Bilateral;   • RADIOFREQUENCY ABLATION Bilateral 3/16/2022    Procedure: RADIOFREQUENCY ABLATION LUMBAR bilateral L4-S1;  Surgeon: Ronit Harrison MD;  Location: [facility] MAIN OR;  Service: Pain Management;  Laterality: Bilateral;   • URETEROSCOPY LASER LITHOTRIPSY WITH STENT INSERTION Right 12/21/2020    Procedure: CYSTOSCOPY, RIGHT URETEROSCOPY, RIGHT RETROGRADE PYELOGRAM, LASER LITHOTRIPSY WITH RIGHT URETERAL STENT EXCHANGE;  Surgeon: Mikie Mejia MD;  Location: [facility] MAIN OR;  Service: Urology;  Laterality: Right;       Social History     Socioeconomic History   • Marital status:      Spouse name: Golden   • Number of children: 2   • Years of education: 12   • Highest education level: High school graduate   Tobacco Use   • Smoking status: Current Every Day Smoker     Packs/day: 0.25     Years: 45.00     Pack years: 11.25     Types: Cigarettes     Start date: 1970   • Smokeless tobacco: Never Used   • Tobacco comment:  uses vape and smokes cigarettes   Vaping Use   • Vaping Use: Every day   • Substances: Flavoring   Substance and Sexual Activity   • Alcohol use: No   • Drug use: No   • Sexual activity: Yes     Partners: Male     Birth control/protection: Post-menopausal       Family History   Problem Relation Age of Onset   • Heart disease Mother          at age 63   • Diabetes Mother    • Hypertension Mother    • Diabetes Father    • Deep vein thrombosis Father    • Depression Father    • Liver disease Father    • Lung cancer Father 58         at age 68   • Breast cancer Maternal Grandmother         ? age dx   • Dementia Maternal Grandmother    • Hypertension Maternal Grandmother    • Ovarian cancer Daughter 23   • Diabetes Daughter    • Depression Daughter    • Diabetes Sister    • Diabetes Brother    • Heart disease Brother    • Cancer Brother          at age 43   • Heart disease Maternal Uncle    • Cancer Paternal Uncle    • Clotting disorder Paternal Grandmother    • Colon cancer Neg Hx    • Colon polyps Neg Hx    • Malig Hyperthermia Neg Hx        The following portion of the patient's history were reviewed and updated as appropriate: past medical history, past surgical history, past social history, past family history, allergies, current medications, and problem list.    Review of Systems   Constitutional: Positive for malaise/fatigue. Negative for diaphoresis and fever.   HENT: Positive for hoarse voice. Negative for congestion, hearing loss, nosebleeds and sore throat.    Eyes: Negative for photophobia, vision loss in left eye, vision loss in right eye and visual disturbance.   Cardiovascular: Positive for chest pain (pressure/heaviness), leg swelling (intermittent) and palpitations. Negative for dyspnea on exertion, irregular heartbeat, near-syncope, orthopnea, paroxysmal nocturnal dyspnea and syncope.   Respiratory: Positive for shortness of breath. Negative for cough, hemoptysis, sleep  disturbances due to breathing, snoring, sputum production and wheezing.    Endocrine: Negative for cold intolerance, heat intolerance, polydipsia, polyphagia and polyuria.   Hematologic/Lymphatic: Negative for bleeding problem. Does not bruise/bleed easily.   Skin: Negative for color change, dry skin, poor wound healing, rash and suspicious lesions.   Musculoskeletal: Negative for arthritis, back pain, falls, gout, joint pain, joint swelling, muscle cramps, muscle weakness and myalgias.   Gastrointestinal: Negative for bloating, abdominal pain, constipation, diarrhea, dysphagia, melena, nausea and vomiting.   Neurological: Negative for excessive daytime sleepiness, dizziness, headaches, light-headedness, loss of balance, numbness, paresthesias, seizures, vertigo and weakness.   Psychiatric/Behavioral: Negative for depression, memory loss and substance abuse. The patient is not nervous/anxious.        Allergies   Allergen Reactions   • Metformin Other (See Comments)     Kidney failure         Current Outpatient Medications:   •  Accu-Chek Ilana Plus test strip, TEST THREE TIMES DAILY BEFORE MEALS AS DIRECTED, Disp: 200 each, Rfl: 5  •  Accu-Chek Softclix Lancets lancets, TEST BLOOD SUGAR THREE TIMES DAILY BEFORE MEALS AS DIRECTED, Disp: 200 each, Rfl: 5  •  albuterol sulfate  (90 Base) MCG/ACT inhaler, Inhale 2 puffs., Disp: , Rfl:   •  Alcohol Swabs (B-D SINGLE USE SWABS REGULAR) pads, USE THREE TIMES DAILY BEFORE MEALS, Disp: 300 each, Rfl: 5  •  amLODIPine (NORVASC) 5 MG tablet, Take 5 mg by mouth Daily., Disp: , Rfl:   •  Ascorbic Acid (VITAMIN C PO), Take 1 tablet by mouth Daily., Disp: , Rfl:   •  aspirin 81 MG chewable tablet, Chew 81 mg Daily., Disp: , Rfl:   •  Blood Glucose Calibration (ACCU-CHEK ILANA) solution, 1 each by In Vitro route 3 (Three) Times a Day Before Meals., Disp: 5 each, Rfl: 5  •  Blood Glucose Monitoring Suppl (ACCU-CHEK ILANA PLUS) w/Device kit, 1 each 3 (Three) Times a Day Before  Meals., Disp: 1 kit, Rfl: 2  •  citalopram (CeleXA) 10 MG tablet, TAKE 1 TABLET EVERY DAY, Disp: 90 tablet, Rfl: 1  •  dexamethasone (DECADRON) 4 MG tablet, Take 2 tablets with food the day after chemotherapy.  Then take 2 tablets twice daily with meals for 2 days for nausea prevention.., Disp: , Rfl:   •  Dulaglutide (Trulicity) 0.75 MG/0.5ML solution pen-injector, Inject 0.75 mg under the skin into the appropriate area as directed 1 (One) Time Per Week., Disp: 5 pen, Rfl: 5  •  Empagliflozin (Jardiance) 25 MG tablet, Take 1 tablet by mouth Daily., Disp: 90 tablet, Rfl: 1  •  Fluticasone-Umeclidin-Vilant (Trelegy Ellipta) 200-62.5-25 MCG/INH inhaler, Inhale 1 puff Daily., Disp: , Rfl:   •  insulin aspart (NovoLOG FlexPen) 100 UNIT/ML solution pen-injector sc pen, Inject 5 Units under the skin into the appropriate area as directed 3 (Three) Times a Day With Meals. SSI-DEPENDS PM BLOOD GLUCOSE, Disp: 5 pen, Rfl: 5  •  Insulin Glargine (LANTUS SOLOSTAR) 100 UNIT/ML injection pen, Inject 10 Units under the skin into the appropriate area as directed Daily., Disp: 5 pen, Rfl: 5  •  Insulin Pen Needle (RELION PEN NEEDLE 31G/8MM) 31G X 8 MM misc, 1 each 3 (Three) Times a Day., Disp: 90 each, Rfl: 11  •  Lancets Misc. (ACCU-CHEK MULTICLIX LANCET DEV) kit, 1 each 3 (Three) Times a Day Before Meals., Disp: 200 each, Rfl: 11  •  lisinopril (PRINIVIL,ZESTRIL) 20 MG tablet, Take 20 mg by mouth Daily., Disp: , Rfl:   •  Melatonin 10 MG tablet, Take 1 tablet by mouth every night at bedtime., Disp: , Rfl:   •  metoprolol succinate XL (TOPROL-XL) 200 MG 24 hr tablet, Take 1 tablet by mouth Daily., Disp: 90 tablet, Rfl: 1  •  ondansetron (ZOFRAN) 8 MG tablet, Take 8 mg by mouth., Disp: , Rfl:   •  pantoprazole (PROTONIX) 40 MG EC tablet, Take 1 tablet by mouth 2 (Two) Times a Day Before Meals., Disp: 60 tablet, Rfl: 3  •  pregabalin (LYRICA) 150 MG capsule, Take 1 capsule by mouth 3 (Three) Times a Day., Disp: 90 capsule, Rfl: 2  •   "promethazine (PHENERGAN) 25 MG tablet, Take 12.5-25 mg by mouth., Disp: , Rfl:   •  rOPINIRole (REQUIP) 3 MG tablet, Take 1 tablet by mouth Every Night. Take 1-3 hr prior to bedtime, Disp: 90 tablet, Rfl: 1  •  rosuvastatin (CRESTOR) 5 MG tablet, Take 5 mg by mouth Daily., Disp: , Rfl:   •  sucralfate (CARAFATE) 1 g tablet, Take 1 tablet by mouth 4 (Four) Times a Day Before Meals & at Bedtime., Disp: 120 tablet, Rfl: 3  •  vitamin D (ERGOCALCIFEROL) 1.25 MG (58135 UT) capsule capsule, 1 (One) Time Per Week., Disp: , Rfl:         Objective:     Vitals:    07/05/22 0946   BP: 160/100   Pulse: 91   Resp: 16   SpO2: 96%   Weight: 69.8 kg (153 lb 14.4 oz)   Height: 157.5 cm (62\")     Body mass index is 28.15 kg/m².      Vitals reviewed.   Constitutional:       General: Not in acute distress.     Appearance: Acutely ill-appearing.   Eyes:      General:         Right eye: No discharge.         Left eye: No discharge.      Conjunctiva/sclera: Conjunctivae normal.   HENT:      Head: Normocephalic and atraumatic.      Right Ear: External ear normal.      Left Ear: External ear normal.      Nose: Nose normal.   Neck:      Thyroid: No thyromegaly.      Vascular: No JVD.      Trachea: No tracheal deviation.      Lymphadenopathy: No cervical adenopathy.   Pulmonary:      Effort: Pulmonary effort is normal. No respiratory distress.      Breath sounds: Normal breath sounds. No wheezing. No rales.   Chest:      Chest wall: Not tender to palpatation.   Cardiovascular:      Normal rate. Regular rhythm.      No gallop.   Pulses:     Intact distal pulses.   Edema:     Peripheral edema absent.   Abdominal:      General: There is no distension.      Palpations: Abdomen is soft.      Tenderness: There is no abdominal tenderness.   Musculoskeletal: Normal range of motion.         General: No tenderness or deformity.      Cervical back: Normal range of motion and neck supple. Skin:     General: Skin is warm and dry.      Findings: No " erythema or rash.   Neurological:      Mental Status: Alert and oriented to person, place, and time.      Coordination: Coordination normal.   Psychiatric:         Attention and Perception: Attention normal.         Mood and Affect: Mood normal.         Speech: Speech normal.         Behavior: Behavior normal. Behavior is cooperative.         Thought Content: Thought content normal.         Cognition and Memory: Cognition normal.         Judgment: Judgment normal.               ECG 12 Lead    Date/Time: 7/5/2022 9:56 AM  Performed by: Jocelyn Mccurdy APRN  Authorized by: Joceyln Mccurdy APRN   Comparison: compared with previous ECG from 1/4/2022  Similar to previous ECG  Rhythm: sinus rhythm  Rate: normal  Conduction: conduction normal  ST Segments: ST segments normal  T inversion: aVL  QRS axis: normal  Other findings: left atrial abnormality    Clinical impression: abnormal EKG              Assessment:       Diagnosis Plan   1. Essential hypertension     2. Dyslipidemia     3. Aortic valve calcification     4. Internal carotid artery stenosis, right     5. Precordial pain  Adult Transthoracic Echo Complete W/ Cont if Necessary Per Protocol    Stress Test With Myocardial Perfusion One Day   6. Dyspnea on exertion  Adult Transthoracic Echo Complete W/ Cont if Necessary Per Protocol    Stress Test With Myocardial Perfusion One Day          Plan:         1. She has a history of multiple bilateral small strokes, but had an unremarkable NATHANIEL. She has an implanted loop recorder and we have not seen any evidence of atrial fibrillation, last remote check 6/2022.. She is on low-dose aspirin. If she is noted to have atrial arrhythmia, she would need a repeat EGD prior to initiating anticoagulation. She was scheduled for one in December 2021, but had to reschedule.She is encouraged her to reach out to her gastroenterologist to get this rescheduled.     2. Her blood pressure remains elevated. Reticent to add a thiazide as she is  already on empagliflozin and lisinopril and has some degree of chronic renal insufficiency. She is on amlodipine 5 mg daily but thinks she is taking 10, she will call and let us know her correct amount.  She has not taken any of her BP medications this morning.  I have asked her to sit quietly for 10-15 minutes with her feet on the floor at least 1 hour after taking her monring meds and then check her BP.  Call in one week.    3.  Small cell lung CA - currently receiving chemo and radiation treatments.  She is weak and fatigued.    4.  Chest pain and SOA - check stress and echo.    Affirm correct dosage of her amlodipine and call back.  Check BP at home as described as above and call in 1 week. Check stress and echo.    As always, it has been a pleasure to participate in your patient's care. Thank you.       Sincerely,       TRISH Wright      Current Outpatient Medications:   •  Accu-Chek Ilana Plus test strip, TEST THREE TIMES DAILY BEFORE MEALS AS DIRECTED, Disp: 200 each, Rfl: 5  •  Accu-Chek Softclix Lancets lancets, TEST BLOOD SUGAR THREE TIMES DAILY BEFORE MEALS AS DIRECTED, Disp: 200 each, Rfl: 5  •  albuterol sulfate  (90 Base) MCG/ACT inhaler, Inhale 2 puffs., Disp: , Rfl:   •  Alcohol Swabs (B-D SINGLE USE SWABS REGULAR) pads, USE THREE TIMES DAILY BEFORE MEALS, Disp: 300 each, Rfl: 5  •  amLODIPine (NORVASC) 5 MG tablet, Take 5 mg by mouth Daily., Disp: , Rfl:   •  Ascorbic Acid (VITAMIN C PO), Take 1 tablet by mouth Daily., Disp: , Rfl:   •  aspirin 81 MG chewable tablet, Chew 81 mg Daily., Disp: , Rfl:   •  Blood Glucose Calibration (ACCU-CHEK ILANA) solution, 1 each by In Vitro route 3 (Three) Times a Day Before Meals., Disp: 5 each, Rfl: 5  •  Blood Glucose Monitoring Suppl (ACCU-CHEK ILANA PLUS) w/Device kit, 1 each 3 (Three) Times a Day Before Meals., Disp: 1 kit, Rfl: 2  •  citalopram (CeleXA) 10 MG tablet, TAKE 1 TABLET EVERY DAY, Disp: 90 tablet, Rfl: 1  •  dexamethasone (DECADRON) 4  MG tablet, Take 2 tablets with food the day after chemotherapy.  Then take 2 tablets twice daily with meals for 2 days for nausea prevention.., Disp: , Rfl:   •  Dulaglutide (Trulicity) 0.75 MG/0.5ML solution pen-injector, Inject 0.75 mg under the skin into the appropriate area as directed 1 (One) Time Per Week., Disp: 5 pen, Rfl: 5  •  Empagliflozin (Jardiance) 25 MG tablet, Take 1 tablet by mouth Daily., Disp: 90 tablet, Rfl: 1  •  Fluticasone-Umeclidin-Vilant (Trelegy Ellipta) 200-62.5-25 MCG/INH inhaler, Inhale 1 puff Daily., Disp: , Rfl:   •  insulin aspart (NovoLOG FlexPen) 100 UNIT/ML solution pen-injector sc pen, Inject 5 Units under the skin into the appropriate area as directed 3 (Three) Times a Day With Meals. SSI-DEPENDS PM BLOOD GLUCOSE, Disp: 5 pen, Rfl: 5  •  Insulin Glargine (LANTUS SOLOSTAR) 100 UNIT/ML injection pen, Inject 10 Units under the skin into the appropriate area as directed Daily., Disp: 5 pen, Rfl: 5  •  Insulin Pen Needle (RELION PEN NEEDLE 31G/8MM) 31G X 8 MM misc, 1 each 3 (Three) Times a Day., Disp: 90 each, Rfl: 11  •  Lancets Misc. (ACCU-CHEK MULTICLIX LANCET DEV) kit, 1 each 3 (Three) Times a Day Before Meals., Disp: 200 each, Rfl: 11  •  lisinopril (PRINIVIL,ZESTRIL) 20 MG tablet, Take 20 mg by mouth Daily., Disp: , Rfl:   •  Melatonin 10 MG tablet, Take 1 tablet by mouth every night at bedtime., Disp: , Rfl:   •  metoprolol succinate XL (TOPROL-XL) 200 MG 24 hr tablet, Take 1 tablet by mouth Daily., Disp: 90 tablet, Rfl: 1  •  ondansetron (ZOFRAN) 8 MG tablet, Take 8 mg by mouth., Disp: , Rfl:   •  pantoprazole (PROTONIX) 40 MG EC tablet, Take 1 tablet by mouth 2 (Two) Times a Day Before Meals., Disp: 60 tablet, Rfl: 3  •  pregabalin (LYRICA) 150 MG capsule, Take 1 capsule by mouth 3 (Three) Times a Day., Disp: 90 capsule, Rfl: 2  •  promethazine (PHENERGAN) 25 MG tablet, Take 12.5-25 mg by mouth., Disp: , Rfl:   •  rOPINIRole (REQUIP) 3 MG tablet, Take 1 tablet by mouth Every  Night. Take 1-3 hr prior to bedtime, Disp: 90 tablet, Rfl: 1  •  rosuvastatin (CRESTOR) 5 MG tablet, Take 5 mg by mouth Daily., Disp: , Rfl:   •  sucralfate (CARAFATE) 1 g tablet, Take 1 tablet by mouth 4 (Four) Times a Day Before Meals & at Bedtime., Disp: 120 tablet, Rfl: 3  •  vitamin D (ERGOCALCIFEROL) 1.25 MG (86038 UT) capsule capsule, 1 (One) Time Per Week., Disp: , Rfl:       Dictated utilizing Dragon dictation

## 2022-07-21 ENCOUNTER — APPOINTMENT (OUTPATIENT)
Dept: CARDIOLOGY | Facility: HOSPITAL | Age: 67
End: 2022-07-21

## 2022-07-21 ENCOUNTER — APPOINTMENT (OUTPATIENT)
Dept: NUCLEAR MEDICINE | Facility: HOSPITAL | Age: 67
End: 2022-07-21

## 2022-08-23 DIAGNOSIS — G89.4 CHRONIC PAIN SYNDROME: ICD-10-CM

## 2022-08-23 DIAGNOSIS — E11.42 DIABETIC PERIPHERAL NEUROPATHY: ICD-10-CM

## 2022-08-24 RX ORDER — PREGABALIN 150 MG/1
CAPSULE ORAL
Qty: 90 CAPSULE | Refills: 0 | Status: SHIPPED | OUTPATIENT
Start: 2022-08-24 | End: 2022-09-23 | Stop reason: SDUPTHER

## 2022-08-26 PROCEDURE — 93298 REM INTERROG DEV EVAL SCRMS: CPT | Performed by: INTERNAL MEDICINE

## 2022-08-26 PROCEDURE — G2066 INTER DEVC REMOTE 30D: HCPCS | Performed by: INTERNAL MEDICINE

## 2022-09-23 DIAGNOSIS — E11.42 DIABETIC PERIPHERAL NEUROPATHY: ICD-10-CM

## 2022-09-23 DIAGNOSIS — G89.4 CHRONIC PAIN SYNDROME: ICD-10-CM

## 2022-09-23 NOTE — TELEPHONE ENCOUNTER
Medication Refill Request    Date of phone call: 22    Medication being requested: PREGABALIN 150 MG   si TAB PO TID   Qty: 90    Date of last visit: 22    Date of last refill:     SANDOVAL up to date?:     Next Follow up?: NONE SCHEDULED    Any new pertinent information? (i.e, new medication allergies, new use of medications, change in patient's health or condition, non-compliance or inconsistency with prescribing agreement?):

## 2022-09-26 RX ORDER — PREGABALIN 150 MG/1
150 CAPSULE ORAL 3 TIMES DAILY
Qty: 90 CAPSULE | Refills: 0 | Status: SHIPPED | OUTPATIENT
Start: 2022-09-26

## 2022-11-27 PROCEDURE — G2066 INTER DEVC REMOTE 30D: HCPCS | Performed by: INTERNAL MEDICINE

## 2022-11-27 PROCEDURE — 93298 REM INTERROG DEV EVAL SCRMS: CPT | Performed by: INTERNAL MEDICINE

## 2022-12-28 PROCEDURE — G2066 INTER DEVC REMOTE 30D: HCPCS | Performed by: INTERNAL MEDICINE

## 2022-12-28 PROCEDURE — 93298 REM INTERROG DEV EVAL SCRMS: CPT | Performed by: INTERNAL MEDICINE

## 2023-01-28 PROCEDURE — 93298 REM INTERROG DEV EVAL SCRMS: CPT | Performed by: INTERNAL MEDICINE

## 2023-01-28 PROCEDURE — G2066 INTER DEVC REMOTE 30D: HCPCS | Performed by: INTERNAL MEDICINE

## 2023-02-28 PROCEDURE — 93298 REM INTERROG DEV EVAL SCRMS: CPT | Performed by: INTERNAL MEDICINE

## 2023-02-28 PROCEDURE — G2066 INTER DEVC REMOTE 30D: HCPCS | Performed by: INTERNAL MEDICINE

## 2023-03-03 NOTE — THERAPY TREATMENT NOTE
Outpatient Physical Therapy Ortho Treatment Note  DONATO Ho     Patient Name: Jayne Beltran  : 1955  MRN: [id number]  Today's Date: 2021      Visit Date: 2021    Visit Dx:    ICD-10-CM ICD-9-CM   1. Acute back pain with sciatica, right  M54.41 724.3   2. Acute back pain with sciatica, left  M54.42 724.3       Patient Active Problem List   Diagnosis   • Essential hypertension   • Snoring   • TIA (transient ischemic attack)   • Chronic bilateral low back pain with bilateral sciatica   • GERD without esophagitis   • Inflamed seborrheic keratosis   • Type 2 diabetes mellitus without complication, without long-term current use of insulin (CMS/HCC)   • Peripheral neuropathy   • Restless legs syndrome   • Benign paroxysmal positional vertigo   • Bone lesion   • Vitamin D deficiency   • Dyslipidemia   • Muscle spasm of both lower legs   • Tobacco use disorder   • Abnormal lung sounds   • Intermittent claudication (CMS/HCC)   • Leg mass, right   • Rib pain on left side   • Diabetic autonomic neuropathy associated with type 2 diabetes mellitus (CMS/HCC)   • Leukocytosis   • Primary insomnia   • PMB (postmenopausal bleeding)   • Family history of ovarian cancer   • Acute renal failure (ARF) (CMS/HCC)   • Duplicated renal collecting system   • Atherosclerotic vascular disease   • Elevated platelet count   • Low hemoglobin   • Other anomalies of uterus   • Multiple kidney stones   • Multiple kidney stones   • Bilateral lower extremity edema   • Diarrhea   • Sore on leg   • Syncope and collapse   • Unspecified malignant neoplasm of skin of nose   • Shortness of breath   • Pain, unspecified   • Other specified coagulation defects (CMS/HCC)   • Encounter for change or removal of nonsurgical wound dressing   • Iron deficiency anemia   • Anxiety   • History of subdural hematoma   • Acute back pain with sciatica, left   • Diabetes mellitus (CMS/HCC)   • Panic attack   • Acute renal failure (CMS/HCC)   •  Calculus of kidney   • Restless legs syndrome   • Transient ischemic attack   • Right shoulder injury, initial encounter   • Nonspecific abnormal findings on imaging examination of skull and head   • Hyperosmolality   • Gastrointestinal hemorrhage   • Absolute anemia   • Melena   • Aortic valve calcification   • Impaired left ventricular relaxation   • Concentric left ventricular hypertrophy   • Left atrial dilatation   • Nonrheumatic mitral valve regurgitation   • Internal carotid artery stenosis, right   • Cerebrovascular accident (CVA) due to embolism of precerebral artery (CMS/HCC)   • Acute back pain with sciatica, right   • Chronic pain of right ankle   • Chronic pain of left ankle   • DDD (degenerative disc disease), lumbar   • Degenerative arthritis of lumbar spine        Past Medical History:   Diagnosis Date   • COPD (chronic obstructive pulmonary disease) (CMS/HCC)    • Diabetes mellitus (CMS/HCC)     type 2   • Fibroid    • Hypertension    • Leukocytosis    • Neuromuscular disorder (CMS/HCC)    • Panic attack    • Skin cancer     nose   • TIA (transient ischemic attack)         Past Surgical History:   Procedure Laterality Date   • CHOLECYSTECTOMY     • COLONOSCOPY     • ENDOSCOPY N/A 6/11/2021    Procedure: ESOPHAGOGASTRODUODENOSCOPY AT BEDSIDE;  Surgeon: Donovan Banks MD;  Location: [facility] ENDOSCOPY;  Service: Gastroenterology;  Laterality: N/A;  pre: melena, GI bleed, anemia   post: retained food    • ENDOSCOPY N/A 6/12/2021    Procedure: ESOPHAGOGASTRODUODENOSCOPY WITH GOLD PROBE CAUTERY 7FR TO BLEEDING GASTRIC ANTRUM ULCER;  Surgeon: Calvin Barraza MD;  Location: [facility] ENDOSCOPY;  Service: Gastroenterology;  Laterality: N/A;  PRE- GI BLEED  POST- BLEEDING GASTRIC ANTRUM ULCER   • INSERTION HEMODIALYSIS CATHETER N/A 9/3/2020    Procedure: HEMODIALYSIS CATHETER INSERTION;  Surgeon: Sergio Durant MD;  Location: [facility] MAIN OR;  Service: Vascular;  Laterality: N/A;   • KIDNEY STONE  "SURGERY     • URETEROSCOPY LASER LITHOTRIPSY WITH STENT INSERTION Right 12/21/2020    Procedure: CYSTOSCOPY, RIGHT URETEROSCOPY, RIGHT RETROGRADE PYELOGRAM, LASER LITHOTRIPSY WITH RIGHT URETERAL STENT EXCHANGE;  Surgeon: Mikie Mejia MD;  Location: [facility];  Service: Urology;  Laterality: Right;                       PT Assessment/Plan     Row Name 09/09/21 0700          PT Assessment    Assessment Comments  Continued with flexibility exercises as well as lumbar traction as she is reporting decreases in her symptoms.  -AS        PT Plan    PT Plan Comments  Continue with current treatment plan.  -AS       User Key  (r) = Recorded By, (t) = Taken By, (c) = Cosigned By    Initials Name Provider Type    AS Shemar Rosa, PT Physical Therapist          Modalities     Row Name 09/09/21 0700             Traction 28825    Traction Type  Lumbar  -AS      PT Traction Rx Minutes  20  -AS      Duration  Intermittent  -AS      Position  Supine  -AS      Weight  75  -AS      Hold  60  -AS      Relax  10  -AS         Functional Testing    Outcome Measure Options  Other Outcome Measure  -AS        User Key  (r) = Recorded By, (t) = Taken By, (c) = Cosigned By    Initials Name Provider Type    AS Shemar Rosa, PT Physical Therapist        OP Exercises     Row Name 09/09/21 0700             Subjective Comments    Subjective Comments  Patient states she \"felt good for about an hour\" after her treatment session. She states she has been compliant with her HEP. Patient states she had a lumbar MRI last week but she still does not know her results at this time.  -AS         Exercise 1    Exercise Name 1  Mihai. HS Stretch with ADD  -AS      Reps 1  10  -AS      Time 1  10 sec hold each  -AS         Exercise 2    Exercise Name 2  Mihai. Piriformis Stretch  -AS      Reps 2  10  -AS      Time 2  10 sec hold each  -AS         Exercise 3    Exercise Name 3  Lumbar Rotations  -AS      Reps 3  25  -AS         Exercise " 4    Exercise Name 4  SKTC  -AS      Reps 4  10  -AS      Time 4  10 sec hold each  -AS        User Key  (r) = Recorded By, (t) = Taken By, (c) = Cosigned By    Initials Name Provider Type    AS Shemar Rosa, PT Physical Therapist                                Outcome Measure Options: Other Outcome Measure         Time Calculation:   Start Time: 0752  Stop Time: 0835  Time Calculation (min): 43 min  Untimed Charges  PT Traction Rx Minutes: 20  Total Minutes  Untimed Charges Total Minutes: 20   Total Minutes: 20  Therapy Charges for Today     Code Description Service Date Service Provider Modifiers Qty    16165475502 HC PT THER PROC EA 15 MIN 9/9/2021 Shemar Rosa, PT GP 1    51087404127 HC PT TRACTION LUMBAR 9/9/2021 Shemar Rosa, PT GP 1          PT G-Codes  Outcome Measure Options: Other Outcome Measure         Shemar Rosa, PT  9/9/2021      Aklief counseling:  Patient advised to apply a pea-sized amount only at bedtime and wait 30 minutes after washing their face before applying.  If too drying, patient may add a non-comedogenic moisturizer.  The most commonly reported side effects including irritation, redness, scaling, dryness, stinging, burning, itching, and increased risk of sunburn.  The patient verbalized understanding of the proper use and possible adverse effects of retinoids.  All of the patient's questions and concerns were addressed.

## 2023-03-31 PROCEDURE — G2066 INTER DEVC REMOTE 30D: HCPCS | Performed by: INTERNAL MEDICINE

## 2023-03-31 PROCEDURE — 93298 REM INTERROG DEV EVAL SCRMS: CPT | Performed by: INTERNAL MEDICINE

## 2023-04-06 NOTE — PROGRESS NOTES
CARDIOLOGY        Patient Name: Jayne Beltran  :1955  Age: 66 y.o.  Primary Cardiologist: Charles Jackson MD  Encounter Provider:  TRISH Khan    Date of Service: 21          CHIEF COMPLAINT / REASON FOR OFFICE VISIT     Hypertension (hospital follow up ), Transient Ischemic Attack, Diabetes, and Nicotine Dependence      HISTORY OF PRESENT ILLNESS       HPI  Jayne Beltran is a 66 y.o. female who presents today for hospital follow-up.  Patient was seen in the hospital by Dr. Richards but prefers the [facility] location so she will be transitioning care to Dr. Jackson..     Pt has a  history significant for GI bleeding with anemia, hypertension, encephalopathy, COPD, history of tobacco abuse.    Review of medical records reveals patient hospitalized 2021 for altered mental status and worsening respiratory failure.  She was admitted to the ICU service and was placed on mechanical ventilator.  MRI of the brain revealed multiple bilateral embolic stroke.  Blood cultures negative.  NATHANIEL was negative.  Patient had Zio patch placed at time of discharge.  During same hospitalization patient had acute blood loss anemia secondary to GI bleeding with coffee-ground emesis.  Gastric ulcer cauterized 2021.    Patient presents today with her daughter for follow-up.  She was seen by neurology last week where she was encouraged to follow-up with our office due to high concern for cardioembolic stroke.  Patient ZIO monitor at time of discharge revealed multiple episodes of SVT with 1 slightly suspicious episode of A. fib lasting less than 30 seconds.  Dr. Jackson has personally reviewed the strips as well as myself and there is no concern for atrial fibrillation on monitor.  Patient had an outpatient hemoglobin drawn on 8/10/2021 where patient is still anemic with hemoglobin 8.7.  She has a follow-up with gastroenterology 10/20/2021.  Patient has not had any further episodes of strokelike symptoms.   ER Physician History and Physical    HPI:    I was wearing a mask during the entire encounter with this patient.    History obtained from patient, family.    39-year-old male presents to ER with vomiting and diarrhea.  Vomited once yesterday, multiple episodes of watery diarrhea yesterday and this morning.  Denies any abdominal pain but states \"my intestines feel numb\".  Feels very fatigued and does not feel like himself.  No headaches or fevers or chills or back pain.  No recent antibiotic use or travel or hospitalization.    Review of Systems:  General: Negative except as noted above in HPI  Skin: Negative except as noted above in HPI  Eyes: Negative except as noted above in HPI  ENT: Negative except as noted above in HPI  Neck: Negative except as noted above in HPI  Respiratory: Negative except as noted above in HPI  Cardiac: Negative except as noted above in HPI  Gastrointestinal: Negative except as noted above in HPI  Urinary: Negative except as noted above in HPI  Musculoskeletal: Negative except as noted above in HPI  Neurologic: Negative except as noted above in HPI  All other systems reviewed and negative      Allergies   Allergen Reactions   • Penicillins SWELLING and SHORTNESS OF BREATH     [facility] Allergy Text Annotation: penicillins     • Unknown Other (See Comments)     tempormental       No past medical history on file.    No past surgical history on file.    No family history on file.    Social History     Tobacco Use   • Smoking status: Never   • Smokeless tobacco: Never       Physical Exam:  Patient Vitals for the past 24 hrs:   BP Temp Pulse Resp SpO2 Height Weight   04/06/23 1015 (!) 138/111 -- 71 15 99 % -- --   04/06/23 0854 (!) 141/88 96.9 °F (36.1 °C) 95 20 100 % 6' (1.829 m) 86.2 kg (190 lb)       Pulse Ox is 100% on Room Air which is normal for this patient    General Appearance: AAOx3, NAD  Skin: No rash, no bruising  HEENT: Normocephalic/atraumatic, sclera anicteric, no facial  Denies any episodes of tachycardia or heart palpitations.  She also denies blood in the stool, hematuria.  She is asymptomatic and denies chest pain, shortness of breath at rest or with exertion, edema.  Reports baseline fatigue.      The following portions of the patient's history were reviewed and updated as appropriate: allergies, current medications, past family history, past medical history, past social history, past surgical history and problem list.      VITAL SIGNS     Visit Vitals  LMP  (LMP Unknown)         Wt Readings from Last 3 Encounters:   09/20/21 67.6 kg (149 lb)   09/16/21 68 kg (150 lb)   09/15/21 67.7 kg (149 lb 3.2 oz)     There is no height or weight on file to calculate BMI.      REVIEW OF SYSTEMS   Review of Systems   Constitutional: Positive for malaise/fatigue. Negative for chills, fever, weight gain and weight loss.   Cardiovascular: Negative for leg swelling.   Respiratory: Negative for cough, snoring and wheezing.    Hematologic/Lymphatic: Negative for bleeding problem. Does not bruise/bleed easily.   Skin: Negative for color change.   Musculoskeletal: Negative for falls, joint pain and myalgias.   Gastrointestinal: Negative for melena.   Genitourinary: Negative for hematuria.   Neurological: Negative for excessive daytime sleepiness.   Psychiatric/Behavioral: Negative for depression. The patient is not nervous/anxious.            PHYSICAL EXAMINATION     Vitals and nursing note reviewed.   Constitutional:       Appearance: Normal appearance. Well-developed.   Eyes:      Conjunctiva/sclera: Conjunctivae normal.   Neck:      Vascular: No carotid bruit.   Pulmonary:      Breath sounds: Normal breath sounds.   Cardiovascular:      Normal rate. Regular rhythm. Normal S1 with normal intensity. Normal S2 with normal intensity.      Murmurs: There is no murmur.      No gallop. No click. No rub.   Edema:     Peripheral edema absent.   Musculoskeletal: Normal range of motion. Skin:     General:  swelling  Neck: Normal range of motion, no meningismus  Chest and Lungs: Clear to auscultation bilaterally, no wheezing, rales, or rhonchi  Cardiovascular: Regular rate, regular rhythm, no murmus  Abdomen: Soft, non-tender, nondistended, no rebound or guarding  Back: No midline tenderness, no CVA tenderness  Musculoskeletal: No edema or tenderness  Neurologic: Awake, alert, no obvious deficits, moving all extremities  Psychiatric: Appropriate, cooperative    Medical Decision Making:  Differential diagnoses to consider in this patient include but are not limited to: acute gastroenteritis vs gallbladder/hepatic pathology vs pancreatitis vs gastritis/reflux vs appendicitis vs diverticulitis/colitis vs obstruction vs renal colic.    Patient afebrile with normal vital signs, but is very uncomfortable on arrival, then did start complaining of some chest pain, EKG no ischemia, troponin within normal limits.  Labs with elevated anion gap likely due to dehydration/ketosis, other labs generally unremarkable.  CT abdomen pelvis with no significant acute process.  After medications and fluids patient states his symptoms are improved, no chest pain, no abdominal pain or vomiting.  He is tolerating oral intake.  Symptoms consistent with gastroenteritis and he is comfortable with discharge home w/ Bentyl and Zofran prescriptions and follow-up with PCP.    I personally reviewed external documentation including prior outpatient surgical notes, hx of hemorrhoids    EKG Personally Interpreted by EM physician  Indication: chest pain  Interpretation: Sinus rhythm, rate 79, no acute ischemia.    Rhythm strip personally interpreted by EM physician  Indication: chest pain  Interpretation: sinus rhythm, regular rate, no ectopy    Results for orders placed or performed during the hospital encounter of 04/06/23   Comprehensive Metabolic Panel   Result Value Ref Range    Fasting Status      Sodium 140 135 - 145 mmol/L    Potassium 3.8 3.4 -  5.1 mmol/L    Chloride 101 97 - 110 mmol/L    Carbon Dioxide 22 21 - 32 mmol/L    Anion Gap 21 (H) 7 - 19 mmol/L    Glucose 103 (H) 70 - 99 mg/dL    BUN 15 6 - 20 mg/dL    Creatinine 1.22 (H) 0.67 - 1.17 mg/dL    Glomerular Filtration Rate 77 >=60    BUN/Cr 12 7 - 25    Calcium 9.3 8.4 - 10.2 mg/dL    Bilirubin, Total 1.4 (H) 0.2 - 1.0 mg/dL    GOT/AST 20 <=37 Units/L    GPT/ALT 31 <64 Units/L    Alkaline Phosphatase 98 45 - 117 Units/L    Albumin 4.7 3.6 - 5.1 g/dL    Protein, Total 8.4 (H) 6.4 - 8.2 g/dL    Globulin 3.7 2.0 - 4.0 g/dL    A/G Ratio 1.3 1.0 - 2.4   Lipase   Result Value Ref Range    Lipase 93 73 - 393 Units/L   CBC with Automated Differential (performable only)   Result Value Ref Range    WBC 12.0 (H) 4.2 - 11.0 K/mcL    RBC 5.90 4.50 - 5.90 mil/mcL    HGB 15.8 13.0 - 17.0 g/dL    HCT 48.3 39.0 - 51.0 %    MCV 81.9 78.0 - 100.0 fl    MCH 26.8 26.0 - 34.0 pg    MCHC 32.7 32.0 - 36.5 g/dL    RDW-CV 14.8 11.0 - 15.0 %    RDW-SD 44.3 39.0 - 50.0 fL     140 - 450 K/mcL    NRBC 0 <=0 /100 WBC    Neutrophil, Percent 68 %    Lymphocytes, Percent 19 %    Mono, Percent 9 %    Eosinophils, Percent 2 %    Basophils, Percent 1 %    Immature Granulocytes 1 %    Absolute Neutrophils 8.3 (H) 1.8 - 7.7 K/mcL    Absolute Lymphocytes 2.3 1.0 - 4.8 K/mcL    Absolute Monocytes 1.0 (H) 0.3 - 0.9 K/mcL    Absolute Eosinophils  0.2 0.0 - 0.5 K/mcL    Absolute Basophils 0.1 0.0 - 0.3 K/mcL    Absolute Immature Granulocytes 0.1 0.0 - 0.2 K/mcL   Magnesium   Result Value Ref Range    Magnesium 2.2 1.7 - 2.4 mg/dL   TROPONIN I, HIGH SENSITIVITY   Result Value Ref Range    Troponin I, High Sensitivity 4 <77 ng/L       Imaging Results          CT ABDOMEN PELVIS W CONTRAST (Final result)  Result time 04/06/23 11:40:13    Final result                 Impression:    Impression:  1. No CT evidence of bowel obstruction.  No CT evidence of acute appendicitis.  No CT evidence of acute diverticulitis.  2.  No  Skin is warm and dry.   Neurological:      Mental Status: Alert and oriented to person, place, and time.      GCS: GCS eye subscore is 4. GCS verbal subscore is 5. GCS motor subscore is 6.   Psychiatric:         Speech: Speech normal.         Behavior: Behavior normal.         Thought Content: Thought content normal.         Judgment: Judgment normal.           REVIEWED DATA     Procedures    Cardiac Procedures:  1. NATHANIEL 6/16/2021.  Systolic function normal.  Mild to moderate mitral valve regurgitation.  Saline test results are negative.  2. Echocardiogram 6/14/2021.  LVEF 61-65%.  Mild to moderate LVH.  Grade 1 diastolic dysfunction.  Normal RV cavity size and systolic function.  Left atrial cavity is mild to moderately dilated.  Calculated RVSP 21 mmHg.  3. ZIO monitor 7/21/2021.  An abnormal monitor study.  Several episodes of SVT with at least one being suspicious for atrial fibrillation.    Lipid Panel    Lipid Panel 10/14/20 1/20/21 8/10/21   Total Cholesterol 163 176 141   Triglycerides 513 (A) 361 (A) 259 (A)   HDL Cholesterol 31 (A) 37 (A) 30 (A)   VLDL Cholesterol 77 (A) 58 (A) 42 (A)   LDL Cholesterol  55 81 69   (A) Abnormal value       Comments are available for some flowsheets but are not being displayed.               ASSESSMENT & PLAN      Diagnosis Plan   1. Cerebrovascular accident (CVA) due to embolism of precerebral artery (CMS/[facility])  Loop Recorder Implant   2. Aortic valve calcification     3. Nonrheumatic mitral valve regurgitation     4. History of GI bleed           SUMMARY/DISCUSSION  1. Subacute bilateral cardioembolic strokes.  Patient with recent hospitalization for bilateral embolic strokes.  High suspicion for cardio source.  Patient has had NATHANIEL as well as ZIO monitoring which have not revealed any sources of patient's stroke.  Daughter is very anxious that patient will suffer another stroke.  I have discussed this with Dr. Jackson who is personally reviewed ZIO monitoring strips.  There is  hydronephrosis.  Prostate gland is mildly enlarged and heterogeneous.  3.  No suspicious hepatic mass.  Mild fatty infiltration of the liver.    Electronically Signed by: LUZMARIA MELO MD   Signed on: 4/6/2023 11:40 AM   Workstation ID: [id number]             Narrative:    EXAM: CT ABDOMEN PELVIS W CONTRAST    CLINICAL INDICATION: Abdominal pain.    COMPARISON: CT scan of the abdomen and pelvis from 01/26/2015.    TECHNIQUE:  CT scan of the abdomen and pelvis is obtained utilizing intravenous  contrast, no oral contrast as requested. Coronal reformatted images were  obtained.   Adjustment of the mA and or kV was done based on the patient's size.    FINDINGS:  Sections through the base of the chest demonstrate normal heart base and  pericardial base.   There is no pleural fluid collection.    Sections through the abdomen and pelvis demonstrate no suspicious hepatic  mass.  There is mild diffuse fatty infiltration of the liver.  No calcified gallstones are noted in the gallbladder on the CT exam.  Spleen is unremarkable.  Pancreas is unremarkable.  There is no adrenal mass.  There is no hydronephrosis.  There is no CT evidence of pyelonephritis.  Bladder appears unremarkable.  Prostate gland is mildly enlarged and heterogeneous with several small  calcifications.  Stomach is not distended.  There is predominantly fluid in the stomach.  Small bowel loops are not distended. Large bowel loops are not distended.  There is no evidence of bowel obstruction.  Appendix has normal diameter.  There is no CT evidence of acute  appendicitis.  Multiple diverticuli are noted.  There is no CT evidence of acute  diverticulitis.  No inflammatory changes are noted in the abdominal and pelvic fat.   There is no extraluminal fluid collection in the abdomen or pelvis.  No abdominal aortic aneurysm is noted.  There is mild atherosclerotic  disease in the aorta and branches.  No enlarged lymph nodes are noted in the abdomen or  no evidence of atrial fibrillation on her ZIO monitor.  Recommend proceeding with implantation of Linq loop recorder so that we can monitor for possible episodes of A. fib.  If patient is found to have atrial fibrillation anticoagulation will be challenging as patient also had GI bleeding with a ulcer cauterized on 6/12/2021 during same admission.  GI follow-up planned for 10/20/2021.  2. Aortic valve calcification.  Seen on echocardiogram 6/14/2021.  Not causing stenosis or insufficiency.  3. Mitral valve regurgitation.  4. Recent GI bleed.  Most recent hemoglobin 8.7 on 8/10/2021.  5. Follow-up with Dr. Jackson in 3-4 months.  Sooner for problems or complications.        MEDICATIONS         Discharge Medications          Accurate as of September 23, 2021  1:34 PM. If you have any questions, ask your nurse or doctor.            Continue These Medications      Instructions Start Date   Accu-Chek Ilana Plus test strip  Generic drug: glucose blood   TEST THREE TIMES DAILY BEFORE MEALS AS DIRECTED      Accu-Chek Ilana Plus w/Device kit   1 each, Does not apply, 3 Times Daily Before Meals      Accu-Chek Ilana solution   1 each, In Vitro, 3 Times Daily Before Meals      Accu-Chek Multiclix Lancet Dev kit   1 each, Does not apply, 3 Times Daily Before Meals      Accu-Chek Softclix Lancets lancets   TEST BLOOD SUGAR THREE TIMES DAILY BEFORE MEALS AS DIRECTED      B-D SINGLE USE SWABS REGULAR pads   1 each, Does not apply, 3 Times Daily Before Meals      citalopram 10 MG tablet  Commonly known as: CeleXA   10 mg, Oral, Daily      gabapentin 300 MG capsule  Commonly known as: NEURONTIN   Take 1 capsule PO nightly for 3-4 nights.  If no side effects increase to 1 capsule 2x/day.  Continue increasing until taking 2 capsules 2x/day.      Insulin Glargine 100 UNIT/ML injection pen  Commonly known as: LANTUS SOLOSTAR   10 Units, Subcutaneous, Daily      Jardiance 25 MG tablet tablet  Generic drug: empagliflozin   25 mg, Oral,  Daily      lisinopril 10 MG tablet  Commonly known as: PRINIVIL,ZESTRIL   10 mg, Oral, Daily      Melatonin 10 MG tablet   1 tablet, Oral, Every Night at Bedtime      metoprolol succinate  MG 24 hr tablet  Commonly known as: TOPROL-XL   200 mg, Oral, Daily      NovoLOG FlexPen 100 UNIT/ML solution pen-injector sc pen  Generic drug: insulin aspart   5 Units, Subcutaneous, 3 Times Daily With Meals, SSI-DEPENDS PM BLOOD GLUCOSE      pantoprazole 40 MG EC tablet  Commonly known as: PROTONIX   40 mg, Oral, 2 Times Daily Before Meals      RELION PEN NEEDLE 31G/8MM 31G X 8 MM misc  Generic drug: Insulin Pen Needle   1 each, Does not apply, 3 Times Daily      rOPINIRole 3 MG tablet  Commonly known as: REQUIP   3 mg, Oral, Nightly, Take 1-3 hr prior to bedtime      sucralfate 1 g tablet  Commonly known as: CARAFATE   1 g, Oral, 4 Times Daily Before Meals & Nightly      Trulicity 0.75 MG/0.5ML solution pen-injector  Generic drug: Dulaglutide   0.75 mg, Subcutaneous, Weekly      VITAMIN C PO   1 tablet, Oral, Daily                 **Dragon Disclaimer:   Much of this encounter note is an electronic transcription/translation of spoken language to printed text. The electronic translation of spoken language may permit erroneous, or at times, nonsensical words or phrases to be inadvertently transcribed. Although I have reviewed the note for such errors, some may still exist.    pelvis.  No aggressive bone lesion is noted.                                  Medications   sodium chloride (NORMAL SALINE) 0.9 % bolus 1,000 mL (0 mLs Intravenous Completed 4/6/23 1040)   ondansetron (ZOFRAN) injection 4 mg (4 mg Intravenous Given 4/6/23 0959)   dicyclomine (BENTYL) injection 20 mg (20 mg Intramuscular Given 4/6/23 1006)   iohexol (OMNIPAQUE 350 INJECT) contrast solution 75 mL (75 mLs Intravenous Given 4/6/23 1054)     I explained to the patient that an emergency department evaluation is not exhaustive and it is important to follow up promptly with a primary care doctor or specialist. I also advised the patient to return to the ED if symptoms worsen or there are any new concerning symptoms. The patient expressed understanding of these follow up and return instructions.       Clinical Impression:  ED Diagnosis   1. Gastroenteritis              Discharge 4/6/2023  1:01 PM  Grant Trevino discharge to home/self care.          Grant Johnson MD   4/6/2023 9:46 AM      Grant Johnson MD  04/06/23 9016

## 2023-09-02 PROCEDURE — 93298 REM INTERROG DEV EVAL SCRMS: CPT | Performed by: INTERNAL MEDICINE

## 2023-09-02 PROCEDURE — G2066 INTER DEVC REMOTE 30D: HCPCS | Performed by: INTERNAL MEDICINE

## 2024-10-02 ENCOUNTER — TRANSCRIBE ORDERS (OUTPATIENT)
Dept: MAMMOGRAPHY | Facility: HOSPITAL | Age: 69
End: 2024-10-02
Payer: MEDICARE

## 2024-10-02 DIAGNOSIS — Z12.31 BREAST CANCER SCREENING BY MAMMOGRAM: Primary | ICD-10-CM

## 2025-03-29 PROCEDURE — 93298 REM INTERROG DEV EVAL SCRMS: CPT | Performed by: INTERNAL MEDICINE

## 2025-05-04 ENCOUNTER — APPOINTMENT (OUTPATIENT)
Dept: GENERAL RADIOLOGY | Facility: HOSPITAL | Age: 70
End: 2025-05-04
Payer: MEDICARE

## 2025-05-04 ENCOUNTER — HOSPITAL ENCOUNTER (EMERGENCY)
Facility: HOSPITAL | Age: 70
Discharge: HOME | End: 2025-05-04
Attending: STUDENT IN AN ORGANIZED HEALTH CARE EDUCATION/TRAINING PROGRAM | Admitting: STUDENT IN AN ORGANIZED HEALTH CARE EDUCATION/TRAINING PROGRAM
Payer: MEDICARE

## 2025-05-04 VITALS
RESPIRATION RATE: 18 BRPM | BODY MASS INDEX: 25.87 KG/M2 | HEART RATE: 68 BPM | HEIGHT: 63 IN | DIASTOLIC BLOOD PRESSURE: 83 MMHG | TEMPERATURE: 98.3 F | SYSTOLIC BLOOD PRESSURE: 161 MMHG | OXYGEN SATURATION: 94 % | WEIGHT: 146 LBS

## 2025-05-04 DIAGNOSIS — S70.01XA CONTUSION OF RIGHT HIP, INITIAL ENCOUNTER: Primary | ICD-10-CM

## 2025-05-04 DIAGNOSIS — S40.011A CONTUSION OF RIGHT SHOULDER, INITIAL ENCOUNTER: ICD-10-CM

## 2025-05-04 PROCEDURE — 73030 X-RAY EXAM OF SHOULDER: CPT

## 2025-05-04 PROCEDURE — 73130 X-RAY EXAM OF HAND: CPT

## 2025-05-04 PROCEDURE — 99283 EMERGENCY DEPT VISIT LOW MDM: CPT | Performed by: STUDENT IN AN ORGANIZED HEALTH CARE EDUCATION/TRAINING PROGRAM

## 2025-05-04 PROCEDURE — 73502 X-RAY EXAM HIP UNI 2-3 VIEWS: CPT

## 2025-05-04 PROCEDURE — 73552 X-RAY EXAM OF FEMUR 2/>: CPT

## 2025-05-04 RX ORDER — IBUPROFEN 600 MG/1
600 TABLET, FILM COATED ORAL EVERY 6 HOURS PRN
Qty: 20 TABLET | Refills: 0 | Status: SHIPPED | OUTPATIENT
Start: 2025-05-04

## 2025-05-04 NOTE — ED PROVIDER NOTES
Subjective   History of Present Illness  69-year-old female presents emergency department for a fall that occurred on Thursday.  She is currently complaining of pain in her hands, right shoulder, and right hip.  She is able to ambulate without issue.  She denies any head trauma or loss of consciousness.        Review of Systems   All other systems reviewed and are negative.      Past Medical History:   Diagnosis Date    Acute renal failure (ARF) 03/03/2020    March 10, 2020: She had severe diarrhea with viral gastroenteritis started on March 2 and she went to the hospital on March 3, 2020.  She was found to be in severe metabolic acidosis and acute renal failure.  She was hospitalized for 5 days.  Her creatinine was initially over 8 and upon discharge was down to 1.41.  She is doing much better no longer has diarrhea.  Will get a check her kidney funct    Anxiety 09/30/2020 9/30/2020:  Pt states she has anxiety and panic attacks and requests a medication.  She states she has had panic attacks at dialysis and at night. She has stayed up at night cleaning house.  11/11/2020: Her  states she has been taking more of her pain medicines than prescribed. He states she doubles up inappropriately on er pain meds. He states she has been escalating her doses of Lyrica.      Benign paroxysmal positional vertigo 03/22/2016 August 29, 2019: Currently asymptomatic.  Continue to monitor.    Calculus of kidney 11/25/2020    Chronic bilateral low back pain with bilateral sciatica 02/28/2017 August 18, 2021: She has chronic low back pain that goes down both of her legs.  She has been experiencing worsening pain and now numbness in both of her legs that is not getting any better.  It is affecting her activities of daily living including doing household chores.  X-ray and MRI of the lumbar spine.  Refer to pain management.  Refer to physical therapy.     Chronic pain of left ankle 08/17/2021    Chronic pain of right  ankle 08/17/2021    COPD (chronic obstructive pulmonary disease)     Diabetes mellitus     type 2    Fibroid     History of GI bleed     History of stroke     History of subdural hematoma 10/29/2020    10/29/2020: She is following with neurology for history of subdural hematoma. She had only been home floor four hours after getting out of the hospital for renal failure and fell and hit her head on the concrete and slipped down the ramp and hit her head.  She was hospitalized again and is following with neurology. She went to [facility].  Following with neurology.     Hypertension     Internal carotid artery stenosis, right     mild by duplex 6/2021    Leukocytosis     Malignant neoplasm of upper lobe of left lung     Neuromuscular disorder     Panic attack     Plantar fasciitis, bilateral 09/15/2021    9/15/2021: She wears flip flops, sandals, and goes bare foot a lot. She has foot pain when she wakes up and puts pressure on her feet in the morning. Pt advised and agrees to wear tennis shoes all day as much as possible.    Restless legs syndrome 11/25/2020    Skin cancer     nose    TIA (transient ischemic attack)     Vitamin D deficiency 11/01/2017       Allergies   Allergen Reactions    Ativan [Lorazepam] Mental Status Change    Metformin Other (See Comments)     Kidney failure       Past Surgical History:   Procedure Laterality Date    CARDIAC ELECTROPHYSIOLOGY PROCEDURE N/A 10/5/2021    Procedure: Loop insertion LINQ;  Surgeon: Eduard Castellon MD;  Location: [facility] CATH INVASIVE LOCATION;  Service: Cardiovascular;  Laterality: N/A;    CHOLECYSTECTOMY      COLONOSCOPY  approx 2018    ENDOSCOPY N/A 6/11/2021    Procedure: ESOPHAGOGASTRODUODENOSCOPY AT BEDSIDE;  Surgeon: Donovan Banks MD;  Location: [facility] ENDOSCOPY;  Service: Gastroenterology;  Laterality: N/A;  pre: melena, GI bleed, anemia   post: retained food     ENDOSCOPY N/A 6/12/2021    Procedure: ESOPHAGOGASTRODUODENOSCOPY WITH GOLD PROBE  CAUTERY 7FR TO BLEEDING GASTRIC ANTRUM ULCER;  Surgeon: Calvin Barraza MD;  Location: [facility] ENDOSCOPY;  Service: Gastroenterology;  Laterality: N/A;  PRE- GI BLEED  POST- BLEEDING GASTRIC ANTRUM ULCER    INSERTION HEMODIALYSIS CATHETER N/A 9/3/2020    Procedure: HEMODIALYSIS CATHETER INSERTION;  Surgeon: Sergio Durant MD;  Location: [facility] MAIN OR;  Service: Vascular;  Laterality: N/A;    KIDNEY STONE SURGERY      MEDIAL BRANCH BLOCK Bilateral 2022    Procedure: bilateral L3-L5 x 2, 1-2 weeks apart;  Surgeon: Ronit Harrison MD;  Location: [facility] MAIN OR;  Service: Pain Management;  Laterality: Bilateral;    MEDIAL BRANCH BLOCK Bilateral 2022    Procedure: MEDIAL BRANCH BLOCK LUMBAR bilateral L4-S1;  Surgeon: Ronit Harrison MD;  Location: [facility] MAIN OR;  Service: Pain Management;  Laterality: Bilateral;    RADIOFREQUENCY ABLATION Bilateral 3/16/2022    Procedure: RADIOFREQUENCY ABLATION LUMBAR bilateral L4-S1;  Surgeon: Ronit Harrison MD;  Location: [facility] MAIN OR;  Service: Pain Management;  Laterality: Bilateral;    URETEROSCOPY LASER LITHOTRIPSY WITH STENT INSERTION Right 2020    Procedure: CYSTOSCOPY, RIGHT URETEROSCOPY, RIGHT RETROGRADE PYELOGRAM, LASER LITHOTRIPSY WITH RIGHT URETERAL STENT EXCHANGE;  Surgeon: Mikie Mejia MD;  Location: [facility] MAIN OR;  Service: Urology;  Laterality: Right;       Family History   Problem Relation Age of Onset    Heart disease Mother          at age 63    Diabetes Mother     Hypertension Mother     Diabetes Father     Deep vein thrombosis Father     Depression Father     Liver disease Father     Lung cancer Father 58         at age 68    Breast cancer Maternal Grandmother         ? age dx    Dementia Maternal Grandmother     Hypertension Maternal Grandmother     Ovarian cancer Daughter 23    Diabetes Daughter     Depression Daughter     Diabetes Sister     Diabetes Brother     Heart disease Brother     Cancer Brother           at age 43    Heart disease Maternal Uncle     Cancer Paternal Uncle     Clotting disorder Paternal Grandmother     Colon cancer Neg Hx     Colon polyps Neg Hx     Malig Hyperthermia Neg Hx        Social History     Socioeconomic History    Marital status:      Spouse name: Golden    Number of children: 2    Years of education: 12    Highest education level: High school graduate   Tobacco Use    Smoking status: Every Day     Current packs/day: 0.25     Average packs/day: 0.3 packs/day for 55.3 years (13.8 ttl pk-yrs)     Types: Cigarettes     Start date:     Smokeless tobacco: Never    Tobacco comments:     uses vape and smokes cigarettes   Vaping Use    Vaping status: Every Day    Substances: Flavoring   Substance and Sexual Activity    Alcohol use: No    Drug use: No    Sexual activity: Yes     Partners: Male     Birth control/protection: Post-menopausal           Objective   Physical Exam  Constitutional:       Appearance: Normal appearance.   HENT:      Head: Normocephalic.      Mouth/Throat:      Mouth: Mucous membranes are moist.   Eyes:      Pupils: Pupils are equal, round, and reactive to light.   Cardiovascular:      Rate and Rhythm: Normal rate and regular rhythm.   Pulmonary:      Effort: Pulmonary effort is normal.   Abdominal:      Palpations: Abdomen is soft.   Musculoskeletal:         General: Normal range of motion.      Cervical back: Normal range of motion.      Comments: Mild tenderness to palpation of the dorsum of the bilateral hands.  Mild tenderness to palpation of the right shoulder.  Ecchymosis and tenderness to palpation of the right hip.   Neurological:      General: No focal deficit present.      Mental Status: She is alert.   Psychiatric:         Mood and Affect: Mood normal.         Procedures           ED Course                                                       Medical Decision Making  69-year-old female presents emergency department for a fall that occurred on  Thursday.  She is currently complaining of pain in her hands, right shoulder, and right hip.  She is able to ambulate without issue.  She denies any head trauma or loss of consciousness.  Vitals within normal limits upon arrival.  X-ray imaging independently interpreted by me negative for any acute fractures.  Patient advised to continue symptomatic control at home with ibuprofen and to return the emergency room for any worsening symptoms.  Patient understands and agrees with our plan for discharge.    Problems Addressed:  Contusion of right hip, initial encounter: complicated acute illness or injury  Contusion of right shoulder, initial encounter: complicated acute illness or injury    Amount and/or Complexity of Data Reviewed  Independent Historian: caregiver  Radiology: ordered and independent interpretation performed.    Risk  Prescription drug management.        Final diagnoses:   Contusion of right hip, initial encounter   Contusion of right shoulder, initial encounter       ED Disposition  ED Disposition       ED Disposition   Discharge    Condition   Stable    Comment   --               Merary Calderon MD  [address] [place] [id number]  [phone]      As needed         Medication List        New Prescriptions      ibuprofen 600 MG tablet  Commonly known as: ADVIL,MOTRIN  Take 1 tablet by mouth Every 6 (Six) Hours As Needed for Moderate Pain.               Where to Get Your Medications        These medications were sent to [facility] - [place], [place] - [address] - [phone] [facility] [phone] FX  [address], [facility] [address]      Phone: [phone]   ibuprofen 600 MG tablet            Joselito Garcia DO  05/04/25 [id number]

## 2025-05-04 NOTE — Clinical Note
[facility] EMERGENCY DEPARTMENT  [address]  [place] [address]  Phone: [phone]    Jayne Beltran was seen and treated in our emergency department on 5/4/2025.  She may return to work on 05/04/2025.         Thank you for choosing [facility].    Joselito Garcia DO

## 2025-05-04 NOTE — Clinical Note
[facility] EMERGENCY DEPARTMENT  [address]  [place] [address]  Phone: [phone]    Jayne Beltran was seen and treated in our emergency department on 5/4/2025.  She may return to work on 05/05/2025.         Thank you for choosing [facility].    Joselito Garcia DO

## 2025-06-20 NOTE — TELEPHONE ENCOUNTER
Can you refill for Dr. Harrison? The patient has not been seen since April PAST SURGICAL HISTORY:  H/O cystoscopy     H/O fracture of fibula nondisplaced 03/2010    H/O laminectomy 03/2001

## 2025-06-30 LAB
MDC_IDC_PG_IMPLANT_DTM: NORMAL
MDC_IDC_PG_MFG: NORMAL
MDC_IDC_PG_MODEL: NORMAL
MDC_IDC_PG_SERIAL: NORMAL
MDC_IDC_PG_TYPE: NORMAL
MDC_IDC_SESS_DTM: NORMAL
MDC_IDC_SESS_TYPE: NORMAL

## 2025-07-23 LAB
MDC_IDC_MSMT_BATTERY_STATUS: NORMAL
MDC_IDC_PG_IMPLANT_DTM: NORMAL
MDC_IDC_PG_MFG: NORMAL
MDC_IDC_PG_MODEL: NORMAL
MDC_IDC_PG_SERIAL: NORMAL
MDC_IDC_PG_TYPE: NORMAL
MDC_IDC_SESS_DTM: NORMAL
MDC_IDC_SESS_TYPE: NORMAL
MDC_IDC_STAT_AT_BURDEN_PERCENT: 0

## 2025-07-24 ENCOUNTER — TELEPHONE (OUTPATIENT)
Age: 70
End: 2025-07-24
Payer: MEDICARE

## 2025-07-24 NOTE — TELEPHONE ENCOUNTER
Patient with [employer] Link implanted for cryptogenic stroke, no AF documented on device since implant. Remote transmission reviewed today documented RRT reached on 7/22/25. I LVM with patient that the device has reached RRT and the recommendation is to leave the device implanted. My direct number given if she has any questions.

## 2025-07-29 LAB
MDC_IDC_MSMT_BATTERY_STATUS: NORMAL
MDC_IDC_PG_IMPLANT_DTM: NORMAL
MDC_IDC_PG_MFG: NORMAL
MDC_IDC_PG_MODEL: NORMAL
MDC_IDC_PG_SERIAL: NORMAL
MDC_IDC_PG_TYPE: NORMAL
MDC_IDC_SESS_DTM: NORMAL
MDC_IDC_SESS_TYPE: NORMAL
MDC_IDC_STAT_AT_BURDEN_PERCENT: 0

## (undated) DEVICE — GLV SURG BIOGEL LTX PF 8 1/2

## (undated) DEVICE — LOU LOOP RECORDER PACK: Brand: MEDLINE INDUSTRIES, INC.

## (undated) DEVICE — GLV SURG TRIUMPH PF LTX 7.5 STRL

## (undated) DEVICE — NDL SPINE 22G 31/2IN BLK

## (undated) DEVICE — SUT ETHLN 2/0 PS 18IN 585H

## (undated) DEVICE — FIBR LASR HOLMIUM ACCUMAX 200 1PT USE

## (undated) DEVICE — NDL HYPO PRECISIONGLIDE REG 25G 1 1/2

## (undated) DEVICE — GLV SURG BIOGEL LTX PF 7 1/2

## (undated) DEVICE — GLV SURG TRIUMPH PF LTX 7 STRL

## (undated) DEVICE — BIOPATCH™ ANTIMICROBIAL DRESSING WITH CHLORHEXIDINE GLUCONATE IS A HYDROPHILLIC POLYURETHANE ABSORPTIVE FOAM WITH CHLORHEXIDINE GLUCONATE (CHG) WHICH INHIBITS BACTERIAL GROWTH UNDER THE DRESSING. THE DRESSING IS INTENDED TO BE USED TO ABSORB EXUDATE, COVER A WOUND CAUSED BY VASCULAR AND NONVASCULAR PERCUTANEOUS MEDICAL DEVICES DURING SURGERY, AS WELL AS REDUCE LOCAL INFECTION AND COLONIZATION OF MICROORGANISMS.: Brand: BIOPATCH

## (undated) DEVICE — CANN O2 ETCO2 FITS ALL CONN CO2 SMPL A/ 7IN DISP LF

## (undated) DEVICE — DECANT BG O JET

## (undated) DEVICE — SENSR O2 OXIMAX FNGR A/ 18IN NONSTR

## (undated) DEVICE — TUBING, SUCTION, 1/4" X 10', STRAIGHT: Brand: MEDLINE

## (undated) DEVICE — TIDISHIELD UROLOGY DRAIN BAGS FROSTY VINYL STERILE FITS SIEMENS UROSKOP ACCESS 20 PER CASE: Brand: TIDISHIELD

## (undated) DEVICE — ST. SORBAVIEW ULTIMATE IJ SYSTEM A,C: Brand: CENTURION

## (undated) DEVICE — BITEBLOCK OMNI BLOC

## (undated) DEVICE — LOU MINOR PROCEDURE: Brand: MEDLINE INDUSTRIES, INC.

## (undated) DEVICE — INTENDED FOR TISSUE SEPARATION, AND OTHER PROCEDURES THAT REQUIRE A SHARP SURGICAL BLADE TO PUNCTURE OR CUT.: Brand: BARD-PARKER ® CARBON RIB-BACK BLADES

## (undated) DEVICE — PAD GRND E/S NT200IX RF/GEN W/CABL DISP

## (undated) DEVICE — EPIDURAL TRAY: Brand: MEDLINE INDUSTRIES, INC.

## (undated) DEVICE — GW SENSR NITNL STR .035 3X150CM

## (undated) DEVICE — Device

## (undated) DEVICE — TOWEL,OR,DSP,ST,BLUE,STD,4/PK,20PK/CS: Brand: MEDLINE

## (undated) DEVICE — LN SMPL CO2 SHTRM SD STREAM W/M LUER

## (undated) DEVICE — 3M™ IOBAN™ 2 ANTIMICROBIAL INCISE DRAPE 6650EZ: Brand: IOBAN™ 2

## (undated) DEVICE — GOWN,REINF,POLY,SIRUS,BRTH SLV,XLNG/XXL: Brand: MEDLINE

## (undated) DEVICE — PK URETSCP 40

## (undated) DEVICE — PRT BIOP SEALS

## (undated) DEVICE — CVR PROB 96IN LF STRL

## (undated) DEVICE — KT CATH TAL PALINDROME SAPPHIRE 14.5F23CM

## (undated) DEVICE — ANTIBACTERIAL UNDYED BRAIDED (POLYGLACTIN 910), SYNTHETIC ABSORBABLE SUTURE: Brand: COATED VICRYL

## (undated) DEVICE — BIPOLAR ELECTROHEMOSTASIS CATHETER: Brand: INJECTION GOLD PROBE

## (undated) DEVICE — SYR LUERLOK 5CC

## (undated) DEVICE — UNDERGLV SURG BIOGEL INDICAT PI SZ8.5 BLU

## (undated) DEVICE — KT ORCA ORCAPOD DISP STRL

## (undated) DEVICE — SYR SLP TP 10ML DISP

## (undated) DEVICE — ADAPT CLN BIOGUARD AIR/H2O DISP

## (undated) DEVICE — SYR LUERLOK 20CC BX/50

## (undated) DEVICE — ADHS SKIN DERMABOND TOP ADVANCED